# Patient Record
Sex: MALE | Race: WHITE | Employment: OTHER | URBAN - METROPOLITAN AREA
[De-identification: names, ages, dates, MRNs, and addresses within clinical notes are randomized per-mention and may not be internally consistent; named-entity substitution may affect disease eponyms.]

---

## 2019-12-05 ENCOUNTER — APPOINTMENT (OUTPATIENT)
Dept: CT IMAGING | Age: 74
End: 2019-12-05
Payer: MEDICARE

## 2019-12-05 ENCOUNTER — HOSPITAL ENCOUNTER (EMERGENCY)
Age: 74
Discharge: HOME | End: 2019-12-05
Attending: EMERGENCY MEDICINE
Payer: MEDICARE

## 2019-12-05 ENCOUNTER — APPOINTMENT (OUTPATIENT)
Dept: GENERAL RADIOLOGY | Age: 74
End: 2019-12-05
Payer: MEDICARE

## 2019-12-05 VITALS
HEART RATE: 73 BPM | RESPIRATION RATE: 18 BRPM | SYSTOLIC BLOOD PRESSURE: 152 MMHG | HEIGHT: 68 IN | BODY MASS INDEX: 25.31 KG/M2 | TEMPERATURE: 98.7 F | WEIGHT: 167 LBS | OXYGEN SATURATION: 96 % | DIASTOLIC BLOOD PRESSURE: 70 MMHG

## 2019-12-05 DIAGNOSIS — N18.6 ESRD ON HEMODIALYSIS (HCC): ICD-10-CM

## 2019-12-05 DIAGNOSIS — Z99.2 ESRD ON HEMODIALYSIS (HCC): ICD-10-CM

## 2019-12-05 DIAGNOSIS — R51.9 NONINTRACTABLE HEADACHE, UNSPECIFIED CHRONICITY PATTERN, UNSPECIFIED HEADACHE TYPE: Primary | ICD-10-CM

## 2019-12-05 LAB
ALBUMIN SERPL-MCNC: 4.7 G/DL (ref 3.5–5.2)
ALP BLD-CCNC: 79 U/L (ref 40–129)
ALT SERPL-CCNC: <5 U/L (ref 0–40)
ANION GAP SERPL CALCULATED.3IONS-SCNC: 18 MMOL/L (ref 7–16)
APTT: 32.9 SEC (ref 24.5–35.1)
AST SERPL-CCNC: 9 U/L (ref 0–39)
BACTERIA: ABNORMAL /HPF
BILIRUB SERPL-MCNC: 0.6 MG/DL (ref 0–1.2)
BILIRUBIN URINE: NEGATIVE
BLOOD, URINE: NEGATIVE
BUN BLDV-MCNC: 51 MG/DL (ref 8–23)
CALCIUM SERPL-MCNC: 10.2 MG/DL (ref 8.6–10.2)
CHLORIDE BLD-SCNC: 98 MMOL/L (ref 98–107)
CLARITY: CLEAR
CO2: 25 MMOL/L (ref 22–29)
COLOR: YELLOW
CREAT SERPL-MCNC: 6.6 MG/DL (ref 0.7–1.2)
EPITHELIAL CELLS, UA: ABNORMAL /HPF
GFR AFRICAN AMERICAN: 10
GFR NON-AFRICAN AMERICAN: 8 ML/MIN/1.73
GLUCOSE BLD-MCNC: 109 MG/DL (ref 74–99)
GLUCOSE URINE: NEGATIVE MG/DL
HCT VFR BLD CALC: 44 % (ref 37–54)
HEMOGLOBIN: 13.7 G/DL (ref 12.5–16.5)
INR BLD: 1
KETONES, URINE: NEGATIVE MG/DL
LEUKOCYTE ESTERASE, URINE: ABNORMAL
MCH RBC QN AUTO: 32.9 PG (ref 26–35)
MCHC RBC AUTO-ENTMCNC: 31.1 % (ref 32–34.5)
MCV RBC AUTO: 105.5 FL (ref 80–99.9)
NITRITE, URINE: NEGATIVE
PDW BLD-RTO: 15.1 FL (ref 11.5–15)
PH UA: 8.5 (ref 5–9)
PLATELET # BLD: 115 E9/L (ref 130–450)
PMV BLD AUTO: 11.3 FL (ref 7–12)
POTASSIUM REFLEX MAGNESIUM: 5 MMOL/L (ref 3.5–5)
PRO-BNP: ABNORMAL PG/ML (ref 0–450)
PROTEIN UA: 100 MG/DL
PROTHROMBIN TIME: 11.8 SEC (ref 9.3–12.4)
RBC # BLD: 4.17 E12/L (ref 3.8–5.8)
RBC UA: ABNORMAL /HPF (ref 0–2)
SODIUM BLD-SCNC: 141 MMOL/L (ref 132–146)
SPECIFIC GRAVITY UA: 1.01 (ref 1–1.03)
TOTAL PROTEIN: 7.7 G/DL (ref 6.4–8.3)
TROPONIN: 0.05 NG/ML (ref 0–0.03)
TROPONIN: 0.06 NG/ML (ref 0–0.03)
UROBILINOGEN, URINE: 0.2 E.U./DL
WBC # BLD: 5.1 E9/L (ref 4.5–11.5)
WBC UA: ABNORMAL /HPF (ref 0–5)

## 2019-12-05 PROCEDURE — 99284 EMERGENCY DEPT VISIT MOD MDM: CPT

## 2019-12-05 PROCEDURE — 85730 THROMBOPLASTIN TIME PARTIAL: CPT

## 2019-12-05 PROCEDURE — 85027 COMPLETE CBC AUTOMATED: CPT

## 2019-12-05 PROCEDURE — 84484 ASSAY OF TROPONIN QUANT: CPT

## 2019-12-05 PROCEDURE — 70450 CT HEAD/BRAIN W/O DYE: CPT

## 2019-12-05 PROCEDURE — 93005 ELECTROCARDIOGRAM TRACING: CPT | Performed by: EMERGENCY MEDICINE

## 2019-12-05 PROCEDURE — 83880 ASSAY OF NATRIURETIC PEPTIDE: CPT

## 2019-12-05 PROCEDURE — 87088 URINE BACTERIA CULTURE: CPT

## 2019-12-05 PROCEDURE — 80053 COMPREHEN METABOLIC PANEL: CPT

## 2019-12-05 PROCEDURE — 2500000003 HC RX 250 WO HCPCS: Performed by: EMERGENCY MEDICINE

## 2019-12-05 PROCEDURE — 96374 THER/PROPH/DIAG INJ IV PUSH: CPT

## 2019-12-05 PROCEDURE — 81001 URINALYSIS AUTO W/SCOPE: CPT

## 2019-12-05 PROCEDURE — 96375 TX/PRO/DX INJ NEW DRUG ADDON: CPT

## 2019-12-05 PROCEDURE — 85610 PROTHROMBIN TIME: CPT

## 2019-12-05 PROCEDURE — 6360000002 HC RX W HCPCS: Performed by: EMERGENCY MEDICINE

## 2019-12-05 PROCEDURE — 36415 COLL VENOUS BLD VENIPUNCTURE: CPT

## 2019-12-05 PROCEDURE — 71045 X-RAY EXAM CHEST 1 VIEW: CPT

## 2019-12-05 RX ORDER — LABETALOL HYDROCHLORIDE 5 MG/ML
10 INJECTION, SOLUTION INTRAVENOUS ONCE
Status: COMPLETED | OUTPATIENT
Start: 2019-12-05 | End: 2019-12-05

## 2019-12-05 RX ORDER — METOPROLOL TARTRATE 50 MG/1
50 TABLET, FILM COATED ORAL 2 TIMES DAILY
Status: ON HOLD | COMMUNITY
End: 2021-08-13 | Stop reason: HOSPADM

## 2019-12-05 RX ORDER — DIPHENHYDRAMINE HYDROCHLORIDE 50 MG/ML
25 INJECTION INTRAMUSCULAR; INTRAVENOUS ONCE
Status: COMPLETED | OUTPATIENT
Start: 2019-12-05 | End: 2019-12-05

## 2019-12-05 RX ORDER — ROSUVASTATIN CALCIUM 10 MG/1
5 TABLET, COATED ORAL NIGHTLY
COMMUNITY

## 2019-12-05 RX ORDER — AMLODIPINE BESYLATE 10 MG/1
10 TABLET ORAL DAILY
Status: ON HOLD | COMMUNITY
End: 2020-07-14 | Stop reason: ALTCHOICE

## 2019-12-05 RX ORDER — DOXAZOSIN 2 MG/1
2 TABLET ORAL NIGHTLY
Status: ON HOLD | COMMUNITY
End: 2020-07-14 | Stop reason: ALTCHOICE

## 2019-12-05 RX ORDER — HYDRALAZINE HYDROCHLORIDE 20 MG/ML
5 INJECTION INTRAMUSCULAR; INTRAVENOUS ONCE
Status: COMPLETED | OUTPATIENT
Start: 2019-12-05 | End: 2019-12-05

## 2019-12-05 RX ORDER — METOCLOPRAMIDE HYDROCHLORIDE 5 MG/ML
10 INJECTION INTRAMUSCULAR; INTRAVENOUS ONCE
Status: COMPLETED | OUTPATIENT
Start: 2019-12-05 | End: 2019-12-05

## 2019-12-05 RX ADMIN — LABETALOL HYDROCHLORIDE 10 MG: 5 INJECTION INTRAVENOUS at 21:25

## 2019-12-05 RX ADMIN — DIPHENHYDRAMINE HYDROCHLORIDE 25 MG: 50 INJECTION, SOLUTION INTRAMUSCULAR; INTRAVENOUS at 19:07

## 2019-12-05 RX ADMIN — METOCLOPRAMIDE 10 MG: 5 INJECTION, SOLUTION INTRAMUSCULAR; INTRAVENOUS at 19:07

## 2019-12-05 RX ADMIN — HYDRALAZINE HYDROCHLORIDE 5 MG: 20 INJECTION INTRAMUSCULAR; INTRAVENOUS at 18:19

## 2019-12-05 ASSESSMENT — PAIN SCALES - GENERAL
PAINLEVEL_OUTOF10: 0
PAINLEVEL_OUTOF10: 0
PAINLEVEL_OUTOF10: 8

## 2019-12-05 ASSESSMENT — PAIN DESCRIPTION - PAIN TYPE: TYPE: ACUTE PAIN

## 2019-12-05 ASSESSMENT — PAIN DESCRIPTION - LOCATION
LOCATION: HEAD
LOCATION: HEAD

## 2019-12-06 LAB
EKG ATRIAL RATE: 71 BPM
EKG P AXIS: 66 DEGREES
EKG P-R INTERVAL: 142 MS
EKG Q-T INTERVAL: 374 MS
EKG QRS DURATION: 68 MS
EKG QTC CALCULATION (BAZETT): 406 MS
EKG R AXIS: 65 DEGREES
EKG T AXIS: 58 DEGREES
EKG VENTRICULAR RATE: 71 BPM

## 2019-12-06 PROCEDURE — 93010 ELECTROCARDIOGRAM REPORT: CPT | Performed by: INTERNAL MEDICINE

## 2019-12-06 ASSESSMENT — ENCOUNTER SYMPTOMS
SHORTNESS OF BREATH: 0
EYE DISCHARGE: 0
VOMITING: 0
ABDOMINAL PAIN: 0
SINUS PRESSURE: 0
SORE THROAT: 0
EYE PAIN: 0
EYE REDNESS: 0
NAUSEA: 0
WHEEZING: 0
DIARRHEA: 0
COUGH: 0
BACK PAIN: 0

## 2019-12-08 LAB — URINE CULTURE, ROUTINE: NORMAL

## 2019-12-10 ENCOUNTER — HOSPITAL ENCOUNTER (OUTPATIENT)
Age: 74
Discharge: HOME | End: 2019-12-12

## 2019-12-10 PROCEDURE — 88305 TISSUE EXAM BY PATHOLOGIST: CPT

## 2020-02-19 ENCOUNTER — HOSPITAL ENCOUNTER (OUTPATIENT)
Age: 75
Discharge: HOME | End: 2020-02-21
Payer: MEDICARE

## 2020-02-19 LAB — HEMOGLOBIN: 7.6 G/DL (ref 12.5–16.5)

## 2020-02-19 PROCEDURE — 85018 HEMOGLOBIN: CPT

## 2020-03-09 ENCOUNTER — OFFICE VISIT (OUTPATIENT)
Dept: VASCULAR SURGERY | Age: 75
End: 2020-03-09
Payer: MEDICARE

## 2020-03-09 VITALS
BODY MASS INDEX: 25.76 KG/M2 | WEIGHT: 170 LBS | SYSTOLIC BLOOD PRESSURE: 150 MMHG | RESPIRATION RATE: 16 BRPM | HEIGHT: 68 IN | HEART RATE: 78 BPM | DIASTOLIC BLOOD PRESSURE: 78 MMHG

## 2020-03-09 PROBLEM — Z99.2 ENCOUNTER REGARDING VASCULAR ACCESS FOR DIALYSIS FOR ESRD (HCC): Status: ACTIVE | Noted: 2020-03-09

## 2020-03-09 PROBLEM — N18.6 ENCOUNTER REGARDING VASCULAR ACCESS FOR DIALYSIS FOR ESRD (HCC): Status: ACTIVE | Noted: 2020-03-09

## 2020-03-09 PROCEDURE — 99204 OFFICE O/P NEW MOD 45 MIN: CPT | Performed by: SURGERY

## 2020-03-09 RX ORDER — MULTIVIT-MIN/IRON/FOLIC ACID/K 18-600-40
CAPSULE ORAL
COMMUNITY

## 2020-03-09 RX ORDER — HYDRALAZINE HYDROCHLORIDE 50 MG/1
50 TABLET, FILM COATED ORAL 4 TIMES DAILY
Status: ON HOLD | COMMUNITY
End: 2021-08-13 | Stop reason: HOSPADM

## 2020-03-09 NOTE — PROGRESS NOTES
sexual activity: Not on file   Other Topics Concern    Not on file   Social History Narrative    Not on file     No family history on file.   Labs  Lab Results   Component Value Date    WBC 5.1 12/05/2019    HGB 7.6 (L) 02/19/2020    HCT 44.0 12/05/2019     (L) 12/05/2019    PROTIME 11.8 12/05/2019    INR 1.0 12/05/2019    APTT 32.9 12/05/2019    K 5.0 12/05/2019    BUN 51 (H) 12/05/2019    CREATININE 6.6 (H) 12/05/2019       PHYSICAL EXAM:    Resp 16   Ht 5' 8\" (1.727 m)   Wt 170 lb (77.1 kg)   BMI 25.85 kg/m²   CONSTITUTIONAL:   Awake, alert, cooperative  PSYCHIATRIC :  Oriented to time, place and person     Appropriate insight to disease process  EYES: Lids and lashes normal  ENT:  External ears and nose without lesions   Hearing deficits noted  NECK: Supple, symmetrical, trachea midline   Thyroid goiter not appreciated   Carotid bruit notnoted  LUNGS:  No increased work of breathing                 Clear to auscultation  CARDIOVASCULAR:  regular rate and rhythm   ABDOMEN:  soft, non-distended, non-tender   Hernias notnoted   Aorta is not palpable  Lymphatics : Cervical lymphadenopathy notnoted     Femoral lymphadenopathy notnoted  SKIN:   Normal skin color   Texture and turgor normal, no induration  EXTREMITIES:   R UE 5/5 strength   No cyanosis noted in nail beds  L UE 5/5 strength   No cyanosis noted in nail beds  R LE Edema absent   No open wounds  L LE Edema absent   No open wounds    L brachial 2+     L radial 1+     L ulnar No signal     L palmar biphasic     Arterial Dominance radial   R femoral 2+ L femoral 2+   R dorsalis pedis monophasic L dorsalis pedis Weakly biphasic   R posterior tibial Weakly biphasic L posterior tibial Weakly biphasic     RADIOLOGY: As    A/P Dialysis Access  Bleeding with use of fistula - L BC AVF  · Plan for L UE fistulagram  · Previous fistulagram Dr. Mor Rojas used 9 mm balloon for the cephalic vein, subclavian vein junction  · He also plastied an area more proximally  · will schedule OR at patient's convenience   · Pt explained risks, benefits, complications, procedure, alternatives, options, and expected outcomes of each of the above options and was agreeable to proceed  · They understand the recurrent nature of these kind of stenoses    Assx PVD  History of aorto bifemoral bypass  · He is asymptomatic from his PVD  · Continue Medical management with ASA and statin   · Discussed with pt tobacco use and relationship to PVD  pt currently is not a smoker  Pt understands tobacco use can contribute to worsening of pvd and development of limb loss   · Emphasized importance of foot care and to call if develops any wounds, ulcerations, changes in appearance, claudication and/or symptoms  · We will obtain ABIs and PVRs in the future at a follow-up appointment    Anaid Moreno

## 2020-03-09 NOTE — PATIENT INSTRUCTIONS
help?  Call your doctor now or seek immediate medical care if:    · You have signs of infection, such as:  ? Increased pain, swelling, warmth, or redness around the access. ? Red streaks leading from the access. ? Pus draining from the access. ? A fever.     · You do not feel a pulse in your access.    Watch closely for changes in your health, and be sure to contact your doctor if:    · You do not get better as expected. Where can you learn more? Go to https://[employer]. org and sign in to your [employer] account. Enter L169 in the City Notes box to learn more about \"Hemodialysis Vascular Access: Care Instructions. \"     If you do not have an account, please click on the \"Sign Up Now\" link. Current as of: August 11, 2019  Content Version: 12.3  © 1076-8445 Healthwise, Incorporated. Care instructions adapted under license by [facility] ([facility]). If you have questions about a medical condition or this instruction, always ask your healthcare professional. [place] [address] any warranty or liability for your use of this information.

## 2020-03-17 ENCOUNTER — TELEPHONE (OUTPATIENT)
Dept: VASCULAR SURGERY | Age: 75
End: 2020-03-17

## 2020-05-07 ENCOUNTER — TELEPHONE (OUTPATIENT)
Dept: VASCULAR SURGERY | Age: 75
End: 2020-05-07

## 2020-05-19 ENCOUNTER — TELEPHONE (OUTPATIENT)
Dept: VASCULAR SURGERY | Age: 75
End: 2020-05-19

## 2020-07-11 ENCOUNTER — APPOINTMENT (OUTPATIENT)
Dept: GENERAL RADIOLOGY | Age: 75
DRG: 177 | End: 2020-07-11
Payer: MEDICARE

## 2020-07-11 ENCOUNTER — HOSPITAL ENCOUNTER (EMERGENCY)
Age: 75
Discharge: HOME | DRG: 177 | End: 2020-07-11
Attending: EMERGENCY MEDICINE
Payer: MEDICARE

## 2020-07-11 VITALS
TEMPERATURE: 97.8 F | RESPIRATION RATE: 16 BRPM | DIASTOLIC BLOOD PRESSURE: 76 MMHG | HEART RATE: 70 BPM | OXYGEN SATURATION: 96 % | SYSTOLIC BLOOD PRESSURE: 149 MMHG

## 2020-07-11 LAB
ANION GAP SERPL CALCULATED.3IONS-SCNC: 12 MMOL/L (ref 7–16)
ANISOCYTOSIS: ABNORMAL
BASOPHILS ABSOLUTE: 0 E9/L (ref 0–0.2)
BASOPHILS RELATIVE PERCENT: 0 % (ref 0–2)
BUN BLDV-MCNC: 27 MG/DL (ref 8–23)
CALCIUM SERPL-MCNC: 9.5 MG/DL (ref 8.6–10.2)
CHLORIDE BLD-SCNC: 101 MMOL/L (ref 98–107)
CO2: 28 MMOL/L (ref 22–29)
CREAT SERPL-MCNC: 4.8 MG/DL (ref 0.7–1.2)
EKG ATRIAL RATE: 74 BPM
EKG P AXIS: 83 DEGREES
EKG P-R INTERVAL: 142 MS
EKG Q-T INTERVAL: 394 MS
EKG QRS DURATION: 78 MS
EKG QTC CALCULATION (BAZETT): 437 MS
EKG R AXIS: 69 DEGREES
EKG T AXIS: 61 DEGREES
EKG VENTRICULAR RATE: 74 BPM
EOSINOPHILS ABSOLUTE: 0.03 E9/L (ref 0.05–0.5)
EOSINOPHILS RELATIVE PERCENT: 1 % (ref 0–6)
GFR AFRICAN AMERICAN: 14
GFR NON-AFRICAN AMERICAN: 12 ML/MIN/1.73
GLUCOSE BLD-MCNC: 94 MG/DL (ref 74–99)
HCT VFR BLD CALC: 27.6 % (ref 37–54)
HEMOGLOBIN: 8.6 G/DL (ref 12.5–16.5)
LYMPHOCYTES ABSOLUTE: 0.22 E9/L (ref 1.5–4)
LYMPHOCYTES RELATIVE PERCENT: 8 % (ref 20–42)
MCH RBC QN AUTO: 32.7 PG (ref 26–35)
MCHC RBC AUTO-ENTMCNC: 31.2 % (ref 32–34.5)
MCV RBC AUTO: 104.9 FL (ref 80–99.9)
MONOCYTES ABSOLUTE: 0.27 E9/L (ref 0.1–0.95)
MONOCYTES RELATIVE PERCENT: 10 % (ref 2–12)
NEUTROPHILS ABSOLUTE: 2.19 E9/L (ref 1.8–7.3)
NEUTROPHILS RELATIVE PERCENT: 81 % (ref 43–80)
PDW BLD-RTO: 15.9 FL (ref 11.5–15)
PLATELET # BLD: 72 E9/L (ref 130–450)
PLATELET CONFIRMATION: NORMAL
PMV BLD AUTO: 12 FL (ref 7–12)
POIKILOCYTES: ABNORMAL
POTASSIUM REFLEX MAGNESIUM: 4.1 MMOL/L (ref 3.5–5)
RBC # BLD: 2.63 E12/L (ref 3.8–5.8)
SODIUM BLD-SCNC: 141 MMOL/L (ref 132–146)
TEAR DROP CELLS: ABNORMAL
TROPONIN: 0.08 NG/ML (ref 0–0.03)
WBC # BLD: 2.7 E9/L (ref 4.5–11.5)

## 2020-07-11 PROCEDURE — U0003 INFECTIOUS AGENT DETECTION BY NUCLEIC ACID (DNA OR RNA); SEVERE ACUTE RESPIRATORY SYNDROME CORONAVIRUS 2 (SARS-COV-2) (CORONAVIRUS DISEASE [COVID-19]), AMPLIFIED PROBE TECHNIQUE, MAKING USE OF HIGH THROUGHPUT TECHNOLOGIES AS DESCRIBED BY CMS-2020-01-R: HCPCS

## 2020-07-11 PROCEDURE — 80048 BASIC METABOLIC PNL TOTAL CA: CPT

## 2020-07-11 PROCEDURE — 85025 COMPLETE CBC W/AUTO DIFF WBC: CPT

## 2020-07-11 PROCEDURE — 84484 ASSAY OF TROPONIN QUANT: CPT

## 2020-07-11 PROCEDURE — 71045 X-RAY EXAM CHEST 1 VIEW: CPT

## 2020-07-11 PROCEDURE — 99284 EMERGENCY DEPT VISIT MOD MDM: CPT

## 2020-07-11 PROCEDURE — 93005 ELECTROCARDIOGRAM TRACING: CPT | Performed by: STUDENT IN AN ORGANIZED HEALTH CARE EDUCATION/TRAINING PROGRAM

## 2020-07-11 PROCEDURE — 93010 ELECTROCARDIOGRAM REPORT: CPT | Performed by: INTERNAL MEDICINE

## 2020-07-11 ASSESSMENT — ENCOUNTER SYMPTOMS
BACK PAIN: 0
VOMITING: 0
TROUBLE SWALLOWING: 0
DIARRHEA: 0
CONSTIPATION: 0
ABDOMINAL PAIN: 0
EYE PAIN: 0
PHOTOPHOBIA: 0
NAUSEA: 0
SORE THROAT: 0
APNEA: 0
EYE REDNESS: 0
WHEEZING: 0
SHORTNESS OF BREATH: 1
COUGH: 1
RHINORRHEA: 0
CHEST TIGHTNESS: 0

## 2020-07-11 ASSESSMENT — PAIN DESCRIPTION - PAIN TYPE: TYPE: ACUTE PAIN

## 2020-07-11 ASSESSMENT — PAIN SCALES - GENERAL: PAINLEVEL_OUTOF10: 6

## 2020-07-11 NOTE — ED PROVIDER NOTES
[facility] ENCOUNTER      Pt Name: Lynette Morales  MRN: [id number]  [place] 1945  Date of evaluation: 7/11/2020      CHIEF COMPLAINT       Chief Complaint   Patient presents with    Headache    Generalized Body Aches     wife and son have covid        HPI  Lynette Morales is a 75 y.o. male with history of end-stage renal disease on dialysis Monday Wednesday Friday, hyperlipidemia, hypertension who presents to the emergency department with complaints of headache, generalized body aches, chills and intermittent shortness of breath. He states that both his wife who he lives with and his son have been tested positive for COVID. He states his symptoms began for 5 days ago and have gradually worsened. He states that he feels generally weak. He has chills at home. The shortness of breath is mild, intermittent. Nothing seems to cause the shortness of breath. He denies dyspnea on exertion. Denies any associated chest pain, lightheadedness, syncope, nausea, vomiting, diarrhea, or abdominal pain. His last dialysis session was on Friday  And was the full time. He has not missed any dialysis sessions. He also endorses ageusia. No history of any heart problems. Except as noted above the remainder of the review of systems was reviewed and negative. Review of Systems   Constitutional: Positive for chills and fatigue. Negative for activity change, diaphoresis and fever. HENT: Negative for rhinorrhea, sore throat and trouble swallowing. Eyes: Negative for photophobia, pain and redness. Respiratory: Positive for cough and shortness of breath. Negative for apnea, chest tightness and wheezing. Cardiovascular: Negative for chest pain, palpitations and leg swelling. Gastrointestinal: Negative for abdominal pain, constipation, diarrhea, nausea and vomiting. Endocrine: Negative for polyuria.    Genitourinary: Negative for Sensory: No sensory deficit. Motor: No weakness or abnormal muscle tone. Psychiatric:         Mood and Affect: Mood normal.         Behavior: Behavior normal.          Procedures     MDM   This is a 76-year-old male with a history of end-stage renal disease on dialysis, hyperlipidemia, hypertension who presents to the emergency department with complaints of fatigue, body aches, chills intermittent shortness of breath. In the emergency department he is awake and alert, well-appearing, no respiratory distress. Ambulates without difficulty, does not become hypoxic with ambulation. Chest x-ray shows no evidence of pneumonia. COVID test pending. EKG showed no ischemic changes, less likely acute cardiac etiology of her symptoms today. Troponin was 0.08 however consistent with previous and likely secondary to his end-stage renal disease, patient asymptomatic with no chest pain here. Electrolytes show potassium normal at 4.1. He has chronic stable anemia hemoglobin 8.6 actually improved compared to previous. CBC did show thrombocytopenia with platelet count of 72 slightly worse compared to previous. Discussed this with the patient will follow-up with his regular doctor. Does have mild leukopenia. Symptoms seem most consistent with viral syndrome. Both his wife from he lives with and his daughter are positive for COVID and my clinical suspicion is that he is the COVID virus. Discussed with him the importance of quarantining and to call his dialysis center so that they can organize appropriate isolation during his dialysis sessions.   Discussed plan for discharge home as well as return precautions and the patient understands and agrees to the plan.                  --------------------------------------------- PAST HISTORY ---------------------------------------------  Past Medical History:  has a past medical history of Blind left eye, Chronic kidney disease, Dialysis patient ([facility] Hyperlipidemia, and Hypertension. Past Surgical History:  has a past surgical history that includes AV fistula creation (Left, 2015); HEMODIALYSIS ACCESS PERCUTANEOUS REVISION (Left, 2019); and PERIPHERAL VASCULAR BYPASS (2008). Social History:  reports that he has quit smoking. He has never used smokeless tobacco. He reports current alcohol use. He reports that he does not use drugs. Family History: family history is not on file. The patients home medications have been reviewed. Allergies: Patient has no known allergies.     -------------------------------------------------- RESULTS -------------------------------------------------  Labs:  Results for orders placed or performed during the hospital encounter of 07/11/20   CBC Auto Differential   Result Value Ref Range    WBC 2.7 (L) 4.5 - 11.5 E9/L    RBC 2.63 (L) 3.80 - 5.80 E12/L    Hemoglobin 8.6 (L) 12.5 - 16.5 g/dL    Hematocrit 27.6 (L) 37.0 - 54.0 %    .9 (H) 80.0 - 99.9 fL    MCH 32.7 26.0 - 35.0 pg    MCHC 31.2 (L) 32.0 - 34.5 %    RDW 15.9 (H) 11.5 - 15.0 fL    Platelets 72 (L) 462 - 450 E9/L    MPV 12.0 7.0 - 12.0 fL    Neutrophils % 81.0 (H) 43.0 - 80.0 %    Lymphocytes % 8.0 (L) 20.0 - 42.0 %    Monocytes % 10.0 2.0 - 12.0 %    Eosinophils % 1.0 0.0 - 6.0 %    Basophils % 0.0 0.0 - 2.0 %    Neutrophils Absolute 2.19 1.80 - 7.30 E9/L    Lymphocytes Absolute 0.22 (L) 1.50 - 4.00 E9/L    Monocytes Absolute 0.27 0.10 - 0.95 E9/L    Eosinophils Absolute 0.03 (L) 0.05 - 0.50 E9/L    Basophils Absolute 0.00 0.00 - 0.20 E9/L    Anisocytosis 1+     Poikilocytes 1+     Tear Drop Cells 1+    Basic Metabolic Panel w/ Reflex to MG   Result Value Ref Range    Sodium 141 132 - 146 mmol/L    Potassium reflex Magnesium 4.1 3.5 - 5.0 mmol/L    Chloride 101 98 - 107 mmol/L    CO2 28 22 - 29 mmol/L    Anion Gap 12 7 - 16 mmol/L    Glucose 94 74 - 99 mg/dL    BUN 27 (H) 8 - 23 mg/dL    CREATININE 4.8 (H) 0.7 - 1.2 mg/dL    GFR Non-African American 12 >=60 mL/min/1.73 GFR African American 14     Calcium 9.5 8.6 - 10.2 mg/dL   Troponin   Result Value Ref Range    Troponin 0.08 (H) 0.00 - 0.03 ng/mL   Covid-19 Ambulatory   Result Value Ref Range    Source NP swab    Platelet Confirmation   Result Value Ref Range    Platelet Confirmation CONFIRMED    EKG 12 Lead   Result Value Ref Range    Ventricular Rate 74 BPM    Atrial Rate 74 BPM    P-R Interval 142 ms    QRS Duration 78 ms    Q-T Interval 394 ms    QTc Calculation (Bazett) 437 ms    P Axis 83 degrees    R Axis 69 degrees    T Axis 61 degrees       Radiology:  XR CHEST PORTABLE   Final Result   No acute process                   ------------------------- NURSING NOTES AND VITALS REVIEWED ---------------------------  Date / Time Roomed:  7/11/2020  8:53 AM  ED Bed Assignment:  05/05    The nursing notes within the ED encounter and vital signs as below have been reviewed. BP (!) 149/76   Pulse 70   Temp 97.8 °F (36.6 °C)   Resp 16   SpO2 96%   Oxygen Saturation Interpretation: Normal      ------------------------------------------ PROGRESS NOTES ------------------------------------------    I have spoken with the patient and discussed todays results, in addition to providing specific details for the plan of care and counseling regarding the diagnosis and prognosis. Their questions are answered at this time and they are agreeable with the plan. I discussed at length with them reasons for immediate return here for re evaluation. They will followup with their primary care physician by calling their office tomorrow. --------------------------------- ADDITIONAL PROVIDER NOTES ---------------------------------  At this time the patient is without objective evidence of an acute process requiring hospitalization or inpatient management. They have remained hemodynamically stable throughout their entire ED visit and are stable for discharge with outpatient follow-up.      The plan has been discussed in detail and they are aware of the specific conditions for emergent return, as well as the importance of follow-up. Discharge Medication List as of 7/11/2020 11:50 AM          Diagnosis:  1. Thrombocytopenia ([facility]    2. Macrocytic anemia    3. ESRD (end stage renal disease) ([facility]    4. Viral syndrome    5. Body aches        Disposition:  Patient's disposition: Discharge to home  Patient's condition is stable.        Emre Iraheta,   Resident  07/11/20 1932

## 2020-07-13 ENCOUNTER — CARE COORDINATION (OUTPATIENT)
Dept: CARE COORDINATION | Age: 75
End: 2020-07-13

## 2020-07-13 ENCOUNTER — HOSPITAL ENCOUNTER (INPATIENT)
Age: 75
LOS: 16 days | Discharge: HOME | DRG: 177 | End: 2020-07-29
Attending: EMERGENCY MEDICINE | Admitting: INTERNAL MEDICINE
Payer: MEDICARE

## 2020-07-13 ENCOUNTER — APPOINTMENT (OUTPATIENT)
Dept: GENERAL RADIOLOGY | Age: 75
DRG: 177 | End: 2020-07-13
Payer: MEDICARE

## 2020-07-13 PROBLEM — U07.1 COVID-19: Status: ACTIVE | Noted: 2020-07-13

## 2020-07-13 LAB
ALBUMIN SERPL-MCNC: 4.2 G/DL (ref 3.5–5.2)
ALP BLD-CCNC: 67 U/L (ref 40–129)
ALT SERPL-CCNC: 8 U/L (ref 0–40)
ANION GAP SERPL CALCULATED.3IONS-SCNC: 18 MMOL/L (ref 7–16)
ANISOCYTOSIS: ABNORMAL
AST SERPL-CCNC: 21 U/L (ref 0–39)
BASOPHILS ABSOLUTE: 0 E9/L (ref 0–0.2)
BASOPHILS RELATIVE PERCENT: 0 % (ref 0–2)
BILIRUB SERPL-MCNC: 0.3 MG/DL (ref 0–1.2)
BUN BLDV-MCNC: 62 MG/DL (ref 8–23)
BURR CELLS: ABNORMAL
CALCIUM SERPL-MCNC: 9 MG/DL (ref 8.6–10.2)
CHLORIDE BLD-SCNC: 93 MMOL/L (ref 98–107)
CO2: 22 MMOL/L (ref 22–29)
CREAT SERPL-MCNC: 8.2 MG/DL (ref 0.7–1.2)
EOSINOPHILS ABSOLUTE: 0 E9/L (ref 0.05–0.5)
EOSINOPHILS RELATIVE PERCENT: 0 % (ref 0–6)
GFR AFRICAN AMERICAN: 8
GFR NON-AFRICAN AMERICAN: 6 ML/MIN/1.73
GLUCOSE BLD-MCNC: 112 MG/DL (ref 74–99)
HCT VFR BLD CALC: 26.9 % (ref 37–54)
HEMOGLOBIN: 8.3 G/DL (ref 12.5–16.5)
LACTIC ACID, SEPSIS: 1.5 MMOL/L (ref 0.5–1.9)
LYMPHOCYTES ABSOLUTE: 0.43 E9/L (ref 1.5–4)
LYMPHOCYTES RELATIVE PERCENT: 9 % (ref 20–42)
MCH RBC QN AUTO: 32.5 PG (ref 26–35)
MCHC RBC AUTO-ENTMCNC: 30.9 % (ref 32–34.5)
MCV RBC AUTO: 105.5 FL (ref 80–99.9)
MONOCYTES ABSOLUTE: 0.29 E9/L (ref 0.1–0.95)
MONOCYTES RELATIVE PERCENT: 6 % (ref 2–12)
NEUTROPHILS ABSOLUTE: 4.08 E9/L (ref 1.8–7.3)
NEUTROPHILS RELATIVE PERCENT: 85 % (ref 43–80)
OVALOCYTES: ABNORMAL
PDW BLD-RTO: 16.3 FL (ref 11.5–15)
PLATELET # BLD: 87 E9/L (ref 130–450)
PLATELET CONFIRMATION: NORMAL
PMV BLD AUTO: 11.9 FL (ref 7–12)
POIKILOCYTES: ABNORMAL
POTASSIUM SERPL-SCNC: 5.3 MMOL/L (ref 3.5–5)
RBC # BLD: 2.55 E12/L (ref 3.8–5.8)
SARS-COV-2, NAAT: DETECTED
SODIUM BLD-SCNC: 133 MMOL/L (ref 132–146)
TOTAL PROTEIN: 6.4 G/DL (ref 6.4–8.3)
TROPONIN: 0.09 NG/ML (ref 0–0.03)
WBC # BLD: 4.8 E9/L (ref 4.5–11.5)

## 2020-07-13 PROCEDURE — 99285 EMERGENCY DEPT VISIT HI MDM: CPT

## 2020-07-13 PROCEDURE — 83605 ASSAY OF LACTIC ACID: CPT

## 2020-07-13 PROCEDURE — 99223 1ST HOSP IP/OBS HIGH 75: CPT | Performed by: INTERNAL MEDICINE

## 2020-07-13 PROCEDURE — 84484 ASSAY OF TROPONIN QUANT: CPT

## 2020-07-13 PROCEDURE — 80053 COMPREHEN METABOLIC PANEL: CPT

## 2020-07-13 PROCEDURE — 2700000000 HC OXYGEN THERAPY PER DAY

## 2020-07-13 PROCEDURE — U0002 COVID-19 LAB TEST NON-CDC: HCPCS

## 2020-07-13 PROCEDURE — 2580000003 HC RX 258: Performed by: STUDENT IN AN ORGANIZED HEALTH CARE EDUCATION/TRAINING PROGRAM

## 2020-07-13 PROCEDURE — 87040 BLOOD CULTURE FOR BACTERIA: CPT

## 2020-07-13 PROCEDURE — 6360000002 HC RX W HCPCS: Performed by: STUDENT IN AN ORGANIZED HEALTH CARE EDUCATION/TRAINING PROGRAM

## 2020-07-13 PROCEDURE — 93005 ELECTROCARDIOGRAM TRACING: CPT | Performed by: STUDENT IN AN ORGANIZED HEALTH CARE EDUCATION/TRAINING PROGRAM

## 2020-07-13 PROCEDURE — 2060000000 HC ICU INTERMEDIATE R&B

## 2020-07-13 PROCEDURE — 85025 COMPLETE CBC W/AUTO DIFF WBC: CPT

## 2020-07-13 PROCEDURE — 71045 X-RAY EXAM CHEST 1 VIEW: CPT

## 2020-07-13 PROCEDURE — 6370000000 HC RX 637 (ALT 250 FOR IP): Performed by: EMERGENCY MEDICINE

## 2020-07-13 RX ORDER — SODIUM CHLORIDE 0.9 % (FLUSH) 0.9 %
10 SYRINGE (ML) INJECTION EVERY 12 HOURS SCHEDULED
Status: DISCONTINUED | OUTPATIENT
Start: 2020-07-13 | End: 2020-07-13 | Stop reason: SDUPTHER

## 2020-07-13 RX ORDER — SODIUM CHLORIDE 0.9 % (FLUSH) 0.9 %
10 SYRINGE (ML) INJECTION EVERY 12 HOURS SCHEDULED
Status: DISCONTINUED | OUTPATIENT
Start: 2020-07-13 | End: 2020-07-29 | Stop reason: HOSPADM

## 2020-07-13 RX ORDER — METOPROLOL TARTRATE 50 MG/1
50 TABLET, FILM COATED ORAL 2 TIMES DAILY
Status: DISCONTINUED | OUTPATIENT
Start: 2020-07-13 | End: 2020-07-29 | Stop reason: HOSPADM

## 2020-07-13 RX ORDER — DOXAZOSIN 2 MG/1
2 TABLET ORAL NIGHTLY
Status: DISCONTINUED | OUTPATIENT
Start: 2020-07-13 | End: 2020-07-15

## 2020-07-13 RX ORDER — HEPARIN SODIUM 10000 [USP'U]/ML
5000 INJECTION, SOLUTION INTRAVENOUS; SUBCUTANEOUS EVERY 8 HOURS SCHEDULED
Status: DISCONTINUED | OUTPATIENT
Start: 2020-07-13 | End: 2020-07-25

## 2020-07-13 RX ORDER — SODIUM CHLORIDE 0.9 % (FLUSH) 0.9 %
10 SYRINGE (ML) INJECTION PRN
Status: DISCONTINUED | OUTPATIENT
Start: 2020-07-13 | End: 2020-07-29 | Stop reason: HOSPADM

## 2020-07-13 RX ORDER — ASCORBIC ACID 500 MG
1000 TABLET ORAL 2 TIMES DAILY
Status: DISCONTINUED | OUTPATIENT
Start: 2020-07-13 | End: 2020-07-29 | Stop reason: HOSPADM

## 2020-07-13 RX ORDER — ONDANSETRON 2 MG/ML
4 INJECTION INTRAMUSCULAR; INTRAVENOUS EVERY 6 HOURS PRN
Status: DISCONTINUED | OUTPATIENT
Start: 2020-07-13 | End: 2020-07-29 | Stop reason: HOSPADM

## 2020-07-13 RX ORDER — POLYETHYLENE GLYCOL 3350 17 G/17G
17 POWDER, FOR SOLUTION ORAL DAILY PRN
Status: DISCONTINUED | OUTPATIENT
Start: 2020-07-13 | End: 2020-07-29 | Stop reason: HOSPADM

## 2020-07-13 RX ORDER — SODIUM CHLORIDE 0.9 % (FLUSH) 0.9 %
10 SYRINGE (ML) INJECTION PRN
Status: DISCONTINUED | OUTPATIENT
Start: 2020-07-13 | End: 2020-07-13 | Stop reason: SDUPTHER

## 2020-07-13 RX ORDER — ACETAMINOPHEN 325 MG/1
650 TABLET ORAL EVERY 6 HOURS PRN
Status: DISCONTINUED | OUTPATIENT
Start: 2020-07-13 | End: 2020-07-29 | Stop reason: HOSPADM

## 2020-07-13 RX ORDER — PROMETHAZINE HYDROCHLORIDE 25 MG/1
12.5 TABLET ORAL EVERY 6 HOURS PRN
Status: DISCONTINUED | OUTPATIENT
Start: 2020-07-13 | End: 2020-07-29 | Stop reason: HOSPADM

## 2020-07-13 RX ORDER — ZINC SULFATE 50(220)MG
50 CAPSULE ORAL DAILY
Status: DISCONTINUED | OUTPATIENT
Start: 2020-07-14 | End: 2020-07-29 | Stop reason: HOSPADM

## 2020-07-13 RX ORDER — ACETAMINOPHEN 500 MG
1000 TABLET ORAL ONCE
Status: COMPLETED | OUTPATIENT
Start: 2020-07-13 | End: 2020-07-13

## 2020-07-13 RX ORDER — HYDRALAZINE HYDROCHLORIDE 50 MG/1
50 TABLET, FILM COATED ORAL 3 TIMES DAILY
Status: DISCONTINUED | OUTPATIENT
Start: 2020-07-13 | End: 2020-07-29 | Stop reason: HOSPADM

## 2020-07-13 RX ORDER — ROSUVASTATIN CALCIUM 5 MG/1
5 TABLET, COATED ORAL NIGHTLY
Status: DISCONTINUED | OUTPATIENT
Start: 2020-07-13 | End: 2020-07-29 | Stop reason: HOSPADM

## 2020-07-13 RX ORDER — CALCIUM ACETATE 667 MG/1
2001 CAPSULE ORAL
Status: DISCONTINUED | OUTPATIENT
Start: 2020-07-14 | End: 2020-07-14 | Stop reason: SDUPTHER

## 2020-07-13 RX ORDER — ACETAMINOPHEN 650 MG/1
650 SUPPOSITORY RECTAL EVERY 6 HOURS PRN
Status: DISCONTINUED | OUTPATIENT
Start: 2020-07-13 | End: 2020-07-29 | Stop reason: HOSPADM

## 2020-07-13 RX ORDER — ASPIRIN 81 MG/1
81 TABLET, CHEWABLE ORAL DAILY
Status: DISCONTINUED | OUTPATIENT
Start: 2020-07-14 | End: 2020-07-25

## 2020-07-13 RX ORDER — AMLODIPINE BESYLATE 10 MG/1
10 TABLET ORAL DAILY
Status: DISCONTINUED | OUTPATIENT
Start: 2020-07-14 | End: 2020-07-29 | Stop reason: HOSPADM

## 2020-07-13 RX ADMIN — SODIUM CHLORIDE, PRESERVATIVE FREE 10 ML: 5 INJECTION INTRAVENOUS at 20:04

## 2020-07-13 RX ADMIN — ACETAMINOPHEN 1000 MG: 500 TABLET ORAL at 20:03

## 2020-07-13 RX ADMIN — CALCIUM GLUCONATE 1 G: 98 INJECTION, SOLUTION INTRAVENOUS at 21:17

## 2020-07-13 ASSESSMENT — ENCOUNTER SYMPTOMS
SORE THROAT: 0
ABDOMINAL PAIN: 0
COUGH: 1
VOMITING: 0
SHORTNESS OF BREATH: 1
EYE DISCHARGE: 0

## 2020-07-13 ASSESSMENT — PAIN SCALES - GENERAL
PAINLEVEL_OUTOF10: 0
PAINLEVEL_OUTOF10: 0

## 2020-07-13 NOTE — ED NOTES
Crt of 8.22 reported to , no orders given to this RN     Cedrick Perry, ANTONI  10/11/02 [facility], ANTONI  75/06/57 1954

## 2020-07-13 NOTE — CARE COORDINATION
Patient contacted regarding Lina Watkins. Discussed COVID-19 related testing which was available at this time. Test results were pending. Patient informed of results, if available? Yes    Care Transition Nurse/ Ambulatory Care Manager contacted the patient by telephone to perform post discharge assessment. Verified name and  with patient as identifiers. Provided introduction to self, and explanation of the CTN/ACM role, and reason for call due to risk factors for infection and/or exposure to COVID-19. Symptoms reviewed with patient who verbalized the following symptoms: pain or aching joints. Due to new onset of symptoms encounter was not routed to provider for escalation. Discussed follow-up appointments. If no appointment was previously scheduled, appointment scheduling offered: Yes  [facility] follow up appointment(s): No future appointments. Non-[facility] follow up appointment(s): na     Patient has following risk factors of: chronic kidney disease. CTN/ACM reviewed discharge instructions, medical action plan and red flags such as increased shortness of breath, increasing fever and signs of decompensation with patient who verbalized understanding. Discussed exposure protocols and quarantine with CDC Guidelines What to do if you are sick with coronavirus disease .  Patient was given an opportunity for questions and concerns. The patient agrees to contact the [facility] exposure line [phone], [facility] department [place] Department of Health: ([phone]) and PCP office for questions related to their healthcare. CTN/ACM provided contact information for future needs.     Reviewed and educated patient on any new and changed medications related to discharge diagnosis     Patient/family/caregiver given information for [employer] Loop and agrees to enroll no  Patient's preferred e-mail: na   Patient's preferred phone number: na  Based on Loop alert triggers, patient will be contacted by nurse care manager

## 2020-07-14 LAB
ANION GAP SERPL CALCULATED.3IONS-SCNC: 17 MMOL/L (ref 7–16)
BACTERIA: NORMAL /HPF
BILIRUBIN URINE: NEGATIVE
BLOOD, URINE: NEGATIVE
BUN BLDV-MCNC: 69 MG/DL (ref 8–23)
C-REACTIVE PROTEIN: 3.1 MG/DL (ref 0–0.4)
CALCIUM SERPL-MCNC: 9.9 MG/DL (ref 8.6–10.2)
CHLORIDE BLD-SCNC: 94 MMOL/L (ref 98–107)
CLARITY: CLEAR
CO2: 23 MMOL/L (ref 22–29)
COLOR: YELLOW
CREAT SERPL-MCNC: 8.9 MG/DL (ref 0.7–1.2)
D DIMER: 969 NG/ML DDU
EKG ATRIAL RATE: 84 BPM
EKG P AXIS: 72 DEGREES
EKG P-R INTERVAL: 134 MS
EKG Q-T INTERVAL: 350 MS
EKG QRS DURATION: 72 MS
EKG QTC CALCULATION (BAZETT): 413 MS
EKG R AXIS: 66 DEGREES
EKG T AXIS: 61 DEGREES
EKG VENTRICULAR RATE: 84 BPM
EPITHELIAL CELLS, UA: NORMAL /HPF
FERRITIN: 898 NG/ML
FIBRINOGEN: 359 MG/DL (ref 225–540)
FOLATE: 8.1 NG/ML (ref 4.8–24.2)
GFR AFRICAN AMERICAN: 7
GFR NON-AFRICAN AMERICAN: 6 ML/MIN/1.73
GLUCOSE BLD-MCNC: 120 MG/DL (ref 74–99)
GLUCOSE URINE: NEGATIVE MG/DL
HCT VFR BLD CALC: 26.4 % (ref 37–54)
HEMOGLOBIN: 8.2 G/DL (ref 12.5–16.5)
KETONES, URINE: NEGATIVE MG/DL
LACTATE DEHYDROGENASE: 192 U/L (ref 135–225)
LEUKOCYTE ESTERASE, URINE: NEGATIVE
MCH RBC QN AUTO: 32.9 PG (ref 26–35)
MCHC RBC AUTO-ENTMCNC: 31.1 % (ref 32–34.5)
MCV RBC AUTO: 106 FL (ref 80–99.9)
NITRITE, URINE: NEGATIVE
PDW BLD-RTO: 16.5 FL (ref 11.5–15)
PH UA: 8 (ref 5–9)
PLATELET # BLD: 79 E9/L (ref 130–450)
PLATELET CONFIRMATION: NORMAL
PMV BLD AUTO: 11.9 FL (ref 7–12)
POTASSIUM REFLEX MAGNESIUM: 4.9 MMOL/L (ref 3.5–5)
PROCALCITONIN: 2.21 NG/ML (ref 0–0.08)
PROTEIN UA: >=300 MG/DL
RBC # BLD: 2.49 E12/L (ref 3.8–5.8)
RBC UA: NORMAL /HPF (ref 0–2)
SARS-COV-2: DETECTED
SEDIMENTATION RATE, ERYTHROCYTE: 2 MM/HR (ref 0–15)
SODIUM BLD-SCNC: 134 MMOL/L (ref 132–146)
SOURCE: ABNORMAL
SPECIFIC GRAVITY UA: 1.01 (ref 1–1.03)
UROBILINOGEN, URINE: 0.2 E.U./DL
VITAMIN B-12: 564 PG/ML (ref 211–946)
WBC # BLD: 4.2 E9/L (ref 4.5–11.5)
WBC UA: NORMAL /HPF (ref 0–5)

## 2020-07-14 PROCEDURE — 2700000000 HC OXYGEN THERAPY PER DAY

## 2020-07-14 PROCEDURE — 90935 HEMODIALYSIS ONE EVALUATION: CPT | Performed by: INTERNAL MEDICINE

## 2020-07-14 PROCEDURE — 83615 LACTATE (LD) (LDH) ENZYME: CPT

## 2020-07-14 PROCEDURE — 6370000000 HC RX 637 (ALT 250 FOR IP): Performed by: NURSE PRACTITIONER

## 2020-07-14 PROCEDURE — 6360000002 HC RX W HCPCS: Performed by: INTERNAL MEDICINE

## 2020-07-14 PROCEDURE — 85384 FIBRINOGEN ACTIVITY: CPT

## 2020-07-14 PROCEDURE — 82728 ASSAY OF FERRITIN: CPT

## 2020-07-14 PROCEDURE — 85378 FIBRIN DEGRADE SEMIQUANT: CPT

## 2020-07-14 PROCEDURE — 5A1D70Z PERFORMANCE OF URINARY FILTRATION, INTERMITTENT, LESS THAN 6 HOURS PER DAY: ICD-10-PCS | Performed by: INTERNAL MEDICINE

## 2020-07-14 PROCEDURE — 82746 ASSAY OF FOLIC ACID SERUM: CPT

## 2020-07-14 PROCEDURE — 82607 VITAMIN B-12: CPT

## 2020-07-14 PROCEDURE — 85651 RBC SED RATE NONAUTOMATED: CPT

## 2020-07-14 PROCEDURE — 85027 COMPLETE CBC AUTOMATED: CPT

## 2020-07-14 PROCEDURE — 2500000003 HC RX 250 WO HCPCS: Performed by: INTERNAL MEDICINE

## 2020-07-14 PROCEDURE — 86140 C-REACTIVE PROTEIN: CPT

## 2020-07-14 PROCEDURE — 36415 COLL VENOUS BLD VENIPUNCTURE: CPT

## 2020-07-14 PROCEDURE — 2580000003 HC RX 258: Performed by: INTERNAL MEDICINE

## 2020-07-14 PROCEDURE — 2060000000 HC ICU INTERMEDIATE R&B

## 2020-07-14 PROCEDURE — 84145 PROCALCITONIN (PCT): CPT

## 2020-07-14 PROCEDURE — 81001 URINALYSIS AUTO W/SCOPE: CPT

## 2020-07-14 PROCEDURE — 99232 SBSQ HOSP IP/OBS MODERATE 35: CPT | Performed by: FAMILY MEDICINE

## 2020-07-14 PROCEDURE — 6370000000 HC RX 637 (ALT 250 FOR IP): Performed by: INTERNAL MEDICINE

## 2020-07-14 PROCEDURE — 2580000003 HC RX 258: Performed by: SPECIALIST

## 2020-07-14 PROCEDURE — 80048 BASIC METABOLIC PNL TOTAL CA: CPT

## 2020-07-14 RX ORDER — 0.9 % SODIUM CHLORIDE 0.9 %
30 INTRAVENOUS SOLUTION INTRAVENOUS PRN
Status: DISCONTINUED | OUTPATIENT
Start: 2020-07-14 | End: 2020-07-29 | Stop reason: HOSPADM

## 2020-07-14 RX ORDER — FERROUS SULFATE 325(65) MG
325 TABLET ORAL 2 TIMES DAILY WITH MEALS
Status: DISCONTINUED | OUTPATIENT
Start: 2020-07-14 | End: 2020-07-29 | Stop reason: HOSPADM

## 2020-07-14 RX ORDER — NIFEDIPINE 30 MG/1
60 TABLET, FILM COATED, EXTENDED RELEASE ORAL EVERY 12 HOURS
Status: ON HOLD | COMMUNITY
End: 2020-07-29 | Stop reason: HOSPADM

## 2020-07-14 RX ORDER — DEXAMETHASONE SODIUM PHOSPHATE 4 MG/ML
6 INJECTION, SOLUTION INTRA-ARTICULAR; INTRALESIONAL; INTRAMUSCULAR; INTRAVENOUS; SOFT TISSUE DAILY
Status: DISCONTINUED | OUTPATIENT
Start: 2020-07-14 | End: 2020-07-21

## 2020-07-14 RX ORDER — CALCIUM ACETATE 667 MG/1
2001 CAPSULE ORAL
Status: DISCONTINUED | OUTPATIENT
Start: 2020-07-14 | End: 2020-07-29 | Stop reason: HOSPADM

## 2020-07-14 RX ADMIN — DOXAZOSIN 2 MG: 2 TABLET ORAL at 19:55

## 2020-07-14 RX ADMIN — METOPROLOL TARTRATE 50 MG: 50 TABLET, FILM COATED ORAL at 07:19

## 2020-07-14 RX ADMIN — DOXAZOSIN 2 MG: 2 TABLET ORAL at 00:05

## 2020-07-14 RX ADMIN — HEPARIN SODIUM 5000 UNITS: 10000 INJECTION INTRAVENOUS; SUBCUTANEOUS at 00:05

## 2020-07-14 RX ADMIN — METOPROLOL TARTRATE 50 MG: 50 TABLET, FILM COATED ORAL at 00:05

## 2020-07-14 RX ADMIN — Medication 1000 MG: at 00:05

## 2020-07-14 RX ADMIN — ACETAMINOPHEN 650 MG: 325 TABLET ORAL at 13:25

## 2020-07-14 RX ADMIN — ZINC SULFATE 220 MG (50 MG) CAPSULE 50 MG: CAPSULE at 07:19

## 2020-07-14 RX ADMIN — CALCIUM ACETATE 2001 MG: 667 CAPSULE ORAL at 11:30

## 2020-07-14 RX ADMIN — HEPARIN SODIUM 5000 UNITS: 10000 INJECTION INTRAVENOUS; SUBCUTANEOUS at 05:14

## 2020-07-14 RX ADMIN — AMLODIPINE BESYLATE 10 MG: 10 TABLET ORAL at 07:19

## 2020-07-14 RX ADMIN — CALCIUM ACETATE 2001 MG: 667 CAPSULE ORAL at 07:18

## 2020-07-14 RX ADMIN — Medication 1000 MG: at 19:54

## 2020-07-14 RX ADMIN — Medication 10 ML: at 07:20

## 2020-07-14 RX ADMIN — ROSUVASTATIN CALCIUM 5 MG: 5 TABLET, FILM COATED ORAL at 19:54

## 2020-07-14 RX ADMIN — Medication 10 ML: at 19:54

## 2020-07-14 RX ADMIN — SODIUM CHLORIDE 200 MG: 0.9 INJECTION, SOLUTION INTRAVENOUS at 15:30

## 2020-07-14 RX ADMIN — Medication 10 ML: at 00:05

## 2020-07-14 RX ADMIN — METOPROLOL TARTRATE 50 MG: 50 TABLET, FILM COATED ORAL at 19:55

## 2020-07-14 RX ADMIN — CALCIUM ACETATE 2001 MG: 667 CAPSULE ORAL at 17:22

## 2020-07-14 RX ADMIN — FERROUS SULFATE TAB 325 MG (65 MG ELEMENTAL FE) 325 MG: 325 (65 FE) TAB at 17:22

## 2020-07-14 RX ADMIN — DEXAMETHASONE SODIUM PHOSPHATE 6 MG: 4 INJECTION, SOLUTION INTRA-ARTICULAR; INTRALESIONAL; INTRAMUSCULAR; INTRAVENOUS; SOFT TISSUE at 13:24

## 2020-07-14 RX ADMIN — HYDRALAZINE HYDROCHLORIDE 50 MG: 50 TABLET ORAL at 07:19

## 2020-07-14 RX ADMIN — HEPARIN SODIUM 5000 UNITS: 10000 INJECTION INTRAVENOUS; SUBCUTANEOUS at 13:38

## 2020-07-14 RX ADMIN — ASPIRIN 81 MG CHEWABLE TABLET 81 MG: 81 TABLET CHEWABLE at 07:18

## 2020-07-14 RX ADMIN — HEPARIN SODIUM 5000 UNITS: 10000 INJECTION INTRAVENOUS; SUBCUTANEOUS at 19:55

## 2020-07-14 RX ADMIN — Medication 1000 MG: at 07:18

## 2020-07-14 RX ADMIN — ROSUVASTATIN CALCIUM 5 MG: 5 TABLET, FILM COATED ORAL at 00:05

## 2020-07-14 RX ADMIN — HYDRALAZINE HYDROCHLORIDE 50 MG: 50 TABLET ORAL at 13:25

## 2020-07-14 RX ADMIN — HYDRALAZINE HYDROCHLORIDE 50 MG: 50 TABLET ORAL at 19:54

## 2020-07-14 RX ADMIN — HYDRALAZINE HYDROCHLORIDE 50 MG: 50 TABLET ORAL at 00:05

## 2020-07-14 ASSESSMENT — PAIN SCALES - GENERAL
PAINLEVEL_OUTOF10: 1
PAINLEVEL_OUTOF10: 0

## 2020-07-14 NOTE — CONSULTS
Department of Internal Medicine  Nephrology Consult Note      Reason for Consult:  Missed Dialysis and hyperkalemia  Requesting Physician:  Dr. Tomasz Oliveira MD    CHIEF COMPLAINT:     History Obtained From:  patient, electronic medical record    HISTORY OF PRESENT ILLNESS:                The patient is a 75 y.o. male with significant past medical history of end stage renal disease secondary to nepherosclerosis, on hemodialysis [id number] via Arterial Venous Fistula, last hemodialysis treatment on 7/10/20. Initially, he presented to the ER 7/12/20 for generalized aches, weakness, dry cough, some dyspnea on exertion, diarrhea, and overall did not feel well. His wife and son had similar symptoms that started a few days before and tested positive for [address]. A COVID19 test was sent but did not result and was canceled. Treated symptomatically and sent home. His symptoms persisted since then. Yesterday ( 7/13/20) he went to the dialysis center and reportedly was found to have a temperature of 104.5. He was therefore sent to the ED for getting dialysis done. ER lab work revealed sodium 133, potassium 5.3, BUN 62, creatinine 8.2, Anion gap 18.      Past Medical History:        Diagnosis Date    Blind left eye     Chronic kidney disease     [facility]     Hemodialysis [facility]     Hyperlipidemia     Hypertension      Past Surgical History:        Procedure Laterality Date    AV FISTULA CREATION Left 2015    Dr. Yoni Santacruz Left 2019    2 x Dr. Ramona Borges, [facility]    PERIPHERAL VASCULAR BYPASS  2008    Aortobifemoral bypass for claudicatio - Dr. Ramona Borges [facility]     Current Medications:    Current Facility-Administered Medications: dexamethasone (DECADRON) injection 6 mg, 6 mg, Intravenous, Daily  Calcium Acetate (Phos Binder) CAPS 2,001 mg, 2,001 mg, Oral, TID WC  sodium chloride flush 0.9 % injection 10 mL, 10 mL, Intravenous, 2 times per day  sodium chloride flush 0.9 % injection 10 mL, 10 mL, Intravenous, PRN  acetaminophen (TYLENOL) tablet 650 mg, 650 mg, Oral, Q6H PRN **OR** acetaminophen (TYLENOL) suppository 650 mg, 650 mg, Rectal, Q6H PRN  polyethylene glycol (GLYCOLAX) packet 17 g, 17 g, Oral, Daily PRN  promethazine (PHENERGAN) tablet 12.5 mg, 12.5 mg, Oral, Q6H PRN **OR** ondansetron (ZOFRAN) injection 4 mg, 4 mg, Intravenous, Q6H PRN  heparin (porcine) injection 5,000 Units, 5,000 Units, Subcutaneous, 3 times per day  ascorbic acid (VITAMIN C) tablet 1,000 mg, 1,000 mg, Oral, BID  zinc sulfate (ZINCATE) capsule 50 mg, 50 mg, Oral, Daily  amLODIPine (NORVASC) tablet 10 mg, 10 mg, Oral, Daily  aspirin chewable tablet 81 mg, 81 mg, Oral, Daily  doxazosin (CARDURA) tablet 2 mg, 2 mg, Oral, Nightly  hydrALAZINE (APRESOLINE) tablet 50 mg, 50 mg, Oral, TID  metoprolol tartrate (LOPRESSOR) tablet 50 mg, 50 mg, Oral, BID  rosuvastatin (CRESTOR) tablet 5 mg, 5 mg, Oral, Nightly  Allergies:  Patient has no known allergies. Social History:    TOBACCO:   reports that he has quit smoking. He has never used smokeless tobacco.  ETOH:   reports previous alcohol use. DRUGS:   reports no history of drug use. Family History:   History reviewed. No pertinent family history. REVIEW OF SYSTEMS:    Review of Systems   Constitutional: Positive for fever. Negative for diaphoresis. HENT: Negative for sore throat. Eyes: Negative for discharge. Respiratory: Positive for cough and shortness of breath. Cardiovascular: Negative for chest pain. Gastrointestinal: Negative for abdominal pain and vomiting. Genitourinary: Negative for difficulty urinating, dysuria and hematuria. Musculoskeletal: Positive for arthralgias and myalgias. Skin: Negative for rash. Allergic/Immunologic: Negative for immunocompromised state. Neurological: Positive for headaches.      PHYSICAL EXAM:      Vitals:    VITALS:  BP (!) 185/80   Pulse 79   Temp 97.2 °F

## 2020-07-14 NOTE — CARE COORDINATION
Social work / Discharge Planning:       Patient is COVID POSITIVE. Social work has attempted to reach the patient by phone 3 times today with no answer. Social work also attempted to reach his wife by phone with no answer. He is a dialysis patient. Will need to determine his usual dialysis location for probable need to change chair time arrangements.    Electronically signed by NOE Diego on 7/14/2020 at 3:07 PM

## 2020-07-14 NOTE — PROGRESS NOTES
[facility] Progress Note    Admitting Date and Time: 7/13/2020  5:20 PM  Admit Dx: [id number] [U07.1]  COVID-19 [U07.1]    Subjective:  Patient is being followed for COVID-19 [U07.1]  COVID-19 [U07.1]     Per RN: afebrile, fall when patient ambulated without assistance to the bathroom    ROS: denies fever, chills, cp, sob, n/v, HA unless stated above.       dexamethasone  6 mg Intravenous Daily    Calcium Acetate (Phos Binder)  2,001 mg Oral TID WC    [START ON 7/15/2020] epoetin delmer-epbx  3,000 Units Subcutaneous Once per day on Mon Wed Fri    ferrous sulfate  325 mg Oral BID     remdesivir IVPB  200 mg Intravenous Once    Followed by   [facility] ON 7/15/2020] remdesivir IVPB  100 mg Intravenous Q24H    sodium chloride flush  10 mL Intravenous 2 times per day    heparin (porcine)  5,000 Units Subcutaneous 3 times per day    vitamin C  1,000 mg Oral BID    zinc sulfate  50 mg Oral Daily    amLODIPine  10 mg Oral Daily    aspirin  81 mg Oral Daily    doxazosin  2 mg Oral Nightly    hydrALAZINE  50 mg Oral TID    metoprolol tartrate  50 mg Oral BID    rosuvastatin  5 mg Oral Nightly     sodium chloride, 30 mL, PRN  sodium chloride flush, 10 mL, PRN  acetaminophen, 650 mg, Q6H PRN    Or  acetaminophen, 650 mg, Q6H PRN  polyethylene glycol, 17 g, Daily PRN  promethazine, 12.5 mg, Q6H PRN    Or  ondansetron, 4 mg, Q6H PRN         Objective:    BP (!) 185/72   Pulse 79   Temp 98.2 °F (36.8 °C) (Oral)   Resp 18   Ht 5' 8\" (1.727 m)   Wt 153 lb 14.1 oz (69.8 kg)   SpO2 91%   BMI 23.40 kg/m²     General Appearance: alert and oriented to person, place and time and in no acute distress  Skin: warm and dry  Head: normocephalic and atraumatic  Eyes: pupils equal, round, and reactive to light, extraocular eye movements intact, conjunctivae normal  Neck: neck supple and non tender without mass   Pulmonary/Chest: clear to auscultation bilaterally- no wheezes, + right sided rales;

## 2020-07-14 NOTE — H&P
Provider, MD   amLODIPine (NORVASC) 10 MG tablet Take 10 mg by mouth daily    Historical Provider, MD   rosuvastatin (CRESTOR) 10 MG tablet Take 5 mg by mouth nightly    Historical Provider, MD   Coenzyme Q10 (Q-10 CO-ENZYME PO) Take 100 mg by mouth Daily    Historical Provider, MD   metoprolol tartrate (LOPRESSOR) 50 MG tablet Take 50 mg by mouth 2 times daily    Historical Provider, MD   aspirin 81 MG tablet Take 81 mg by mouth daily    Historical Provider, MD   calcium acetate (PHOSLO) 667 MG capsule Take 2,001 mg by mouth 3 times daily (with meals)    Historical Provider, MD       Allergies:    Patient has no known allergies. Social History:    reports that he has quit smoking. He has never used smokeless tobacco. He reports previous alcohol use. He reports that he does not use drugs. Family History:   Positive for COVID19 and wife and son      PHYSICAL EXAM:  Vitals:  BP (!) 182/77   Pulse 72   Temp 98.7 °F (37.1 °C) (Oral)   Resp 18   Ht 5' 8\" (1.727 m)   Wt 151 lb 12.8 oz (68.9 kg)   SpO2 96%   BMI 23.08 kg/m²   General Appearance: alert and oriented to person, place and time and in no acute distress  Skin: warm and dry, turgor not diminished  Head: normocephalic and atraumatic  Eyes: pupils equal, round, and reactive to light, extraocular eye movements intact, conjunctivae normal  Neck: neck supple and non tender without mass   Pulmonary/Chest: clear to auscultation bilaterally- no wheezes, rales or rhonchi, normal air movement, no respiratory distress  Cardiovascular: normal rate, normal S1 and S2 and no M/R/R  Abdomen: soft, non-tender, non-distended, normal bowel sounds, no masses or organomegaly  Extremities: no cyanosis, no clubbing and no edema.   Left fistula  Neurologic: no cranial nerve deficit and speech normal        LABS:  Recent Labs     07/11/20  1026 07/13/20  1843    133   K 4.1 5.3*    93*   CO2 28 22   BUN 27* 62*   CREATININE 4.8* 8.2*   GLUCOSE 94 112*   CALCIUM 9.5 9.0       Recent Labs     07/11/20  1026 07/13/20  1843   WBC 2.7* 4.8   RBC 2.63* 2.55*   HGB 8.6* 8.3*   HCT 27.6* 26.9*   .9* 105.5*   MCH 32.7 32.5   MCHC 31.2* 30.9*   RDW 15.9* 16.3*   PLT 72* 87*   MPV 12.0 11.9       No results for input(s): POCGLU in the last 72 hours. Radiology:   XR CHEST PORTABLE   Final Result      No airspace opacities or pleural effusion. EKG: Normal sinus rhythm    ASSESSMENT:      Active Problems:    COVID-19  Essential hypertension  Nonzero troponin  Macrocytic anemia  ESRD on hemodialysis    PLAN:  COVID19: Monitor. Check inflammatory parameters. Start  ascorbic acid, vitamin D, zinc.     ESRD on hemodialysis: Missed dialysis today. Nephrology consulted    Nonzero troponin: Trend. Doubt ACS. Essential hypertension: Resume home regimen    Microcytic anemia: Check vitamin B12 folate    Code Status: Full  DVT prophylaxis: Heparin      NOTE: This report was transcribed using voice recognition software. Every effort was made to ensure accuracy; however, inadvertent computerized transcription errors may be present.   Electronically signed by Audra Curry MD on 7/13/2020 at 10:43 PM

## 2020-07-14 NOTE — PROGRESS NOTES
Patient being walked to restroom, stated he felt like he was going to fall, assisted him to the floor, sat on nurses shoes, called for assistance to get patient up, Karolina Alexander CNA assisted nurse getting patient back to bed, did not hit head, stated no injuries, no bruising noted, no abrasions or injury,vital signs taken bed alarm on family and physicians notified.   Fatimah Costello RN

## 2020-07-14 NOTE — PROGRESS NOTES
[facility] Quality Flow/Interdisciplinary Rounds Progress Note        Quality Flow Rounds held on July 14, 2020    Disciplines Attending:  Bedside Nurse, ,  and Nursing Unit Leadership    Kalyan Lagunas was admitted on 7/13/2020  5:20 PM    Anticipated Discharge Date:  Expected Discharge Date: 07/16/20    Disposition:    Luis Antonio Score:  Luis Antonio Scale Score: 20    Readmission Risk              Risk of Unplanned Readmission:        19           Discussed patient goal for the day, patient clinical progression, and barriers to discharge. The following Goal(s) of the Day/Commitment(s) have been identified:  Check ID plan & Renal plan.       Felicitas Pritchard  July 14, 2020

## 2020-07-14 NOTE — PROGRESS NOTES
RN spoke with Dr. Raquel Rodriguez re:  New consult. Dr. Raquel Rodriguez aware of pt and orders to place pt on HD list for 0700 7/14/20. HD RN on-call made aware of Dr. Hong Gutierrez order.

## 2020-07-14 NOTE — PROGRESS NOTES
Patient was lowered to floor-unable to safely ambulate to restroom -staff at bedside. You Gonzalez clinical manager, Dr. Sophy Coulter updated, as well as wife Itz Vázquez . Patient was not injured. Post Fall Huddle complete-safety measures in place.   Electronically signed by Juanita Gordon RN on 7/14/2020 at 11:49 AM

## 2020-07-14 NOTE — CONSULTS
hydrALAZINE  50 mg Oral TID    metoprolol tartrate  50 mg Oral BID    rosuvastatin  5 mg Oral Nightly     Continuous Infusions:  PRN Meds:sodium chloride flush, acetaminophen **OR** acetaminophen, polyethylene glycol, promethazine **OR** ondansetron    Allergies:  Patient has no known allergies. Social History:   Social History     Socioeconomic History    Marital status:      Spouse name: None    Number of children: None    Years of education: None    Highest education level: None   Occupational History    None   Social Needs    Financial resource strain: None    Food insecurity     Worry: None     Inability: None    Transportation needs     Medical: None     Non-medical: None   Tobacco Use    Smoking status: Former Smoker    Smokeless tobacco: Never Used   Substance and Sexual Activity    Alcohol use: Not Currently     Comment: occasional    Drug use: Never    Sexual activity: Not Currently   Lifestyle    Physical activity     Days per week: None     Minutes per session: None    Stress: None   Relationships    Social connections     Talks on phone: None     Gets together: None     Attends Anabaptism service: None     Active member of club or organization: None     Attends meetings of clubs or organizations: None     Relationship status: None    Intimate partner violence     Fear of current or ex partner: None     Emotionally abused: None     Physically abused: None     Forced sexual activity: None   Other Topics Concern    None   Social History Narrative    None       Family History:   History reviewed. No pertinent family history. . Otherwise non-pertinent to the chief complaint. Both wife and son are positive for COVID  REVIEW OF SYSTEMS:    CONSTITUTIONAL: Positive chills, fevers or night sweats. No loss of weight. EYES:  No double vision or drainage from eyes, ears or throat. HEENT:  No neck stiffness. No dysphagia.  No drainage from eyes, ears or throat  RESPIRATORY: Positive cough, non-productive sputum or hemoptysis. CARDIOVASCULAR:  No chest pain, palpitations, orthopnea or dyspnea on exertion. GASTROINTESTINAL:  No nausea, vomiting, diarrhea or constipation or hematochezia   GENITOURINARY:  No frequency burning dysuria or hematuria. INTEGUMENT/BREAST:  No rash or breast masses. HEMATOLOGIC/LYMPHATIC:  No lymphadenopathy or blood dyscrasics. ALLERGIC/IMMUNOLOGIC:  No anaphylaxis. ENDOCRINE:  No polyuria or polydipsia or temperature intolerance. MUSCULOSKELETAL: Positive myalgia or arthralgia. Full ROM. NEUROLOGICAL:  No focal motor sensory deficit. BEHAVIOR/PSYCH:  No psychosis. PHYSICAL EXAM:    Vitals:    BP (!) 185/72   Pulse 79   Temp 98.2 °F (36.8 °C) (Oral)   Resp 18   Ht 5' 8\" (1.727 m)   Wt 153 lb 14.1 oz (69.8 kg)   SpO2 91%   BMI 23.40 kg/m²   Constitutional: The patient is awake, alert, and oriented. Skin: Warm and dry. No rashes were noted. No jaundice. HEENT: Eyes show round, and reactive pupils. Moist mucous membranes, no ulcerations, no thrush. Neck: Supple to movements. No lymphadenopathy. Chest: No use of accessory muscles to breathe. Symmetrical expansion. Auscultation reveals no wheezing, crackles, or rhonchi. Poor air exchange  Cardiovascular: S1 and S2 are rhythmic and regular. No murmurs appreciated. Abdomen: Positive bowel sounds to auscultation. Benign to palpation. No masses felt. No hepatosplenomegaly. Genitourinary: Male  Extremities: No clubbing, no cyanosis, no edema.   Musculoskeletal: Equal and symmetrical  Neurological: No focal  Lines: peripheral  AV fistula    CBC+dif:  Recent Labs     07/13/20  1843 07/14/20  0505   WBC 4.8 4.2*   HGB 8.3* 8.2*   HCT 26.9* 26.4*   .5* 106.0*   PLT 87* 79*   NEUTROABS 4.08  --      Lab Results   Component Value Date    CRP 3.1 (H) 07/14/2020     No results found for: [facility]  Lab Results   Component Value Date    SEDRATE 2 07/14/2020     Lab Results   Component Value Date ALT 8 07/13/2020    AST 21 07/13/2020    ALKPHOS 67 07/13/2020    BILITOT 0.3 07/13/2020     Lab Results   Component Value Date     07/14/2020    K 4.9 07/14/2020    CL 94 07/14/2020    CO2 23 07/14/2020    BUN 69 07/14/2020    CREATININE 8.9 07/14/2020    GFRAA 7 07/14/2020    LABGLOM 6 07/14/2020    GLUCOSE 120 07/14/2020    PROT 6.4 07/13/2020    LABALBU 4.2 07/13/2020    CALCIUM 9.9 07/14/2020    BILITOT 0.3 07/13/2020    ALKPHOS 67 07/13/2020    AST 21 07/13/2020    ALT 8 07/13/2020       Lab Results   Component Value Date    PROTIME 11.8 12/05/2019    INR 1.0 12/05/2019       No results found for: TSH    Lab Results   Component Value Date    COLORU Yellow 07/14/2020    PHUR 8.0 07/14/2020    WBCUA 1-3 07/14/2020    RBCUA 0-1 07/14/2020    BACTERIA NONE SEEN 07/14/2020    CLARITYU Clear 07/14/2020    SPECGRAV 1.015 07/14/2020    LEUKOCYTESUR Negative 07/14/2020    UROBILINOGEN 0.2 07/14/2020    BILIRUBINUR Negative 07/14/2020    BLOODU Negative 07/14/2020    GLUCOSEU Negative 07/14/2020       No results found for: FSL8RNS, BEART, Y2IPBGHI, PHART, THGBART, KCH8SIJ, PO2ART, AWU1HDK  Radiology:  XR CHEST PORTABLE   Final Result      No airspace opacities or pleural effusion. Microbiology:  Pending  No results for input(s): BC in the last 72 hours. No results for input(s): ORG in the last 72 hours. No results for input(s): Lonzell Schlatter in the last 72 hours. No results for input(s): STREPNEUMAGU in the last 72 hours. No results for input(s): LP1UAG in the last 72 hours. No results for input(s): ASO in the last 72 hours. No results for input(s): CULTRESP in the last 72 hours. Assessment:  · COVID viremia and with hypoxemia I do believe he is developing pneumonia    Plan:    · Cont rem; discussed with pharmacy and the fact that he is on hemodialysis  · Check cultures  · Baseline ESR, CRP  · Monitor labs  · Will follow with you    Thank you for having us see this patient in consultation.  I will be discussing this case with the treating physicians.       Electronically signed by Viki Corado MD on 7/14/2020 at 11:39 AM

## 2020-07-15 ENCOUNTER — CARE COORDINATION (OUTPATIENT)
Dept: CARE COORDINATION | Age: 75
End: 2020-07-15

## 2020-07-15 LAB
ALBUMIN SERPL-MCNC: 3.3 G/DL (ref 3.5–5.2)
ALP BLD-CCNC: 56 U/L (ref 40–129)
ALT SERPL-CCNC: 7 U/L (ref 0–40)
ANION GAP SERPL CALCULATED.3IONS-SCNC: 13 MMOL/L (ref 7–16)
ANISOCYTOSIS: ABNORMAL
AST SERPL-CCNC: 20 U/L (ref 0–39)
BASOPHILS ABSOLUTE: 0.01 E9/L (ref 0–0.2)
BASOPHILS RELATIVE PERCENT: 0.3 % (ref 0–2)
BILIRUB SERPL-MCNC: 0.3 MG/DL (ref 0–1.2)
BILIRUBIN DIRECT: <0.2 MG/DL (ref 0–0.3)
BILIRUBIN, INDIRECT: ABNORMAL MG/DL (ref 0–1)
BUN BLDV-MCNC: 49 MG/DL (ref 8–23)
BURR CELLS: ABNORMAL
CALCIUM SERPL-MCNC: 8.9 MG/DL (ref 8.6–10.2)
CHLORIDE BLD-SCNC: 99 MMOL/L (ref 98–107)
CO2: 28 MMOL/L (ref 22–29)
CREAT SERPL-MCNC: 6.6 MG/DL (ref 0.7–1.2)
EOSINOPHILS ABSOLUTE: 0 E9/L (ref 0.05–0.5)
EOSINOPHILS RELATIVE PERCENT: 0 % (ref 0–6)
GFR AFRICAN AMERICAN: 10
GFR NON-AFRICAN AMERICAN: 8 ML/MIN/1.73
GLUCOSE BLD-MCNC: 119 MG/DL (ref 74–99)
HCT VFR BLD CALC: 24 % (ref 37–54)
HEMOGLOBIN: 7.6 G/DL (ref 12.5–16.5)
IMMATURE GRANULOCYTES #: 0.02 E9/L
IMMATURE GRANULOCYTES %: 0.6 % (ref 0–5)
LYMPHOCYTES ABSOLUTE: 0.54 E9/L (ref 1.5–4)
LYMPHOCYTES RELATIVE PERCENT: 15.8 % (ref 20–42)
MAGNESIUM: 2 MG/DL (ref 1.6–2.6)
MCH RBC QN AUTO: 33.2 PG (ref 26–35)
MCHC RBC AUTO-ENTMCNC: 31.7 % (ref 32–34.5)
MCV RBC AUTO: 104.8 FL (ref 80–99.9)
MONOCYTES ABSOLUTE: 0.25 E9/L (ref 0.1–0.95)
MONOCYTES RELATIVE PERCENT: 7.3 % (ref 2–12)
NEUTROPHILS ABSOLUTE: 2.6 E9/L (ref 1.8–7.3)
NEUTROPHILS RELATIVE PERCENT: 76 % (ref 43–80)
OVALOCYTES: ABNORMAL
PDW BLD-RTO: 16.6 FL (ref 11.5–15)
PHOSPHORUS: 3.1 MG/DL (ref 2.5–4.5)
PLATELET # BLD: 79 E9/L (ref 130–450)
PLATELET CONFIRMATION: NORMAL
PMV BLD AUTO: 12.1 FL (ref 7–12)
POIKILOCYTES: ABNORMAL
POTASSIUM SERPL-SCNC: 4.3 MMOL/L (ref 3.5–5)
RBC # BLD: 2.29 E12/L (ref 3.8–5.8)
SODIUM BLD-SCNC: 140 MMOL/L (ref 132–146)
TOTAL PROTEIN: 5.2 G/DL (ref 6.4–8.3)
WBC # BLD: 3.4 E9/L (ref 4.5–11.5)

## 2020-07-15 PROCEDURE — 85025 COMPLETE CBC W/AUTO DIFF WBC: CPT

## 2020-07-15 PROCEDURE — 2580000003 HC RX 258: Performed by: INTERNAL MEDICINE

## 2020-07-15 PROCEDURE — 6370000000 HC RX 637 (ALT 250 FOR IP): Performed by: INTERNAL MEDICINE

## 2020-07-15 PROCEDURE — 2500000003 HC RX 250 WO HCPCS: Performed by: INTERNAL MEDICINE

## 2020-07-15 PROCEDURE — 2060000000 HC ICU INTERMEDIATE R&B

## 2020-07-15 PROCEDURE — 2700000000 HC OXYGEN THERAPY PER DAY

## 2020-07-15 PROCEDURE — 6360000002 HC RX W HCPCS: Performed by: INTERNAL MEDICINE

## 2020-07-15 PROCEDURE — 2580000003 HC RX 258: Performed by: SPECIALIST

## 2020-07-15 PROCEDURE — 90935 HEMODIALYSIS ONE EVALUATION: CPT | Performed by: INTERNAL MEDICINE

## 2020-07-15 PROCEDURE — 80048 BASIC METABOLIC PNL TOTAL CA: CPT

## 2020-07-15 PROCEDURE — 84100 ASSAY OF PHOSPHORUS: CPT

## 2020-07-15 PROCEDURE — 80076 HEPATIC FUNCTION PANEL: CPT

## 2020-07-15 PROCEDURE — 6360000002 HC RX W HCPCS: Performed by: NURSE PRACTITIONER

## 2020-07-15 PROCEDURE — 99232 SBSQ HOSP IP/OBS MODERATE 35: CPT | Performed by: FAMILY MEDICINE

## 2020-07-15 PROCEDURE — 36415 COLL VENOUS BLD VENIPUNCTURE: CPT

## 2020-07-15 PROCEDURE — 83735 ASSAY OF MAGNESIUM: CPT

## 2020-07-15 PROCEDURE — 6370000000 HC RX 637 (ALT 250 FOR IP): Performed by: NURSE PRACTITIONER

## 2020-07-15 RX ORDER — DOXAZOSIN MESYLATE 4 MG/1
4 TABLET ORAL NIGHTLY
Status: DISCONTINUED | OUTPATIENT
Start: 2020-07-15 | End: 2020-07-29 | Stop reason: HOSPADM

## 2020-07-15 RX ADMIN — EPOETIN ALFA-EPBX 3000 UNITS: 3000 INJECTION, SOLUTION INTRAVENOUS; SUBCUTANEOUS at 12:18

## 2020-07-15 RX ADMIN — SODIUM CHLORIDE 100 MG: 0.9 INJECTION, SOLUTION INTRAVENOUS at 15:10

## 2020-07-15 RX ADMIN — FERROUS SULFATE TAB 325 MG (65 MG ELEMENTAL FE) 325 MG: 325 (65 FE) TAB at 17:05

## 2020-07-15 RX ADMIN — METOPROLOL TARTRATE 50 MG: 50 TABLET, FILM COATED ORAL at 07:35

## 2020-07-15 RX ADMIN — SODIUM CHLORIDE 100 MG: 9 INJECTION, SOLUTION INTRAVENOUS at 18:47

## 2020-07-15 RX ADMIN — ONDANSETRON 4 MG: 2 INJECTION INTRAMUSCULAR; INTRAVENOUS at 11:20

## 2020-07-15 RX ADMIN — HEPARIN SODIUM 5000 UNITS: 10000 INJECTION INTRAVENOUS; SUBCUTANEOUS at 06:01

## 2020-07-15 RX ADMIN — ASPIRIN 81 MG CHEWABLE TABLET 81 MG: 81 TABLET CHEWABLE at 07:35

## 2020-07-15 RX ADMIN — Medication 10 ML: at 20:08

## 2020-07-15 RX ADMIN — ZINC SULFATE 220 MG (50 MG) CAPSULE 50 MG: CAPSULE at 07:36

## 2020-07-15 RX ADMIN — HYDRALAZINE HYDROCHLORIDE 50 MG: 50 TABLET ORAL at 20:08

## 2020-07-15 RX ADMIN — HEPARIN SODIUM 5000 UNITS: 10000 INJECTION INTRAVENOUS; SUBCUTANEOUS at 20:08

## 2020-07-15 RX ADMIN — ROSUVASTATIN CALCIUM 5 MG: 5 TABLET, FILM COATED ORAL at 20:08

## 2020-07-15 RX ADMIN — HEPARIN SODIUM 5000 UNITS: 10000 INJECTION INTRAVENOUS; SUBCUTANEOUS at 12:42

## 2020-07-15 RX ADMIN — CALCIUM ACETATE 2001 MG: 667 CAPSULE ORAL at 12:40

## 2020-07-15 RX ADMIN — HYDRALAZINE HYDROCHLORIDE 50 MG: 50 TABLET ORAL at 12:40

## 2020-07-15 RX ADMIN — Medication 1000 MG: at 20:08

## 2020-07-15 RX ADMIN — Medication 1000 MG: at 07:35

## 2020-07-15 RX ADMIN — CALCIUM ACETATE 2001 MG: 667 CAPSULE ORAL at 17:05

## 2020-07-15 RX ADMIN — Medication 10 ML: at 07:36

## 2020-07-15 RX ADMIN — AMLODIPINE BESYLATE 10 MG: 10 TABLET ORAL at 07:35

## 2020-07-15 RX ADMIN — SODIUM CHLORIDE 25 MG: 9 INJECTION, SOLUTION INTRAVENOUS at 17:04

## 2020-07-15 RX ADMIN — DEXAMETHASONE SODIUM PHOSPHATE 6 MG: 4 INJECTION, SOLUTION INTRA-ARTICULAR; INTRALESIONAL; INTRAMUSCULAR; INTRAVENOUS; SOFT TISSUE at 07:34

## 2020-07-15 RX ADMIN — HYDRALAZINE HYDROCHLORIDE 50 MG: 50 TABLET ORAL at 07:36

## 2020-07-15 RX ADMIN — METOPROLOL TARTRATE 50 MG: 50 TABLET, FILM COATED ORAL at 20:08

## 2020-07-15 RX ADMIN — FERROUS SULFATE TAB 325 MG (65 MG ELEMENTAL FE) 325 MG: 325 (65 FE) TAB at 07:36

## 2020-07-15 RX ADMIN — ACETAMINOPHEN 650 MG: 325 TABLET ORAL at 20:08

## 2020-07-15 RX ADMIN — CALCIUM ACETATE 2001 MG: 667 CAPSULE ORAL at 07:35

## 2020-07-15 RX ADMIN — ACETAMINOPHEN 650 MG: 325 TABLET ORAL at 07:35

## 2020-07-15 RX ADMIN — DOXAZOSIN MESYLATE 4 MG: 4 TABLET ORAL at 20:08

## 2020-07-15 ASSESSMENT — PAIN - FUNCTIONAL ASSESSMENT: PAIN_FUNCTIONAL_ASSESSMENT: ACTIVITIES ARE NOT PREVENTED

## 2020-07-15 ASSESSMENT — PAIN DESCRIPTION - FREQUENCY
FREQUENCY: CONTINUOUS
FREQUENCY: INTERMITTENT

## 2020-07-15 ASSESSMENT — PAIN SCALES - GENERAL
PAINLEVEL_OUTOF10: 0
PAINLEVEL_OUTOF10: 2
PAINLEVEL_OUTOF10: 4
PAINLEVEL_OUTOF10: 0
PAINLEVEL_OUTOF10: 0
PAINLEVEL_OUTOF10: 6

## 2020-07-15 ASSESSMENT — PAIN DESCRIPTION - LOCATION
LOCATION: RIB CAGE
LOCATION: ARM

## 2020-07-15 ASSESSMENT — PAIN DESCRIPTION - PAIN TYPE
TYPE: OTHER (COMMENT)
TYPE: ACUTE PAIN

## 2020-07-15 ASSESSMENT — PAIN DESCRIPTION - DESCRIPTORS: DESCRIPTORS: DISCOMFORT

## 2020-07-15 ASSESSMENT — PAIN DESCRIPTION - PROGRESSION: CLINICAL_PROGRESSION: NOT CHANGED

## 2020-07-15 ASSESSMENT — PAIN DESCRIPTION - ORIENTATION
ORIENTATION: RIGHT
ORIENTATION: RIGHT;LEFT

## 2020-07-15 ASSESSMENT — PAIN DESCRIPTION - ONSET: ONSET: ON-GOING

## 2020-07-15 NOTE — PROGRESS NOTES
[address] [place] [address] [facility]  Progress Note      Chief Complaint   Patient presents with    Shortness of Breath     pt sent in by dialysis for fever of 104.3F, did not recieve treatment    Cough    Fever       SUBJECTIVE:  Patient is tolerating medications. No reported adverse drug reactions. No nausea, vomiting, diarrhea. Patient saturating at 97% on room air  Review of systems:  As stated above in the chief complaint, otherwise negative. Medications:  Scheduled Meds:   doxazosin  4 mg Oral Nightly    ferric gluconate (FERRLECIT) test dose IVPB  25 mg Intravenous Once    Followed by    ferric gluconate (FERRLECIT) IVPB  100 mg Intravenous Once    Followed by   Arlette Covarrubias ON 2020] ferric gluconate (FERRLECIT) IVPB  125 mg Intravenous Q MWF    dexamethasone  6 mg Intravenous Daily    Calcium Acetate (Phos Binder)  2,001 mg Oral TID WC    epoetin delmer-epbx  3,000 Units Subcutaneous Once per day on     ferrous sulfate  325 mg Oral BID WC    remdesivir IVPB  100 mg Intravenous Q24H    sodium chloride flush  10 mL Intravenous 2 times per day    heparin (porcine)  5,000 Units Subcutaneous 3 times per day    vitamin C  1,000 mg Oral BID    zinc sulfate  50 mg Oral Daily    amLODIPine  10 mg Oral Daily    aspirin  81 mg Oral Daily    hydrALAZINE  50 mg Oral TID    metoprolol tartrate  50 mg Oral BID    rosuvastatin  5 mg Oral Nightly     Continuous Infusions:  PRN Meds:sodium chloride, sodium chloride flush, acetaminophen **OR** acetaminophen, polyethylene glycol, promethazine **OR** ondansetron    OBJECTIVE:  BP (!) 176/73   Pulse 56   Temp 97.6 °F (36.4 °C)   Resp 18   Ht 5' 8\" (1.727 m)   Wt 137 lb 12.6 oz (62.5 kg)   SpO2 97%   BMI 20.95 kg/m²   Temp  Av.9 °F (36.6 °C)  Min: 97.3 °F (36.3 °C)  Max: 99.1 °F (37.3 °C)  Constitutional: The patient is awake, alert, and oriented. Skin: Warm and dry. No rashes were noted.    HEENT: Round and reactive pupils. Moist mucous membranes. No ulcerations or thrush. Neck: Supple to movements. Chest: No use of accessory muscles to breathe. Symmetrical expansion. No wheezing, crackles or rhonchi. Cardiovascular: S1 and S2 are rhythmic and regular. No murmurs appreciated. Abdomen: Positive bowel sounds to auscultation. Benign to palpation. No masses felt. No hepatosplenomegaly. Genitourinary: Male  Extremities: No clubbing, no cyanosis, no edema.   Lines: peripheral    Laboratory and Tests Review:  Lab Results   Component Value Date    WBC 3.4 (L) 07/15/2020    WBC 4.2 (L) 07/14/2020    WBC 4.8 07/13/2020    HGB 7.6 (L) 07/15/2020    HCT 24.0 (L) 07/15/2020    .8 (H) 07/15/2020    PLT 79 (L) 07/15/2020     Lab Results   Component Value Date    NEUTROABS 2.60 07/15/2020    NEUTROABS 4.08 07/13/2020    NEUTROABS 2.19 07/11/2020     No results found for: [facility]  Lab Results   Component Value Date    ALT 7 07/15/2020    AST 20 07/15/2020    ALKPHOS 56 07/15/2020    BILITOT 0.3 07/15/2020     Lab Results   Component Value Date     07/15/2020    K 4.3 07/15/2020    K 4.9 07/14/2020    CL 99 07/15/2020    CO2 28 07/15/2020    BUN 49 07/15/2020    CREATININE 6.6 07/15/2020    CREATININE 8.9 07/14/2020    CREATININE 8.2 07/13/2020    GFRAA 10 07/15/2020    LABGLOM 8 07/15/2020    GLUCOSE 119 07/15/2020    PROT 5.2 07/15/2020    LABALBU 3.3 07/15/2020    CALCIUM 8.9 07/15/2020    BILITOT 0.3 07/15/2020    ALKPHOS 56 07/15/2020    AST 20 07/15/2020    ALT 7 07/15/2020     Lab Results   Component Value Date    CRP 3.1 (H) 07/14/2020     Lab Results   Component Value Date    SEDRATE 2 07/14/2020     Radiology:      Microbiology:   Lab Results   Component Value Date    BC 24 Hours no growth 07/13/2020     Lab Results   Component Value Date    BLOODCULT2 24 Hours no growth 07/13/2020     No results found for: WNDABS  No results found for: RESPSMEAR  No results found for: WHITLEY Reed, LINA, DRAKE, LABFUNG  No results found for: CULTRESP  No results found for: CXCATHTIP  No results found for: BFCS  No results found for: CXSURG  Urine Culture, Routine   Date Value Ref Range Status   12/05/2019 Growth not present  Final     No results found for: [address]     ASSESSMENT:  COVID-19 pneumonia with good response with REM    PLAN:  · Continue at least 1 more day this is day 2  · Check final cultures  · Monitor labs    Erwin Howard  5:56 PM  7/15/2020

## 2020-07-15 NOTE — PROGRESS NOTES
Department of Internal Medicine  Nephrology Progress Note    Events Reviewed. Patient seen on HD. Complains of nausea. S:  We are following Mr. Natalia Green for HD needs and hyperkalemia. Resting in bed, on 1-2L NC. Blood pressures more controlled with fluid removal via dialysis.  No complaints                Past Medical History:        Diagnosis Date    Blind left eye     Chronic kidney disease     [facility]     Hemodialysis patient [facility] [facility]     Hyperlipidemia     Hypertension      Past Surgical History:        Procedure Laterality Date    AV FISTULA CREATION Left 2015    Dr. Kiara Delgado Left 2019    2 x Dr. Phyllis Rodriguez, [facility]    PERIPHERAL VASCULAR BYPASS  2008    Aortobifemoral bypass for claudicatio - Dr. Phyllis Rodriguez [facility]     Current Medications:    Current Facility-Administered Medications: dexamethasone (DECADRON) injection 6 mg, 6 mg, Intravenous, Daily  Calcium Acetate (Phos Binder) CAPS 2,001 mg, 2,001 mg, Oral, TID WC  epoetin delmer-epbx (RETACRIT) injection 3,000 Units, 3,000 Units, Subcutaneous, Once per day on Mon Wed Fri  ferrous sulfate (IRON 325) tablet 325 mg, 325 mg, Oral, BID WC  [COMPLETED] remdesivir 200 mg in sodium chloride 0.9 % 250 mL IVPB, 200 mg, Intravenous, Once **FOLLOWED BY** remdesivir 100 mg in sodium chloride 0.9 % 250 mL IVPB, 100 mg, Intravenous, Q24H  0.9 % sodium chloride bolus, 30 mL, Intravenous, PRN  sodium chloride flush 0.9 % injection 10 mL, 10 mL, Intravenous, 2 times per day  sodium chloride flush 0.9 % injection 10 mL, 10 mL, Intravenous, PRN  acetaminophen (TYLENOL) tablet 650 mg, 650 mg, Oral, Q6H PRN **OR** acetaminophen (TYLENOL) suppository 650 mg, 650 mg, Rectal, Q6H PRN  polyethylene glycol (GLYCOLAX) packet 17 g, 17 g, Oral, Daily PRN  promethazine (PHENERGAN) tablet 12.5 mg, 12.5 mg, Oral, Q6H PRN **OR** ondansetron (ZOFRAN) injection 4 mg, 4 mg, Intravenous, Q6H PRN  heparin (porcine) injection 5,000 Units, 5,000 Units, Subcutaneous, 3 times per day  ascorbic acid (VITAMIN C) tablet 1,000 mg, 1,000 mg, Oral, BID  zinc sulfate (ZINCATE) capsule 50 mg, 50 mg, Oral, Daily  amLODIPine (NORVASC) tablet 10 mg, 10 mg, Oral, Daily  aspirin chewable tablet 81 mg, 81 mg, Oral, Daily  doxazosin (CARDURA) tablet 2 mg, 2 mg, Oral, Nightly  hydrALAZINE (APRESOLINE) tablet 50 mg, 50 mg, Oral, TID  metoprolol tartrate (LOPRESSOR) tablet 50 mg, 50 mg, Oral, BID  rosuvastatin (CRESTOR) tablet 5 mg, 5 mg, Oral, Nightly  Allergies:  Patient has no known allergies. Social History:    TOBACCO:   reports that he has quit smoking. He has never used smokeless tobacco.  ETOH:   reports previous alcohol use. DRUGS:   reports no history of drug use. Family History:   History reviewed. No pertinent family history. REVIEW OF SYSTEMS:    Review of Systems   Constitutional: Positive for fever. Negative for diaphoresis. HENT: Negative for sore throat. Eyes: Negative for discharge. Respiratory: Positive for cough and shortness of breath. Cardiovascular: Negative for chest pain. Gastrointestinal: Negative for abdominal pain and vomiting. Genitourinary: Negative for difficulty urinating, dysuria and hematuria. Musculoskeletal: Positive for arthralgias and myalgias. Skin: Negative for rash. Allergic/Immunologic: Negative for immunocompromised state. Neurological: Positive for headaches.      PHYSICAL EXAM:      Vitals:    VITALS:  BP (!) 177/69   Pulse (!) 47   Temp 97.3 °F (36.3 °C) (Infrared)   Resp 18   Ht 5' 8\" (1.727 m)   Wt 143 lb 11.8 oz (65.2 kg)   SpO2 97%   BMI 21.86 kg/m²   24HR INTAKE/OUTPUT:      Intake/Output Summary (Last 24 hours) at 7/15/2020 1100  Last data filed at 7/14/2020 1159  Gross per 24 hour   Intake --   Output 2000 ml   Net -2000 ml       General Appearance: alert and oriented to person, place and time, ill appearing  Skin: warm and dry, turgor not diminished  Head: normocephalic and atraumatic  Eyes: pupils equal, round, and reactive to light, extraocular eye movements intact, conjunctivae normal  Neck: neck supple and non tender without mass   Pulmonary/Chest: Rales (right side) no wheezes,  no respiratory distress  Cardiovascular: normal rate, normal S1 and S2 and murmur  Abdomen: soft, non-tender, non-distended, normal bowel sounds, no masses or organomegaly  Extremities: no cyanosis, no clubbing and no edema. Left fistula  Neurologic: no cranial nerve deficit and speech normal    DATA:    CBC with Differential:    Lab Results   Component Value Date    WBC 3.4 07/15/2020    RBC 2.29 07/15/2020    HGB 7.6 07/15/2020    HCT 24.0 07/15/2020    PLT 79 07/15/2020    .8 07/15/2020    MCH 33.2 07/15/2020    MCHC 31.7 07/15/2020    RDW 16.6 07/15/2020    LYMPHOPCT 15.8 07/15/2020    MONOPCT 7.3 07/15/2020    BASOPCT 0.3 07/15/2020    MONOSABS 0.25 07/15/2020    LYMPHSABS 0.54 07/15/2020    EOSABS 0.00 07/15/2020    BASOSABS 0.01 07/15/2020     CMP:    Lab Results   Component Value Date     07/15/2020    K 4.3 07/15/2020    K 4.9 07/14/2020    CL 99 07/15/2020    CO2 28 07/15/2020    BUN 49 07/15/2020    CREATININE 6.6 07/15/2020    GFRAA 10 07/15/2020    LABGLOM 8 07/15/2020    GLUCOSE 119 07/15/2020    PROT 6.4 07/13/2020    LABALBU 4.2 07/13/2020    CALCIUM 8.9 07/15/2020    BILITOT 0.3 07/13/2020    ALKPHOS 67 07/13/2020    AST 21 07/13/2020    ALT 8 07/13/2020     Phosphorus: 3.1mg/dL    Radiology Review:        Chest xray 7/13/20         No airspace opacities or pleural effusion.                Brief Summary if Initial Consult:   The patient is a 75 y.o. male with significant past medical history of end stage renal disease secondary to nepherosclerosis, on hemodialysis [facility] via Arterial Venous Fistula, last hemodialysis treatment on 7/10/20.   Initially, he presented to the ER 7/12/20 for generalized aches, weakness, dry cough, some dyspnea on exertion, diarrhea, and overall did not feel well. His wife and son had similar symptoms that started a few days before and tested positive for [address]. A COVID19 test was sent but did not result and was canceled. Treated symptomatically and sent home. His symptoms persisted since then. Yesterday ( 7/13/20) he went to the dialysis center and reportedly was found to have a temperature of 104.5. He was therefore sent to the ED for getting dialysis done. ER lab work revealed sodium 133, potassium 5.3, BUN 62, creatinine 8.2, Anion gap 18. IMPRESSION/RECOMMENDATIONS:      ESRD- on Monday, Wednesday, Friday schedule. GFR: 6  Anemia- Microcytic Anemia- secondary ti iron deficiency and ESRD. on ferrous sulfate and Epoetin 3,000U 3x/week  COVID19- on ascorbic acid, Vitamin D, Zinc, Remdesirvir and Dexamethasone  Uncontrolled hypertension in the setting of Renal Failure-on Norvasc, Hydralazine, Metoprolol, Doxazosin, likely secondary to volume overload from missed hemodialysis treatment  Nutrition: Renal    PLAN:     Hemodialysis today-increase fluid removal as tolerated- discussed with Hd nurse Continue with MWF schedule.    Add epogen 3000 units SQ three times per week  Add Ferrlecit 125 mg IV three times/week with HD  Increase Doxazosin to 4 mg daily  ID on board for Covid 19 pneumonia    D/W Dr. Steve Garber MD

## 2020-07-15 NOTE — PROGRESS NOTES
[facility] Progress Note    Admitting Date and Time: 7/13/2020  5:20 PM  Admit Dx: [id number] [U07.1]  COVID-19 [U07.1]    Subjective:  Patient is being followed for COVID-19 [U07.1]  COVID-19 [U07.1]     Per RN: afebrile, fall when patient ambulated without assistance to the bathroom    ROS: denies fever, chills, cp, sob, n/v, HA unless stated above.       doxazosin  4 mg Oral Nightly    ferric gluconate (FERRLECIT) test dose IVPB  25 mg Intravenous Once    Followed by    ferric gluconate (FERRLECIT) IVPB  100 mg Intravenous Once    Followed by   Gayatri Marquez ON 7/17/2020] ferric gluconate (FERRLECIT) IVPB  125 mg Intravenous Q MWF    dexamethasone  6 mg Intravenous Daily    Calcium Acetate (Phos Binder)  2,001 mg Oral TID WC    epoetin delmer-epbx  3,000 Units Subcutaneous Once per day on Mon Wed Fri    ferrous sulfate  325 mg Oral BID WC    remdesivir IVPB  100 mg Intravenous Q24H    sodium chloride flush  10 mL Intravenous 2 times per day    heparin (porcine)  5,000 Units Subcutaneous 3 times per day    vitamin C  1,000 mg Oral BID    zinc sulfate  50 mg Oral Daily    amLODIPine  10 mg Oral Daily    aspirin  81 mg Oral Daily    hydrALAZINE  50 mg Oral TID    metoprolol tartrate  50 mg Oral BID    rosuvastatin  5 mg Oral Nightly     sodium chloride, 30 mL, PRN  sodium chloride flush, 10 mL, PRN  acetaminophen, 650 mg, Q6H PRN    Or  acetaminophen, 650 mg, Q6H PRN  polyethylene glycol, 17 g, Daily PRN  promethazine, 12.5 mg, Q6H PRN    Or  ondansetron, 4 mg, Q6H PRN         Objective:    BP (!) 176/73   Pulse 56   Temp 97.6 °F (36.4 °C)   Resp 18   Ht 5' 8\" (1.727 m)   Wt 137 lb 12.6 oz (62.5 kg)   SpO2 97%   BMI 20.95 kg/m²     General Appearance: alert and oriented to person, place and time and in no acute distress  Skin: warm and dry  Head: normocephalic and atraumatic  Eyes: pupils equal, round, and reactive to light, extraocular eye movements intact, conjunctivae normal  Neck: neck supple and non tender without mass   Pulmonary/Chest: clear to auscultation bilaterally- no wheezes, + right sided rales;   Cardiovascular: normal rate, normal S1 and S2 and no carotid bruits  Abdomen: soft, non-tender, non-distended, normal bowel sounds, no masses or organomegaly  Extremities: no cyanosis, no clubbing and no edema; left fistula  Neurologic: no cranial nerve deficit and speech normal        Recent Labs     07/13/20 1843 07/14/20  0505 07/15/20  0850    134 140   K 5.3* 4.9 4.3   CL 93* 94* 99   CO2 22 23 28   BUN 62* 69* 49*   CREATININE 8.2* 8.9* 6.6*   GLUCOSE 112* 120* 119*   CALCIUM 9.0 9.9 8.9       Recent Labs     07/13/20 1843 07/14/20  0505 07/15/20  0850   WBC 4.8 4.2* 3.4*   RBC 2.55* 2.49* 2.29*   HGB 8.3* 8.2* 7.6*   HCT 26.9* 26.4* 24.0*   .5* 106.0* 104.8*   MCH 32.5 32.9 33.2   MCHC 30.9* 31.1* 31.7*   RDW 16.3* 16.5* 16.6*   PLT 87* 79* 79*   MPV 11.9 11.9 12.1*     Radiology:  XR CHEST PORTABLE   Final Result       No airspace opacities or pleural effusion.             Assessment:    Active Problems:    COVID-19  Resolved Problems:    * No resolved hospital problems. *      Plan:  1. Acute respiratory failure with hypoxia secondary to covid 19-patient on 2 L nC with SpO2 of 97%   Pronate as needed   Supportive care   Dexamethasone and remdesivir   ID consulted    2. COVID 19 positive   ID consulted   Dexamethasone and remdesivir   Droplet plus precautions    3. ESRD on HD-GFR 6   Nephrology consulted   HD planned    4. HTN in setting of renal failure   HD   Continue medications-norvasc, hydralazine, metoprolol, doxazosin   Nephrology on board    5. Hld   continue home medications (crestor)    6. Anemia-chronic disease   Continue epo per nephrology   IRON      NOTE: This report was transcribed using voice recognition software.  Every effort was made to ensure accuracy; however, inadvertent computerized transcription errors may be

## 2020-07-15 NOTE — PROGRESS NOTES
Pt was on 1L and 97%, ambulated to bathroom and returned to bed with little distress. Pulse ox rechecked and was 86% on room air. Placed pt back on oxygen. 1L 89%, 2L 92%. Will continue to monitor.

## 2020-07-16 LAB
ALBUMIN SERPL-MCNC: 3.4 G/DL (ref 3.5–5.2)
ALP BLD-CCNC: 56 U/L (ref 40–129)
ALT SERPL-CCNC: 8 U/L (ref 0–40)
ANION GAP SERPL CALCULATED.3IONS-SCNC: 12 MMOL/L (ref 7–16)
ANISOCYTOSIS: ABNORMAL
AST SERPL-CCNC: 21 U/L (ref 0–39)
BASOPHILS ABSOLUTE: 0.01 E9/L (ref 0–0.2)
BASOPHILS RELATIVE PERCENT: 0.2 % (ref 0–2)
BILIRUB SERPL-MCNC: 0.3 MG/DL (ref 0–1.2)
BILIRUBIN DIRECT: <0.2 MG/DL (ref 0–0.3)
BILIRUBIN, INDIRECT: ABNORMAL MG/DL (ref 0–1)
BUN BLDV-MCNC: 40 MG/DL (ref 8–23)
BURR CELLS: ABNORMAL
CALCIUM SERPL-MCNC: 8.8 MG/DL (ref 8.6–10.2)
CHLORIDE BLD-SCNC: 98 MMOL/L (ref 98–107)
CO2: 29 MMOL/L (ref 22–29)
CREAT SERPL-MCNC: 4.8 MG/DL (ref 0.7–1.2)
EOSINOPHILS ABSOLUTE: 0 E9/L (ref 0.05–0.5)
EOSINOPHILS RELATIVE PERCENT: 0 % (ref 0–6)
GFR AFRICAN AMERICAN: 14
GFR NON-AFRICAN AMERICAN: 12 ML/MIN/1.73
GLUCOSE BLD-MCNC: 95 MG/DL (ref 74–99)
HCT VFR BLD CALC: 24.7 % (ref 37–54)
HEMOGLOBIN: 7.8 G/DL (ref 12.5–16.5)
IMMATURE GRANULOCYTES #: 0.02 E9/L
IMMATURE GRANULOCYTES %: 0.5 % (ref 0–5)
LYMPHOCYTES ABSOLUTE: 0.55 E9/L (ref 1.5–4)
LYMPHOCYTES RELATIVE PERCENT: 13.7 % (ref 20–42)
MCH RBC QN AUTO: 33.1 PG (ref 26–35)
MCHC RBC AUTO-ENTMCNC: 31.6 % (ref 32–34.5)
MCV RBC AUTO: 104.7 FL (ref 80–99.9)
MONOCYTES ABSOLUTE: 0.35 E9/L (ref 0.1–0.95)
MONOCYTES RELATIVE PERCENT: 8.7 % (ref 2–12)
NEUTROPHILS ABSOLUTE: 3.09 E9/L (ref 1.8–7.3)
NEUTROPHILS RELATIVE PERCENT: 76.9 % (ref 43–80)
OVALOCYTES: ABNORMAL
PDW BLD-RTO: 16.9 FL (ref 11.5–15)
PLATELET # BLD: 89 E9/L (ref 130–450)
PLATELET CONFIRMATION: NORMAL
PMV BLD AUTO: 11.1 FL (ref 7–12)
POIKILOCYTES: ABNORMAL
POTASSIUM SERPL-SCNC: 4.1 MMOL/L (ref 3.5–5)
RBC # BLD: 2.36 E12/L (ref 3.8–5.8)
SODIUM BLD-SCNC: 139 MMOL/L (ref 132–146)
TOTAL PROTEIN: 5.2 G/DL (ref 6.4–8.3)
WBC # BLD: 4 E9/L (ref 4.5–11.5)

## 2020-07-16 PROCEDURE — 6370000000 HC RX 637 (ALT 250 FOR IP): Performed by: INTERNAL MEDICINE

## 2020-07-16 PROCEDURE — 2700000000 HC OXYGEN THERAPY PER DAY

## 2020-07-16 PROCEDURE — 99232 SBSQ HOSP IP/OBS MODERATE 35: CPT | Performed by: INTERNAL MEDICINE

## 2020-07-16 PROCEDURE — 6360000002 HC RX W HCPCS: Performed by: INTERNAL MEDICINE

## 2020-07-16 PROCEDURE — 2500000003 HC RX 250 WO HCPCS: Performed by: INTERNAL MEDICINE

## 2020-07-16 PROCEDURE — 6370000000 HC RX 637 (ALT 250 FOR IP): Performed by: NURSE PRACTITIONER

## 2020-07-16 PROCEDURE — 80076 HEPATIC FUNCTION PANEL: CPT

## 2020-07-16 PROCEDURE — 36415 COLL VENOUS BLD VENIPUNCTURE: CPT

## 2020-07-16 PROCEDURE — 80048 BASIC METABOLIC PNL TOTAL CA: CPT

## 2020-07-16 PROCEDURE — 85025 COMPLETE CBC W/AUTO DIFF WBC: CPT

## 2020-07-16 PROCEDURE — 2060000000 HC ICU INTERMEDIATE R&B

## 2020-07-16 PROCEDURE — 2580000003 HC RX 258: Performed by: SPECIALIST

## 2020-07-16 PROCEDURE — 2580000003 HC RX 258: Performed by: INTERNAL MEDICINE

## 2020-07-16 RX ADMIN — HEPARIN SODIUM 5000 UNITS: 10000 INJECTION INTRAVENOUS; SUBCUTANEOUS at 15:00

## 2020-07-16 RX ADMIN — Medication 10 ML: at 08:10

## 2020-07-16 RX ADMIN — HEPARIN SODIUM 5000 UNITS: 10000 INJECTION INTRAVENOUS; SUBCUTANEOUS at 21:27

## 2020-07-16 RX ADMIN — FERROUS SULFATE TAB 325 MG (65 MG ELEMENTAL FE) 325 MG: 325 (65 FE) TAB at 17:23

## 2020-07-16 RX ADMIN — FERROUS SULFATE TAB 325 MG (65 MG ELEMENTAL FE) 325 MG: 325 (65 FE) TAB at 08:09

## 2020-07-16 RX ADMIN — ACETAMINOPHEN 650 MG: 325 TABLET ORAL at 08:09

## 2020-07-16 RX ADMIN — ASPIRIN 81 MG CHEWABLE TABLET 81 MG: 81 TABLET CHEWABLE at 08:09

## 2020-07-16 RX ADMIN — AMLODIPINE BESYLATE 10 MG: 10 TABLET ORAL at 08:09

## 2020-07-16 RX ADMIN — SODIUM CHLORIDE 100 MG: 0.9 INJECTION, SOLUTION INTRAVENOUS at 15:09

## 2020-07-16 RX ADMIN — CALCIUM ACETATE 2001 MG: 667 CAPSULE ORAL at 08:09

## 2020-07-16 RX ADMIN — HYDRALAZINE HYDROCHLORIDE 50 MG: 50 TABLET ORAL at 21:26

## 2020-07-16 RX ADMIN — Medication 10 ML: at 21:25

## 2020-07-16 RX ADMIN — CALCIUM ACETATE 2001 MG: 667 CAPSULE ORAL at 11:54

## 2020-07-16 RX ADMIN — ROSUVASTATIN CALCIUM 5 MG: 5 TABLET, FILM COATED ORAL at 21:26

## 2020-07-16 RX ADMIN — DOXAZOSIN MESYLATE 4 MG: 4 TABLET ORAL at 21:27

## 2020-07-16 RX ADMIN — METOPROLOL TARTRATE 50 MG: 50 TABLET, FILM COATED ORAL at 08:09

## 2020-07-16 RX ADMIN — Medication 1000 MG: at 08:09

## 2020-07-16 RX ADMIN — ONDANSETRON 4 MG: 2 INJECTION INTRAMUSCULAR; INTRAVENOUS at 17:14

## 2020-07-16 RX ADMIN — Medication 1000 MG: at 21:25

## 2020-07-16 RX ADMIN — HEPARIN SODIUM 5000 UNITS: 10000 INJECTION INTRAVENOUS; SUBCUTANEOUS at 06:15

## 2020-07-16 RX ADMIN — DEXAMETHASONE SODIUM PHOSPHATE 6 MG: 4 INJECTION, SOLUTION INTRA-ARTICULAR; INTRALESIONAL; INTRAMUSCULAR; INTRAVENOUS; SOFT TISSUE at 08:10

## 2020-07-16 RX ADMIN — HYDRALAZINE HYDROCHLORIDE 50 MG: 50 TABLET ORAL at 08:09

## 2020-07-16 RX ADMIN — HYDRALAZINE HYDROCHLORIDE 50 MG: 50 TABLET ORAL at 15:09

## 2020-07-16 RX ADMIN — METOPROLOL TARTRATE 50 MG: 50 TABLET, FILM COATED ORAL at 21:26

## 2020-07-16 RX ADMIN — ZINC SULFATE 220 MG (50 MG) CAPSULE 50 MG: CAPSULE at 08:09

## 2020-07-16 ASSESSMENT — PAIN DESCRIPTION - ORIENTATION: ORIENTATION: RIGHT;LEFT

## 2020-07-16 ASSESSMENT — PAIN SCALES - GENERAL
PAINLEVEL_OUTOF10: 5
PAINLEVEL_OUTOF10: 0
PAINLEVEL_OUTOF10: 5

## 2020-07-16 ASSESSMENT — PAIN DESCRIPTION - FREQUENCY: FREQUENCY: INTERMITTENT

## 2020-07-16 ASSESSMENT — PAIN DESCRIPTION - PAIN TYPE: TYPE: ACUTE PAIN

## 2020-07-16 ASSESSMENT — PAIN DESCRIPTION - DESCRIPTORS: DESCRIPTORS: DISCOMFORT

## 2020-07-16 ASSESSMENT — PAIN DESCRIPTION - LOCATION: LOCATION: RIB CAGE

## 2020-07-16 ASSESSMENT — PAIN DESCRIPTION - PROGRESSION: CLINICAL_PROGRESSION: NOT CHANGED

## 2020-07-16 NOTE — PROGRESS NOTES
Pt up to bathroom with wheeled walker. Nurse stand-by assist. He denies pain and SOB. Ambulatory pulse ox complete. Pt tolerated well. Incentive spirometer education implemented and returned demonstration noted. All needs are met at this time.  Safety measures are in place

## 2020-07-16 NOTE — PROGRESS NOTES
[facility] CARE AT [facility]   Progress Note    Admitting Date and Time: 7/13/2020  5:20 PM  Admit Dx: PIOZT-03 [U07.1]  COVID-19 [U07.1]    Subjective:    Patient was admitted with COVID-19 [U07.1]  COVID-19 [U07.1].  Patient denies fever, chills, cp, sob, n/v.     doxazosin  4 mg Oral Nightly    [START ON 7/17/2020] ferric gluconate (FERRLECIT) IVPB  125 mg Intravenous Q MWF    dexamethasone  6 mg Intravenous Daily    Calcium Acetate (Phos Binder)  2,001 mg Oral TID WC    epoetin delmer-epbx  3,000 Units Subcutaneous Once per day on Mon Wed Fri    ferrous sulfate  325 mg Oral BID WC    remdesivir IVPB  100 mg Intravenous Q24H    sodium chloride flush  10 mL Intravenous 2 times per day    heparin (porcine)  5,000 Units Subcutaneous 3 times per day    vitamin C  1,000 mg Oral BID    zinc sulfate  50 mg Oral Daily    amLODIPine  10 mg Oral Daily    aspirin  81 mg Oral Daily    hydrALAZINE  50 mg Oral TID    metoprolol tartrate  50 mg Oral BID    rosuvastatin  5 mg Oral Nightly     sodium chloride, 30 mL, PRN  sodium chloride flush, 10 mL, PRN  acetaminophen, 650 mg, Q6H PRN    Or  acetaminophen, 650 mg, Q6H PRN  polyethylene glycol, 17 g, Daily PRN  promethazine, 12.5 mg, Q6H PRN    Or  ondansetron, 4 mg, Q6H PRN         Objective:    BP (!) 152/67   Pulse 69   Temp 98.6 °F (37 °C) (Oral)   Resp 18   Ht 5' 8\" (1.727 m)   Wt 137 lb 12.6 oz (62.5 kg)   SpO2 94%   BMI 20.95 kg/m²   Skin: warm and dry, no rash or erythema  Pulmonary/Chest: clear to auscultation bilaterally but diminished- no wheezes, rales or rhonchi, normal air movement, no respiratory distress  Cardiovascular: rhythm reg at rate of 70  Abdomen: soft, non-tender, non-distended, normal bowel sounds, no masses or organomegaly  Extremities: no cyanosis, no clubbing and no edema      Recent Labs     07/14/20  0505 07/15/20  0850 07/16/20  0310    140 139   K 4.9 4.3 4.1   CL 94* 99 98   CO2 23 28 29   BUN 69* 49* 40* CREATININE 8.9* 6.6* 4.8*   GLUCOSE 120* 119* 95   CALCIUM 9.9 8.9 8.8       Recent Labs     07/14/20  0505 07/15/20  0850 07/16/20  0310   WBC 4.2* 3.4* 4.0*   RBC 2.49* 2.29* 2.36*   HGB 8.2* 7.6* 7.8*   HCT 26.4* 24.0* 24.7*   .0* 104.8* 104.7*   MCH 32.9 33.2 33.1   MCHC 31.1* 31.7* 31.6*   RDW 16.5* 16.6* 16.9*   PLT 79* 79* 89*   MPV 11.9 12.1* 11.1       CBC with Differential:    Lab Results   Component Value Date    WBC 4.0 07/16/2020    RBC 2.36 07/16/2020    HGB 7.8 07/16/2020    HCT 24.7 07/16/2020    PLT 89 07/16/2020    .7 07/16/2020    MCH 33.1 07/16/2020    MCHC 31.6 07/16/2020    RDW 16.9 07/16/2020    LYMPHOPCT 13.7 07/16/2020    MONOPCT 8.7 07/16/2020    BASOPCT 0.2 07/16/2020    MONOSABS 0.35 07/16/2020    LYMPHSABS 0.55 07/16/2020    EOSABS 0.00 07/16/2020    BASOSABS 0.01 07/16/2020     BMP:    Lab Results   Component Value Date     07/16/2020    K 4.1 07/16/2020    K 4.9 07/14/2020    CL 98 07/16/2020    CO2 29 07/16/2020    BUN 40 07/16/2020    LABALBU 3.4 07/16/2020    CREATININE 4.8 07/16/2020    CALCIUM 8.8 07/16/2020    GFRAA 14 07/16/2020    LABGLOM 12 07/16/2020    GLUCOSE 95 07/16/2020     Hepatic Function Panel:    Lab Results   Component Value Date    ALKPHOS 56 07/16/2020    ALT 8 07/16/2020    AST 21 07/16/2020    PROT 5.2 07/16/2020    BILITOT 0.3 07/16/2020    BILIDIR <0.2 07/16/2020    IBILI see below 07/16/2020    LABALBU 3.4 07/16/2020        Radiology:   XR CHEST PORTABLE   Final Result      No airspace opacities or pleural effusion. Assessment:    Active Problems:    COVID-19  Resolved Problems:    * No resolved hospital problems. *      Plan:  1. Acute respiratory failure with hypoxia due to covid 19 wean oxygen as able  2. Pneumonia due to covid 19 ID following remdesivir per ID. Continue steroids  3. ESRD HD per nephrology  4. htn monitor bp and continue med  5. Hyperlipidemia continue med  6.  Thrombocytopenia monitor        Electronically signed by Nishant Jaquez DO on 7/16/2020 at 6:51 PM

## 2020-07-16 NOTE — PROGRESS NOTES
Pulse ox was ___88___% on room air at rest.  Ambulated patient on room air. Oxygen saturation was ____94__% on room air while ambulating.   Recovery pulse ox was ___91___% on _______ RA

## 2020-07-16 NOTE — PROGRESS NOTES
[address] [place] [address] [facility]  [facility]  Progress Note      Chief Complaint   Patient presents with    Shortness of Breath     pt sent in by dialysis for fever of 104.3F, did not recieve treatment    Cough    Fever       SUBJECTIVE:  Patient is tolerating medications. No reported adverse drug reactions. No nausea, vomiting, diarrhea. Patient saturating at 97% on room air/2 L  Review of systems:  As stated above in the chief complaint, otherwise negative. Medications:  Scheduled Meds:   doxazosin  4 mg Oral Nightly    [START ON 2020] ferric gluconate (FERRLECIT) IVPB  125 mg Intravenous Q MWF    dexamethasone  6 mg Intravenous Daily    Calcium Acetate (Phos Binder)  2,001 mg Oral TID WC    epoetin delmer-epbx  3,000 Units Subcutaneous Once per day on     ferrous sulfate  325 mg Oral BID WC    remdesivir IVPB  100 mg Intravenous Q24H    sodium chloride flush  10 mL Intravenous 2 times per day    heparin (porcine)  5,000 Units Subcutaneous 3 times per day    vitamin C  1,000 mg Oral BID    zinc sulfate  50 mg Oral Daily    amLODIPine  10 mg Oral Daily    aspirin  81 mg Oral Daily    hydrALAZINE  50 mg Oral TID    metoprolol tartrate  50 mg Oral BID    rosuvastatin  5 mg Oral Nightly     Continuous Infusions:  PRN Meds:sodium chloride, sodium chloride flush, acetaminophen **OR** acetaminophen, polyethylene glycol, promethazine **OR** ondansetron    OBJECTIVE:  BP (!) 171/74   Pulse 72   Temp 99 °F (37.2 °C) (Oral)   Resp 18   Ht 5' 8\" (1.727 m)   Wt 137 lb 12.6 oz (62.5 kg)   SpO2 96%   BMI 20.95 kg/m²   Temp  Av.4 °F (36.9 °C)  Min: 97.6 °F (36.4 °C)  Max: 99 °F (37.2 °C)  Constitutional: The patient is awake, alert, and oriented. Skin: Warm and dry. No rashes were noted. HEENT: Round and reactive pupils. Moist mucous membranes. No ulcerations or thrush. Neck: Supple to movements. Chest: No use of accessory muscles to breathe.  Symmetrical expansion. No wheezing, crackles or rhonchi. Cardiovascular: S1 and S2 are rhythmic and regular. No murmurs appreciated. Abdomen: Positive bowel sounds to auscultation. Benign to palpation. No masses felt. No hepatosplenomegaly. Genitourinary: Male  Extremities: No clubbing, no cyanosis, no edema.   Lines: peripheral    Laboratory and Tests Review:  Lab Results   Component Value Date    WBC 4.0 (L) 07/16/2020    WBC 3.4 (L) 07/15/2020    WBC 4.2 (L) 07/14/2020    HGB 7.8 (L) 07/16/2020    HCT 24.7 (L) 07/16/2020    .7 (H) 07/16/2020    PLT 89 (L) 07/16/2020     Lab Results   Component Value Date    NEUTROABS 3.09 07/16/2020    NEUTROABS 2.60 07/15/2020    NEUTROABS 4.08 07/13/2020     No results found for: CRPHS  Lab Results   Component Value Date    ALT 8 07/16/2020    AST 21 07/16/2020    ALKPHOS 56 07/16/2020    BILITOT 0.3 07/16/2020     Lab Results   Component Value Date     07/16/2020    K 4.1 07/16/2020    K 4.9 07/14/2020    CL 98 07/16/2020    CO2 29 07/16/2020    BUN 40 07/16/2020    CREATININE 4.8 07/16/2020    CREATININE 6.6 07/15/2020    CREATININE 8.9 07/14/2020    GFRAA 14 07/16/2020    LABGLOM 12 07/16/2020    GLUCOSE 95 07/16/2020    PROT 5.2 07/16/2020    LABALBU 3.4 07/16/2020    CALCIUM 8.8 07/16/2020    BILITOT 0.3 07/16/2020    ALKPHOS 56 07/16/2020    AST 21 07/16/2020    ALT 8 07/16/2020     Lab Results   Component Value Date    CRP 3.1 (H) 07/14/2020     Lab Results   Component Value Date    SEDRATE 2 07/14/2020     Radiology:      Microbiology:   Lab Results   Component Value Date    BC 24 Hours no growth 07/13/2020     Lab Results   Component Value Date    BLOODCULT2 24 Hours no growth 07/13/2020     No results found for: WNDABS  No results found for: RESPSMEAR  No results found for: MPNEUMO, CLAMYDCU, LABLEGI, AFBCX, FUNGSM, LABFUNG  No results found for: CULTRESP  No results found for: CXCATHTIP  No results found for: BFCS  No results found for: CXSURG  Urine Culture, Routine   Date Value Ref Range Status   12/05/2019 Growth not present  Final     No results found for: [address]     ASSESSMENT:  COVID-19 pneumonia with good response with REM    PLAN:  · Continue  rem day this is day 3  · Check final cultures  · Monitor labs    Annelise Walton  10:55 AM  7/16/2020

## 2020-07-16 NOTE — CARE COORDINATION
Social work / Discharge Planning:       Patient is COVID POSITIVE. Social work called [phone] and spoke to the [employer].   She stated that she needed to make some calls to coordinate chair time for positive covid patient. She requested that social work call her back in one hour.    Electronically signed by NOE Wade on 7/16/2020 at 12:58 PM

## 2020-07-16 NOTE — PLAN OF CARE
Met This Shift  Goal: Hemodynamic stability will improve  Description: Hemodynamic stability will improve  Outcome: Met This Shift  Goal: Ability to maintain a balanced intake and output will improve  Description: Ability to maintain a balanced intake and output will improve  Outcome: Met This Shift     Problem: Respiratory:  Goal: Respiratory status will improve  Description: Respiratory status will improve  Outcome: Met This Shift     Problem: Pain:  Goal: Pain level will decrease  Description: Pain level will decrease  Outcome: Met This Shift  Goal: Control of acute pain  Description: Control of acute pain  Outcome: Met This Shift  Goal: Control of chronic pain  Description: Control of chronic pain  Outcome: Met This Shift     Problem: Skin Integrity:  Goal: Will show no infection signs and symptoms  Description: Will show no infection signs and symptoms  Outcome: Met This Shift  Goal: Absence of new skin breakdown  Description: Absence of new skin breakdown  Outcome: Met This Shift

## 2020-07-16 NOTE — CARE COORDINATION
Social work / Discharge Planning:       Social work spoke to Kike, the director at [facility]. The patient will need to get dialysis here tomorrow and will start up again at [facility] on Monday. Awaiting return call regarding chair time. Social work updated Dr. Adriana Marsh and RN. Electronically signed by NOE Diego on 7/16/2020 at 2:23 PM          RN updated social work that patient will not need 02 for discharge.   Electronically signed by NOE Diego on 7/16/2020 at 2:24 PM

## 2020-07-16 NOTE — PROGRESS NOTES
650 mg, 650 mg, Oral, Q6H PRN **OR** acetaminophen (TYLENOL) suppository 650 mg, 650 mg, Rectal, Q6H PRN  polyethylene glycol (GLYCOLAX) packet 17 g, 17 g, Oral, Daily PRN  promethazine (PHENERGAN) tablet 12.5 mg, 12.5 mg, Oral, Q6H PRN **OR** ondansetron (ZOFRAN) injection 4 mg, 4 mg, Intravenous, Q6H PRN  heparin (porcine) injection 5,000 Units, 5,000 Units, Subcutaneous, 3 times per day  ascorbic acid (VITAMIN C) tablet 1,000 mg, 1,000 mg, Oral, BID  zinc sulfate (ZINCATE) capsule 50 mg, 50 mg, Oral, Daily  amLODIPine (NORVASC) tablet 10 mg, 10 mg, Oral, Daily  aspirin chewable tablet 81 mg, 81 mg, Oral, Daily  hydrALAZINE (APRESOLINE) tablet 50 mg, 50 mg, Oral, TID  metoprolol tartrate (LOPRESSOR) tablet 50 mg, 50 mg, Oral, BID  rosuvastatin (CRESTOR) tablet 5 mg, 5 mg, Oral, Nightly  Allergies:  Patient has no known allergies. Social History:    TOBACCO:   reports that he has quit smoking. He has never used smokeless tobacco.  ETOH:   reports previous alcohol use. DRUGS:   reports no history of drug use. Family History:   History reviewed. No pertinent family history. REVIEW OF SYSTEMS:    Review of Systems   Constitutional: Positive for fever. Negative for diaphoresis. HENT: Negative for sore throat. Eyes: Negative for discharge. Respiratory: Positive for cough and shortness of breath. Cardiovascular: Negative for chest pain. Gastrointestinal: Negative for abdominal pain and vomiting. Genitourinary: Negative for difficulty urinating, dysuria and hematuria. Musculoskeletal: Positive for arthralgias and myalgias. Skin: Negative for rash. Allergic/Immunologic: Negative for immunocompromised state. Neurological: Positive for headaches.      PHYSICAL EXAM:      Vitals:    VITALS:  BP (!) 154/89   Pulse 72   Temp 99 °F (37.2 °C) (Oral)   Resp 18   Ht 5' 8\" (1.727 m)   Wt 137 lb 12.6 oz (62.5 kg)   SpO2 96%   BMI 20.95 kg/m²   24HR INTAKE/OUTPUT:    No intake or output data in the 24 hours ending 07/16/20 1524    General Appearance: alert and oriented to person, place and time, ill appearing  Skin: warm and dry, turgor not diminished  Head: normocephalic and atraumatic  Eyes: pupils equal, round, and reactive to light, extraocular eye movements intact, conjunctivae normal  Neck: neck supple and non tender without mass   Pulmonary/Chest: Rales (right side) no wheezes,  no respiratory distress  Cardiovascular: normal rate, normal S1 and S2 and murmur  Abdomen: soft, non-tender, non-distended, normal bowel sounds, no masses or organomegaly  Extremities: no cyanosis, no clubbing and no edema.   Left fistula  Neurologic: no cranial nerve deficit and speech normal    DATA:    CBC with Differential:    Lab Results   Component Value Date    WBC 4.0 07/16/2020    RBC 2.36 07/16/2020    HGB 7.8 07/16/2020    HCT 24.7 07/16/2020    PLT 89 07/16/2020    .7 07/16/2020    MCH 33.1 07/16/2020    MCHC 31.6 07/16/2020    RDW 16.9 07/16/2020    LYMPHOPCT 13.7 07/16/2020    MONOPCT 8.7 07/16/2020    BASOPCT 0.2 07/16/2020    MONOSABS 0.35 07/16/2020    LYMPHSABS 0.55 07/16/2020    EOSABS 0.00 07/16/2020    BASOSABS 0.01 07/16/2020     CMP:    Lab Results   Component Value Date     07/16/2020    K 4.1 07/16/2020    K 4.9 07/14/2020    CL 98 07/16/2020    CO2 29 07/16/2020    BUN 40 07/16/2020    CREATININE 4.8 07/16/2020    GFRAA 14 07/16/2020    LABGLOM 12 07/16/2020    GLUCOSE 95 07/16/2020    PROT 5.2 07/16/2020    LABALBU 3.4 07/16/2020    CALCIUM 8.8 07/16/2020    BILITOT 0.3 07/16/2020    ALKPHOS 56 07/16/2020    AST 21 07/16/2020    ALT 8 07/16/2020     Phosphorus: 3.1mg/dL    Radiology Review:        Chest xray 7/13/20         No airspace opacities or pleural effusion.                Brief Summary if Initial Consult:   The patient is a 75 y.o. 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

## 2020-07-17 LAB
ABO/RH: NORMAL
ALBUMIN SERPL-MCNC: 3.1 G/DL (ref 3.5–5.2)
ALP BLD-CCNC: 50 U/L (ref 40–129)
ALT SERPL-CCNC: 7 U/L (ref 0–40)
ANION GAP SERPL CALCULATED.3IONS-SCNC: 14 MMOL/L (ref 7–16)
ANION GAP SERPL CALCULATED.3IONS-SCNC: 14 MMOL/L (ref 7–16)
ANISOCYTOSIS: ABNORMAL
ANTIBODY SCREEN: NORMAL
AST SERPL-CCNC: 18 U/L (ref 0–39)
BASOPHILS ABSOLUTE: 0 E9/L (ref 0–0.2)
BASOPHILS ABSOLUTE: 0 E9/L (ref 0–0.2)
BASOPHILS RELATIVE PERCENT: 0 % (ref 0–2)
BASOPHILS RELATIVE PERCENT: 0 % (ref 0–2)
BILIRUB SERPL-MCNC: 0.3 MG/DL (ref 0–1.2)
BILIRUBIN DIRECT: <0.2 MG/DL (ref 0–0.3)
BILIRUBIN, INDIRECT: ABNORMAL MG/DL (ref 0–1)
BUN BLDV-MCNC: 42 MG/DL (ref 8–23)
BUN BLDV-MCNC: 81 MG/DL (ref 8–23)
CALCIUM SERPL-MCNC: 8.9 MG/DL (ref 8.6–10.2)
CALCIUM SERPL-MCNC: 9.4 MG/DL (ref 8.6–10.2)
CHLORIDE BLD-SCNC: 97 MMOL/L (ref 98–107)
CHLORIDE BLD-SCNC: 98 MMOL/L (ref 98–107)
CO2: 27 MMOL/L (ref 22–29)
CO2: 29 MMOL/L (ref 22–29)
CREAT SERPL-MCNC: 4.1 MG/DL (ref 0.7–1.2)
CREAT SERPL-MCNC: 6.8 MG/DL (ref 0.7–1.2)
EOSINOPHILS ABSOLUTE: 0 E9/L (ref 0.05–0.5)
EOSINOPHILS ABSOLUTE: 0 E9/L (ref 0.05–0.5)
EOSINOPHILS RELATIVE PERCENT: 0 % (ref 0–6)
EOSINOPHILS RELATIVE PERCENT: 0 % (ref 0–6)
GFR AFRICAN AMERICAN: 10
GFR AFRICAN AMERICAN: 17
GFR NON-AFRICAN AMERICAN: 14 ML/MIN/1.73
GFR NON-AFRICAN AMERICAN: 8 ML/MIN/1.73
GLUCOSE BLD-MCNC: 110 MG/DL (ref 74–99)
GLUCOSE BLD-MCNC: 146 MG/DL (ref 74–99)
HCT VFR BLD CALC: 21.2 % (ref 37–54)
HCT VFR BLD CALC: 25.6 % (ref 37–54)
HEMOGLOBIN: 6.6 G/DL (ref 12.5–16.5)
HEMOGLOBIN: 8.1 G/DL (ref 12.5–16.5)
IMMATURE GRANULOCYTES #: 0.03 E9/L
IMMATURE GRANULOCYTES #: 0.09 E9/L
IMMATURE GRANULOCYTES %: 0.6 % (ref 0–5)
IMMATURE GRANULOCYTES %: 1.1 % (ref 0–5)
LYMPHOCYTES ABSOLUTE: 0.53 E9/L (ref 1.5–4)
LYMPHOCYTES ABSOLUTE: 0.6 E9/L (ref 1.5–4)
LYMPHOCYTES RELATIVE PERCENT: 12.2 % (ref 20–42)
LYMPHOCYTES RELATIVE PERCENT: 6.3 % (ref 20–42)
MAGNESIUM: 2 MG/DL (ref 1.6–2.6)
MCH RBC QN AUTO: 32.5 PG (ref 26–35)
MCH RBC QN AUTO: 32.8 PG (ref 26–35)
MCHC RBC AUTO-ENTMCNC: 31.1 % (ref 32–34.5)
MCHC RBC AUTO-ENTMCNC: 31.6 % (ref 32–34.5)
MCV RBC AUTO: 102.8 FL (ref 80–99.9)
MCV RBC AUTO: 105.5 FL (ref 80–99.9)
MONOCYTES ABSOLUTE: 0.37 E9/L (ref 0.1–0.95)
MONOCYTES ABSOLUTE: 0.5 E9/L (ref 0.1–0.95)
MONOCYTES RELATIVE PERCENT: 6 % (ref 2–12)
MONOCYTES RELATIVE PERCENT: 7.5 % (ref 2–12)
NEUTROPHILS ABSOLUTE: 3.93 E9/L (ref 1.8–7.3)
NEUTROPHILS ABSOLUTE: 7.26 E9/L (ref 1.8–7.3)
NEUTROPHILS RELATIVE PERCENT: 79.7 % (ref 43–80)
NEUTROPHILS RELATIVE PERCENT: 86.6 % (ref 43–80)
OVALOCYTES: ABNORMAL
PDW BLD-RTO: 16.9 FL (ref 11.5–15)
PDW BLD-RTO: 18.5 FL (ref 11.5–15)
PHOSPHORUS: 3 MG/DL (ref 2.5–4.5)
PLATELET # BLD: 104 E9/L (ref 130–450)
PLATELET # BLD: 121 E9/L (ref 130–450)
PMV BLD AUTO: 11.8 FL (ref 7–12)
PMV BLD AUTO: 12.1 FL (ref 7–12)
POIKILOCYTES: ABNORMAL
POTASSIUM SERPL-SCNC: 3.9 MMOL/L (ref 3.5–5)
POTASSIUM SERPL-SCNC: 5.1 MMOL/L (ref 3.5–5)
RBC # BLD: 2.01 E12/L (ref 3.8–5.8)
RBC # BLD: 2.49 E12/L (ref 3.8–5.8)
SODIUM BLD-SCNC: 138 MMOL/L (ref 132–146)
SODIUM BLD-SCNC: 141 MMOL/L (ref 132–146)
TEAR DROP CELLS: ABNORMAL
TOTAL PROTEIN: 4.9 G/DL (ref 6.4–8.3)
WBC # BLD: 4.9 E9/L (ref 4.5–11.5)
WBC # BLD: 8.4 E9/L (ref 4.5–11.5)

## 2020-07-17 PROCEDURE — 86900 BLOOD TYPING SEROLOGIC ABO: CPT

## 2020-07-17 PROCEDURE — 90935 HEMODIALYSIS ONE EVALUATION: CPT

## 2020-07-17 PROCEDURE — 2500000003 HC RX 250 WO HCPCS: Performed by: INTERNAL MEDICINE

## 2020-07-17 PROCEDURE — 2580000003 HC RX 258: Performed by: INTERNAL MEDICINE

## 2020-07-17 PROCEDURE — 36415 COLL VENOUS BLD VENIPUNCTURE: CPT

## 2020-07-17 PROCEDURE — 2700000000 HC OXYGEN THERAPY PER DAY

## 2020-07-17 PROCEDURE — 83735 ASSAY OF MAGNESIUM: CPT

## 2020-07-17 PROCEDURE — P9016 RBC LEUKOCYTES REDUCED: HCPCS

## 2020-07-17 PROCEDURE — 80048 BASIC METABOLIC PNL TOTAL CA: CPT

## 2020-07-17 PROCEDURE — 90935 HEMODIALYSIS ONE EVALUATION: CPT | Performed by: INTERNAL MEDICINE

## 2020-07-17 PROCEDURE — 84100 ASSAY OF PHOSPHORUS: CPT

## 2020-07-17 PROCEDURE — 80076 HEPATIC FUNCTION PANEL: CPT

## 2020-07-17 PROCEDURE — 6370000000 HC RX 637 (ALT 250 FOR IP): Performed by: NURSE PRACTITIONER

## 2020-07-17 PROCEDURE — 99232 SBSQ HOSP IP/OBS MODERATE 35: CPT | Performed by: INTERNAL MEDICINE

## 2020-07-17 PROCEDURE — 86923 COMPATIBILITY TEST ELECTRIC: CPT

## 2020-07-17 PROCEDURE — 6360000002 HC RX W HCPCS: Performed by: INTERNAL MEDICINE

## 2020-07-17 PROCEDURE — 6370000000 HC RX 637 (ALT 250 FOR IP): Performed by: STUDENT IN AN ORGANIZED HEALTH CARE EDUCATION/TRAINING PROGRAM

## 2020-07-17 PROCEDURE — 6370000000 HC RX 637 (ALT 250 FOR IP): Performed by: INTERNAL MEDICINE

## 2020-07-17 PROCEDURE — 86850 RBC ANTIBODY SCREEN: CPT

## 2020-07-17 PROCEDURE — 36430 TRANSFUSION BLD/BLD COMPNT: CPT

## 2020-07-17 PROCEDURE — 2060000000 HC ICU INTERMEDIATE R&B

## 2020-07-17 PROCEDURE — 86901 BLOOD TYPING SEROLOGIC RH(D): CPT

## 2020-07-17 PROCEDURE — 6360000002 HC RX W HCPCS: Performed by: NURSE PRACTITIONER

## 2020-07-17 PROCEDURE — 85025 COMPLETE CBC W/AUTO DIFF WBC: CPT

## 2020-07-17 RX ORDER — PANTOPRAZOLE SODIUM 40 MG/1
40 TABLET, DELAYED RELEASE ORAL
Status: DISCONTINUED | OUTPATIENT
Start: 2020-07-18 | End: 2020-07-29 | Stop reason: HOSPADM

## 2020-07-17 RX ORDER — 0.9 % SODIUM CHLORIDE 0.9 %
20 INTRAVENOUS SOLUTION INTRAVENOUS ONCE
Status: COMPLETED | OUTPATIENT
Start: 2020-07-17 | End: 2020-07-17

## 2020-07-17 RX ORDER — SUCRALFATE 1 G/1
1 TABLET ORAL EVERY 6 HOURS SCHEDULED
Status: DISCONTINUED | OUTPATIENT
Start: 2020-07-17 | End: 2020-07-22

## 2020-07-17 RX ADMIN — Medication 1000 MG: at 21:20

## 2020-07-17 RX ADMIN — SUCRALFATE 1 G: 1 TABLET ORAL at 17:35

## 2020-07-17 RX ADMIN — HYDRALAZINE HYDROCHLORIDE 50 MG: 50 TABLET ORAL at 12:23

## 2020-07-17 RX ADMIN — EPOETIN ALFA-EPBX 3000 UNITS: 3000 INJECTION, SOLUTION INTRAVENOUS; SUBCUTANEOUS at 12:36

## 2020-07-17 RX ADMIN — SODIUM CHLORIDE 125 MG: 900 INJECTION INTRAVENOUS at 11:21

## 2020-07-17 RX ADMIN — Medication 10 ML: at 21:20

## 2020-07-17 RX ADMIN — DEXAMETHASONE SODIUM PHOSPHATE 6 MG: 4 INJECTION, SOLUTION INTRA-ARTICULAR; INTRALESIONAL; INTRAMUSCULAR; INTRAVENOUS; SOFT TISSUE at 11:19

## 2020-07-17 RX ADMIN — CALCIUM ACETATE 2001 MG: 667 CAPSULE ORAL at 11:24

## 2020-07-17 RX ADMIN — SODIUM CHLORIDE 20 ML: 9 INJECTION, SOLUTION INTRAVENOUS at 14:43

## 2020-07-17 RX ADMIN — HEPARIN SODIUM 5000 UNITS: 10000 INJECTION INTRAVENOUS; SUBCUTANEOUS at 05:39

## 2020-07-17 RX ADMIN — HYDRALAZINE HYDROCHLORIDE 50 MG: 50 TABLET ORAL at 11:19

## 2020-07-17 RX ADMIN — ONDANSETRON 4 MG: 2 INJECTION INTRAMUSCULAR; INTRAVENOUS at 12:23

## 2020-07-17 RX ADMIN — FERROUS SULFATE TAB 325 MG (65 MG ELEMENTAL FE) 325 MG: 325 (65 FE) TAB at 17:35

## 2020-07-17 RX ADMIN — Medication 1000 MG: at 11:20

## 2020-07-17 RX ADMIN — CALCIUM ACETATE 2001 MG: 667 CAPSULE ORAL at 17:35

## 2020-07-17 RX ADMIN — SUCRALFATE 1 G: 1 TABLET ORAL at 22:12

## 2020-07-17 RX ADMIN — ZINC SULFATE 220 MG (50 MG) CAPSULE 50 MG: CAPSULE at 11:19

## 2020-07-17 RX ADMIN — ROSUVASTATIN CALCIUM 5 MG: 5 TABLET, FILM COATED ORAL at 21:21

## 2020-07-17 RX ADMIN — Medication 10 ML: at 11:19

## 2020-07-17 RX ADMIN — FERROUS SULFATE TAB 325 MG (65 MG ELEMENTAL FE) 325 MG: 325 (65 FE) TAB at 11:19

## 2020-07-17 RX ADMIN — ASPIRIN 81 MG CHEWABLE TABLET 81 MG: 81 TABLET CHEWABLE at 11:19

## 2020-07-17 ASSESSMENT — PAIN SCALES - GENERAL
PAINLEVEL_OUTOF10: 0

## 2020-07-17 ASSESSMENT — PAIN DESCRIPTION - PROGRESSION: CLINICAL_PROGRESSION: NOT CHANGED

## 2020-07-17 NOTE — PROGRESS NOTES
[address] [place] [address] [facility]  Progress Note      Chief Complaint   Patient presents with    Shortness of Breath     pt sent in by dialysis for fever of 104.3F, did not recieve treatment    Cough    Fever       SUBJECTIVE:  Patient is tolerating medications. No reported adverse drug reactions. No nausea, vomiting, diarrhea. Patient saturating at 97% on room air      Review of systems:  As stated above in the chief complaint, otherwise negative. Medications:  Scheduled Meds:   sodium chloride  20 mL Intravenous Once    doxazosin  4 mg Oral Nightly    ferric gluconate (FERRLECIT) IVPB  125 mg Intravenous Q MWF    dexamethasone  6 mg Intravenous Daily    Calcium Acetate (Phos Binder)  2,001 mg Oral TID WC    epoetin delmer-epbx  3,000 Units Subcutaneous Once per day on     ferrous sulfate  325 mg Oral BID WC    remdesivir IVPB  100 mg Intravenous Q24H    sodium chloride flush  10 mL Intravenous 2 times per day    heparin (porcine)  5,000 Units Subcutaneous 3 times per day    vitamin C  1,000 mg Oral BID    zinc sulfate  50 mg Oral Daily    amLODIPine  10 mg Oral Daily    aspirin  81 mg Oral Daily    hydrALAZINE  50 mg Oral TID    metoprolol tartrate  50 mg Oral BID    rosuvastatin  5 mg Oral Nightly     Continuous Infusions:  PRN Meds:sodium chloride, sodium chloride flush, acetaminophen **OR** acetaminophen, polyethylene glycol, promethazine **OR** ondansetron    OBJECTIVE:  BP (!) 102/45   Pulse 65   Temp 97.9 °F (36.6 °C)   Resp 18   Ht 5' 8\" (1.727 m)   Wt 142 lb 3.2 oz (64.5 kg)   SpO2 94%   BMI 21.62 kg/m²   Temp  Av °F (36.7 °C)  Min: 97.4 °F (36.3 °C)  Max: 98.6 °F (37 °C)  Constitutional: The patient is awake, alert, and oriented. Skin: Warm and dry. No rashes were noted. HEENT: Round and reactive pupils. Moist mucous membranes. No ulcerations or thrush. Neck: Supple to movements. Chest: No use of accessory muscles to breathe. Symmetrical expansion. No wheezing, crackles or rhonchi. Cardiovascular: S1 and S2 are rhythmic and regular. No murmurs appreciated. Abdomen: Positive bowel sounds to auscultation. Benign to palpation. No masses felt. No hepatosplenomegaly. Genitourinary: Male  Extremities: No clubbing, no cyanosis, no edema.   Lines: peripheral    Laboratory and Tests Review:  Lab Results   Component Value Date    WBC 4.9 07/17/2020    WBC 4.0 (L) 07/16/2020    WBC 3.4 (L) 07/15/2020    HGB 6.6 (L) 07/17/2020    HCT 21.2 (L) 07/17/2020    .5 (H) 07/17/2020     (L) 07/17/2020     Lab Results   Component Value Date    NEUTROABS 3.93 07/17/2020    NEUTROABS 3.09 07/16/2020    NEUTROABS 2.60 07/15/2020     No results found for: CRPHS  Lab Results   Component Value Date    ALT 7 07/17/2020    AST 18 07/17/2020    ALKPHOS 50 07/17/2020    BILITOT 0.3 07/17/2020     Lab Results   Component Value Date     07/17/2020    K 5.1 07/17/2020    K 4.9 07/14/2020    CL 97 07/17/2020    CO2 27 07/17/2020    BUN 81 07/17/2020    CREATININE 6.8 07/17/2020    CREATININE 4.8 07/16/2020    CREATININE 6.6 07/15/2020    GFRAA 10 07/17/2020    LABGLOM 8 07/17/2020    GLUCOSE 110 07/17/2020    PROT 4.9 07/17/2020    LABALBU 3.1 07/17/2020    CALCIUM 9.4 07/17/2020    BILITOT 0.3 07/17/2020    ALKPHOS 50 07/17/2020    AST 18 07/17/2020    ALT 7 07/17/2020     Lab Results   Component Value Date    CRP 3.1 (H) 07/14/2020     Lab Results   Component Value Date    SEDRATE 2 07/14/2020     Radiology:      Microbiology:   Lab Results   Component Value Date    BC 24 Hours no growth 07/13/2020     Lab Results   Component Value Date    BLOODCULT2 24 Hours no growth 07/13/2020     No results found for: WNDABS  No results found for: RESPSMEAR  No results found for: MPNEUMO, CLAMYDCU, LABLEGI, AFBCX, FUNGSM, LABFUNG  No results found for: CULTRESP  No results found for: CXCATHTIP  No results found for: BFCS  No results found for: CXSURG  Urine Culture, Routine   Date Value Ref Range Status   12/05/2019 Growth not present  Final     No results found for: [address]     ASSESSMENT:  COVID-19 pneumonia with good response with REM    PLAN:  · Stop   rem ; ok to d/c  · Check final cultures  · Monitor labs    Merline Bock  10:52 AM  7/17/2020

## 2020-07-17 NOTE — PROGRESS NOTES
Pt incontinent of stool. Felt like he needed to go more and requested ambulating to BR. Sat pt on edge of bed and pt leaned backwards with difficulty sitting up. RN encouraged bedpan and pt refused. Pt felt nauseated and began heaving. No emesis, just green sputum expectorated. Zofran given. RN encouraged bedpan again - pt refused. Pt attempted to stand up and could not remain on feet - he needed to sit back down and at this point agreed to bedpan. Pt educated on low blood count + side effect of dialysis + pt not eating can all contribute to weakness. Noted large black tarry BM. Hemoccult tested +. Explained to pt that hemocult is to r/o GI bleed vs iron supplement stools. Pt continues to have continuous loose bowel movements. Munira care performed after 4 back-to-back BMs. Affected skin remains intact. Will get pt aquaphor. Charge RN notified. 1400 Heparin held.

## 2020-07-17 NOTE — PROGRESS NOTES
Spoke with Pt's wife re: recent colonoscopy as pt does not remember who he saw. Una Neff states that he say Dr Sherrie Oleary and Colonoscopy was done at [facility]. She believes that the only finding was a couple of polyps. Does not recall that he has ever had an EGD. Called Dr Tom Estrada office. Staff there states that Dr Layton Silverio group covers him at [facility].

## 2020-07-17 NOTE — PROGRESS NOTES
epoetin delmer-epbx (RETACRIT) injection 3,000 Units, 3,000 Units, Subcutaneous, Once per day on Mon Wed Fri  ferrous sulfate (IRON 325) tablet 325 mg, 325 mg, Oral, BID WC  0.9 % sodium chloride bolus, 30 mL, Intravenous, PRN  sodium chloride flush 0.9 % injection 10 mL, 10 mL, Intravenous, 2 times per day  sodium chloride flush 0.9 % injection 10 mL, 10 mL, Intravenous, PRN  acetaminophen (TYLENOL) tablet 650 mg, 650 mg, Oral, Q6H PRN **OR** acetaminophen (TYLENOL) suppository 650 mg, 650 mg, Rectal, Q6H PRN  polyethylene glycol (GLYCOLAX) packet 17 g, 17 g, Oral, Daily PRN  promethazine (PHENERGAN) tablet 12.5 mg, 12.5 mg, Oral, Q6H PRN **OR** ondansetron (ZOFRAN) injection 4 mg, 4 mg, Intravenous, Q6H PRN  heparin (porcine) injection 5,000 Units, 5,000 Units, Subcutaneous, 3 times per day  ascorbic acid (VITAMIN C) tablet 1,000 mg, 1,000 mg, Oral, BID  zinc sulfate (ZINCATE) capsule 50 mg, 50 mg, Oral, Daily  amLODIPine (NORVASC) tablet 10 mg, 10 mg, Oral, Daily  aspirin chewable tablet 81 mg, 81 mg, Oral, Daily  hydrALAZINE (APRESOLINE) tablet 50 mg, 50 mg, Oral, TID  metoprolol tartrate (LOPRESSOR) tablet 50 mg, 50 mg, Oral, BID  rosuvastatin (CRESTOR) tablet 5 mg, 5 mg, Oral, Nightly  Allergies:  Patient has no known allergies. Social History:    TOBACCO:   reports that he has quit smoking. He has never used smokeless tobacco.  ETOH:   reports previous alcohol use. DRUGS:   reports no history of drug use. Family History:   History reviewed. No pertinent family history. REVIEW OF SYSTEMS:    Review of Systems   Constitutional: Positive for fever. Negative for diaphoresis. HENT: Negative for sore throat. Eyes: Negative for discharge. Respiratory: Positive for cough and shortness of breath. Cardiovascular: Negative for chest pain. Gastrointestinal: Negative for abdominal pain and vomiting. Genitourinary: Negative for difficulty urinating, dysuria and hematuria.    Musculoskeletal: PROT 4.9 07/17/2020    LABALBU 3.1 07/17/2020    CALCIUM 9.4 07/17/2020    BILITOT 0.3 07/17/2020    ALKPHOS 50 07/17/2020    AST 18 07/17/2020    ALT 7 07/17/2020     Phosphorus: 3.1mg/dL    Radiology Review:        Chest xray 7/13/20         No airspace opacities or pleural effusion.                Brief Summary if Initial Consult:   The patient is a 75 y.o. 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 Initially, he presented to the ER 7/12/20 for generalized aches, weakness, dry cough, some dyspnea on exertion, diarrhea, and overall did not feel well. His wife and son had similar symptoms that started a few days before and tested positive for [address]. A COVID19 test was sent but did not result and was canceled. Treated symptomatically and sent home. His symptoms persisted since then. Yesterday ( 7/13/20) he went to the dialysis center and reportedly was found to have a temperature of 104.5. He was therefore sent to the ED for getting dialysis done. ER lab work revealed sodium 133, potassium 5.3, BUN 62, creatinine 8.2, Anion gap 18. IMPRESSION/RECOMMENDATIONS:      ESRD- on Monday, Wednesday, Friday schedule. GFR: 6  Anemia- Microcytic Anemia- secondary to iron deficiency and ESRD. on ferrous sulfate and Epoetin 3,000U 3x/week, ferrlecit 125mg IV 3x/week with dialysis. COVID19- on ascorbic acid, Vitamin D, Zinc, Remdesirvir and Dexamethasone  Uncontrolled hypertension in the setting of Renal Failure-on Norvasc, Hydralazine, Metoprolol, Doxazosin, likely secondary to volume overload from missed hemodialysis treatment  GI Bleed: s/p 1 unit of blood. Surgery consulted. Diet: Renal    PLAN:            Continue HD per patient schedule of Monday, Wednesday, and Friday. 1 Unit of PRBC with dialysis today.  D/W the RN  Continue with Ferrlecit 3x weekly with HD  Continue Doxazosin  4 mg daily  Obtain repeat BMP, mag, phos, cbc  Continue to monitor kidney function  Surgery Consulted for evaluation of hemoccult + and anemia secondary to blood loss

## 2020-07-17 NOTE — CONSULTS
GENERAL SURGERY  CONSULT NOTE  7/17/2020    Physician Consulted: Dr. Nova Cook  Reason for Consult: Anemia, melena  Referring Physician: Dr. Lian JANE  Shira Bourgeois is a 75 y.o. male who presents for evaluation of melena and anemia. Patient not seen due to COVID status. Chart checked and report from nursing staff. Patient was planned for discharge today. He is ESRD on dialysis MWF. Unable to undergo outpatient dialysis because of COVID status. Underwent dialysis today in hospital. Had large tarry bowel movement afterwards. Also seeming more fatigued. Episode of hypotension. Had a colonoscopy in December of 2019 with Dr Jagdeep Rachel which found tubular adenomas. He has had tubular adenomas since 2011. Last EGD was 2016 which found duodenitis and negative for H. pylori      Past Medical History:   Diagnosis Date    Blind left eye     Chronic kidney disease     [facility] [address]     [facility] ([facility] [address]     Hyperlipidemia     Hypertension        Past Surgical History:   Procedure Laterality Date    AV FISTULA CREATION Left 2015    Dr. Mariana Fraire Left 2019    2 x Dr. Meka Del Toro, [facility]    PERIPHERAL VASCULAR BYPASS  2008    Aortobifemoral bypass for claudicatio - Dr. Meka Del Toro [facility]       Medications Prior to Admission:    Prior to Admission medications    Medication Sig Start Date End Date Taking?  Authorizing Provider   NIFEdipine (ADALAT CC) 30 MG extended release tablet Take 60 mg by mouth every 12 hours   Yes Historical Provider, MD   Cholecalciferol (VITAMIN D) 50 MCG (2000 UT) CAPS capsule Take by mouth    Historical Provider, MD   hydrALAZINE (APRESOLINE) 50 MG tablet Take 50 mg by mouth 3 times daily    Historical Provider, MD   rosuvastatin (CRESTOR) 10 MG tablet Take 5 mg by mouth nightly    Historical Provider, MD   Coenzyme Q10 (Q-10 CO-ENZYME PO) Take 100 mg by mouth Daily    Historical Provider, MD   metoprolol tartrate (LOPRESSOR) 50 MG tablet Take 50 mg by mouth 2 times daily    Historical Provider, MD   aspirin 81 MG tablet Take 81 mg by mouth daily    Historical Provider, MD   calcium acetate (PHOSLO) 667 MG capsule Take 2,001 mg by mouth 3 times daily (with meals)    Historical Provider, MD       No Known Allergies    History reviewed. No pertinent family history. Social History     Tobacco Use    Smoking status: Former Smoker    Smokeless tobacco: Never Used   Substance Use Topics    Alcohol use: Not Currently     Comment: occasional    Drug use: Never         Review of Systems   Not performed due to patient's COVID positive status    PHYSICAL EXAM:    Vitals:    20 1445   BP: 137/61   Pulse: 73   Resp: 20   Temp: 97.8 °F (36.6 °C)   SpO2: 93%     Physical Exam  Not performed due to patient's COVID positive status    LABS:    CBC  Recent Labs     20  0530   WBC 4.9   HGB 6.6*   HCT 21.2*   *     BMP  Recent Labs     20  0530      K 5.1*   CL 97*   CO2 27   BUN 81*   CREATININE 6.8*   CALCIUM 9.4     Liver Function  Recent Labs     20  0530   BILITOT 0.3   BILIDIR <0.2   AST 18   ALT 7   ALKPHOS 50   PROT 4.9*   LABALBU 3.1*     No results for input(s): LACTATE in the last 72 hours. No results for input(s): INR, PTT in the last 72 hours. Invalid input(s): PT    RADIOLOGY    Xr Chest Portable    Result Date: 2020  Patient MRN:  [id number] : 1945 Age: 75 years Gender: Male Order Date:  2020 6:15 PM EXAM: XR CHEST PORTABLE COMPARISON: 2020 INDICATION:  shortness of breath shortness of breath FINDINGS: The heart is normal in size. No focal airspace opacity. There is no pleural effusion. There is no pneumothorax. No free air beneath the diaphragms. No airspace opacities or pleural effusion.          ASSESSMENT/PLAN:  75 y.o. male with upper GI Bleed    -Hemoccult positive, also on IRON  -If his Hgb continues to drop we will perform an EGD, but at this time the risks outweigh the benefits  -Will follow H/H  transfusion per primary  -PPI  -Carafate      Electronically signed by Henrietta Thomas DO on 7/17/20 at 3:52 PM EDT      The patient was seen and examined and the chart was reviewed. I agree with the assessment and plan. I will speak with the nursing staff regarding the patient's clinical status regarding bowel movements. Likely, if the patient has ongoing evidence of a GI bleed then the patient will undergo a an upper endoscopy.

## 2020-07-18 LAB
ALBUMIN SERPL-MCNC: 3.1 G/DL (ref 3.5–5.2)
ALP BLD-CCNC: 52 U/L (ref 40–129)
ALT SERPL-CCNC: 21 U/L (ref 0–40)
ANION GAP SERPL CALCULATED.3IONS-SCNC: 14 MMOL/L (ref 7–16)
AST SERPL-CCNC: 46 U/L (ref 0–39)
BASOPHILS ABSOLUTE: 0 E9/L (ref 0–0.2)
BASOPHILS RELATIVE PERCENT: 0 % (ref 0–2)
BILIRUB SERPL-MCNC: 0.3 MG/DL (ref 0–1.2)
BILIRUBIN DIRECT: <0.2 MG/DL (ref 0–0.3)
BILIRUBIN, INDIRECT: ABNORMAL MG/DL (ref 0–1)
BLOOD CULTURE, ROUTINE: NORMAL
BUN BLDV-MCNC: 60 MG/DL (ref 8–23)
CALCIUM SERPL-MCNC: 9.2 MG/DL (ref 8.6–10.2)
CHLORIDE BLD-SCNC: 98 MMOL/L (ref 98–107)
CO2: 28 MMOL/L (ref 22–29)
CREAT SERPL-MCNC: 5.1 MG/DL (ref 0.7–1.2)
CULTURE, BLOOD 2: NORMAL
EOSINOPHILS ABSOLUTE: 0 E9/L (ref 0.05–0.5)
EOSINOPHILS RELATIVE PERCENT: 0 % (ref 0–6)
GFR AFRICAN AMERICAN: 13
GFR NON-AFRICAN AMERICAN: 11 ML/MIN/1.73
GLUCOSE BLD-MCNC: 121 MG/DL (ref 74–99)
HCT VFR BLD CALC: 22 % (ref 37–54)
HEMOGLOBIN: 6.9 G/DL (ref 12.5–16.5)
IMMATURE GRANULOCYTES #: 0.14 E9/L
IMMATURE GRANULOCYTES %: 1.6 % (ref 0–5)
LYMPHOCYTES ABSOLUTE: 0.6 E9/L (ref 1.5–4)
LYMPHOCYTES RELATIVE PERCENT: 6.8 % (ref 20–42)
MCH RBC QN AUTO: 31.8 PG (ref 26–35)
MCHC RBC AUTO-ENTMCNC: 31.4 % (ref 32–34.5)
MCV RBC AUTO: 101.4 FL (ref 80–99.9)
MONOCYTES ABSOLUTE: 0.69 E9/L (ref 0.1–0.95)
MONOCYTES RELATIVE PERCENT: 7.8 % (ref 2–12)
NEUTROPHILS ABSOLUTE: 7.45 E9/L (ref 1.8–7.3)
NEUTROPHILS RELATIVE PERCENT: 83.8 % (ref 43–80)
PDW BLD-RTO: 18.7 FL (ref 11.5–15)
PLATELET # BLD: 126 E9/L (ref 130–450)
PMV BLD AUTO: 11.8 FL (ref 7–12)
POTASSIUM SERPL-SCNC: 4.3 MMOL/L (ref 3.5–5)
RBC # BLD: 2.17 E12/L (ref 3.8–5.8)
SODIUM BLD-SCNC: 140 MMOL/L (ref 132–146)
TOTAL PROTEIN: 5.1 G/DL (ref 6.4–8.3)
WBC # BLD: 8.9 E9/L (ref 4.5–11.5)

## 2020-07-18 PROCEDURE — 36415 COLL VENOUS BLD VENIPUNCTURE: CPT

## 2020-07-18 PROCEDURE — 85025 COMPLETE CBC W/AUTO DIFF WBC: CPT

## 2020-07-18 PROCEDURE — 99232 SBSQ HOSP IP/OBS MODERATE 35: CPT | Performed by: INTERNAL MEDICINE

## 2020-07-18 PROCEDURE — 2060000000 HC ICU INTERMEDIATE R&B

## 2020-07-18 PROCEDURE — 6370000000 HC RX 637 (ALT 250 FOR IP): Performed by: STUDENT IN AN ORGANIZED HEALTH CARE EDUCATION/TRAINING PROGRAM

## 2020-07-18 PROCEDURE — 6370000000 HC RX 637 (ALT 250 FOR IP): Performed by: INTERNAL MEDICINE

## 2020-07-18 PROCEDURE — 2500000003 HC RX 250 WO HCPCS: Performed by: INTERNAL MEDICINE

## 2020-07-18 PROCEDURE — 80048 BASIC METABOLIC PNL TOTAL CA: CPT

## 2020-07-18 PROCEDURE — 36430 TRANSFUSION BLD/BLD COMPNT: CPT

## 2020-07-18 PROCEDURE — 80076 HEPATIC FUNCTION PANEL: CPT

## 2020-07-18 PROCEDURE — 2580000003 HC RX 258: Performed by: INTERNAL MEDICINE

## 2020-07-18 PROCEDURE — 6370000000 HC RX 637 (ALT 250 FOR IP): Performed by: NURSE PRACTITIONER

## 2020-07-18 PROCEDURE — P9016 RBC LEUKOCYTES REDUCED: HCPCS

## 2020-07-18 PROCEDURE — 6360000002 HC RX W HCPCS: Performed by: INTERNAL MEDICINE

## 2020-07-18 RX ORDER — 0.9 % SODIUM CHLORIDE 0.9 %
20 INTRAVENOUS SOLUTION INTRAVENOUS ONCE
Status: COMPLETED | OUTPATIENT
Start: 2020-07-18 | End: 2020-07-18

## 2020-07-18 RX ADMIN — HYDRALAZINE HYDROCHLORIDE 50 MG: 50 TABLET ORAL at 14:39

## 2020-07-18 RX ADMIN — SUCRALFATE 1 G: 1 TABLET ORAL at 05:37

## 2020-07-18 RX ADMIN — Medication 10 ML: at 09:53

## 2020-07-18 RX ADMIN — SUCRALFATE 1 G: 1 TABLET ORAL at 16:50

## 2020-07-18 RX ADMIN — DOXAZOSIN MESYLATE 4 MG: 4 TABLET ORAL at 21:35

## 2020-07-18 RX ADMIN — ROSUVASTATIN CALCIUM 5 MG: 5 TABLET, FILM COATED ORAL at 21:35

## 2020-07-18 RX ADMIN — CALCIUM ACETATE 2001 MG: 667 CAPSULE ORAL at 09:52

## 2020-07-18 RX ADMIN — SODIUM CHLORIDE 20 ML: 9 INJECTION, SOLUTION INTRAVENOUS at 12:04

## 2020-07-18 RX ADMIN — FERROUS SULFATE TAB 325 MG (65 MG ELEMENTAL FE) 325 MG: 325 (65 FE) TAB at 16:50

## 2020-07-18 RX ADMIN — FERROUS SULFATE TAB 325 MG (65 MG ELEMENTAL FE) 325 MG: 325 (65 FE) TAB at 09:53

## 2020-07-18 RX ADMIN — METOPROLOL TARTRATE 50 MG: 50 TABLET, FILM COATED ORAL at 21:35

## 2020-07-18 RX ADMIN — AMLODIPINE BESYLATE 10 MG: 10 TABLET ORAL at 09:52

## 2020-07-18 RX ADMIN — DEXAMETHASONE SODIUM PHOSPHATE 6 MG: 4 INJECTION, SOLUTION INTRA-ARTICULAR; INTRALESIONAL; INTRAMUSCULAR; INTRAVENOUS; SOFT TISSUE at 09:53

## 2020-07-18 RX ADMIN — Medication 1000 MG: at 09:52

## 2020-07-18 RX ADMIN — SUCRALFATE 1 G: 1 TABLET ORAL at 12:09

## 2020-07-18 RX ADMIN — PANTOPRAZOLE SODIUM 40 MG: 40 TABLET, DELAYED RELEASE ORAL at 05:37

## 2020-07-18 RX ADMIN — ZINC SULFATE 220 MG (50 MG) CAPSULE 50 MG: CAPSULE at 09:53

## 2020-07-18 RX ADMIN — CALCIUM ACETATE 2001 MG: 667 CAPSULE ORAL at 12:08

## 2020-07-18 RX ADMIN — PETROLATUM: 420 OINTMENT TOPICAL at 09:53

## 2020-07-18 RX ADMIN — CALCIUM ACETATE 2001 MG: 667 CAPSULE ORAL at 16:50

## 2020-07-18 RX ADMIN — Medication 1000 MG: at 21:35

## 2020-07-18 RX ADMIN — Medication 10 ML: at 21:36

## 2020-07-18 RX ADMIN — SODIUM CHLORIDE, PRESERVATIVE FREE 10 ML: 5 INJECTION INTRAVENOUS at 14:44

## 2020-07-18 RX ADMIN — HYDRALAZINE HYDROCHLORIDE 50 MG: 50 TABLET ORAL at 21:35

## 2020-07-18 RX ADMIN — ASPIRIN 81 MG CHEWABLE TABLET 81 MG: 81 TABLET CHEWABLE at 09:53

## 2020-07-18 RX ADMIN — METOPROLOL TARTRATE 50 MG: 50 TABLET, FILM COATED ORAL at 09:53

## 2020-07-18 RX ADMIN — HYDRALAZINE HYDROCHLORIDE 50 MG: 50 TABLET ORAL at 09:52

## 2020-07-18 ASSESSMENT — PAIN SCALES - GENERAL
PAINLEVEL_OUTOF10: 0

## 2020-07-18 ASSESSMENT — PAIN DESCRIPTION - PROGRESSION: CLINICAL_PROGRESSION: NOT CHANGED

## 2020-07-18 NOTE — PROGRESS NOTES
Department of Internal Medicine  Nephrology Progress Note    Events Reviewed. S:  We are following MrLázaro Mantilla for HD needs and hyperkalemia. He is off of supplemental o2, saturating well on RA  I was called by the nurse this am for low HB  Arranged for 1 unit of PRBC transfusion.                  Past Medical History:        Diagnosis Date    Blind left eye     Chronic kidney disease     [facility] [address]     [facility]od[facility] [address]     Hyperlipidemia     Hypertension      Past Surgical History:        Procedure Laterality Date    AV FISTULA CREATION Left 2015    Dr. Yoni Santacruz Left 2019    2 x Dr. Ramona Borges, [facility]    PERIPHERAL VASCULAR BYPASS  2008    Aortobifemoral bypass for claudicatio - Dr. Ramona Borges [facility]     Current Medications:    Current Facility-Administered Medications: white petrolatum ointment, , Topical, BID PRN  pantoprazole (PROTONIX) tablet 40 mg, 40 mg, Oral, QAM AC  sucralfate (CARAFATE) tablet 1 g, 1 g, Oral, 4 times per day  doxazosin (CARDURA) tablet 4 mg, 4 mg, Oral, Nightly  [COMPLETED] ferric gluconate (FERRLECIT) 25 mg in sodium chloride 0.9 % 50 mL (test dose) IVPB, 25 mg, Intravenous, Once **FOLLOWED BY** [COMPLETED] ferric gluconate (FERRLECIT) 100 mg in sodium chloride 0.9 % 100 mL IVPB, 100 mg, Intravenous, Once **FOLLOWED BY** ferric gluconate (FERRLECIT) 125 mg in sodium chloride 0.9 % 100 mL IVPB, 125 mg, Intravenous, Q MWF  dexamethasone (DECADRON) injection 6 mg, 6 mg, Intravenous, Daily  Calcium Acetate (Phos Binder) CAPS 2,001 mg, 2,001 mg, Oral, TID WC  epoetin delmer-epbx (RETACRIT) injection 3,000 Units, 3,000 Units, Subcutaneous, Once per day on Mon Wed Fri  ferrous sulfate (IRON 325) tablet 325 mg, 325 mg, Oral, BID WC  0.9 % sodium chloride bolus, 30 mL, Intravenous, PRN  sodium chloride flush 0.9 % injection 10 mL, 10 mL, Intravenous, 2 times per day  sodium chloride flush 0.9 % injection 10 mL, 10 mL, Intravenous, PRN  acetaminophen (TYLENOL) tablet 650 mg, 650 mg, Oral, Q6H PRN **OR** acetaminophen (TYLENOL) suppository 650 mg, 650 mg, Rectal, Q6H PRN  polyethylene glycol (GLYCOLAX) packet 17 g, 17 g, Oral, Daily PRN  promethazine (PHENERGAN) tablet 12.5 mg, 12.5 mg, Oral, Q6H PRN **OR** ondansetron (ZOFRAN) injection 4 mg, 4 mg, Intravenous, Q6H PRN  heparin (porcine) injection 5,000 Units, 5,000 Units, Subcutaneous, 3 times per day  ascorbic acid (VITAMIN C) tablet 1,000 mg, 1,000 mg, Oral, BID  zinc sulfate (ZINCATE) capsule 50 mg, 50 mg, Oral, Daily  amLODIPine (NORVASC) tablet 10 mg, 10 mg, Oral, Daily  aspirin chewable tablet 81 mg, 81 mg, Oral, Daily  hydrALAZINE (APRESOLINE) tablet 50 mg, 50 mg, Oral, TID  metoprolol tartrate (LOPRESSOR) tablet 50 mg, 50 mg, Oral, BID  rosuvastatin (CRESTOR) tablet 5 mg, 5 mg, Oral, Nightly  Allergies:  Patient has no known allergies. Social History:    TOBACCO:   reports that he has quit smoking. He has never used smokeless tobacco.  ETOH:   reports previous alcohol use. DRUGS:   reports no history of drug use. Family History:   History reviewed. No pertinent family history. REVIEW OF SYSTEMS:    Review of Systems   Constitutional: Positive for fever. Negative for diaphoresis. HENT: Negative for sore throat. Eyes: Negative for discharge. Respiratory: Positive for cough and shortness of breath. Cardiovascular: Negative for chest pain. Gastrointestinal: Negative for abdominal pain and vomiting. Genitourinary: Negative for difficulty urinating, dysuria and hematuria. Musculoskeletal: Positive for arthralgias and myalgias. Skin: Negative for rash. Allergic/Immunologic: Negative for immunocompromised state. Neurological: Positive for headaches.      PHYSICAL EXAM:      Vitals:    VITALS:  BP (!) 129/50   Pulse 66   Temp 97 °F (36.1 °C) (Oral)   Resp 17   Ht 5' 8\" (1.727 m)   Wt 142 lb 3.2 oz (64.5 kg)   SpO2 95%   BMI 21.62 kg/m²   24HR INTAKE/OUTPUT:      Intake/Output Summary (Last 24 hours) at 7/18/2020 1519  Last data filed at 7/18/2020 1155  Gross per 24 hour   Intake 115 ml   Output --   Net 115 ml       General Appearance: alert and oriented to person, place and time, ill appearing  Skin: warm and dry, turgor not diminished  Head: normocephalic and atraumatic  Eyes: pupils equal, round, and reactive to light, extraocular eye movements intact, conjunctivae normal  Neck: neck supple and non tender without mass   Pulmonary/Chest: Rales (right side) no wheezes,  no respiratory distress  Cardiovascular: normal rate, normal S1 and S2 and murmur  Abdomen: soft, non-tender, non-distended, normal bowel sounds, no masses or organomegaly  Extremities: no cyanosis, no clubbing and no edema.   Left fistula  Neurologic: no cranial nerve deficit and speech normal    DATA:    CBC with Differential:    Lab Results   Component Value Date    WBC 8.9 07/18/2020    RBC 2.17 07/18/2020    HGB 6.9 07/18/2020    HCT 22.0 07/18/2020     07/18/2020    .4 07/18/2020    MCH 31.8 07/18/2020    MCHC 31.4 07/18/2020    RDW 18.7 07/18/2020    LYMPHOPCT 6.8 07/18/2020    MONOPCT 7.8 07/18/2020    BASOPCT 0.0 07/18/2020    MONOSABS 0.69 07/18/2020    LYMPHSABS 0.60 07/18/2020    EOSABS 0.00 07/18/2020    BASOSABS 0.00 07/18/2020     CMP:    Lab Results   Component Value Date     07/18/2020    K 4.3 07/18/2020    K 4.9 07/14/2020    CL 98 07/18/2020    CO2 28 07/18/2020    BUN 60 07/18/2020    CREATININE 5.1 07/18/2020    GFRAA 13 07/18/2020    LABGLOM 11 07/18/2020    GLUCOSE 121 07/18/2020    PROT 5.1 07/18/2020    LABALBU 3.1 07/18/2020    CALCIUM 9.2 07/18/2020    BILITOT 0.3 07/18/2020    ALKPHOS 52 07/18/2020    AST 46 07/18/2020    ALT 21 07/18/2020     Phosphorus: 3.1mg/dL    Radiology Review:        Chest xray 7/13/20         No airspace opacities or pleural effusion.                Brief Summary if Initial Consult:   The patient is a 75 y.o. male with significant past medical history of end stage renal disease secondary to nepherosclerosis, on hemodialysis [facility]xrtl-Gexckcp[facility] via Arterial Venous Fistula, last hemodialysis treatment on 7/10/20. Initially, he presented to the ER 7/12/20 for generalized aches, weakness, dry cough, some dyspnea on exertion, diarrhea, and overall did not feel well. His wife and son had similar symptoms that started a few days before and tested positive for [address]. A COVID19 test was sent but did not result and was canceled. Treated symptomatically and sent home. His symptoms persisted since then. Yesterday ( 7/13/20) he went to the dialysis center and reportedly was found to have a temperature of 104.5. He was therefore sent to the ED for getting dialysis done. ER lab work revealed sodium 133, potassium 5.3, BUN 62, creatinine 8.2, Anion gap 18. IMPRESSION/RECOMMENDATIONS:      ESRD- on Monday, Wednesday, Friday schedule. GFR: 6  Anemia- Microcytic Anemia- secondary to iron deficiency and ESRD. on ferrous sulfate and Epoetin 3,000U 3x/week, ferrlecit 125mg IV 3x/week with dialysis. COVID19- on ascorbic acid, Vitamin D, Zinc, Remdesirvir and Dexamethasone  Uncontrolled hypertension in the setting of Renal Failure-on Norvasc, Hydralazine, Metoprolol, Doxazosin, likely secondary to volume overload from missed hemodialysis treatment  GI Bleed: s/p 1 unit of blood. Surgery consulted. Diet: Renal    PLAN:            Continue HD per patient schedule of Monday, Wednesday, and Friday. 1 Unit of PRBC with dialysis today.  D/W the RN  Continue with Ferrlecit 3x weekly with HD  Increase Epogen to 5000 units Three times/week  Surgery consulted, he will likely need an endoscopy

## 2020-07-18 NOTE — PROGRESS NOTES
Notified Renal, Dr Tom Duran of HGB, BUN and Creat this am, did order 1 PRBC, no dialysis today.   Gin Lynn RN

## 2020-07-18 NOTE — PROGRESS NOTES
[facility]   Progress Note    Admitting Date and Time: 7/13/2020  5:20 PM  Admit Dx: [id number] [U07.1]  COVID-19 [U07.1]    Subjective:    Patient was admitted with COVID-19 [U07.1]  COVID-19 [U07.1].  Patient denies fever, chills, cp, sob, n/v.     [START ON 7/18/2020] pantoprazole  40 mg Oral QAM AC    sucralfate  1 g Oral 4 times per day    doxazosin  4 mg Oral Nightly    ferric gluconate (FERRLECIT) IVPB  125 mg Intravenous Q MWF    dexamethasone  6 mg Intravenous Daily    Calcium Acetate (Phos Binder)  2,001 mg Oral TID WC    epoetin delmer-epbx  3,000 Units Subcutaneous Once per day on Mon Wed Fri    ferrous sulfate  325 mg Oral BID WC    sodium chloride flush  10 mL Intravenous 2 times per day    heparin (porcine)  5,000 Units Subcutaneous 3 times per day    vitamin C  1,000 mg Oral BID    zinc sulfate  50 mg Oral Daily    amLODIPine  10 mg Oral Daily    aspirin  81 mg Oral Daily    hydrALAZINE  50 mg Oral TID    metoprolol tartrate  50 mg Oral BID    rosuvastatin  5 mg Oral Nightly     white petrolatum, , BID PRN  sodium chloride, 30 mL, PRN  sodium chloride flush, 10 mL, PRN  acetaminophen, 650 mg, Q6H PRN    Or  acetaminophen, 650 mg, Q6H PRN  polyethylene glycol, 17 g, Daily PRN  promethazine, 12.5 mg, Q6H PRN    Or  ondansetron, 4 mg, Q6H PRN         Objective:    BP (!) 106/44   Pulse 80   Temp 98.8 °F (37.1 °C) (Oral)   Resp 19   Ht 5' 8\" (1.727 m)   Wt 142 lb 3.2 oz (64.5 kg)   SpO2 93%   BMI 21.62 kg/m²   Skin: warm and dry, no rash or erythema  Pulmonary/Chest: clear to auscultation bilaterally- no wheezes, rales or rhonchi, normal air movement, no respiratory distress  Cardiovascular: rhythm reg at rate of 84  Abdomen: soft, non-tender, non-distended, normal bowel sounds, no masses or organomegaly  Extremities: no cyanosis, no clubbing and no edema      Recent Labs     07/16/20  0310 07/17/20  0530 07/17/20  1725    138 141   K 4.1 5.1* 3.9 CL 98 97* 98   CO2 29 27 29   BUN 40* 81* 42*   CREATININE 4.8* 6.8* 4.1*   GLUCOSE 95 110* 146*   CALCIUM 8.8 9.4 8.9       Recent Labs     07/16/20  0310 07/17/20  0530 07/17/20  1725   WBC 4.0* 4.9 8.4   RBC 2.36* 2.01* 2.49*   HGB 7.8* 6.6* 8.1*   HCT 24.7* 21.2* 25.6*   .7* 105.5* 102.8*   MCH 33.1 32.8 32.5   MCHC 31.6* 31.1* 31.6*   RDW 16.9* 16.9* 18.5*   PLT 89* 104* 121*   MPV 11.1 11.8 12.1*       CBC with Differential:    Lab Results   Component Value Date    WBC 8.4 07/17/2020    RBC 2.49 07/17/2020    HGB 8.1 07/17/2020    HCT 25.6 07/17/2020     07/17/2020    .8 07/17/2020    MCH 32.5 07/17/2020    MCHC 31.6 07/17/2020    RDW 18.5 07/17/2020    LYMPHOPCT 6.3 07/17/2020    MONOPCT 6.0 07/17/2020    BASOPCT 0.0 07/17/2020    MONOSABS 0.50 07/17/2020    LYMPHSABS 0.53 07/17/2020    EOSABS 0.00 07/17/2020    BASOSABS 0.00 07/17/2020     BMP:    Lab Results   Component Value Date     07/17/2020    K 3.9 07/17/2020    K 4.9 07/14/2020    CL 98 07/17/2020    CO2 29 07/17/2020    BUN 42 07/17/2020    LABALBU 3.1 07/17/2020    CREATININE 4.1 07/17/2020    CALCIUM 8.9 07/17/2020    GFRAA 17 07/17/2020    LABGLOM 14 07/17/2020    GLUCOSE 146 07/17/2020     Magnesium:    Lab Results   Component Value Date    MG 2.0 07/17/2020     Phosphorus:    Lab Results   Component Value Date    PHOS 3.0 07/17/2020        Radiology:   XR CHEST PORTABLE   Final Result      No airspace opacities or pleural effusion. Assessment:    Active Problems:    COVID-19  Resolved Problems:    * No resolved hospital problems. *      Plan:  1. Acute respiratory failure with hypoxia due to covid 19 wean oxygen as able  2. Pneumonia due to covid 19 ID following ID stopping remdesivir. Continue steroids  3. Melena consult surgery  4. Anemia likely with acute blood loss component monitor hgb and transfuse prn  5. ESRD HD per nephrology  6. htn monitor bp and continue med  7.  Hyperlipidemia continue med  8. Thrombocytopenia monitor        Electronically signed by Ben Sorto DO on 7/17/2020 at 9:58 PM

## 2020-07-19 ENCOUNTER — ANESTHESIA EVENT (OUTPATIENT)
Dept: OPERATING ROOM | Age: 75
DRG: 177 | End: 2020-07-19
Payer: MEDICARE

## 2020-07-19 ENCOUNTER — ANESTHESIA (OUTPATIENT)
Dept: OPERATING ROOM | Age: 75
DRG: 177 | End: 2020-07-19
Payer: MEDICARE

## 2020-07-19 ENCOUNTER — APPOINTMENT (OUTPATIENT)
Dept: ULTRASOUND IMAGING | Age: 75
DRG: 177 | End: 2020-07-19
Payer: MEDICARE

## 2020-07-19 LAB
ALBUMIN SERPL-MCNC: 3.5 G/DL (ref 3.5–5.2)
ALP BLD-CCNC: 62 U/L (ref 40–129)
ALT SERPL-CCNC: 21 U/L (ref 0–40)
ANION GAP SERPL CALCULATED.3IONS-SCNC: 17 MMOL/L (ref 7–16)
ANISOCYTOSIS: ABNORMAL
AST SERPL-CCNC: 32 U/L (ref 0–39)
BASOPHILIC STIPPLING: ABNORMAL
BASOPHILS ABSOLUTE: 0 E9/L (ref 0–0.2)
BASOPHILS RELATIVE PERCENT: 0 % (ref 0–2)
BILIRUB SERPL-MCNC: 0.4 MG/DL (ref 0–1.2)
BILIRUBIN DIRECT: <0.2 MG/DL (ref 0–0.3)
BILIRUBIN, INDIRECT: ABNORMAL MG/DL (ref 0–1)
BLOOD BANK DISPENSE STATUS: NORMAL
BLOOD BANK PRODUCT CODE: NORMAL
BPU ID: NORMAL
BUN BLDV-MCNC: 91 MG/DL (ref 8–23)
CALCIUM SERPL-MCNC: 10.6 MG/DL (ref 8.6–10.2)
CHLORIDE BLD-SCNC: 96 MMOL/L (ref 98–107)
CO2: 28 MMOL/L (ref 22–29)
CREAT SERPL-MCNC: 7 MG/DL (ref 0.7–1.2)
DESCRIPTION BLOOD BANK: NORMAL
EOSINOPHILS ABSOLUTE: 0 E9/L (ref 0.05–0.5)
EOSINOPHILS RELATIVE PERCENT: 0 % (ref 0–6)
GFR AFRICAN AMERICAN: 9
GFR NON-AFRICAN AMERICAN: 8 ML/MIN/1.73
GLUCOSE BLD-MCNC: 118 MG/DL (ref 74–99)
HCT VFR BLD CALC: 21.2 % (ref 37–54)
HEMOGLOBIN: 6.8 G/DL (ref 12.5–16.5)
LYMPHOCYTES ABSOLUTE: 0.38 E9/L (ref 1.5–4)
LYMPHOCYTES RELATIVE PERCENT: 4.4 % (ref 20–42)
MCH RBC QN AUTO: 32.5 PG (ref 26–35)
MCHC RBC AUTO-ENTMCNC: 32.1 % (ref 32–34.5)
MCV RBC AUTO: 101.4 FL (ref 80–99.9)
MONOCYTES ABSOLUTE: 0.38 E9/L (ref 0.1–0.95)
MONOCYTES RELATIVE PERCENT: 4.3 % (ref 2–12)
NEUTROPHILS ABSOLUTE: 8.55 E9/L (ref 1.8–7.3)
NEUTROPHILS RELATIVE PERCENT: 91.3 % (ref 43–80)
NUCLEATED RED BLOOD CELLS: 1.7 /100 WBC
PDW BLD-RTO: 19.4 FL (ref 11.5–15)
PLATELET # BLD: 127 E9/L (ref 130–450)
PMV BLD AUTO: 11.9 FL (ref 7–12)
POIKILOCYTES: ABNORMAL
POLYCHROMASIA: ABNORMAL
POTASSIUM SERPL-SCNC: 4.5 MMOL/L (ref 3.5–5)
RBC # BLD: 2.09 E12/L (ref 3.8–5.8)
SCHISTOCYTES: ABNORMAL
SODIUM BLD-SCNC: 141 MMOL/L (ref 132–146)
TEAR DROP CELLS: ABNORMAL
TOTAL PROTEIN: 5.7 G/DL (ref 6.4–8.3)
WBC # BLD: 9.4 E9/L (ref 4.5–11.5)

## 2020-07-19 PROCEDURE — 6360000002 HC RX W HCPCS: Performed by: INTERNAL MEDICINE

## 2020-07-19 PROCEDURE — 99232 SBSQ HOSP IP/OBS MODERATE 35: CPT | Performed by: INTERNAL MEDICINE

## 2020-07-19 PROCEDURE — 80048 BASIC METABOLIC PNL TOTAL CA: CPT

## 2020-07-19 PROCEDURE — 2580000003 HC RX 258: Performed by: INTERNAL MEDICINE

## 2020-07-19 PROCEDURE — 80076 HEPATIC FUNCTION PANEL: CPT

## 2020-07-19 PROCEDURE — 36415 COLL VENOUS BLD VENIPUNCTURE: CPT

## 2020-07-19 PROCEDURE — 85025 COMPLETE CBC W/AUTO DIFF WBC: CPT

## 2020-07-19 PROCEDURE — P9016 RBC LEUKOCYTES REDUCED: HCPCS

## 2020-07-19 PROCEDURE — 6370000000 HC RX 637 (ALT 250 FOR IP): Performed by: NURSE PRACTITIONER

## 2020-07-19 PROCEDURE — 93970 EXTREMITY STUDY: CPT

## 2020-07-19 PROCEDURE — 36430 TRANSFUSION BLD/BLD COMPNT: CPT

## 2020-07-19 PROCEDURE — 2500000003 HC RX 250 WO HCPCS: Performed by: INTERNAL MEDICINE

## 2020-07-19 PROCEDURE — 6370000000 HC RX 637 (ALT 250 FOR IP): Performed by: INTERNAL MEDICINE

## 2020-07-19 PROCEDURE — 2060000000 HC ICU INTERMEDIATE R&B

## 2020-07-19 PROCEDURE — 6370000000 HC RX 637 (ALT 250 FOR IP): Performed by: STUDENT IN AN ORGANIZED HEALTH CARE EDUCATION/TRAINING PROGRAM

## 2020-07-19 RX ORDER — 0.9 % SODIUM CHLORIDE 0.9 %
20 INTRAVENOUS SOLUTION INTRAVENOUS ONCE
Status: DISCONTINUED | OUTPATIENT
Start: 2020-07-19 | End: 2020-07-20

## 2020-07-19 RX ADMIN — DEXAMETHASONE SODIUM PHOSPHATE 6 MG: 4 INJECTION, SOLUTION INTRA-ARTICULAR; INTRALESIONAL; INTRAMUSCULAR; INTRAVENOUS; SOFT TISSUE at 09:03

## 2020-07-19 RX ADMIN — DOXAZOSIN MESYLATE 4 MG: 4 TABLET ORAL at 21:15

## 2020-07-19 RX ADMIN — CALCIUM ACETATE 2001 MG: 667 CAPSULE ORAL at 18:02

## 2020-07-19 RX ADMIN — HYDRALAZINE HYDROCHLORIDE 50 MG: 50 TABLET ORAL at 21:14

## 2020-07-19 RX ADMIN — ZINC SULFATE 220 MG (50 MG) CAPSULE 50 MG: CAPSULE at 09:07

## 2020-07-19 RX ADMIN — ASPIRIN 81 MG CHEWABLE TABLET 81 MG: 81 TABLET CHEWABLE at 09:06

## 2020-07-19 RX ADMIN — SUCRALFATE 1 G: 1 TABLET ORAL at 01:52

## 2020-07-19 RX ADMIN — FERROUS SULFATE TAB 325 MG (65 MG ELEMENTAL FE) 325 MG: 325 (65 FE) TAB at 18:02

## 2020-07-19 RX ADMIN — SUCRALFATE 1 G: 1 TABLET ORAL at 18:02

## 2020-07-19 RX ADMIN — CALCIUM ACETATE 2001 MG: 667 CAPSULE ORAL at 09:06

## 2020-07-19 RX ADMIN — CALCIUM ACETATE 2001 MG: 667 CAPSULE ORAL at 11:27

## 2020-07-19 RX ADMIN — Medication 10 ML: at 09:07

## 2020-07-19 RX ADMIN — Medication 10 ML: at 21:14

## 2020-07-19 RX ADMIN — METOPROLOL TARTRATE 50 MG: 50 TABLET, FILM COATED ORAL at 09:06

## 2020-07-19 RX ADMIN — ROSUVASTATIN CALCIUM 5 MG: 5 TABLET, FILM COATED ORAL at 21:14

## 2020-07-19 RX ADMIN — HYDRALAZINE HYDROCHLORIDE 50 MG: 50 TABLET ORAL at 14:45

## 2020-07-19 RX ADMIN — FERROUS SULFATE TAB 325 MG (65 MG ELEMENTAL FE) 325 MG: 325 (65 FE) TAB at 09:06

## 2020-07-19 RX ADMIN — Medication 1000 MG: at 21:15

## 2020-07-19 RX ADMIN — SUCRALFATE 1 G: 1 TABLET ORAL at 06:08

## 2020-07-19 RX ADMIN — AMLODIPINE BESYLATE 10 MG: 10 TABLET ORAL at 09:06

## 2020-07-19 RX ADMIN — PANTOPRAZOLE SODIUM 40 MG: 40 TABLET, DELAYED RELEASE ORAL at 06:08

## 2020-07-19 RX ADMIN — SUCRALFATE 1 G: 1 TABLET ORAL at 11:27

## 2020-07-19 RX ADMIN — METOPROLOL TARTRATE 50 MG: 50 TABLET, FILM COATED ORAL at 21:14

## 2020-07-19 RX ADMIN — HYDRALAZINE HYDROCHLORIDE 50 MG: 50 TABLET ORAL at 09:07

## 2020-07-19 RX ADMIN — Medication 1000 MG: at 09:06

## 2020-07-19 ASSESSMENT — PAIN SCALES - GENERAL
PAINLEVEL_OUTOF10: 0

## 2020-07-19 NOTE — PROGRESS NOTES
The patient's laboratory studies have been reviewed. Again, the patient did have a drop in his hemoglobin and hematocrit over the last 24 hours. The patient will undergo an upper endoscopy tomorrow 7/20/2020.

## 2020-07-19 NOTE — PLAN OF CARE
Problem: Gas Exchange - Impaired  Goal: Absence of hypoxia  Outcome: Met This Shift     Problem:  Body Temperature -  Risk of, Imbalanced  Goal: Ability to maintain a body temperature within defined limits  Outcome: Met This Shift     Problem: Falls - Risk of:  Goal: Will remain free from falls  Description: Will remain free from falls  Outcome: Met This Shift  Goal: Absence of physical injury  Description: Absence of physical injury  Outcome: Met This Shift

## 2020-07-19 NOTE — PROGRESS NOTES
Department of Internal Medicine  Nephrology Progress Note    Events Reviewed. S:  We are following MrLázaro Morocho for HD needs and hyperkalemia.  He is off of supplemental o2, saturating well on RA  Events reviewed                Past Medical History:        Diagnosis Date    Blind left eye     Chronic kidney disease     [facility]     Hemodialysis patient ([facility]     Hyperlipidemia     Hypertension      Past Surgical History:        Procedure Laterality Date    AV FISTULA CREATION Left 2015    Dr. Raquel Cartagena Left 2019    2 x Dr. Maco Garner, [facility]    PERIPHERAL VASCULAR BYPASS  2008    Aortobifemoral bypass for claudicatio - Dr. Maco Garner [facility]     Current Medications:    Current Facility-Administered Medications: 0.9 % sodium chloride bolus, 20 mL, Intravenous, Once  [START ON 7/20/2020] epoetin delmer-epbx (RETACRIT) injection 5,000 Units, 5,000 Units, Subcutaneous, Once per day on Mon Wed Fri  white petrolatum ointment, , Topical, BID PRN  pantoprazole (PROTONIX) tablet 40 mg, 40 mg, Oral, QAM AC  sucralfate (CARAFATE) tablet 1 g, 1 g, Oral, 4 times per day  doxazosin (CARDURA) tablet 4 mg, 4 mg, Oral, Nightly  [COMPLETED] ferric gluconate (FERRLECIT) 25 mg in sodium chloride 0.9 % 50 mL (test dose) IVPB, 25 mg, Intravenous, Once **FOLLOWED BY** [COMPLETED] ferric gluconate (FERRLECIT) 100 mg in sodium chloride 0.9 % 100 mL IVPB, 100 mg, Intravenous, Once **FOLLOWED BY** ferric gluconate (FERRLECIT) 125 mg in sodium chloride 0.9 % 100 mL IVPB, 125 mg, Intravenous, Q MWF  dexamethasone (DECADRON) injection 6 mg, 6 mg, Intravenous, Daily  Calcium Acetate (Phos Binder) CAPS 2,001 mg, 2,001 mg, Oral, TID WC  ferrous sulfate (IRON 325) tablet 325 mg, 325 mg, Oral, BID WC  0.9 % sodium chloride bolus, 30 mL, Intravenous, PRN  sodium chloride flush 0.9 % injection 10 mL, 10 mL, Intravenous, 2 times per day  sodium chloride flush 0.9 % injection 10 mL, 10 mL, Intravenous, PRN  acetaminophen (TYLENOL) tablet 650 mg, 650 mg, Oral, Q6H PRN **OR** acetaminophen (TYLENOL) suppository 650 mg, 650 mg, Rectal, Q6H PRN  polyethylene glycol (GLYCOLAX) packet 17 g, 17 g, Oral, Daily PRN  promethazine (PHENERGAN) tablet 12.5 mg, 12.5 mg, Oral, Q6H PRN **OR** ondansetron (ZOFRAN) injection 4 mg, 4 mg, Intravenous, Q6H PRN  heparin (porcine) injection 5,000 Units, 5,000 Units, Subcutaneous, 3 times per day  ascorbic acid (VITAMIN C) tablet 1,000 mg, 1,000 mg, Oral, BID  zinc sulfate (ZINCATE) capsule 50 mg, 50 mg, Oral, Daily  amLODIPine (NORVASC) tablet 10 mg, 10 mg, Oral, Daily  aspirin chewable tablet 81 mg, 81 mg, Oral, Daily  hydrALAZINE (APRESOLINE) tablet 50 mg, 50 mg, Oral, TID  metoprolol tartrate (LOPRESSOR) tablet 50 mg, 50 mg, Oral, BID  rosuvastatin (CRESTOR) tablet 5 mg, 5 mg, Oral, Nightly  Allergies:  Patient has no known allergies. Social History:    TOBACCO:   reports that he has quit smoking. He has never used smokeless tobacco.  ETOH:   reports previous alcohol use. DRUGS:   reports no history of drug use. Family History:   History reviewed. No pertinent family history. REVIEW OF SYSTEMS:    Review of Systems   Constitutional: Positive for fever. Negative for diaphoresis. HENT: Negative for sore throat. Eyes: Negative for discharge. Respiratory: Positive for cough and shortness of breath. Cardiovascular: Negative for chest pain. Gastrointestinal: Negative for abdominal pain and vomiting. Genitourinary: Negative for difficulty urinating, dysuria and hematuria. Musculoskeletal: Positive for arthralgias and myalgias. Skin: Negative for rash. Allergic/Immunologic: Negative for immunocompromised state. Neurological: Positive for headaches.      PHYSICAL EXAM:      Vitals:    VITALS:  BP (!) 106/53   Pulse 70   Temp 98.4 °F (36.9 °C) (Oral)   Resp 16   Ht 5' 8\" (1.727 m)   Wt 138 lb 11.2 oz (62.9 kg)   SpO2 96%   BMI 21.09 kg/m² 24HR INTAKE/OUTPUT:      Intake/Output Summary (Last 24 hours) at 7/19/2020 1753  Last data filed at 7/19/2020 1323  Gross per 24 hour   Intake 140 ml   Output --   Net 140 ml       General Appearance: alert and oriented to person, place and time, ill appearing  Skin: warm and dry, turgor not diminished  Head: normocephalic and atraumatic  Eyes: pupils equal, round, and reactive to light, extraocular eye movements intact, conjunctivae normal  Neck: neck supple and non tender without mass   Pulmonary/Chest: Rales (right side) no wheezes,  no respiratory distress  Cardiovascular: normal rate, normal S1 and S2 and murmur  Abdomen: soft, non-tender, non-distended, normal bowel sounds, no masses or organomegaly  Extremities: no cyanosis, no clubbing and no edema.   Left fistula  Neurologic: no cranial nerve deficit and speech normal    DATA:    CBC with Differential:    Lab Results   Component Value Date    WBC 9.4 07/19/2020    RBC 2.09 07/19/2020    HGB 6.8 07/19/2020    HCT 21.2 07/19/2020     07/19/2020    .4 07/19/2020    MCH 32.5 07/19/2020    MCHC 32.1 07/19/2020    RDW 19.4 07/19/2020    NRBC 1.7 07/19/2020    LYMPHOPCT 4.4 07/19/2020    MONOPCT 4.3 07/19/2020    BASOPCT 0.0 07/19/2020    MONOSABS 0.38 07/19/2020    LYMPHSABS 0.38 07/19/2020    EOSABS 0.00 07/19/2020    BASOSABS 0.00 07/19/2020     CMP:    Lab Results   Component Value Date     07/19/2020    K 4.5 07/19/2020    K 4.9 07/14/2020    CL 96 07/19/2020    CO2 28 07/19/2020    BUN 91 07/19/2020    CREATININE 7.0 07/19/2020    GFRAA 9 07/19/2020    LABGLOM 8 07/19/2020    GLUCOSE 118 07/19/2020    PROT 5.7 07/19/2020    LABALBU 3.5 07/19/2020    CALCIUM 10.6 07/19/2020    BILITOT 0.4 07/19/2020    ALKPHOS 62 07/19/2020    AST 32 07/19/2020    ALT 21 07/19/2020     Phosphorus: 3.1mg/dL    Radiology Review:        Chest xray 7/13/20         No airspace opacities or pleural effusion.                Brief Summary if Initial Consult:   The patient is a 75 y.o. male with significant past medical history of end stage renal disease secondary to nepherosclerosis, on hemodialysis [id number] via Arterial Venous Fistula, last hemodialysis treatment on 7/10/20. Initially, he presented to the ER 7/12/20 for generalized aches, weakness, dry cough, some dyspnea on exertion, diarrhea, and overall did not feel well. His wife and son had similar symptoms that started a few days before and tested positive for [address]. A COVID19 test was sent but did not result and was canceled. Treated symptomatically and sent home. His symptoms persisted since then. Yesterday ( 7/13/20) he went to the dialysis center and reportedly was found to have a temperature of 104.5. He was therefore sent to the ED for getting dialysis done. ER lab work revealed sodium 133, potassium 5.3, BUN 62, creatinine 8.2, Anion gap 18. IMPRESSION/RECOMMENDATIONS:      ESRD- on Monday, Wednesday, Friday schedule. GFR: 6  Anemia- Microcytic Anemia- secondary to iron deficiency and ESRD. on ferrous sulfate and Epoetin 3,000U 3x/week, ferrlecit 125mg IV 3x/week with dialysis. COVID19- on ascorbic acid, Vitamin D, Zinc, Remdesirvir and Dexamethasone  Uncontrolled hypertension in the setting of Renal Failure-on Norvasc, Hydralazine, Metoprolol, Doxazosin, likely secondary to volume overload from missed hemodialysis treatment  GI Bleed: s/p 1 unit of blood. Surgery consulted. Diet: Renal    PLAN:            Continue HD per patient schedule of Monday, Wednesday, and Friday. 1 Unit of PRBC with dialysis today.  D/W the RN  Continue with Ferrlecit 3x weekly with HD  Increase Epogen to 5000 units Three times/week  Plan for endoscopy tomorrow noted

## 2020-07-19 NOTE — PROGRESS NOTES
[facility] Hospital[facility]   Progress Note    Admitting Date and Time: 7/13/2020  5:20 PM  Admit Dx: [id number] [U07.1]  COVID-19 [U07.1]    Subjective:    Patient was admitted with COVID-19 [U07.1]  COVID-19 [U07.1].  Patient denies fever, chills, cp, sob, n/v.     sodium chloride  20 mL Intravenous Once    [START ON 7/20/2020] epoetin delmer-epbx  5,000 Units Subcutaneous Once per day on Mon Wed Fri    pantoprazole  40 mg Oral QAM AC    sucralfate  1 g Oral 4 times per day    doxazosin  4 mg Oral Nightly    ferric gluconate (FERRLECIT) IVPB  125 mg Intravenous Q MWF    dexamethasone  6 mg Intravenous Daily    Calcium Acetate (Phos Binder)  2,001 mg Oral TID WC    ferrous sulfate  325 mg Oral BID WC    sodium chloride flush  10 mL Intravenous 2 times per day    heparin (porcine)  5,000 Units Subcutaneous 3 times per day    vitamin C  1,000 mg Oral BID    zinc sulfate  50 mg Oral Daily    amLODIPine  10 mg Oral Daily    aspirin  81 mg Oral Daily    hydrALAZINE  50 mg Oral TID    metoprolol tartrate  50 mg Oral BID    rosuvastatin  5 mg Oral Nightly     white petrolatum, , BID PRN  sodium chloride, 30 mL, PRN  sodium chloride flush, 10 mL, PRN  acetaminophen, 650 mg, Q6H PRN    Or  acetaminophen, 650 mg, Q6H PRN  polyethylene glycol, 17 g, Daily PRN  promethazine, 12.5 mg, Q6H PRN    Or  ondansetron, 4 mg, Q6H PRN         Objective:    BP (!) 105/53   Pulse 70   Temp 97.7 °F (36.5 °C) (Oral)   Resp 16   Ht 5' 8\" (1.727 m)   Wt 138 lb 11.2 oz (62.9 kg)   SpO2 93%   BMI 21.09 kg/m²   Skin: warm and dry, no rash or erythema  Pulmonary/Chest: clear to auscultation bilaterally- no wheezes, rales or rhonchi, normal air movement, no respiratory distress  Cardiovascular: rhythm reg at rate of 72  Abdomen: soft, non-tender, non-distended, normal bowel sounds, no masses or organomegaly  Extremities: no cyanosis, no clubbing and no edema      Recent Labs     07/17/20  1725 07/18/20  4757 07/19/20  0910    140 141   K 3.9 4.3 4.5   CL 98 98 96*   CO2 29 28 28   BUN 42* 60* 91*   CREATININE 4.1* 5.1* 7.0*   GLUCOSE 146* 121* 118*   CALCIUM 8.9 9.2 10.6*       Recent Labs     07/17/20  1725 07/18/20  0533 07/19/20  0910   WBC 8.4 8.9 9.4   RBC 2.49* 2.17* 2.09*   HGB 8.1* 6.9* 6.8*   HCT 25.6* 22.0* 21.2*   .8* 101.4* 101.4*   MCH 32.5 31.8 32.5   MCHC 31.6* 31.4* 32.1   RDW 18.5* 18.7* 19.4*   * 126* 127*   MPV 12.1* 11.8 11.9       CBC with Differential:    Lab Results   Component Value Date    WBC 9.4 07/19/2020    RBC 2.09 07/19/2020    HGB 6.8 07/19/2020    HCT 21.2 07/19/2020     07/19/2020    .4 07/19/2020    MCH 32.5 07/19/2020    MCHC 32.1 07/19/2020    RDW 19.4 07/19/2020    NRBC 1.7 07/19/2020    LYMPHOPCT 4.4 07/19/2020    MONOPCT 4.3 07/19/2020    BASOPCT 0.0 07/19/2020    MONOSABS 0.38 07/19/2020    LYMPHSABS 0.38 07/19/2020    EOSABS 0.00 07/19/2020    BASOSABS 0.00 07/19/2020     BMP:    Lab Results   Component Value Date     07/19/2020    K 4.5 07/19/2020    K 4.9 07/14/2020    CL 96 07/19/2020    CO2 28 07/19/2020    BUN 91 07/19/2020    LABALBU 3.5 07/19/2020    CREATININE 7.0 07/19/2020    CALCIUM 10.6 07/19/2020    GFRAA 9 07/19/2020    LABGLOM 8 07/19/2020    GLUCOSE 118 07/19/2020     Hepatic Function Panel:    Lab Results   Component Value Date    ALKPHOS 62 07/19/2020    ALT 21 07/19/2020    AST 32 07/19/2020    PROT 5.7 07/19/2020    BILITOT 0.4 07/19/2020    BILIDIR <0.2 07/19/2020    IBILI see below 07/19/2020    LABALBU 3.5 07/19/2020        Radiology:   US DUP LOWER EXTREMITIES BILATERAL VENOUS   Final Result   No evidence to suggest deep venous thrombosis of the bilateral lower   extremities. XR CHEST PORTABLE   Final Result      No airspace opacities or pleural effusion. Assessment:    Active Problems:    COVID-19  Resolved Problems:    * No resolved hospital problems. *      Plan:  1.  Acute

## 2020-07-19 NOTE — PROGRESS NOTES
Clinical supervisor, Shine Mello, notified of need to schedule EGD for tomorrow with Dr. Jose A Hernández.

## 2020-07-19 NOTE — ANESTHESIA PRE PROCEDURE
Department of Anesthesiology  Preprocedure Note       Name:  rCistina Kincaid   Age:  75 y.o.  :  1945                                          MRN:  [id number]         Date:  2020      Surgeon: * No surgeons listed *    Procedure: * No procedures listed *    Medications prior to admission:   Prior to Admission medications    Medication Sig Start Date End Date Taking?  Authorizing Provider   NIFEdipine (ADALAT CC) 30 MG extended release tablet Take 60 mg by mouth every 12 hours   Yes Historical Provider, MD   Cholecalciferol (VITAMIN D) 50 MCG (2000) CAPS capsule Take by mouth    Historical Provider, MD   hydrALAZINE (APRESOLINE) 50 MG tablet Take 50 mg by mouth 3 times daily    Historical Provider, MD   rosuvastatin (CRESTOR) 10 MG tablet Take 5 mg by mouth nightly    Historical Provider, MD   Coenzyme Q10 (Q-10 CO-ENZYME PO) Take 100 mg by mouth Daily    Historical Provider, MD   metoprolol tartrate (LOPRESSOR) 50 MG tablet Take 50 mg by mouth 2 times daily    Historical Provider, MD   aspirin 81 MG tablet Take 81 mg by mouth daily    Historical Provider, MD   calcium acetate (PHOSLO) 667 MG capsule Take 2,001 mg by mouth 3 times daily (with meals)    Historical Provider, MD       Current medications:    Current Facility-Administered Medications   Medication Dose Route Frequency Provider Last Rate Last Dose    0.9 % sodium chloride bolus  20 mL Intravenous Once Jose Pedroic, DO        [START ON 2020] epoetin delmer-epbx (RETACRIT) injection 5,000 Units  5,000 Units Subcutaneous Once per day on  Iram Javed MD        white petrolatum ointment   Topical BID PRN Jose Pedroic, DO        pantoprazole (PROTONIX) tablet 40 mg  40 mg Oral QAM AMOR Green DO   40 mg at 20 0608    sucralfate (CARAFATE) tablet 1 g  1 g Oral 4 times per day Gaurav Green DO   1 g at 20 1127    doxazosin (CARDURA) tablet 4 mg  4 mg Oral Nightly Iram Javed MD   4 mg at 07/18/20 2135    ferric gluconate (FERRLECIT) 125 mg in sodium chloride 0.9 % 100 mL IVPB  125 mg Intravenous Q MWF Arthur Younger MD   Stopped at 07/17/20 1441    dexamethasone (DECADRON) injection 6 mg  6 mg Intravenous Daily Sharyn Sewell MD   6 mg at 07/19/20 0903    Calcium Acetate (Phos Binder) CAPS 2,001 mg  2,001 mg Oral TID  Irina Gay MD   2,001 mg at 07/19/20 1127    ferrous sulfate (IRON 325) tablet 325 mg  325 mg Oral BID  Graciela Ramires, APRN - CNP   325 mg at 07/19/20 0906    0.9 % sodium chloride bolus  30 mL Intravenous PRN Peg White MD        sodium chloride flush 0.9 % injection 10 mL  10 mL Intravenous 2 times per day Lis Haq MD   10 mL at 07/19/20 0907    sodium chloride flush 0.9 % injection 10 mL  10 mL Intravenous PRN Irina Gay MD   10 mL at 07/18/20 1444    acetaminophen (TYLENOL) tablet 650 mg  650 mg Oral Q6H PRN Lis Haq MD   650 mg at 07/16/20 0809    Or    acetaminophen (TYLENOL) suppository 650 mg  650 mg Rectal Q6H PRN Irina Gay MD        polyethylene glycol (GLYCOLAX) packet 17 g  17 g Oral Daily PRN Irina Gay MD        promethazine (PHENERGAN) tablet 12.5 mg  12.5 mg Oral Q6H PRN Irina Gay MD        Or    ondansetron (ZOFRAN) injection 4 mg  4 mg Intravenous Q6H PRN Irina Gay MD   4 mg at 07/17/20 1223    heparin (porcine) injection 5,000 Units  5,000 Units Subcutaneous 3 times per day Lis Haq MD   Stopped at 07/17/20 1441    ascorbic acid (VITAMIN C) tablet 1,000 mg  1,000 mg Oral BID Irina Gay MD   1,000 mg at 07/19/20 0906    zinc sulfate (ZINCATE) capsule 50 mg  50 mg Oral Daily Irina Gay MD   50 mg at 07/19/20 0907    amLODIPine (NORVASC) tablet 10 mg  10 mg Oral Daily Irina Gay MD   10 mg at 07/19/20 0906    aspirin chewable tablet 81 mg  81 mg Oral Daily Irina Gay MD   81 mg at 07/19/20 0906    hydrALAZINE (APRESOLINE) tablet 50 mg  50 mg Oral TID Irina MD Victor Hugo   50 mg at 07/19/20 1445    metoprolol tartrate (LOPRESSOR) tablet 50 mg  50 mg Oral BID Mallory Iyer MD   50 mg at 07/19/20 3262    rosuvastatin (CRESTOR) tablet 5 mg  5 mg Oral Nightly Irina Gay MD   5 mg at 07/18/20 2130       Allergies:  No Known Allergies    Problem List:    Patient Active Problem List   Diagnosis Code    Encounter regarding vascular access for dialysis for ESRD ([facility] [address] N18.6, Z99.2    COVID-19 U07.1       Past Medical History:        Diagnosis Date    Blind left eye     Chronic kidney disease     D[facility] ([facility] [facility]     [facility]     Hyperlipidemia     Hypertension        Past Surgical History:        Procedure Laterality Date    AV FISTULA CREATION Left 2015    Dr. Mike Ayala Left 2019    2 x Dr. Delores Ann, [facility]    PERIPHERAL VASCULAR BYPASS  2008    Aortobifemoral bypass for claudicatio - Dr. Katya Lindquist       Social History:    Social History     Tobacco Use    Smoking status: Former Smoker    Smokeless tobacco: Never Used   Substance Use Topics    Alcohol use: Not Currently     Comment: occasional                                Counseling given: Not Answered      Vital Signs (Current):   Vitals:    07/19/20 0145 07/19/20 0900 07/19/20 1513 07/19/20 1554   BP: (!) 155/55 (!) 141/46 (!) 105/54 (!) 106/53   Pulse: 77 78 70 70   Resp:  16 16 16   Temp: 97.7 °F (36.5 °C) 97.9 °F (36.6 °C) 98.3 °F (36.8 °C) 98.4 °F (36.9 °C)   TempSrc: Infrared Oral Oral Oral   SpO2:  94% 94% 96%   Weight: 138 lb 11.2 oz (62.9 kg)      Height:                                                  BP Readings from Last 3 Encounters:   07/19/20 (!) 106/53   07/11/20 (!) 149/76   03/09/20 (!) 150/78       NPO Status:                                                                                 BMI:   Wt Readings from Last 3 Encounters:   07/19/20 138 lb 11.2 oz (62.9 kg)   03/09/20 170 lb (77.1 kg)   12/05/19 167 lb (75.8 kg)     Body mass index is 21.09 kg/m². CBC:   Lab Results   Component Value Date    WBC 9.4 07/19/2020    RBC 2.09 07/19/2020    HGB 6.8 07/19/2020    HCT 21.2 07/19/2020    .4 07/19/2020    RDW 19.4 07/19/2020     07/19/2020       CMP:   Lab Results   Component Value Date     07/19/2020    K 4.5 07/19/2020    K 4.9 07/14/2020    CL 96 07/19/2020    CO2 28 07/19/2020    BUN 91 07/19/2020    CREATININE 7.0 07/19/2020    GFRAA 9 07/19/2020    LABGLOM 8 07/19/2020    GLUCOSE 118 07/19/2020    PROT 5.7 07/19/2020    CALCIUM 10.6 07/19/2020    BILITOT 0.4 07/19/2020    ALKPHOS 62 07/19/2020    AST 32 07/19/2020    ALT 21 07/19/2020       POC Tests: No results for input(s): POCGLU, POCNA, POCK, POCCL, POCBUN, POCHEMO, POCHCT in the last 72 hours.     Coags:   Lab Results   Component Value Date    PROTIME 11.8 12/05/2019    INR 1.0 12/05/2019    APTT 32.9 12/05/2019       HCG (If Applicable): No results found for: PREGTESTUR, PREGSERUM, HCG, HCGQUANT     ABGs: No results found for: PHART, PO2ART, [id number], EOJ8BSB, BEART, S4IZRDYT     Type & Screen (If Applicable):  No results found for: LABABO, LABRH    Drug/Infectious Status (If Applicable):  No results found for: HIV, HEPCAB    COVID-19 Screening (If Applicable):   Lab Results   Component Value Date    COVID19 DETECTED 07/13/2020    COVID19 Detected 07/11/2020         Anesthesia Evaluation  Patient summary reviewed no history of anesthetic complications:   Airway: Mallampati: III  TM distance: >3 FB   Neck ROM: full  Mouth opening: > = 3 FB Dental:      Comment: Poor dentition    Pulmonary: breath sounds clear to auscultation                            ROS comment: covid +   Cardiovascular:    (+) hypertension:, hyperlipidemia        Rhythm: regular                      Neuro/Psych:   Negative Neuro/Psych ROS              GI/Hepatic/Renal:   (+) renal disease: dialysis,           Endo/Other:    (+) blood dyscrasia: anemia:., . Abdominal:           Vascular: negative vascular ROS. Anesthesia Plan      MAC     ASA 4       Induction: intravenous. Anesthetic plan and risks discussed with patient. Plan discussed with CRNA. Kierra Arellano MD   7/19/2020     DOS STAFF ADDENDUM:    Patient seen and chart reviewed. No interval change in history or exam.   Anesthesia plan discussed, risk/benefits addressed. Patient's concerns and questions answered.      John Bedoya, DO  July 20, 2020

## 2020-07-19 NOTE — PROGRESS NOTES
[facility] Quality Flow/Interdisciplinary Rounds Progress Note        Quality Flow Rounds held on July 19, 2020    Disciplines Attending:  Bedside Nurse, ,  and Nursing Unit Leadership    Christopher Wong was admitted on 7/13/2020  5:20 PM    Anticipated Discharge Date:  Expected Discharge Date: 07/16/20    Disposition:    Luis Antonio Score:  Luis Antonio Scale Score: 17    Readmission Risk              Risk of Unplanned Readmission:        24           Discussed patient goal for the day, patient clinical progression, and barriers to discharge. The following Goal(s) of the Day/Commitment(s) have been identified:  Decadron IV daily, low hgb, General surgery planning upper endoscopy tomorrow.       Jez Gaffney  July 19, 2020

## 2020-07-20 VITALS
OXYGEN SATURATION: 100 % | DIASTOLIC BLOOD PRESSURE: 52 MMHG | RESPIRATION RATE: 12 BRPM | SYSTOLIC BLOOD PRESSURE: 121 MMHG

## 2020-07-20 LAB
ALBUMIN SERPL-MCNC: 2.6 G/DL (ref 3.5–5.2)
ALP BLD-CCNC: 48 U/L (ref 40–129)
ALT SERPL-CCNC: 15 U/L (ref 0–40)
ANION GAP SERPL CALCULATED.3IONS-SCNC: 15 MMOL/L (ref 7–16)
AST SERPL-CCNC: 25 U/L (ref 0–39)
BILIRUB SERPL-MCNC: 0.4 MG/DL (ref 0–1.2)
BILIRUBIN DIRECT: <0.2 MG/DL (ref 0–0.3)
BILIRUBIN, INDIRECT: ABNORMAL MG/DL (ref 0–1)
BUN BLDV-MCNC: 98 MG/DL (ref 8–23)
CALCIUM SERPL-MCNC: 9 MG/DL (ref 8.6–10.2)
CHLORIDE BLD-SCNC: 102 MMOL/L (ref 98–107)
CO2: 22 MMOL/L (ref 22–29)
CREAT SERPL-MCNC: 6.9 MG/DL (ref 0.7–1.2)
GFR AFRICAN AMERICAN: 9
GFR NON-AFRICAN AMERICAN: 8 ML/MIN/1.73
GLUCOSE BLD-MCNC: 98 MG/DL (ref 74–99)
HCT VFR BLD CALC: 18.7 % (ref 37–54)
HCT VFR BLD CALC: 23.4 % (ref 37–54)
HEMOGLOBIN: 6 G/DL (ref 12.5–16.5)
HEMOGLOBIN: 7.7 G/DL (ref 12.5–16.5)
MCH RBC QN AUTO: 31.6 PG (ref 26–35)
MCHC RBC AUTO-ENTMCNC: 32.1 % (ref 32–34.5)
MCV RBC AUTO: 98.4 FL (ref 80–99.9)
PDW BLD-RTO: 18.7 FL (ref 11.5–15)
PLATELET # BLD: 99 E9/L (ref 130–450)
PLATELET CONFIRMATION: NORMAL
PMV BLD AUTO: 12.8 FL (ref 7–12)
POTASSIUM SERPL-SCNC: 4.7 MMOL/L (ref 3.5–5)
RBC # BLD: 1.9 E12/L (ref 3.8–5.8)
SODIUM BLD-SCNC: 139 MMOL/L (ref 132–146)
TOTAL PROTEIN: 4.4 G/DL (ref 6.4–8.3)
WBC # BLD: 7.2 E9/L (ref 4.5–11.5)

## 2020-07-20 PROCEDURE — 36415 COLL VENOUS BLD VENIPUNCTURE: CPT

## 2020-07-20 PROCEDURE — 7100000000 HC PACU RECOVERY - FIRST 15 MIN: Performed by: SURGERY

## 2020-07-20 PROCEDURE — 2580000003 HC RX 258: Performed by: NURSE ANESTHETIST, CERTIFIED REGISTERED

## 2020-07-20 PROCEDURE — 2500000003 HC RX 250 WO HCPCS: Performed by: NURSE ANESTHETIST, CERTIFIED REGISTERED

## 2020-07-20 PROCEDURE — 2580000003 HC RX 258: Performed by: INTERNAL MEDICINE

## 2020-07-20 PROCEDURE — P9047 ALBUMIN (HUMAN), 25%, 50ML: HCPCS | Performed by: INTERNAL MEDICINE

## 2020-07-20 PROCEDURE — 88342 IMHCHEM/IMCYTCHM 1ST ANTB: CPT

## 2020-07-20 PROCEDURE — 6360000002 HC RX W HCPCS: Performed by: INTERNAL MEDICINE

## 2020-07-20 PROCEDURE — 90935 HEMODIALYSIS ONE EVALUATION: CPT

## 2020-07-20 PROCEDURE — 85014 HEMATOCRIT: CPT

## 2020-07-20 PROCEDURE — 80048 BASIC METABOLIC PNL TOTAL CA: CPT

## 2020-07-20 PROCEDURE — 7100000001 HC PACU RECOVERY - ADDTL 15 MIN: Performed by: SURGERY

## 2020-07-20 PROCEDURE — 2060000000 HC ICU INTERMEDIATE R&B

## 2020-07-20 PROCEDURE — 6370000000 HC RX 637 (ALT 250 FOR IP): Performed by: STUDENT IN AN ORGANIZED HEALTH CARE EDUCATION/TRAINING PROGRAM

## 2020-07-20 PROCEDURE — 99232 SBSQ HOSP IP/OBS MODERATE 35: CPT | Performed by: INTERNAL MEDICINE

## 2020-07-20 PROCEDURE — 3700000001 HC ADD 15 MINUTES (ANESTHESIA): Performed by: SURGERY

## 2020-07-20 PROCEDURE — 80076 HEPATIC FUNCTION PANEL: CPT

## 2020-07-20 PROCEDURE — 85018 HEMOGLOBIN: CPT

## 2020-07-20 PROCEDURE — 3700000000 HC ANESTHESIA ATTENDED CARE: Performed by: SURGERY

## 2020-07-20 PROCEDURE — 6370000000 HC RX 637 (ALT 250 FOR IP): Performed by: INTERNAL MEDICINE

## 2020-07-20 PROCEDURE — 85027 COMPLETE CBC AUTOMATED: CPT

## 2020-07-20 PROCEDURE — 2709999900 HC NON-CHARGEABLE SUPPLY: Performed by: SURGERY

## 2020-07-20 PROCEDURE — 3600007501: Performed by: SURGERY

## 2020-07-20 PROCEDURE — 3600007511: Performed by: SURGERY

## 2020-07-20 PROCEDURE — 88305 TISSUE EXAM BY PATHOLOGIST: CPT

## 2020-07-20 PROCEDURE — 2700000000 HC OXYGEN THERAPY PER DAY

## 2020-07-20 PROCEDURE — 6360000002 HC RX W HCPCS: Performed by: NURSE ANESTHETIST, CERTIFIED REGISTERED

## 2020-07-20 PROCEDURE — 0DB68ZX EXCISION OF STOMACH, VIA NATURAL OR ARTIFICIAL OPENING ENDOSCOPIC, DIAGNOSTIC: ICD-10-PCS | Performed by: SURGERY

## 2020-07-20 RX ORDER — ALBUMIN (HUMAN) 12.5 G/50ML
25 SOLUTION INTRAVENOUS 2 TIMES DAILY
Status: COMPLETED | OUTPATIENT
Start: 2020-07-20 | End: 2020-07-23

## 2020-07-20 RX ORDER — 0.9 % SODIUM CHLORIDE 0.9 %
250 INTRAVENOUS SOLUTION INTRAVENOUS ONCE
Status: DISCONTINUED | OUTPATIENT
Start: 2020-07-20 | End: 2020-07-29 | Stop reason: HOSPADM

## 2020-07-20 RX ORDER — PROPOFOL 10 MG/ML
INJECTION, EMULSION INTRAVENOUS PRN
Status: DISCONTINUED | OUTPATIENT
Start: 2020-07-20 | End: 2020-07-20 | Stop reason: SDUPTHER

## 2020-07-20 RX ORDER — SODIUM CHLORIDE 9 MG/ML
INJECTION, SOLUTION INTRAVENOUS CONTINUOUS PRN
Status: DISCONTINUED | OUTPATIENT
Start: 2020-07-20 | End: 2020-07-20 | Stop reason: SDUPTHER

## 2020-07-20 RX ORDER — SUCCINYLCHOLINE/SOD CL,ISO/PF 200MG/10ML
SYRINGE (ML) INTRAVENOUS PRN
Status: DISCONTINUED | OUTPATIENT
Start: 2020-07-20 | End: 2020-07-20 | Stop reason: SDUPTHER

## 2020-07-20 RX ADMIN — ALBUMIN (HUMAN) 25 G: 0.25 INJECTION, SOLUTION INTRAVENOUS at 21:49

## 2020-07-20 RX ADMIN — ACETAMINOPHEN 650 MG: 325 TABLET ORAL at 18:04

## 2020-07-20 RX ADMIN — SODIUM CHLORIDE: 9 INJECTION, SOLUTION INTRAVENOUS at 15:09

## 2020-07-20 RX ADMIN — Medication 140 MG: at 15:15

## 2020-07-20 RX ADMIN — DEXAMETHASONE SODIUM PHOSPHATE 6 MG: 4 INJECTION, SOLUTION INTRA-ARTICULAR; INTRALESIONAL; INTRAMUSCULAR; INTRAVENOUS; SOFT TISSUE at 09:02

## 2020-07-20 RX ADMIN — EPOETIN ALFA-EPBX 2000 UNITS: 2000 INJECTION, SOLUTION INTRAVENOUS; SUBCUTANEOUS at 09:03

## 2020-07-20 RX ADMIN — EPOETIN ALFA-EPBX 3000 UNITS: 3000 INJECTION, SOLUTION INTRAVENOUS; SUBCUTANEOUS at 09:03

## 2020-07-20 RX ADMIN — Medication 10 ML: at 08:55

## 2020-07-20 RX ADMIN — SODIUM CHLORIDE 125 MG: 900 INJECTION INTRAVENOUS at 09:03

## 2020-07-20 RX ADMIN — SUCRALFATE 1 G: 1 TABLET ORAL at 18:04

## 2020-07-20 RX ADMIN — PROPOFOL 50 MG: 10 INJECTION, EMULSION INTRAVENOUS at 15:15

## 2020-07-20 RX ADMIN — Medication 10 ML: at 21:59

## 2020-07-20 ASSESSMENT — PULMONARY FUNCTION TESTS
PIF_VALUE: 9
PIF_VALUE: 9
PIF_VALUE: 21
PIF_VALUE: 9
PIF_VALUE: 19
PIF_VALUE: 9
PIF_VALUE: 20
PIF_VALUE: 9
PIF_VALUE: 20
PIF_VALUE: 9
PIF_VALUE: 24
PIF_VALUE: 19
PIF_VALUE: 20
PIF_VALUE: 20
PIF_VALUE: 21
PIF_VALUE: 25
PIF_VALUE: 9

## 2020-07-20 ASSESSMENT — PAIN SCALES - GENERAL
PAINLEVEL_OUTOF10: 0

## 2020-07-20 NOTE — PROGRESS NOTES
Patient  Is out for EGD off the floor. Tolerated HD well today with 1.6 liter removed. Poor appetite noted by nursing. Discussed with Dr Lennox Ben. PLAN:  - start SPA BID x 6 doses  - await GI work up.       Emily Baker MD

## 2020-07-20 NOTE — PROGRESS NOTES
Comprehensive Nutrition Assessment    Type and Reason for Visit:  Initial(LOS Assessment)    Nutrition Recommendations/Plan: Continue NPO and ADAT once medically appropriate. Will Start Nepro Renal ONS TID w/ diet. Rec to consider Starting an Princess. Stimulant if medically appropriate. If intake remains poor x next few days, would then rec to consider EN feeds d/t poor intake. Nutrition Assessment:  Pt adm w/ SOB, cough and fever x ~3 days 2/2 COVID19+ per EMR review. Pt is at risk d/t continued poor intake x ~7 days since adm 2/2 poor appetite as well as increased needs 2/2 ESRD w/ HD losses. Will Start ONS to aid in PO intake. Malnutrition Assessment:  Malnutrition Status:  Insufficient data    Context:  Acute Illness     Findings of the 6 clinical characteristics of malnutrition:  Energy Intake:  7 - 50% or less of estimated energy requirements for 5 or more days  Weight Loss:  Unable to assess(2/2 poor EMR wt hx pta)     Body Fat Loss:  Unable to assess(2/2 COVID19 Iso)     Muscle Mass Loss:  Unable to assess    Fluid Accumulation:  Unable to assess(2/2 ESRD w/ Fluid Overload)     Strength:  Not Performed    Estimated Daily Nutrient Needs:  Energy (kcal):  6534-9749(MSJ x 1.2 SF per CBW); Weight Used for Energy Requirements:  Current     Protein (g):  80-95(1.3-1.5 gm/kg per CBW);  Weight Used for Protein Requirements:  Current        Fluid (ml/day):  Per Renal MGMT; Weight Used for Fluid Requirements:  Current      Nutrition Related Findings:  A&O, poor dentition, Abd/BS WDL, Gen/RUE +1 and BLE Trace edema, -I/O's(HD Treatments; pend plan for EGD 7/20)      Wounds:  None       Current Nutrition Therapies:    Diet NPO, After Midnight    Anthropometric Measures:  · Height: 5' 8\" (172.7 cm)  · Current Body Weight: 138 lb (62.6 kg)(bed 7/19)   · Admission Body Weight: 151 lb (68.5 kg)(bed 7/13)    · Usual Body Weight: 167 lb (75.8 kg)(stated 12/5/16 per EMR; poor EMR hx w/ no actual wt's pta currently; [place]OL[place] properly at this time d/t poor EMR wt hx)     · Ideal Body Weight: 154 lbs; % Ideal Body Weight 89.6 %   · BMI: 21  · Adjusted Body Weight:  ; No Adjustment   · Adjusted BMI:      · BMI Categories: Underweight (BMI less than 22) age over 72       Nutrition Diagnosis:   · Inadequate oral intake related to other (comment)(2/2 COVID19+ w/ Poor Appetite) as evidenced by intake 26-50%, poor intake prior to admission    Nutrition Interventions:   Food and/or Nutrient Delivery:  Continue NPO(Continue NPO and ADAT once medically appropriate. Will Start Nepro Renal ONS TID w/ diet. Rec to consider Starting an Princess. Stimulant if medically appropriate. If intake remains poor x next few days, would then rec to consider EN feeds d/t poor intake.)  Nutrition Education/Counseling:  (diet edu not appropriate at this time)   Coordination of Nutrition Care:  Continued Inpatient Monitoring    Goals:  Nutrition Progression. Nutrition Monitoring and Evaluation:   Behavioral-Environmental Outcomes:  (n/a)   Food/Nutrient Intake Outcomes:  Diet Advancement/Tolerance, Food and Nutrient Intake, Supplement Intake  Physical Signs/Symptoms Outcomes:  Chewing or Swallowing, GI Status, Fluid Status or Edema, Nutrition Focused Physical Findings, Skin, Weight     Discharge Planning:     Too soon to determine     Electronically signed by Yoselin Nails RD, LD on 7/20/20 at 2:58 PM EDT    Contact: ext [phone]

## 2020-07-20 NOTE — OP NOTE
Operative Note      Patient: Carlos Martinez  YOB: 1945  MRN: [id number]    Date of Procedure: 7/20/2020    Pre-Op Diagnosis: COVID 19+    Post-Op Diagnosis: Severe reflux disease with multiple esophageal ulcerations with associated friability and possible underlying malignancy and moderate gastritis       Procedure(s):  EGD ESOPHAGOGASTRODUODENOSCOPY with biopsies the other Dr. Sha Ross ++DROPLET PLUS - COVID++    Surgeon(s):  Sally Lauren MD    Assistant:   * No surgical staff found *    Anesthesia: Monitor Anesthesia Care    Estimated Blood Loss (mL): Minimal    Complications: None    Specimens:   ID Type Source Tests Collected by Time Destination   A :  Antrum [facility] SURGICAL PATHOLOGY Sally Lauren MD 7/20/2020 1531        Implants:  * No implants in log *      Drains: * No LDAs found *    Findings: As above    Detailed Description of Procedure:   Patient was brought to the operative suite after intubation. A bite-block was placed. The scope was passed through the lumen of the esophagus, stomach and duodenum. The endoscope was retrieved. The duodenum was normal.  Upon reentering the stomach, the patient had a mild gastritis. The retroflexed view did not reveal hiatal hernia. The GE junction was marked at 40 cm from incisors. Patient had severe reflux esophagitis with associated ulcerations. This occupied at least 75% of the circumference. Subsequently, the possibility of a new malignancy cannot be ruled out.     Electronically signed by Sally Lauren MD on 7/20/2020 at 3:35 PM

## 2020-07-20 NOTE — PLAN OF CARE
Problem: Airway Clearance - Ineffective  Goal: Achieve or maintain patent airway  Outcome: Ongoing     Problem: Breathing Pattern - Ineffective  Goal: Ability to achieve and maintain a regular respiratory rate  Outcome: Met This Shift     Problem: Isolation Precautions - Risk of Spread of Infection  Goal: Prevent transmission of infection  Outcome: Ongoing     Problem: Falls - Risk of:  Goal: Will remain free from falls  Description: Will remain free from falls  Outcome: Met This Shift  Goal: Absence of physical injury  Description: Absence of physical injury  Outcome: Met This Shift

## 2020-07-20 NOTE — PROGRESS NOTES
Spoke with Dr Pierre Villanueva regarding Farzad Michel. Ordered a STAT repeat while patient on dialysis.

## 2020-07-21 LAB
ABO/RH: NORMAL
ALBUMIN SERPL-MCNC: 3.3 G/DL (ref 3.5–5.2)
ALP BLD-CCNC: 52 U/L (ref 40–129)
ALT SERPL-CCNC: 12 U/L (ref 0–40)
ANION GAP SERPL CALCULATED.3IONS-SCNC: 15 MMOL/L (ref 7–16)
ANTIBODY SCREEN: NORMAL
AST SERPL-CCNC: 17 U/L (ref 0–39)
BILIRUB SERPL-MCNC: 0.6 MG/DL (ref 0–1.2)
BILIRUBIN DIRECT: 0.2 MG/DL (ref 0–0.3)
BILIRUBIN, INDIRECT: 0.4 MG/DL (ref 0–1)
BUN BLDV-MCNC: 64 MG/DL (ref 8–23)
CALCIUM SERPL-MCNC: 9.1 MG/DL (ref 8.6–10.2)
CHLORIDE BLD-SCNC: 99 MMOL/L (ref 98–107)
CO2: 26 MMOL/L (ref 22–29)
CREAT SERPL-MCNC: 5.4 MG/DL (ref 0.7–1.2)
GFR AFRICAN AMERICAN: 13
GFR NON-AFRICAN AMERICAN: 10 ML/MIN/1.73
GLUCOSE BLD-MCNC: 95 MG/DL (ref 74–99)
HCT VFR BLD CALC: 19 % (ref 37–54)
HCT VFR BLD CALC: 19.8 % (ref 37–54)
HEMOGLOBIN: 6.1 G/DL (ref 12.5–16.5)
HEMOGLOBIN: 6.5 G/DL (ref 12.5–16.5)
MCH RBC QN AUTO: 32.4 PG (ref 26–35)
MCHC RBC AUTO-ENTMCNC: 32.1 % (ref 32–34.5)
MCV RBC AUTO: 101.1 FL (ref 80–99.9)
PDW BLD-RTO: 19.8 FL (ref 11.5–15)
PLATELET # BLD: 89 E9/L (ref 130–450)
PLATELET CONFIRMATION: NORMAL
PMV BLD AUTO: 12.1 FL (ref 7–12)
POTASSIUM SERPL-SCNC: 4.2 MMOL/L (ref 3.5–5)
RBC # BLD: 1.88 E12/L (ref 3.8–5.8)
SODIUM BLD-SCNC: 140 MMOL/L (ref 132–146)
TOTAL PROTEIN: 5.1 G/DL (ref 6.4–8.3)
WBC # BLD: 7.2 E9/L (ref 4.5–11.5)

## 2020-07-21 PROCEDURE — P9016 RBC LEUKOCYTES REDUCED: HCPCS

## 2020-07-21 PROCEDURE — 6360000002 HC RX W HCPCS: Performed by: INTERNAL MEDICINE

## 2020-07-21 PROCEDURE — 86900 BLOOD TYPING SEROLOGIC ABO: CPT

## 2020-07-21 PROCEDURE — 6370000000 HC RX 637 (ALT 250 FOR IP): Performed by: STUDENT IN AN ORGANIZED HEALTH CARE EDUCATION/TRAINING PROGRAM

## 2020-07-21 PROCEDURE — 86850 RBC ANTIBODY SCREEN: CPT

## 2020-07-21 PROCEDURE — 80048 BASIC METABOLIC PNL TOTAL CA: CPT

## 2020-07-21 PROCEDURE — 6370000000 HC RX 637 (ALT 250 FOR IP): Performed by: INTERNAL MEDICINE

## 2020-07-21 PROCEDURE — 80076 HEPATIC FUNCTION PANEL: CPT

## 2020-07-21 PROCEDURE — 2580000003 HC RX 258: Performed by: INTERNAL MEDICINE

## 2020-07-21 PROCEDURE — 85027 COMPLETE CBC AUTOMATED: CPT

## 2020-07-21 PROCEDURE — 36430 TRANSFUSION BLD/BLD COMPNT: CPT

## 2020-07-21 PROCEDURE — 86923 COMPATIBILITY TEST ELECTRIC: CPT

## 2020-07-21 PROCEDURE — 85014 HEMATOCRIT: CPT

## 2020-07-21 PROCEDURE — 85018 HEMOGLOBIN: CPT

## 2020-07-21 PROCEDURE — P9047 ALBUMIN (HUMAN), 25%, 50ML: HCPCS | Performed by: INTERNAL MEDICINE

## 2020-07-21 PROCEDURE — 36415 COLL VENOUS BLD VENIPUNCTURE: CPT

## 2020-07-21 PROCEDURE — 6370000000 HC RX 637 (ALT 250 FOR IP): Performed by: NURSE PRACTITIONER

## 2020-07-21 PROCEDURE — 2060000000 HC ICU INTERMEDIATE R&B

## 2020-07-21 PROCEDURE — 2500000003 HC RX 250 WO HCPCS: Performed by: INTERNAL MEDICINE

## 2020-07-21 PROCEDURE — 99232 SBSQ HOSP IP/OBS MODERATE 35: CPT | Performed by: INTERNAL MEDICINE

## 2020-07-21 PROCEDURE — 86901 BLOOD TYPING SEROLOGIC RH(D): CPT

## 2020-07-21 RX ORDER — 0.9 % SODIUM CHLORIDE 0.9 %
20 INTRAVENOUS SOLUTION INTRAVENOUS ONCE
Status: COMPLETED | OUTPATIENT
Start: 2020-07-21 | End: 2020-07-21

## 2020-07-21 RX ADMIN — ALBUMIN (HUMAN) 25 G: 0.25 INJECTION, SOLUTION INTRAVENOUS at 20:29

## 2020-07-21 RX ADMIN — CALCIUM ACETATE 2001 MG: 667 CAPSULE ORAL at 17:21

## 2020-07-21 RX ADMIN — Medication 1000 MG: at 10:05

## 2020-07-21 RX ADMIN — SUCRALFATE 1 G: 1 TABLET ORAL at 22:47

## 2020-07-21 RX ADMIN — ALBUMIN (HUMAN) 25 G: 0.25 INJECTION, SOLUTION INTRAVENOUS at 09:55

## 2020-07-21 RX ADMIN — SUCRALFATE 1 G: 1 TABLET ORAL at 12:38

## 2020-07-21 RX ADMIN — Medication 10 ML: at 10:00

## 2020-07-21 RX ADMIN — ROSUVASTATIN CALCIUM 5 MG: 5 TABLET, FILM COATED ORAL at 20:29

## 2020-07-21 RX ADMIN — SODIUM CHLORIDE 20 ML: 9 INJECTION, SOLUTION INTRAVENOUS at 17:20

## 2020-07-21 RX ADMIN — Medication 1000 MG: at 20:24

## 2020-07-21 RX ADMIN — ASPIRIN 81 MG CHEWABLE TABLET 81 MG: 81 TABLET CHEWABLE at 10:05

## 2020-07-21 RX ADMIN — CALCIUM ACETATE 2001 MG: 667 CAPSULE ORAL at 10:06

## 2020-07-21 RX ADMIN — DEXAMETHASONE SODIUM PHOSPHATE 6 MG: 4 INJECTION, SOLUTION INTRA-ARTICULAR; INTRALESIONAL; INTRAMUSCULAR; INTRAVENOUS; SOFT TISSUE at 09:55

## 2020-07-21 RX ADMIN — FERROUS SULFATE TAB 325 MG (65 MG ELEMENTAL FE) 325 MG: 325 (65 FE) TAB at 17:21

## 2020-07-21 RX ADMIN — FERROUS SULFATE TAB 325 MG (65 MG ELEMENTAL FE) 325 MG: 325 (65 FE) TAB at 10:06

## 2020-07-21 RX ADMIN — HYDRALAZINE HYDROCHLORIDE 50 MG: 50 TABLET ORAL at 20:24

## 2020-07-21 RX ADMIN — Medication 10 ML: at 20:29

## 2020-07-21 RX ADMIN — METOPROLOL TARTRATE 50 MG: 50 TABLET, FILM COATED ORAL at 20:24

## 2020-07-21 RX ADMIN — SUCRALFATE 1 G: 1 TABLET ORAL at 17:27

## 2020-07-21 RX ADMIN — ZINC SULFATE 220 MG (50 MG) CAPSULE 50 MG: CAPSULE at 10:06

## 2020-07-21 RX ADMIN — AMLODIPINE BESYLATE 10 MG: 10 TABLET ORAL at 10:06

## 2020-07-21 RX ADMIN — DOXAZOSIN MESYLATE 4 MG: 4 TABLET ORAL at 20:25

## 2020-07-21 RX ADMIN — CALCIUM ACETATE 2001 MG: 667 CAPSULE ORAL at 12:38

## 2020-07-21 ASSESSMENT — PAIN SCALES - GENERAL
PAINLEVEL_OUTOF10: 0

## 2020-07-21 ASSESSMENT — PAIN DESCRIPTION - PROGRESSION
CLINICAL_PROGRESSION: NOT CHANGED
CLINICAL_PROGRESSION: NOT CHANGED

## 2020-07-21 NOTE — PROGRESS NOTES
[facility] Hospital[facility]   Progress Note    Admitting Date and Time: 7/13/2020  5:20 PM  Admit Dx: CHDAS-64 [U07.1]  COVID-19 [U07.1]    Subjective:    Patient was admitted with COVID-19 [U07.1]  COVID-19 [U07.1].  Patient denies fever, chills, cp, sob, n/v.     epoetin delmer-epbx  3,000 Units Subcutaneous Once per day on Mon Wed Fri    And    epoetin delmer-epbx  2,000 Units Subcutaneous Once per day on Mon Wed Fri    albumin human  25 g Intravenous BID    sodium chloride  250 mL Intravenous Once    pantoprazole  40 mg Oral QAM AC    sucralfate  1 g Oral 4 times per day    doxazosin  4 mg Oral Nightly    ferric gluconate (FERRLECIT) IVPB  125 mg Intravenous Q MWF    dexamethasone  6 mg Intravenous Daily    Calcium Acetate (Phos Binder)  2,001 mg Oral TID WC    ferrous sulfate  325 mg Oral BID WC    sodium chloride flush  10 mL Intravenous 2 times per day    heparin (porcine)  5,000 Units Subcutaneous 3 times per day    vitamin C  1,000 mg Oral BID    zinc sulfate  50 mg Oral Daily    amLODIPine  10 mg Oral Daily    aspirin  81 mg Oral Daily    hydrALAZINE  50 mg Oral TID    metoprolol tartrate  50 mg Oral BID    rosuvastatin  5 mg Oral Nightly     white petrolatum, , BID PRN  sodium chloride, 30 mL, PRN  sodium chloride flush, 10 mL, PRN  acetaminophen, 650 mg, Q6H PRN    Or  acetaminophen, 650 mg, Q6H PRN  polyethylene glycol, 17 g, Daily PRN  promethazine, 12.5 mg, Q6H PRN    Or  ondansetron, 4 mg, Q6H PRN         Objective:    /64   Pulse 82   Temp 98 °F (36.7 °C) (Infrared)   Resp 18   Ht 5' 8\" (1.727 m)   Wt 143 lb 15.4 oz (65.3 kg)   SpO2 96%   BMI 21.89 kg/m²   Skin: warm and dry, no rash or erythema  Pulmonary/Chest: clear to auscultation bilaterally- no wheezes, rales or rhonchi, normal air movement, no respiratory distress  Cardiovascular: rhythm reg at rate of 84  Abdomen: soft, non-tender, non-distended, normal bowel sounds, no masses or organomegaly  Extremities: no cyanosis, no clubbing and no edema      Recent Labs     07/18/20  0533 07/19/20  0910 07/20/20  0605    141 139   K 4.3 4.5 4.7   CL 98 96* 102   CO2 28 28 22   BUN 60* 91* 98*   CREATININE 5.1* 7.0* 6.9*   GLUCOSE 121* 118* 98   CALCIUM 9.2 10.6* 9.0       Recent Labs     07/18/20  0533 07/19/20  0910 07/20/20  0605 07/20/20  0923   WBC 8.9 9.4 7.2  --    RBC 2.17* 2.09* 1.90*  --    HGB 6.9* 6.8* 6.0* 7.7*   HCT 22.0* 21.2* 18.7* 23.4*   .4* 101.4* 98.4  --    MCH 31.8 32.5 31.6  --    MCHC 31.4* 32.1 32.1  --    RDW 18.7* 19.4* 18.7*  --    * 127* 99*  --    MPV 11.8 11.9 12.8*  --        CBC:   Lab Results   Component Value Date    WBC 7.2 07/20/2020    RBC 1.90 07/20/2020    HGB 7.7 07/20/2020    HCT 23.4 07/20/2020    MCV 98.4 07/20/2020    MCH 31.6 07/20/2020    MCHC 32.1 07/20/2020    RDW 18.7 07/20/2020    PLT 99 07/20/2020    MPV 12.8 07/20/2020     BMP:    Lab Results   Component Value Date     07/20/2020    K 4.7 07/20/2020    K 4.9 07/14/2020     07/20/2020    CO2 22 07/20/2020    BUN 98 07/20/2020    LABALBU 2.6 07/20/2020    CREATININE 6.9 07/20/2020    CALCIUM 9.0 07/20/2020    GFRAA 9 07/20/2020    LABGLOM 8 07/20/2020    GLUCOSE 98 07/20/2020     Hepatic Function Panel:    Lab Results   Component Value Date    ALKPHOS 48 07/20/2020    ALT 15 07/20/2020    AST 25 07/20/2020    PROT 4.4 07/20/2020    BILITOT 0.4 07/20/2020    BILIDIR <0.2 07/20/2020    IBILI see below 07/20/2020    LABALBU 2.6 07/20/2020        Radiology:   US DUP LOWER EXTREMITIES BILATERAL VENOUS   Final Result   No evidence to suggest deep venous thrombosis of the bilateral lower   extremities. XR CHEST PORTABLE   Final Result      No airspace opacities or pleural effusion. Assessment:    Active Problems:    COVID-19  Resolved Problems:    * No resolved hospital problems. *      Plan:  1.  Acute respiratory failure with hypoxia due to covid 19 monitor off oxygen  2. Pneumonia due to covid 19 ID following ID stopping remdesivir. Continue steroids  3. Severe gerd ppi  4. Esophageal ulcerations ppi  5. Gastritis ppi  6. Melena no bleeding seen on egd  7. Anemia likely with acute blood loss component monitor hgb and transfuse prn  8. ESRD HD per nephrology  9. htn monitor bp and continue med  10. Hyperlipidemia continue med  11.  Thrombocytopenia monitor        Electronically signed by Rosa Rojas DO on 7/20/2020 at 8:15 PM

## 2020-07-21 NOTE — PROGRESS NOTES
Department of Internal Medicine  Nephrology Progress Note    Events Reviewed. S:  We are following Mr. Luis Salguero for HD needs and hyperkalemia.   He had EGD yesterday which showed severe reflux disease with multiple esophageal ulcers possible underlying malignancy and moderate gastritis  Hemoglobin is again down to 6.1 today          Past Medical History:        Diagnosis Date    Blind left eye     Chronic kidney disease     [facility]     Hemodialysis patient ([facility]     Hyperlipidemia     Hypertension      Past Surgical History:        Procedure Laterality Date    AV FISTULA CREATION Left 2015    Dr. Payal Camacho Left 2019    2 x Dr. Sixto Fong, [facility]    PERIPHERAL VASCULAR BYPASS  2008    Aortobifemoral bypass for claudicatio - Dr. Joshua Griffin N/A 7/20/2020    EGD ESOPHAGOGASTRODUODENOSCOPY WITH ANTRAL BIOPSY performed by Sally Lauren MD at [facility] OR     Current Medications:    Current Facility-Administered Medications: 0.9 % sodium chloride bolus, 20 mL, Intravenous, Once  epoetin delmer-epbx (RETACRIT) injection 3,000 Units, 3,000 Units, Subcutaneous, Once per day on Mon Wed Fri **AND** epoetin delmer-epbx (RETACRIT) injection 2,000 Units, 2,000 Units, Subcutaneous, Once per day on Mon Wed Fri  albumin human 25 % IV solution 25 g, 25 g, Intravenous, BID  0.9 % sodium chloride bolus, 250 mL, Intravenous, Once  white petrolatum ointment, , Topical, BID PRN  pantoprazole (PROTONIX) tablet 40 mg, 40 mg, Oral, QAM AC  sucralfate (CARAFATE) tablet 1 g, 1 g, Oral, 4 times per day  doxazosin (CARDURA) tablet 4 mg, 4 mg, Oral, Nightly  [COMPLETED] ferric gluconate (FERRLECIT) 25 mg in sodium chloride 0.9 % 50 mL (test dose) IVPB, 25 mg, Intravenous, Once **FOLLOWED BY** [COMPLETED] ferric gluconate (FERRLECIT) 100 mg in sodium chloride 0.9 % 100 mL IVPB, 100 mg, Intravenous, Once **FOLLOWED BY** ferric gluconate (FERRLECIT) 125 mg in sodium chloride 0.9 % 100 mL IVPB, 125 mg, Intravenous, Q MWF  dexamethasone (DECADRON) injection 6 mg, 6 mg, Intravenous, Daily  Calcium Acetate (Phos Binder) CAPS 2,001 mg, 2,001 mg, Oral, TID WC  ferrous sulfate (IRON 325) tablet 325 mg, 325 mg, Oral, BID WC  0.9 % sodium chloride bolus, 30 mL, Intravenous, PRN  sodium chloride flush 0.9 % injection 10 mL, 10 mL, Intravenous, 2 times per day  sodium chloride flush 0.9 % injection 10 mL, 10 mL, Intravenous, PRN  acetaminophen (TYLENOL) tablet 650 mg, 650 mg, Oral, Q6H PRN **OR** acetaminophen (TYLENOL) suppository 650 mg, 650 mg, Rectal, Q6H PRN  polyethylene glycol (GLYCOLAX) packet 17 g, 17 g, Oral, Daily PRN  promethazine (PHENERGAN) tablet 12.5 mg, 12.5 mg, Oral, Q6H PRN **OR** ondansetron (ZOFRAN) injection 4 mg, 4 mg, Intravenous, Q6H PRN  heparin (porcine) injection 5,000 Units, 5,000 Units, Subcutaneous, 3 times per day  ascorbic acid (VITAMIN C) tablet 1,000 mg, 1,000 mg, Oral, BID  zinc sulfate (ZINCATE) capsule 50 mg, 50 mg, Oral, Daily  amLODIPine (NORVASC) tablet 10 mg, 10 mg, Oral, Daily  aspirin chewable tablet 81 mg, 81 mg, Oral, Daily  hydrALAZINE (APRESOLINE) tablet 50 mg, 50 mg, Oral, TID  metoprolol tartrate (LOPRESSOR) tablet 50 mg, 50 mg, Oral, BID  rosuvastatin (CRESTOR) tablet 5 mg, 5 mg, Oral, Nightly  Allergies:  Patient has no known allergies. Social History:    TOBACCO:   reports that he has quit smoking. He has never used smokeless tobacco.  ETOH:   reports previous alcohol use. DRUGS:   reports no history of drug use. Family History:   History reviewed. No pertinent family history. REVIEW OF SYSTEMS:    Review of Systems   Constitutional: Positive for fever. Negative for diaphoresis. HENT: Negative for sore throat. Eyes: Negative for discharge. Respiratory: Positive for cough and shortness of breath. Cardiovascular: Negative for chest pain. Gastrointestinal: Negative for abdominal pain and vomiting. Genitourinary: Negative for difficulty urinating, dysuria and hematuria. Musculoskeletal: Positive for arthralgias and myalgias. Skin: Negative for rash. Allergic/Immunologic: Negative for immunocompromised state. Neurological: Positive for headaches. PHYSICAL EXAM:      Vitals:    VITALS:  BP (!) 104/50   Pulse 70   Temp 98.6 °F (37 °C) (Infrared)   Resp 16   Ht 5' 8\" (1.727 m)   Wt 143 lb 15.4 oz (65.3 kg)   SpO2 94%   BMI 21.89 kg/m²   24HR INTAKE/OUTPUT:      Intake/Output Summary (Last 24 hours) at 7/21/2020 1426  Last data filed at 7/20/2020 1540  Gross per 24 hour   Intake 200 ml   Output --   Net 200 ml       General Appearance: alert and oriented to person, place and time, ill appearing  Skin: warm and dry, turgor not diminished  Head: normocephalic and atraumatic  Eyes: pupils equal, round, and reactive to light, extraocular eye movements intact, conjunctivae normal  Neck: neck supple and non tender without mass   Pulmonary/Chest: Rales (right side) no wheezes,  no respiratory distress  Cardiovascular: normal rate, normal S1 and S2 and murmur  Abdomen: soft, non-tender, non-distended, normal bowel sounds, no masses or organomegaly  Extremities: no cyanosis, no clubbing and no edema.   Left fistula  Neurologic: no cranial nerve deficit and speech normal    DATA:    CBC with Differential:    Lab Results   Component Value Date    WBC 7.2 07/21/2020    RBC 1.88 07/21/2020    HGB 6.1 07/21/2020    HCT 19.0 07/21/2020    PLT 89 07/21/2020    .1 07/21/2020    MCH 32.4 07/21/2020    MCHC 32.1 07/21/2020    RDW 19.8 07/21/2020    NRBC 1.7 07/19/2020    LYMPHOPCT 4.4 07/19/2020    MONOPCT 4.3 07/19/2020    BASOPCT 0.0 07/19/2020    MONOSABS 0.38 07/19/2020    LYMPHSABS 0.38 07/19/2020    EOSABS 0.00 07/19/2020    BASOSABS 0.00 07/19/2020     CMP:    Lab Results   Component Value Date     07/21/2020    K 4.2 07/21/2020    K 4.9 07/14/2020    CL 99 07/21/2020    CO2 26 07/21/2020 BUN 64 07/21/2020    CREATININE 5.4 07/21/2020    GFRAA 13 07/21/2020    LABGLOM 10 07/21/2020    GLUCOSE 95 07/21/2020    PROT 5.1 07/21/2020    LABALBU 3.3 07/21/2020    CALCIUM 9.1 07/21/2020    BILITOT 0.6 07/21/2020    ALKPHOS 52 07/21/2020    AST 17 07/21/2020    ALT 12 07/21/2020     Phosphorus: 3.1mg/dL    Radiology Review:        Chest xray 7/13/20         No airspace opacities or pleural effusion.                Brief Summary if Initial Consult:   The patient is a 75 y.o. male with significant past medical history of end stage renal disease secondary to nepherosclerosis, on hemodialysis [facility] via Arterial Venous Fistula, last hemodialysis treatment on 7/10/20. Initially, he presented to the ER 7/12/20 for generalized aches, weakness, dry cough, some dyspnea on exertion, diarrhea, and overall did not feel well. His wife and son had similar symptoms that started a few days before and tested positive for [address]. A COVID19 test was sent but did not result and was canceled. Treated symptomatically and sent home. His symptoms persisted since then. Yesterday ( 7/13/20) he went to the dialysis center and reportedly was found to have a temperature of 104.5. He was therefore sent to the ED for getting dialysis done. ER lab work revealed sodium 133, potassium 5.3, BUN 62, creatinine 8.2, Anion gap 18. IMPRESSION/RECOMMENDATIONS:      ESRD- on Monday, Wednesday, Friday schedule. GFR: 6  Anemia- Microcytic Anemia- secondary to iron deficiency and ESRD. on ferrous sulfate and Epoetin 3,000U 3x/week, ferrlecit 125mg IV 3x/week with dialysis. COVID19- on ascorbic acid, Vitamin D, Zinc, Remdesirvir and Dexamethasone  Uncontrolled hypertension in the setting of Renal Failure-on Norvasc, Hydralazine, Metoprolol, Doxazosin, likely secondary to volume overload from missed hemodialysis treatment  GI Bleed: s/p 1 unit of blood. Surgery consulted.   Diet: Renal    PLAN:            Continue HD per patient schedule of Monday, Wednesday, and Friday.    1 Unit of PRBC today, discussed with the RN  Continue with Ferrlecit 3x weekly with HD  Continue with Epogen to 5000 units Three times/week  Endoscopy results noted      Jorgito Victoria MD

## 2020-07-21 NOTE — PLAN OF CARE
Problem: Airway Clearance - Ineffective  Goal: Achieve or maintain patent airway  Outcome: Ongoing     Problem: Gas Exchange - Impaired  Goal: Absence of hypoxia  Outcome: Ongoing  Goal: Promote optimal lung function  Outcome: Ongoing     Problem: Breathing Pattern - Ineffective  Goal: Ability to achieve and maintain a regular respiratory rate  Outcome: Ongoing     Problem:  Body Temperature -  Risk of, Imbalanced  Goal: Ability to maintain a body temperature within defined limits  Outcome: Ongoing  Goal: Will regain or maintain usual level of consciousness  Outcome: Ongoing  Goal: Complications related to the disease process, condition or treatment will be avoided or minimized  Outcome: Ongoing     Problem: Isolation Precautions - Risk of Spread of Infection  Goal: Prevent transmission of infection  Outcome: Ongoing     Problem: Nutrition Deficits  Goal: Optimize nutrtional status  Outcome: Ongoing     Problem: Risk for Fluid Volume Deficit  Goal: Maintain normal heart rhythm  Outcome: Ongoing  Goal: Maintain absence of muscle cramping  Outcome: Ongoing  Goal: Maintain normal serum potassium, sodium, calcium, phosphorus, and pH  Outcome: Ongoing     Problem: Loneliness or Risk for Loneliness  Goal: Demonstrate positive use of time alone when socialization is not possible  Outcome: Ongoing     Problem: Fatigue  Goal: Verbalize increase energy and improved vitality  Outcome: Ongoing     Problem: Patient Education: Go to Patient Education Activity  Goal: Patient/Family Education  Outcome: Ongoing     Problem: Falls - Risk of:  Goal: Will remain free from falls  Description: Will remain free from falls  Outcome: Ongoing  Goal: Absence of physical injury  Description: Absence of physical injury  Outcome: Ongoing     Problem: Fluid Volume:  Goal: Ability to achieve a balanced intake and output will improve  Description: Ability to achieve a balanced intake and output will improve  Outcome: Ongoing  Goal: Hemodynamic stability will improve  Description: Hemodynamic stability will improve  Outcome: Ongoing  Goal: Ability to maintain a balanced intake and output will improve  Description: Ability to maintain a balanced intake and output will improve  Outcome: Ongoing     Problem: Physical Regulation:  Goal: Ability to maintain clinical measurements within normal limits will improve  Description: Ability to maintain clinical measurements within normal limits will improve  Outcome: Ongoing  Goal: Will show no signs and symptoms of electrolyte imbalance  Description: Will show no signs and symptoms of electrolyte imbalance  Outcome: Ongoing     Problem: Health Behavior:  Goal: Identification of resources available to assist in meeting health care needs will improve  Description: Identification of resources available to assist in meeting health care needs will improve  Outcome: Ongoing     Problem: Respiratory:  Goal: Respiratory status will improve  Description: Respiratory status will improve  Outcome: Ongoing     Problem: Pain:  Goal: Pain level will decrease  Description: Pain level will decrease  Outcome: Ongoing  Goal: Control of acute pain  Description: Control of acute pain  Outcome: Ongoing  Goal: Control of chronic pain  Description: Control of chronic pain  Outcome: Ongoing     Problem: Skin Integrity:  Goal: Will show no infection signs and symptoms  Description: Will show no infection signs and symptoms  Outcome: Ongoing  Goal: Absence of new skin breakdown  Description: Absence of new skin breakdown  Outcome: Ongoing

## 2020-07-21 NOTE — ANESTHESIA POSTPROCEDURE EVALUATION
Department of Anesthesiology  Postprocedure Note    Patient: Stefanie Pacheco  MRN: [id number]  YOB: 1945  Date of evaluation: 7/21/2020  Time:  9:03 AM     Procedure Summary     Date:  07/20/20 Room / Location:  [facility] OR 08 / [facility]    Anesthesia Start:  5192 Anesthesia Stop:  9427    Procedures:       EGD ESOPHAGOGASTRODUODENOSCOPY WITH ANTRAL BIOPSY (N/A Throat)      Procedure Not Yet Scheduled Diagnosis:  (-)    Surgeon:  Diana Hawkins MD Responsible Provider:  Melissa Bright DO    Anesthesia Type:  MAC ASA Status:  4          Anesthesia Type: MAC    Anisa Phase I: Anisa Score: 8    Anisa Phase II:      Last vitals: Reviewed and per EMR flowsheets.        Anesthesia Post Evaluation    Patient location during evaluation: PACU  Patient participation: complete - patient participated  Level of consciousness: awake and alert  Airway patency: patent  Nausea & Vomiting: no nausea and no vomiting  Complications: no  Cardiovascular status: hemodynamically stable  Respiratory status: acceptable  Hydration status: euvolemic

## 2020-07-21 NOTE — PROGRESS NOTES
GENERAL SURGERY  DAILY PROGRESS NOTE  7/21/2020    Subjective:  Per nurse, no events overnight, no obvious bleeding.     Objective:  BP (!) 100/47   Pulse 75   Temp 99.5 °F (37.5 °C) (Infrared)   Resp 17   Ht 5' 8\" (1.727 m)   Wt 143 lb 15.4 oz (65.3 kg)   SpO2 95%   BMI 21.89 kg/m²     Not examined due to COVID ++    Assessment/Plan:  75 y.o. male gastritis, severe reflux esophagitis with associated ulcerations    -Peptic Ulcer Diet w/ renal supplements  -PPI  -Carafate  -OK to discharge from general surgery standpoint  -Outpatient follow up    Electronically signed by Viktor Chang DO on 7/21/2020 at 8:41 AM

## 2020-07-21 NOTE — PLAN OF CARE
Problem: Airway Clearance - Ineffective  Goal: Achieve or maintain patent airway  Outcome: Met This Shift     Problem: Gas Exchange - Impaired  Goal: Absence of hypoxia  Outcome: Met This Shift  Goal: Promote optimal lung function  Outcome: Met This Shift     Problem: Breathing Pattern - Ineffective  Goal: Ability to achieve and maintain a regular respiratory rate  Outcome: Met This Shift     Problem: Body Temperature -  Risk of, Imbalanced  Goal: Ability to maintain a body temperature within defined limits  Outcome: Met This Shift  Goal: Will regain or maintain usual level of consciousness  Outcome: Met This Shift  Goal: Complications related to the disease process, condition or treatment will be avoided or minimized  Outcome: Met This Shift     Problem: Isolation Precautions - Risk of Spread of Infection  Goal: Prevent transmission of infection  Outcome: Met This Shift     Problem: Nutrition Deficits  Goal: Optimize nutrtional status  Outcome: Met This Shift     Problem: Risk for Fluid Volume Deficit  Goal: Maintain normal heart rhythm  Outcome: Met This Shift  Goal: Maintain absence of muscle cramping  Outcome: Met This Shift     Problem: Loneliness or Risk for Loneliness  Goal: Demonstrate positive use of time alone when socialization is not possible  Outcome: Met This Shift     Problem: Fatigue  Goal: Verbalize increase energy and improved vitality  Outcome: Met This Shift     Problem: Falls - Risk of:  Goal: Will remain free from falls  Description: Will remain free from falls  Outcome: Met This Shift  Goal: Absence of physical injury  Description: Absence of physical injury  Outcome: Met This Shift     Problem: Inadequate oral food/beverage intake (NI-2.1)  Goal: Food and/or Nutrient Delivery  Description: Individualized approach for food/nutrient provision.   7/20/2020 1457 by Hailey Torres RD, RENETTA  Outcome: Met This Shift

## 2020-07-21 NOTE — CARE COORDINATION
Social work / Discharge Planning:       Patient is COVID POSITIVE. Possible discharge tomorrow per QFR. Will need to update CDC ([place]) for dialysis arrangements due to positive covid.   Electronically signed by NOE Jackson on 7/21/2020 at 3:27 PM

## 2020-07-22 ENCOUNTER — APPOINTMENT (OUTPATIENT)
Dept: CT IMAGING | Age: 75
DRG: 177 | End: 2020-07-22
Payer: MEDICARE

## 2020-07-22 LAB
ALBUMIN SERPL-MCNC: 3.5 G/DL (ref 3.5–5.2)
ALP BLD-CCNC: 48 U/L (ref 40–129)
ALT SERPL-CCNC: 9 U/L (ref 0–40)
AST SERPL-CCNC: 12 U/L (ref 0–39)
BILIRUB SERPL-MCNC: 0.8 MG/DL (ref 0–1.2)
BILIRUBIN DIRECT: 0.3 MG/DL (ref 0–0.3)
BILIRUBIN, INDIRECT: 0.5 MG/DL (ref 0–1)
BLOOD BANK DISPENSE STATUS: NORMAL
BLOOD BANK DISPENSE STATUS: NORMAL
BLOOD BANK PRODUCT CODE: NORMAL
BLOOD BANK PRODUCT CODE: NORMAL
BPU ID: NORMAL
BPU ID: NORMAL
DESCRIPTION BLOOD BANK: NORMAL
DESCRIPTION BLOOD BANK: NORMAL
HCT VFR BLD CALC: 23.3 % (ref 37–54)
HEMOGLOBIN: 7.6 G/DL (ref 12.5–16.5)
TOTAL PROTEIN: 5.2 G/DL (ref 6.4–8.3)

## 2020-07-22 PROCEDURE — 36430 TRANSFUSION BLD/BLD COMPNT: CPT

## 2020-07-22 PROCEDURE — 2580000003 HC RX 258: Performed by: INTERNAL MEDICINE

## 2020-07-22 PROCEDURE — 6360000002 HC RX W HCPCS: Performed by: INTERNAL MEDICINE

## 2020-07-22 PROCEDURE — 90935 HEMODIALYSIS ONE EVALUATION: CPT

## 2020-07-22 PROCEDURE — 80076 HEPATIC FUNCTION PANEL: CPT

## 2020-07-22 PROCEDURE — 6370000000 HC RX 637 (ALT 250 FOR IP): Performed by: STUDENT IN AN ORGANIZED HEALTH CARE EDUCATION/TRAINING PROGRAM

## 2020-07-22 PROCEDURE — 99233 SBSQ HOSP IP/OBS HIGH 50: CPT | Performed by: INTERNAL MEDICINE

## 2020-07-22 PROCEDURE — 36415 COLL VENOUS BLD VENIPUNCTURE: CPT

## 2020-07-22 PROCEDURE — 6370000000 HC RX 637 (ALT 250 FOR IP): Performed by: INTERNAL MEDICINE

## 2020-07-22 PROCEDURE — 2060000000 HC ICU INTERMEDIATE R&B

## 2020-07-22 PROCEDURE — 97161 PT EVAL LOW COMPLEX 20 MIN: CPT

## 2020-07-22 PROCEDURE — 6360000004 HC RX CONTRAST MEDICATION: Performed by: RADIOLOGY

## 2020-07-22 PROCEDURE — P9047 ALBUMIN (HUMAN), 25%, 50ML: HCPCS | Performed by: INTERNAL MEDICINE

## 2020-07-22 PROCEDURE — 74177 CT ABD & PELVIS W/CONTRAST: CPT

## 2020-07-22 PROCEDURE — 6370000000 HC RX 637 (ALT 250 FOR IP): Performed by: NURSE PRACTITIONER

## 2020-07-22 PROCEDURE — 97165 OT EVAL LOW COMPLEX 30 MIN: CPT

## 2020-07-22 PROCEDURE — 85014 HEMATOCRIT: CPT

## 2020-07-22 PROCEDURE — P9016 RBC LEUKOCYTES REDUCED: HCPCS

## 2020-07-22 PROCEDURE — 2500000003 HC RX 250 WO HCPCS: Performed by: INTERNAL MEDICINE

## 2020-07-22 PROCEDURE — 85018 HEMOGLOBIN: CPT

## 2020-07-22 RX ORDER — SUCRALFATE 1 G/1
1 TABLET ORAL EVERY 6 HOURS SCHEDULED
Status: DISCONTINUED | OUTPATIENT
Start: 2020-07-22 | End: 2020-07-29 | Stop reason: HOSPADM

## 2020-07-22 RX ORDER — 0.9 % SODIUM CHLORIDE 0.9 %
20 INTRAVENOUS SOLUTION INTRAVENOUS ONCE
Status: COMPLETED | OUTPATIENT
Start: 2020-07-22 | End: 2020-07-22

## 2020-07-22 RX ORDER — SUCRALFATE ORAL 1 G/10ML
1 SUSPENSION ORAL EVERY 6 HOURS SCHEDULED
Status: DISCONTINUED | OUTPATIENT
Start: 2020-07-22 | End: 2020-07-22 | Stop reason: CLARIF

## 2020-07-22 RX ORDER — POLYETHYLENE GLYCOL 3350 17 G/17G
17 POWDER, FOR SOLUTION ORAL ONCE
Status: COMPLETED | OUTPATIENT
Start: 2020-07-22 | End: 2020-07-22

## 2020-07-22 RX ADMIN — HYDRALAZINE HYDROCHLORIDE 50 MG: 50 TABLET ORAL at 10:50

## 2020-07-22 RX ADMIN — Medication 10 ML: at 20:20

## 2020-07-22 RX ADMIN — SODIUM CHLORIDE 125 MG: 900 INJECTION INTRAVENOUS at 12:29

## 2020-07-22 RX ADMIN — FERROUS SULFATE TAB 325 MG (65 MG ELEMENTAL FE) 325 MG: 325 (65 FE) TAB at 10:50

## 2020-07-22 RX ADMIN — ALBUMIN (HUMAN) 25 G: 0.25 INJECTION, SOLUTION INTRAVENOUS at 20:17

## 2020-07-22 RX ADMIN — ALBUMIN (HUMAN) 25 G: 0.25 INJECTION, SOLUTION INTRAVENOUS at 10:51

## 2020-07-22 RX ADMIN — EPOETIN ALFA-EPBX 2000 UNITS: 2000 INJECTION, SOLUTION INTRAVENOUS; SUBCUTANEOUS at 12:29

## 2020-07-22 RX ADMIN — SUCRALFATE 1 G: 1 TABLET ORAL at 05:48

## 2020-07-22 RX ADMIN — SUCRALFATE 1 G: 1 TABLET ORAL at 12:30

## 2020-07-22 RX ADMIN — Medication 1000 MG: at 21:30

## 2020-07-22 RX ADMIN — DOXAZOSIN MESYLATE 4 MG: 4 TABLET ORAL at 21:06

## 2020-07-22 RX ADMIN — Medication 10 ML: at 10:51

## 2020-07-22 RX ADMIN — ASPIRIN 81 MG CHEWABLE TABLET 81 MG: 81 TABLET CHEWABLE at 10:50

## 2020-07-22 RX ADMIN — SODIUM CHLORIDE 20 ML: 9 INJECTION, SOLUTION INTRAVENOUS at 02:00

## 2020-07-22 RX ADMIN — METOPROLOL TARTRATE 50 MG: 50 TABLET, FILM COATED ORAL at 10:49

## 2020-07-22 RX ADMIN — HYDRALAZINE HYDROCHLORIDE 50 MG: 50 TABLET ORAL at 13:48

## 2020-07-22 RX ADMIN — IOPAMIDOL 110 ML: 755 INJECTION, SOLUTION INTRAVENOUS at 16:21

## 2020-07-22 RX ADMIN — IOHEXOL 50 ML: 240 INJECTION, SOLUTION INTRATHECAL; INTRAVASCULAR; INTRAVENOUS; ORAL at 16:21

## 2020-07-22 RX ADMIN — POLYETHYLENE GLYCOL 3350 17 G: 17 POWDER, FOR SOLUTION ORAL at 20:21

## 2020-07-22 RX ADMIN — ZINC SULFATE 220 MG (50 MG) CAPSULE 50 MG: CAPSULE at 10:51

## 2020-07-22 RX ADMIN — METOPROLOL TARTRATE 50 MG: 50 TABLET, FILM COATED ORAL at 21:06

## 2020-07-22 RX ADMIN — CALCIUM ACETATE 2001 MG: 667 CAPSULE ORAL at 10:50

## 2020-07-22 RX ADMIN — PANTOPRAZOLE SODIUM 40 MG: 40 TABLET, DELAYED RELEASE ORAL at 10:50

## 2020-07-22 RX ADMIN — ROSUVASTATIN CALCIUM 5 MG: 5 TABLET, FILM COATED ORAL at 20:21

## 2020-07-22 RX ADMIN — EPOETIN ALFA-EPBX 3000 UNITS: 3000 INJECTION, SOLUTION INTRAVENOUS; SUBCUTANEOUS at 12:30

## 2020-07-22 RX ADMIN — Medication 1000 MG: at 11:05

## 2020-07-22 RX ADMIN — AMLODIPINE BESYLATE 10 MG: 10 TABLET ORAL at 10:50

## 2020-07-22 RX ADMIN — CALCIUM ACETATE 2001 MG: 667 CAPSULE ORAL at 12:30

## 2020-07-22 ASSESSMENT — PAIN DESCRIPTION - PROGRESSION: CLINICAL_PROGRESSION: NOT CHANGED

## 2020-07-22 ASSESSMENT — PAIN SCALES - GENERAL
PAINLEVEL_OUTOF10: 0

## 2020-07-22 NOTE — PROGRESS NOTES
Maik sent to Dr. Dinorah Mcclelland with the following message: Dr. Yunior Ferris would like to do a CT of the abd/pelvis with contrast today to check for a bleeding source. The patient would not be due for dialysis until Friday usually. The CT department wanted me to see if we could move forward with the CT today. Could the patient have dialysis tomorrow? Electronically signed by Jessenia Graf RN on 7/22/2020 at 2:54 PM      Dr. Dinorah Mcclelland agreeable to dialyze patient tomorrow in order to move forward with CT scan today.   Electronically signed by Jessenia Graf RN on 7/22/2020 at 2:56 PM

## 2020-07-22 NOTE — PROGRESS NOTES
Physical Therapy    [facility]  Initial Assessment    NAME: Kristen Schneider  : 1945  MRN: [id number]    Date of Service: 2020    REQUIRES PT FOLLOW UP: Yes       Patient Diagnosis(es): The primary encounter diagnosis was COVID-19. Diagnoses of Cough, Shortness of breath, and Hyperkalemia were also pertinent to this visit. has a past medical history of Blind left eye, Chronic kidney disease, Dialysis patient [facility]), Hemodialysis patient ([facility] [address] Hyperlipidemia, and Hypertension. has a past surgical history that includes AV fistula creation (Left, 2015); HEMODIALYSIS ACCESS PERCUTANEOUS REVISION (Left, ); PERIPHERAL VASCULAR BYPASS (); and Upper gastrointestinal endoscopy (N/A, 2020). Evaluating Therapist: Gabriela Morales, PT     Referring Provider:  Stephan Garza DO     Room #: 583    DIAGNOSIS: COVID  Additional History: ESRD on HD   PRECAUTIONS:  Falls , droplet plus     Social:  Pt lives with  Wife  in a  1  floor plan  Prior to admission pt walked with  No AD, independent per pt     Initial Evaluation  Date:  2020 Treatment      Short Term/ Long Term   Goals   Was pt agreeable to Eval/treatment? yes      Does pt have pain?  none reported      Bed Mobility  Rolling:  NT   Supine to sit:  NT   Sit to supine: NT   Scooting:  Min assist in sit  Pt sitting in chair upon arrival   SBA    Transfers Sit to stand: Mod assist   Stand to sit:  Mod assist   Stand pivot: mod assist    SBA    Ambulation     7 feet with ww  with  Min assist    50  feet with  ww  with  SBA        Stair negotiation: ascended and descended NT    4  steps with  1 rail with  CGA ( as able)    LE ROM  AAROM WFL      LE strength  4-/ 5      AM- PAC RAW score              Pt is alert and Oriented      Balance: min/mod assist , high fall risk  Endurance: poor        ASSESSMENT  Pt displays functional ability as noted in the objective portion of this evaluation. Treatment/Education:    Mobility as above. limited activity tolerance. Pt ambulated with flexed knees . LE instability noted , especially with fatigue. High fall risk   Pt transferred from bedside chair to w/c due to going for testing     Pt educated on fall risk,  Safe and proper technique with mobility, cues for posture       Patient response to education:   Pt verbalized understanding Pt demonstrated skill Pt requires further education in this area   x Needs cues   x       Comments:  Pt left  In w/c after session: RN transporting pt for testing       Rehab potential is Good for reaching above PT goals. Pts/ family goals   1. None stated     Patient and or family understand(s) diagnosis, prognosis, and plan of care. -  Yes     PLAN  PT care will be provided in accordance with the objectives noted above. Whenever appropriate, clear delegation orders will be provided for nursing staff. Exercises and functional mobility practice will be used as well as appropriate assistive devices or modalities to obtain goals. Patient and family education will also be administered as needed. Frequency of treatments will be 2-5x/week x  7-14 days. Evaluation Time includes thorough review of current medical information, gathering information on past medical history/social history and prior level of function, completion of standardized testing/informal observation of tasks, assessment of data and education on plan of care and goals.     CPT codes:  [x] Low Complexity PT evaluation 98337  [] Moderate Complexity PT evaluation 16460  [] High Complexity PT evaluation 15723  [] PT Re-evaluation 99467  [] Gait training 90655  minutes  [] Therapeutic activities 08304 minutes  [] Therapeutic exercises 93004  minutes  [] Neuromuscular reeducation 16352  minutes       Kathie Mendez number:  PT [id number]

## 2020-07-22 NOTE — PROGRESS NOTES
Department of Internal Medicine  Nephrology Progress Note    Events Reviewed. S:  We are following MrLázaro Garrett for HD needs and hyperkalemia. He is a little more comfortable today, discussed with the RN, he has a large hematoma/bruise noted on the right flank not previously documented. He had dialysis this a.m., tolerated the treatment well   Received 2 units of PRBC transfusion with hemoglobin up from 6.5-7.6.   No other obvious source of bleeding  past Medical History:        Diagnosis Date    Blind left eye     Chronic kidney disease     [facility] ([facility] [address]     Hemodialysis patient ([facility] [address]     Hyperlipidemia     Hypertension      Past Surgical History:        Procedure Laterality Date    AV FISTULA CREATION Left 2015    Dr. Khushi Goodman Left 2019    2 x Dr. Jeermy Payton, [facility]    PERIPHERAL VASCULAR BYPASS  2008    Aortobifemoral bypass for claudicatio - Dr. Isabel Javed N/A 7/20/2020    EGD ESOPHAGOGASTRODUODENOSCOPY WITH ANTRAL BIOPSY performed by Matthew Castro MD at [facility] OR     Current Medications:    Current Facility-Administered Medications: epoetin delmer-epbx (RETACRIT) injection 3,000 Units, 3,000 Units, Subcutaneous, Once per day on Mon Wed Fri **AND** epoetin delmer-epbx (RETACRIT) injection 2,000 Units, 2,000 Units, Subcutaneous, Once per day on Mon Wed Fri  albumin human 25 % IV solution 25 g, 25 g, Intravenous, BID  0.9 % sodium chloride bolus, 250 mL, Intravenous, Once  white petrolatum ointment, , Topical, BID PRN  pantoprazole (PROTONIX) tablet 40 mg, 40 mg, Oral, QAM AC  sucralfate (CARAFATE) tablet 1 g, 1 g, Oral, 4 times per day  doxazosin (CARDURA) tablet 4 mg, 4 mg, Oral, Nightly  [COMPLETED] ferric gluconate (FERRLECIT) 25 mg in sodium chloride 0.9 % 50 mL (test dose) IVPB, 25 mg, Intravenous, Once **FOLLOWED BY** [COMPLETED] ferric gluconate (FERRLECIT) 100 mg in sodium chloride 0.9 % 100 mL IVPB, 100 mg, Intravenous, Once **FOLLOWED BY** ferric gluconate (FERRLECIT) 125 mg in sodium chloride 0.9 % 100 mL IVPB, 125 mg, Intravenous, Q MWF  Calcium Acetate (Phos Binder) CAPS 2,001 mg, 2,001 mg, Oral, TID WC  ferrous sulfate (IRON 325) tablet 325 mg, 325 mg, Oral, BID WC  0.9 % sodium chloride bolus, 30 mL, Intravenous, PRN  sodium chloride flush 0.9 % injection 10 mL, 10 mL, Intravenous, 2 times per day  sodium chloride flush 0.9 % injection 10 mL, 10 mL, Intravenous, PRN  acetaminophen (TYLENOL) tablet 650 mg, 650 mg, Oral, Q6H PRN **OR** acetaminophen (TYLENOL) suppository 650 mg, 650 mg, Rectal, Q6H PRN  polyethylene glycol (GLYCOLAX) packet 17 g, 17 g, Oral, Daily PRN  promethazine (PHENERGAN) tablet 12.5 mg, 12.5 mg, Oral, Q6H PRN **OR** ondansetron (ZOFRAN) injection 4 mg, 4 mg, Intravenous, Q6H PRN  heparin (porcine) injection 5,000 Units, 5,000 Units, Subcutaneous, 3 times per day  ascorbic acid (VITAMIN C) tablet 1,000 mg, 1,000 mg, Oral, BID  zinc sulfate (ZINCATE) capsule 50 mg, 50 mg, Oral, Daily  amLODIPine (NORVASC) tablet 10 mg, 10 mg, Oral, Daily  aspirin chewable tablet 81 mg, 81 mg, Oral, Daily  hydrALAZINE (APRESOLINE) tablet 50 mg, 50 mg, Oral, TID  metoprolol tartrate (LOPRESSOR) tablet 50 mg, 50 mg, Oral, BID  rosuvastatin (CRESTOR) tablet 5 mg, 5 mg, Oral, Nightly  Allergies:  Patient has no known allergies. Social History:    TOBACCO:   reports that he has quit smoking. He has never used smokeless tobacco.  ETOH:   reports previous alcohol use. DRUGS:   reports no history of drug use. Family History:   History reviewed. No pertinent family history. REVIEW OF SYSTEMS:    Review of Systems   Constitutional: Positive for fever. Negative for diaphoresis. HENT: Negative for sore throat. Eyes: Negative for discharge. Respiratory: Positive for cough and shortness of breath. Cardiovascular: Negative for chest pain. Gastrointestinal: Negative for abdominal pain and vomiting. Genitourinary: Negative for difficulty urinating, dysuria and hematuria. Musculoskeletal: Positive for arthralgias and myalgias. Skin: Negative for rash. Allergic/Immunologic: Negative for immunocompromised state. Neurological: Positive for headaches. PHYSICAL EXAM:      Vitals:    VITALS:  BP (!) 156/59   Pulse 71   Temp 98.6 °F (37 °C) (Axillary)   Resp 16   Ht 5' 8\" (1.727 m)   Wt 139 lb 12.4 oz (63.4 kg)   SpO2 96%   BMI 21.25 kg/m²   24HR INTAKE/OUTPUT:      Intake/Output Summary (Last 24 hours) at 7/22/2020 1537  Last data filed at 7/22/2020 1022  Gross per 24 hour   Intake 1263 ml   Output 1950 ml   Net -687 ml       General Appearance: alert and oriented to person, place and time, eating lunch  Skin: warm and dry, turgor not diminished  Head: normocephalic and atraumatic  Eyes: pupils equal, round, and reactive to light, extraocular eye movements intact, conjunctivae normal  Neck: neck supple and non tender without mass   Pulmonary/Chest: Rales (right side) no wheezes,  no respiratory distress  Cardiovascular: normal rate, normal S1 and S2 and murmur  Abdomen: soft, non-tender, non-distended, normal bowel sounds, no masses or organomegaly, right flank bruise/hematoma noted by the RN  Extremities: no cyanosis, no clubbing and no edema.   Left fistula  Neurologic: no cranial nerve deficit and speech normal    DATA:    CBC with Differential:    Lab Results   Component Value Date    WBC 7.2 07/21/2020    RBC 1.88 07/21/2020    HGB 7.6 07/22/2020    HCT 23.3 07/22/2020    PLT 89 07/21/2020    .1 07/21/2020    MCH 32.4 07/21/2020    MCHC 32.1 07/21/2020    RDW 19.8 07/21/2020    NRBC 1.7 07/19/2020    LYMPHOPCT 4.4 07/19/2020    MONOPCT 4.3 07/19/2020    BASOPCT 0.0 07/19/2020    MONOSABS 0.38 07/19/2020    LYMPHSABS 0.38 07/19/2020    EOSABS 0.00 07/19/2020    BASOSABS 0.00 07/19/2020     CMP:    Lab Results   Component Value Date     07/21/2020    K 4.2 07/21/2020    K 4.9 consulted. Diet: Renal    PLAN:            Continue HD per patient schedule of Monday, Wednesday, and Friday.   Okay to proceed with CT of abdomen and pelvis with IV contrast, will move his dialysis tomorrow  With 2 units of PRBC transfusion hemoglobin only went up from 6.5-7.6, continue to monitor the hemoglobin level closely, continue with Ferrlecit 3 times a week with dialysis, Epogen 5000 units subcutaneously 3 times a week  Discussed with Dr. June Child    Discussed with the RN      Jeri Gonzales MD

## 2020-07-22 NOTE — PROGRESS NOTES
Initiated blood transfusion at 0322. Remained with patient for 15 minutes to monitor for reaction. Patient tolerating well at this time.

## 2020-07-22 NOTE — PROGRESS NOTES
Occupational Therapy  OCCUPATIONAL THERAPY INITIAL EVALUATION      Date:2020  Patient Name: Trina Garduno  MRN: [id number]  : 1945  Room: [address] [place] [id number]    Referring Provider: Rashida Workman DO   Evaluating OT: Tony Farley. Deion [id number]    AM-[facility] Daily Activity Raw Score: 1524      Recommended Adaptive Equipment: Continue to assess. Diagnosis: COVID-19 [U07.1]     Pertinent Medical History: HTN, ESRD      Precautions: falls, droplet+ isolation, chair alarm, visual impairment - blind in L eye    Home Living: Patient lives with his wife in a one-floor home. Prior Level of Function (PLOF): Per patient, he was independent with ADLs, independent (shared responsibility) with completion of IADLs, and independent with functional mobility (without device) prior to this hospitalization. Driving: Yes    Pain Level: No complaints/indications of pain verbalized/demonstrated. Cognition: Patient alert and oriented grossly. WFL command follow demonstrated. Memory: Fair   Sequencing: Fair   Problem Solving: Fair   Judgement/Safety: Fair    Functional Assessment:   Initial Eval Status  Date: 2020 Treatment Status  Date:  Short Term Goals  Treatment Frequency/Duration: 2-5x/week for 3-7 days PRN   Feeding Setup  Independent   Grooming Min A  SBA  (standing/seated at sink)   UB Dressing Min A  Setup   LB Dressing Max A  Min A - with use of AE, as needed/appropriate   Bathing Max A  Min A - with use of AE/DME, as needed/appropriate   Toileting Max A  Min A   Bed Mobility  Not assessed. Patient seated in bedside chair upon start of this session. Functional Transfers Sit-to-Stand:  Mod A   from bedside chair  SBA   Functional Mobility Min A   (with walker) for minimal functional mobility within patient's room (from bedside chair to manual wheelchair near foot of bed)  SBA - with use of device, as needed/appropriate   Balance Sitting: Fair+  (at edge of chair)  Standing: Fair-  (with walker)  Fair+ dynamic standing balance during completion of ADLs and other functional tasks. Activity Tolerance Limited  Patient will demonstrate Good understanding and consistent implementation of energy conservation techniques and work simplification techniques into ADL routines. Visual/  Perceptual Impaired  Patient is blind in L eye     N/A   B UE Strength 4-/5  Patient will demonstrate 4+/5 B UE strength in order to maximize independence with ADLs and functional transfers. Long-Term Goal: Patient will increase functional independence to PLOF in order to allow patient to live in least restrictive environment. ROM: Additional Information:    R UE  WFL    L UE WFL      Hearing: Impaired  Sensation: No complaints of numbness/tingling in B UEs. Tone: WFL  Edema: No    Comments: RN approved patient's participation in [address] [place] [address] activities. Upon arrival, patient seated in bedside chair. At end of session, patient seated in manual wheelchair with nursing staff member present and all lines and tubes intact. Patient would benefit from continued skilled OT to increase safety and independence with completion of ADL/IADL tasks for functional independence and quality of life. Patient education provided regardin) safe transfer techniques. Patient indicated [address] understanding.     Eval Complexity: Low    Assessment of Current Deficits:   Functional Mobility [x]  ADLs [x] Strength [x]  Cognition []  Functional Transfers  [x] IADLs [x] Safety Awareness [x]  Endurance [x]  Fine Motor Coordination [] Balance [x] Vision/Perception [] Sensation []   Gross Motor Coordination [] ROM [] Delirium []                  Motor Control []    Plan of Care:   OT treatment to be provided 2-5x/week for 3-7 days to address the following, as needed, during hospitalization:  ADL Retraining [x]   Equipment Needs [x]   Neuromuscular Re-Education [x] Energy Conservation Techniques [x]  Functional Transfer Training [x] Patient and/or Family Education [x]  Functional Mobility Training [x] Environmental Modifications [x]  Cognitive Re-Training []   Compensatory Techniques for ADLs [x]  Splinting Needs []   Positioning to Improve Overall Function [x]   Therapeutic Activity [x]  Therapeutic Exercise  [x]  Visual/Perceptual: []    Delirium Prevention/Treatment  []  Other:  []    Rehab Potential: Good for established goals. Patient / Family Goal: No goal stated. Patient and/or family were instructed on functional diagnosis, prognosis/goals, and OT plan of care. Demonstrated Fair understanding. Low complexity evaluation + 0 timed treatment minutes  Time Out: 1614    Evaluation time includes thorough review of current medical information, gathering information on past medical history/social history and prior level of function, completion of standardized testing/informal observation of tasks, assessment of data, and development of POC/Goals. Katalina Chacko, OTR/L  License Number: [id number]

## 2020-07-22 NOTE — PLAN OF CARE
Problem: Airway Clearance - Ineffective  Goal: Achieve or maintain patent airway  Outcome: Met This Shift     Problem: Gas Exchange - Impaired  Goal: Absence of hypoxia  Outcome: Met This Shift  Goal: Promote optimal lung function  Outcome: Met This Shift     Problem: Breathing Pattern - Ineffective  Goal: Ability to achieve and maintain a regular respiratory rate  Outcome: Met This Shift

## 2020-07-22 NOTE — PROGRESS NOTES
organomegaly  Extremities: no cyanosis, no clubbing and no edema      Recent Labs     07/19/20  0910 07/20/20  0605 07/21/20  0547    139 140   K 4.5 4.7 4.2   CL 96* 102 99   CO2 28 22 26   BUN 91* 98* 64*   CREATININE 7.0* 6.9* 5.4*   GLUCOSE 118* 98 95   CALCIUM 10.6* 9.0 9.1       Recent Labs     07/19/20  0910 07/20/20  0605 07/20/20  0923 07/21/20  0950   WBC 9.4 7.2  --  7.2   RBC 2.09* 1.90*  --  1.88*   HGB 6.8* 6.0* 7.7* 6.1*   HCT 21.2* 18.7* 23.4* 19.0*   .4* 98.4  --  101.1*   MCH 32.5 31.6  --  32.4   MCHC 32.1 32.1  --  32.1   RDW 19.4* 18.7*  --  19.8*   * 99*  --  89*   MPV 11.9 12.8*  --  12.1*       CBC:   Lab Results   Component Value Date    WBC 7.2 07/21/2020    RBC 1.88 07/21/2020    HGB 6.1 07/21/2020    HCT 19.0 07/21/2020    .1 07/21/2020    MCH 32.4 07/21/2020    MCHC 32.1 07/21/2020    RDW 19.8 07/21/2020    PLT 89 07/21/2020    MPV 12.1 07/21/2020     BMP:    Lab Results   Component Value Date     07/21/2020    K 4.2 07/21/2020    K 4.9 07/14/2020    CL 99 07/21/2020    CO2 26 07/21/2020    BUN 64 07/21/2020    LABALBU 3.3 07/21/2020    CREATININE 5.4 07/21/2020    CALCIUM 9.1 07/21/2020    GFRAA 13 07/21/2020    LABGLOM 10 07/21/2020    GLUCOSE 95 07/21/2020     Hepatic Function Panel:    Lab Results   Component Value Date    ALKPHOS 52 07/21/2020    ALT 12 07/21/2020    AST 17 07/21/2020    PROT 5.1 07/21/2020    BILITOT 0.6 07/21/2020    BILIDIR 0.2 07/21/2020    IBILI 0.4 07/21/2020    LABALBU 3.3 07/21/2020        Radiology:   US DUP LOWER EXTREMITIES BILATERAL VENOUS   Final Result   No evidence to suggest deep venous thrombosis of the bilateral lower   extremities. XR CHEST PORTABLE   Final Result      No airspace opacities or pleural effusion.                 Assessment:    Active Problems:    COVID-19    Acute respiratory failure due to COVID-19    Pneumonia due to COVID-19 virus    ESRD (end stage renal disease) ([facility] [address] Thrombocytopenia ([facility] [address]    Melena    Anemia associated with acute blood loss  Resolved Problems:    * No resolved hospital problems. *      Plan:  1. Acute respiratory failure with hypoxia due to covid 19 monitor off oxygen  2. Pneumonia due to covid 19 ID following ID finished remdesivir. 3. Severe gerd ppi carafate  4. Esophageal ulcerations ppi carafate  5. Gastritis ppi carafate  6. Melena no bleeding seen on egd  7. Anemia likely with acute blood loss component monitor hgb and transfuse prn  8. ESRD HD per nephrology  9. htn monitor bp and continue med  10. Hyperlipidemia continue med  11. Thrombocytopenia monitor    Pt's hgb dropped again today and getting another transfusion. I am stopping steroids at this point because of trying to protect the gi tract and pt not hypoxic. Although possibility of fluid shifts accounting for fluctuations in hgb(or 7.7 not accurate), pt's hgb did have a significant drop and receiving transfusion by nephrology. Prior to discharge, I would want to make sure hgb is stable. Would consider rescoping him, possibly push endoscopy or colonoscopy, if needed. Also, increase activity while monitoring hgb.  Will get PT/OT eval.               Electronically signed by Diana Burton DO on 7/21/2020 at 9:09 PM

## 2020-07-22 NOTE — PROGRESS NOTES
[facility] Progress Note    Admitting Date and Time: 7/13/2020  5:20 PM  Admit Dx: [id number] [U07.1]  COVID-19 [U07.1]    Subjective:  Patient is being followed for COVID-19 [U07.1]  COVID-19 [U07.1]   Pt feels ok, breathing well, no complaints, didn't have a BM for 3 days    ROS: denies fever, chills, cp, sob, n/v, HA unless stated above.       epoetin delmer-epbx  3,000 Units Subcutaneous Once per day on Mon Wed Fri    And    epoetin delmer-epbx  2,000 Units Subcutaneous Once per day on Mon Wed Fri    albumin human  25 g Intravenous BID    sodium chloride  250 mL Intravenous Once    pantoprazole  40 mg Oral QAM AC    sucralfate  1 g Oral 4 times per day    doxazosin  4 mg Oral Nightly    ferric gluconate (FERRLECIT) IVPB  125 mg Intravenous Q MWF    Calcium Acetate (Phos Binder)  2,001 mg Oral TID WC    ferrous sulfate  325 mg Oral BID WC    sodium chloride flush  10 mL Intravenous 2 times per day    heparin (porcine)  5,000 Units Subcutaneous 3 times per day    vitamin C  1,000 mg Oral BID    zinc sulfate  50 mg Oral Daily    amLODIPine  10 mg Oral Daily    aspirin  81 mg Oral Daily    hydrALAZINE  50 mg Oral TID    metoprolol tartrate  50 mg Oral BID    rosuvastatin  5 mg Oral Nightly     white petrolatum, , BID PRN  sodium chloride, 30 mL, PRN  sodium chloride flush, 10 mL, PRN  acetaminophen, 650 mg, Q6H PRN    Or  acetaminophen, 650 mg, Q6H PRN  polyethylene glycol, 17 g, Daily PRN  promethazine, 12.5 mg, Q6H PRN    Or  ondansetron, 4 mg, Q6H PRN         Objective:    BP (!) 164/51   Pulse 69   Temp 98.5 °F (36.9 °C) (Axillary)   Resp 16   Ht 5' 8\" (1.727 m)   Wt 139 lb 12.4 oz (63.4 kg)   SpO2 97%   BMI 21.25 kg/m²     General Appearance: alert and oriented to person, place and time   Skin: warm and dry, a large 25x 15 cm hematoma/ecchymosis lrigth flank  Head: normocephalic and atraumatic  Eyes: pupils equal, round, and reactive to light, extraocular eye movements

## 2020-07-22 NOTE — PROGRESS NOTES
Significant bruise to R flank not previously documented by other clinicians. Shared with Dr. Yeimi Babb and Dr. Reveles Head on rounds.     Electronically signed by Cintia Anguiano RN on 7/22/2020 at 12:48 PM

## 2020-07-22 NOTE — PROGRESS NOTES
Pt ran 3 hours: 1550 removed: stable/tolerated well; denies c/o; 2251 bath. Next tx 7/24/20. Needles removed; hemostasis achieved; DSD applied. + refill test at end of tx as BV change from -16.0 to -14.7. Pt confused and upset about start time. Insisted this RN initiated tx at 10, but it was 0656. Pt believes he ran 4 hours today but did not.        /87   Pulse 83   Temp 97.6 °F (36.4 °C)   Resp 14   Ht 5' 8\" (1.727 m)   Wt 139 lb 12.4 oz (63.4 kg)   SpO2 95%   BMI 21.25 kg/m²

## 2020-07-23 LAB
ALBUMIN SERPL-MCNC: 3.5 G/DL (ref 3.5–5.2)
ALP BLD-CCNC: 49 U/L (ref 40–129)
ALT SERPL-CCNC: 8 U/L (ref 0–40)
ANION GAP SERPL CALCULATED.3IONS-SCNC: 13 MMOL/L (ref 7–16)
ANISOCYTOSIS: ABNORMAL
AST SERPL-CCNC: 17 U/L (ref 0–39)
BASOPHILIC STIPPLING: ABNORMAL
BASOPHILS ABSOLUTE: 0 E9/L (ref 0–0.2)
BASOPHILS RELATIVE PERCENT: 0 % (ref 0–2)
BILIRUB SERPL-MCNC: 1.3 MG/DL (ref 0–1.2)
BILIRUBIN DIRECT: 0.4 MG/DL (ref 0–0.3)
BILIRUBIN, INDIRECT: 0.9 MG/DL (ref 0–1)
BUN BLDV-MCNC: 49 MG/DL (ref 8–23)
CALCIUM SERPL-MCNC: 8.6 MG/DL (ref 8.6–10.2)
CHLORIDE BLD-SCNC: 101 MMOL/L (ref 98–107)
CO2: 25 MMOL/L (ref 22–29)
CREAT SERPL-MCNC: 4.3 MG/DL (ref 0.7–1.2)
EOSINOPHILS ABSOLUTE: 0.08 E9/L (ref 0.05–0.5)
EOSINOPHILS RELATIVE PERCENT: 1.1 % (ref 0–6)
GFR AFRICAN AMERICAN: 16
GFR NON-AFRICAN AMERICAN: 14 ML/MIN/1.73
GLUCOSE BLD-MCNC: 76 MG/DL (ref 74–99)
HCT VFR BLD CALC: 23.1 % (ref 37–54)
HEMOGLOBIN: 7.4 G/DL (ref 12.5–16.5)
HYPOCHROMIA: ABNORMAL
IMMATURE GRANULOCYTES #: 0.14 E9/L
IMMATURE GRANULOCYTES %: 2 % (ref 0–5)
LYMPHOCYTES ABSOLUTE: 0.81 E9/L (ref 1.5–4)
LYMPHOCYTES RELATIVE PERCENT: 11.5 % (ref 20–42)
MAGNESIUM: 1.7 MG/DL (ref 1.6–2.6)
MCH RBC QN AUTO: 29.8 PG (ref 26–35)
MCHC RBC AUTO-ENTMCNC: 32 % (ref 32–34.5)
MCV RBC AUTO: 93.1 FL (ref 80–99.9)
MONOCYTES ABSOLUTE: 0.54 E9/L (ref 0.1–0.95)
MONOCYTES RELATIVE PERCENT: 7.6 % (ref 2–12)
NEUTROPHILS ABSOLUTE: 5.49 E9/L (ref 1.8–7.3)
NEUTROPHILS RELATIVE PERCENT: 77.8 % (ref 43–80)
OVALOCYTES: ABNORMAL
PDW BLD-RTO: 21.2 FL (ref 11.5–15)
PLATELET # BLD: 89 E9/L (ref 130–450)
PLATELET CONFIRMATION: NORMAL
PMV BLD AUTO: 11.8 FL (ref 7–12)
POIKILOCYTES: ABNORMAL
POLYCHROMASIA: ABNORMAL
POTASSIUM REFLEX MAGNESIUM: 3.5 MMOL/L (ref 3.5–5)
RBC # BLD: 2.48 E12/L (ref 3.8–5.8)
SODIUM BLD-SCNC: 139 MMOL/L (ref 132–146)
TEAR DROP CELLS: ABNORMAL
TOTAL PROTEIN: 4.9 G/DL (ref 6.4–8.3)
WBC # BLD: 7.1 E9/L (ref 4.5–11.5)

## 2020-07-23 PROCEDURE — 6360000002 HC RX W HCPCS: Performed by: INTERNAL MEDICINE

## 2020-07-23 PROCEDURE — 80076 HEPATIC FUNCTION PANEL: CPT

## 2020-07-23 PROCEDURE — 99233 SBSQ HOSP IP/OBS HIGH 50: CPT | Performed by: INTERNAL MEDICINE

## 2020-07-23 PROCEDURE — 6370000000 HC RX 637 (ALT 250 FOR IP): Performed by: SURGERY

## 2020-07-23 PROCEDURE — 2580000003 HC RX 258: Performed by: INTERNAL MEDICINE

## 2020-07-23 PROCEDURE — 6370000000 HC RX 637 (ALT 250 FOR IP): Performed by: NURSE PRACTITIONER

## 2020-07-23 PROCEDURE — 2060000000 HC ICU INTERMEDIATE R&B

## 2020-07-23 PROCEDURE — 6370000000 HC RX 637 (ALT 250 FOR IP): Performed by: INTERNAL MEDICINE

## 2020-07-23 PROCEDURE — 6370000000 HC RX 637 (ALT 250 FOR IP): Performed by: STUDENT IN AN ORGANIZED HEALTH CARE EDUCATION/TRAINING PROGRAM

## 2020-07-23 PROCEDURE — 83735 ASSAY OF MAGNESIUM: CPT

## 2020-07-23 PROCEDURE — 85025 COMPLETE CBC W/AUTO DIFF WBC: CPT

## 2020-07-23 PROCEDURE — 97535 SELF CARE MNGMENT TRAINING: CPT

## 2020-07-23 PROCEDURE — 97530 THERAPEUTIC ACTIVITIES: CPT

## 2020-07-23 PROCEDURE — 80048 BASIC METABOLIC PNL TOTAL CA: CPT

## 2020-07-23 PROCEDURE — 36415 COLL VENOUS BLD VENIPUNCTURE: CPT

## 2020-07-23 PROCEDURE — 90935 HEMODIALYSIS ONE EVALUATION: CPT

## 2020-07-23 PROCEDURE — 2500000003 HC RX 250 WO HCPCS: Performed by: INTERNAL MEDICINE

## 2020-07-23 PROCEDURE — P9047 ALBUMIN (HUMAN), 25%, 50ML: HCPCS | Performed by: INTERNAL MEDICINE

## 2020-07-23 RX ADMIN — ROSUVASTATIN CALCIUM 5 MG: 5 TABLET, FILM COATED ORAL at 21:17

## 2020-07-23 RX ADMIN — PANTOPRAZOLE SODIUM 40 MG: 40 TABLET, DELAYED RELEASE ORAL at 05:14

## 2020-07-23 RX ADMIN — FERROUS SULFATE TAB 325 MG (65 MG ELEMENTAL FE) 325 MG: 325 (65 FE) TAB at 16:26

## 2020-07-23 RX ADMIN — SUCRALFATE 1 G: 1 TABLET ORAL at 23:52

## 2020-07-23 RX ADMIN — AMLODIPINE BESYLATE 10 MG: 10 TABLET ORAL at 09:22

## 2020-07-23 RX ADMIN — FERROUS SULFATE TAB 325 MG (65 MG ELEMENTAL FE) 325 MG: 325 (65 FE) TAB at 09:22

## 2020-07-23 RX ADMIN — Medication 10 ML: at 09:23

## 2020-07-23 RX ADMIN — SUCRALFATE 1 G: 1 TABLET ORAL at 00:20

## 2020-07-23 RX ADMIN — SUCRALFATE 1 G: 1 TABLET ORAL at 11:10

## 2020-07-23 RX ADMIN — HYDRALAZINE HYDROCHLORIDE 50 MG: 50 TABLET ORAL at 21:18

## 2020-07-23 RX ADMIN — SUCRALFATE 1 G: 1 TABLET ORAL at 05:14

## 2020-07-23 RX ADMIN — METOPROLOL TARTRATE 50 MG: 50 TABLET, FILM COATED ORAL at 09:22

## 2020-07-23 RX ADMIN — DOXAZOSIN MESYLATE 4 MG: 4 TABLET ORAL at 21:18

## 2020-07-23 RX ADMIN — SUCRALFATE 1 G: 1 TABLET ORAL at 16:26

## 2020-07-23 RX ADMIN — ZINC SULFATE 220 MG (50 MG) CAPSULE 50 MG: CAPSULE at 09:22

## 2020-07-23 RX ADMIN — CALCIUM ACETATE 2001 MG: 667 CAPSULE ORAL at 16:27

## 2020-07-23 RX ADMIN — CALCIUM ACETATE 2001 MG: 667 CAPSULE ORAL at 11:09

## 2020-07-23 RX ADMIN — ASPIRIN 81 MG CHEWABLE TABLET 81 MG: 81 TABLET CHEWABLE at 09:22

## 2020-07-23 RX ADMIN — Medication 1000 MG: at 09:22

## 2020-07-23 RX ADMIN — ALBUMIN (HUMAN) 25 G: 0.25 INJECTION, SOLUTION INTRAVENOUS at 11:08

## 2020-07-23 RX ADMIN — HYDRALAZINE HYDROCHLORIDE 50 MG: 50 TABLET ORAL at 14:21

## 2020-07-23 RX ADMIN — CALCIUM ACETATE 2001 MG: 667 CAPSULE ORAL at 09:22

## 2020-07-23 RX ADMIN — Medication 1000 MG: at 21:17

## 2020-07-23 RX ADMIN — METOPROLOL TARTRATE 50 MG: 50 TABLET, FILM COATED ORAL at 21:17

## 2020-07-23 RX ADMIN — Medication 10 ML: at 21:19

## 2020-07-23 RX ADMIN — HYDRALAZINE HYDROCHLORIDE 50 MG: 50 TABLET ORAL at 09:22

## 2020-07-23 ASSESSMENT — PAIN SCALES - GENERAL
PAINLEVEL_OUTOF10: 0

## 2020-07-23 NOTE — CARE COORDINATION
Updated discharge plan of care. Pt therapies scores low. Plan for home may NOT be safe discharge plan. Attempted to call and speak with wife-left vm. Possible need for LIANA. Backdoor referral called to Nathaly Pagan at [facility] with wife about transfer to [facility]. She is agreeable to LIANA. [facility] is the only facility accepting pt's with +Covid at this time.  Referral called-[facility]

## 2020-07-23 NOTE — PROGRESS NOTES
[facility] Quality Flow/Interdisciplinary Rounds Progress Note        Quality Flow Rounds held on July 23, 2020    Disciplines Attending:  Bedside Nurse, ,  and Nursing Unit Leadership    Christopher Wong was admitted on 7/13/2020  5:20 PM    Anticipated Discharge Date:  Expected Discharge Date: 07/16/20    Disposition:    Luis Antonio Score:  Luis Antonio Scale Score: 17    Readmission Risk              Risk of Unplanned Readmission:        22           Discussed patient goal for the day, patient clinical progression, and barriers to discharge. The following Goal(s) of the Day/Commitment(s) have been identified:  Monitor Hgb, check placement progress.       Dany Quinonez  July 23, 2020

## 2020-07-23 NOTE — PROGRESS NOTES
Department of Internal Medicine  Nephrology Progress Note    Events Reviewed. S:  We are following Mr. Melissa Nathan for HD needs and hyperkalemia.   No acute overnight events as per the RN, he underwent a CT of abdomen and pelvis with IV contrast yesterday, I have arranged for his dialysis treatment today  past Medical History:        Diagnosis Date    Blind left eye     Chronic kidney disease     [facility] patient ([facility] [address]     Hemodialysis patient ([facility] [address]     Hyperlipidemia     Hypertension      Past Surgical History:        Procedure Laterality Date    AV FISTULA CREATION Left 2015    Dr. Mirta Phan Left 2019    2 x Dr. Annelise Nunez, [facility]    PERIPHERAL VASCULAR BYPASS  2008    Aortobifemoral bypass for claudicatio - Dr. Dominguez Distance N/A 7/20/2020    EGD ESOPHAGOGASTRODUODENOSCOPY WITH ANTRAL BIOPSY performed by Alexa Torres MD at [facility] OR     Current Medications:    Current Facility-Administered Medications: sucralfate (CARAFATE) tablet 1 g, 1 g, Oral, 4 times per day  epoetin delmer-epbx (RETACRIT) injection 3,000 Units, 3,000 Units, Subcutaneous, Once per day on Mon Wed Fri **AND** epoetin delmer-epbx (RETACRIT) injection 2,000 Units, 2,000 Units, Subcutaneous, Once per day on Mon Wed Fri  0.9 % sodium chloride bolus, 250 mL, Intravenous, Once  white petrolatum ointment, , Topical, BID PRN  pantoprazole (PROTONIX) tablet 40 mg, 40 mg, Oral, QAM AC  doxazosin (CARDURA) tablet 4 mg, 4 mg, Oral, Nightly  [COMPLETED] ferric gluconate (FERRLECIT) 25 mg in sodium chloride 0.9 % 50 mL (test dose) IVPB, 25 mg, Intravenous, Once **FOLLOWED BY** [COMPLETED] ferric gluconate (FERRLECIT) 100 mg in sodium chloride 0.9 % 100 mL IVPB, 100 mg, Intravenous, Once **FOLLOWED BY** ferric gluconate (FERRLECIT) 125 mg in sodium chloride 0.9 % 100 mL IVPB, 125 mg, Intravenous, Q MWF  Calcium Acetate (Phos Binder) CAPS 2,001 mg, 2,001 mg, Oral, TID WC  ferrous sulfate (IRON 325) tablet 325 mg, 325 mg, Oral, BID WC  0.9 % sodium chloride bolus, 30 mL, Intravenous, PRN  sodium chloride flush 0.9 % injection 10 mL, 10 mL, Intravenous, 2 times per day  sodium chloride flush 0.9 % injection 10 mL, 10 mL, Intravenous, PRN  acetaminophen (TYLENOL) tablet 650 mg, 650 mg, Oral, Q6H PRN **OR** acetaminophen (TYLENOL) suppository 650 mg, 650 mg, Rectal, Q6H PRN  polyethylene glycol (GLYCOLAX) packet 17 g, 17 g, Oral, Daily PRN  promethazine (PHENERGAN) tablet 12.5 mg, 12.5 mg, Oral, Q6H PRN **OR** ondansetron (ZOFRAN) injection 4 mg, 4 mg, Intravenous, Q6H PRN  heparin (porcine) injection 5,000 Units, 5,000 Units, Subcutaneous, 3 times per day  ascorbic acid (VITAMIN C) tablet 1,000 mg, 1,000 mg, Oral, BID  zinc sulfate (ZINCATE) capsule 50 mg, 50 mg, Oral, Daily  amLODIPine (NORVASC) tablet 10 mg, 10 mg, Oral, Daily  aspirin chewable tablet 81 mg, 81 mg, Oral, Daily  hydrALAZINE (APRESOLINE) tablet 50 mg, 50 mg, Oral, TID  metoprolol tartrate (LOPRESSOR) tablet 50 mg, 50 mg, Oral, BID  rosuvastatin (CRESTOR) tablet 5 mg, 5 mg, Oral, Nightly  Allergies:  Patient has no known allergies. Social History:    TOBACCO:   reports that he has quit smoking. He has never used smokeless tobacco.  ETOH:   reports previous alcohol use. DRUGS:   reports no history of drug use. Family History:   History reviewed. No pertinent family history. REVIEW OF SYSTEMS:    Review of Systems   Constitutional: Positive for fever. Negative for diaphoresis. HENT: Negative for sore throat. Eyes: Negative for discharge. Respiratory: Positive for cough and shortness of breath. Cardiovascular: Negative for chest pain. Gastrointestinal: Negative for abdominal pain and vomiting. Genitourinary: Negative for difficulty urinating, dysuria and hematuria. Musculoskeletal: Positive for arthralgias and myalgias. Skin: Negative for rash.    Allergic/Immunologic: Negative for immunocompromised state.   Neurological: Positive for headaches. PHYSICAL EXAM:      Vitals:    VITALS:  BP (!) 151/68   Pulse 63   Temp 98.1 °F (36.7 °C) (Oral)   Resp 16   Ht 5' 8\" (1.727 m)   Wt 153 lb 4.8 oz (69.5 kg)   SpO2 96%   BMI 23.31 kg/m²   24HR INTAKE/OUTPUT:      Intake/Output Summary (Last 24 hours) at 7/23/2020 1501  Last data filed at 7/23/2020 0900  Gross per 24 hour   Intake 180 ml   Output --   Net 180 ml       General Appearance: alert and oriented to person, place and time, resting comfortably  Skin: warm and dry, turgor not diminished  Head: normocephalic and atraumatic  Eyes: pupils equal, round, and reactive to light, extraocular eye movements intact, conjunctivae normal  Neck: neck supple and non tender without mass   Pulmonary/Chest: Rales (right side) no wheezes,  no respiratory distress  Cardiovascular: normal rate, normal S1 and S2 and murmur  Abdomen: soft, non-tender, non-distended, normal bowel sounds, no masses or organomegaly, right flank bruise/hematoma noted by the RN  Extremities: no cyanosis, no clubbing and no edema.   Left fistula  Neurologic: no cranial nerve deficit and speech normal    DATA:    CBC with Differential:    Lab Results   Component Value Date    WBC 7.1 07/23/2020    RBC 2.48 07/23/2020    HGB 7.4 07/23/2020    HCT 23.1 07/23/2020    PLT 89 07/23/2020    MCV 93.1 07/23/2020    MCH 29.8 07/23/2020    MCHC 32.0 07/23/2020    RDW 21.2 07/23/2020    NRBC 1.7 07/19/2020    LYMPHOPCT 11.5 07/23/2020    MONOPCT 7.6 07/23/2020    BASOPCT 0.0 07/23/2020    MONOSABS 0.54 07/23/2020    LYMPHSABS 0.81 07/23/2020    EOSABS 0.08 07/23/2020    BASOSABS 0.00 07/23/2020     CMP:    Lab Results   Component Value Date     07/23/2020    K 3.5 07/23/2020     07/23/2020    CO2 25 07/23/2020    BUN 49 07/23/2020    CREATININE 4.3 07/23/2020    GFRAA 16 07/23/2020    LABGLOM 14 07/23/2020    GLUCOSE 76 07/23/2020    PROT 4.9 07/23/2020    LABALBU 3.5 07/23/2020    CALCIUM 8.6 07/23/2020    BILITOT 1.3 07/23/2020    ALKPHOS 49 07/23/2020    AST 17 07/23/2020    ALT 8 07/23/2020     Phosphorus: 3.1mg/dL    Radiology Review:        Chest xray 7/13/20         No airspace opacities or pleural effusion.                Brief Summary if Initial Consult:   The patient is a 75 y.o. male with significant past medical history of end stage renal disease secondary to nepherosclerosis, on hemodialysis [facility] via Arterial Venous Fistula, last hemodialysis treatment on 7/10/20. Initially, he presented to the ER 7/12/20 for generalized aches, weakness, dry cough, some dyspnea on exertion, diarrhea, and overall did not feel well. His wife and son had similar symptoms that started a few days before and tested positive for [address]. A COVID19 test was sent but did not result and was canceled. Treated symptomatically and sent home. His symptoms persisted since then. Yesterday ( 7/13/20) he went to the dialysis center and reportedly was found to have a temperature of 104.5. He was therefore sent to the ED for getting dialysis done. ER lab work revealed sodium 133, potassium 5.3, BUN 62, creatinine 8.2, Anion gap 18. IMPRESSION/RECOMMENDATIONS:      ESRD- on Monday, Wednesday, Friday schedule. GFR: 6  Anemia- Microcytic Anemia- secondary to iron deficiency and ESRD. on ferrous sulfate and Epoetin 3,000U 3x/week, ferrlecit 125mg IV 3x/week with dialysis. COVID19- on ascorbic acid, Vitamin D, Zinc, Remdesirvir and Dexamethasone  Uncontrolled hypertension in the setting of Renal Failure-on Norvasc, Hydralazine, Metoprolol, Doxazosin, likely secondary to volume overload from missed hemodialysis treatment  GI Bleed: s/p 1 unit of blood. Surgery consulted.   Diet: Renal    PLAN:            Hemodialysis today as he underwent a CT of abdomen and pelvis with IV contrast yesterday  CT results noted and there is a large intramuscular hematoma in the right rectus abdominis muscle extending from the rib margin down to the pubic symphysis, this may be contributing to the drop in his hemoglobin  Continue with current doses of Epogen and IV iron  Transfuse for hemoglobin is less than 7    Discussed with the RN      Jose Jewell MD

## 2020-07-23 NOTE — PROGRESS NOTES
Occupational Therapy  OT BEDSIDE TREATMENT NOTE      Date:2020  Patient Name: Tina Lund  MRN: [id number]  : 1945  Room: [address] [place] [address]     Referring Provider: Falguni Mejia DO   Evaluating OT: Vandana Ch, OTR/L - [id number]     AM-PAC Daily Activity Raw Score: 15/24      Recommended Adaptive Equipment: Continue to assess.      Diagnosis: COVID-19      Pertinent Medical History: HTN, ESRD      Precautions: falls, droplet plus isolation COVID +     Home Living: Patient lives with his wife in a one-floor home.      Prior Level of Function (PLOF): Per patient, he was independent with ADLs, independent (shared responsibility) with completion of IADLs, and independent with functional mobility (without device) prior to this hospitalization. Driving: Yes     Pain Level: no reports of pain    Cognition: pt is oriented and alert to current situation. Appears disgruntled and limited motivation for participation this session     Functional Assessment:    Initial Eval Status  Date: 2020 Treatment Status  Date: 20 Short Term Goals  Treatment Frequency/Duration: 2-5x/week for 3-7 days PRN   Feeding Setup  Set up  Of dinner tray Independent   Grooming Min A SBA  Standing sink level for hand hygiene  SBA  (standing/seated at sink)   UB Dressing Min A   Setup   LB Dressing Max A Mod A  Management of B socks  Min A - with use of AE, as needed/appropriate   Bathing Max A   Min A - with use of AE/DME, as needed/appropriate   Toileting Max A Mod A  Use of grab bar for support in transfer  Min A   Bed Mobility  Not assessed. Patient seated in bedside chair upon start of this session. Supine <> Sit: Mod A      Functional Transfers Sit-to-Stand:  Mod A   from bedside chair  STS: Mod A SBA   Functional Mobility Min A   (with walker) for minimal functional mobility within patient's room (from bedside chair to manual wheelchair near foot of bed) Min A with [employer]  Household distance  Limited further due to weakness/fatigue  SBA - with use of device, as needed/appropriate   Balance Sitting: Fair+  (at edge of chair)  Standing: Fair-  (with walker) Fair minus at [address]  standing  Fair+ dynamic standing balance during completion of ADLs and other functional tasks. Activity Tolerance Limited Fair minus  Patient will demonstrate Good understanding and consistent implementation of energy conservation techniques and work simplification techniques into ADL routines. B UE Strength 4-/5   Patient will demonstrate 4+/5 B UE strength in order to maximize independence with ADLs and functional transfers. Education: Pt trained on pacing, safety, compensatory techniques, active participation, functional use of B UEs and strengthening in bed mobility, functional transfers, functional mobility, LB dressing, toileting, grooming and self feeding in preparation for maximum independence in all self care tasks. Min to mod cues throughout session for follow through of trained techniques. Comments: Upon arrival pt supine in bed. At end of session supine in bed per pt request all lines and tubes intact, call light and phone within reach. Bed/chair alarm: ON    · Pt has made fair progress towards set goals.    · Continue with current plan of care    Treatment Time In:1605   Treatment Time Out: 1630   Treatment Charges: Mins Units    ADL/Home Mgt 78313 20 2    Thera Activities 96109 5     Ther Ex 21222      Manual Therapy 97797      Neuro Re-ed 91315      Orthotic manage/training  90279      Non Billable Time      Total Timed Treatment 25 2        Jovana Gallo OTR/L  [id number]

## 2020-07-23 NOTE — PROGRESS NOTES
Physical Therapy  Facility/Department: [facility]  Daily Treatment Note  NAME: Valentino Hurter  : 1945  MRN: [id number]    Date of Service: 2020      Patient Diagnosis(es): The primary encounter diagnosis was COVID-19. Diagnoses of Cough, Shortness of breath, and Hyperkalemia were also pertinent to this visit. has a past medical history of Blind left eye, Chronic kidney disease, Dialysis patient [facility]), Hemodialysis patient ([facility] [address] Hyperlipidemia, and Hypertension. has a past surgical history that includes AV fistula creation (Left, 2015); HEMODIALYSIS ACCESS PERCUTANEOUS REVISION (Left, ); PERIPHERAL VASCULAR BYPASS (); and Upper gastrointestinal endoscopy (N/A, 2020). Evaluating Therapist: Lambert Jeronimo PT      Referring Provider:  Maria De Jesus Arboleda DO      Room #: 433    DIAGNOSIS: COVID  Additional History: ESRD on HD   PRECAUTIONS:  Falls , droplet plus      Social:  Pt lives with  Wife  in a  1  floor plan  Prior to admission pt walked with  No AD, independent per pt       Initial Evaluation  Date:  2020 Treatment  2020      Short Term/ Long Term   Goals   Was pt agreeable to Eval/treatment? yes  Needed encouragement      Does pt have pain?  none reported        Bed Mobility  Rolling:  NT   Supine to sit:  NT   Sit to supine: NT   Scooting:  Min assist in sit  Pt sitting in chair upon arrival   Supine to sit : mod assist     Sit to supine : min assist   Scooting : min assist  SBA    Transfers Sit to stand:   Mod assist   Stand to sit:  Mod assist   Stand pivot: mod assist   Sit<> stand: mod assist from bed and commode   SBA    Ambulation     7 feet with ww  with  Min assist   15 feet x 1and 20 feet x 1 with ww min assist   50  feet with  ww  with  SBA          Stair negotiation: ascended and descended NT     4  steps with  1 rail with  CGA ( as able)    LE ROM  AAROM WFL        LE strength  4-/ 5        AM- PAC RAW score                 Pt is alert and Oriented       Balance: min assist , high fall risk  Endurance: poor         ASSESSMENT    Pt displays functional ability as noted in the objective portion of this treatment            Treatment/Education:    Mobility as above. limited activity tolerance,  but much improved as compared to yesterday. Cues to stay within JERAD of walker and safety with mobility      Pt educated on fall risk,  Safe and proper technique with mobility, cues for posture        Patient response to education:   Pt verbalized understanding Pt demonstrated skill Pt requires further education in this area   x Needs cues   x         Comments:  Pt left  In  Bed after session, call light in reach and pt set up with dinner            PLAN    PT care will be provided in accordance with the objectives noted above. Whenever appropriate, clear delegation orders will be provided for nursing staff. Exercises and functional mobility practice will be used as well as appropriate assistive devices or modalities to obtain goals. Patient and family education will also be administered as needed.     Frequency of treatments will be 2-5x/week x  7-14 days. Con't with PT POC      Time in:  1608  Time out : 1635       CPT codes:  []? Low Complexity PT evaluation [id number]  []? Moderate Complexity PT evaluation 44004  []? High Complexity PT evaluation [id number]  []? PT Re-evaluation [id number]  []? Gait training 23856  minutes  [x]? Therapeutic activities 71018  23 minutes  []? Therapeutic exercises 39032  minutes  []?  Neuromuscular reeducation 71581  minutes         Kathie 18 number:  PT 0233

## 2020-07-23 NOTE — PROGRESS NOTES
The patient is presently in isolation. The CT of the abdomen and pelvis revealed a large rectus sheath hematoma extending along the entire right rectus and into the right flank. The patient's hemoglobin and hematocrit were 7.4 and 23.1. The patient has a thrombocytopenia.

## 2020-07-23 NOTE — CARE COORDINATION
7/23/2020  Social Work Discharge Planning:SW spoke to Quiana Cotter at [employer] in [place] who informed that as long as Pt gets dialyzed before discharge, or Sat., they can have a chair for him next Monday at 4pm. Pt will need to call them from the parking lot so they can bring him into the facility.  Electronically signed by NOE Chaves on 7/23/2020 at 2:11 PM

## 2020-07-23 NOTE — PROGRESS NOTES
[facility] Progress Note    Admitting Date and Time: 7/13/2020  5:20 PM  Admit Dx: [id number] [U07.1]  COVID-19 [U07.1]    Subjective:  Patient is being followed for COVID-19 [U07.1]  COVID-19 [U07.1]   Pt feels ok, breathing well, no complaints,     ROS: denies fever, chills, cp, sob, n/v, HA unless stated above.       sucralfate  1 g Oral 4 times per day    epoetin delmer-epbx  3,000 Units Subcutaneous Once per day on Mon Wed Fri    And    epoetin delmer-epbx  2,000 Units Subcutaneous Once per day on Mon Wed Fri    sodium chloride  250 mL Intravenous Once    pantoprazole  40 mg Oral QAM AC    doxazosin  4 mg Oral Nightly    ferric gluconate (FERRLECIT) IVPB  125 mg Intravenous Q MWF    Calcium Acetate (Phos Binder)  2,001 mg Oral TID WC    ferrous sulfate  325 mg Oral BID WC    sodium chloride flush  10 mL Intravenous 2 times per day    heparin (porcine)  5,000 Units Subcutaneous 3 times per day    vitamin C  1,000 mg Oral BID    zinc sulfate  50 mg Oral Daily    amLODIPine  10 mg Oral Daily    aspirin  81 mg Oral Daily    hydrALAZINE  50 mg Oral TID    metoprolol tartrate  50 mg Oral BID    rosuvastatin  5 mg Oral Nightly     white petrolatum, , BID PRN  sodium chloride, 30 mL, PRN  sodium chloride flush, 10 mL, PRN  acetaminophen, 650 mg, Q6H PRN    Or  acetaminophen, 650 mg, Q6H PRN  polyethylene glycol, 17 g, Daily PRN  promethazine, 12.5 mg, Q6H PRN    Or  ondansetron, 4 mg, Q6H PRN         Objective:    BP (!) 142/58   Pulse 69   Temp 98.4 °F (36.9 °C) (Oral)   Resp 16   Ht 5' 8\" (1.727 m)   Wt 153 lb 4.8 oz (69.5 kg)   SpO2 96%   BMI 23.31 kg/m²     General Appearance: alert and oriented to person, place and time   Skin: warm and dry, a large 25x 15 cm hematoma/ecchymosis lrigth flank  Head: normocephalic and atraumatic  Eyes: pupils equal, round, and reactive to light, extraocular eye movements intact, conjunctivae normal  Neck: neck supple and non tender without mass Pulmonary/Chest: clear to auscultation bilaterally- no wheezes, rales or rhonchi, normal air movement, no respiratory distress  Cardiovascular: normal rate, normal S1 and S2 and no carotid bruits  Abdomen: soft, non-tender, non-distended, normal bowel sounds, no masses or organomegaly  Extremities: no cyanosis, no clubbing and no edema  Neurologic: no cranial nerve deficit and speech normal        Recent Labs     07/21/20  0547      K 4.2   CL 99   CO2 26   BUN 64*   CREATININE 5.4*   GLUCOSE 95   CALCIUM 9.1       Recent Labs     07/21/20  0950 07/21/20  2248 07/22/20  0658 07/23/20  0420   WBC 7.2  --   --  7.1   RBC 1.88*  --   --  2.48*   HGB 6.1* 6.5* 7.6* 7.4*   HCT 19.0* 19.8* 23.3* 23.1*   .1*  --   --  93.1   MCH 32.4  --   --  29.8   MCHC 32.1  --   --  32.0   RDW 19.8*  --   --  21.2*   PLT 89*  --   --  89*   MPV 12.1*  --   --  11.8         Assessment:    Active Problems:    COVID-19    Acute respiratory failure due to COVID-19    Pneumonia due to COVID-19 virus    ESRD (end stage renal disease) (HCC)    Thrombocytopenia (HCC)    Melena    Anemia associated with acute blood loss  Resolved Problems:    * No resolved hospital problems. *      Plan:  1. Acute hypoxic respiratory failure due to COVID-19 pneumonia, initially oxygen saturation was below 90% on room air, required O2 nasal cannula, currently resolved patient is off oxygen. 2.  Acute COVID-19 pneumonia, patient received a course of Remdesivir and was on steroids, steroids were stopped due to concern of bleeding. 3.  Acute anemia thought to be due to bleeding, patient has a new hematoma on his back, he also has esophageal ulceration but no signs of bleeding on the EGD that was done on 7/20/2020. Patient status post transfusion to use of packed RBCs, hemoglobin went up from 6.5-7.5 continue monitoring for now. 4.  Esophageal ulceration, start patient on Carafate and PPI  5.   History of end-stage renal disease, appreciate nephrology input continue on dialysis  6. Thrombocytopenia, might be due to COVID-19 infection, platelet count at 89, continue monitor. 7.   New right flank hematoma, CT abd showed intramuscular hematoma of the rectus muscle and a large hematoma right flank, no retroperitoneal or intra-abd bleed. NOTE: This report was transcribed using voice recognition software. Every effort was made to ensure accuracy; however, inadvertent computerized transcription errors may be present.   Electronically signed by Inocencio Bean MD on 7/23/2020 at 1:06 PM

## 2020-07-24 LAB
ALBUMIN SERPL-MCNC: 3.8 G/DL (ref 3.5–5.2)
ALP BLD-CCNC: 66 U/L (ref 40–129)
ALT SERPL-CCNC: 9 U/L (ref 0–40)
ANION GAP SERPL CALCULATED.3IONS-SCNC: 13 MMOL/L (ref 7–16)
AST SERPL-CCNC: 15 U/L (ref 0–39)
BILIRUB SERPL-MCNC: 1.6 MG/DL (ref 0–1.2)
BILIRUBIN DIRECT: 0.4 MG/DL (ref 0–0.3)
BILIRUBIN, INDIRECT: 1.2 MG/DL (ref 0–1)
BUN BLDV-MCNC: 41 MG/DL (ref 8–23)
CALCIUM SERPL-MCNC: 9.4 MG/DL (ref 8.6–10.2)
CHLORIDE BLD-SCNC: 98 MMOL/L (ref 98–107)
CO2: 27 MMOL/L (ref 22–29)
CREAT SERPL-MCNC: 4.6 MG/DL (ref 0.7–1.2)
GFR AFRICAN AMERICAN: 15
GFR NON-AFRICAN AMERICAN: 12 ML/MIN/1.73
GLUCOSE BLD-MCNC: 87 MG/DL (ref 74–99)
HCT VFR BLD CALC: 23.9 % (ref 37–54)
HEMOGLOBIN: 7.6 G/DL (ref 12.5–16.5)
MCH RBC QN AUTO: 30.5 PG (ref 26–35)
MCHC RBC AUTO-ENTMCNC: 31.8 % (ref 32–34.5)
MCV RBC AUTO: 96 FL (ref 80–99.9)
PDW BLD-RTO: 21.9 FL (ref 11.5–15)
PLATELET # BLD: 99 E9/L (ref 130–450)
PLATELET CONFIRMATION: NORMAL
PMV BLD AUTO: 12 FL (ref 7–12)
POTASSIUM SERPL-SCNC: 4.2 MMOL/L (ref 3.5–5)
RBC # BLD: 2.49 E12/L (ref 3.8–5.8)
SODIUM BLD-SCNC: 138 MMOL/L (ref 132–146)
TOTAL PROTEIN: 5.4 G/DL (ref 6.4–8.3)
WBC # BLD: 8.5 E9/L (ref 4.5–11.5)

## 2020-07-24 PROCEDURE — 97535 SELF CARE MNGMENT TRAINING: CPT

## 2020-07-24 PROCEDURE — 6370000000 HC RX 637 (ALT 250 FOR IP): Performed by: SURGERY

## 2020-07-24 PROCEDURE — 6370000000 HC RX 637 (ALT 250 FOR IP): Performed by: NURSE PRACTITIONER

## 2020-07-24 PROCEDURE — 97530 THERAPEUTIC ACTIVITIES: CPT

## 2020-07-24 PROCEDURE — 99233 SBSQ HOSP IP/OBS HIGH 50: CPT | Performed by: INTERNAL MEDICINE

## 2020-07-24 PROCEDURE — 2580000003 HC RX 258: Performed by: INTERNAL MEDICINE

## 2020-07-24 PROCEDURE — 6370000000 HC RX 637 (ALT 250 FOR IP): Performed by: INTERNAL MEDICINE

## 2020-07-24 PROCEDURE — 6370000000 HC RX 637 (ALT 250 FOR IP): Performed by: STUDENT IN AN ORGANIZED HEALTH CARE EDUCATION/TRAINING PROGRAM

## 2020-07-24 PROCEDURE — 2060000000 HC ICU INTERMEDIATE R&B

## 2020-07-24 PROCEDURE — 87340 HEPATITIS B SURFACE AG IA: CPT

## 2020-07-24 PROCEDURE — U0003 INFECTIOUS AGENT DETECTION BY NUCLEIC ACID (DNA OR RNA); SEVERE ACUTE RESPIRATORY SYNDROME CORONAVIRUS 2 (SARS-COV-2) (CORONAVIRUS DISEASE [COVID-19]), AMPLIFIED PROBE TECHNIQUE, MAKING USE OF HIGH THROUGHPUT TECHNOLOGIES AS DESCRIBED BY CMS-2020-01-R: HCPCS

## 2020-07-24 PROCEDURE — 85027 COMPLETE CBC AUTOMATED: CPT

## 2020-07-24 PROCEDURE — 36415 COLL VENOUS BLD VENIPUNCTURE: CPT

## 2020-07-24 PROCEDURE — 80048 BASIC METABOLIC PNL TOTAL CA: CPT

## 2020-07-24 PROCEDURE — 80076 HEPATIC FUNCTION PANEL: CPT

## 2020-07-24 PROCEDURE — 2500000003 HC RX 250 WO HCPCS: Performed by: INTERNAL MEDICINE

## 2020-07-24 PROCEDURE — 6360000002 HC RX W HCPCS: Performed by: INTERNAL MEDICINE

## 2020-07-24 RX ADMIN — FERROUS SULFATE TAB 325 MG (65 MG ELEMENTAL FE) 325 MG: 325 (65 FE) TAB at 18:04

## 2020-07-24 RX ADMIN — ZINC SULFATE 220 MG (50 MG) CAPSULE 50 MG: CAPSULE at 11:48

## 2020-07-24 RX ADMIN — HYDRALAZINE HYDROCHLORIDE 50 MG: 50 TABLET ORAL at 21:07

## 2020-07-24 RX ADMIN — PANTOPRAZOLE SODIUM 40 MG: 40 TABLET, DELAYED RELEASE ORAL at 05:43

## 2020-07-24 RX ADMIN — CALCIUM ACETATE 2001 MG: 667 CAPSULE ORAL at 18:04

## 2020-07-24 RX ADMIN — Medication 10 ML: at 11:48

## 2020-07-24 RX ADMIN — Medication 10 ML: at 21:07

## 2020-07-24 RX ADMIN — SUCRALFATE 1 G: 1 TABLET ORAL at 18:52

## 2020-07-24 RX ADMIN — Medication 1000 MG: at 11:48

## 2020-07-24 RX ADMIN — EPOETIN ALFA-EPBX 2000 UNITS: 2000 INJECTION, SOLUTION INTRAVENOUS; SUBCUTANEOUS at 12:03

## 2020-07-24 RX ADMIN — HYDRALAZINE HYDROCHLORIDE 50 MG: 50 TABLET ORAL at 14:30

## 2020-07-24 RX ADMIN — EPOETIN ALFA-EPBX 3000 UNITS: 3000 INJECTION, SOLUTION INTRAVENOUS; SUBCUTANEOUS at 12:03

## 2020-07-24 RX ADMIN — SUCRALFATE 1 G: 1 TABLET ORAL at 23:11

## 2020-07-24 RX ADMIN — AMLODIPINE BESYLATE 10 MG: 10 TABLET ORAL at 11:47

## 2020-07-24 RX ADMIN — METOPROLOL TARTRATE 50 MG: 50 TABLET, FILM COATED ORAL at 21:07

## 2020-07-24 RX ADMIN — SUCRALFATE 1 G: 1 TABLET ORAL at 12:04

## 2020-07-24 RX ADMIN — DOXAZOSIN MESYLATE 4 MG: 4 TABLET ORAL at 23:11

## 2020-07-24 RX ADMIN — ROSUVASTATIN CALCIUM 5 MG: 5 TABLET, FILM COATED ORAL at 21:07

## 2020-07-24 RX ADMIN — SUCRALFATE 1 G: 1 TABLET ORAL at 05:43

## 2020-07-24 RX ADMIN — CALCIUM ACETATE 2001 MG: 667 CAPSULE ORAL at 11:50

## 2020-07-24 RX ADMIN — ASPIRIN 81 MG CHEWABLE TABLET 81 MG: 81 TABLET CHEWABLE at 11:49

## 2020-07-24 RX ADMIN — SODIUM CHLORIDE 125 MG: 900 INJECTION INTRAVENOUS at 11:50

## 2020-07-24 RX ADMIN — METOPROLOL TARTRATE 50 MG: 50 TABLET, FILM COATED ORAL at 11:49

## 2020-07-24 RX ADMIN — HYDRALAZINE HYDROCHLORIDE 50 MG: 50 TABLET ORAL at 11:49

## 2020-07-24 RX ADMIN — Medication 1000 MG: at 21:07

## 2020-07-24 NOTE — PROGRESS NOTES
Department of Internal Medicine  Nephrology Progress Note    Events Reviewed. S:  We are following MrLázaro Green for HD needs and hyperkalemia.   Events reviewed with the RN, he is hypoxic today and discharge is on hold  past Medical History:        Diagnosis Date    Blind left eye     Chronic kidney disease     [facility]     [facility]od[facility] ([facility]     Hyperlipidemia     Hypertension      Past Surgical History:        Procedure Laterality Date    AV FISTULA CREATION Left 2015    Dr. Kiara Delgado Left 2019    2 x Dr. Phyllis Rodriguez, [facility]    PERIPHERAL VASCULAR BYPASS  2008    Aortobifemoral bypass for claudicatio - Dr. Griffith Organ N/A 7/20/2020    EGD ESOPHAGOGASTRODUODENOSCOPY WITH ANTRAL BIOPSY performed by Kavita Kwan MD at [facility] OR     Current Medications:    Current Facility-Administered Medications: sucralfate (CARAFATE) tablet 1 g, 1 g, Oral, 4 times per day  epoetin delmer-epbx (RETACRIT) injection 3,000 Units, 3,000 Units, Subcutaneous, Once per day on Mon Wed Fri **AND** epoetin delmer-epbx (RETACRIT) injection 2,000 Units, 2,000 Units, Subcutaneous, Once per day on Mon Wed Fri  0.9 % sodium chloride bolus, 250 mL, Intravenous, Once  white petrolatum ointment, , Topical, BID PRN  pantoprazole (PROTONIX) tablet 40 mg, 40 mg, Oral, QAM AC  doxazosin (CARDURA) tablet 4 mg, 4 mg, Oral, Nightly  [COMPLETED] ferric gluconate (FERRLECIT) 25 mg in sodium chloride 0.9 % 50 mL (test dose) IVPB, 25 mg, Intravenous, Once **FOLLOWED BY** [COMPLETED] ferric gluconate (FERRLECIT) 100 mg in sodium chloride 0.9 % 100 mL IVPB, 100 mg, Intravenous, Once **FOLLOWED BY** ferric gluconate (FERRLECIT) 125 mg in sodium chloride 0.9 % 100 mL IVPB, 125 mg, Intravenous, Q MWF  Calcium Acetate (Phos Binder) CAPS 2,001 mg, 2,001 mg, Oral, TID WC  ferrous sulfate (IRON 325) tablet 325 mg, 325 mg, Oral, BID WC  0.9 % sodium chloride bolus, 30 07/24/2020    ALT 9 07/24/2020     Phosphorus: 3.1mg/dL    Radiology Review:        Chest xray 7/13/20         No airspace opacities or pleural effusion.                Brief Summary if Initial Consult:   The patient is a 75 y.o. male with significant past medical history of end stage renal disease secondary to nepherosclerosis, on hemodialysis [facility] via Arterial Venous Fistula, last hemodialysis treatment on 7/10/20. Initially, he presented to the ER 7/12/20 for generalized aches, weakness, dry cough, some dyspnea on exertion, diarrhea, and overall did not feel well. His wife and son had similar symptoms that started a few days before and tested positive for [address]. A COVID19 test was sent but did not result and was canceled. Treated symptomatically and sent home. His symptoms persisted since then. Yesterday ( 7/13/20) he went to the dialysis center and reportedly was found to have a temperature of 104.5. He was therefore sent to the ED for getting dialysis done. ER lab work revealed sodium 133, potassium 5.3, BUN 62, creatinine 8.2, Anion gap 18. IMPRESSION/RECOMMENDATIONS:      ESRD- on Monday, Wednesday, Friday schedule. GFR: 6  Anemia- Microcytic Anemia- secondary to iron deficiency and ESRD. on ferrous sulfate and Epoetin 3,000U 3x/week, ferrlecit 125mg IV 3x/week with dialysis. COVID19- on ascorbic acid, Vitamin D, Zinc, Remdesirvir and Dexamethasone  Uncontrolled hypertension in the setting of Renal Failure-on Norvasc, Hydralazine, Metoprolol, Doxazosin, likely secondary to volume overload from missed hemodialysis treatment  GI Bleed: s/p 1 unit of blood. Surgery consulted.   Diet: Renal    PLAN:            Hemoglobin remained stable  Dialysis tomorrow, orders reviewed with the dialysis nurse   Continue with Epogen and IV iron at current doses   Okay to discharge from renal standpoint, repeat COVID testing requested by the outpatient dialysis unit, discussed with the ANTONI Jane MD

## 2020-07-24 NOTE — CARE COORDINATION
[facility] unable to accept the patient. Spoke with Connor Cortess from [facility] about needs and to see if pt will meet criteria. Will need to reach out to family. Will follow-O     ADDEND: requested for daily therapies and for repeat COVID send out testing. Still awaiting input from [facility], [address] [place] [address]. We will not get auth for [facility] until Monday.  Will explore all options for discharge planning, since wife is not happy with [facility] choices, discussed this with the wife-O

## 2020-07-24 NOTE — FLOWSHEET NOTE
07/23/20 2011   Vital Signs   BP (!) 144/52   Temp 97.8 °F (36.6 °C)   Pulse 70   Resp 19   Post-Hemodialysis Assessment   Post-Treatment Procedures Blood returned; Access bleeding time < 10 minutes   Machine Disinfection Process Acid/Vinegar Clean;Heat Disinfect; Exterior Machine Disinfection   Rinseback Volume (ml) 300 ml   Total Liters Processed (l/min) 34.9 l/min   Dialyzer Clearance Lightly streaked   Duration of Treatment (minutes) 150 minutes   Hemodialysis Intake (ml) 300 ml   Hemodialysis Output (ml) 700 ml   NET Removed (ml) 400 ml   Tolerated Treatment Good   Patient Response to Treatment Tx ended dt pt demands, Education given for AMA tx termination. 400ml removed   Bilateral Breath Sounds Diminished   Physician Notified?  Yes

## 2020-07-24 NOTE — PLAN OF CARE
injury  Outcome: Met This Shift     Problem: Fluid Volume:  Goal: Ability to achieve a balanced intake and output will improve  Description: Ability to achieve a balanced intake and output will improve  Outcome: Met This Shift  Goal: Hemodynamic stability will improve  Description: Hemodynamic stability will improve  Outcome: Met This Shift  Goal: Ability to maintain a balanced intake and output will improve  Description: Ability to maintain a balanced intake and output will improve  Outcome: Met This Shift     Problem: Physical Regulation:  Goal: Ability to maintain clinical measurements within normal limits will improve  Description: Ability to maintain clinical measurements within normal limits will improve  Outcome: Met This Shift  Goal: Will show no signs and symptoms of electrolyte imbalance  Description: Will show no signs and symptoms of electrolyte imbalance  Outcome: Met This Shift     Problem: Health Behavior:  Goal: Identification of resources available to assist in meeting health care needs will improve  Description: Identification of resources available to assist in meeting health care needs will improve  Outcome: Met This Shift     Problem: Respiratory:  Goal: Respiratory status will improve  Description: Respiratory status will improve  7/23/2020 2341 by Tigist Corcoran RN  Outcome: Met This Shift  7/23/2020 1148 by Jodie Todd RN  Outcome: Met This Shift     Problem: Pain:  Goal: Pain level will decrease  Description: Pain level will decrease  Outcome: Met This Shift  Goal: Control of acute pain  Description: Control of acute pain  Outcome: Met This Shift  Goal: Control of chronic pain  Description: Control of chronic pain  Outcome: Met This Shift     Problem: Skin Integrity:  Goal: Will show no infection signs and symptoms  Description: Will show no infection signs and symptoms  Outcome: Met This Shift  Goal: Absence of new skin breakdown  Description: Absence of new skin breakdown  Outcome:  Met This Shift

## 2020-07-24 NOTE — PROGRESS NOTES
neck supple and non tender without mass   Pulmonary/Chest: clear to auscultation bilaterally- no wheezes, rales or rhonchi, normal air movement, no respiratory distress  Cardiovascular: normal rate, normal S1 and S2 and no carotid bruits  Abdomen: soft, non-tender, non-distended, normal bowel sounds, no masses or organomegaly  Extremities: no cyanosis, no clubbing and no edema  Neurologic: no cranial nerve deficit and speech normal        Recent Labs     07/23/20  1420      K 3.5      CO2 25   BUN 49*   CREATININE 4.3*   GLUCOSE 76   CALCIUM 8.6       Recent Labs     07/21/20  0950 07/21/20  2248 07/22/20  0658 07/23/20  0420   WBC 7.2  --   --  7.1   RBC 1.88*  --   --  2.48*   HGB 6.1* 6.5* 7.6* 7.4*   HCT 19.0* 19.8* 23.3* 23.1*   .1*  --   --  93.1   MCH 32.4  --   --  29.8   MCHC 32.1  --   --  32.0   RDW 19.8*  --   --  21.2*   PLT 89*  --   --  89*   MPV 12.1*  --   --  11.8         Assessment:    Active Problems:    COVID-19    Acute respiratory failure due to COVID-19    Pneumonia due to COVID-19 virus    ESRD (end stage renal disease) (HCC)    Thrombocytopenia (HCC)    Melena    Anemia associated with acute blood loss  Resolved Problems:    * No resolved hospital problems. *      Plan:  1. Acute hypoxic respiratory failure due to COVID-19 pneumonia, initially oxygen saturation was below 90% on room air, required O2 nasal cannula, currently resolved patient is off oxygen. 2.  Acute COVID-19 pneumonia, patient received a course of Remdesivir and was on steroids, steroids were stopped due to concern of bleeding. 3.  Acute anemia thought to be due to bleeding, patient has a new hematoma on his back, he also has esophageal ulceration but no signs of bleeding on the EGD that was done on 7/20/2020. Patient status post transfusion to use of packed RBCs, hemoglobin went up from 6.5 to 7.5 continue monitoring for now stable at 7.6  4.   Esophageal ulceration, start patient on Carafate and PPI  5. History of end-stage renal disease, appreciate nephrology input continue on dialysis  6. Thrombocytopenia, might be due to COVID-19 infection, platelet count at 89, continue monitor. 7.   New right flank hematoma, CT abd showed intramuscular hematoma of the rectus muscle and a large hematoma right flank, no retroperitoneal or intra-abd bleed. 8.  Dispo, not safe for home discharge, talking to family about SNF placement. NOTE: This report was transcribed using voice recognition software. Every effort was made to ensure accuracy; however, inadvertent computerized transcription errors may be present.   Electronically signed by Governor MD Kevin on 7/24/2020 at 8:07 AM

## 2020-07-24 NOTE — PROGRESS NOTES
[facility] Quality Flow/Interdisciplinary Rounds Progress Note        Quality Flow Rounds held on July 24, 2020    Disciplines Attending:  Bedside Nurse, ,  and Nursing Unit Leadership    Starla Lay was admitted on 7/13/2020  5:20 PM    Anticipated Discharge Date:  Expected Discharge Date: 07/16/20    Disposition:    Luis Antonio Score:  Luis Antonio Scale Score: 17    Readmission Risk              Risk of Unplanned Readmission:        22           Discussed patient goal for the day, patient clinical progression, and barriers to discharge. The following Goal(s) of the Day/Commitment(s) have been identified:  Check discharge.       Angel Toure  July 24, 2020

## 2020-07-24 NOTE — PROGRESS NOTES
to maximize safety. SBA   Functional Mobility Min A   (with walker) for minimal functional mobility within patient's room (from bedside chair to manual wheelchair near foot of bed) CGA to Min A (with walker) within patient's room and bathroom. Cues given occasionally to maximize safety with management of walker. SBA - with use of device, as needed/appropriate   Balance Sitting: Fair+  (at edge of chair)  Standing: Fair-  (with walker) Dynamic Standing: Fair-  (with walker) Fair+ dynamic standing balance during completion of ADLs and other functional tasks. Activity Tolerance Limited Fair- Patient will demonstrate Good understanding and consistent implementation of energy conservation techniques and work simplification techniques into ADL routines. B UE Strength 4-/5   Patient will demonstrate 4+/5 B UE strength in order to maximize independence with ADLs and functional transfers. Comments: Upon this OTR's arrival to patient's room, patient supine in bed. Upon conclusion of this session, patient seated in bedside chair with all lines and tubes intact, chair alarm activated, waffle cushion in place, and call light within reach. Further skilled OT treatment indicated to increase patient's safety and independence with completion of ADL/IADL tasks in order to maximize patient's functional independence and quality of life. Education: Patient education provided regardin) safe transfer techniques, 2) importance of OOB activities, 3) importance of having assistance with ADLs and other OOB activities to prevent falls, 4) techniques to maximize safety with use of walker. Patient demonstrated [address] understanding. · Patient has made progress towards set goals. · Continue with current plan of care. Treatment Time In: 1528  Treatment Time Out: 4122    Treatment Charges: Minutes: Units:    Ther Ex  [id number]     Manual Therapy Eliana Hawkins [id number] 12 1   ADL/Home Mgt [id number] 12 1   Neuro Re-ed 91701 Group Therapy      Orthotic manage/training  [id number]     Total Timed Treatment 23 2     Katalina Boo, OTR/L  License Number: [id number]

## 2020-07-25 LAB
ABO/RH: NORMAL
ALBUMIN SERPL-MCNC: 3.4 G/DL (ref 3.5–5.2)
ALP BLD-CCNC: 53 U/L (ref 40–129)
ALT SERPL-CCNC: 9 U/L (ref 0–40)
ANION GAP SERPL CALCULATED.3IONS-SCNC: 16 MMOL/L (ref 7–16)
ANTIBODY SCREEN: NORMAL
AST SERPL-CCNC: 12 U/L (ref 0–39)
BILIRUB SERPL-MCNC: 1.2 MG/DL (ref 0–1.2)
BILIRUBIN DIRECT: 0.4 MG/DL (ref 0–0.3)
BILIRUBIN, INDIRECT: 0.8 MG/DL (ref 0–1)
BLOOD BANK DISPENSE STATUS: NORMAL
BLOOD BANK PRODUCT CODE: NORMAL
BPU ID: NORMAL
BUN BLDV-MCNC: 44 MG/DL (ref 8–23)
CALCIUM SERPL-MCNC: 9.2 MG/DL (ref 8.6–10.2)
CHLORIDE BLD-SCNC: 98 MMOL/L (ref 98–107)
CO2: 25 MMOL/L (ref 22–29)
CREAT SERPL-MCNC: 5.6 MG/DL (ref 0.7–1.2)
DESCRIPTION BLOOD BANK: NORMAL
GFR AFRICAN AMERICAN: 12
GFR NON-AFRICAN AMERICAN: 10 ML/MIN/1.73
GLUCOSE BLD-MCNC: 82 MG/DL (ref 74–99)
HCT VFR BLD CALC: 21.6 % (ref 37–54)
HEMOGLOBIN: 6.8 G/DL (ref 12.5–16.5)
MCH RBC QN AUTO: 30.6 PG (ref 26–35)
MCHC RBC AUTO-ENTMCNC: 31.5 % (ref 32–34.5)
MCV RBC AUTO: 97.3 FL (ref 80–99.9)
PDW BLD-RTO: 22.5 FL (ref 11.5–15)
PLATELET # BLD: 100 E9/L (ref 130–450)
PMV BLD AUTO: 11.7 FL (ref 7–12)
POTASSIUM SERPL-SCNC: 3.8 MMOL/L (ref 3.5–5)
RBC # BLD: 2.22 E12/L (ref 3.8–5.8)
SODIUM BLD-SCNC: 139 MMOL/L (ref 132–146)
TOTAL PROTEIN: 5.1 G/DL (ref 6.4–8.3)
WBC # BLD: 6.9 E9/L (ref 4.5–11.5)

## 2020-07-25 PROCEDURE — 85027 COMPLETE CBC AUTOMATED: CPT

## 2020-07-25 PROCEDURE — 90935 HEMODIALYSIS ONE EVALUATION: CPT

## 2020-07-25 PROCEDURE — 36430 TRANSFUSION BLD/BLD COMPNT: CPT

## 2020-07-25 PROCEDURE — 99233 SBSQ HOSP IP/OBS HIGH 50: CPT | Performed by: INTERNAL MEDICINE

## 2020-07-25 PROCEDURE — 2500000003 HC RX 250 WO HCPCS: Performed by: INTERNAL MEDICINE

## 2020-07-25 PROCEDURE — 6370000000 HC RX 637 (ALT 250 FOR IP): Performed by: STUDENT IN AN ORGANIZED HEALTH CARE EDUCATION/TRAINING PROGRAM

## 2020-07-25 PROCEDURE — 86901 BLOOD TYPING SEROLOGIC RH(D): CPT

## 2020-07-25 PROCEDURE — 80048 BASIC METABOLIC PNL TOTAL CA: CPT

## 2020-07-25 PROCEDURE — 6360000002 HC RX W HCPCS: Performed by: INTERNAL MEDICINE

## 2020-07-25 PROCEDURE — 2580000003 HC RX 258: Performed by: INTERNAL MEDICINE

## 2020-07-25 PROCEDURE — 6370000000 HC RX 637 (ALT 250 FOR IP): Performed by: NURSE PRACTITIONER

## 2020-07-25 PROCEDURE — 6370000000 HC RX 637 (ALT 250 FOR IP): Performed by: SURGERY

## 2020-07-25 PROCEDURE — 36415 COLL VENOUS BLD VENIPUNCTURE: CPT

## 2020-07-25 PROCEDURE — 86850 RBC ANTIBODY SCREEN: CPT

## 2020-07-25 PROCEDURE — 86923 COMPATIBILITY TEST ELECTRIC: CPT

## 2020-07-25 PROCEDURE — 2060000000 HC ICU INTERMEDIATE R&B

## 2020-07-25 PROCEDURE — 6370000000 HC RX 637 (ALT 250 FOR IP): Performed by: INTERNAL MEDICINE

## 2020-07-25 PROCEDURE — 86900 BLOOD TYPING SEROLOGIC ABO: CPT

## 2020-07-25 PROCEDURE — 80076 HEPATIC FUNCTION PANEL: CPT

## 2020-07-25 PROCEDURE — P9016 RBC LEUKOCYTES REDUCED: HCPCS

## 2020-07-25 RX ORDER — 0.9 % SODIUM CHLORIDE 0.9 %
20 INTRAVENOUS SOLUTION INTRAVENOUS ONCE
Status: DISCONTINUED | OUTPATIENT
Start: 2020-07-25 | End: 2020-07-29 | Stop reason: HOSPADM

## 2020-07-25 RX ADMIN — HEPARIN SODIUM 5000 UNITS: 10000 INJECTION INTRAVENOUS; SUBCUTANEOUS at 06:24

## 2020-07-25 RX ADMIN — FERROUS SULFATE TAB 325 MG (65 MG ELEMENTAL FE) 325 MG: 325 (65 FE) TAB at 12:35

## 2020-07-25 RX ADMIN — ROSUVASTATIN CALCIUM 5 MG: 5 TABLET, FILM COATED ORAL at 22:14

## 2020-07-25 RX ADMIN — Medication 10 ML: at 22:15

## 2020-07-25 RX ADMIN — PANTOPRAZOLE SODIUM 40 MG: 40 TABLET, DELAYED RELEASE ORAL at 06:08

## 2020-07-25 RX ADMIN — FERROUS SULFATE TAB 325 MG (65 MG ELEMENTAL FE) 325 MG: 325 (65 FE) TAB at 17:10

## 2020-07-25 RX ADMIN — Medication 1000 MG: at 22:11

## 2020-07-25 RX ADMIN — SUCRALFATE 1 G: 1 TABLET ORAL at 17:14

## 2020-07-25 RX ADMIN — Medication 500 MG: at 12:33

## 2020-07-25 RX ADMIN — PETROLATUM: 420 OINTMENT TOPICAL at 12:00

## 2020-07-25 RX ADMIN — Medication 10 ML: at 08:34

## 2020-07-25 RX ADMIN — SUCRALFATE 1 G: 1 TABLET ORAL at 22:15

## 2020-07-25 RX ADMIN — SUCRALFATE 1 G: 1 TABLET ORAL at 06:08

## 2020-07-25 RX ADMIN — CALCIUM ACETATE 2001 MG: 667 CAPSULE ORAL at 17:09

## 2020-07-25 RX ADMIN — ASPIRIN 81 MG CHEWABLE TABLET 81 MG: 81 TABLET CHEWABLE at 12:33

## 2020-07-25 RX ADMIN — HYDRALAZINE HYDROCHLORIDE 50 MG: 50 TABLET ORAL at 15:38

## 2020-07-25 RX ADMIN — ACETAMINOPHEN 650 MG: 325 TABLET ORAL at 22:23

## 2020-07-25 ASSESSMENT — PAIN SCALES - GENERAL
PAINLEVEL_OUTOF10: 0

## 2020-07-25 NOTE — PROGRESS NOTES
Patient getting ready to be hook up for hd. Patient advise me to leave the medications and once he had some food he would take his pills.

## 2020-07-25 NOTE — PROGRESS NOTES
Department of Internal Medicine  Nephrology Progress Note    Events Reviewed. S:  We are following Mr. Mimi Hand for HD needs and hyperkalemia.   Events reviewed with the RN,had dialysis today  Tolerated the treatment well  past Medical History:        Diagnosis Date    Blind left eye     Chronic kidney disease     [facility] [address]     Hemod[facility] ([facility] [address] [facility]     Hyperlipidemia     Hypertension      Past Surgical History:        Procedure Laterality Date    AV FISTULA CREATION Left 2015    Dr. Angie Broderick Left 2019    2 x Dr. Stacia Rodgers, [facility]    PERIPHERAL VASCULAR BYPASS  2008    Aortobifemoral bypass for claudicatio - Dr. Shadia Ruiz N/A 7/20/2020    EGD ESOPHAGOGASTRODUODENOSCOPY WITH ANTRAL BIOPSY performed by Alesia Roper MD at [facility] OR     Current Medications:    Current Facility-Administered Medications: 0.9 % sodium chloride bolus, 20 mL, Intravenous, Once  sucralfate (CARAFATE) tablet 1 g, 1 g, Oral, 4 times per day  epoetin delmer-epbx (RETACRIT) injection 3,000 Units, 3,000 Units, Subcutaneous, Once per day on Mon Wed Fri **AND** epoetin delmer-epbx (RETACRIT) injection 2,000 Units, 2,000 Units, Subcutaneous, Once per day on Mon Wed Fri  0.9 % sodium chloride bolus, 250 mL, Intravenous, Once  white petrolatum ointment, , Topical, BID PRN  pantoprazole (PROTONIX) tablet 40 mg, 40 mg, Oral, QAM AC  doxazosin (CARDURA) tablet 4 mg, 4 mg, Oral, Nightly  [COMPLETED] ferric gluconate (FERRLECIT) 25 mg in sodium chloride 0.9 % 50 mL (test dose) IVPB, 25 mg, Intravenous, Once **FOLLOWED BY** [COMPLETED] ferric gluconate (FERRLECIT) 100 mg in sodium chloride 0.9 % 100 mL IVPB, 100 mg, Intravenous, Once **FOLLOWED BY** ferric gluconate (FERRLECIT) 125 mg in sodium chloride 0.9 % 100 mL IVPB, 125 mg, Intravenous, Q MWF  Calcium Acetate (Phos Binder) CAPS 2,001 mg, 2,001 mg, Oral, TID WC  ferrous sulfate (IRON 325) tablet 325 mg, 325 mg, Oral, BID WC  0.9 % sodium chloride bolus, 30 mL, Intravenous, PRN  sodium chloride flush 0.9 % injection 10 mL, 10 mL, Intravenous, 2 times per day  sodium chloride flush 0.9 % injection 10 mL, 10 mL, Intravenous, PRN  acetaminophen (TYLENOL) tablet 650 mg, 650 mg, Oral, Q6H PRN **OR** acetaminophen (TYLENOL) suppository 650 mg, 650 mg, Rectal, Q6H PRN  polyethylene glycol (GLYCOLAX) packet 17 g, 17 g, Oral, Daily PRN  promethazine (PHENERGAN) tablet 12.5 mg, 12.5 mg, Oral, Q6H PRN **OR** ondansetron (ZOFRAN) injection 4 mg, 4 mg, Intravenous, Q6H PRN  heparin (porcine) injection 5,000 Units, 5,000 Units, Subcutaneous, 3 times per day  ascorbic acid (VITAMIN C) tablet 1,000 mg, 1,000 mg, Oral, BID  zinc sulfate (ZINCATE) capsule 50 mg, 50 mg, Oral, Daily  amLODIPine (NORVASC) tablet 10 mg, 10 mg, Oral, Daily  aspirin chewable tablet 81 mg, 81 mg, Oral, Daily  hydrALAZINE (APRESOLINE) tablet 50 mg, 50 mg, Oral, TID  metoprolol tartrate (LOPRESSOR) tablet 50 mg, 50 mg, Oral, BID  rosuvastatin (CRESTOR) tablet 5 mg, 5 mg, Oral, Nightly  Allergies:  Patient has no known allergies. Social History:    TOBACCO:   reports that he has quit smoking. He has never used smokeless tobacco.  ETOH:   reports previous alcohol use. DRUGS:   reports no history of drug use. Family History:   History reviewed. No pertinent family history. REVIEW OF SYSTEMS:    Review of Systems   Constitutional: Positive for fever. Negative for diaphoresis. HENT: Negative for sore throat. Eyes: Negative for discharge. Respiratory: Positive for cough and shortness of breath. Cardiovascular: Negative for chest pain. Gastrointestinal: Negative for abdominal pain and vomiting. Genitourinary: Negative for difficulty urinating, dysuria and hematuria. Musculoskeletal: Positive for arthralgias and myalgias. Skin: Negative for rash. Allergic/Immunologic: Negative for immunocompromised state.    Neurological: Positive for headaches. PHYSICAL EXAM:      Vitals:    VITALS:  BP (!) 130/49   Pulse 84   Temp 98.1 °F (36.7 °C) (Oral)   Resp 18   Ht 5' 8\" (1.727 m)   Wt 153 lb 4.8 oz (69.5 kg)   SpO2 98%   BMI 23.31 kg/m²   24HR INTAKE/OUTPUT:      Intake/Output Summary (Last 24 hours) at 7/25/2020 1258  Last data filed at 7/25/2020 1145  Gross per 24 hour   Intake --   Output 1840 ml   Net -1840 ml       General Appearance: alert and oriented to person, place and time, resting comfortably  Skin: warm and dry, turgor not diminished  Head: normocephalic and atraumatic  Eyes: pupils equal, round, and reactive to light, extraocular eye movements intact, conjunctivae normal  Neck: neck supple and non tender without mass   Pulmonary/Chest: Rales (right side) no wheezes,  no respiratory distress  Cardiovascular: normal rate, normal S1 and S2 and murmur  Abdomen: soft, non-tender, non-distended, normal bowel sounds, no masses or organomegaly, right flank bruise/hematoma noted by the RN  Extremities: no cyanosis, no clubbing and no edema.   Left fistula  Neurologic: no cranial nerve deficit and speech normal    DATA:    CBC with Differential:    Lab Results   Component Value Date    WBC 6.9 07/25/2020    RBC 2.22 07/25/2020    HGB 6.8 07/25/2020    HCT 21.6 07/25/2020     07/25/2020    MCV 97.3 07/25/2020    MCH 30.6 07/25/2020    MCHC 31.5 07/25/2020    RDW 22.5 07/25/2020    NRBC 1.7 07/19/2020    LYMPHOPCT 11.5 07/23/2020    MONOPCT 7.6 07/23/2020    BASOPCT 0.0 07/23/2020    MONOSABS 0.54 07/23/2020    LYMPHSABS 0.81 07/23/2020    EOSABS 0.08 07/23/2020    BASOSABS 0.00 07/23/2020     CMP:    Lab Results   Component Value Date     07/25/2020    K 3.8 07/25/2020    K 3.5 07/23/2020    CL 98 07/25/2020    CO2 25 07/25/2020    BUN 44 07/25/2020    CREATININE 5.6 07/25/2020    GFRAA 12 07/25/2020    LABGLOM 10 07/25/2020    GLUCOSE 82 07/25/2020    PROT 5.4 07/24/2020    LABALBU 3.8 07/24/2020    CALCIUM 9.2 07/25/2020 BILITOT 1.6 07/24/2020    ALKPHOS 66 07/24/2020    AST 15 07/24/2020    ALT 9 07/24/2020     Phosphorus: 3.1mg/dL    Radiology Review:        Chest xray 7/13/20         No airspace opacities or pleural effusion.                Brief Summary if Initial Consult:   The patient is a 75 y.o. male with significant past medical history of end stage renal disease secondary to nepherosclerosis, on hemodialysis [facility] via Arterial Venous Fistula, last hemodialysis treatment on 7/10/20. Initially, he presented to the ER 7/12/20 for generalized aches, weakness, dry cough, some dyspnea on exertion, diarrhea, and overall did not feel well. His wife and son had similar symptoms that started a few days before and tested positive for [address]. A COVID19 test was sent but did not result and was canceled. Treated symptomatically and sent home. His symptoms persisted since then. Yesterday ( 7/13/20) he went to the dialysis center and reportedly was found to have a temperature of 104.5. He was therefore sent to the ED for getting dialysis done. ER lab work revealed sodium 133, potassium 5.3, BUN 62, creatinine 8.2, Anion gap 18. IMPRESSION/RECOMMENDATIONS:      ESRD- on Monday, Wednesday, Friday schedule. GFR: 6  Anemia- Microcytic Anemia- secondary to iron deficiency and ESRD. on ferrous sulfate and Epoetin 3,000U 3x/week, fex/week with dialysis.    COVID19- on ascorbic acid, Vitamin D, Zinc, Remdesirvir and Dexamethasone  Uncontrolled hypertension in the setting of Renal Failure-on Norvasc, Hydralazine, Metoprolol, Doxazosin, likely secondary to volume overload from missed hemodialysis     PLAN:  HD today, orders reviewed with the dialysis nurse  Transfusion per primary team  C/W Retacrit and IV Iron with dialysis  Hold BP meds for a SBP of less than 120    Arthur Pettit MD

## 2020-07-25 NOTE — PROGRESS NOTES
[facility] Progress Note    Admitting Date and Time: 7/13/2020  5:20 PM  Admit Dx: MALYR-51 [U07.1]  COVID-19 [U07.1]    Subjective:  Patient is being followed for COVID-19 [U07.1]  COVID-19 [U07.1]   Pt feels ok, breathing well, complaining of pain in his right upper extremity where the bruise is at    ROS: denies fever, chills, cp, sob, n/v, HA unless stated above.       sucralfate  1 g Oral 4 times per day    epoetin delmer-epbx  3,000 Units Subcutaneous Once per day on Mon Wed Fri    And    epoetin delmer-epbx  2,000 Units Subcutaneous Once per day on Mon Wed Fri    sodium chloride  250 mL Intravenous Once    pantoprazole  40 mg Oral QAM AC    doxazosin  4 mg Oral Nightly    ferric gluconate (FERRLECIT) IVPB  125 mg Intravenous Q MWF    Calcium Acetate (Phos Binder)  2,001 mg Oral TID WC    ferrous sulfate  325 mg Oral BID WC    sodium chloride flush  10 mL Intravenous 2 times per day    heparin (porcine)  5,000 Units Subcutaneous 3 times per day    vitamin C  1,000 mg Oral BID    zinc sulfate  50 mg Oral Daily    amLODIPine  10 mg Oral Daily    aspirin  81 mg Oral Daily    hydrALAZINE  50 mg Oral TID    metoprolol tartrate  50 mg Oral BID    rosuvastatin  5 mg Oral Nightly     white petrolatum, , BID PRN  sodium chloride, 30 mL, PRN  sodium chloride flush, 10 mL, PRN  acetaminophen, 650 mg, Q6H PRN    Or  acetaminophen, 650 mg, Q6H PRN  polyethylene glycol, 17 g, Daily PRN  promethazine, 12.5 mg, Q6H PRN    Or  ondansetron, 4 mg, Q6H PRN         Objective:    /75   Pulse 85   Temp 97.7 °F (36.5 °C) (Infrared)   Resp 18   Ht 5' 8\" (1.727 m)   Wt 153 lb 4.8 oz (69.5 kg)   SpO2 96%   BMI 23.31 kg/m²     General Appearance: alert and oriented to person, place and time   Skin: warm and dry, a large 25x 15 cm hematoma/ecchymosis lrigth flank  Head: normocephalic and atraumatic, right arm with tenderness and brusing  Eyes: pupils equal, round, and reactive to light, disease, appreciate nephrology input continue on dialysis  6. Thrombocytopenia, might be due to COVID-19 infection, platelet count at 89, continue monitor. 7.   New right flank hematoma, CT abd showed intramuscular hematoma of the rectus muscle and a large hematoma right flank, no retroperitoneal or intra-abd bleed. Discussed with surgery, patient might need to be transferred downtown for possible IR embolization  8. Dispo, not safe for home discharge, talking to family about SNF placement. NOTE: This report was transcribed using voice recognition software. Every effort was made to ensure accuracy; however, inadvertent computerized transcription errors may be present.   Electronically signed by Adriana Castillo MD on 7/25/2020 at 7:54 AM

## 2020-07-25 NOTE — PLAN OF CARE
Ability to maintain clinical measurements within normal limits will improve  Outcome: Met This Shift  Goal: Will show no signs and symptoms of electrolyte imbalance  Description: Will show no signs and symptoms of electrolyte imbalance  Outcome: Met This Shift     Problem: Respiratory:  Goal: Respiratory status will improve  Description: Respiratory status will improve  Outcome: Met This Shift     Problem: Pain:  Goal: Pain level will decrease  Description: Pain level will decrease  Outcome: Met This Shift  Goal: Control of acute pain  Description: Control of acute pain  Outcome: Met This Shift  Goal: Control of chronic pain  Description: Control of chronic pain  Outcome: Met This Shift     Problem: Skin Integrity:  Goal: Will show no infection signs and symptoms  Description: Will show no infection signs and symptoms  Outcome: Met This Shift  Goal: Absence of new skin breakdown  Description: Absence of new skin breakdown  Outcome: Met This Shift

## 2020-07-25 NOTE — PROGRESS NOTES
[facility] Quality Flow/Interdisciplinary Rounds Progress Note        Quality Flow Rounds held on July 25, 2020    Disciplines Attending:  Bedside Nurse, ,  and Nursing Unit Leadership    Mitchell Shipley was admitted on 7/13/2020  5:20 PM    Anticipated Discharge Date:  Expected Discharge Date: 07/16/20    Disposition:    Luis Antonio Score:  Luis Antonio Scale Score: 18    Readmission Risk              Risk of Unplanned Readmission:        22           Discussed patient goal for the day, patient clinical progression, and barriers to discharge. The following Goal(s) of the Day/Commitment(s) have been identified:  Monitor hgb/hct-for placement Monday to SNF.       Ginnie Boeck  July 25, 2020

## 2020-07-26 LAB
ALBUMIN SERPL-MCNC: 3.6 G/DL (ref 3.5–5.2)
ALP BLD-CCNC: 61 U/L (ref 40–129)
ALT SERPL-CCNC: 11 U/L (ref 0–40)
ANION GAP SERPL CALCULATED.3IONS-SCNC: 13 MMOL/L (ref 7–16)
AST SERPL-CCNC: 16 U/L (ref 0–39)
BILIRUB SERPL-MCNC: 2 MG/DL (ref 0–1.2)
BILIRUBIN DIRECT: 0.5 MG/DL (ref 0–0.3)
BILIRUBIN, INDIRECT: 1.5 MG/DL (ref 0–1)
BUN BLDV-MCNC: 32 MG/DL (ref 8–23)
CALCIUM SERPL-MCNC: 9.3 MG/DL (ref 8.6–10.2)
CHLORIDE BLD-SCNC: 99 MMOL/L (ref 98–107)
CO2: 27 MMOL/L (ref 22–29)
CREAT SERPL-MCNC: 4.6 MG/DL (ref 0.7–1.2)
GFR AFRICAN AMERICAN: 15
GFR NON-AFRICAN AMERICAN: 12 ML/MIN/1.73
GLUCOSE BLD-MCNC: 81 MG/DL (ref 74–99)
HCT VFR BLD CALC: 24.9 % (ref 37–54)
HEMOGLOBIN: 8 G/DL (ref 12.5–16.5)
MCH RBC QN AUTO: 30.9 PG (ref 26–35)
MCHC RBC AUTO-ENTMCNC: 32.1 % (ref 32–34.5)
MCV RBC AUTO: 96.1 FL (ref 80–99.9)
PDW BLD-RTO: 21.8 FL (ref 11.5–15)
PLATELET # BLD: 121 E9/L (ref 130–450)
PMV BLD AUTO: 11.1 FL (ref 7–12)
POTASSIUM SERPL-SCNC: 3.7 MMOL/L (ref 3.5–5)
RBC # BLD: 2.59 E12/L (ref 3.8–5.8)
SODIUM BLD-SCNC: 139 MMOL/L (ref 132–146)
TOTAL PROTEIN: 5.3 G/DL (ref 6.4–8.3)
WBC # BLD: 7.8 E9/L (ref 4.5–11.5)

## 2020-07-26 PROCEDURE — 6370000000 HC RX 637 (ALT 250 FOR IP): Performed by: INTERNAL MEDICINE

## 2020-07-26 PROCEDURE — 80076 HEPATIC FUNCTION PANEL: CPT

## 2020-07-26 PROCEDURE — 80048 BASIC METABOLIC PNL TOTAL CA: CPT

## 2020-07-26 PROCEDURE — 85027 COMPLETE CBC AUTOMATED: CPT

## 2020-07-26 PROCEDURE — 99232 SBSQ HOSP IP/OBS MODERATE 35: CPT | Performed by: INTERNAL MEDICINE

## 2020-07-26 PROCEDURE — 36415 COLL VENOUS BLD VENIPUNCTURE: CPT

## 2020-07-26 PROCEDURE — 6370000000 HC RX 637 (ALT 250 FOR IP): Performed by: NURSE PRACTITIONER

## 2020-07-26 PROCEDURE — 2060000000 HC ICU INTERMEDIATE R&B

## 2020-07-26 PROCEDURE — 6370000000 HC RX 637 (ALT 250 FOR IP): Performed by: SURGERY

## 2020-07-26 PROCEDURE — 2500000003 HC RX 250 WO HCPCS: Performed by: INTERNAL MEDICINE

## 2020-07-26 PROCEDURE — 6370000000 HC RX 637 (ALT 250 FOR IP): Performed by: STUDENT IN AN ORGANIZED HEALTH CARE EDUCATION/TRAINING PROGRAM

## 2020-07-26 PROCEDURE — 2580000003 HC RX 258: Performed by: INTERNAL MEDICINE

## 2020-07-26 RX ADMIN — PETROLATUM: 420 OINTMENT TOPICAL at 09:18

## 2020-07-26 RX ADMIN — FERROUS SULFATE TAB 325 MG (65 MG ELEMENTAL FE) 325 MG: 325 (65 FE) TAB at 16:35

## 2020-07-26 RX ADMIN — SUCRALFATE 1 G: 1 TABLET ORAL at 16:34

## 2020-07-26 RX ADMIN — DOXAZOSIN MESYLATE 4 MG: 4 TABLET ORAL at 21:20

## 2020-07-26 RX ADMIN — METOPROLOL TARTRATE 50 MG: 50 TABLET, FILM COATED ORAL at 09:13

## 2020-07-26 RX ADMIN — AMLODIPINE BESYLATE 10 MG: 10 TABLET ORAL at 09:12

## 2020-07-26 RX ADMIN — Medication 10 ML: at 09:19

## 2020-07-26 RX ADMIN — SUCRALFATE 1 G: 1 TABLET ORAL at 06:02

## 2020-07-26 RX ADMIN — FERROUS SULFATE TAB 325 MG (65 MG ELEMENTAL FE) 325 MG: 325 (65 FE) TAB at 09:12

## 2020-07-26 RX ADMIN — CALCIUM ACETATE 2001 MG: 667 CAPSULE ORAL at 09:12

## 2020-07-26 RX ADMIN — PANTOPRAZOLE SODIUM 40 MG: 40 TABLET, DELAYED RELEASE ORAL at 06:02

## 2020-07-26 RX ADMIN — ZINC SULFATE 220 MG (50 MG) CAPSULE 50 MG: CAPSULE at 09:13

## 2020-07-26 RX ADMIN — HYDRALAZINE HYDROCHLORIDE 50 MG: 50 TABLET ORAL at 14:58

## 2020-07-26 RX ADMIN — HYDRALAZINE HYDROCHLORIDE 50 MG: 50 TABLET ORAL at 09:13

## 2020-07-26 RX ADMIN — Medication 1000 MG: at 21:18

## 2020-07-26 RX ADMIN — CALCIUM ACETATE 2001 MG: 667 CAPSULE ORAL at 16:34

## 2020-07-26 RX ADMIN — Medication 1000 MG: at 09:12

## 2020-07-26 RX ADMIN — SUCRALFATE 1 G: 1 TABLET ORAL at 12:08

## 2020-07-26 RX ADMIN — HYDRALAZINE HYDROCHLORIDE 50 MG: 50 TABLET ORAL at 21:18

## 2020-07-26 RX ADMIN — ROSUVASTATIN CALCIUM 5 MG: 5 TABLET, FILM COATED ORAL at 21:18

## 2020-07-26 RX ADMIN — METOPROLOL TARTRATE 50 MG: 50 TABLET, FILM COATED ORAL at 21:18

## 2020-07-26 RX ADMIN — CALCIUM ACETATE 2001 MG: 667 CAPSULE ORAL at 12:08

## 2020-07-26 RX ADMIN — Medication 10 ML: at 21:20

## 2020-07-26 ASSESSMENT — PAIN SCALES - GENERAL: PAINLEVEL_OUTOF10: 0

## 2020-07-26 NOTE — PROGRESS NOTES
[facility] Quality Flow/Interdisciplinary Rounds Progress Note        Quality Flow Rounds held on July 26, 2020    Disciplines Attending:  Bedside Nurse, ,  and Nursing Unit Leadership    Mateo Ruiz was admitted on 7/13/2020  5:20 PM    Anticipated Discharge Date:  Expected Discharge Date: 07/16/20    Disposition:    Luis Antonio Score:  Luis Antonio Scale Score: 19    Readmission Risk              Risk of Unplanned Readmission:        21           Discussed patient goal for the day, patient clinical progression, and barriers to discharge. The following Goal(s) of the Day/Commitment(s) have been identified:  Continue to monitor hgb/hct, discharge planning to SNF.       Germaine Franco  July 26, 2020

## 2020-07-26 NOTE — PLAN OF CARE
Patient resting comfortably I bed. Patient tool all his am medications w/o difficulty. Patient right arm re-wrapped with ace.

## 2020-07-26 NOTE — PROGRESS NOTES
Department of Internal Medicine  Nephrology Progress Note    Events Reviewed. S:  We are following Mr. Viviana Oliver for HD needs and hyperkalemia.   Events reviewed with the RN, hemoglobin remains stable     past Medical History:        Diagnosis Date    Blind left eye     Chronic kidney disease     [facility] ([facility]     [facility]od[facility] ([facility] [facility]     Hyperlipidemia     Hypertension      Past Surgical History:        Procedure Laterality Date    AV FISTULA CREATION Left 2015    Dr. Sheree Jones Left 2019    2 x Dr. Norva Severin, [facility]    PERIPHERAL VASCULAR BYPASS  2008    Aortobifemoral bypass for claudicatio - Dr. Shahbaz Diaz N/A 7/20/2020    EGD ESOPHAGOGASTRODUODENOSCOPY WITH ANTRAL BIOPSY performed by Cheng Blood MD at [facility] OR     Current Medications:    Current Facility-Administered Medications: 0.9 % sodium chloride bolus, 20 mL, Intravenous, Once  sucralfate (CARAFATE) tablet 1 g, 1 g, Oral, 4 times per day  epoetin delmer-epbx (RETACRIT) injection 3,000 Units, 3,000 Units, Subcutaneous, Once per day on Mon Wed Fri **AND** epoetin delmer-epbx (RETACRIT) injection 2,000 Units, 2,000 Units, Subcutaneous, Once per day on Mon Wed Fri  0.9 % sodium chloride bolus, 250 mL, Intravenous, Once  white petrolatum ointment, , Topical, BID PRN  pantoprazole (PROTONIX) tablet 40 mg, 40 mg, Oral, QAM AC  doxazosin (CARDURA) tablet 4 mg, 4 mg, Oral, Nightly  [COMPLETED] ferric gluconate (FERRLECIT) 25 mg in sodium chloride 0.9 % 50 mL (test dose) IVPB, 25 mg, Intravenous, Once **FOLLOWED BY** [COMPLETED] ferric gluconate (FERRLECIT) 100 mg in sodium chloride 0.9 % 100 mL IVPB, 100 mg, Intravenous, Once **FOLLOWED BY** ferric gluconate (FERRLECIT) 125 mg in sodium chloride 0.9 % 100 mL IVPB, 125 mg, Intravenous, Q MWF  Calcium Acetate (Phos Binder) CAPS 2,001 mg, 2,001 mg, Oral, TID WC  ferrous sulfate (IRON 325) tablet 325 mg, 325 mg, Oral, BID WC  0.9 % sodium chloride bolus, 30 mL, Intravenous, PRN  sodium chloride flush 0.9 % injection 10 mL, 10 mL, Intravenous, 2 times per day  sodium chloride flush 0.9 % injection 10 mL, 10 mL, Intravenous, PRN  acetaminophen (TYLENOL) tablet 650 mg, 650 mg, Oral, Q6H PRN **OR** acetaminophen (TYLENOL) suppository 650 mg, 650 mg, Rectal, Q6H PRN  polyethylene glycol (GLYCOLAX) packet 17 g, 17 g, Oral, Daily PRN  promethazine (PHENERGAN) tablet 12.5 mg, 12.5 mg, Oral, Q6H PRN **OR** ondansetron (ZOFRAN) injection 4 mg, 4 mg, Intravenous, Q6H PRN  ascorbic acid (VITAMIN C) tablet 1,000 mg, 1,000 mg, Oral, BID  zinc sulfate (ZINCATE) capsule 50 mg, 50 mg, Oral, Daily  amLODIPine (NORVASC) tablet 10 mg, 10 mg, Oral, Daily  hydrALAZINE (APRESOLINE) tablet 50 mg, 50 mg, Oral, TID  metoprolol tartrate (LOPRESSOR) tablet 50 mg, 50 mg, Oral, BID  rosuvastatin (CRESTOR) tablet 5 mg, 5 mg, Oral, Nightly  Allergies:  Patient has no known allergies. Social History:    TOBACCO:   reports that he has quit smoking. He has never used smokeless tobacco.  ETOH:   reports previous alcohol use. DRUGS:   reports no history of drug use. Family History:   History reviewed. No pertinent family history.          PHYSICAL EXAM:      Vitals:    VITALS:  /62   Pulse 80   Temp 98.7 °F (37.1 °C) (Oral)   Resp 16   Ht 5' 8\" (1.727 m)   Wt 153 lb 4.8 oz (69.5 kg)   SpO2 99%   BMI 23.31 kg/m²   24HR INTAKE/OUTPUT:    No intake or output data in the 24 hours ending 07/26/20 1538    General Appearance: alert and oriented to person, place and time, resting comfortably  Skin: warm and dry, turgor not diminished  Head: normocephalic and atraumatic  Eyes: pupils equal, round, and reactive to light, extraocular eye movements intact, conjunctivae normal  Neck: neck supple and non tender without mass   Pulmonary/Chest: Rales (right side) no wheezes,  no respiratory distress  Cardiovascular: normal rate, normal S1 and S2 and murmur  Abdomen: soft, non-tender, non-distended, normal bowel sounds, no masses or organomegaly, right flank bruise/hematoma noted by the RN  Extremities: no cyanosis, no clubbing and no edema. Left fistula  Neurologic: no cranial nerve deficit and speech normal    DATA:    CBC with Differential:    Lab Results   Component Value Date    WBC 7.8 07/26/2020    RBC 2.59 07/26/2020    HGB 8.0 07/26/2020    HCT 24.9 07/26/2020     07/26/2020    MCV 96.1 07/26/2020    MCH 30.9 07/26/2020    MCHC 32.1 07/26/2020    RDW 21.8 07/26/2020    NRBC 1.7 07/19/2020    LYMPHOPCT 11.5 07/23/2020    MONOPCT 7.6 07/23/2020    BASOPCT 0.0 07/23/2020    MONOSABS 0.54 07/23/2020    LYMPHSABS 0.81 07/23/2020    EOSABS 0.08 07/23/2020    BASOSABS 0.00 07/23/2020     CMP:    Lab Results   Component Value Date     07/26/2020    K 3.7 07/26/2020    K 3.5 07/23/2020    CL 99 07/26/2020    CO2 27 07/26/2020    BUN 32 07/26/2020    CREATININE 4.6 07/26/2020    GFRAA 15 07/26/2020    LABGLOM 12 07/26/2020    GLUCOSE 81 07/26/2020    PROT 5.1 07/25/2020    LABALBU 3.4 07/25/2020    CALCIUM 9.3 07/26/2020    BILITOT 1.2 07/25/2020    ALKPHOS 53 07/25/2020    AST 12 07/25/2020    ALT 9 07/25/2020     Phosphorus: 3.1mg/dL    Radiology Review:        Chest xray 7/13/20         No airspace opacities or pleural effusion.                Brief Summary if Initial Consult:   The patient is a 75 y.o. male with significant past medical history of end stage renal disease secondary to nepherosclerosis, on hemodialysis [id number] via Arterial Venous Fistula, last hemodialysis treatment on 7/10/20. Initially, he presented to the ER 7/12/20 for generalized aches, weakness, dry cough, some dyspnea on exertion, diarrhea, and overall did not feel well. His wife and son had similar symptoms that started a few days before and tested positive for [address]. A COVID19 test was sent but did not result and was canceled.   Treated symptomatically and sent home. His symptoms persisted since then. Yesterday ( 7/13/20) he went to the dialysis center and reportedly was found to have a temperature of 104.5. He was therefore sent to the ED for getting dialysis done. ER lab work revealed sodium 133, potassium 5.3, BUN 62, creatinine 8.2, Anion gap 18. IMPRESSION/RECOMMENDATIONS:      ESRD- on Monday, Wednesday, Friday schedule. GFR: 6  Anemia- Microcytic Anemia- secondary to iron deficiency and ESRD. on ferrous sulfate and Epoetin 3,000U 3x/week, fex/week with dialysis.    COVID19- on ascorbic acid, Vitamin D, Zinc, Remdesirvir and Dexamethasone  Uncontrolled hypertension in the setting of Renal Failure-on Norvasc, Hydralazine, Metoprolol, Doxazosin, likely secondary to volume overload from missed hemodialysis     PLAN:  HD Monday Wednesday Friday as per his schedule  Transfusion per primary team, hemoglobin remains stable, surgery on board for new right flank intramuscular hematoma of the rectus muscle, he may need IR embolization if the hemoglobin continues to drop  C/W Retacrit and IV Iron with dialysis  Hold BP meds for a SBP of less than 120    Dionne Meza MD

## 2020-07-26 NOTE — PROGRESS NOTES
[facility] Progress Note    Admitting Date and Time: 7/13/2020  5:20 PM  Admit Dx: [id number] [U07.1]  COVID-19 [U07.1]    Subjective:  Patient is being followed for COVID-19 [U07.1]  COVID-19 [U07.1]   Pt feels ok, breathing well, improved pain in his right upper extremity where the bruise is at    ROS: denies fever, chills, cp, sob, n/v, HA unless stated above.       sodium chloride  20 mL Intravenous Once    sucralfate  1 g Oral 4 times per day    epoetin delmer-epbx  3,000 Units Subcutaneous Once per day on Mon Wed Fri    And    epoetin delmer-epbx  2,000 Units Subcutaneous Once per day on Mon Wed Fri    sodium chloride  250 mL Intravenous Once    pantoprazole  40 mg Oral QAM AC    doxazosin  4 mg Oral Nightly    ferric gluconate (FERRLECIT) IVPB  125 mg Intravenous Q MWF    Calcium Acetate (Phos Binder)  2,001 mg Oral TID WC    ferrous sulfate  325 mg Oral BID WC    sodium chloride flush  10 mL Intravenous 2 times per day    vitamin C  1,000 mg Oral BID    zinc sulfate  50 mg Oral Daily    amLODIPine  10 mg Oral Daily    hydrALAZINE  50 mg Oral TID    metoprolol tartrate  50 mg Oral BID    rosuvastatin  5 mg Oral Nightly     white petrolatum, , BID PRN  sodium chloride, 30 mL, PRN  sodium chloride flush, 10 mL, PRN  acetaminophen, 650 mg, Q6H PRN    Or  acetaminophen, 650 mg, Q6H PRN  polyethylene glycol, 17 g, Daily PRN  promethazine, 12.5 mg, Q6H PRN    Or  ondansetron, 4 mg, Q6H PRN         Objective:    /68   Pulse 80   Temp 98 °F (36.7 °C) (Oral)   Resp 18   Ht 5' 8\" (1.727 m)   Wt 153 lb 4.8 oz (69.5 kg)   SpO2 99%   BMI 23.31 kg/m²     General Appearance: alert and oriented to person, place and time   Skin: warm and dry, a large 25x 15 cm hematoma/ecchymosis lrigth flank  Head: normocephalic and atraumatic, right arm with tenderness and brusing  Eyes: pupils equal, round, and reactive to light, extraocular eye movements intact, conjunctivae normal  Neck: neck supple and non tender without mass   Pulmonary/Chest: clear to auscultation bilaterally- no wheezes, rales or rhonchi, normal air movement, no respiratory distress  Cardiovascular: normal rate, normal S1 and S2 and no carotid bruits  Abdomen: soft, non-tender, non-distended, normal bowel sounds, no masses or organomegaly  Extremities: no cyanosis, no clubbing and no edema  Neurologic: no cranial nerve deficit and speech normal        Recent Labs     07/24/20  1200 07/25/20  0851 07/26/20  0300    139 139   K 4.2 3.8 3.7   CL 98 98 99   CO2 27 25 27   BUN 41* 44* 32*   CREATININE 4.6* 5.6* 4.6*   GLUCOSE 87 82 81   CALCIUM 9.4 9.2 9.3       Recent Labs     07/24/20  1200 07/25/20  0851 07/26/20  0300   WBC 8.5 6.9 7.8   RBC 2.49* 2.22* 2.59*   HGB 7.6* 6.8* 8.0*   HCT 23.9* 21.6* 24.9*   MCV 96.0 97.3 96.1   MCH 30.5 30.6 30.9   MCHC 31.8* 31.5* 32.1   RDW 21.9* 22.5* 21.8*   PLT 99* 100* 121*   MPV 12.0 11.7 11.1         Assessment:    Active Problems:    COVID-19    Acute respiratory failure due to COVID-19    Pneumonia due to COVID-19 virus    ESRD (end stage renal disease) (HCC)    Thrombocytopenia (HCC)    Melena    Anemia associated with acute blood loss  Resolved Problems:    * No resolved hospital problems. *      Plan:  1. Acute hypoxic respiratory failure due to COVID-19 pneumonia, initially oxygen saturation was below 90% on room air, required O2 nasal cannula, currently resolved patient is off oxygen. 2.  Acute COVID-19 pneumonia, patient received a course of Remdesivir and was on steroids, steroids were stopped due to concern of bleeding. 3.  Acute anemia thought to be due to bleeding, patient has a new hematoma on his back, he also has esophageal ulceration but no signs of bleeding on the EGD that was done on 7/20/2020. Patient status post transfusion of packed RBCs, hemoglobin went up from 6.5 to 7.5, dropped again to 6.8, transfused, hg is at 8, we will monitor  4.   Esophageal ulceration, start patient on Carafate and PPI  5. History of end-stage renal disease, appreciate nephrology input continue on dialysis  6. Thrombocytopenia, might be due to COVID-19 infection, platelet count at 89, continue monitor. 7.   New right flank hematoma, CT abd showed intramuscular hematoma of the rectus muscle and a large hematoma right flank, no retroperitoneal or intra-abd bleed. Discussed with surgery, patient might need to be transferred downtown for possible IR embolization tomorrow if continues to drop  8. Dispo, not safe for home discharge, talking to family about SNF placement. NOTE: This report was transcribed using voice recognition software. Every effort was made to ensure accuracy; however, inadvertent computerized transcription errors may be present.   Electronically signed by Susannah Christine MD on 7/26/2020 at 12:04 PM

## 2020-07-26 NOTE — PLAN OF CARE
Problem: Airway Clearance - Ineffective  Goal: Achieve or maintain patent airway  Outcome: Met This Shift     Problem: Gas Exchange - Impaired  Goal: Absence of hypoxia  Outcome: Met This Shift  Goal: Promote optimal lung function  Outcome: Met This Shift     Problem: Breathing Pattern - Ineffective  Goal: Ability to achieve and maintain a regular respiratory rate  Outcome: Met This Shift     Problem: Fatigue  Goal: Verbalize increase energy and improved vitality  Outcome: Met This Shift     Problem: Falls - Risk of:  Goal: Will remain free from falls  Description: Will remain free from falls  Outcome: Met This Shift  Goal: Absence of physical injury  Description: Absence of physical injury  Outcome: Met This Shift     Problem: Pain:  Goal: Pain level will decrease  Description: Pain level will decrease  Outcome: Met This Shift  Goal: Control of acute pain  Description: Control of acute pain  Outcome: Met This Shift  Goal: Control of chronic pain  Description: Control of chronic pain  Outcome: Met This Shift

## 2020-07-27 LAB
HCT VFR BLD CALC: 29.5 % (ref 37–54)
HEMOGLOBIN: 9.5 G/DL (ref 12.5–16.5)
HEPATITIS B SURFACE ANTIGEN INTERPRETATION: NORMAL
MCH RBC QN AUTO: 30.7 PG (ref 26–35)
MCHC RBC AUTO-ENTMCNC: 32.2 % (ref 32–34.5)
MCV RBC AUTO: 95.5 FL (ref 80–99.9)
PDW BLD-RTO: 22 FL (ref 11.5–15)
PLATELET # BLD: 149 E9/L (ref 130–450)
PMV BLD AUTO: 10.7 FL (ref 7–12)
RBC # BLD: 3.09 E12/L (ref 3.8–5.8)
SARS-COV-2: DETECTED
SOURCE: ABNORMAL
WBC # BLD: 9.1 E9/L (ref 4.5–11.5)

## 2020-07-27 PROCEDURE — 2580000003 HC RX 258: Performed by: INTERNAL MEDICINE

## 2020-07-27 PROCEDURE — 97530 THERAPEUTIC ACTIVITIES: CPT

## 2020-07-27 PROCEDURE — 90935 HEMODIALYSIS ONE EVALUATION: CPT

## 2020-07-27 PROCEDURE — U0003 INFECTIOUS AGENT DETECTION BY NUCLEIC ACID (DNA OR RNA); SEVERE ACUTE RESPIRATORY SYNDROME CORONAVIRUS 2 (SARS-COV-2) (CORONAVIRUS DISEASE [COVID-19]), AMPLIFIED PROBE TECHNIQUE, MAKING USE OF HIGH THROUGHPUT TECHNOLOGIES AS DESCRIBED BY CMS-2020-01-R: HCPCS

## 2020-07-27 PROCEDURE — 2060000000 HC ICU INTERMEDIATE R&B

## 2020-07-27 PROCEDURE — 6370000000 HC RX 637 (ALT 250 FOR IP): Performed by: INTERNAL MEDICINE

## 2020-07-27 PROCEDURE — 6370000000 HC RX 637 (ALT 250 FOR IP): Performed by: STUDENT IN AN ORGANIZED HEALTH CARE EDUCATION/TRAINING PROGRAM

## 2020-07-27 PROCEDURE — 6360000002 HC RX W HCPCS: Performed by: INTERNAL MEDICINE

## 2020-07-27 PROCEDURE — 6370000000 HC RX 637 (ALT 250 FOR IP): Performed by: SURGERY

## 2020-07-27 PROCEDURE — 36415 COLL VENOUS BLD VENIPUNCTURE: CPT

## 2020-07-27 PROCEDURE — 97535 SELF CARE MNGMENT TRAINING: CPT

## 2020-07-27 PROCEDURE — 6370000000 HC RX 637 (ALT 250 FOR IP): Performed by: NURSE PRACTITIONER

## 2020-07-27 PROCEDURE — 85027 COMPLETE CBC AUTOMATED: CPT

## 2020-07-27 PROCEDURE — 99232 SBSQ HOSP IP/OBS MODERATE 35: CPT | Performed by: INTERNAL MEDICINE

## 2020-07-27 PROCEDURE — 2500000003 HC RX 250 WO HCPCS: Performed by: INTERNAL MEDICINE

## 2020-07-27 RX ADMIN — Medication 10 ML: at 20:59

## 2020-07-27 RX ADMIN — CALCIUM ACETATE 2001 MG: 667 CAPSULE ORAL at 14:00

## 2020-07-27 RX ADMIN — CALCIUM ACETATE 2001 MG: 667 CAPSULE ORAL at 17:34

## 2020-07-27 RX ADMIN — SUCRALFATE 1 G: 1 TABLET ORAL at 13:59

## 2020-07-27 RX ADMIN — SUCRALFATE 1 G: 1 TABLET ORAL at 17:34

## 2020-07-27 RX ADMIN — HYDRALAZINE HYDROCHLORIDE 50 MG: 50 TABLET ORAL at 14:00

## 2020-07-27 RX ADMIN — SUCRALFATE 1 G: 1 TABLET ORAL at 06:06

## 2020-07-27 RX ADMIN — FERROUS SULFATE TAB 325 MG (65 MG ELEMENTAL FE) 325 MG: 325 (65 FE) TAB at 17:34

## 2020-07-27 RX ADMIN — METOPROLOL TARTRATE 50 MG: 50 TABLET, FILM COATED ORAL at 20:59

## 2020-07-27 RX ADMIN — ZINC SULFATE 220 MG (50 MG) CAPSULE 50 MG: CAPSULE at 14:00

## 2020-07-27 RX ADMIN — AMLODIPINE BESYLATE 10 MG: 10 TABLET ORAL at 14:00

## 2020-07-27 RX ADMIN — SODIUM CHLORIDE 125 MG: 900 INJECTION INTRAVENOUS at 13:56

## 2020-07-27 RX ADMIN — EPOETIN ALFA-EPBX 2000 UNITS: 2000 INJECTION, SOLUTION INTRAVENOUS; SUBCUTANEOUS at 13:59

## 2020-07-27 RX ADMIN — ROSUVASTATIN CALCIUM 5 MG: 5 TABLET, FILM COATED ORAL at 20:59

## 2020-07-27 RX ADMIN — SUCRALFATE 1 G: 1 TABLET ORAL at 00:27

## 2020-07-27 RX ADMIN — Medication 10 ML: at 09:03

## 2020-07-27 RX ADMIN — DOXAZOSIN MESYLATE 4 MG: 4 TABLET ORAL at 20:59

## 2020-07-27 RX ADMIN — EPOETIN ALFA-EPBX 3000 UNITS: 3000 INJECTION, SOLUTION INTRAVENOUS; SUBCUTANEOUS at 13:58

## 2020-07-27 RX ADMIN — PANTOPRAZOLE SODIUM 40 MG: 40 TABLET, DELAYED RELEASE ORAL at 06:06

## 2020-07-27 RX ADMIN — HYDRALAZINE HYDROCHLORIDE 50 MG: 50 TABLET ORAL at 20:59

## 2020-07-27 RX ADMIN — Medication 1000 MG: at 20:59

## 2020-07-27 ASSESSMENT — PAIN SCALES - GENERAL
PAINLEVEL_OUTOF10: 0

## 2020-07-27 NOTE — PLAN OF CARE
Problem: Airway Clearance - Ineffective  Goal: Achieve or maintain patent airway  Outcome: Met This Shift     Problem: Gas Exchange - Impaired  Goal: Absence of hypoxia  Outcome: Met This Shift  Goal: Promote optimal lung function  Outcome: Met This Shift     Problem: Breathing Pattern - Ineffective  Goal: Ability to achieve and maintain a regular respiratory rate  Outcome: Met This Shift     Problem:  Body Temperature -  Risk of, Imbalanced  Goal: Ability to maintain a body temperature within defined limits  Outcome: Met This Shift  Goal: Will regain or maintain usual level of consciousness  Outcome: Met This Shift  Goal: Complications related to the disease process, condition or treatment will be avoided or minimized  Outcome: Met This Shift     Problem: Isolation Precautions - Risk of Spread of Infection  Goal: Prevent transmission of infection  Outcome: Met This Shift     Problem: Nutrition Deficits  Goal: Optimize nutrtional status  Outcome: Met This Shift     Problem: Risk for Fluid Volume Deficit  Goal: Maintain normal heart rhythm  Outcome: Met This Shift  Goal: Maintain absence of muscle cramping  Outcome: Met This Shift  Goal: Maintain normal serum potassium, sodium, calcium, phosphorus, and pH  Outcome: Ongoing     Problem: Loneliness or Risk for Loneliness  Goal: Demonstrate positive use of time alone when socialization is not possible  Outcome: Ongoing     Problem: Fatigue  Goal: Verbalize increase energy and improved vitality  Outcome: Ongoing     Problem: Patient Education: Go to Patient Education Activity  Goal: Patient/Family Education  Outcome: Ongoing     Problem: Falls - Risk of:  Goal: Will remain free from falls  Description: Will remain free from falls  Outcome: Met This Shift  Goal: Absence of physical injury  Description: Absence of physical injury  Outcome: Met This Shift     Problem: Fluid Volume:  Goal: Ability to achieve a balanced intake and output will improve  Description: Ability to achieve a balanced intake and output will improve  Outcome: Ongoing  Goal: Hemodynamic stability will improve  Description: Hemodynamic stability will improve  Outcome: Met This Shift     Problem: Physical Regulation:  Goal: Ability to maintain clinical measurements within normal limits will improve  Description: Ability to maintain clinical measurements within normal limits will improve  Outcome: Met This Shift  Goal: Will show no signs and symptoms of electrolyte imbalance  Description: Will show no signs and symptoms of electrolyte imbalance  Outcome: Met This Shift     Problem: Health Behavior:  Goal: Identification of resources available to assist in meeting health care needs will improve  Description: Identification of resources available to assist in meeting health care needs will improve  Outcome: Met This Shift     Problem: Respiratory:  Goal: Respiratory status will improve  Description: Respiratory status will improve  Outcome: Met This Shift     Problem: Pain:  Goal: Pain level will decrease  Description: Pain level will decrease  Outcome: Met This Shift  Goal: Control of acute pain  Description: Control of acute pain  Outcome: Met This Shift  Goal: Control of chronic pain  Description: Control of chronic pain  Outcome: Met This Shift     Problem: Skin Integrity:  Goal: Will show no infection signs and symptoms  Description: Will show no infection signs and symptoms  Outcome: Met This Shift  Goal: Absence of new skin breakdown  Description: Absence of new skin breakdown  Outcome: Met This Shift     Problem: Inadequate oral food/beverage intake (NI-2.1)  Goal: Food and/or Nutrient Delivery  Description: Individualized approach for food/nutrient provision.   7/27/2020 1537 by Noni Reyes RD, LD  Outcome: Met This Shift

## 2020-07-27 NOTE — PROGRESS NOTES
Occupational Therapy  OT BEDSIDE TREATMENT NOTE      Date:2020  Patient Name: Shady Mcgraw  MRN: [id number]  : 1945  Room: [address] [place] [address]A     Referring Provider: Jose Arita DO  Evaluating OT: Felipe Ch, OTR/GISEL - [id number]     AM-PAC Daily Activity Raw Score: 16     Recommended Adaptive Equipment: Continue to assess.      Diagnosis: COVID-19      Pertinent Medical History: HTN, ESRD      Precautions: falls, droplet+ isolation (COVID-19 +)     Home Living: Patient lives with his wife in a one-floor home.      Prior Level of Function (PLOF): Per patient, he was independent with ADLs, independent (shared responsibility) with completion of IADLs, and independent with functional mobility (without device) prior to this hospitalization. Driving: Yes     Pain Level: Pt report back and neck pain when upright.      Cognition: Patient alert and grossly oriented. Patient reluctant to participate in OOB activities initially; verbal encouragement and patient education provided.     Functional Assessment:    Initial Eval Status  Date: 2020 Treatment Status   Short Term Goals  Treatment Frequency/Duration: 2-5x/week for 3-7 days PRN   Feeding Setup   Independent   Grooming Min A CGA for balance while standing at the sink  SBA  (standing/seated at sink)   UB Dressing Min A   Setup   LB Dressing Max A   Min A - with use of AE, as needed/appropriate   Bathing Max A  Min A - with use of AE/DME, as needed/appropriate   Toileting Max A Min  A Min A   Bed Mobility  Not assessed. Patient seated in bedside chair upon start of this session.  Min A supine to sit   CGA sit to supine      Functional Transfers Sit-to-Stand: Mod A   from bedside chair Mod A from bed and toilet surfaces. Mod cues for hand placement to increase safety and technique.   SBA   Functional Mobility Min A   (with walker) for minimal functional mobility within patient's room (from bedside chair to manual wheelchair near foot of bed)  Min A using w/w to and from bathroom. Cues for safety and pacing. SBA - with use of device, as needed/appropriate   Balance Sitting: Fair+  (at edge of chair)  Standing: Fair-  (with walker)  Fair+ dynamic standing balance during completion of ADLs and other functional tasks. Activity Tolerance Limited Fair- Patient will demonstrate Good understanding and consistent implementation of energy conservation techniques and work simplification techniques into ADL routines. B UE Strength 4-/5   Patient will demonstrate 4+/5 B UE strength in order to maximize independence with ADLs and functional transfers. Comments:  Pt laying in the bed. Encouragement to participate in therapy. Pt declined to remain in chair at end of the session due to back and neck pain when sitting up. Exercise: functional use of UE's noted. Education/treatment:  ADL retraining with facilitation of movement to increase self care. Therapeutic activity to address balance, strength, and endurance for ADL and transfers. Pt education of walker safety, transfer safety, energy conservation, and benefits of increased activity. · Pt has made  progress towards set goals.    · Continue with current plan of care        Treatment Charges: Mins Units    ADL/Home Mgt 89350 10 1    Thera Activities 59007 13 1    Ther Ex 55982      Manual Therapy 23251      Neuro Re-ed 14911      Orthotic manage/training  18942      Non Billable Time      Total Timed Treatment 23 [address] [facility] [place]/L [address]

## 2020-07-27 NOTE — PROGRESS NOTES
[facility] Progress Note    Admitting Date and Time: 7/13/2020  5:20 PM  Admit Dx: [id number] [U07.1]  COVID-19 [U07.1]    Subjective:  Patient is being followed for COVID-19 [U07.1]  COVID-19 [U07.1]   Pt feels ok, breathing well, improved pain in his right upper extremity where the bruise is at    ROS: denies fever, chills, cp, sob, n/v, HA unless stated above.       sodium chloride  20 mL Intravenous Once    sucralfate  1 g Oral 4 times per day    epoetin delmer-epbx  3,000 Units Subcutaneous Once per day on Mon Wed Fri    And    epoetin delmer-epbx  2,000 Units Subcutaneous Once per day on Mon Wed Fri    sodium chloride  250 mL Intravenous Once    pantoprazole  40 mg Oral QAM AC    doxazosin  4 mg Oral Nightly    ferric gluconate (FERRLECIT) IVPB  125 mg Intravenous Q MWF    Calcium Acetate (Phos Binder)  2,001 mg Oral TID WC    ferrous sulfate  325 mg Oral BID WC    sodium chloride flush  10 mL Intravenous 2 times per day    vitamin C  1,000 mg Oral BID    zinc sulfate  50 mg Oral Daily    amLODIPine  10 mg Oral Daily    hydrALAZINE  50 mg Oral TID    metoprolol tartrate  50 mg Oral BID    rosuvastatin  5 mg Oral Nightly     white petrolatum, , BID PRN  sodium chloride, 30 mL, PRN  sodium chloride flush, 10 mL, PRN  acetaminophen, 650 mg, Q6H PRN    Or  acetaminophen, 650 mg, Q6H PRN  polyethylene glycol, 17 g, Daily PRN  promethazine, 12.5 mg, Q6H PRN    Or  ondansetron, 4 mg, Q6H PRN         Objective:    /71   Pulse 80   Temp 98.4 °F (36.9 °C) (Oral)   Resp 12   Ht 5' 8\" (1.727 m)   Wt 137 lb 2 oz (62.2 kg)   SpO2 97%   BMI 20.85 kg/m²     General Appearance: alert and oriented to person, place and time   Skin: warm and dry, a large 25x 15 cm hematoma/ecchymosis lrigth flank  Head: normocephalic and atraumatic, right arm with tenderness and brusing  Eyes: pupils equal, round, and reactive to light, extraocular eye movements intact, conjunctivae normal  Neck: neck supple and non tender without mass   Pulmonary/Chest: clear to auscultation bilaterally- no wheezes, rales or rhonchi, normal air movement, no respiratory distress  Cardiovascular: normal rate, normal S1 and S2 and no carotid bruits  Abdomen: soft, non-tender, non-distended, normal bowel sounds, no masses or organomegaly  Extremities: no cyanosis, no clubbing and no edema  Neurologic: no cranial nerve deficit and speech normal        Recent Labs     07/24/20  1200 07/25/20  0851 07/26/20  0300    139 139   K 4.2 3.8 3.7   CL 98 98 99   CO2 27 25 27   BUN 41* 44* 32*   CREATININE 4.6* 5.6* 4.6*   GLUCOSE 87 82 81   CALCIUM 9.4 9.2 9.3       Recent Labs     07/24/20  1200 07/25/20  0851 07/26/20  0300   WBC 8.5 6.9 7.8   RBC 2.49* 2.22* 2.59*   HGB 7.6* 6.8* 8.0*   HCT 23.9* 21.6* 24.9*   MCV 96.0 97.3 96.1   MCH 30.5 30.6 30.9   MCHC 31.8* 31.5* 32.1   RDW 21.9* 22.5* 21.8*   PLT 99* 100* 121*   MPV 12.0 11.7 11.1         Assessment:    Active Problems:    COVID-19    Acute respiratory failure due to COVID-19    Pneumonia due to COVID-19 virus    ESRD (end stage renal disease) (HCC)    Thrombocytopenia (HCC)    Melena    Anemia associated with acute blood loss  Resolved Problems:    * No resolved hospital problems. *      Plan:  1. Acute hypoxic respiratory failure due to COVID-19 pneumonia, initially oxygen saturation was below 90% on room air, required O2 nasal cannula, currently resolved patient is off oxygen. 2.  Acute COVID-19 pneumonia, patient received a course of Remdesivir and was on steroids, steroids were stopped due to concern of bleeding. 3.  Acute anemia thought to be due to bleeding, patient has a new hematoma on his back, he also has esophageal ulceration but no signs of bleeding on the EGD that was done on 7/20/2020. Patient status post transfusion of packed RBCs, hemoglobin went up from 6.5 to 7.5, dropped again to 6.8, transfused, hg is stable at 9.5, we will monitor  4.   Esophageal

## 2020-07-27 NOTE — PLAN OF CARE
Problem: Inadequate oral food/beverage intake (NI-2.1)  Goal: Food and/or Nutrient Delivery  Continue Diet. Will d/c Nepro Renal ONS d/t pt having dislike for it's taste; will Start Magic Cup Frozen ONS TID. Rec to consider an Appetite Stimulant if med. appropriate. Rec to consider EN feedings at this time d/t continued poor intake since adm. Description: Individualized approach for food/nutrient provision.   Outcome: Met This Shift

## 2020-07-27 NOTE — CARE COORDINATION
Social work / Discharge Planning:       PATIENT IS COVID POSITIVE. [facility] did not accept patient. Last week the CM [facility] [address] [place] [address] (facility accepts covid positive patients). Social work made a follow up call to liaison to check status of referral and to clarify if the facility will transport patient to his dialysis appointments at [address] [place] [address]. Awaiting return call.   Electronically signed by NOE Herbert on 7/27/2020 at 11:21 AM

## 2020-07-27 NOTE — CARE COORDINATION
CM NOTE: Per QFR--- covid +, course of remdesivir completed. Continue IV decadron. +anemia. Await therapy evals.

## 2020-07-27 NOTE — PROGRESS NOTES
[facility] Quality Flow/Interdisciplinary Rounds Progress Note        Quality Flow Rounds held on July 27, 2020    Disciplines Attending:  Bedside Nurse, ,  and Nursing Unit Leadership    Rod Miller was admitted on 7/13/2020  5:20 PM    Anticipated Discharge Date:  Expected Discharge Date: 07/16/20    Disposition:    Luis Antonio Score:  Luis Antonio Scale Score: 19    Readmission Risk              Risk of Unplanned Readmission:        21           Discussed patient goal for the day, patient clinical progression, and barriers to discharge. The following Goal(s) of the Day/Commitment(s) have been identified:  Check LIANA pre-cert, monitor hgb.       Serene Duron  July 27, 2020

## 2020-07-27 NOTE — PROGRESS NOTES
Comprehensive Nutrition Assessment    Type and Reason for Visit:  Reassess    Nutrition Recommendations/Plan: Continue Diet. Will d/c Nepro Renal ONS d/t pt having dislike for it's taste; will Start Magic Cup Frozen ONS TID. Rec to consider an Appetite Stimulant if med. appropriate. Rec to consider EN feedings at this time d/t continued poor intake since adm. Nutrition Assessment:  Pt declining from a nutritional stand-point AEB continued poor intake ~26-50% of most meals since adm d/t poor appetite likely 2/2 COVID19+ as well as Reflux Esophagitis/Gastritis per EMR review and d/w RN. Pt remains at risk d/t continued poor intake w/ increased needs 2/2 ESRD w/ HD losses. Malnutrition Assessment:  Malnutrition Status:  Insufficient data    Context:  Acute Illness     Findings of the 6 clinical characteristics of malnutrition:  Energy Intake:  7 - 50% or less of estimated energy requirements for 5 or more days  Weight Loss:  Unable to assess(2/2 poor EMR wt hx pta)     Body Fat Loss:  Unable to assess(2/2 COVID19+ Iso)     Muscle Mass Loss:  Unable to assess    Fluid Accumulation:  No significant fluid accumulation     Strength:  Not Performed    Estimated Daily Nutrient Needs:  Energy (kcal):  8640-4056(MSJ x 1.2 SF per CBW); Weight Used for Energy Requirements:  Current     Protein (g):  80-95(1.3-1.5 gm/kg per CBW); Weight Used for Protein Requirements:  Current        Fluid (ml/day):  Per Renal MGMT; Weight Used for Fluid Requirements:  Current      Nutrition Related Findings:  A&O, confused at times, partial U/L dentures, Abd/BS WDL, No Edema, No I/O's(HD; EGD w/ Bx revealing severe reflux esophagitis, gastritis, multiple eso. ulcers and possible malignancy 7/20)      Wounds:  None       Current Nutrition Therapies:    Dietary Nutrition Supplements: Renal Oral Supplement  DIET GERD;     Anthropometric Measures:  · Height: 5' 8\" (172.7 cm)  · Current Body Weight: 137 lb (62.1 kg)(bed 7/27)

## 2020-07-27 NOTE — PROGRESS NOTES
Physical Therapy  Facility/Department: [facility]  Daily Treatment Note  NAME: Lynette Morales  : 1945  MRN: [id number]    Date of Service: 2020    Patient Diagnosis(es): The primary encounter diagnosis was COVID-19. Diagnoses of Cough, Shortness of breath, and Hyperkalemia were also pertinent to this visit. has a past medical history of Blind left eye, Chronic kidney disease, Dialysis patient [facility]), [facility] Hyperlipidemia, and Hypertension. has a past surgical history that includes AV fistula creation (Left, 2015); HEMODIALYSIS ACCESS PERCUTANEOUS REVISION (Left, ); PERIPHERAL VASCULAR BYPASS (); and Upper gastrointestinal endoscopy (N/A, 2020).       Evaluating Therapist: Paulina Child PT      Referring Provider:  Jose Arita DO      Room #: 619    DIAGNOSIS: COVID  Additional History: ESRD on HD   PRECAUTIONS:  Falls , droplet plus      Social:  Pt lives with Maris Umana a  1  floor plan  Prior to admission pt walked with  No AD, independent per pt       Initial Evaluation  Date:  2020 Treatment  2020      Short Term/ Long Term   Goals   Was pt agreeable to Eval/treatment?  yes  Needed encouragement      Does pt have pain?  none reported  No c/o pain      Bed Mobility  Rolling:  NT   Supine to sit:  NT   Sit to supine: NT   Scooting:  Min assist in sit  Pt sitting in chair upon arrival   Supine to sit : min assist     Sit to supine : min assist   Scooting : min assist  SBA    Transfers Sit to stand:  Mod assist   Stand to sit:  Mod assist   Stand pivot: mod assist   Sit<> stand: mod assist from bed and commode   SBA    Ambulation     7 feet with ww  with  Min assist   20 feet x 2 with ww min assist   50  feet with  ww  with  SBA          Stair negotiation: ascended and descended NT     4  steps with  1 rail with  CGA ( as able)    LE ROM  AAROM WFL        LE strength  4-/ 5        AM- PAC RAW score             Patient education  Pt was educated on transfer technqiue    Patient response to education:   Pt verbalized understanding Pt demonstrated skill Pt requires further education in this area   no no yes     Additional Comments: Pt continues to have difficulty with sit to stand transfers. Instructed pt to push up from bed to stand but continues to pull on walker. Pt fatigues easily with mobility. Did not want to sit up in chair, states his back starts to hurt if he sits up. Pt was left in chiar with call light left by patient. Time out: 0140      Pt is making good progress toward established Physical Therapy goals. Continue with physical therapy current plan of care.     Adriana PT [id number]      CPT codes:  [] Low Complexity PT evaluation 20153  [] Moderate Complexity PT evaluation 92454  [] High Complexity PT evaluation 47936  [] PT Re-evaluation 01443  [] Gait training 14542  minutes  [] Manual therapy 56895    minutes  [x] Therapeutic activities 97866    minutes  [] Therapeutic exercises 58095     minutes  [] Neuromuscular reeducation 10244     minutes

## 2020-07-27 NOTE — PROGRESS NOTES
Call placed to Dr. June Child to update on H/H-ok to discharge once SNF is established.   Electronically signed by Kim Weaver RN on 7/27/2020 at 12:35 PM

## 2020-07-27 NOTE — PROGRESS NOTES
Dr. Raquel Rodriguez inquired about discharge plans-updated on possible accepting SNF's, Dr. Raquel Rodriguez requesting ambulatory COVID be sent today. He is ready to discharge from renal point of view.   Electronically signed by Kahlil Bragg RN on 7/27/2020 at 12:37 PM

## 2020-07-28 LAB
ALBUMIN SERPL-MCNC: 3.5 G/DL (ref 3.5–5.2)
ALP BLD-CCNC: 71 U/L (ref 40–129)
ALT SERPL-CCNC: 10 U/L (ref 0–40)
ANION GAP SERPL CALCULATED.3IONS-SCNC: 10 MMOL/L (ref 7–16)
AST SERPL-CCNC: 14 U/L (ref 0–39)
BILIRUB SERPL-MCNC: 1.5 MG/DL (ref 0–1.2)
BILIRUBIN DIRECT: 0.4 MG/DL (ref 0–0.3)
BILIRUBIN, INDIRECT: 1.1 MG/DL (ref 0–1)
BUN BLDV-MCNC: 29 MG/DL (ref 8–23)
CALCIUM SERPL-MCNC: 9.9 MG/DL (ref 8.6–10.2)
CHLORIDE BLD-SCNC: 99 MMOL/L (ref 98–107)
CO2: 30 MMOL/L (ref 22–29)
CREAT SERPL-MCNC: 4.9 MG/DL (ref 0.7–1.2)
GFR AFRICAN AMERICAN: 14
GFR NON-AFRICAN AMERICAN: 12 ML/MIN/1.73
GLUCOSE BLD-MCNC: 90 MG/DL (ref 74–99)
HCT VFR BLD CALC: 25.3 % (ref 37–54)
HEMOGLOBIN: 8 G/DL (ref 12.5–16.5)
POTASSIUM SERPL-SCNC: 4 MMOL/L (ref 3.5–5)
SODIUM BLD-SCNC: 139 MMOL/L (ref 132–146)
TOTAL PROTEIN: 5.5 G/DL (ref 6.4–8.3)

## 2020-07-28 PROCEDURE — 6370000000 HC RX 637 (ALT 250 FOR IP): Performed by: NURSE PRACTITIONER

## 2020-07-28 PROCEDURE — 99232 SBSQ HOSP IP/OBS MODERATE 35: CPT | Performed by: INTERNAL MEDICINE

## 2020-07-28 PROCEDURE — 85018 HEMOGLOBIN: CPT

## 2020-07-28 PROCEDURE — 2580000003 HC RX 258: Performed by: INTERNAL MEDICINE

## 2020-07-28 PROCEDURE — 6370000000 HC RX 637 (ALT 250 FOR IP): Performed by: INTERNAL MEDICINE

## 2020-07-28 PROCEDURE — 2060000000 HC ICU INTERMEDIATE R&B

## 2020-07-28 PROCEDURE — 36415 COLL VENOUS BLD VENIPUNCTURE: CPT

## 2020-07-28 PROCEDURE — 97535 SELF CARE MNGMENT TRAINING: CPT

## 2020-07-28 PROCEDURE — 2500000003 HC RX 250 WO HCPCS: Performed by: INTERNAL MEDICINE

## 2020-07-28 PROCEDURE — 80076 HEPATIC FUNCTION PANEL: CPT

## 2020-07-28 PROCEDURE — 6370000000 HC RX 637 (ALT 250 FOR IP): Performed by: SURGERY

## 2020-07-28 PROCEDURE — 80048 BASIC METABOLIC PNL TOTAL CA: CPT

## 2020-07-28 PROCEDURE — 6370000000 HC RX 637 (ALT 250 FOR IP): Performed by: STUDENT IN AN ORGANIZED HEALTH CARE EDUCATION/TRAINING PROGRAM

## 2020-07-28 PROCEDURE — 97530 THERAPEUTIC ACTIVITIES: CPT

## 2020-07-28 PROCEDURE — 85014 HEMATOCRIT: CPT

## 2020-07-28 RX ADMIN — ZINC SULFATE 220 MG (50 MG) CAPSULE 50 MG: CAPSULE at 10:07

## 2020-07-28 RX ADMIN — METOPROLOL TARTRATE 50 MG: 50 TABLET, FILM COATED ORAL at 20:29

## 2020-07-28 RX ADMIN — Medication 1000 MG: at 10:07

## 2020-07-28 RX ADMIN — FERROUS SULFATE TAB 325 MG (65 MG ELEMENTAL FE) 325 MG: 325 (65 FE) TAB at 10:08

## 2020-07-28 RX ADMIN — CALCIUM ACETATE 2001 MG: 667 CAPSULE ORAL at 10:07

## 2020-07-28 RX ADMIN — METOPROLOL TARTRATE 50 MG: 50 TABLET, FILM COATED ORAL at 10:08

## 2020-07-28 RX ADMIN — HYDRALAZINE HYDROCHLORIDE 50 MG: 50 TABLET ORAL at 10:07

## 2020-07-28 RX ADMIN — PANTOPRAZOLE SODIUM 40 MG: 40 TABLET, DELAYED RELEASE ORAL at 06:43

## 2020-07-28 RX ADMIN — ROSUVASTATIN CALCIUM 5 MG: 5 TABLET, FILM COATED ORAL at 20:29

## 2020-07-28 RX ADMIN — DOXAZOSIN MESYLATE 4 MG: 4 TABLET ORAL at 20:29

## 2020-07-28 RX ADMIN — Medication 10 ML: at 10:07

## 2020-07-28 RX ADMIN — AMLODIPINE BESYLATE 10 MG: 10 TABLET ORAL at 10:07

## 2020-07-28 RX ADMIN — Medication 10 ML: at 20:29

## 2020-07-28 RX ADMIN — Medication 1000 MG: at 20:28

## 2020-07-28 RX ADMIN — SUCRALFATE 1 G: 1 TABLET ORAL at 06:43

## 2020-07-28 RX ADMIN — SUCRALFATE 1 G: 1 TABLET ORAL at 12:15

## 2020-07-28 RX ADMIN — CALCIUM ACETATE 2001 MG: 667 CAPSULE ORAL at 12:15

## 2020-07-28 RX ADMIN — SUCRALFATE 1 G: 1 TABLET ORAL at 01:15

## 2020-07-28 RX ADMIN — HYDRALAZINE HYDROCHLORIDE 50 MG: 50 TABLET ORAL at 20:28

## 2020-07-28 RX ADMIN — HYDRALAZINE HYDROCHLORIDE 50 MG: 50 TABLET ORAL at 14:00

## 2020-07-28 ASSESSMENT — PAIN SCALES - GENERAL
PAINLEVEL_OUTOF10: 0

## 2020-07-28 NOTE — PROGRESS NOTES
Occupational Therapy  OT BEDSIDE TREATMENT NOTE      Date:2020  Patient Name: Laurita Norris  MRN: [id number]  : 1945  Room: [address] [place] [address]-A     Referring Provider: Jose Arita DO  Evaluating OT: Rogelio Ch, OTR/L - [id number]     AM-PAC Daily Activity Raw Score: 17     Recommended Adaptive Equipment: shower chair      Diagnosis: COVID-19      Pertinent Medical History: HTN, ESRD      Precautions: falls, droplet+ isolation (COVID-19 +)     Home Living: Patient lives with his wife in a one-floor home.      Prior Level of Function (PLOF): Per patient, he was independent with ADLs, independent (shared responsibility) with completion of IADLs, and independent with functional mobility (without device) prior to this hospitalization. Driving: Yes     Pain Level: Pt did not report pain during this session.      Cognition: Patient alert and grossly oriented. Cues for safety awareness.      Functional Assessment:    Initial Eval Status  Date: 2020 Treatment Status   Short Term Goals  Treatment Frequency/Duration: 2-5x/week for 3-7 days PRN   Feeding Setup   Independent   Grooming Min A SBA for balance while standing at the sink  SBA  (standing/seated at sink)   UB Dressing Min A   Setup   LB Dressing Max A Min  A   Min A - with use of AE, as needed/appropriate   Bathing Max A  Min A - with use of AE/DME, as needed/appropriate   Toileting Max A  Min A   Bed Mobility  Not assessed. Patient seated in bedside chair upon start of this session.  Min A supine to sit        Functional Transfers Sit-to-Stand: Mod A   from bedside chair CGA and mod cues for hand placement. Limited carry over of safety instruction. SBA   Functional Mobility Min A   (with walker) for minimal functional mobility within patient's room (from bedside chair to manual wheelchair near foot of bed)  Min A using w/w to and from bathroom. Cues for safety and pacing.   SBA - with use of device, as needed/appropriate   Balance Sitting: Fair+  (at edge of chair)  Standing: Fair-  (with walker) Stand balance during ADL SBA. Fair+ dynamic standing balance during completion of ADLs and other functional tasks. Activity Tolerance Limited Fair- Patient will demonstrate Good understanding and consistent implementation of energy conservation techniques and work simplification techniques into ADL routines. B UE Strength 4-/5   Patient will demonstrate 4+/5 B UE strength in order to maximize independence with ADLs and functional transfers. Comments:  Pt agreeable to sit in the chair at end of the session. Chair alarm activated. Exercise: functional use of UE's noted. Education/treatment:  ADL retraining with facilitation of movement to increase self care. Therapeutic activity to address balance, strength, and endurance for ADL and transfers. Pt education of walker safety, transfer safety, energy conservation, and benefits of increased activity. · Pt has made  progress towards set goals.    · Continue with current plan of care        Treatment Charges: Mins Units    ADL/Home Mgt 83674 8 1    Thera Activities 40132 17 1    Ther Ex 82445      Manual Therapy 58492      Neuro Re-ed 05022      Orthotic manage/training  07412      Non Billable Time      Total Timed Treatment 25 1287 [facility] [place]/L [id number]

## 2020-07-28 NOTE — PROGRESS NOTES
[facility]    Admitting Date and Time: 7/13/2020  5:20 PM  Admit Dx: [id number] [U07.1]  COVID-19 [U07.1]    Subjective:  Patient is being followed for COVID-19 [U07.1]  COVID-19 [U07.1]   Pt feels ok, breathing well, improved pain, patient continues to have poor functional status, unfortunately no facility will take him    ROS: denies fever, chills, cp, sob, n/v, HA unless stated above.       sodium chloride  20 mL Intravenous Once    sucralfate  1 g Oral 4 times per day    epoetin delmer-epbx  3,000 Units Subcutaneous Once per day on Mon Wed Fri    And    epoetin delmer-epbx  2,000 Units Subcutaneous Once per day on Mon Wed Fri    sodium chloride  250 mL Intravenous Once    pantoprazole  40 mg Oral QAM AC    doxazosin  4 mg Oral Nightly    ferric gluconate (FERRLECIT) IVPB  125 mg Intravenous Q MWF    Calcium Acetate (Phos Binder)  2,001 mg Oral TID WC    ferrous sulfate  325 mg Oral BID WC    sodium chloride flush  10 mL Intravenous 2 times per day    vitamin C  1,000 mg Oral BID    zinc sulfate  50 mg Oral Daily    amLODIPine  10 mg Oral Daily    hydrALAZINE  50 mg Oral TID    metoprolol tartrate  50 mg Oral BID    rosuvastatin  5 mg Oral Nightly     white petrolatum, , BID PRN  sodium chloride, 30 mL, PRN  sodium chloride flush, 10 mL, PRN  acetaminophen, 650 mg, Q6H PRN    Or  acetaminophen, 650 mg, Q6H PRN  polyethylene glycol, 17 g, Daily PRN  promethazine, 12.5 mg, Q6H PRN    Or  ondansetron, 4 mg, Q6H PRN         Objective:    BP (!) 110/57   Pulse 83   Temp 97.9 °F (36.6 °C) (Infrared)   Resp 16   Ht 5' 8\" (1.727 m)   Wt 137 lb 2 oz (62.2 kg)   SpO2 99%   BMI 20.85 kg/m²     General Appearance: alert and oriented to person, place and time   Skin: warm and dry, a large 25x 15 cm hematoma/ecchymosis lrigth flank  Head: normocephalic and atraumatic, right arm with tenderness and brusing  Eyes: pupils equal, round, and reactive to light, extraocular eye movements intact, conjunctivae normal  Neck: neck supple and non tender without mass   Pulmonary/Chest: clear to auscultation bilaterally- no wheezes, rales or rhonchi, normal air movement, no respiratory distress  Cardiovascular: normal rate, normal S1 and S2 and no carotid bruits  Abdomen: soft, non-tender, non-distended, normal bowel sounds, no masses or organomegaly  Extremities: no cyanosis, no clubbing and no edema  Neurologic: no cranial nerve deficit and speech normal        Recent Labs     07/26/20  0300      K 3.7   CL 99   CO2 27   BUN 32*   CREATININE 4.6*   GLUCOSE 81   CALCIUM 9.3       Recent Labs     07/26/20  0300 07/27/20  1115   WBC 7.8 9.1   RBC 2.59* 3.09*   HGB 8.0* 9.5*   HCT 24.9* 29.5*   MCV 96.1 95.5   MCH 30.9 30.7   MCHC 32.1 32.2   RDW 21.8* 22.0*   * 149   MPV 11.1 10.7         Assessment:    Active Problems:    COVID-19    Acute respiratory failure due to COVID-19    Pneumonia due to COVID-19 virus    ESRD (end stage renal disease) (HCC)    Thrombocytopenia (HCC)    Melena    Anemia associated with acute blood loss  Resolved Problems:    * No resolved hospital problems. *      Plan:  1. Acute hypoxic respiratory failure due to COVID-19 pneumonia, initially oxygen saturation was below 90% on room air, required O2 nasal cannula, currently resolved patient is off oxygen. 2.  Acute COVID-19 pneumonia, patient received a course of Remdesivir and was on steroids, steroids were stopped due to concern of bleeding. 3.  Acute anemia thought to be due to bleeding, patient has a new hematoma on his back, he also has esophageal ulceration but no signs of bleeding on the EGD that was done on 7/20/2020. Patient status post transfusion of packed RBCs, hemoglobin went up from 6.5 to 7.5, dropped again to 6.8, transfused, hg is stable at 9.5, we will monitor  4. Esophageal ulceration, start patient on Carafate and PPI  5.   History of end-stage renal disease, appreciate nephrology input

## 2020-07-28 NOTE — PROGRESS NOTES
Department of Internal Medicine  Nephrology Progress Note    Events Reviewed. S:  We are following Mr. Pooja Patterson for HD needs and hyperkalemia.   Events reviewed with the RN, hemoglobin remains stable     past Medical History:        Diagnosis Date    Blind left eye     Chronic kidney disease     [facility]     [facility]od[facility]     Hyperlipidemia     Hypertension      Past Surgical History:        Procedure Laterality Date    AV FISTULA CREATION Left 2015    Dr. Miki Ascencio Left 2019    2 x Dr. Arnav Bruno, [facility]    PERIPHERAL VASCULAR BYPASS  2008    Aortobifemoral bypass for claudicatio - Dr. Bon Horn N/A 7/20/2020    EGD ESOPHAGOGASTRODUODENOSCOPY WITH ANTRAL BIOPSY performed by Marcos Arreaga MD at [facility] OR     Current Medications:    Current Facility-Administered Medications: 0.9 % sodium chloride bolus, 20 mL, Intravenous, Once  sucralfate (CARAFATE) tablet 1 g, 1 g, Oral, 4 times per day  epoetin delmer-epbx (RETACRIT) injection 3,000 Units, 3,000 Units, Subcutaneous, Once per day on Mon Wed Fri **AND** epoetin delmer-epbx (RETACRIT) injection 2,000 Units, 2,000 Units, Subcutaneous, Once per day on Mon Wed Fri  0.9 % sodium chloride bolus, 250 mL, Intravenous, Once  white petrolatum ointment, , Topical, BID PRN  pantoprazole (PROTONIX) tablet 40 mg, 40 mg, Oral, QAM AC  doxazosin (CARDURA) tablet 4 mg, 4 mg, Oral, Nightly  [COMPLETED] ferric gluconate (FERRLECIT) 25 mg in sodium chloride 0.9 % 50 mL (test dose) IVPB, 25 mg, Intravenous, Once **FOLLOWED BY** [COMPLETED] ferric gluconate (FERRLECIT) 100 mg in sodium chloride 0.9 % 100 mL IVPB, 100 mg, Intravenous, Once **FOLLOWED BY** ferric gluconate (FERRLECIT) 125 mg in sodium chloride 0.9 % 100 mL IVPB, 125 mg, Intravenous, Q MWF  Calcium Acetate (Phos Binder) CAPS 2,001 mg, 2,001 mg, Oral, TID WC  ferrous sulfate (IRON 325) tablet 325 mg, 325 mg, Oral, BID WC  0.9 % sodium chloride bolus, 30 mL, Intravenous, PRN  sodium chloride flush 0.9 % injection 10 mL, 10 mL, Intravenous, 2 times per day  sodium chloride flush 0.9 % injection 10 mL, 10 mL, Intravenous, PRN  acetaminophen (TYLENOL) tablet 650 mg, 650 mg, Oral, Q6H PRN **OR** acetaminophen (TYLENOL) suppository 650 mg, 650 mg, Rectal, Q6H PRN  polyethylene glycol (GLYCOLAX) packet 17 g, 17 g, Oral, Daily PRN  promethazine (PHENERGAN) tablet 12.5 mg, 12.5 mg, Oral, Q6H PRN **OR** ondansetron (ZOFRAN) injection 4 mg, 4 mg, Intravenous, Q6H PRN  ascorbic acid (VITAMIN C) tablet 1,000 mg, 1,000 mg, Oral, BID  zinc sulfate (ZINCATE) capsule 50 mg, 50 mg, Oral, Daily  amLODIPine (NORVASC) tablet 10 mg, 10 mg, Oral, Daily  hydrALAZINE (APRESOLINE) tablet 50 mg, 50 mg, Oral, TID  metoprolol tartrate (LOPRESSOR) tablet 50 mg, 50 mg, Oral, BID  rosuvastatin (CRESTOR) tablet 5 mg, 5 mg, Oral, Nightly  Allergies:  Patient has no known allergies. Social History:    TOBACCO:   reports that he has quit smoking. He has never used smokeless tobacco.  ETOH:   reports previous alcohol use. DRUGS:   reports no history of drug use. Family History:   History reviewed. No pertinent family history.          PHYSICAL EXAM:      Vitals:    VITALS:  BP (!) 110/57   Pulse 83   Temp 97.9 °F (36.6 °C) (Infrared)   Resp 16   Ht 5' 8\" (1.727 m)   Wt 137 lb 2 oz (62.2 kg)   SpO2 99%   BMI 20.85 kg/m²   24HR INTAKE/OUTPUT:      Intake/Output Summary (Last 24 hours) at 7/28/2020 1613  Last data filed at 7/28/2020 1115  Gross per 24 hour   Intake 180 ml   Output --   Net 180 ml       General Appearance: alert and oriented to person, place and time, resting comfortably  Skin: warm and dry, turgor not diminished  Head: normocephalic and atraumatic  Eyes: pupils equal, round, and reactive to light, extraocular eye movements intact, conjunctivae normal  Neck: neck supple and non tender without mass   Pulmonary/Chest: Rales (right side) no wheezes,  no respiratory distress  Cardiovascular: normal rate, normal S1 and S2 and murmur  Abdomen: soft, non-tender, non-distended, normal bowel sounds, no masses or organomegaly, right flank bruise/hematoma noted by the RN  Extremities: no cyanosis, no clubbing and no edema. Left fistula  Neurologic: no cranial nerve deficit and speech normal    DATA:    CBC with Differential:    Lab Results   Component Value Date    WBC 9.1 07/27/2020    RBC 3.09 07/27/2020    HGB 8.0 07/28/2020    HCT 25.3 07/28/2020     07/27/2020    MCV 95.5 07/27/2020    MCH 30.7 07/27/2020    MCHC 32.2 07/27/2020    RDW 22.0 07/27/2020    NRBC 1.7 07/19/2020    LYMPHOPCT 11.5 07/23/2020    MONOPCT 7.6 07/23/2020    BASOPCT 0.0 07/23/2020    MONOSABS 0.54 07/23/2020    LYMPHSABS 0.81 07/23/2020    EOSABS 0.08 07/23/2020    BASOSABS 0.00 07/23/2020     CMP:    Lab Results   Component Value Date     07/28/2020    K 4.0 07/28/2020    K 3.5 07/23/2020    CL 99 07/28/2020    CO2 30 07/28/2020    BUN 29 07/28/2020    CREATININE 4.9 07/28/2020    GFRAA 14 07/28/2020    LABGLOM 12 07/28/2020    GLUCOSE 90 07/28/2020    PROT 5.3 07/26/2020    LABALBU 3.6 07/26/2020    CALCIUM 9.9 07/28/2020    BILITOT 2.0 07/26/2020    ALKPHOS 61 07/26/2020    AST 16 07/26/2020    ALT 11 07/26/2020     Phosphorus: 3.1mg/dL    Radiology Review:        Chest xray 7/13/20         No airspace opacities or pleural effusion.                Brief Summary if Initial Consult:   The patient is a [address] y.o. 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 Initially, he presented to the ER 7/12/20 for generalized aches, weakness, dry cough, some dyspnea on exertion, diarrhea, and overall did not feel well.  His wife and son had similar symptoms that started a few days before and tested positive for

## 2020-07-28 NOTE — PROGRESS NOTES
Department of Internal Medicine  Nephrology Progress Note    Events Reviewed. S:  We are following Mr. Anneliese Valdez for HD needs and hyperkalemia.   Events reviewed with the RN, hemoglobin remains stable     past Medical History:        Diagnosis Date    Blind left eye     Chronic kidney disease     [facility]     [facility]od[facility] ([facility] [facility]     Hyperlipidemia     Hypertension      Past Surgical History:        Procedure Laterality Date    AV FISTULA CREATION Left 2015    Dr. Vane Ledezma Left 2019    2 x Dr. Adela Chirinos, [facility]    PERIPHERAL VASCULAR BYPASS  2008    Aortobifemoral bypass for claudicatio - Dr. Era Villagomez N/A 7/20/2020    EGD ESOPHAGOGASTRODUODENOSCOPY WITH ANTRAL BIOPSY performed by Zenobia Glasgow MD at [facility] OR     Current Medications:    Current Facility-Administered Medications: 0.9 % sodium chloride bolus, 20 mL, Intravenous, Once  sucralfate (CARAFATE) tablet 1 g, 1 g, Oral, 4 times per day  epoetin delmer-epbx (RETACRIT) injection 3,000 Units, 3,000 Units, Subcutaneous, Once per day on Mon Wed Fri **AND** epoetin delmer-epbx (RETACRIT) injection 2,000 Units, 2,000 Units, Subcutaneous, Once per day on Mon Wed Fri  0.9 % sodium chloride bolus, 250 mL, Intravenous, Once  white petrolatum ointment, , Topical, BID PRN  pantoprazole (PROTONIX) tablet 40 mg, 40 mg, Oral, QAM AC  doxazosin (CARDURA) tablet 4 mg, 4 mg, Oral, Nightly  [COMPLETED] ferric gluconate (FERRLECIT) 25 mg in sodium chloride 0.9 % 50 mL (test dose) IVPB, 25 mg, Intravenous, Once **FOLLOWED BY** [COMPLETED] ferric gluconate (FERRLECIT) 100 mg in sodium chloride 0.9 % 100 mL IVPB, 100 mg, Intravenous, Once **FOLLOWED BY** ferric gluconate (FERRLECIT) 125 mg in sodium chloride 0.9 % 100 mL IVPB, 125 mg, Intravenous, Q MWF  Calcium Acetate (Phos Binder) CAPS 2,001 mg, 2,001 mg, Oral, TID WC  ferrous sulfate (IRON 325) tablet 325 mg, 325 mg, Oral, BID WC  0.9 % sodium chloride bolus, 30 mL, Intravenous, PRN  sodium chloride flush 0.9 % injection 10 mL, 10 mL, Intravenous, 2 times per day  sodium chloride flush 0.9 % injection 10 mL, 10 mL, Intravenous, PRN  acetaminophen (TYLENOL) tablet 650 mg, 650 mg, Oral, Q6H PRN **OR** acetaminophen (TYLENOL) suppository 650 mg, 650 mg, Rectal, Q6H PRN  polyethylene glycol (GLYCOLAX) packet 17 g, 17 g, Oral, Daily PRN  promethazine (PHENERGAN) tablet 12.5 mg, 12.5 mg, Oral, Q6H PRN **OR** ondansetron (ZOFRAN) injection 4 mg, 4 mg, Intravenous, Q6H PRN  ascorbic acid (VITAMIN C) tablet 1,000 mg, 1,000 mg, Oral, BID  zinc sulfate (ZINCATE) capsule 50 mg, 50 mg, Oral, Daily  amLODIPine (NORVASC) tablet 10 mg, 10 mg, Oral, Daily  hydrALAZINE (APRESOLINE) tablet 50 mg, 50 mg, Oral, TID  metoprolol tartrate (LOPRESSOR) tablet 50 mg, 50 mg, Oral, BID  rosuvastatin (CRESTOR) tablet 5 mg, 5 mg, Oral, Nightly  Allergies:  Patient has no known allergies. Social History:    TOBACCO:   reports that he has quit smoking. He has never used smokeless tobacco.  ETOH:   reports previous alcohol use. DRUGS:   reports no history of drug use. Family History:   History reviewed. No pertinent family history.          PHYSICAL EXAM:      Vitals:    VITALS:  BP (!) 110/57   Pulse 83   Temp 97.9 °F (36.6 °C) (Infrared)   Resp 16   Ht 5' 8\" (1.727 m)   Wt 137 lb 2 oz (62.2 kg)   SpO2 99%   BMI 20.85 kg/m²   24HR INTAKE/OUTPUT:      Intake/Output Summary (Last 24 hours) at 7/28/2020 1614  Last data filed at 7/28/2020 1115  Gross per 24 hour   Intake 180 ml   Output --   Net 180 ml       DATA:    CBC with Differential:    Lab Results   Component Value Date    WBC 9.1 07/27/2020    RBC 3.09 07/27/2020    HGB 8.0 07/28/2020    HCT 25.3 07/28/2020     07/27/2020    MCV 95.5 07/27/2020    MCH 30.7 07/27/2020    MCHC 32.2 07/27/2020    RDW 22.0 07/27/2020    NRBC 1.7 07/19/2020    LYMPHOPCT 11.5 07/23/2020    MONOPCT 7.6 07/23/2020 BASOPCT 0.0 07/23/2020    MONOSABS 0.54 07/23/2020    LYMPHSABS 0.81 07/23/2020    EOSABS 0.08 07/23/2020    BASOSABS 0.00 07/23/2020     CMP:    Lab Results   Component Value Date     07/28/2020    K 4.0 07/28/2020    K 3.5 07/23/2020    CL 99 07/28/2020    CO2 30 07/28/2020    BUN 29 07/28/2020    CREATININE 4.9 07/28/2020    GFRAA 14 07/28/2020    LABGLOM 12 07/28/2020    GLUCOSE 90 07/28/2020    PROT 5.3 07/26/2020    LABALBU 3.6 07/26/2020    CALCIUM 9.9 07/28/2020    BILITOT 2.0 07/26/2020    ALKPHOS 61 07/26/2020    AST 16 07/26/2020    ALT 11 07/26/2020     Phosphorus: 3.1mg/dL    Radiology Review:        Chest xray 7/13/20         No airspace opacities or pleural effusion.                Brief Summary if Initial Consult:   The patient is a 75 y.o. 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 Initially, he presented to the ER 7/12/20 for generalized aches, weakness, dry cough, some dyspnea on exertion, diarrhea, and overall did not feel well. His wife and son had similar symptoms that started a few days before and tested positive for [address]. A COVID19 test was sent but did not result and was canceled. Treated symptomatically and sent home. His symptoms persisted since then. Yesterday ( 7/13/20) he went to the dialysis center and reportedly was found to have a temperature of 104.5. He was therefore sent to the ED for getting dialysis done. ER lab work revealed sodium 133, potassium 5.3, BUN 62, creatinine 8.2, Anion gap 18. IMPRESSION/RECOMMENDATIONS:      ESRD- on Monday, Wednesday, Friday schedule. GFR: 6  Anemia- Microcytic Anemia- secondary to iron deficiency and ESRD. on ferrous sulfate and Epoetin 3,000U 3x/week, fex/week with dialysis.    COVID19- on ascorbic acid, Vitamin D, Zinc, Remdesirvir and Dexamethasone  Uncontrolled hypertension in the setting of Renal Failure-on Norvasc, Hydralazine, Metoprolol, Doxazosin, likely secondary to volume overload from missed hemodialysis     PLAN:  HD Monday Wednesday Friday as per his schedule  Transfusion per primary team, hemoglobin remains stable, surgery on board for new right flank intramuscular hematoma of the rectus muscle, he may need IR embolization if the hemoglobin continues to drop  C/W Retacrit and IV Iron with dialysis  Hold BP meds for a SBP of less than 120  Agree with discharge plans to home and continue HD at [address] [place] [address],[address], [place]

## 2020-07-28 NOTE — CARE COORDINATION
MELISSA spoke with Prabhu Godoy at [facility] who stated they will follow case for home care & therapy at home per pt request. Will not be able to see pt at home until Friday 7/31. H&H dropped this afternoon so CM will await further orders & TX plan. MELISSA spoke with pt to explain above. Pt appreciative that he can have [address] PT/OT---states he just wants to go home soon. MELISSA contacted wife to explain above. SHe also was very happy that pt could have some asst & therapy at home.

## 2020-07-28 NOTE — PROGRESS NOTES
Palliative Consult sent via [employer].   Electronically signed by Angel Toure RN on 7/28/2020 at 4:11 PM

## 2020-07-28 NOTE — CARE COORDINATION
CM NOTE: Per QFR---- covid positive---completed course of IV remdesivir, currently on IV decadron. [facility] 14/24---ambulating approx 40' in isolation room. Is on room air & doing better. CM spoke with pt to discuss possible return home & continue HD TXs at [facility]. Pt states he is doing better & feels he can manage at home with the assistance of his son & wife. CM explained she will contact pt's wife to share transition of care plan with her. Pt states she usually doesn't answer the phone. CM attempted to reach Zulay Vogt without answer. VM left with transition of care plan & request to contact CM. Await return call from wife. CM spoke with wife who states she is at home but not up to par but can provide some assistance. States her son just tested negative & will be returning to work. CM explained barrier with accepting SARs d/t transport issues with HD in [place] 3 times per week. She states she understands that problem. Discussed pt's H&H being stable past 24hrs. & recent labs pending. Will discuss with unit/PCP as pt appears ready for transition of care.

## 2020-07-29 VITALS
BODY MASS INDEX: 19.61 KG/M2 | HEIGHT: 68 IN | OXYGEN SATURATION: 93 % | RESPIRATION RATE: 16 BRPM | TEMPERATURE: 98.2 F | DIASTOLIC BLOOD PRESSURE: 65 MMHG | HEART RATE: 84 BPM | WEIGHT: 129.41 LBS | SYSTOLIC BLOOD PRESSURE: 124 MMHG

## 2020-07-29 LAB
ALBUMIN SERPL-MCNC: 3.4 G/DL (ref 3.5–5.2)
ALP BLD-CCNC: 69 U/L (ref 40–129)
ALT SERPL-CCNC: 12 U/L (ref 0–40)
ANION GAP SERPL CALCULATED.3IONS-SCNC: 14 MMOL/L (ref 7–16)
AST SERPL-CCNC: 18 U/L (ref 0–39)
BILIRUB SERPL-MCNC: 1.4 MG/DL (ref 0–1.2)
BILIRUBIN DIRECT: 0.4 MG/DL (ref 0–0.3)
BILIRUBIN, INDIRECT: 1 MG/DL (ref 0–1)
BUN BLDV-MCNC: 38 MG/DL (ref 8–23)
CALCIUM SERPL-MCNC: 9.8 MG/DL (ref 8.6–10.2)
CHLORIDE BLD-SCNC: 97 MMOL/L (ref 98–107)
CO2: 28 MMOL/L (ref 22–29)
CREAT SERPL-MCNC: 6.2 MG/DL (ref 0.7–1.2)
GFR AFRICAN AMERICAN: 11
GFR NON-AFRICAN AMERICAN: 9 ML/MIN/1.73
GLUCOSE BLD-MCNC: 83 MG/DL (ref 74–99)
HCT VFR BLD CALC: 26 % (ref 37–54)
HEMOGLOBIN: 8.3 G/DL (ref 12.5–16.5)
MCH RBC QN AUTO: 31.7 PG (ref 26–35)
MCHC RBC AUTO-ENTMCNC: 31.9 % (ref 32–34.5)
MCV RBC AUTO: 99.2 FL (ref 80–99.9)
PDW BLD-RTO: 21.8 FL (ref 11.5–15)
PLATELET # BLD: 139 E9/L (ref 130–450)
PMV BLD AUTO: 10.4 FL (ref 7–12)
POTASSIUM SERPL-SCNC: 4 MMOL/L (ref 3.5–5)
RBC # BLD: 2.62 E12/L (ref 3.8–5.8)
SARS-COV-2: DETECTED
SODIUM BLD-SCNC: 139 MMOL/L (ref 132–146)
SOURCE: ABNORMAL
TOTAL PROTEIN: 5.6 G/DL (ref 6.4–8.3)
WBC # BLD: 8.2 E9/L (ref 4.5–11.5)

## 2020-07-29 PROCEDURE — 6370000000 HC RX 637 (ALT 250 FOR IP): Performed by: INTERNAL MEDICINE

## 2020-07-29 PROCEDURE — 80048 BASIC METABOLIC PNL TOTAL CA: CPT

## 2020-07-29 PROCEDURE — 36415 COLL VENOUS BLD VENIPUNCTURE: CPT

## 2020-07-29 PROCEDURE — 2580000003 HC RX 258: Performed by: INTERNAL MEDICINE

## 2020-07-29 PROCEDURE — 85027 COMPLETE CBC AUTOMATED: CPT

## 2020-07-29 PROCEDURE — 90935 HEMODIALYSIS ONE EVALUATION: CPT

## 2020-07-29 PROCEDURE — 6370000000 HC RX 637 (ALT 250 FOR IP): Performed by: STUDENT IN AN ORGANIZED HEALTH CARE EDUCATION/TRAINING PROGRAM

## 2020-07-29 PROCEDURE — 80076 HEPATIC FUNCTION PANEL: CPT

## 2020-07-29 PROCEDURE — 6370000000 HC RX 637 (ALT 250 FOR IP): Performed by: NURSE PRACTITIONER

## 2020-07-29 PROCEDURE — 6370000000 HC RX 637 (ALT 250 FOR IP): Performed by: SURGERY

## 2020-07-29 PROCEDURE — 2500000003 HC RX 250 WO HCPCS: Performed by: INTERNAL MEDICINE

## 2020-07-29 PROCEDURE — 6360000002 HC RX W HCPCS: Performed by: INTERNAL MEDICINE

## 2020-07-29 PROCEDURE — 99239 HOSP IP/OBS DSCHRG MGMT >30: CPT | Performed by: INTERNAL MEDICINE

## 2020-07-29 RX ORDER — PANTOPRAZOLE SODIUM 40 MG/1
40 TABLET, DELAYED RELEASE ORAL
Qty: 30 TABLET | Refills: 3 | Status: ON HOLD | OUTPATIENT
Start: 2020-07-30 | End: 2020-11-03 | Stop reason: HOSPADM

## 2020-07-29 RX ORDER — ZINC SULFATE 50(220)MG
50 CAPSULE ORAL DAILY
Qty: 30 CAPSULE | Refills: 3 | COMMUNITY
Start: 2020-07-29 | End: 2020-10-03 | Stop reason: ALTCHOICE

## 2020-07-29 RX ORDER — SUCRALFATE 1 G/1
1 TABLET ORAL 4 TIMES DAILY
Qty: 120 TABLET | Refills: 3 | Status: ON HOLD | OUTPATIENT
Start: 2020-07-29 | End: 2022-10-16 | Stop reason: HOSPADM

## 2020-07-29 RX ORDER — AMLODIPINE BESYLATE 10 MG/1
10 TABLET ORAL DAILY
Qty: 30 TABLET | Refills: 3 | Status: SHIPPED | OUTPATIENT
Start: 2020-07-29 | End: 2020-10-03 | Stop reason: ALTCHOICE

## 2020-07-29 RX ORDER — PROMETHAZINE HYDROCHLORIDE 12.5 MG/1
12.5 TABLET ORAL EVERY 6 HOURS PRN
Qty: 20 TABLET | Refills: 0 | Status: SHIPPED | OUTPATIENT
Start: 2020-07-29 | End: 2020-08-05

## 2020-07-29 RX ORDER — FERROUS SULFATE 325(65) MG
325 TABLET ORAL 2 TIMES DAILY WITH MEALS
Qty: 30 TABLET | Refills: 3 | Status: SHIPPED | OUTPATIENT
Start: 2020-07-29 | End: 2020-10-03 | Stop reason: ALTCHOICE

## 2020-07-29 RX ORDER — MULTIVIT WITH MINERALS/LUTEIN
250 TABLET ORAL 2 TIMES DAILY
Qty: 20 TABLET | Refills: 0 | Status: SHIPPED | OUTPATIENT
Start: 2020-07-29 | End: 2020-10-03 | Stop reason: ALTCHOICE

## 2020-07-29 RX ORDER — DOXAZOSIN MESYLATE 4 MG/1
4 TABLET ORAL NIGHTLY
Qty: 30 TABLET | Refills: 3 | Status: SHIPPED | OUTPATIENT
Start: 2020-07-29 | End: 2020-10-03 | Stop reason: ALTCHOICE

## 2020-07-29 RX ADMIN — ZINC SULFATE 220 MG (50 MG) CAPSULE 50 MG: CAPSULE at 13:27

## 2020-07-29 RX ADMIN — SUCRALFATE 1 G: 1 TABLET ORAL at 00:10

## 2020-07-29 RX ADMIN — METOPROLOL TARTRATE 50 MG: 50 TABLET, FILM COATED ORAL at 14:50

## 2020-07-29 RX ADMIN — Medication 1000 MG: at 13:27

## 2020-07-29 RX ADMIN — CALCIUM ACETATE 2001 MG: 667 CAPSULE ORAL at 13:28

## 2020-07-29 RX ADMIN — HYDRALAZINE HYDROCHLORIDE 50 MG: 50 TABLET ORAL at 14:51

## 2020-07-29 RX ADMIN — FERROUS SULFATE TAB 325 MG (65 MG ELEMENTAL FE) 325 MG: 325 (65 FE) TAB at 13:28

## 2020-07-29 RX ADMIN — SUCRALFATE 1 G: 1 TABLET ORAL at 13:27

## 2020-07-29 RX ADMIN — PANTOPRAZOLE SODIUM 40 MG: 40 TABLET, DELAYED RELEASE ORAL at 05:09

## 2020-07-29 RX ADMIN — EPOETIN ALFA-EPBX 3000 UNITS: 3000 INJECTION, SOLUTION INTRAVENOUS; SUBCUTANEOUS at 13:30

## 2020-07-29 RX ADMIN — SUCRALFATE 1 G: 1 TABLET ORAL at 05:09

## 2020-07-29 RX ADMIN — Medication 10 ML: at 13:29

## 2020-07-29 RX ADMIN — EPOETIN ALFA-EPBX 2000 UNITS: 2000 INJECTION, SOLUTION INTRAVENOUS; SUBCUTANEOUS at 13:30

## 2020-07-29 RX ADMIN — AMLODIPINE BESYLATE 10 MG: 10 TABLET ORAL at 13:26

## 2020-07-29 RX ADMIN — SODIUM CHLORIDE 125 MG: 900 INJECTION INTRAVENOUS at 13:30

## 2020-07-29 ASSESSMENT — PAIN SCALES - GENERAL
PAINLEVEL_OUTOF10: 0
PAINLEVEL_OUTOF10: 0

## 2020-07-29 NOTE — CARE COORDINATION
Social work / Discharge Planning:        Discharge order noted. CM has home care orders finalized. Social work spoke to Kike at [employer] and provided update on discharge. Patient will start back at [facility] on Friday at 4pm.  He must call from the parking lot when he arrives.    Electronically signed by NOE Rice on 7/29/2020 at 10:59 AM

## 2020-07-29 NOTE — PROGRESS NOTES
Pt's wife updated on discharge. All questions answered. Will call nurses station when she is on her way.

## 2020-07-29 NOTE — PROGRESS NOTES
Hemodialysis x 3 hours today completed as ordered. UF 1000mls, Weight post tx 58.7kg. Vital stable throughout run. Report given.

## 2020-07-29 NOTE — PROGRESS NOTES
Department of Internal Medicine  Nephrology Progress Note    Events Reviewed. S:  We are following Mr. Esther Paz for HD needs and hyperkalemia. Events reviewed with the RN, hemoglobin remains stable .  Tolerated HD well today    past Medical History:        Diagnosis Date    Blind left eye     Chronic kidney disease     [facility]     [facility]od[facility] [facility]     Hyperlipidemia     Hypertension      Past Surgical History:        Procedure Laterality Date    AV FISTULA CREATION Left 2015    Dr. Po Knox Left 2019    2 x Dr. Melissa Alvarez, [facility]    PERIPHERAL VASCULAR BYPASS  2008    Aortobifemoral bypass for claudicatio - Dr. Avelino Sandhoff N/A 7/20/2020    EGD ESOPHAGOGASTRODUODENOSCOPY WITH ANTRAL BIOPSY performed by Fernie Baptiste MD at [facility] OR     Current Medications:    Current Facility-Administered Medications: 0.9 % sodium chloride bolus, 20 mL, Intravenous, Once  sucralfate (CARAFATE) tablet 1 g, 1 g, Oral, 4 times per day  epoetin delmer-epbx (RETACRIT) injection 3,000 Units, 3,000 Units, Subcutaneous, Once per day on Mon Wed Fri **AND** epoetin delmer-epbx (RETACRIT) injection 2,000 Units, 2,000 Units, Subcutaneous, Once per day on Mon Wed Fri  0.9 % sodium chloride bolus, 250 mL, Intravenous, Once  white petrolatum ointment, , Topical, BID PRN  pantoprazole (PROTONIX) tablet 40 mg, 40 mg, Oral, QAM AC  doxazosin (CARDURA) tablet 4 mg, 4 mg, Oral, Nightly  [COMPLETED] ferric gluconate (FERRLECIT) 25 mg in sodium chloride 0.9 % 50 mL (test dose) IVPB, 25 mg, Intravenous, Once **FOLLOWED BY** [COMPLETED] ferric gluconate (FERRLECIT) 100 mg in sodium chloride 0.9 % 100 mL IVPB, 100 mg, Intravenous, Once **FOLLOWED BY** ferric gluconate (FERRLECIT) 125 mg in sodium chloride 0.9 % 100 mL IVPB, 125 mg, Intravenous, Q MWF  Calcium Acetate (Phos Binder) CAPS 2,001 mg, 2,001 mg, Oral, TID WC  ferrous sulfate (IRON 325) tablet 325 mg, 325 mg, Oral, BID WC  0.9 % sodium chloride bolus, 30 mL, Intravenous, PRN  sodium chloride flush 0.9 % injection 10 mL, 10 mL, Intravenous, 2 times per day  sodium chloride flush 0.9 % injection 10 mL, 10 mL, Intravenous, PRN  acetaminophen (TYLENOL) tablet 650 mg, 650 mg, Oral, Q6H PRN **OR** acetaminophen (TYLENOL) suppository 650 mg, 650 mg, Rectal, Q6H PRN  polyethylene glycol (GLYCOLAX) packet 17 g, 17 g, Oral, Daily PRN  promethazine (PHENERGAN) tablet 12.5 mg, 12.5 mg, Oral, Q6H PRN **OR** ondansetron (ZOFRAN) injection 4 mg, 4 mg, Intravenous, Q6H PRN  ascorbic acid (VITAMIN C) tablet 1,000 mg, 1,000 mg, Oral, BID  zinc sulfate (ZINCATE) capsule 50 mg, 50 mg, Oral, Daily  amLODIPine (NORVASC) tablet 10 mg, 10 mg, Oral, Daily  hydrALAZINE (APRESOLINE) tablet 50 mg, 50 mg, Oral, TID  metoprolol tartrate (LOPRESSOR) tablet 50 mg, 50 mg, Oral, BID  rosuvastatin (CRESTOR) tablet 5 mg, 5 mg, Oral, Nightly  Allergies:  Patient has no known allergies. Social History:    TOBACCO:   reports that he has quit smoking. He has never used smokeless tobacco.  ETOH:   reports previous alcohol use. DRUGS:   reports no history of drug use. Family History:   History reviewed. No pertinent family history.          PHYSICAL EXAM:      Vitals:    VITALS:  /65   Pulse 84   Temp 98.2 °F (36.8 °C) (Oral)   Resp 16   Ht 5' 8\" (1.727 m)   Wt 131 lb 2.8 oz (59.5 kg)   SpO2 93%   BMI 19.94 kg/m²   24HR INTAKE/OUTPUT:      Intake/Output Summary (Last 24 hours) at 7/29/2020 1607  Last data filed at 7/29/2020 1454  Gross per 24 hour   Intake 710 ml   Output 1650 ml   Net -940 ml       DATA:    CBC with Differential:    Lab Results   Component Value Date    WBC 8.2 07/29/2020    RBC 2.62 07/29/2020    HGB 8.3 07/29/2020    HCT 26.0 07/29/2020     07/29/2020    MCV 99.2 07/29/2020    MCH 31.7 07/29/2020    MCHC 31.9 07/29/2020    RDW 21.8 07/29/2020    NRBC 1.7 07/19/2020    LYMPHOPCT 11.5 07/23/2020 MONOPCT 7.6 07/23/2020    BASOPCT 0.0 07/23/2020    MONOSABS 0.54 07/23/2020    LYMPHSABS 0.81 07/23/2020    EOSABS 0.08 07/23/2020    BASOSABS 0.00 07/23/2020     CMP:    Lab Results   Component Value Date     07/29/2020    K 4.0 07/29/2020    K 3.5 07/23/2020    CL 97 07/29/2020    CO2 28 07/29/2020    BUN 38 07/29/2020    CREATININE 6.2 07/29/2020    GFRAA 11 07/29/2020    LABGLOM 9 07/29/2020    GLUCOSE 83 07/29/2020    PROT 5.5 07/28/2020    LABALBU 3.5 07/28/2020    CALCIUM 9.8 07/29/2020    BILITOT 1.5 07/28/2020    ALKPHOS 71 07/28/2020    AST 14 07/28/2020    ALT 10 07/28/2020     Phosphorus: 3.1mg/dL    Radiology Review:        Chest xray 7/13/20         No airspace opacities or pleural effusion.                Brief Summary if Initial Consult:   The patient is a 75 y.o. 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 Initially, he presented to the ER 7/12/20 for generalized aches, weakness, dry cough, some dyspnea on exertion, diarrhea, and overall did not feel well. His wife and son had similar symptoms that started a few days before and tested positive for [address]. A COVID19 test was sent but did not result and was canceled. Treated symptomatically and sent home. His symptoms persisted since then. Yesterday ( 7/13/20) he went to the dialysis center and reportedly was found to have a temperature of 104.5. He was therefore sent to the ED for getting dialysis done. ER lab work revealed sodium 133, potassium 5.3, BUN 62, creatinine 8.2, Anion gap 18. IMPRESSION/RECOMMENDATIONS:      ESRD- on Monday, Wednesday, Friday schedule. GFR: 6  Anemia- Microcytic Anemia- secondary to iron deficiency and ESRD. on ferrous sulfate and Epoetin 3,000U 3x/week, fex/week with dialysis.    COVID19- on ascorbic acid, Vitamin D, Zinc, Remdesirvir and Dexamethasone  Uncontrolled hypertension in the setting of Renal Failure-on Norvasc, Hydralazine, Metoprolol, Doxazosin, likely secondary to volume overload from missed hemodialysis     PLAN:  HD Monday Wednesday Friday as per his schedule  Transfusion per primary team, hemoglobin remains stable, surgery on board for new right flank intramuscular hematoma of the rectus muscle, he may need IR embolization if the hemoglobin continues to drop  C/W Retacrit and IV Iron with dialysis  Hold BP meds for a SBP of less than 120  Agree with discharge plans to home and continue HD at [address] [place] [address],[address], [place]

## 2020-07-29 NOTE — DISCHARGE SUMMARY
[facility] Physician Discharge Summary       Yola Hagan DO  [address] [address]  [phone]          [address] [address]      DIALYSIS APPOINTMENTS ARE MONDAY, [address] FROM THE [address]ING [address] [facility] [phone]. Activity level: As tolerated     Dispo: home    Condition on discharge: Stable     Patient ID:  Christopher Wong  [id number]  74 y.o.  1945    Admit date: 7/13/2020    Discharge date and time:  7/29/2020  10:42 AM    Admission Diagnoses: Active Problems:    COVID-19    Acute respiratory failure due to COVID-19    Pneumonia due to COVID-19 virus    ESRD (end stage renal disease) (HCC)    Thrombocytopenia (HCC)    Melena    Anemia associated with acute blood loss  Resolved Problems:    * No resolved hospital problems. *      Discharge Diagnoses: Active Problems:    COVID-19    Acute respiratory failure due to COVID-19    Pneumonia due to COVID-19 virus    ESRD (end stage renal disease) (HCC)    Thrombocytopenia (HCC)    Melena    Anemia associated with acute blood loss  Resolved Problems:    * No resolved hospital problems. *      Consults:  IP CONSULT TO INTERNAL MEDICINE  IP CONSULT TO NEPHROLOGY  IP CONSULT TO INFECTIOUS DISEASES  IP CONSULT TO PHARMACY  IP CONSULT TO IV TEAM  IP CONSULT TO IV TEAM  IP CONSULT TO GENERAL SURGERY  IP CONSULT TO IV TEAM  IP CONSULT TO IV TEAM  IP CONSULT TO PALLIATIVE CARE  IP CONSULT TO [address] [place] [address] Course:   Patient Christopher Wong is a 75 y.o. presented with fever found to be hypoxic and he tested positive for COVID-19 [U07.1]  COVID-19 [U07.1], will admit the patient for evaluation treated him for the following      1. Acute hypoxic respiratory failure due to COVID-19 pneumonia, initially oxygen saturation was below 90% on room air, required O2 nasal cannula, currently resolved patient is off oxygen.   2.  Acute COVID-19 pneumonia, [P.O.:180]  Out: -   No intake/output data recorded. LABS:  Recent Labs     20  1305 20  0541    139   K 4.0 4.0   CL 99 97*   CO2 30* 28   BUN 29* 38*   CREATININE 4.9* 6.2*   GLUCOSE 90 83   CALCIUM 9.9 9.8       Recent Labs     20  1115 20  1305 20  0541   WBC 9.1  --  8.2   RBC 3.09*  --  2.62*   HGB 9.5* 8.0* 8.3*   HCT 29.5* 25.3* 26.0*   MCV 95.5  --  99.2   MCH 30.7  --  31.7   MCHC 32.2  --  31.9*   RDW 22.0*  --  21.8*     --  139   MPV 10.7  --  10.4       No results for input(s): POCGLU in the last 72 hours. Imaging:  Xr Chest Portable    Result Date: 2020  Patient MRN:  [id number] : 1945 Age: 75 years Gender: Male Order Date:  2020 6:15 PM EXAM: XR CHEST PORTABLE COMPARISON: 2020 INDICATION:  shortness of breath shortness of breath FINDINGS: The heart is normal in size. No focal airspace opacity. There is no pleural effusion. There is no pneumothorax. No free air beneath the diaphragms. No airspace opacities or pleural effusion.        Patient Instructions:      Medication List      START taking these medications    ferrous sulfate 325 (65 Fe) MG tablet  Commonly known as:  IRON 325  Take 1 tablet by mouth 2 times daily (with meals)     pantoprazole 40 MG tablet  Commonly known as:  PROTONIX  Take 1 tablet by mouth every morning (before breakfast)  Start taking on:  2020     promethazine 12.5 MG tablet  Commonly known as:  PHENERGAN  Take 1 tablet by mouth every 6 hours as needed for Nausea     sucralfate 1 GM tablet  Commonly known as:  CARAFATE  Take 1 tablet by mouth 4 times daily     vitamin C 250 MG tablet  Take 1 tablet by mouth 2 times daily for 10 days     zinc sulfate 220 (50 Zn) MG capsule  Commonly known as:  ZINCATE  Take 1 capsule by mouth daily        CHANGE how you take these medications    doxazosin 4 MG tablet  Commonly known as:  CARDURA  Take 1 tablet by mouth nightly  What changed: · medication strength  · how much to take        CONTINUE taking these medications    amLODIPine 10 MG tablet  Commonly known as:  NORVASC  Take 1 tablet by mouth daily     calcium acetate 667 MG capsule  Commonly known as:  PHOSLO     Crestor 10 MG tablet  Generic drug:  rosuvastatin     hydrALAZINE 50 MG tablet  Commonly known as:  APRESOLINE     metoprolol tartrate 50 MG tablet  Commonly known as:  LOPRESSOR     Q-10 CO-ENZYME PO     vitamin D 50 MCG (2000 UT) Caps capsule        STOP taking these medications    aspirin 81 MG tablet     NIFEdipine 30 MG extended release tablet  Commonly known as:  ADALAT CC           Where to Get Your Medications      These medications were sent to [address] [place] [address] [phone] - [phone]  [address] [place] [address]    Phone:  [phone]   · amLODIPine 10 MG tablet  · doxazosin 4 MG tablet  · ferrous sulfate 325 (65 Fe) MG tablet  · pantoprazole 40 MG tablet  · promethazine 12.5 MG tablet  · sucralfate 1 GM tablet  · vitamin C 250 MG tablet     You can get these medications from any pharmacy    You don't need a prescription for these medications  · zinc sulfate 220 (50 Zn) MG capsule           Note that more than 30 minutes was spent in preparing discharge papers, discussing discharge with patient, medication review, etc.    Signed:  Electronically signed by Montserrat Coleman MD on 7/29/2020 at 10:42 AM

## 2020-07-30 ENCOUNTER — CARE COORDINATION (OUTPATIENT)
Dept: CASE MANAGEMENT | Age: 75
End: 2020-07-30

## 2020-07-30 NOTE — CARE COORDINATION
Called pt for the COVID follow up call. Pt was POSITIVE for COVID 7/13, 7/24/20. Pt was resting, spoke with pt wife Taqueria Florez. Taqueria Florez reported the pt is much better now, denied any fever, SOB, chills, aches or nausea and vomiting. Pt is fatigued & has a cough that has not changed, no worse. Wife  meds, reviewed new, changed & stopped meds, voiced understanding. Advance Care Planning  People with COVID-19 may have no symptoms, mild symptoms, such as fever, cough, and shortness of breath or they may have more severe illness, developing severe and fatal pneumonia. As a result, Advance Care Planning with attention to naming a health care decision maker (someone you trust to make healthcare decisions for you if you could not speak for yourself) and sharing other health care preferences is important BEFORE a possible health crisis. Please contact your Primary Care Provider to discuss Advance Care Planning. Preventing the Spread of Coronavirus Disease 2019 in Homes and Residential Communities  For the most recent information go to [employer]    Prevention steps for People with confirmed or suspected COVID-19 (including persons under investigation) who do not need to be hospitalized  and   People with confirmed COVID-19 who were hospitalized and determined to be medically stable to go home    Your healthcare provider and public health staff will evaluate whether you can be cared for at home. If it is determined that you do not need to be hospitalized and can be isolated at home, you will be monitored by staff from your local or state health department. You should follow the prevention steps below until a healthcare provider or local or state health department says you can return to your normal activities. Stay home except to get medical care  People who are mildly ill with COVID-19 are able to isolate at home during their illness.  You should restrict activities outside your home, except for getting medical care. Do not go to work, school, or public areas. Avoid using public transportation, ride-sharing, or taxis. Separate yourself from other people and animals in your home  People: As much as possible, you should stay in a specific room and away from other people in your home. Also, you should use a separate bathroom, if available. Animals: You should restrict contact with pets and other animals while you are sick with COVID-19, just like you would around other people. Although there have not been reports of pets or other animals becoming sick with COVID-19, it is still recommended that people sick with COVID-19 limit contact with animals until more information is known about the virus. When possible, have another member of your household care for your animals while you are sick. If you are sick with COVID-19, avoid contact with your pet, including petting, snuggling, being kissed or licked, and sharing food. If you must care for your pet or be around animals while you are sick, wash your hands before and after you interact with pets and wear a facemask. Call ahead before visiting your doctor  If you have a medical appointment, call the healthcare provider and tell them that you have or may have COVID-19. This will help the healthcare providers office take steps to keep other people from getting infected or exposed. Wear a facemask  You should wear a facemask when you are around other people (e.g., sharing a room or vehicle) or pets and before you enter a healthcare providers office. If you are not able to wear a facemask (for example, because it causes trouble breathing), then people who live with you should not stay in the same room with you, or they should wear a facemask if they enter your room. Cover your coughs and sneezes  Cover your mouth and nose with a tissue when you cough or sneeze. Throw used tissues in a lined trash can.  Immediately wash your hands with soap and water for at least 20 seconds or, if soap and water are not available, clean your hands with an alcohol-based hand  that contains at least 60% alcohol. Clean your hands often  Wash your hands often with soap and water for at least 20 seconds, especially after blowing your nose, coughing, or sneezing; going to the bathroom; and before eating or preparing food. If soap and water are not readily available, use an alcohol-based hand  with at least 60% alcohol, covering all surfaces of your hands and rubbing them together until they feel dry. Soap and water are the best option if hands are visibly dirty. Avoid touching your eyes, nose, and mouth with unwashed hands. Avoid sharing personal household items  You should not share dishes, drinking glasses, cups, eating utensils, towels, or bedding with other people or pets in your home. After using these items, they should be washed thoroughly with soap and water. Clean all high-touch surfaces everyday  High touch surfaces include counters, tabletops, doorknobs, bathroom fixtures, toilets, phones, keyboards, tablets, and bedside tables. Also, clean any surfaces that may have blood, stool, or body fluids on them. Use a household cleaning spray or wipe, according to the label instructions. Labels contain instructions for safe and effective use of the cleaning product including precautions you should take when applying the product, such as wearing gloves and making sure you have good ventilation during use of the product. Monitor your symptoms  Seek prompt medical attention if your illness is worsening (e.g., difficulty breathing). Before seeking care, call your healthcare provider and tell them that you have, or are being evaluated for, COVID-19. Put on a facemask before you enter the facility.  These steps will help the healthcare providers office to keep other people in the office or waiting room from getting infected or exposed. Ask your healthcare provider to call the local or state health department. Persons who are placed under active monitoring or facilitated self-monitoring should follow instructions provided by their local health department or occupational health professionals, as appropriate. When working with your local health department check their available hours. If you have a medical emergency and need to call 911, notify the dispatch personnel that you have, or are being evaluated for COVID-19. If possible, put on a facemask before emergency medical services arrive. Discontinuing home isolation  Patients with confirmed COVID-19 should remain under home isolation precautions until the risk of secondary transmission to others is thought to be low. The decision to discontinue home isolation precautions should be made on a case-by-case basis, in consultation with healthcare providers and state and local health departments. Declined LOOP program.    Wife denied any needs or concerns. CTN will continue to follow.     [address] Transition Nurse  [phone]

## 2020-08-06 ENCOUNTER — CARE COORDINATION (OUTPATIENT)
Dept: CASE MANAGEMENT | Age: 75
End: 2020-08-06

## 2020-08-06 NOTE — CARE COORDINATION
CTN final outreach call for [address]  Transitions episode on 8/6/2020  Christopher Wong has been provided the following resources and education related to COVID-19:                         Signs, symptoms and red flags related to COVID-19            CDC exposure and quarantine guidelines            [employer] exposure contact - [phone]            Contact for their local Department of Health     Spoke with James's wife Miguel Abad for final outreach call for [address], [address], she stated he is presently napping. Miguel Abad stated other than continuing to have decreased appetite and intermittent nausea Sanda Heimlich is doing better. She ha snot been able to schedule his follow up appt because she needs to make sure their son is able to help her. He continues with HD. Miguel Pollo denies any needs, questions, or concerns at this time. No further outreach scheduled with this CTN. Patient has this CTN contact information if future needs arise.

## 2020-10-03 ENCOUNTER — APPOINTMENT (OUTPATIENT)
Dept: CT IMAGING | Age: 75
End: 2020-10-03
Payer: MEDICARE

## 2020-10-03 ENCOUNTER — HOSPITAL ENCOUNTER (EMERGENCY)
Age: 75
Discharge: HOME | End: 2020-10-03
Attending: EMERGENCY MEDICINE
Payer: MEDICARE

## 2020-10-03 VITALS
BODY MASS INDEX: 19.11 KG/M2 | DIASTOLIC BLOOD PRESSURE: 72 MMHG | HEART RATE: 70 BPM | WEIGHT: 129 LBS | TEMPERATURE: 97.7 F | OXYGEN SATURATION: 98 % | SYSTOLIC BLOOD PRESSURE: 175 MMHG | HEIGHT: 69 IN | RESPIRATION RATE: 18 BRPM

## 2020-10-03 LAB
ANION GAP SERPL CALCULATED.3IONS-SCNC: 7 MMOL/L (ref 7–16)
BASOPHILS ABSOLUTE: 0.06 E9/L (ref 0–0.2)
BASOPHILS RELATIVE PERCENT: 1.3 % (ref 0–2)
BUN BLDV-MCNC: 34 MG/DL (ref 8–23)
CALCIUM SERPL-MCNC: 9.7 MG/DL (ref 8.6–10.2)
CHLORIDE BLD-SCNC: 100 MMOL/L (ref 98–107)
CO2: 30 MMOL/L (ref 22–29)
CREAT SERPL-MCNC: 4.6 MG/DL (ref 0.7–1.2)
EOSINOPHILS ABSOLUTE: 0.26 E9/L (ref 0.05–0.5)
EOSINOPHILS RELATIVE PERCENT: 5.5 % (ref 0–6)
GFR AFRICAN AMERICAN: 15
GFR NON-AFRICAN AMERICAN: 12 ML/MIN/1.73
GLUCOSE BLD-MCNC: 90 MG/DL (ref 74–99)
HCT VFR BLD CALC: 43.3 % (ref 37–54)
HEMOGLOBIN: 13.2 G/DL (ref 12.5–16.5)
IMMATURE GRANULOCYTES #: 0.02 E9/L
IMMATURE GRANULOCYTES %: 0.4 % (ref 0–5)
INR BLD: 1
LYMPHOCYTES ABSOLUTE: 0.58 E9/L (ref 1.5–4)
LYMPHOCYTES RELATIVE PERCENT: 12.2 % (ref 20–42)
MCH RBC QN AUTO: 32.7 PG (ref 26–35)
MCHC RBC AUTO-ENTMCNC: 30.5 % (ref 32–34.5)
MCV RBC AUTO: 107.2 FL (ref 80–99.9)
MONOCYTES ABSOLUTE: 0.42 E9/L (ref 0.1–0.95)
MONOCYTES RELATIVE PERCENT: 8.8 % (ref 2–12)
NEUTROPHILS ABSOLUTE: 3.43 E9/L (ref 1.8–7.3)
NEUTROPHILS RELATIVE PERCENT: 71.8 % (ref 43–80)
PDW BLD-RTO: 15.7 FL (ref 11.5–15)
PLATELET # BLD: 94 E9/L (ref 130–450)
PLATELET CONFIRMATION: NORMAL
PMV BLD AUTO: 11.8 FL (ref 7–12)
POTASSIUM REFLEX MAGNESIUM: 4.8 MMOL/L (ref 3.5–5)
PROTHROMBIN TIME: 11.6 SEC (ref 9.3–12.4)
RBC # BLD: 4.04 E12/L (ref 3.8–5.8)
RBC # BLD: NORMAL 10*6/UL
SEDIMENTATION RATE, ERYTHROCYTE: 1 MM/HR (ref 0–15)
SODIUM BLD-SCNC: 137 MMOL/L (ref 132–146)
WBC # BLD: 4.8 E9/L (ref 4.5–11.5)

## 2020-10-03 PROCEDURE — 96374 THER/PROPH/DIAG INJ IV PUSH: CPT

## 2020-10-03 PROCEDURE — 99283 EMERGENCY DEPT VISIT LOW MDM: CPT

## 2020-10-03 PROCEDURE — 96375 TX/PRO/DX INJ NEW DRUG ADDON: CPT

## 2020-10-03 PROCEDURE — 85651 RBC SED RATE NONAUTOMATED: CPT

## 2020-10-03 PROCEDURE — 70450 CT HEAD/BRAIN W/O DYE: CPT

## 2020-10-03 PROCEDURE — 99284 EMERGENCY DEPT VISIT MOD MDM: CPT

## 2020-10-03 PROCEDURE — 80048 BASIC METABOLIC PNL TOTAL CA: CPT

## 2020-10-03 PROCEDURE — 85025 COMPLETE CBC W/AUTO DIFF WBC: CPT

## 2020-10-03 PROCEDURE — 6370000000 HC RX 637 (ALT 250 FOR IP): Performed by: EMERGENCY MEDICINE

## 2020-10-03 PROCEDURE — 85610 PROTHROMBIN TIME: CPT

## 2020-10-03 PROCEDURE — 6360000002 HC RX W HCPCS: Performed by: EMERGENCY MEDICINE

## 2020-10-03 RX ORDER — FLUTICASONE PROPIONATE 50 MCG
1 SPRAY, SUSPENSION (ML) NASAL DAILY
Qty: 2 BOTTLE | Refills: 1 | Status: SHIPPED | OUTPATIENT
Start: 2020-10-03 | End: 2020-10-11

## 2020-10-03 RX ORDER — ACETAMINOPHEN 500 MG
1000 TABLET ORAL ONCE
Status: COMPLETED | OUTPATIENT
Start: 2020-10-03 | End: 2020-10-03

## 2020-10-03 RX ORDER — 0.9 % SODIUM CHLORIDE 0.9 %
1000 INTRAVENOUS SOLUTION INTRAVENOUS ONCE
Status: DISCONTINUED | OUTPATIENT
Start: 2020-10-03 | End: 2020-10-03 | Stop reason: HOSPADM

## 2020-10-03 RX ORDER — DIPHENHYDRAMINE HYDROCHLORIDE 50 MG/ML
25 INJECTION INTRAMUSCULAR; INTRAVENOUS ONCE
Status: COMPLETED | OUTPATIENT
Start: 2020-10-03 | End: 2020-10-03

## 2020-10-03 RX ORDER — DOXAZOSIN MESYLATE 4 MG/1
1 TABLET ORAL NIGHTLY
Status: ON HOLD | COMMUNITY
End: 2022-10-16 | Stop reason: HOSPADM

## 2020-10-03 RX ORDER — METOCLOPRAMIDE HYDROCHLORIDE 5 MG/ML
10 INJECTION INTRAMUSCULAR; INTRAVENOUS ONCE
Status: COMPLETED | OUTPATIENT
Start: 2020-10-03 | End: 2020-10-03

## 2020-10-03 RX ADMIN — DIPHENHYDRAMINE HYDROCHLORIDE 25 MG: 50 INJECTION, SOLUTION INTRAMUSCULAR; INTRAVENOUS at 13:00

## 2020-10-03 RX ADMIN — METOCLOPRAMIDE 10 MG: 5 INJECTION, SOLUTION INTRAMUSCULAR; INTRAVENOUS at 13:00

## 2020-10-03 RX ADMIN — ACETAMINOPHEN 1000 MG: 500 TABLET ORAL at 13:00

## 2020-10-03 ASSESSMENT — ENCOUNTER SYMPTOMS
SINUS PAIN: 0
PHOTOPHOBIA: 0
CHOKING: 0
VOMITING: 0
ABDOMINAL PAIN: 0
COUGH: 0
NAUSEA: 0
COLOR CHANGE: 0
CHEST TIGHTNESS: 0
RHINORRHEA: 0
SHORTNESS OF BREATH: 0
APNEA: 0
BACK PAIN: 0

## 2020-10-03 ASSESSMENT — PAIN SCALES - GENERAL: PAINLEVEL_OUTOF10: 6

## 2020-10-03 NOTE — ED PROVIDER NOTES
Dharmesh Antonio is a 67 y.o. male presenting to the ED for headache, beginning 2-3 days ago. The complaint has been intermittent, moderate in severity, and worsened by nothing. 68 yo m w bilateral sharp occipital parietal ha x 2-3 days, pain is 6/10 and intermittent. Pt denies any other compaluints , denies cough or cold sx, fever, neck pain, vision changes, focal weakness, abd pain, cp, sob. Pt was recently put on cipro by nephrologist for uti. Review of Systems:   Review of Systems   Constitutional: Negative for fever and unexpected weight change. HENT: Negative for congestion, postnasal drip, rhinorrhea and sinus pain. Eyes: Negative for photophobia and visual disturbance. Respiratory: Negative for apnea, cough, choking, chest tightness and shortness of breath. Cardiovascular: Negative for chest pain and palpitations. Gastrointestinal: Negative for abdominal pain, nausea and vomiting. Endocrine: Negative for polyphagia and polyuria. Genitourinary: Negative for difficulty urinating, enuresis, flank pain, hematuria and penile pain. Musculoskeletal: Negative for back pain and neck pain. Skin: Negative for color change and pallor. Neurological: Positive for headaches. Negative for dizziness, tremors, seizures, syncope, speech difficulty, weakness and numbness. Hematological: Negative for adenopathy. Psychiatric/Behavioral: Negative for agitation.                 --------------------------------------------- PAST HISTORY ---------------------------------------------  Past Medical History:  has a past medical history of Blind left eye, Chronic kidney disease, Dialysis patient ([facility] [address] Hemodialysis patient ([facility] [address] Hyperlipidemia, and Hypertension. Past Surgical History:  has a past surgical history that includes AV fistula creation (Left, 2015); HEMODIALYSIS ACCESS PERCUTANEOUS REVISION (Left, 2019); PERIPHERAL VASCULAR BYPASS (2008);  Upper gastrointestinal endoscopy (N/A, 7/20/2020); and Cardiac surgery. Social History:  reports that he has quit smoking. He has never used smokeless tobacco. He reports previous alcohol use. He reports that he does not use drugs. Family History: family history is not on file. The patients home medications have been reviewed. Allergies: Patient has no known allergies.     -------------------------------------------------- RESULTS -------------------------------------------------  All laboratory and radiology results have been personally reviewed by myself   LABS:  Results for orders placed or performed during the hospital encounter of 10/03/20   CBC Auto Differential   Result Value Ref Range    WBC 4.8 4.5 - 11.5 E9/L    RBC 4.04 3.80 - 5.80 E12/L    Hemoglobin 13.2 12.5 - 16.5 g/dL    Hematocrit 43.3 37.0 - 54.0 %    .2 (H) 80.0 - 99.9 fL    MCH 32.7 26.0 - 35.0 pg    MCHC 30.5 (L) 32.0 - 34.5 %    RDW 15.7 (H) 11.5 - 15.0 fL    Platelets 94 (L) 475 - 450 E9/L    MPV 11.8 7.0 - 12.0 fL    Neutrophils % 71.8 43.0 - 80.0 %    Immature Granulocytes % 0.4 0.0 - 5.0 %    Lymphocytes % 12.2 (L) 20.0 - 42.0 %    Monocytes % 8.8 2.0 - 12.0 %    Eosinophils % 5.5 0.0 - 6.0 %    Basophils % 1.3 0.0 - 2.0 %    Neutrophils Absolute 3.43 1.80 - 7.30 E9/L    Immature Granulocytes # 0.02 E9/L    Lymphocytes Absolute 0.58 (L) 1.50 - 4.00 E9/L    Monocytes Absolute 0.42 0.10 - 0.95 E9/L    Eosinophils Absolute 0.26 0.05 - 0.50 E9/L    Basophils Absolute 0.06 0.00 - 0.20 E9/L    RBC Morphology Normal    Basic Metabolic Panel w/ Reflex to MG   Result Value Ref Range    Sodium 137 132 - 146 mmol/L    Potassium reflex Magnesium 4.8 3.5 - 5.0 mmol/L    Chloride 100 98 - 107 mmol/L    CO2 30 (H) 22 - 29 mmol/L    Anion Gap 7 7 - 16 mmol/L    Glucose 90 74 - 99 mg/dL    BUN 34 (H) 8 - 23 mg/dL    CREATININE 4.6 (H) 0.7 - 1.2 mg/dL    GFR Non-African American 12 >=60 mL/min/1.73    GFR African American 15     Calcium 9.7 8.6 - 10.2 mg/dL   Protime-INR Result Value Ref Range    Protime 11.6 9.3 - 12.4 sec    INR 1.0    Sedimentation Rate   Result Value Ref Range    Sed Rate 1 0 - 15 mm/Hr   Platelet Confirmation   Result Value Ref Range    Platelet Confirmation CONFIRMED        RADIOLOGY:  Interpreted by Radiologist.  CT Head WO Contrast   Final Result   Left maxillary acute sinusitis. No evidence for acute intracranial findings. Chronic ischemic changes as above.             ------------------------- NURSING NOTES AND VITALS REVIEWED ---------------------------   The nursing notes within the ED encounter and vital signs as below have been reviewed. BP (!) 175/72   Pulse 70   Temp 97.7 °F (36.5 °C) (Temporal)   Resp 18   Ht 5' 9\" (1.753 m)   Wt 129 lb (58.5 kg)   SpO2 98%   BMI 19.05 kg/m²   Oxygen Saturation Interpretation: Normal      ---------------------------------------------------PHYSICAL EXAM--------------------------------------    Physical Exam  Vitals signs reviewed. Constitutional:       General: He is not in acute distress. Appearance: Normal appearance. He is not toxic-appearing. HENT:      Head: Normocephalic and atraumatic. Right Ear: External ear normal.      Left Ear: External ear normal.      Nose: Nose normal. No congestion. Mouth/Throat:      Mouth: Mucous membranes are moist.      Pharynx: Oropharynx is clear. No posterior oropharyngeal erythema. Eyes:      Extraocular Movements: Extraocular movements intact. Pupils: Pupils are equal, round, and reactive to light. Neck:      Musculoskeletal: Normal range of motion and neck supple. No muscular tenderness. Cardiovascular:      Rate and Rhythm: Normal rate and regular rhythm. Pulses: Normal pulses. Heart sounds: No murmur. Pulmonary:      Effort: Pulmonary effort is normal.      Breath sounds: No wheezing or rhonchi. Chest:      Chest wall: No tenderness. Abdominal:      General: Bowel sounds are normal.      Tenderness:  There is side effects of medication before taking. I did discuss warning signs for when to return to the Emergency Room, and the patient verbalized understanding      Counseling: The emergency provider has spoken with the patient and discussed todays results, in addition to providing specific details for the plan of care and counseling regarding the diagnosis and prognosis. Questions are answered at this time and they are agreeable with the plan.      --------------------------------- IMPRESSION AND DISPOSITION ---------------------------------    IMPRESSION  1. Acute nonintractable headache, unspecified headache type    2. Acute non-recurrent maxillary sinusitis    3. Chronic kidney disease, unspecified CKD stage        DISPOSITION  Disposition: Discharge to home  Patient condition is good      NOTE: This report was transcribed using voice recognition software.  Every effort was made to ensure accuracy; however, inadvertent computerized transcription errors may be present       Candy Bragg DO  10/03/20 [id number]

## 2020-10-11 ENCOUNTER — APPOINTMENT (OUTPATIENT)
Dept: CT IMAGING | Age: 75
DRG: 073 | End: 2020-10-11
Payer: MEDICARE

## 2020-10-11 ENCOUNTER — HOSPITAL ENCOUNTER (INPATIENT)
Age: 75
LOS: 1 days | Discharge: ANOTHER HOSPITAL | DRG: 073 | End: 2020-10-12
Attending: EMERGENCY MEDICINE | Admitting: INTERNAL MEDICINE
Payer: MEDICARE

## 2020-10-11 ENCOUNTER — APPOINTMENT (OUTPATIENT)
Dept: GENERAL RADIOLOGY | Age: 75
DRG: 073 | End: 2020-10-11
Payer: MEDICARE

## 2020-10-11 PROBLEM — G93.40 ENCEPHALOPATHY: Status: ACTIVE | Noted: 2020-10-11

## 2020-10-11 PROBLEM — R41.82 AMS (ALTERED MENTAL STATUS): Status: ACTIVE | Noted: 2020-10-11

## 2020-10-11 LAB
ALBUMIN SERPL-MCNC: 3.7 G/DL (ref 3.5–5.2)
ALP BLD-CCNC: 94 U/L (ref 40–129)
ALT SERPL-CCNC: 21 U/L (ref 0–40)
ANION GAP SERPL CALCULATED.3IONS-SCNC: 12 MMOL/L (ref 7–16)
ANISOCYTOSIS: ABNORMAL
AST SERPL-CCNC: 27 U/L (ref 0–39)
BASOPHILS ABSOLUTE: 0.06 E9/L (ref 0–0.2)
BASOPHILS RELATIVE PERCENT: 1.3 % (ref 0–2)
BILIRUB SERPL-MCNC: 0.5 MG/DL (ref 0–1.2)
BUN BLDV-MCNC: 46 MG/DL (ref 8–23)
CALCIUM SERPL-MCNC: 9.8 MG/DL (ref 8.6–10.2)
CHLORIDE BLD-SCNC: 98 MMOL/L (ref 98–107)
CHP ED QC CHECK: NORMAL
CO2: 26 MMOL/L (ref 22–29)
CREAT SERPL-MCNC: 6.4 MG/DL (ref 0.7–1.2)
EOSINOPHILS ABSOLUTE: 0.06 E9/L (ref 0.05–0.5)
EOSINOPHILS RELATIVE PERCENT: 1.3 % (ref 0–6)
GFR AFRICAN AMERICAN: 10
GFR NON-AFRICAN AMERICAN: 9 ML/MIN/1.73
GLUCOSE BLD-MCNC: 86 MG/DL
GLUCOSE BLD-MCNC: 92 MG/DL (ref 74–99)
HCT VFR BLD CALC: 38.9 % (ref 37–54)
HEMOGLOBIN: 12 G/DL (ref 12.5–16.5)
IMMATURE GRANULOCYTES #: 0.01 E9/L
IMMATURE GRANULOCYTES %: 0.2 % (ref 0–5)
LACTIC ACID: 0.9 MMOL/L (ref 0.5–2.2)
LYMPHOCYTES ABSOLUTE: 0.93 E9/L (ref 1.5–4)
LYMPHOCYTES RELATIVE PERCENT: 20.3 % (ref 20–42)
MCH RBC QN AUTO: 32.2 PG (ref 26–35)
MCHC RBC AUTO-ENTMCNC: 30.8 % (ref 32–34.5)
MCV RBC AUTO: 104.3 FL (ref 80–99.9)
METER GLUCOSE: 86 MG/DL (ref 74–99)
MONOCYTES ABSOLUTE: 0.45 E9/L (ref 0.1–0.95)
MONOCYTES RELATIVE PERCENT: 9.8 % (ref 2–12)
NEUTROPHILS ABSOLUTE: 3.07 E9/L (ref 1.8–7.3)
NEUTROPHILS RELATIVE PERCENT: 67.1 % (ref 43–80)
OVALOCYTES: ABNORMAL
PDW BLD-RTO: 14.6 FL (ref 11.5–15)
PLATELET # BLD: 83 E9/L (ref 130–450)
PLATELET CONFIRMATION: NORMAL
PMV BLD AUTO: 11.3 FL (ref 7–12)
POIKILOCYTES: ABNORMAL
POTASSIUM REFLEX MAGNESIUM: 4.7 MMOL/L (ref 3.5–5)
PRO-BNP: ABNORMAL PG/ML (ref 0–450)
RBC # BLD: 3.73 E12/L (ref 3.8–5.8)
SODIUM BLD-SCNC: 136 MMOL/L (ref 132–146)
TEAR DROP CELLS: ABNORMAL
TOTAL PROTEIN: 6.2 G/DL (ref 6.4–8.3)
TROPONIN: 0.11 NG/ML (ref 0–0.03)
WBC # BLD: 4.6 E9/L (ref 4.5–11.5)

## 2020-10-11 PROCEDURE — 80053 COMPREHEN METABOLIC PANEL: CPT

## 2020-10-11 PROCEDURE — 2500000003 HC RX 250 WO HCPCS: Performed by: STUDENT IN AN ORGANIZED HEALTH CARE EDUCATION/TRAINING PROGRAM

## 2020-10-11 PROCEDURE — 85025 COMPLETE CBC W/AUTO DIFF WBC: CPT

## 2020-10-11 PROCEDURE — 83880 ASSAY OF NATRIURETIC PEPTIDE: CPT

## 2020-10-11 PROCEDURE — 6370000000 HC RX 637 (ALT 250 FOR IP): Performed by: INTERNAL MEDICINE

## 2020-10-11 PROCEDURE — 99284 EMERGENCY DEPT VISIT MOD MDM: CPT

## 2020-10-11 PROCEDURE — G0378 HOSPITAL OBSERVATION PER HR: HCPCS

## 2020-10-11 PROCEDURE — 6360000002 HC RX W HCPCS: Performed by: INTERNAL MEDICINE

## 2020-10-11 PROCEDURE — 82962 GLUCOSE BLOOD TEST: CPT

## 2020-10-11 PROCEDURE — 70450 CT HEAD/BRAIN W/O DYE: CPT

## 2020-10-11 PROCEDURE — 93005 ELECTROCARDIOGRAM TRACING: CPT | Performed by: STUDENT IN AN ORGANIZED HEALTH CARE EDUCATION/TRAINING PROGRAM

## 2020-10-11 PROCEDURE — 6370000000 HC RX 637 (ALT 250 FOR IP): Performed by: EMERGENCY MEDICINE

## 2020-10-11 PROCEDURE — 84484 ASSAY OF TROPONIN QUANT: CPT

## 2020-10-11 PROCEDURE — 71045 X-RAY EXAM CHEST 1 VIEW: CPT

## 2020-10-11 PROCEDURE — 2580000003 HC RX 258: Performed by: STUDENT IN AN ORGANIZED HEALTH CARE EDUCATION/TRAINING PROGRAM

## 2020-10-11 PROCEDURE — 99285 EMERGENCY DEPT VISIT HI MDM: CPT

## 2020-10-11 PROCEDURE — 2060000000 HC ICU INTERMEDIATE R&B

## 2020-10-11 PROCEDURE — 83605 ASSAY OF LACTIC ACID: CPT

## 2020-10-11 RX ORDER — METOPROLOL TARTRATE 5 MG/5ML
5 INJECTION INTRAVENOUS ONCE
Status: COMPLETED | OUTPATIENT
Start: 2020-10-11 | End: 2020-10-11

## 2020-10-11 RX ORDER — 0.9 % SODIUM CHLORIDE 0.9 %
1000 INTRAVENOUS SOLUTION INTRAVENOUS ONCE
Status: COMPLETED | OUTPATIENT
Start: 2020-10-11 | End: 2020-10-11

## 2020-10-11 RX ORDER — HEPARIN SODIUM 10000 [USP'U]/ML
5000 INJECTION, SOLUTION INTRAVENOUS; SUBCUTANEOUS EVERY 12 HOURS
Status: DISCONTINUED | OUTPATIENT
Start: 2020-10-11 | End: 2020-10-12 | Stop reason: HOSPADM

## 2020-10-11 RX ORDER — HYDRALAZINE HYDROCHLORIDE 50 MG/1
50 TABLET, FILM COATED ORAL 3 TIMES DAILY
Status: DISCONTINUED | OUTPATIENT
Start: 2020-10-11 | End: 2020-10-12 | Stop reason: HOSPADM

## 2020-10-11 RX ORDER — DOXAZOSIN MESYLATE 4 MG/1
4 TABLET ORAL NIGHTLY
Status: DISCONTINUED | OUTPATIENT
Start: 2020-10-11 | End: 2020-10-12 | Stop reason: HOSPADM

## 2020-10-11 RX ORDER — SUCRALFATE 1 G/1
1 TABLET ORAL 4 TIMES DAILY
Status: DISCONTINUED | OUTPATIENT
Start: 2020-10-11 | End: 2020-10-12 | Stop reason: HOSPADM

## 2020-10-11 RX ORDER — HYDRALAZINE HYDROCHLORIDE 25 MG/1
25 TABLET, FILM COATED ORAL ONCE
Status: COMPLETED | OUTPATIENT
Start: 2020-10-11 | End: 2020-10-11

## 2020-10-11 RX ORDER — PANTOPRAZOLE SODIUM 40 MG/1
40 TABLET, DELAYED RELEASE ORAL
Status: DISCONTINUED | OUTPATIENT
Start: 2020-10-12 | End: 2020-10-12 | Stop reason: HOSPADM

## 2020-10-11 RX ORDER — ROSUVASTATIN CALCIUM 5 MG/1
5 TABLET, COATED ORAL NIGHTLY
Status: DISCONTINUED | OUTPATIENT
Start: 2020-10-11 | End: 2020-10-12 | Stop reason: HOSPADM

## 2020-10-11 RX ORDER — METOPROLOL TARTRATE 50 MG/1
50 TABLET, FILM COATED ORAL 2 TIMES DAILY
Status: DISCONTINUED | OUTPATIENT
Start: 2020-10-11 | End: 2020-10-12 | Stop reason: HOSPADM

## 2020-10-11 RX ORDER — GINSENG 100 MG
CAPSULE ORAL 2 TIMES DAILY
Status: ON HOLD | COMMUNITY
End: 2020-10-19 | Stop reason: HOSPADM

## 2020-10-11 RX ADMIN — METOPROLOL TARTRATE 5 MG: 5 INJECTION INTRAVENOUS at 16:27

## 2020-10-11 RX ADMIN — SUCRALFATE 1 G: 1 TABLET ORAL at 20:20

## 2020-10-11 RX ADMIN — HYDRALAZINE HYDROCHLORIDE 25 MG: 25 TABLET, FILM COATED ORAL at 18:15

## 2020-10-11 RX ADMIN — METOPROLOL TARTRATE 50 MG: 50 TABLET, FILM COATED ORAL at 20:20

## 2020-10-11 RX ADMIN — DOXAZOSIN 4 MG: 4 TABLET ORAL at 21:11

## 2020-10-11 RX ADMIN — HEPARIN SODIUM 5000 UNITS: 10000 INJECTION INTRAVENOUS; SUBCUTANEOUS at 20:34

## 2020-10-11 RX ADMIN — HYDRALAZINE HYDROCHLORIDE 50 MG: 50 TABLET, FILM COATED ORAL at 20:18

## 2020-10-11 RX ADMIN — POTASSIUM & SODIUM PHOSPHATES POWDER PACK 280-160-250 MG 250 MG: 280-160-250 PACK at 20:18

## 2020-10-11 RX ADMIN — SODIUM CHLORIDE 1000 ML: 9 INJECTION, SOLUTION INTRAVENOUS at 14:44

## 2020-10-11 RX ADMIN — ROSUVASTATIN CALCIUM 5 MG: 5 TABLET, FILM COATED ORAL at 20:19

## 2020-10-11 ASSESSMENT — PAIN DESCRIPTION - DESCRIPTORS: DESCRIPTORS: BURNING;TINGLING

## 2020-10-11 ASSESSMENT — PAIN SCALES - GENERAL
PAINLEVEL_OUTOF10: 0

## 2020-10-11 ASSESSMENT — ENCOUNTER SYMPTOMS
WHEEZING: 0
NAUSEA: 0
EYE PAIN: 0
COUGH: 0
SORE THROAT: 0
ABDOMINAL PAIN: 0
EYE REDNESS: 0
BACK PAIN: 0
SINUS PRESSURE: 0
SHORTNESS OF BREATH: 0
DIARRHEA: 0
EYE DISCHARGE: 0
VOMITING: 0

## 2020-10-11 ASSESSMENT — PAIN DESCRIPTION - ORIENTATION: ORIENTATION: RIGHT

## 2020-10-11 NOTE — ED NOTES
Per Dr. Pramod Kaur to send pt to floor with this BP. Medicated with Metoprolol tartrate 5 mg IV at 1630 without effect.      Cloria Cooks, RN  10/11/20 188 hospitals       Cloria Cooks, RN  10/11/20 [id number]

## 2020-10-11 NOTE — PROGRESS NOTES
Spoke to Maintenance dept and they will not come in to verify the negative air, was told to make sure negative air is turned on and the light goes on.   They will verify it in the morning

## 2020-10-11 NOTE — ED PROVIDER NOTES
Chief Complaint   Patient presents with    Hallucinations     pt was diagnosed with shingles on friday. since taking the pain meds and antivirals, patient is having visual and auditory hallucinations along with generalized AMS. Is a 48-year-old male with a medical history of end-stage renal disease on dialysis who presents today for altered mental status and hallucinations. Patients wife is at bedside he states he was diagnosed with shingles several days ago by his PCP, he was sent home with valacyclovir as well as Norco, on Friday. Wife states that he took these medications on Friday and Saturday, and started to develop acute altered mental status and visual hallucinations. Patient states he is seeing people popping out of his vision, the other people are not seeing. He denies auditory hallucinations. He denies thoughts of wanting to hurt himself or anyone else. Wife states he stopped his medications on Saturday, however he is still been confused and having visual disturbances. He is a renal disease patient on dialysis, he goes Monday Wednesday Friday, had a full treatment on Friday. They deny recent falls, trauma or injury. The history is provided by the patient and the spouse. No  was used. Review of Systems   Constitutional: Negative for chills and fever. HENT: Negative for ear pain, sinus pressure and sore throat. Eyes: Negative for pain, discharge, redness and visual disturbance. Respiratory: Negative for cough, shortness of breath and wheezing. Cardiovascular: Negative for chest pain. Gastrointestinal: Negative for abdominal pain, diarrhea, nausea and vomiting. Genitourinary: Negative for dysuria and frequency. Musculoskeletal: Negative for arthralgias and back pain. Skin: Positive for rash. Negative for wound. Neurological: Negative for weakness and headaches. Hematological: Negative for adenopathy.    Psychiatric/Behavioral: Positive for confusion and hallucinations. All other systems reviewed and are negative. Physical Exam  Vitals signs and nursing note reviewed. Constitutional:       General: He is not in acute distress. Appearance: He is well-developed. HENT:      Head: Normocephalic and atraumatic. Eyes:      Extraocular Movements: Extraocular movements intact. Conjunctiva/sclera: Conjunctivae normal.      Pupils: Pupils are equal, round, and reactive to light. Neck:      Musculoskeletal: Normal range of motion and neck supple. Cardiovascular:      Rate and Rhythm: Normal rate and regular rhythm. Heart sounds: Normal heart sounds. No murmur. Pulmonary:      Effort: Pulmonary effort is normal. No respiratory distress. Breath sounds: Normal breath sounds. No wheezing or rales. Abdominal:      General: Bowel sounds are normal.      Palpations: Abdomen is soft. Tenderness: There is no abdominal tenderness. There is no guarding or rebound. Skin:     General: Skin is warm and dry. Findings: Lesion present. Comments: Crusting around the superior aspect of the right eyebrow as well as the nose consistent with a diagnosis of shingles   Neurological:      Mental Status: He is alert and oriented to person, place, and time. Cranial Nerves: No cranial nerve deficit.       Coordination: Coordination normal.      Comments: Muscle strength 5/5 in upper and lower extremities bilaterally  Sensation intact to light touch in upper and lower extremities bilaterally  Alert and oriented x3  Normal finger-to-nose   Psychiatric:      Comments: Visual hallucinations  No thoughts of SI or HI          Procedures     Labs Reviewed   CBC WITH AUTO DIFFERENTIAL - Abnormal; Notable for the following components:       Result Value    RBC 3.73 (*)     Hemoglobin 12.0 (*)     .3 (*)     MCHC 30.8 (*)     Platelets 83 (*)     Lymphocytes Absolute 0.93 (*)     All other components within normal limits COMPREHENSIVE METABOLIC PANEL W/ REFLEX TO MG FOR LOW K - Abnormal; Notable for the following components:    BUN 46 (*)     CREATININE 6.4 (*)     Total Protein 6.2 (*)     All other components within normal limits   TROPONIN - Abnormal; Notable for the following components:    Troponin 0.11 (*)     All other components within normal limits   BRAIN NATRIURETIC PEPTIDE - Abnormal; Notable for the following components:    Pro-BNP 52,431 (*)     All other components within normal limits   POCT GLUCOSE - Normal   LACTIC ACID, PLASMA   PLATELET CONFIRMATION   POCT GLUCOSE     CT Head WO Contrast   Final Result   No acute intracranial abnormality. XR CHEST PORTABLE   Final Result   No evidence of acute process. EKG #1:  I personally interpreted this EKG  Time:  1242    Rate: 61  Rhythm: Sinus. Interpretation: Sinus rhythm with premature atrial complexes, no ST elevation or T wave inversion. MDM  Number of Diagnoses or Management Options  Altered mental status, unspecified altered mental status type:   Herpes zoster with complication:   Diagnosis management comments: Patient is a 30-year-old male with a recent diagnosis of shingles who presents today for altered mental status. Patient was previously on valacyclovir as well as Norco.  He stopped taking his medication Saturday. On presentation patient is alert and oriented, moving all extremities, there are no neurologic deficits. Lab work imaging was obtained. WBC is 4.6, hemoglobin is stable at 12. Electrolytes are normal.  Troponin and BNP are elevated, however patient is end-stage renal disease on dialysis. Point-of-care glucose is 86. CT of the head was obtained since patient is having altered mental status per wife, CT shows no acute intracranial abnormalities. Chest x-ray is normal.  Patient does not make urine, therefore UA is not able to be obtained to evaluate for UTI.   Patient's vitals have remained stable while in the department, he was hypertensive, however wife states he did not take his medication today. He was given medication in the department. I did speak with PCP and recommend admission to the hospital for altered mental status and hallucinations, however likely symptoms are related to either the Norco or the valacyclovir use for his shingles. PCP agrees to admit. Family is in agreement this plan. Amount and/or Complexity of Data Reviewed  Clinical lab tests: reviewed  Tests in the radiology section of CPT®: reviewed           ED Course as of Oct 11 1631   Sun Oct 11, 2020   1603 UA canceled. Patient does not make urine    []   1606 I spoke with PCP, Dr. Kayla Herrera who will admit    []      ED Course User Index  [] Angel Alves, DO       --------------------------------------------- PAST HISTORY ---------------------------------------------  Past Medical History:  has a past medical history of Blind left eye, Chronic kidney disease, Dialysis patient [facility] Hyperlipidemia, and Hypertension. Past Surgical History:  has a past surgical history that includes AV fistula creation (Left, 2015); HEMODIALYSIS ACCESS PERCUTANEOUS REVISION (Left, 2019); PERIPHERAL VASCULAR BYPASS (2008); Upper gastrointestinal endoscopy (N/A, 7/20/2020); and Cardiac surgery. Social History:  reports that he has quit smoking. He has never used smokeless tobacco. He reports previous alcohol use. He reports that he does not use drugs. Family History: family history is not on file. The patients home medications have been reviewed. Allergies: Patient has no known allergies.     -------------------------------------------------- RESULTS -------------------------------------------------    LABS:  Results for orders placed or performed during the hospital encounter of 10/11/20   CBC Auto Differential   Result Value Ref Range    WBC 4.6 4.5 - 11.5 E9/L    RBC 3.73 (L) 3.80 - 5.80 E12/L    Hemoglobin 12.0 (L) 12.5 - 16.5 g/dL    Hematocrit 38.9 37.0 - 54.0 %    .3 (H) 80.0 - 99.9 fL    MCH 32.2 26.0 - 35.0 pg    MCHC 30.8 (L) 32.0 - 34.5 %    RDW 14.6 11.5 - 15.0 fL    Platelets 83 (L) 432 - 450 E9/L    MPV 11.3 7.0 - 12.0 fL    Neutrophils % 67.1 43.0 - 80.0 %    Immature Granulocytes % 0.2 0.0 - 5.0 %    Lymphocytes % 20.3 20.0 - 42.0 %    Monocytes % 9.8 2.0 - 12.0 %    Eosinophils % 1.3 0.0 - 6.0 %    Basophils % 1.3 0.0 - 2.0 %    Neutrophils Absolute 3.07 1.80 - 7.30 E9/L    Immature Granulocytes # 0.01 E9/L    Lymphocytes Absolute 0.93 (L) 1.50 - 4.00 E9/L    Monocytes Absolute 0.45 0.10 - 0.95 E9/L    Eosinophils Absolute 0.06 0.05 - 0.50 E9/L    Basophils Absolute 0.06 0.00 - 0.20 E9/L    Anisocytosis 1+     Poikilocytes 1+     Ovalocytes 1+     Tear Drop Cells 1+    Comprehensive Metabolic Panel w/ Reflex to MG   Result Value Ref Range    Sodium 136 132 - 146 mmol/L    Potassium reflex Magnesium 4.7 3.5 - 5.0 mmol/L    Chloride 98 98 - 107 mmol/L    CO2 26 22 - 29 mmol/L    Anion Gap 12 7 - 16 mmol/L    Glucose 92 74 - 99 mg/dL    BUN 46 (H) 8 - 23 mg/dL    CREATININE 6.4 (H) 0.7 - 1.2 mg/dL    GFR Non-African American 9 >=60 mL/min/1.73    GFR African American 10     Calcium 9.8 8.6 - 10.2 mg/dL    Total Protein 6.2 (L) 6.4 - 8.3 g/dL    Alb 3.7 3.5 - 5.2 g/dL    Total Bilirubin 0.5 0.0 - 1.2 mg/dL    Alkaline Phosphatase 94 40 - 129 U/L    ALT 21 0 - 40 U/L    AST 27 0 - 39 U/L   Troponin   Result Value Ref Range    Troponin 0.11 (H) 0.00 - 0.03 ng/mL   Brain Natriuretic Peptide   Result Value Ref Range    Pro-BNP 52,431 (H) 0 - 450 pg/mL   Lactic Acid, Plasma   Result Value Ref Range    Lactic Acid 0.9 0.5 - 2.2 mmol/L   Platelet Confirmation   Result Value Ref Range    Platelet Confirmation CONFIRMED    POCT Glucose   Result Value Ref Range    Glucose 86 mg/dL    QC OK? y    POCT Glucose   Result Value Ref Range    Meter Glucose 86 74 - 99 mg/dL       RADIOLOGY:  CT Head WO Contrast   Final Result   No acute intracranial abnormality. XR CHEST PORTABLE   Final Result   No evidence of acute process. ------------------------- NURSING NOTES AND VITALS REVIEWED ---------------------------  Date / Time Roomed:  10/11/2020  1:31 PM  ED Bed Assignment:  21/21    The nursing notes within the ED encounter and vital signs as below have been reviewed. Patient Vitals for the past 24 hrs:   BP Temp Temp src Pulse Resp SpO2 Height Weight   10/11/20 1617 (!) 203/89 -- -- 71 16 97 % -- --   10/11/20 1339 (!) 202/94 98 °F (36.7 °C) Oral 64 16 96 % 5' 8\" (1.727 m) 137 lb (62.1 kg)       Oxygen Saturation Interpretation: Normal    ------------------------------------------ PROGRESS NOTES ------------------------------------------  Re-evaluation(s):  Time: 1600  Patients symptoms show no change  Repeat physical examination is not changed    Counseling:  I have spoken with the patient and discussed todays results, in addition to providing specific details for the plan of care and counseling regarding the diagnosis and prognosis. Their questions are answered at this time and they are agreeable with the plan of admission.    --------------------------------- ADDITIONAL PROVIDER NOTES ---------------------------------  Consultations:  Time: 3221. Spoke with Dr. Cammie Saucedo. Discussed case. They will admit the patient. This patient's ED course included: a personal history and physicial examination, re-evaluation prior to disposition, multiple bedside re-evaluations and IV medications    This patient has remained hemodynamically stable during their ED course. Diagnosis:  1. Altered mental status, unspecified altered mental status type    2. Herpes zoster with complication    3. Hallucinations        Disposition:  Patient's disposition: Admit to telemetry  Patient's condition is fair.              Taqueria Saldana DO  Resident  10/11/20 [id number]

## 2020-10-12 ENCOUNTER — HOSPITAL ENCOUNTER (INPATIENT)
Age: 75
LOS: 9 days | Discharge: NURSING, REHAB OR LONG-TERM CARE | DRG: 981 | End: 2020-10-21
Attending: INTERNAL MEDICINE | Admitting: INTERNAL MEDICINE
Payer: MEDICARE

## 2020-10-12 ENCOUNTER — APPOINTMENT (OUTPATIENT)
Dept: MRI IMAGING | Age: 75
DRG: 073 | End: 2020-10-12
Payer: MEDICARE

## 2020-10-12 ENCOUNTER — APPOINTMENT (OUTPATIENT)
Dept: GENERAL RADIOLOGY | Age: 75
DRG: 073 | End: 2020-10-12
Payer: MEDICARE

## 2020-10-12 ENCOUNTER — APPOINTMENT (OUTPATIENT)
Dept: CT IMAGING | Age: 75
DRG: 073 | End: 2020-10-12
Payer: MEDICARE

## 2020-10-12 VITALS
SYSTOLIC BLOOD PRESSURE: 142 MMHG | RESPIRATION RATE: 20 BRPM | BODY MASS INDEX: 22.15 KG/M2 | HEART RATE: 79 BPM | HEIGHT: 68 IN | TEMPERATURE: 98.2 F | WEIGHT: 146.16 LBS | DIASTOLIC BLOOD PRESSURE: 71 MMHG | OXYGEN SATURATION: 99 %

## 2020-10-12 LAB
ALBUMIN SERPL-MCNC: 3.6 G/DL (ref 3.5–5.2)
ALP BLD-CCNC: 91 U/L (ref 40–129)
ALT SERPL-CCNC: 22 U/L (ref 0–40)
ANION GAP SERPL CALCULATED.3IONS-SCNC: 15 MMOL/L (ref 7–16)
APTT: 26.9 SEC (ref 24.5–35.1)
AST SERPL-CCNC: 31 U/L (ref 0–39)
BASOPHILS ABSOLUTE: 0.04 E9/L (ref 0–0.2)
BASOPHILS RELATIVE PERCENT: 0.5 % (ref 0–2)
BILIRUB SERPL-MCNC: 0.6 MG/DL (ref 0–1.2)
BUN BLDV-MCNC: 57 MG/DL (ref 8–23)
C-REACTIVE PROTEIN: 1.1 MG/DL (ref 0–0.4)
CALCIUM SERPL-MCNC: 9.7 MG/DL (ref 8.6–10.2)
CHLORIDE BLD-SCNC: 101 MMOL/L (ref 98–107)
CO2: 21 MMOL/L (ref 22–29)
CREAT SERPL-MCNC: 7.4 MG/DL (ref 0.7–1.2)
EKG ATRIAL RATE: 61 BPM
EKG P AXIS: 52 DEGREES
EKG P-R INTERVAL: 136 MS
EKG Q-T INTERVAL: 426 MS
EKG QRS DURATION: 82 MS
EKG QTC CALCULATION (BAZETT): 428 MS
EKG R AXIS: 60 DEGREES
EKG T AXIS: 50 DEGREES
EKG VENTRICULAR RATE: 61 BPM
EOSINOPHILS ABSOLUTE: 0 E9/L (ref 0.05–0.5)
EOSINOPHILS RELATIVE PERCENT: 0 % (ref 0–6)
GFR AFRICAN AMERICAN: 9
GFR NON-AFRICAN AMERICAN: 7 ML/MIN/1.73
GLUCOSE BLD-MCNC: 99 MG/DL (ref 74–99)
GLUCOSE, CSF: 61 MG/DL (ref 40–70)
HCT VFR BLD CALC: 35.5 % (ref 37–54)
HEMOGLOBIN: 11 G/DL (ref 12.5–16.5)
IMMATURE GRANULOCYTES #: 0.04 E9/L
IMMATURE GRANULOCYTES %: 0.5 % (ref 0–5)
INR BLD: 1
LYMPHOCYTES ABSOLUTE: 0.6 E9/L (ref 1.5–4)
LYMPHOCYTES RELATIVE PERCENT: 7.5 % (ref 20–42)
MCH RBC QN AUTO: 32.2 PG (ref 26–35)
MCHC RBC AUTO-ENTMCNC: 31 % (ref 32–34.5)
MCV RBC AUTO: 103.8 FL (ref 80–99.9)
MONOCYTES ABSOLUTE: 0.41 E9/L (ref 0.1–0.95)
MONOCYTES RELATIVE PERCENT: 5.1 % (ref 2–12)
NEUTROPHILS ABSOLUTE: 6.91 E9/L (ref 1.8–7.3)
NEUTROPHILS RELATIVE PERCENT: 86.4 % (ref 43–80)
PDW BLD-RTO: 14.6 FL (ref 11.5–15)
PLATELET # BLD: 89 E9/L (ref 130–450)
PLATELET CONFIRMATION: NORMAL
PMV BLD AUTO: 11.6 FL (ref 7–12)
POTASSIUM SERPL-SCNC: 5 MMOL/L (ref 3.5–5)
PROTEIN CSF: 62 MG/DL (ref 15–40)
PROTHROMBIN TIME: 11.6 SEC (ref 9.3–12.4)
RBC # BLD: 3.42 E12/L (ref 3.8–5.8)
SEDIMENTATION RATE, ERYTHROCYTE: 3 MM/HR (ref 0–15)
SODIUM BLD-SCNC: 137 MMOL/L (ref 132–146)
TOTAL PROTEIN: 6 G/DL (ref 6.4–8.3)
WBC # BLD: 8 E9/L (ref 4.5–11.5)

## 2020-10-12 PROCEDURE — 6360000002 HC RX W HCPCS: Performed by: HOSPITALIST

## 2020-10-12 PROCEDURE — 6360000002 HC RX W HCPCS: Performed by: SPECIALIST

## 2020-10-12 PROCEDURE — 5A1D70Z PERFORMANCE OF URINARY FILTRATION, INTERMITTENT, LESS THAN 6 HOURS PER DAY: ICD-10-PCS | Performed by: INTERNAL MEDICINE

## 2020-10-12 PROCEDURE — 82945 GLUCOSE OTHER FLUID: CPT

## 2020-10-12 PROCEDURE — 87205 SMEAR GRAM STAIN: CPT

## 2020-10-12 PROCEDURE — 86140 C-REACTIVE PROTEIN: CPT

## 2020-10-12 PROCEDURE — 80053 COMPREHEN METABOLIC PANEL: CPT

## 2020-10-12 PROCEDURE — 6370000000 HC RX 637 (ALT 250 FOR IP): Performed by: HOSPITALIST

## 2020-10-12 PROCEDURE — 85610 PROTHROMBIN TIME: CPT

## 2020-10-12 PROCEDURE — 85651 RBC SED RATE NONAUTOMATED: CPT

## 2020-10-12 PROCEDURE — 87483 CNS DNA AMP PROBE TYPE 12-25: CPT

## 2020-10-12 PROCEDURE — 70551 MRI BRAIN STEM W/O DYE: CPT

## 2020-10-12 PROCEDURE — 70450 CT HEAD/BRAIN W/O DYE: CPT

## 2020-10-12 PROCEDURE — 6360000002 HC RX W HCPCS: Performed by: INTERNAL MEDICINE

## 2020-10-12 PROCEDURE — 89051 BODY FLUID CELL COUNT: CPT

## 2020-10-12 PROCEDURE — 2580000003 HC RX 258: Performed by: SPECIALIST

## 2020-10-12 PROCEDURE — 87798 DETECT AGENT NOS DNA AMP: CPT

## 2020-10-12 PROCEDURE — 62328 DX LMBR SPI PNXR W/FLUOR/CT: CPT

## 2020-10-12 PROCEDURE — 6370000000 HC RX 637 (ALT 250 FOR IP): Performed by: INTERNAL MEDICINE

## 2020-10-12 PROCEDURE — 85025 COMPLETE CBC W/AUTO DIFF WBC: CPT

## 2020-10-12 PROCEDURE — 36415 COLL VENOUS BLD VENIPUNCTURE: CPT

## 2020-10-12 PROCEDURE — 87070 CULTURE OTHR SPECIMN AEROBIC: CPT

## 2020-10-12 PROCEDURE — 85730 THROMBOPLASTIN TIME PARTIAL: CPT

## 2020-10-12 PROCEDURE — 84157 ASSAY OF PROTEIN OTHER: CPT

## 2020-10-12 PROCEDURE — 90935 HEMODIALYSIS ONE EVALUATION: CPT | Performed by: INTERNAL MEDICINE

## 2020-10-12 PROCEDURE — 009U3ZX DRAINAGE OF SPINAL CANAL, PERCUTANEOUS APPROACH, DIAGNOSTIC: ICD-10-PCS | Performed by: INTERNAL MEDICINE

## 2020-10-12 PROCEDURE — 2060000000 HC ICU INTERMEDIATE R&B

## 2020-10-12 RX ORDER — DOXAZOSIN MESYLATE 4 MG/1
4 TABLET ORAL NIGHTLY
Status: DISCONTINUED | OUTPATIENT
Start: 2020-10-12 | End: 2020-10-22 | Stop reason: HOSPADM

## 2020-10-12 RX ORDER — HALOPERIDOL 5 MG/ML
5 INJECTION INTRAMUSCULAR EVERY 6 HOURS PRN
Status: DISCONTINUED | OUTPATIENT
Start: 2020-10-12 | End: 2020-10-12 | Stop reason: HOSPADM

## 2020-10-12 RX ORDER — HALOPERIDOL 5 MG/ML
5 INJECTION INTRAMUSCULAR ONCE
Status: COMPLETED | OUTPATIENT
Start: 2020-10-12 | End: 2020-10-12

## 2020-10-12 RX ORDER — AMLODIPINE BESYLATE 5 MG/1
5 TABLET ORAL ONCE
Status: COMPLETED | OUTPATIENT
Start: 2020-10-12 | End: 2020-10-12

## 2020-10-12 RX ORDER — GINSENG 100 MG
CAPSULE ORAL 3 TIMES DAILY
Status: DISCONTINUED | OUTPATIENT
Start: 2020-10-12 | End: 2020-10-12 | Stop reason: HOSPADM

## 2020-10-12 RX ORDER — METOPROLOL TARTRATE 50 MG/1
50 TABLET, FILM COATED ORAL 2 TIMES DAILY
Status: DISCONTINUED | OUTPATIENT
Start: 2020-10-12 | End: 2020-10-22 | Stop reason: HOSPADM

## 2020-10-12 RX ORDER — HYDRALAZINE HYDROCHLORIDE 20 MG/ML
25 INJECTION INTRAMUSCULAR; INTRAVENOUS ONCE
Status: DISCONTINUED | OUTPATIENT
Start: 2020-10-12 | End: 2020-10-12

## 2020-10-12 RX ORDER — HYDRALAZINE HYDROCHLORIDE 20 MG/ML
10 INJECTION INTRAMUSCULAR; INTRAVENOUS EVERY 6 HOURS PRN
Status: CANCELLED | OUTPATIENT
Start: 2020-10-12

## 2020-10-12 RX ORDER — ROSUVASTATIN CALCIUM 5 MG/1
5 TABLET, COATED ORAL NIGHTLY
Status: CANCELLED | OUTPATIENT
Start: 2020-10-12

## 2020-10-12 RX ORDER — SODIUM CHLORIDE 0.9 % (FLUSH) 0.9 %
10 SYRINGE (ML) INJECTION PRN
Status: CANCELLED | OUTPATIENT
Start: 2020-10-12

## 2020-10-12 RX ORDER — DOXAZOSIN MESYLATE 4 MG/1
4 TABLET ORAL NIGHTLY
Status: CANCELLED | OUTPATIENT
Start: 2020-10-12

## 2020-10-12 RX ORDER — ROSUVASTATIN CALCIUM 5 MG/1
5 TABLET, COATED ORAL NIGHTLY
Status: DISCONTINUED | OUTPATIENT
Start: 2020-10-12 | End: 2020-10-22 | Stop reason: HOSPADM

## 2020-10-12 RX ORDER — HYDRALAZINE HYDROCHLORIDE 50 MG/1
50 TABLET, FILM COATED ORAL 3 TIMES DAILY
Status: CANCELLED | OUTPATIENT
Start: 2020-10-12

## 2020-10-12 RX ORDER — HYDRALAZINE HYDROCHLORIDE 20 MG/ML
10 INJECTION INTRAMUSCULAR; INTRAVENOUS EVERY 6 HOURS PRN
Status: DISCONTINUED | OUTPATIENT
Start: 2020-10-12 | End: 2020-10-12 | Stop reason: HOSPADM

## 2020-10-12 RX ORDER — HYDRALAZINE HYDROCHLORIDE 20 MG/ML
10 INJECTION INTRAMUSCULAR; INTRAVENOUS EVERY 6 HOURS PRN
Status: DISCONTINUED | OUTPATIENT
Start: 2020-10-12 | End: 2020-10-22 | Stop reason: HOSPADM

## 2020-10-12 RX ORDER — PANTOPRAZOLE SODIUM 40 MG/1
40 TABLET, DELAYED RELEASE ORAL
Status: CANCELLED | OUTPATIENT
Start: 2020-10-13

## 2020-10-12 RX ORDER — HYDRALAZINE HYDROCHLORIDE 20 MG/ML
10 INJECTION INTRAMUSCULAR; INTRAVENOUS ONCE
Status: COMPLETED | OUTPATIENT
Start: 2020-10-12 | End: 2020-10-12

## 2020-10-12 RX ORDER — METOPROLOL TARTRATE 50 MG/1
50 TABLET, FILM COATED ORAL 2 TIMES DAILY
Status: CANCELLED | OUTPATIENT
Start: 2020-10-12

## 2020-10-12 RX ORDER — HEPARIN SODIUM 10000 [USP'U]/ML
5000 INJECTION, SOLUTION INTRAVENOUS; SUBCUTANEOUS EVERY 12 HOURS
Status: CANCELLED | OUTPATIENT
Start: 2020-10-12

## 2020-10-12 RX ORDER — PANTOPRAZOLE SODIUM 40 MG/1
40 TABLET, DELAYED RELEASE ORAL
Status: DISCONTINUED | OUTPATIENT
Start: 2020-10-13 | End: 2020-10-22 | Stop reason: HOSPADM

## 2020-10-12 RX ORDER — SUCRALFATE 1 G/1
1 TABLET ORAL 4 TIMES DAILY
Status: CANCELLED | OUTPATIENT
Start: 2020-10-12

## 2020-10-12 RX ORDER — HYDRALAZINE HYDROCHLORIDE 50 MG/1
50 TABLET, FILM COATED ORAL 3 TIMES DAILY
Status: DISCONTINUED | OUTPATIENT
Start: 2020-10-12 | End: 2020-10-22 | Stop reason: HOSPADM

## 2020-10-12 RX ORDER — SODIUM CHLORIDE 0.9 % (FLUSH) 0.9 %
10 SYRINGE (ML) INJECTION PRN
Status: DISCONTINUED | OUTPATIENT
Start: 2020-10-12 | End: 2020-10-22 | Stop reason: HOSPADM

## 2020-10-12 RX ORDER — SUCRALFATE 1 G/1
1 TABLET ORAL 4 TIMES DAILY
Status: DISCONTINUED | OUTPATIENT
Start: 2020-10-12 | End: 2020-10-22 | Stop reason: HOSPADM

## 2020-10-12 RX ORDER — HEPARIN SODIUM 10000 [USP'U]/ML
5000 INJECTION, SOLUTION INTRAVENOUS; SUBCUTANEOUS EVERY 12 HOURS
Status: DISCONTINUED | OUTPATIENT
Start: 2020-10-13 | End: 2020-10-22 | Stop reason: HOSPADM

## 2020-10-12 RX ADMIN — HALOPERIDOL LACTATE 5 MG: 5 INJECTION, SOLUTION INTRAMUSCULAR at 00:52

## 2020-10-12 RX ADMIN — SUCRALFATE 1 G: 1 TABLET ORAL at 11:25

## 2020-10-12 RX ADMIN — BACITRACIN: 500 OINTMENT TOPICAL at 16:44

## 2020-10-12 RX ADMIN — METOPROLOL TARTRATE 50 MG: 50 TABLET, FILM COATED ORAL at 11:25

## 2020-10-12 RX ADMIN — HYDRALAZINE HYDROCHLORIDE 50 MG: 50 TABLET, FILM COATED ORAL at 11:25

## 2020-10-12 RX ADMIN — HYDRALAZINE HYDROCHLORIDE 10 MG: 20 INJECTION, SOLUTION INTRAMUSCULAR; INTRAVENOUS at 01:35

## 2020-10-12 RX ADMIN — PANTOPRAZOLE SODIUM 40 MG: 40 TABLET, DELAYED RELEASE ORAL at 11:25

## 2020-10-12 RX ADMIN — AMLODIPINE BESYLATE 5 MG: 5 TABLET ORAL at 01:26

## 2020-10-12 RX ADMIN — POTASSIUM & SODIUM PHOSPHATES POWDER PACK 280-160-250 MG 250 MG: 280-160-250 PACK at 11:25

## 2020-10-12 RX ADMIN — HALOPERIDOL LACTATE 5 MG: 5 INJECTION, SOLUTION INTRAMUSCULAR at 13:48

## 2020-10-12 ASSESSMENT — PAIN SCALES - GENERAL
PAINLEVEL_OUTOF10: 0
PAINLEVEL_OUTOF10: 0
PAINLEVEL_OUTOF10: 3
PAINLEVEL_OUTOF10: 0

## 2020-10-12 ASSESSMENT — PAIN DESCRIPTION - PAIN TYPE: TYPE: ACUTE PAIN

## 2020-10-12 NOTE — PLAN OF CARE
Problem: Falls - Risk of:  Goal: Absence of physical injury  Description: Absence of physical injury  Outcome: Not Met This Shift     Problem: Falls - Risk of:  Goal: Will remain free from falls  Description: Will remain free from falls  Outcome: Not Met This Shift

## 2020-10-12 NOTE — H&P
CHIEF COMPLAINT: increased confusion      HISTORY OF PRESENT ILLNESS:      The patient is a 67 y.o. male patient presents with increased confusion and delusions. He was fine till Friday October 9 came to office with icnreasing pain in and on forehead. He was found to have herpes zoster. He was started on Valtrex dosed in renal fashion. He had follow up with opthomology. He was also given n orco for pain. Since Friday began with visual and auditory hallucinations. He is agitated this am.he does not know where or why he is here. Past Medical History:    Past Medical History:   Diagnosis Date    Blind left eye     Chronic kidney disease     [facility]     [facility] ([facility]     Hyperlipidemia     Hypertension        Past Surgical History:    Past Surgical History:   Procedure Laterality Date    AV FISTULA CREATION Left 2015    Dr. Cristal Albert Left 2019    2 x Dr[facility] Maame Valentino, [facility]    PERIPHERAL VASCULAR BYPASS  2008    Aortobifemoral bypass for claudicatio - Dr. April Allan N/A 7/20/2020    EGD ESOPHAGOGASTRODUODENOSCOPY WITH ANTRAL BIOPSY performed by Marylu Trimble MD at [facility] OR       Medications Prior to Admission:    Medications Prior to Admission: bacitracin 500 UNIT/GM ointment, Apply topically 2 times daily Apply topically 2 times daily.   doxazosin (CARDURA) 4 MG tablet, Take 1 tablet by mouth nightly  sucralfate (CARAFATE) 1 GM tablet, Take 1 tablet by mouth 4 times daily  pantoprazole (PROTONIX) 40 MG tablet, Take 1 tablet by mouth every morning (before breakfast)  Cholecalciferol (VITAMIN D) 50 MCG (2000 UT) CAPS capsule, Take by mouth  hydrALAZINE (APRESOLINE) 50 MG tablet, Take 50 mg by mouth 3 times daily  rosuvastatin (CRESTOR) 10 MG tablet, Take 5 mg by mouth nightly  Coenzyme Q10 (Q-10 CO-ENZYME PO), Take 100 mg by mouth Daily  metoprolol tartrate (LOPRESSOR) 50 MG 10/11/2020    CL 98 10/11/2020    CO2 26 10/11/2020    BUN 46 10/11/2020    CREATININE 6.4 10/11/2020    GFRAA 10 10/11/2020    LABGLOM 9 10/11/2020    GLUCOSE 86 10/11/2020    PROT 6.2 10/11/2020    LABALBU 3.7 10/11/2020    CALCIUM 9.8 10/11/2020    BILITOT 0.5 10/11/2020    ALKPHOS 94 10/11/2020    AST 27 10/11/2020    ALT 21 10/11/2020     PT/INR:    Lab Results   Component Value Date    PROTIME 11.6 10/03/2020    INR 1.0 10/03/2020     @cktotal:3,ckmb:3,ckmbindex:3,troponini:3@  U/A:    Lab Results   Component Value Date    NITRU Negative 07/14/2020    COLORU Yellow 07/14/2020    PHUR 8.0 07/14/2020    WBCUA 1-3 07/14/2020    RBCUA 0-1 07/14/2020    BACTERIA NONE SEEN 07/14/2020    CLARITYU Clear 07/14/2020    SPECGRAV 1.015 07/14/2020    UROBILINOGEN 0.2 07/14/2020    BILIRUBINUR Negative 07/14/2020    BLOODU Negative 07/14/2020    GLUCOSEU Negative 07/14/2020    KETUA Negative 07/14/2020          ASSESSMENT:      Active Problems:    AMS (altered mental status)    Encephalopathy  Resolved Problems:    * No resolved hospital problems.  *        PLAN:  encephalopathy-  Infectious vs medication related  Will stop all norco  ? Any chance valtrex causing  Will get mri of brain without d/t kidneys-looking for temporal pathology  Ask both ID and neuro to see    Uncontrolled HTN  Doubt cause of confusion  Believe raising with confusion   Defer to renal for treatemnt    ESRD-  Nephrology to see    Acute diastolic heart failure  For HD    Alem Joel DO  7:00 AM  10/12/2020

## 2020-10-12 NOTE — PROGRESS NOTES
Occupational Therapy  OT order received and chart reviewed. Pt currently off floor for test. Plan for transfer downtown for neurology consult per medical chart. Will continue to follow. Thank you.     Steph Peterson OTR/L  [id number]

## 2020-10-12 NOTE — PROGRESS NOTES
IRFLOWSHEET    Date: 10/12/2020    Time: 2:54 PM     Exam: Image guided lumbar puncture    Radiologist Performing Procedure: Dr. Ashley Swift    Nurse Reporting/Phone: [phone]     Nurse Receiving: Vicki Jones    Permit signed:      Yes    ID Band Checklist: Yes    Allergies: Patient has no known allergies. TIME OUT: 1514    PROCEDURE START TIME: 1515    Puncture Site: lumbar spine area    Puncture Time: 1515    Catheters: 20 gauge spinal needle    LABS:   Lab Results   Component Value Date    INR 1.0 10/12/2020    PROTIME 11.6 10/12/2020           Lab Results   Component Value Date    CREATININE 7.4 (H) 10/12/2020    BUN 57 (H) 10/12/2020          Lab Results   Component Value Date    HGB 11.0 (L) 10/12/2020    HCT 35.5 (L) 10/12/2020    PLT 89 (L) 10/12/2020         PROCEDURE END TIME: 1532    Total Contrast: n/a    Fluoroscopy Time: 0.9     Complications:  None    Comments:  Dr. Ashley Swift in to explain procedure to pt's wife, consent form signed. Dr. Ashley Swift aware of pt's vital signs. Pt positioned prone on fluoroscopy table and images taken. Images reviewed by Dr. Ashley Swift.  Lidocaine 1% used for pt comfort. Needle placed to lumbar area at 1518 by Dr. Ashley Swift.  CSF tubes filled and sent to lab. Total of 13ml's CSF collected. Band aid applied to area. Report called to floor nurse, Phyllis Waite RN. Informed her of pt's current b/p and bedrest orders, understanding voiced.

## 2020-10-12 NOTE — PROGRESS NOTES
Physical Therapy  PT orders received. Eval attempted , but pt off floor. Will re-attempt at later date.

## 2020-10-12 NOTE — PROGRESS NOTES
ISA Bed assignment = 8511 Bed A. Nurse to nurse report called. [facility] to transport.  time 8:30PM. Updated patient's wife.

## 2020-10-12 NOTE — PROGRESS NOTES
Spoke to Dr. Mariano Kanner regarding MRI Brain and spinal fluid results. Order received to start IV acyclovir. Patient does not need airborne isolation per Dr. Mariano Kanner.

## 2020-10-12 NOTE — PROGRESS NOTES
Spoke to Dr. Ester Raman. We do not have neurology coverage in [facility] until 10/19. Patient to be transferred to [facility] ([facility]).

## 2020-10-12 NOTE — PROGRESS NOTES
Notified Dr. Erika Perez (covering for Dr. Harshal Carlos) of new patient consult. Dr. Christine Mahmood he will be in to see patient in the morning.

## 2020-10-12 NOTE — CONSULTS
[address] [place] [address] [facility]  Consultation Note     Admit Date: 10/11/2020  1:31 PM    Reason for Consult:   Zoster. ? Herpes encephalitis    Attending Physician:  Dax Haley DO    HISTORY OF PRESENT ILLNESS:             The history is obtained from extensive review of available past medical records and his wife. The patient is a 67 y.o. male who is known to the ID service. The patient was recently treated in July 2020 with Remdesivir for SARS-CoV-2 infection. He presents stating his PCP on 10/9/2020 with a 1 day history of a painful rash on the right forehead. He was diagnosed with shingles and treated with what seems to be Valacyclovir. His wife said she had given him to doses but does not recall the strength. By 10/10/2020 the patient began complaining of visual and auditory hallucinations. He had been seen by ophthalmology and there was no eye involvement. 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 He was brought into the ED on 10/11/2020 with these symptoms. CT showed old lacunar infarct. Past Medical History:        Diagnosis Date    Blind left eye     Chronic kidney disease     Dialysis patient [facility])     Hemodialysis patient ([facility] [address]     Hyperlipidemia     Hypertension      July 2020. Admitted to [facility] with fever, shortness of breath, myalgias and exposure to COVID-19. Treated with dexamethasone, vitamin C and zinc. Seen by Dr. Melquiades Iglesias. Treated with Remdesivir and discharged home.     Past Surgical History:        Procedure Laterality Date    AV FISTULA CREATION Left 2015    Dr. Russ Damon Left 2019    2 x Dr. Margie Martell, [facility]    PERIPHERAL VASCULAR BYPASS  2008    Aortobifemoral bypass for claudicatio - Dr. Per Corona N/A 7/20/2020    EGD ESOPHAGOGASTRODUODENOSCOPY WITH ANTRAL BIOPSY performed by Sushila Overton MD at [facility] Current Medications:   Scheduled Meds:   doxazosin  4 mg Oral Nightly    hydrALAZINE  50 mg Oral TID    metoprolol tartrate  50 mg Oral BID    pantoprazole  40 mg Oral QAM AC    potassium & sodium phosphates  1 packet Oral Daily    rosuvastatin  5 mg Oral Nightly    sucralfate  1 g Oral 4x Daily    heparin (porcine)  5,000 Units Subcutaneous Q12H     Continuous Infusions:  PRN Meds:haloperidol lactate, hydrALAZINE    Allergies:  Patient has no known allergies. Social History:   Social History     Socioeconomic History    Marital status:      Spouse name: None    Number of children: None    Years of education: None    Highest education level: None   Occupational History    None   Social Needs    Financial resource strain: None    Food insecurity     Worry: None     Inability: None    Transportation needs     Medical: None     Non-medical: None   Tobacco Use    Smoking status: Former Smoker    Smokeless tobacco: Never Used   Substance and Sexual Activity    Alcohol use: Not Currently     Comment: occasional    Drug use: Never    Sexual activity: Not Currently   Lifestyle    Physical activity     Days per week: None     Minutes per session: None    Stress: None   Relationships    Social connections     Talks on phone: None     Gets together: None     Attends Nondenominational service: None     Active member of club or organization: None     Attends meetings of clubs or organizations: None     Relationship status: None    Intimate partner violence     Fear of current or ex partner: None     Emotionally abused: None     Physically abused: None     Forced sexual activity: None   Other Topics Concern    None   Social History Narrative    None      Pets: None  Travel: No  The patient lives at home with his wife. He retired from [employer]    Family History:   History reviewed. No pertinent family history. . Otherwise non-pertinent to the chief complaint.     REVIEW OF SYSTEMS:    As in the HPI. A 10 point review of systems could not be obtained from the patient. He is on dialysis Mondays, Wednesdays, Fridays via a left upper extremity AV fistula. There is no history of seizures. No fevers. No emesis. PHYSICAL EXAM:    Vitals:   BP (!) 175/78   Pulse 80   Temp 98.6 °F (37 °C) (Axillary)   Resp 16   Ht 5' 8\" (1.727 m)   Wt 146 lb 1 oz (66.3 kg)   SpO2 97%   BMI 22.21 kg/m²   Constitutional: The patient is lying in bed. He is restless and does not respond to voice commands. Eyes are mostly closed. Skin: Warm and dry. There are erythematous, papular lesions with a few blisters in crops over the right upper trigeminal nerve area. It spares the tip of the nose. HEENT: Eyes show round, and reactive pupils. No conjunctivitis. No keratitis. No jaundice. Moist mucous membranes, no ulcerations, no thrush. Neck: Supple to movements. No lymphadenopathy. Chest: No use of accessory muscles to breathe. Symmetrical expansion. Auscultation reveals no wheezing, crackles, or rhonchi. Cardiovascular: S1 and S2 are rhythmic and regular. No murmurs appreciated. Abdomen: Positive bowel sounds to auscultation. Benign to palpation. No masses felt. No hepatosplenomegaly. Extremities: No clubbing, no cyanosis, no edema.   Lines: peripheral      CBC+dif:  Recent Labs     10/11/20  1434 10/12/20  0923   WBC 4.6 8.0   HGB 12.0* 11.0*   HCT 38.9 35.5*   .3* 103.8*   PLT 83* 89*   NEUTROABS 3.07  --      Lab Results   Component Value Date    CRP 3.1 (H) 07/14/2020      No results found for: CRPHS  Lab Results   Component Value Date    SEDRATE 1 10/03/2020    SEDRATE 2 07/14/2020     Lab Results   Component Value Date    ALT 22 10/12/2020    AST 31 10/12/2020    ALKPHOS 91 10/12/2020    BILITOT 0.6 10/12/2020     Lab Results   Component Value Date     10/12/2020    K 5.0 10/12/2020    K 4.7 10/11/2020     10/12/2020    CO2 21 10/12/2020    BUN 57 10/12/2020    CREATININE 7.4 10/12/2020    GFRAA 9 10/12/2020    LABGLOM 7 10/12/2020    GLUCOSE 99 10/12/2020    PROT 6.0 10/12/2020    LABALBU 3.6 10/12/2020    CALCIUM 9.7 10/12/2020    BILITOT 0.6 10/12/2020    ALKPHOS 91 10/12/2020    AST 31 10/12/2020    ALT 22 10/12/2020       Lab Results   Component Value Date    PROTIME 11.6 10/03/2020    INR 1.0 10/03/2020       No results found for: TSH    Lab Results   Component Value Date    COLORU Yellow 07/14/2020    PHUR 8.0 07/14/2020    WBCUA 1-3 07/14/2020    RBCUA 0-1 07/14/2020    BACTERIA NONE SEEN 07/14/2020    CLARITYU Clear 07/14/2020    SPECGRAV 1.015 07/14/2020    LEUKOCYTESUR Negative 07/14/2020    UROBILINOGEN 0.2 07/14/2020    BILIRUBINUR Negative 07/14/2020    BLOODU Negative 07/14/2020    GLUCOSEU Negative 07/14/2020       No results found for: HCO3, BE, O2SAT, PH, THGB, PCO2, PO2, TCO2  Radiology:  Noted    Microbiology:  Pending  No results for input(s): BC in the last 72 hours. No results for input(s): ORG in the last 72 hours. No results for input(s): John Candle in the last 72 hours. No results for input(s): STREPNEUMAGU in the last 72 hours. No results for input(s): LP1UAG in the last 72 hours. No results for input(s): ASO in the last 72 hours. No results for input(s): CULTRESP in the last 72 hours. No results for input(s): PROCAL in the last 72 hours.     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 [place] [place] for neurology evaluation  · Will follow with you  · I will go ahead and order an image guided lumbar puncture since it is not clear will be able to get an

## 2020-10-12 NOTE — SIGNIFICANT EVENT
Had a fall out of bed per staff hitting head with small laceration, rapid response called. Patient apparently more confused , and has uncontrolled HTN with systolic BP >145 . Haldol 5 mg ordered, also given 10 mg hydralazine and 5 mg norvasc given. Nephrology notified. He is chronic dialysis patient .  Stat ct head no acute abnormality

## 2020-10-12 NOTE — CARE COORDINATION
CASE MANAGEMENT. ... Chart reviewed. Patient resting in bed. Mr Kenny Grissom is in with altered mental status. He is in contact/airborne iso for shingles. Mrs Kenny Grissom is at the bedside. Called and spoke to her over the phone briefly to discuss hospital stay and discharge needs. Unable to obtain much information. Transport here to take patient for testing and Mrs Kenny Grissom is going to accompany him d/t his ams. Will cont to follow along and assist with needs accordingly. Currently, plan is for patient to transport to [facility] for neuro eval. Awaiting bed availability. In the meantime, ID is following.

## 2020-10-12 NOTE — PROGRESS NOTES
We are awaiting bed assignment at [facility] ([facility]). ID consult called through office.

## 2020-10-13 ENCOUNTER — APPOINTMENT (OUTPATIENT)
Dept: GENERAL RADIOLOGY | Age: 75
DRG: 981 | End: 2020-10-13
Attending: INTERNAL MEDICINE
Payer: MEDICARE

## 2020-10-13 PROBLEM — B02.9 HERPES ZOSTER: Status: ACTIVE | Noted: 2020-10-13

## 2020-10-13 LAB
ALBUMIN SERPL-MCNC: 3.7 G/DL (ref 3.5–5.2)
ALP BLD-CCNC: 91 U/L (ref 40–129)
ALT SERPL-CCNC: 21 U/L (ref 0–40)
ANION GAP SERPL CALCULATED.3IONS-SCNC: 20 MMOL/L (ref 7–16)
ANISOCYTOSIS: ABNORMAL
APPEARANCE CSF: CLEAR
AST SERPL-CCNC: 41 U/L (ref 0–39)
BASOPHILS ABSOLUTE: 0 E9/L (ref 0–0.2)
BASOPHILS RELATIVE PERCENT: 0.7 % (ref 0–2)
BILIRUB SERPL-MCNC: 1 MG/DL (ref 0–1.2)
BUN BLDV-MCNC: 25 MG/DL (ref 8–23)
CALCIUM SERPL-MCNC: 9.4 MG/DL (ref 8.6–10.2)
CHLORIDE BLD-SCNC: 96 MMOL/L (ref 98–107)
CO2: 23 MMOL/L (ref 22–29)
COLOR CSF: COLORLESS
CREAT SERPL-MCNC: 4.3 MG/DL (ref 0.7–1.2)
CRYPTOCOCCUS NEOFORMANS/GATTII CSF BY PCR: NOT DETECTED
CYTOMEGALOVIRUS CSF BY PCR: NOT DETECTED
ENTEROVIRUS CSF BY PCR: NOT DETECTED
ENTEROVIRUS CSF BY PCR: NOT DETECTED
EOSINOPHILS ABSOLUTE: 0 E9/L (ref 0.05–0.5)
EOSINOPHILS RELATIVE PERCENT: 0.1 % (ref 0–6)
ESCHERICHIA COLI K1 CSF BY PCR: NOT DETECTED
GFR AFRICAN AMERICAN: 16
GFR NON-AFRICAN AMERICAN: 13 ML/MIN/1.73
GLUCOSE BLD-MCNC: 86 MG/DL (ref 74–99)
HAEMOPHILUS INFLUENZAE CSF BY PCR: NOT DETECTED
HCT VFR BLD CALC: 35.9 % (ref 37–54)
HEMOGLOBIN: 11.9 G/DL (ref 12.5–16.5)
HERPES SIMPLEX VIRUS 1 CSF BY PCR: NOT DETECTED
HUMAN HERPESVIRUS 6 CSF BY PCR: NOT DETECTED
HUMAN PARECHOVIRUS CSF BY PCR: NOT DETECTED
LISTERIA MONOCYTOGENES CSF BY PCR: NOT DETECTED
LYMPHOCYTES ABSOLUTE: 0.84 E9/L (ref 1.5–4)
LYMPHOCYTES RELATIVE PERCENT: 11.3 % (ref 20–42)
MCH RBC QN AUTO: 33.1 PG (ref 26–35)
MCHC RBC AUTO-ENTMCNC: 33.1 % (ref 32–34.5)
MCV RBC AUTO: 99.7 FL (ref 80–99.9)
MONOCYTE, CSF: 96 % (ref 10–70)
MONOCYTES ABSOLUTE: 0.46 E9/L (ref 0.1–0.95)
MONOCYTES RELATIVE PERCENT: 6.1 % (ref 2–12)
NEISSERIA MENINGITIDIS CSF BY PCR: NOT DETECTED
NEUTROPHILS ABSOLUTE: 6.31 E9/L (ref 1.8–7.3)
NEUTROPHILS RELATIVE PERCENT: 82.6 % (ref 43–80)
NEUTROPHILS, CSF: 4 % (ref 0–10)
OVALOCYTES: ABNORMAL
PDW BLD-RTO: 14.5 FL (ref 11.5–15)
PLATELET # BLD: 91 E9/L (ref 130–450)
PLATELET CONFIRMATION: NORMAL
PMV BLD AUTO: 10.6 FL (ref 7–12)
POIKILOCYTES: ABNORMAL
POTASSIUM SERPL-SCNC: 3.8 MMOL/L (ref 3.5–5)
RBC # BLD: 3.6 E12/L (ref 3.8–5.8)
RBC CSF: <2000 /UL
SCHISTOCYTES: ABNORMAL
SODIUM BLD-SCNC: 139 MMOL/L (ref 132–146)
STREPTOCOCCUS AGALACTIAE CSF BY PCR: NOT DETECTED
STREPTOCOCCUS PNEUMONIAE CSF BY PCR: NOT DETECTED
TEAR DROP CELLS: ABNORMAL
TOTAL PROTEIN: 6.1 G/DL (ref 6.4–8.3)
TUBE NUMBER CSF: ABNORMAL
VARICELLA ZOSTER VIRUS CSF BY PCR: DETECTED
VOLUME CSF: 13 ML
WBC # BLD: 7.6 E9/L (ref 4.5–11.5)
WBC CSF: 126 /UL (ref 0–2)

## 2020-10-13 PROCEDURE — 6360000002 HC RX W HCPCS: Performed by: INTERNAL MEDICINE

## 2020-10-13 PROCEDURE — 97530 THERAPEUTIC ACTIVITIES: CPT

## 2020-10-13 PROCEDURE — 2580000003 HC RX 258: Performed by: RADIOLOGY

## 2020-10-13 PROCEDURE — 97162 PT EVAL MOD COMPLEX 30 MIN: CPT

## 2020-10-13 PROCEDURE — 6370000000 HC RX 637 (ALT 250 FOR IP): Performed by: INTERNAL MEDICINE

## 2020-10-13 PROCEDURE — 36415 COLL VENOUS BLD VENIPUNCTURE: CPT

## 2020-10-13 PROCEDURE — 2580000003 HC RX 258: Performed by: INTERNAL MEDICINE

## 2020-10-13 PROCEDURE — 85025 COMPLETE CBC W/AUTO DIFF WBC: CPT

## 2020-10-13 PROCEDURE — 80053 COMPREHEN METABOLIC PANEL: CPT

## 2020-10-13 PROCEDURE — 73521 X-RAY EXAM HIPS BI 2 VIEWS: CPT

## 2020-10-13 PROCEDURE — 2060000000 HC ICU INTERMEDIATE R&B

## 2020-10-13 RX ADMIN — SODIUM CHLORIDE, PRESERVATIVE FREE 10 ML: 5 INJECTION INTRAVENOUS at 10:39

## 2020-10-13 RX ADMIN — HYDRALAZINE HYDROCHLORIDE 10 MG: 20 INJECTION, SOLUTION INTRAMUSCULAR; INTRAVENOUS at 02:16

## 2020-10-13 RX ADMIN — SUCRALFATE 1 G: 1 TABLET ORAL at 17:36

## 2020-10-13 RX ADMIN — HEPARIN SODIUM 5000 UNITS: 10000 INJECTION INTRAVENOUS; SUBCUTANEOUS at 22:30

## 2020-10-13 RX ADMIN — METOPROLOL TARTRATE 50 MG: 50 TABLET, FILM COATED ORAL at 22:45

## 2020-10-13 RX ADMIN — HYDRALAZINE HYDROCHLORIDE 50 MG: 50 TABLET, FILM COATED ORAL at 22:45

## 2020-10-13 RX ADMIN — SUCRALFATE 1 G: 1 TABLET ORAL at 10:38

## 2020-10-13 RX ADMIN — METOPROLOL TARTRATE 50 MG: 50 TABLET, FILM COATED ORAL at 10:38

## 2020-10-13 RX ADMIN — SUCRALFATE 1 G: 1 TABLET ORAL at 14:30

## 2020-10-13 RX ADMIN — HYDRALAZINE HYDROCHLORIDE 50 MG: 50 TABLET, FILM COATED ORAL at 10:38

## 2020-10-13 RX ADMIN — ACYCLOVIR SODIUM 260 MG: 50 INJECTION, SOLUTION INTRAVENOUS at 14:31

## 2020-10-13 RX ADMIN — ROSUVASTATIN CALCIUM 5 MG: 5 TABLET, FILM COATED ORAL at 22:44

## 2020-10-13 RX ADMIN — POTASSIUM & SODIUM PHOSPHATES POWDER PACK 280-160-250 MG 250 MG: 280-160-250 PACK at 10:38

## 2020-10-13 RX ADMIN — SUCRALFATE 1 G: 1 TABLET ORAL at 22:45

## 2020-10-13 RX ADMIN — HEPARIN SODIUM 5000 UNITS: 10000 INJECTION INTRAVENOUS; SUBCUTANEOUS at 11:15

## 2020-10-13 RX ADMIN — HYDRALAZINE HYDROCHLORIDE 50 MG: 50 TABLET, FILM COATED ORAL at 14:30

## 2020-10-13 RX ADMIN — DOXAZOSIN 4 MG: 4 TABLET ORAL at 22:30

## 2020-10-13 ASSESSMENT — PAIN SCALES - GENERAL
PAINLEVEL_OUTOF10: 3
PAINLEVEL_OUTOF10: 0

## 2020-10-13 ASSESSMENT — PAIN DESCRIPTION - LOCATION: LOCATION: BACK

## 2020-10-13 ASSESSMENT — ENCOUNTER SYMPTOMS
CHEST TIGHTNESS: 0
COUGH: 0
SINUS PAIN: 0
SHORTNESS OF BREATH: 0
PHOTOPHOBIA: 0
VOMITING: 0
EYE ITCHING: 0
NAUSEA: 0
TROUBLE SWALLOWING: 0
EYE PAIN: 0

## 2020-10-13 ASSESSMENT — PAIN DESCRIPTION - ORIENTATION: ORIENTATION: LOWER

## 2020-10-13 ASSESSMENT — PAIN DESCRIPTION - PROGRESSION: CLINICAL_PROGRESSION: NOT CHANGED

## 2020-10-13 ASSESSMENT — PAIN - FUNCTIONAL ASSESSMENT: PAIN_FUNCTIONAL_ASSESSMENT: PREVENTS OR INTERFERES SOME ACTIVE ACTIVITIES AND ADLS

## 2020-10-13 ASSESSMENT — PAIN DESCRIPTION - ONSET: ONSET: ON-GOING

## 2020-10-13 ASSESSMENT — PAIN DESCRIPTION - FREQUENCY: FREQUENCY: INTERMITTENT

## 2020-10-13 ASSESSMENT — PAIN DESCRIPTION - DESCRIPTORS: DESCRIPTORS: ACHING;DISCOMFORT

## 2020-10-13 ASSESSMENT — PAIN DESCRIPTION - PAIN TYPE: TYPE: ACUTE PAIN

## 2020-10-13 NOTE — PROGRESS NOTES
Occupational Therapy  OT consult received. Chart reviewed. Will hold evaluation secondary to patient scheduled to have B hip x-rays. Will re-attempt OT evaluation as schedule permits when patient is appropriate.  Ellan Favre, OTR/L #[id number]

## 2020-10-13 NOTE — PROCEDURES
MRI on hold for right now, pt will not be able to hold still for scans. Will need 2 MRI techs available for scan due to airborne and contact precautions, will call to schedule today if patient calm enough to tolerate MRI.

## 2020-10-13 NOTE — PROGRESS NOTES
Physical Therapy  Physical Therapy Initial Assessment     Name: Laurent Addison  : 1945  MRN: [id number]    Referring Provider: Maritza Huntley MD    Date of Service: 10/13/2020    Evaluating PT: Zeus Burrows, PT, DPT, [id number]    Room #: 7698/5228-O  Diagnosis: Encephalopathy  PMHx/PSHx: CKD, HD, HLD, HTN, blind left eye  Precautions: Fall risk, R facial shingles, alarm    SUBJECTIVE:    Pt lives with wife in a single story condo with 1 stair(s) and 0 rail(s) to enter. Bed is on first floor and bath is on first floor. Pt ambulated with [facility] prior to admission. OBJECTIVE:   Initial Evaluation  Date: 10/13/20 Treatment Date: Short Term/ Long Term   Goals   AM-PAC 6 Clicks 3/86     Was pt agreeable to Eval/treatment? Yes     Does pt have pain? L hip pain with mobility; RN aware     Bed Mobility  Rolling: NT  Supine to sit: Mod A  Sit to supine: Mod A  Scooting: Mod A to EOB  Rolling: SBA  Supine to sit: SBA  Sit to supine: SBA  Scooting: SBA   Transfers Sit to stand: NT due to L hip pain  Stand to sit: NT  Stand pivot: NT  Sit to stand: Min A  Stand to sit: Min A  Stand pivot: Mod A with [facility]   Ambulation   NT  >25 feet with [facility] with Mod A   Stair negotiation: ascended and descended NT  1 platform step(s) with [facility] with Mod A   ROM BUE: Refer to OT note  BLE: WFL     Strength BUE: Refer to OT note  BLE: NT     Balance Sitting EOB: SBA  Dynamic Standing: NT  Sitting EOB: Independent   Dynamic Standing: Mod A with [facility]     Pt is A & O x: 4 to person, place, month/year, and situation. Sensation: Pt denies numbness and tingling of extremities. Edema: Unremarkable. Patient education  Pt educated on proper technique during supine to sit transfer. Patient response to education:   Pt verbalized understanding Pt demonstrated skill Pt requires further education in this area   Yes With cueing Yes     ASSESSMENT:    Comments:   Pt was supine in bed with wife present upon room entry, agreeable to PT evaluation.  Pt complained of L hip pain throughout session. Pt required assistance for B LE and trunk mobility during supine to sit transfer. Pt had L hip pain when sitting EOB and attempted to offload by leaning to R side. Pt declined to stand this session due to pain. Pt reports pain has been present since fall at [facility]. RN was notified of pain. Pt sat at EOB for 10+ minutes before he requested to return to supine. Pt was assisted back to bed and was scooted to [facility]. All questions and concerns were addressed. Pt was left supine in bed with all needs met at conclusion of session. RN reports physician ordered B hip x-rays,    Treatment:  Patient practiced and was instructed in the following treatment:     Therapeutic activities: Pt completed all therapeutic activities noted above. Pt was cued for technique during supine to sit transfer. Pt sat at EOB for 10+ minutes as he was cued for repositioning and posture. Pt was skillfully positioned in bed with pillows to protect skin integrity. Pt's/family goals:   1. To return to OF. Patient and or family understand(s) diagnosis, prognosis, and plan of care. Yes. PLAN:    Current Treatment Recommendations   [x] Strengthening     [x] ROM   [x] Balance Training   [x] Endurance Training   [x] Transfer Training   [x] Gait Training   [x] Stair Training   [x] Positioning   [x] Safety and Education Training   [x] Patient/Caregiver Education   [x] HEP  [] Other     PT care will be provided in accordance with the objectives noted above. Exercises and functional mobility practice will be used as well as appropriate assistive devices or modalities to obtain goals. Patient and family education will also be administered as needed. Frequency of treatments: 2-5x/week x 3-5 days.     Time in: 1040  Time out: 1105    Total Treatment Time 15 minutes     Evaluation Time includes thorough review of current medical information, gathering information on past medical history/social history and prior level of function, completion of standardized testing/informal observation of tasks, assessment of data and education on plan of care and goals.     CPT codes:  [] Low Complexity PT evaluation 87756  [x] Moderate Complexity PT evaluation 07305  [] High Complexity PT evaluation 34537  [] PT Re-evaluation 33385  [] Gait training 20288 0 minutes  [] Manual therapy 97259 0 minutes  [x] Therapeutic activities 25176 15 minutes  [] Therapeutic exercises 61887 0 minutes  [] Neuromuscular reeducation 27016 0 minutes     Noemi Esparza, PT, DPT  [id number]

## 2020-10-13 NOTE — CONSULTS
Department of Internal Medicine  Nephrology Consult Note      Reason for Consult:  End-Stage Renal Disease on dialysis. Requesting Physician:  Dr. Won Allison MD    CHIEF COMPLAINT:  AMS    History Obtained From:  patient, spouse, electronic medical record    HISTORY OF PRESENT ILLNESS:  Briefly Pasquale Bolanos is 67 y.o. male with history of ESRD on HD MWF via AVF (last HD yesterday at [facility]), HTN, diastolic HF, and recently admitted to [facility] in July for COVID-19 infection and experienced a 30 lb weight loss. Patient was admitted on 10/12/2020 from home with chief complaint of AMS and visual and auditory hallucinations. Patient was recently diagnosed with shingles on Friday (10/9/2020) after seeing his PCP with complaint of a painful rash on his right forehead for 1 day duration. He was sent home with valacyclovir (renally dosed-once daily, but wife doesn't remember the dose) and Norco. After 2 days of taking the medication, the patient became altered and stopped the medications. The patient went to [facility] on [place] 10/11/2020 because of continued hallucinations with thoughts of medication side effect vs herpes zoster encephalitis. Patient experienced a fall at [facility]. He was then transferred to [facility] for further management and treatment. Patient seen by nephrology on 10/13/2020 for dialysis needs and he feels better today. Per the wife, his mentation has improved. LP performed on 10/12/2020 that was PCR positive for herpes zoster, elevated protein, elevated WBC (monocyte predominance) and IV acyclovir was started. 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                     Past Medical History:        Diagnosis Date    Blind left eye     Chronic kidney disease     [facility] ([facility] [address]     Hemodialysis patient ([facility] [address]     Hyperlipidemia     Hypertension      Past Surgical History:        Procedure Laterality Date    Rayo Ocampo Left 2015     Days per week: Not on file     Minutes per session: Not on file    Stress: Not on file   Relationships    Social connections     Talks on phone: Not on file     Gets together: Not on file     Attends Cheondoism service: Not on file     Active member of club or organization: Not on file     Attends meetings of clubs or organizations: Not on file     Relationship status: Not on file    Intimate partner violence     Fear of current or ex partner: Not on file     Emotionally abused: Not on file     Physically abused: Not on file     Forced sexual activity: Not on file   Other Topics Concern    Not on file   Social History Narrative    Not on file       Family History:   No family history on file. REVIEW OF SYSTEMS:    CONSTITUTIONAL:  negative for  anorexia and weight loss  EYES:  negative for  blurred vision  HEENT:  positive for  Unilateral Facial rash.   negative for  ear drainage, earaches, sore throat and hoarseness  RESPIRATORY:  negative for  dyspnea and wheezing  CARDIOVASCULAR:  negative for  chest pain, edema  GASTROINTESTINAL:  negative for diarrhea, constipation and abdominal pain  GENITOURINARY:  negative for dysuria and urinary incontinence  INTEGUMENT/BREAST:  positive for skin lesion(s)  HEMATOLOGIC/LYMPHATIC:  negative for bleeding  ENDOCRINE:  negative for cold intolerance  MUSCULOSKELETAL:  negative for  joint swelling  NEUROLOGICAL:  negative for visual disturbance, tremor and dysphagia    PHYSICAL EXAM:      Vitals:    VITALS:  BP (!) 156/70   Pulse 88   Temp 98.7 °F (37.1 °C)   Resp 18   Ht 5' 8\" (1.727 m)   Wt 115 lb (52.2 kg)   SpO2 95%   BMI 17.49 kg/m²   24HR INTAKE/OUTPUT:  No intake or output data in the 24 hours ending 10/13/20 1417    Access:  Peripherals, AVF left arm  Constitutional:  Sitting on the side of the bed in no acute distress. HEENT: AT/NC, positive for hearing aids. NECK: supple  Respiratory:  CTAB  Cardiovascular/Edema:  RRR, S1/S2, no edema. euvolemic, chest x-ray without infiltrates. S/p Fall: occurred at [address]. XR BL hips ordered. Elevated troponin likely 2/2 ESRD. Troponin 0.11  BPH: on doxazosin  HTN: on hydralazine and lopressor  HLD: on rosuvastatin   MBD of CKD,, holding binders  Normocytic anemia: 2/2 ESRD. Hg 11.9, MCV 99.7, hold NICOLE for hemoglobin >11  Nutrition, on renal diet    PLAN:    Hemodialysis tomorrow and 3 times a week MWF  Continue Acyclovir 260 mg IV  (5mg/kg) Q24 hrs after dialysis. Continue potassium and sodium phosphate supplementation   Continue current antihypertensive regimen   Follow-up BL XR hips for hip pain with leg movement s/p fall   Strict I&O's. Obtain phosphorus levels in a.m. Thank you very much Dr. Sharri Menchaca  for allowing us to participate in the care of Noah Oliver.        Electronically signed by Joselin Johnson MD on 10/13/2020 at 2:17 PM

## 2020-10-13 NOTE — PROCEDURES
Spoke with Rapheal Lesch in dialysis. Patient is scheduled for MRI on 10/14 at 11:30 and will go to dialysis immediately after MRI. Shaun Durand RN notified.

## 2020-10-13 NOTE — CARE COORDINATION
Social Work:  Met with patient and wife in room. Introduced self and discussed social work roles and discharge planning/transition of care. PCP is Dr. Mine Guido and Pharmacy is [facility]. Lives in a condo with 1 AFSHAN. Has DME w/w and transport w/c PTA. Attends [employer] MWF and wife transports. [facility]. No  SNF. Reviewed PTOT eval and needs. Discussed likely need for [facility] at d/c.  Provided list for [facility] choicing. SW/CM will follow up for choicing and continue to assist with d/c planning.     Electronically signed by NOE Thorne on 10/13/2020 at 2:59 PM

## 2020-10-13 NOTE — H&P
[address] [facility]  History and Physical      CHIEF COMPLAINT: AMS      HISTORY OF PRESENT ILLNESS:      The patient is a 67 y.o. male patient of Dr. Melania Huntley history of end-stage renal disease on hemodialysis who presents with AMS. Patient was recently diagnosed with shingles. He was started on valacyclovir. He became confused. Patient was reported to have increased confusion as well as delusions. Reported visual and auditory hallucinations. He was admitted to [facility]. He was transferred to [address] [place] [address] for further evaluation and treatment. Pt is a poor historian. History is largely obtained from chart review and discussions with staff/family as available. Discussed with wife at bedside. She states patient is doing much better today. Patient reported to have fallen last night. Notes left hip pain. Difficulty weightbearing. Past Medical History:    Past Medical History:   Diagnosis Date    Blind left eye     Chronic kidney disease     [facility]ialysis [facility] ([facility]     Hemodialysis patient ([facility]     Hyperlipidemia     Hypertension        Past Surgical History:    Past Surgical History:   Procedure Laterality Date    AV FISTULA CREATION Left 2015    Dr. Cesar Foley Left 2019    2 x Dr. Gracie Leonard, [facility]    PERIPHERAL VASCULAR BYPASS  2008    Aortobifemoral bypass for claudicatio - Dr. Ra Gardiner N/A 7/20/2020    EGD ESOPHAGOGASTRODUODENOSCOPY WITH ANTRAL BIOPSY performed by Desmond Kendall MD at [facility] OR       Medications Prior to Admission:    Medications Prior to Admission: bacitracin 500 UNIT/GM ointment, Apply topically 2 times daily Apply topically 2 times daily.   potassium & sodium phosphates (PHOS-NAK) 280-160-250 MG PACK, Take 1 packet by mouth daily  doxazosin (CARDURA) 4 MG tablet, Take 1 tablet by mouth nightly  sucralfate (CARAFATE) 1 GM tablet, Take 1 tablet by mouth 4 times daily  pantoprazole (PROTONIX) 40 MG tablet, Take 1 tablet by mouth every morning (before breakfast)  Cholecalciferol (VITAMIN D) 50 MCG (2000 UT) CAPS capsule, Take by mouth  hydrALAZINE (APRESOLINE) 50 MG tablet, Take 50 mg by mouth 3 times daily  rosuvastatin (CRESTOR) 10 MG tablet, Take 5 mg by mouth nightly  Coenzyme Q10 (Q-10 CO-ENZYME PO), Take 100 mg by mouth Daily  metoprolol tartrate (LOPRESSOR) 50 MG tablet, Take 50 mg by mouth 2 times daily    Allergies:    Patient has no known allergies. Social History:    reports that he has quit smoking. He has never used smokeless tobacco. He reports previous alcohol use. He reports that he does not use drugs. Family History:   family history is not on file. REVIEW OF SYSTEMS:  As above in the HPI, otherwise negative    PHYSICAL EXAM:    Vitals:  BP (!) 170/60   Pulse 102   Temp 98.6 °F (37 °C) (Temporal)   Resp 22   Ht 5' 8\" (1.727 m)   Wt 115 lb (52.2 kg)   SpO2 98%   BMI 17.49 kg/m²     General:  Awake, alert, oriented X 2. Well developed, well nourished, well groomed. No apparent distress. HEENT:  Normocephalic, atraumatic. Pupils equal, round, reactive to light. No scleral icterus. No conjunctival injection. Normal lips, teeth, and gums. No nasal discharge. Neck:  Supple  Heart:  RRR, no murmurs, gallops, rubs  Lungs:  CTA bilaterally, bilat symmetrical expansion, no wheeze, rales, or rhonchi  Abdomen:   Bowel sounds present, soft, nontender, no masses, no organomegaly, no peritoneal signs  Extremities: + Hip pain with logroll no clubbing, cyanosis, or edema  Skin:  Warm and dry, no open lesions or rash  Neuro:  Cranial nerves 2-12 intact, no focal deficits  Breast: deferred  Rectal: deferred  Genitalia:  deferred    LABS:    CBC with Differential:    Lab Results   Component Value Date    WBC 7.6 10/13/2020    RBC 3.60 10/13/2020    HGB 11.9 10/13/2020    HCT 35.9 10/13/2020    PLT 91 10/13/2020    MCV 99.7 10/13/2020    MCH 33.1 10/13/2020    MCHC 33.1 10/13/2020    RDW 14.5 10/13/2020    NRBC 1.7 07/19/2020    LYMPHOPCT 7.5 10/12/2020    MONOPCT 5.1 10/12/2020    BASOPCT 0.5 10/12/2020    MONOSABS 0.41 10/12/2020    LYMPHSABS 0.60 10/12/2020    EOSABS 0.00 10/12/2020    BASOSABS 0.04 10/12/2020     CMP:    Lab Results   Component Value Date     10/13/2020    K 3.8 10/13/2020    K 4.7 10/11/2020    CL 96 10/13/2020    CO2 23 10/13/2020    BUN 25 10/13/2020    CREATININE 4.3 10/13/2020    GFRAA 16 10/13/2020    LABGLOM 13 10/13/2020    GLUCOSE 86 10/13/2020    PROT 6.1 10/13/2020    LABALBU 3.7 10/13/2020    CALCIUM 9.4 10/13/2020    BILITOT 1.0 10/13/2020    ALKPHOS 91 10/13/2020    AST 41 10/13/2020    ALT 21 10/13/2020     Magnesium:    Lab Results   Component Value Date    MG 1.7 07/23/2020     Phosphorus:    Lab Results   Component Value Date    PHOS 3.0 07/17/2020     PT/INR:    Lab Results   Component Value Date    PROTIME 11.6 10/12/2020    INR 1.0 10/12/2020     Last 3 Troponin:    Lab Results   Component Value Date    TROPONINI 0.11 10/11/2020    TROPONINI 0.09 07/13/2020    TROPONINI 0.08 07/11/2020     U/A:    Lab Results   Component Value Date    COLORU Yellow 07/14/2020    PROTEINU >=300 07/14/2020    PHUR 8.0 07/14/2020    WBCUA 1-3 07/14/2020    RBCUA 0-1 07/14/2020    BACTERIA NONE SEEN 07/14/2020    CLARITYU Clear 07/14/2020    SPECGRAV 1.015 07/14/2020    LEUKOCYTESUR Negative 07/14/2020    UROBILINOGEN 0.2 07/14/2020    BILIRUBINUR Negative 07/14/2020    BLOODU Negative 07/14/2020    GLUCOSEU Negative 07/14/2020     ABG:  No results found for: PH, PCO2, PO2, HCO3, BE, THGB, TCO2, O2SAT  HgBA1c:  No results found for: LABA1C  FLP:  No results found for: TRIG, HDL, LDLCALC, LDLDIRECT, LABVLDL  TSH:  No results found for: TSH    MRI BRAIN WO CONTRAST    (Results Pending)       ASSESSMENT:      Principal Problem:    Encephalopathy  Active Problems:    ESRD (end stage renal disease) (HCC)    Herpes zoster Hypertension    Hyperlipidemia    Herpes zoster encephalitis  Resolved Problems:    * No resolved hospital problems.  *      PLAN:    25-year-old male history of end-stage renal disease who developed herpes zoster was started on valacyclovir transferred to [address] [place] [address] from [facility] with encephalopathy    Status post LP  Elevated white cells with monocyte predominance  Elevated protein  Positive for herpes zoster  Acyclovir IV  MRI brain with small left frontal lesion on FLAIR  ID consult discussed case  Nephrology consult  Neurology consult  Medications for other co morbidities cont as appropriate w dosage adjustments as necessary   PT/OT  DVT PPx  DC planning         Electronically signed by Vanessa Alves MD on 10/13/2020 at 8:00 AM

## 2020-10-13 NOTE — FLOWSHEET NOTE
10/12/20 1954   Vital Signs   BP (!) 142/71   Temp 98.2 °F (36.8 °C)   Pulse 79   Pain Assessment   Pain Assessment 0-10   Pain Level 0   Post-Hemodialysis Assessment   Post-Treatment Procedures Blood returned; Access bleeding time < 10 minutes   Machine Disinfection Process Acid/Vinegar Clean;Exterior Machine Disinfection; Heat Disinfect   Rinseback Volume (ml) 300 ml   Total Liters Processed (l/min) 72.8 l/min   Dialyzer Clearance Moderately streaked   Duration of Treatment (minutes) 195 minutes   Hemodialysis Output (ml) 2300 ml   NET Removed (ml) 2000 ml   Tolerated Treatment Good   Patient Response to Treatment tolerated well

## 2020-10-13 NOTE — CONSULTS
palpitations  Gastrointestinal: No abdominal pain, no diarrhea  Genitourinary: No dysuria, no hematuria  Integumentary: No rash, no itching  Musculoskeletal: No muscle pain, no joint pain  Neurologic: Has headache, no numbness in extremities    Physical Examination:  Vitals:    10/13/20 0515 10/13/20 0900 10/13/20 1415 10/13/20 1644   BP: (!) 170/60 (!) 156/70 (!) 187/77 (!) 171/77   Pulse: 102 88 70 78   Resp: 22 18 18   Temp:  98.7 °F (37.1 °C)  98 °F (36.7 °C)   TempSrc:    Temporal   SpO2:  95%  94%   Weight:       Height:         Constitutional: Alert, not in distress  Eyes: Sclerae anicteric, no conjunctival erythema  ENT: No buccal lesion, no pharyngeal exudates  Neck: No nuchal rigidity, no cervical adenopathy  Lungs: Clear breath sounds, no crackles, no wheezes  Heart: Regular rate and rhythm, no murmurs  Abdomen: Bowel sounds present, soft, nontender  Skin: Warm and dry, punched out lesions on right parietal area and scant yellowish fluid-filled vesicles on right lateral eyelid. Musculoskeletal: No joint erythema, no joint swelling    Labs, imaging, and medical records/notes were personally reviewed. Assessment:  Herpes zoster encephalitis  Shingles, V1 involvement  ESRD on hemodialysis    Plan:  Start acyclovir 5mg/kg recorded/actual body weight q24h dosed according to renal function. Anticipate 2 weeks of antiviral treatment. Thank you for involving me in the care of Lloyd Mckenzie. I will continue to follow. Please do not hesitate to call for any questions or concerns.     Electronically signed by Magali Streeter MD on 10/13/2020 at 5:13 PM

## 2020-10-13 NOTE — CONSULTS
Systems   Constitutional: Negative for activity change, chills and fatigue. HENT: Negative for congestion, sinus pain and trouble swallowing. Eyes: Negative for photophobia, pain and itching. Respiratory: Negative for cough, chest tightness and shortness of breath. Cardiovascular: Negative for chest pain and palpitations. Gastrointestinal: Negative for nausea and vomiting. Musculoskeletal: Negative for neck pain and neck stiffness. Hip pain   Skin: Positive for rash. Neurological: Positive for weakness. Negative for facial asymmetry, speech difficulty, light-headedness, numbness and headaches. All other systems reviewed and are negative. Objective:   BP (!) 156/70   Pulse 88   Temp 98.7 °F (37.1 °C)   Resp 18   Ht 5' 8\" (1.727 m)   Wt 115 lb (52.2 kg)   SpO2 95%   BMI 17.49 kg/m²     Physical Exam  Vitals signs reviewed. Constitutional:       General: He is not in acute distress. Appearance: Normal appearance. HENT:      Head: Normocephalic. Eyes:      General: Lids are normal.      Extraocular Movements: Extraocular movements intact. Pupils: Pupils are equal, round, and reactive to light. Musculoskeletal:      Comments: Left leg movement and strength diminished from hip pain after fall. Pain on passive and active movement. Heel tap positive. Skin:     Findings: Rash present. Comments: Crusted lesions on right eyebrow and nose, consistent with shingles. Small 1cm laceration on left eyebrow   Neurological:      General: No focal deficit present. Mental Status: He is alert and oriented to person, place, and time. Deep Tendon Reflexes:      Reflex Scores:       Tricep reflexes are 2+ on the right side and 2+ on the left side. Bicep reflexes are 3+ on the right side and 2+ on the left side. Brachioradialis reflexes are 3+ on the right side and 2+ on the left side.        Patellar reflexes are 2+ on the right side and 3+ on the left side.       Achilles reflexes are 2+ on the right side and 2+ on the left side. Psychiatric:         Speech: Speech normal.         Neurological Exam  Mental Status  Alert. Oriented to person, place, time and situation. Recalls 3 of 3 objects immediately. At 3 minutes recalls 2 of 3 objects. Recalls 3 of 3 objects with prompting. Speech is normal. Language is fluent with no aphasia. Attention and concentration are normal. Fund of knowledge is appropriate for level of education. Apraxia absent. Cranial Nerves  CN II: Right visual acuity: normal. Left visual acuity: finger movement. CN III, IV, VI: Extraocular movements intact bilaterally. Normal lids and orbits bilaterally. Pupils equal round and reactive to light bilaterally. CN V: Facial sensation is normal.  CN VII: Full and symmetric facial movement. CN VIII: Hearing is normal.  CN IX, X: Palate elevates symmetrically  CN XI: Shoulder shrug strength is normal.  CN XII: Tongue midline without atrophy or fasciculations. Motor  Decreased muscle bulk throughout. No fasciculations present. Normal muscle tone. Strength is 5/5 in all four extremities except as noted. Right                     Left  Hip flexion                              5-                          3+  Hip extension                         5-                          3+  Hip abduction                         5-                          3+  Hip adduction                         5-                          3+  Knee flexion                           5-                          3+  Knee extension                      5-                          3+    Sensory  Sensation is intact to light touch, pinprick, vibration and proprioception in all four extremities.     Reflexes                                           Right                      Left  Brachioradialis                    3+                         2+  Biceps                                 3+ 2+  Triceps                                2+                         2+  Finger flex                           2+                         2+  Hamstring                            2+                         2+  Patellar                                2+                         3+  Achilles                                2+                         2+    Right pathological reflexes: Tromner absent. Ankle clonus absent. Left pathological reflexes: Tromner absent. Ankle clonus absent. Coordination  Right: Finger-to-nose normal. Heel-to-shin normal.  Left: Finger-to-nose normal. Could not do heel to shin with left due to hip pain. Gait  Not tested. Patient has difficulty walking right now due to hip pain.       Laboratory/Radiology:     CBC with Differential:    Lab Results   Component Value Date    WBC 7.6 10/13/2020    RBC 3.60 10/13/2020    HGB 11.9 10/13/2020    HCT 35.9 10/13/2020    PLT 91 10/13/2020    MCV 99.7 10/13/2020    MCH 33.1 10/13/2020    MCHC 33.1 10/13/2020    RDW 14.5 10/13/2020    NRBC 1.7 07/19/2020    LYMPHOPCT 7.5 10/12/2020    MONOPCT 5.1 10/12/2020    BASOPCT 0.5 10/12/2020    MONOSABS 0.41 10/12/2020    LYMPHSABS 0.60 10/12/2020    EOSABS 0.00 10/12/2020    BASOSABS 0.04 10/12/2020     CMP:    Lab Results   Component Value Date     10/13/2020    K 3.8 10/13/2020    K 4.7 10/11/2020    CL 96 10/13/2020    CO2 23 10/13/2020    BUN 25 10/13/2020    CREATININE 4.3 10/13/2020    GFRAA 16 10/13/2020    LABGLOM 13 10/13/2020    GLUCOSE 86 10/13/2020    PROT 6.1 10/13/2020    LABALBU 3.7 10/13/2020    CALCIUM 9.4 10/13/2020    BILITOT 1.0 10/13/2020    ALKPHOS 91 10/13/2020    AST 41 10/13/2020    ALT 21 10/13/2020     PT/INR:    Lab Results   Component Value Date    PROTIME 11.6 10/12/2020    INR 1.0 10/12/2020     PTT:    Lab Results   Component Value Date    APTT 26.9 10/12/2020     Troponin:    Lab Results   Component Value Date    TROPONINI 0.11 10/11/2020 Cryptococcus neoformans/gattii CSF by PCR  Not Detected  Final  10/12/2020  1:24 PM  [address] [facility]    Cytomegalovirus CSF by PCR  Not Detected  Final  10/12/2020  1:24 PM  [address] [facility]    Enterovirus CSF by PCR  Not Detected  Final  10/12/2020  1:24 PM  [facility]    Escherichia coli K1 CSF by PCR  Not Detected  Final  10/12/2020  1:24 PM  [address] [facility]    Haemophilus influenzae CSF by PCR  Not Detected  Final  10/12/2020  1:24 PM  [address] [facility]    Herpes simplex virus 1 CSF by PCR  Not Detected  Final  10/12/2020  1:24 PM  [address] [facility]    Enterovirus CSF by PCR  Not Detected  Final  10/12/2020  1:24 PM  [address] [facility]    Human herpesvirus 6 CSF by PCR  Not Detected  Final  10/12/2020  1:24 PM  [address] [facility]    Human parechovirus CSF by PCR  Not Detected  Final  10/12/2020  1:24 PM  [address] [facility]    Listeria monocytogenes CSF by PCR  Not Detected  Final  10/12/2020  1:24 PM  [address] [facility]    Neisseria meningitidis CSF by PCR  Not Detected  Final  10/12/2020  1:24 PM  [address] [facility]    Streptococcus agalactiae CSF by PCR  Not Detected  Final  10/12/2020  1:24 PM  [address] [facility]    Streptococcus pneumoniae CSF by PCR  Not Detected  Final  10/12/2020  1:24 PM  [address] [facility]    Varicella zoster virus CSF by PCR  DETECTEDPanic    Final  10/12/2020  1:24 PM  [address] [facility]       Color, CSF  Colorless   Final  10/12/2020  1:24 PM  [address] [place] [address] Lab    Appearance, CSF  Clear  Clear  Final  10/12/2020  1:24 PM  [address] [place] [address] Lab    Tube Number + CELL CT + DIFF-CSF  Tube 3   Final  10/12/2020  1:24 PM  [address] [place] [address] Lab    WBC, CSF  126High    0 - 2 /uL  Final  10/12/2020  1:24 PM  [facility] [id number]  27 involving the left    maxillary sinus.  Small mucous retention cyst versus polyp at the base of the    right maxillary sinus.  The remainder of the visualized paranasal sinuses and    bilateral mastoid air cells are clear.         SOFT TISSUES/SKULL:  No acute abnormality of the visualized skull or soft    tissues. MRI brain 10/12:  FINDINGS:    There is mild diffuse parenchymal volume loss.  There are patchy and    confluent FLAIR hyperintensities within the periventricular and subcortical    white matter in addition to the gabrielle, statistically most compatible with    moderate to severe chronic microvascular ischemic changes. Sultana Mccracken is a    chronic lacunar infarct within the right basal ganglia.  The vascular    arterial flow voids at the skull base appears patent.         There is a left frontal subcortical white matter nonspecific small focal    FLAIR hyperintensity with susceptibility artifact measuring 8 x 7 mm.  There    is no restricted diffusion or vasogenic edema identified within the bilateral    medial temporal lobes.  There is no acute infarct or extra-axial fluid    collection. Sultana Mccracken is no midline shift.         The orbital globes and retrobulbar structures appear grossly unremarkable. There is mild to moderate diffuse mucosal thickening of the left maxillary    sinus.  There is a mucous retention cyst versus polyp within the right    maxillary sinus.  The mastoid air cells are well aerated. I independently reviewed the labs and imaging studies at today's appointment. Assessment:     Patient is a 67year old male with PMH of ESRD, shingles, and stroke presenting with active shingles infection, altered mental status and hallucinations. AMS and hallucinations have resolved and are likely 2/2 to medication side effect. Concern for hip fracture from fall-patient is complaining of hip pain and cannot walk or move his left leg without significant pain. Heel tap positive.     Patient Active Problem List   Diagnosis    Encounter regarding vascular access for dialysis for ESRD ([facility]    COVID-19    Acute respiratory failure due to COVID-19 ([facility]    Pneumonia due to COVID-19 virus    ESRD (end stage renal disease) ([facility]    Thrombocytopenia ([facility]    Melena    Anemia associated with acute blood loss    AMS (altered mental status)    Encephalopathy    Herpes zoster    Hypertension    Hyperlipidemia    Herpes zoster encephalitis       Plan:     MRI with contrast for enhancement of cranial nerves   -MRI wo showed-left frontal subcortical white matter nonspecific small focal lesion   -Consider dialysis afterwards  Xray of Hip and pelvis bilaterally    Yolie Benitez, MS4  10:01 AM  10/13/2020

## 2020-10-14 ENCOUNTER — APPOINTMENT (OUTPATIENT)
Dept: MRI IMAGING | Age: 75
DRG: 981 | End: 2020-10-14
Attending: INTERNAL MEDICINE
Payer: MEDICARE

## 2020-10-14 PROBLEM — S72.002A FRACTURE OF FEMORAL NECK, LEFT, CLOSED (HCC): Status: ACTIVE | Noted: 2020-10-14

## 2020-10-14 LAB
ABO/RH: NORMAL
ANION GAP SERPL CALCULATED.3IONS-SCNC: 12 MMOL/L (ref 7–16)
ANTIBODY SCREEN: NORMAL
BUN BLDV-MCNC: 14 MG/DL (ref 8–23)
CALCIUM SERPL-MCNC: 9 MG/DL (ref 8.6–10.2)
CHLORIDE BLD-SCNC: 98 MMOL/L (ref 98–107)
CO2: 26 MMOL/L (ref 22–29)
CREAT SERPL-MCNC: 2.7 MG/DL (ref 0.7–1.2)
GFR AFRICAN AMERICAN: 28
GFR NON-AFRICAN AMERICAN: 23 ML/MIN/1.73
GLUCOSE BLD-MCNC: 96 MG/DL (ref 74–99)
INR BLD: 1
PHOSPHORUS: 2.7 MG/DL (ref 2.5–4.5)
POTASSIUM REFLEX MAGNESIUM: 3.9 MMOL/L (ref 3.5–5)
PROTHROMBIN TIME: 11.6 SEC (ref 9.3–12.4)
SODIUM BLD-SCNC: 136 MMOL/L (ref 132–146)

## 2020-10-14 PROCEDURE — 70553 MRI BRAIN STEM W/O & W/DYE: CPT

## 2020-10-14 PROCEDURE — 90935 HEMODIALYSIS ONE EVALUATION: CPT

## 2020-10-14 PROCEDURE — 36415 COLL VENOUS BLD VENIPUNCTURE: CPT

## 2020-10-14 PROCEDURE — 85610 PROTHROMBIN TIME: CPT

## 2020-10-14 PROCEDURE — 84100 ASSAY OF PHOSPHORUS: CPT

## 2020-10-14 PROCEDURE — 6360000002 HC RX W HCPCS: Performed by: INTERNAL MEDICINE

## 2020-10-14 PROCEDURE — 86901 BLOOD TYPING SEROLOGIC RH(D): CPT

## 2020-10-14 PROCEDURE — 86850 RBC ANTIBODY SCREEN: CPT

## 2020-10-14 PROCEDURE — 2060000000 HC ICU INTERMEDIATE R&B

## 2020-10-14 PROCEDURE — 2580000003 HC RX 258: Performed by: ORTHOPAEDIC SURGERY

## 2020-10-14 PROCEDURE — 2580000003 HC RX 258: Performed by: RADIOLOGY

## 2020-10-14 PROCEDURE — A9577 INJ MULTIHANCE: HCPCS | Performed by: RADIOLOGY

## 2020-10-14 PROCEDURE — 2580000003 HC RX 258: Performed by: INTERNAL MEDICINE

## 2020-10-14 PROCEDURE — 6360000004 HC RX CONTRAST MEDICATION: Performed by: RADIOLOGY

## 2020-10-14 PROCEDURE — 6370000000 HC RX 637 (ALT 250 FOR IP): Performed by: INTERNAL MEDICINE

## 2020-10-14 PROCEDURE — 80048 BASIC METABOLIC PNL TOTAL CA: CPT

## 2020-10-14 PROCEDURE — 86900 BLOOD TYPING SEROLOGIC ABO: CPT

## 2020-10-14 RX ORDER — SODIUM CHLORIDE 0.9 % (FLUSH) 0.9 %
10 SYRINGE (ML) INJECTION EVERY 12 HOURS SCHEDULED
Status: DISCONTINUED | OUTPATIENT
Start: 2020-10-14 | End: 2020-10-22 | Stop reason: HOSPADM

## 2020-10-14 RX ORDER — PROMETHAZINE HYDROCHLORIDE 25 MG/1
12.5 TABLET ORAL EVERY 6 HOURS PRN
Status: DISCONTINUED | OUTPATIENT
Start: 2020-10-14 | End: 2020-10-22 | Stop reason: HOSPADM

## 2020-10-14 RX ORDER — ACETAMINOPHEN 500 MG
1000 TABLET ORAL EVERY 8 HOURS SCHEDULED
Status: DISCONTINUED | OUTPATIENT
Start: 2020-10-14 | End: 2020-10-22 | Stop reason: HOSPADM

## 2020-10-14 RX ORDER — ONDANSETRON 2 MG/ML
4 INJECTION INTRAMUSCULAR; INTRAVENOUS EVERY 6 HOURS PRN
Status: DISCONTINUED | OUTPATIENT
Start: 2020-10-14 | End: 2020-10-22 | Stop reason: HOSPADM

## 2020-10-14 RX ORDER — POLYETHYLENE GLYCOL 3350 17 G/17G
17 POWDER, FOR SOLUTION ORAL DAILY PRN
Status: DISCONTINUED | OUTPATIENT
Start: 2020-10-14 | End: 2020-10-22 | Stop reason: HOSPADM

## 2020-10-14 RX ORDER — OXYCODONE HYDROCHLORIDE AND ACETAMINOPHEN 5; 325 MG/1; MG/1
1 TABLET ORAL EVERY 4 HOURS PRN
Status: DISCONTINUED | OUTPATIENT
Start: 2020-10-14 | End: 2020-10-22 | Stop reason: HOSPADM

## 2020-10-14 RX ORDER — SODIUM CHLORIDE 0.9 % (FLUSH) 0.9 %
10 SYRINGE (ML) INJECTION PRN
Status: DISCONTINUED | OUTPATIENT
Start: 2020-10-14 | End: 2020-10-22 | Stop reason: HOSPADM

## 2020-10-14 RX ORDER — OXYCODONE HYDROCHLORIDE AND ACETAMINOPHEN 5; 325 MG/1; MG/1
2 TABLET ORAL EVERY 4 HOURS PRN
Status: DISCONTINUED | OUTPATIENT
Start: 2020-10-14 | End: 2020-10-22 | Stop reason: HOSPADM

## 2020-10-14 RX ADMIN — PANTOPRAZOLE SODIUM 40 MG: 40 TABLET, DELAYED RELEASE ORAL at 06:14

## 2020-10-14 RX ADMIN — SUCRALFATE 1 G: 1 TABLET ORAL at 21:04

## 2020-10-14 RX ADMIN — ROSUVASTATIN CALCIUM 5 MG: 5 TABLET, FILM COATED ORAL at 21:04

## 2020-10-14 RX ADMIN — SUCRALFATE 1 G: 1 TABLET ORAL at 08:53

## 2020-10-14 RX ADMIN — HYDRALAZINE HYDROCHLORIDE 50 MG: 50 TABLET, FILM COATED ORAL at 08:53

## 2020-10-14 RX ADMIN — OXYCODONE AND ACETAMINOPHEN 1 TABLET: 5; 325 TABLET ORAL at 22:36

## 2020-10-14 RX ADMIN — SUCRALFATE 1 G: 1 TABLET ORAL at 16:14

## 2020-10-14 RX ADMIN — GADOBENATE DIMEGLUMINE 10 ML: 529 INJECTION, SOLUTION INTRAVENOUS at 11:49

## 2020-10-14 RX ADMIN — POTASSIUM & SODIUM PHOSPHATES POWDER PACK 280-160-250 MG 250 MG: 280-160-250 PACK at 08:53

## 2020-10-14 RX ADMIN — SODIUM CHLORIDE, PRESERVATIVE FREE 10 ML: 5 INJECTION INTRAVENOUS at 08:53

## 2020-10-14 RX ADMIN — ACYCLOVIR SODIUM 260 MG: 50 INJECTION, SOLUTION INTRAVENOUS at 16:14

## 2020-10-14 RX ADMIN — METOPROLOL TARTRATE 50 MG: 50 TABLET, FILM COATED ORAL at 21:04

## 2020-10-14 RX ADMIN — METOPROLOL TARTRATE 50 MG: 50 TABLET, FILM COATED ORAL at 08:53

## 2020-10-14 RX ADMIN — HEPARIN SODIUM 5000 UNITS: 10000 INJECTION INTRAVENOUS; SUBCUTANEOUS at 21:03

## 2020-10-14 RX ADMIN — HEPARIN SODIUM 5000 UNITS: 10000 INJECTION INTRAVENOUS; SUBCUTANEOUS at 06:14

## 2020-10-14 RX ADMIN — HYDRALAZINE HYDROCHLORIDE 50 MG: 50 TABLET, FILM COATED ORAL at 21:04

## 2020-10-14 RX ADMIN — DOXAZOSIN 4 MG: 4 TABLET ORAL at 21:04

## 2020-10-14 RX ADMIN — Medication 10 ML: at 21:30

## 2020-10-14 ASSESSMENT — PAIN SCALES - GENERAL
PAINLEVEL_OUTOF10: 0
PAINLEVEL_OUTOF10: 6
PAINLEVEL_OUTOF10: 6

## 2020-10-14 NOTE — PROGRESS NOTES
JENNY PROGRESS NOTE      Chief complaint: Follow-up of zoster encephalitis    The patient is a 67 y.o. male with history of ESRD on hemodialysis through left upper extremity AV fistula, hypertension, left eye blindness, COVID-19 treated with remdesivir in 07/2020, transferred to [facility] on 10/13 foNeurology evaluation from [facility] where he initially presented on 10/11 with visual hallucinations and unusual behavior. He was recently started on valacyclovir on 10/09 for painful rash on his right forehead presumed to be shingles then became lethargic on 10/10 later having visual hallucinations. According to his wife, he had been evaluated by an eye doctor and was told there was no eye involvement from shingles. At [facility], he underwent lumbar puncture yielding CSF with elevated WBCs of 126 predominantly monocytic at 96%, elevated protein of 62 mg/dL, normal glucose. CSF meningitis encephalitis PCR panel showed Varicella zoster virus PCR detected. Acyclovir was started. He was subsequently transferred to [facility] for Neurology evaluation. Subjective: Patient was seen and examined. No chills, no abdominal pain, no diarrhea, no rash, no itching, no further headaches. Objective:    Vitals:    10/14/20 0845   BP: (!) 147/65   Pulse: 84   Resp: 18   Temp: 98.3 °F (36.8 °C)   SpO2: 95%     Constitutional: Alert, not in distress  Respiratory: Clear breath sounds, no crackles, no wheezes  Cardiovascular: Regular rate and rhythm, no murmurs  Gastrointestinal: Bowel sounds present, soft, nontender  Skin: Warm and dry, punched out lesions on right parietal area and scant yellowish fluid-filled vesicles on right lateral eyelid  Musculoskeletal: No joint swelling, no joint erythema    Labs, imaging, and medical records/notes were personally reviewed.     Assessment:  Herpes zoster encephalitis  Shingles, V1 involvement  ESRD on hemodialysis     Plan:  Continue acyclovir 5mg/kg recorded/actual body weight q24h dosed according to renal function. Anticipate 2 weeks of antiviral treatment.     Thank you for involving me in the care of John Russo. I will continue to follow. Please do not hesitate to call for any questions or concerns.     Electronically signed by Adenike Lares MD on 10/14/2020 at 11:33 AM

## 2020-10-14 NOTE — PROGRESS NOTES
Noah Oliver is a 67 y.o. right handed male with PMH of ERSD on dialysis, stroke, diastolif HF, and HTN. Neurology is following for altered mental status and fall. Patient was confused and had visual hallucinations after taking valacyclovir and Norco for 2 days for a shingles infection in R V1 distribution. 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 Patient fell while at [facility] and had a small laceration on left eyebrow. Patient was transferred to [facility] for neuro evaluation. Mental status was improving. Patient says he feels okay today. He was seen while receiving hemodialysis. He is alert and oriented to person, place, time, and situation. He states that he does not feel confused and denies any hallucinations. He denies any itchiness or pain in eyes. His main complaint is left hip and leg pain which started after his fall. X ray showed a L fractured femoral head.     Objective:     BP (!) 145/66   Pulse 71   Temp 97.5 °F (36.4 °C)   Resp 18   Ht 5' 8\" (1.727 m)   Wt 116 lb 10 oz (52.9 kg)   SpO2 95%   BMI 17.73 kg/m²     General appearance: alert, appears stated age, cooperative and no distress  Head: normocephalic, without obvious abnormality, atraumatic  Eyes: conjunctivae/corneas clear  Neck: no adenopathy, no carotid bruit, supple, symmetrical, trachea midline and thyroid not enlarged, symmetric, no tenderness/mass/nodules  Lungs: clear to auscultation bilaterally  Heart: regular rate and rhythm, S1, S2 normal, no murmur, click, rub or gallop  Extremities: normal, atraumatic, no cyanosis or edema  Pulses: 2+ and symmetric  Skin: crusted lesions in right V1 distribution consistent with shingles; small 1 cm laceration on left eyebrow      Mental Status: Alert, oriented, thought content appropriate     Appropriate attention/concentration  Intact fundus of knowledge  Intact memories    Speech: no dysarthria  Language: no aphasias    Cranial Nerves:  I: smell NA   II: visual acuity  Left eye can see finger movement only (since prior stroke)   II: visual fields Full to confrontation   II: pupils KALIA   III,VII: ptosis None   III,IV,VI: extraocular muscles  Full ROM   V: mastication Normal   V: facial light touch sensation  Normal   V,VII: corneal reflex  Present   VII: facial muscle function - upper  Normal   VII: facial muscle function - lower Normal   VIII: hearing Normal   IX: soft palate elevation  Normal   IX,X: gag reflex Present   XI: trapezius strength  5/5   XI: sternocleidomastoid strength 5/5   XI: neck extension strength  5/5   XII: tongue strength  Normal     Motor:  Right   5/5              Left   5/5               Right Bicep  5/5           Left Bicep  5/5              Right Triceps   5/5       Left Triceps  5/5          Right Deltoid  5/5     Left Deltoid  5/5         Right IPS  5-/5            Left IPS  3+/5               Right Quadriceps  5-/5          Left Quadriceps    3+/5           Right Gastrocnemius    5-/5    Left Gastrocnemius   3+/5  Right Ant Tibialis   5/5  Left Ant Tibialis   5/5    Note: Left lower extremity exam was limited from pain from hip fracture rather than weakness    Sensory:  normal to joint position sense, light touch, a pin prick, proprioception and vibration      Coordination:   finger to nose normal bilaterally  heel to shin normal on the right; unable to perform on left due to pain  test for rapid alternating movements normal    Gait:  was not examined due to patient being on dialysis    DTR:   Right Brachioradialis reflex 3+  Left Brachioradialis reflex 2+  Right Biceps reflex 3+  Left Biceps reflex 2+  Right Triceps reflex 2+  Left Triceps reflex 2+  Right Quadriceps reflex 2+  Left Quadriceps reflex 3+   Right Achilles reflex 2+  Left Achilles reflex 2+    No Babinskis  No Roca's    No pathological reflexes    Laboratory/Radiology:     CBC with Differential:    Lab Results Component Value Date    WBC 7.6 10/13/2020    RBC 3.60 10/13/2020    HGB 11.9 10/13/2020    HCT 35.9 10/13/2020    PLT 91 10/13/2020    MCV 99.7 10/13/2020    MCH 33.1 10/13/2020    MCHC 33.1 10/13/2020    RDW 14.5 10/13/2020    NRBC 1.7 07/19/2020    LYMPHOPCT 11.3 10/13/2020    MONOPCT 6.1 10/13/2020    BASOPCT 0.7 10/13/2020    MONOSABS 0.46 10/13/2020    LYMPHSABS 0.84 10/13/2020    EOSABS 0.00 10/13/2020    BASOSABS 0.00 10/13/2020     CMP:    Lab Results   Component Value Date     10/13/2020    K 3.8 10/13/2020    K 4.7 10/11/2020    CL 96 10/13/2020    CO2 23 10/13/2020    BUN 25 10/13/2020    CREATININE 4.3 10/13/2020    GFRAA 16 10/13/2020    LABGLOM 13 10/13/2020    GLUCOSE 86 10/13/2020    PROT 6.1 10/13/2020    LABALBU 3.7 10/13/2020    CALCIUM 9.4 10/13/2020    BILITOT 1.0 10/13/2020    ALKPHOS 91 10/13/2020    AST 41 10/13/2020    ALT 21 10/13/2020       MRI OF THE BRAIN WITHOUT AND WITH CONTRAST  10/14/2020 11:30 am         TECHNIQUE:    MRI examination of the brain including base skull imaging was performed    utilizing T1 axial and coronal images before and after the administration of    intravenous contrast.  Axial heavily weighted T2 sequences skull base was    also performed.         COMPARISON:    MR brain October 12, 2020.         HISTORY:    ORDERING SYSTEM PROVIDED HISTORY: recent shingles infection involving facial    nerve with abnormal lesion in left frontal lobe on MRI w/o contrast.    TECHNOLOGIST PROVIDED HISTORY:    Need thin sections through posterior fossa for evaluation of cranial nerves. Reason for exam:->recent shingles infection involving facial nerve with    abnormal lesion in left frontal lobe on MRI w/o contrast.    What reading provider will be dictating this exam?->CRC         FINDINGS:    There is no intracranial pathologic enhancement.  There is no associated    enhancement of left frontal subcortical focal FLAIR hyperintensity with    susceptibility artifact.  The bilateral internal auditory canals and    cerebellopontine angle cisterns are without discrete mass lesion or abnormal    enhancement.  There is symmetric enhancement of the cavernous sinuses.  The    bilateral trigeminal nerve root entry zones and cisternal segments are    symmetric in appearance and without abnormal enhancement.  There is symmetric    appearance of the bilateral foramen rotundum and foramen ovale.              Impression    No intracranial pathologic enhancement. All labs and images personally reviewed today    Assessment:     Patient is a 67year old male with PMH of ESRD, shingles, and stroke with active shingles infection, altered mental status and hallucinations. AMS and hallucinations have resolved and are likely 2/2 to medication side effect (Norco and valacyclovir). MRI w wo contrast showed no intracranial pathologic enhancement. Patient is showing no meningeal signs. Patient having pain in left hip and leg after fall. X Ray showed L hip fracture.  Fall was likely 2/2 AMS 2/2 medication side effect.     Patient Active Problem List   Diagnosis    Encounter regarding vascular access for dialysis for ESRD [facility] [address]    COVID-19    Acute respiratory failure due to COVID-19 ([address]    Pneumonia due to COVID-19 virus    ESRD (end stage renal disease) ([address]    Thrombocytopenia ([address]    Melena    Anemia associated with acute blood loss    AMS (altered mental status)    Encephalopathy    Herpes zoster    Hypertension    Hyperlipidemia    Herpes zoster encephalitis       Plan:     AMS resolved  Fall likely 2/2 to AMS  Herpes zoster infection being treated per ID  ESRD management per nephrology  Fractured L femoral head- ortho consulted  Neuro to sign off      Robin Ji, MS 4  3:02 PM  10/14/2020

## 2020-10-14 NOTE — PROGRESS NOTES
Subjective:  Feeling better   No CP or SOB  No fever or chills   No uncontrolled pain  No vomiting or diarrhea     Objective:    BP (!) 147/65   Pulse 84   Temp 98.3 °F (36.8 °C) (Temporal)   Resp 18   Ht 5' 8\" (1.727 m)   Wt 115 lb (52.2 kg)   SpO2 95%   BMI 17.49 kg/m²     24HR INTAKE/OUTPUT:      Intake/Output Summary (Last 24 hours) at 10/14/2020 0921  Last data filed at 10/13/2020 1431  Gross per 24 hour   Intake 50 ml   Output --   Net 50 ml       General appearance: NAD, conversant  Neck: FROM, supple   Lungs: Clear bilaterally no wheezes, no rhonchi, no crackles  CV: RRR, no MRGs; normal carotid upstroke and amplitude without Bruits  Abdomen: Soft, non-tender; no masses or HSM  Extremities: +L hip tenderness No edema, no cyanosis, no clubbing  Skin: Intact no rash, no lesions, no ulcers    Psych: Alert and oriented normal affect  Neuro: Nonfocal  Most Recent Labs  Lab Results   Component Value Date    WBC 7.6 10/13/2020    HGB 11.9 (L) 10/13/2020    HCT 35.9 (L) 10/13/2020    PLT 91 (L) 10/13/2020     10/13/2020    K 3.8 10/13/2020    CL 96 (L) 10/13/2020    CREATININE 4.3 (H) 10/13/2020    BUN 25 (H) 10/13/2020    CO2 23 10/13/2020    GLUCOSE 86 10/13/2020    ALT 21 10/13/2020    AST 41 (H) 10/13/2020    INR 1.0 10/12/2020     No results for input(s): MG in the last 72 hours. Lab Results   Component Value Date    CALCIUM 9.4 10/13/2020    PHOS 3.0 07/17/2020        XR HIP BILATERAL W AP PELVIS (2 VIEWS)   Final Result   1. Left femoral neck fracture. 2. Questionable fractures of the right superior pubic ramus. RECOMMENDATION:   CT of the pelvis may be obtained for further evaluation.          XR HIP 1 VW W PELVIS RIGHT    (Results Pending)   MRI BRAIN W WO CONTRAST    (Results Pending)       Assessment    Principal Problem:    Herpes zoster encephalitis  Active Problems:    ESRD (end stage renal disease) (HCC)    Encephalopathy    Herpes zoster    Hypertension    Hyperlipidemia Fracture of femoral neck, left, closed ([facility] [address]  Resolved Problems:    * No resolved hospital problems.  *      Plan:  48-year-old male history of end-stage renal disease who developed herpes zoster was started on valacyclovir transferred to [address] [place] [address] from [facility] with encephalopathy     Status post LP  Elevated white cells with monocyte predominance  Elevated protein  Positive for herpes zoster  Acyclovir IV--renally adjusted--dose after dialysis  MRI brain with small left frontal lesion on FLAIR  ID Consult appreciated discussed case  Nephrology Consult appreciated   Neurology Consult appreciated   Xray reviewed-- L fem neck fx  Pain control  Bed rest  Orthopedic consult  Medications for other co morbidities cont as appropriate w dosage adjustments as necessary   PT/OT  DVT PPx  DC planning     Electronically signed by Abelardo Corral MD on 10/14/2020 at 9:21 AM

## 2020-10-14 NOTE — PROGRESS NOTES
OT SESSION ATTEMPT     Date:10/14/2020  Patient Name: Polly Faust  MRN: [id number]  : 1945  Room: [address] [place] [address]-A     Attempted OT session this date:    [] unavailable due to other medical staff currently with pt   [] on hold, await MRI/ neurosurgical recommendations. [x] on hold per nursing staff secondary to radiology results of hip/pelvis. Await ortho recommendations   [] declined Occupational Therapy  this date due to ___. Benefits of participation in therapy reviewed with pt.    [] off unit   [] Other:     Will reattempt OT eval at a later time.     Laurent Werner, [place] [id number]

## 2020-10-14 NOTE — PROGRESS NOTES
Department of Internal Medicine  Nephrology Progress Note    Events reviewed. SUBJECTIVE:  We are following [facility] Wang for dialysis needs. Reports no new complaints or concerns. Scheduled Meds:   acetaminophen  1,000 mg Oral 3 times per day    acyclovir  5 mg/kg Intravenous Q24H    doxazosin  4 mg Oral Nightly    heparin (porcine)  5,000 Units Subcutaneous Q12H    hydrALAZINE  50 mg Oral TID    metoprolol tartrate  50 mg Oral BID    pantoprazole  40 mg Oral QAM AC    potassium & sodium phosphates  1 packet Oral Daily    rosuvastatin  5 mg Oral Nightly    sucralfate  1 g Oral 4x Daily     Continuous Infusions:  PRN Meds:.oxyCODONE-acetaminophen **OR** oxyCODONE-acetaminophen, hydrALAZINE, sodium chloride flush      Physical Exam:    VITALS:  BP (!) 147/65   Pulse 84   Temp 98.3 °F (36.8 °C) (Temporal)   Resp 18   Ht 5' 8\" (1.727 m)   Wt 115 lb (52.2 kg)   SpO2 95%   BMI 17.49 kg/m²   24HR INTAKE/OUTPUT:      Intake/Output Summary (Last 24 hours) at 10/14/2020 0933  Last data filed at 10/13/2020 1431  Gross per 24 hour   Intake 50 ml   Output --   Net 50 ml         Access:  Peripherals, AVF left arm  Constitutional:  Sitting on the side of the bed in no acute distress. HEENT: AT/NC, positive for hearing aids. NECK: supple  Respiratory:  CTAB  Cardiovascular/Edema:  RRR, S1/S2, no edema. Gastrointestinal:  Soft, +BS, nontender. Musculoskeletal:  Left hip pain with difficulty weightbearing. Neurologic:  AAOx3  Other:  Skin tear on left face above the eyebrow from fall.      DATA:    CBC:   Lab Results   Component Value Date    WBC 7.6 10/13/2020    RBC 3.60 10/13/2020    HGB 11.9 10/13/2020    HCT 35.9 10/13/2020    MCV 99.7 10/13/2020    MCH 33.1 10/13/2020    MCHC 33.1 10/13/2020    RDW 14.5 10/13/2020    PLT 91 10/13/2020    MPV 10.6 10/13/2020     CMP:    Lab Results   Component Value Date     10/13/2020    K 3.8 10/13/2020    K 4.7 10/11/2020    CL 96 10/13/2020    CO2 23 10/13/2020    BUN 25 10/13/2020    CREATININE 4.3 10/13/2020    GFRAA 16 10/13/2020    LABGLOM 13 10/13/2020    GLUCOSE 86 10/13/2020    PROT 6.1 10/13/2020    LABALBU 3.7 10/13/2020    CALCIUM 9.4 10/13/2020    BILITOT 1.0 10/13/2020    ALKPHOS 91 10/13/2020    AST 41 10/13/2020    ALT 21 10/13/2020     Magnesium:    Lab Results   Component Value Date    MG 1.7 07/23/2020     Phosphorus:    Lab Results   Component Value Date    PHOS 3.0 07/17/2020       Radiology Review:            XR BL Hips- 10/13/2020    1. Left femoral neck fracture. 2. Questionable fractures of the right superior pubic ramus.         RECOMMENDATION:    CT of the pelvis may be obtained for further evaluation.                 CXR- 10/11/2020   No evidence of acute process.                   CT head w/o contrast - 10/11/2020   No acute intracranial abnormality.         Chronic and age related changes including age-appropriate cerebral volume    loss and scattered nonspecific white matter hypodensities which are likely    the sequela of chronic small vessel ischemic disease.                   MRI brain w/o contrast- 10/12/2020    Nonspecific left frontal subcortical white matter small focal FLAIR    hyperintensity with susceptibility artifact.  Further evaluation with    contrast enhanced MR imaging recommended.         No restricted diffusion or edema within the bilateral medial temporal lobes.         Moderate to severe chronic microvascular ischemic changes.                       BRIEF SUMMARY OF INITIAL CONSULT:    Briefly John uRsso is 67 y.o. male with history of ESRD on HD MWF via left upper arm AVF (last HD yesterday at [facility]), HTN, HFpEF, and recently admitted to [facility] in July for COVID-19 infection and experienced a 30 lb weight loss. Patient was admitted on 10/12/2020 from home with chief complaint of AMS and visual and auditory hallucinations.  Patient was recently diagnosed with shingles on Friday (10/9/2020) after seeing his PCP with complaint of a painful rash on his right forehead for 1 day duration. He was sent home with valacyclovir (renally dosed-once daily, but wife doesn't remember the dose) and Norco. After 2 days of taking the medication, the patient became altered and stopped the medications. The patient went to [facility] on [place] 10/11/2020 because of continued hallucinations with thoughts of medication side effect vs herpes zoster encephalitis. Patient experienced a fall at [facility]. He was then transferred to [facility] for further management and treatment. Patient seen by nephrology on 10/13/2020 for dialysis needs and he feels better today. Per the wife, his mentation has improved. LP performed on 10/12/2020 that was PCR positive for herpes zoster, elevated protein, elevated WBC (monocyte predominance) and IV acyclovir was started. 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 IMPRESSION/RECOMMENDATIONS:      ESRD:  Hemodialysis 3 times per week- MWF via AVF left arm; HD started 3/29/2016; last dialysis 10/12/2020 at [facility] with 2L removed, tolerated well. Next dialysis today. Having MRA with gadolinium today, we will plan for HD today and daily x 3 days. Thrombocytopenia: likely 2/2 infection vs drug-induced. Platelets 94 on admission; platelets 996 on discharge in July. 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 On Acyclovir 260 mg IV  (5mg/kg) Q24 hrs after dialysis. Neurology following. Mentation improved. HFpEF, proBNP 52,431, clinically euvolemic, chest x-ray without infiltrates. Left femoral neck fracture S/p Fall at [facility]: BL XR hips with left femoral neck fracture and questionable fractures of the right superior pubic ramus. Elevated troponin likely 2/2 ESRD.  Troponin 0.11  BPH: on doxazosin  HTN: on hydralazine and lopressor  HLD: on rosuvastatin   MBD of CKD, holding binders  Normocytic anemia: 2/2 ESRD. Hg 11.9, MCV 99.7, hold NICOLE for hemoglobin >11  Nutrition, on renal diet    PLAN:    HD today and daily x 3 days. MRI brain w/ and w/o contrast today at 11:30 am   Continue Acyclovir 260 mg IV  (5mg/kg) Q24 hrs after dialysis. Continue potassium and sodium phosphate supplementation   Continue current antihypertensive regimen   Strict I&O's.    Follow-up phosphorus level       Electronically signed by Len Castillo MD on 10/14/2020 at 9:32 AM

## 2020-10-15 ENCOUNTER — ANESTHESIA EVENT (OUTPATIENT)
Dept: OPERATING ROOM | Age: 75
DRG: 981 | End: 2020-10-15
Payer: MEDICARE

## 2020-10-15 PROBLEM — G93.41 METABOLIC ENCEPHALOPATHY: Status: ACTIVE | Noted: 2020-10-11

## 2020-10-15 LAB
ANION GAP SERPL CALCULATED.3IONS-SCNC: 14 MMOL/L (ref 7–16)
BASOPHILS ABSOLUTE: 0.05 E9/L (ref 0–0.2)
BASOPHILS RELATIVE PERCENT: 0.9 % (ref 0–2)
BUN BLDV-MCNC: 21 MG/DL (ref 8–23)
CALCIUM SERPL-MCNC: 8.8 MG/DL (ref 8.6–10.2)
CHLORIDE BLD-SCNC: 97 MMOL/L (ref 98–107)
CO2: 24 MMOL/L (ref 22–29)
CREAT SERPL-MCNC: 4 MG/DL (ref 0.7–1.2)
EOSINOPHILS ABSOLUTE: 0.31 E9/L (ref 0.05–0.5)
EOSINOPHILS RELATIVE PERCENT: 5.7 % (ref 0–6)
GFR AFRICAN AMERICAN: 18
GFR NON-AFRICAN AMERICAN: 15 ML/MIN/1.73
GLUCOSE BLD-MCNC: 84 MG/DL (ref 74–99)
HCT VFR BLD CALC: 34.2 % (ref 37–54)
HEMOGLOBIN: 10.5 G/DL (ref 12.5–16.5)
IMMATURE GRANULOCYTES #: 0.03 E9/L
IMMATURE GRANULOCYTES %: 0.6 % (ref 0–5)
LYMPHOCYTES ABSOLUTE: 0.88 E9/L (ref 1.5–4)
LYMPHOCYTES RELATIVE PERCENT: 16.3 % (ref 20–42)
MCH RBC QN AUTO: 32 PG (ref 26–35)
MCHC RBC AUTO-ENTMCNC: 30.7 % (ref 32–34.5)
MCV RBC AUTO: 104.3 FL (ref 80–99.9)
MONOCYTES ABSOLUTE: 0.49 E9/L (ref 0.1–0.95)
MONOCYTES RELATIVE PERCENT: 9.1 % (ref 2–12)
NEUTROPHILS ABSOLUTE: 3.64 E9/L (ref 1.8–7.3)
NEUTROPHILS RELATIVE PERCENT: 67.4 % (ref 43–80)
PDW BLD-RTO: 14.6 FL (ref 11.5–15)
PLATELET # BLD: 117 E9/L (ref 130–450)
PMV BLD AUTO: 11.1 FL (ref 7–12)
POTASSIUM REFLEX MAGNESIUM: 4.3 MMOL/L (ref 3.5–5)
RBC # BLD: 3.28 E12/L (ref 3.8–5.8)
SODIUM BLD-SCNC: 135 MMOL/L (ref 132–146)
WBC # BLD: 5.4 E9/L (ref 4.5–11.5)

## 2020-10-15 PROCEDURE — 6370000000 HC RX 637 (ALT 250 FOR IP): Performed by: INTERNAL MEDICINE

## 2020-10-15 PROCEDURE — 2060000000 HC ICU INTERMEDIATE R&B

## 2020-10-15 PROCEDURE — 2580000003 HC RX 258: Performed by: ORTHOPAEDIC SURGERY

## 2020-10-15 PROCEDURE — 2580000003 HC RX 258: Performed by: INTERNAL MEDICINE

## 2020-10-15 PROCEDURE — 80048 BASIC METABOLIC PNL TOTAL CA: CPT

## 2020-10-15 PROCEDURE — 90935 HEMODIALYSIS ONE EVALUATION: CPT

## 2020-10-15 PROCEDURE — 6360000002 HC RX W HCPCS: Performed by: INTERNAL MEDICINE

## 2020-10-15 PROCEDURE — 85025 COMPLETE CBC W/AUTO DIFF WBC: CPT

## 2020-10-15 PROCEDURE — 36415 COLL VENOUS BLD VENIPUNCTURE: CPT

## 2020-10-15 RX ADMIN — ACETAMINOPHEN 1000 MG: 500 TABLET ORAL at 14:10

## 2020-10-15 RX ADMIN — DOXAZOSIN 4 MG: 4 TABLET ORAL at 22:15

## 2020-10-15 RX ADMIN — METOPROLOL TARTRATE 50 MG: 50 TABLET, FILM COATED ORAL at 13:20

## 2020-10-15 RX ADMIN — HYDRALAZINE HYDROCHLORIDE 50 MG: 50 TABLET, FILM COATED ORAL at 22:16

## 2020-10-15 RX ADMIN — SUCRALFATE 1 G: 1 TABLET ORAL at 18:16

## 2020-10-15 RX ADMIN — Medication 10 ML: at 22:16

## 2020-10-15 RX ADMIN — Medication 10 ML: at 14:12

## 2020-10-15 RX ADMIN — SUCRALFATE 1 G: 1 TABLET ORAL at 13:20

## 2020-10-15 RX ADMIN — OXYCODONE AND ACETAMINOPHEN 2 TABLET: 5; 325 TABLET ORAL at 10:13

## 2020-10-15 RX ADMIN — METOPROLOL TARTRATE 50 MG: 50 TABLET, FILM COATED ORAL at 22:16

## 2020-10-15 RX ADMIN — SUCRALFATE 1 G: 1 TABLET ORAL at 22:16

## 2020-10-15 RX ADMIN — ACETAMINOPHEN 1000 MG: 500 TABLET ORAL at 22:16

## 2020-10-15 RX ADMIN — ACYCLOVIR SODIUM 260 MG: 50 INJECTION, SOLUTION INTRAVENOUS at 14:07

## 2020-10-15 RX ADMIN — HYDRALAZINE HYDROCHLORIDE 50 MG: 50 TABLET, FILM COATED ORAL at 13:21

## 2020-10-15 ASSESSMENT — PAIN SCALES - GENERAL
PAINLEVEL_OUTOF10: 0
PAINLEVEL_OUTOF10: 8
PAINLEVEL_OUTOF10: 7
PAINLEVEL_OUTOF10: 0

## 2020-10-15 NOTE — PROGRESS NOTES
JENNY PROGRESS NOTE      Chief complaint: Follow-up of zoster encephalitis    The patient is a 67 y.o. male with history of ESRD on hemodialysis through left upper extremity AV fistula, hypertension, left eye blindness, COVID-19 treated with remdesivir in 07/2020, transferred to [facility] on 10/13 foNeurology evaluation from [facility] where he initially presented on 10/11 with visual hallucinations and unusual behavior. He was recently started on valacyclovir on 10/09 for painful rash on his right forehead presumed to be shingles then became lethargic on 10/10 later having visual hallucinations. According to his wife, he had been evaluated by an eye doctor and was told there was no eye involvement from shingles. At [facility], he underwent lumbar puncture yielding CSF with elevated WBCs of 126 predominantly monocytic at 96%, elevated protein of 62 mg/dL, normal glucose. CSF meningitis encephalitis PCR panel showed Varicella zoster virus PCR detected. Acyclovir was started. He was subsequently transferred to [facility] for Neurology evaluation. Subjective: Patient was seen and examined. No chills, no abdominal pain, no diarrhea, no rash, no itching, no headaches. Objective:    Vitals:    10/15/20 1520   BP: (!) 113/56   Pulse: 69   Resp: 18   Temp: 97.7 °F (36.5 °C)   SpO2: 94%     Constitutional: Alert, not in distress  Respiratory: Clear breath sounds, no crackles, no wheezes  Cardiovascular: Regular rate and rhythm, no murmurs  Gastrointestinal: Bowel sounds present, soft, nontender  Skin: Warm and dry, punched out lesions on right parietal area and scant yellowish fluid-filled vesicles on right lateral eyelid  Musculoskeletal: No joint swelling, no joint erythema    Labs, imaging, and medical records/notes were personally reviewed.     Assessment:  Herpes zoster encephalitis  Shingles, right V1 involvement  Closed left femoral neck fracture, secondary to fall  ESRD on hemodialysis     Plan:  Continue acyclovir 5mg/kg recorded/actual body weight q24h dosed according to renal function for 2 weeks from 10/14-10/28.     Thank you for involving me in the care of Franchester Holter. I will continue to follow. Please do not hesitate to call for any questions or concerns.     Electronically signed by Von Sorto MD on 10/15/2020 at 10:46 AM

## 2020-10-15 NOTE — PROGRESS NOTES
Occupational Therapy  OT consult received. Chart reviewed. Will hold evaluation secondary to patient with L displaced transcervical femoral neck fx, scheduled for OR today (10/15/20). Will re-attempt OT evaluation as schedule permits when patient is appropriate.  Holly Lyn, OTR/L #[id number]

## 2020-10-15 NOTE — FLOWSHEET NOTE
Pt ran 3 hours; 3251 bath; 0.5 L removed; stable/tolerated well; denies c/o after scheduled meds administered. Needles removed & patent hemostasis achieved < 10 min, DSD applied positive T/B post Tx. Good Access flow no issues achieving prescribed BFR. Next Tx scheduled Saturday 10/17.      10/15/20 1106   Vital Signs   /65   Pulse 85   Pain Assessment   Pain Assessment 0-10   Pain Level 0   Post-Hemodialysis Assessment   Post-Treatment Procedures Blood returned; Access bleeding time < 10 minutes   Machine Disinfection Process Exterior Machine Disinfection   Rinseback Volume (ml) 300 ml   Total Liters Processed (l/min) 75.4 l/min   Dialyzer Clearance Lightly streaked   Duration of Treatment (minutes) 180 minutes   Hemodialysis Output (ml) 800 ml   NET Removed (ml) 500 ml   Tolerated Treatment Good   Patient Response to Treatment c/o relieved by scheduled meds   Bilateral Breath Sounds Diminished   Edema None   Physician Notified?  No

## 2020-10-15 NOTE — PROGRESS NOTES
Department of Internal Medicine  Nephrology Progress Note    Events reviewed. SUBJECTIVE:  We are following Peggy Vang for dialysis needs. Reports no new complaints or concerns. Scheduled Meds:   acetaminophen  1,000 mg Oral 3 times per day    acyclovir  5 mg/kg Intravenous Q24H    doxazosin  4 mg Oral Nightly    heparin (porcine)  5,000 Units Subcutaneous Q12H    hydrALAZINE  50 mg Oral TID    metoprolol tartrate  50 mg Oral BID    pantoprazole  40 mg Oral QAM AC    potassium & sodium phosphates  1 packet Oral Daily    rosuvastatin  5 mg Oral Nightly    sucralfate  1 g Oral 4x Daily     Continuous Infusions:  PRN Meds:.oxyCODONE-acetaminophen **OR** oxyCODONE-acetaminophen, hydrALAZINE, sodium chloride flush      Physical Exam:    VITALS:  BP (!) 147/65   Pulse 84   Temp 98.3 °F (36.8 °C) (Temporal)   Resp 18   Ht 5' 8\" (1.727 m)   Wt 115 lb (52.2 kg)   SpO2 95%   BMI 17.49 kg/m²   24HR INTAKE/OUTPUT:      Intake/Output Summary (Last 24 hours) at 10/14/2020 0933  Last data filed at 10/13/2020 1431  Gross per 24 hour   Intake 50 ml   Output --   Net 50 ml         Access:  Peripherals, AVF left arm  Constitutional:  Sitting on the side of the bed in no acute distress. HEENT: AT/NC, positive for hearing aids. NECK: supple  Respiratory:  CTAB  Cardiovascular/Edema:  RRR, S1/S2, no edema. Gastrointestinal:  Soft, +BS, nontender. Musculoskeletal:  Left hip pain with difficulty weightbearing and shortening. Neurologic:  AAOx3  Other:  Skin tear on left face above the eyebrow from fall.      DATA:    CBC:   Lab Results   Component Value Date    WBC 7.6 10/13/2020    RBC 3.60 10/13/2020    HGB 11.9 10/13/2020    HCT 35.9 10/13/2020    MCV 99.7 10/13/2020    MCH 33.1 10/13/2020    MCHC 33.1 10/13/2020    RDW 14.5 10/13/2020    PLT 91 10/13/2020    MPV 10.6 10/13/2020     CMP:    Lab Results   Component Value Date     10/15/2020    K 4.3 10/15/2020    CL 97 10/15/2020    CO2 24 10/15/2020    BUN 21 10/15/2020    CREATININE 4.0 10/15/2020    GFRAA 18 10/15/2020    LABGLOM 15 10/15/2020    GLUCOSE 84 10/15/2020    PROT 6.1 10/13/2020    LABALBU 3.7 10/13/2020    CALCIUM 8.8 10/15/2020    BILITOT 1.0 10/13/2020    ALKPHOS 91 10/13/2020    AST 41 10/13/2020    ALT 21 10/13/2020     Magnesium:    Lab Results   Component Value Date    MG 1.7 07/23/2020     Phosphorus:    Lab Results   Component Value Date    PHOS 2.7 10/14/2020       Radiology Review:            XR BL Hips- 10/13/2020    1. Left femoral neck fracture. 2. Questionable fractures of the right superior pubic ramus.         RECOMMENDATION:    CT of the pelvis may be obtained for further evaluation.                 CXR- 10/11/2020   No evidence of acute process.                   CT head w/o contrast - 10/11/2020   No acute intracranial abnormality.         Chronic and age related changes including age-appropriate cerebral volume    loss and scattered nonspecific white matter hypodensities which are likely    the sequela of chronic small vessel ischemic disease.                   MRI brain w/o contrast- 10/12/2020    Nonspecific left frontal subcortical white matter small focal FLAIR    hyperintensity with susceptibility artifact.  Further evaluation with    contrast enhanced MR imaging recommended.         No restricted diffusion or edema within the bilateral medial temporal lobes.         Moderate to severe chronic microvascular ischemic changes.                       BRIEF SUMMARY OF INITIAL CONSULT:    Christiano Summers is 67 y.o. male with history of ESRD on HD MWF via left upper arm AVF (last HD yesterday at [facility]), HTN, HFpEF, and recently admitted to [facility] in July for COVID-19 infection and experienced a 30 lb weight loss. Patient was admitted on 10/12/2020 from home with chief complaint of AMS and visual and auditory hallucinations.  Patient was recently diagnosed with shingles on Friday (10/9/2020) after seeing his PCP with complaint of a painful rash on his right forehead for 1 day duration. He was sent home with valacyclovir (renally dosed-once daily, but wife doesn't remember the dose) and Norco. After 2 days of taking the medication, the patient became altered and stopped the medications. The patient went to [facility] on [place] 10/11/2020 because of continued hallucinations with thoughts of medication side effect vs herpes zoster encephalitis. Patient experienced a fall at [facility]. He was then transferred to [address] [place] [address] for further management and treatment. Patient seen by nephrology on 10/13/2020 for dialysis needs and he feels better today. Per the wife, his mentation has improved. LP performed on 10/12/2020 that was PCR positive for herpes zoster, elevated protein, elevated WBC (monocyte predominance) and IV acyclovir was started. 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 IMPRESSION/RECOMMENDATIONS:      ESRD:  Hemodialysis 3 times per week- MWF via AVF left arm; HD started 3/29/2016; last dialysis yesterday with 1L removed, tolerated well. Dialysis today with 500 ml removed. Plan for dialysis tomorrow for a total of 3 daily due to having MRA with gadolinium yesterday. Thrombocytopenia: likely 2/2 infection vs drug-induced. Platelets 94 on admission; platelets 915 on discharge in July. 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 On Acyclovir 260 mg IV  (5mg/kg) Q24 hrs after dialysis. Neurology following. HFpEF, proBNP 52,431, clinically euvolemic, chest x-ray without infiltrates. Left femoral neck fracture S/p Fall at [facility]: BL XR hips with left femoral neck fracture and questionable fractures of the right superior pubic ramus. Plan for OR today with Ortho. Elevated troponin likely 2/2 ESRD.  Troponin 0.11  BPH: on doxazosin  HTN: on hydralazine and lopressor  HLD: on rosuvastatin   MBD of CKD, holding binders  Normocytic anemia: 2/2 ESRD. Hg 11.9, MCV 99.7, hold NICOLE for hemoglobin >11  Nutrition, NPO for OR    PLAN:    Plan for HD today and tomorrow due to MRI gadolinium exposure yesterday. NPO for OR today with Ortho for displaced left femoral neck fracture. Continue Acyclovir 260 mg IV  (5mg/kg) Q24 hrs after dialysis. Continue potassium and sodium phosphate supplementation   Continue current antihypertensive regimen   Strict I&O's.       Electronically signed by Sadie Coppola MD on 10/15/2020 at 8:43 AM

## 2020-10-15 NOTE — PROGRESS NOTES
Hyperlipidemia    Fracture of femoral neck, left, closed ([facility] [address]  Resolved Problems:    * No resolved hospital problems.  *      Plan:  14-year-old male history of end-stage renal disease who developed herpes zoster was started on valacyclovir transferred to [address] [place] [address] from [facility] with VZV encephalitis found to have left hip fracture for ORIF L hip today     Status post LP  Elevated white cells with monocyte predominance  Elevated protein  Positive for herpes zoster  Acyclovir IV--renally adjusted--dose after dialysis  MRI brain with small left frontal lesion on FLAIR  Repeat MRI w contrast negative  Pain control  ID Consult appreciated discussed case  Nephrology Consult appreciated discussed case  Neurology Consult appreciated discussed case  Orthopedic Consult appreciated for ORIF  Proceed to the OR without further risk stratification  Medications for other co morbidities cont as appropriate w dosage adjustments as necessary   PT/OT  DVT PPx  DC planning will likely need SNF    Electronically signed by Jacklyn Elizabeth MD on 10/15/2020 at 8:42 AM

## 2020-10-16 ENCOUNTER — ANESTHESIA (OUTPATIENT)
Dept: OPERATING ROOM | Age: 75
DRG: 981 | End: 2020-10-16
Payer: MEDICARE

## 2020-10-16 ENCOUNTER — APPOINTMENT (OUTPATIENT)
Dept: GENERAL RADIOLOGY | Age: 75
DRG: 981 | End: 2020-10-16
Attending: INTERNAL MEDICINE
Payer: MEDICARE

## 2020-10-16 VITALS
OXYGEN SATURATION: 99 % | RESPIRATION RATE: 2 BRPM | TEMPERATURE: 96.6 F | SYSTOLIC BLOOD PRESSURE: 163 MMHG | DIASTOLIC BLOOD PRESSURE: 80 MMHG

## 2020-10-16 LAB
ANION GAP SERPL CALCULATED.3IONS-SCNC: 9 MMOL/L (ref 7–16)
BASOPHILS ABSOLUTE: 0.05 E9/L (ref 0–0.2)
BASOPHILS RELATIVE PERCENT: 0.8 % (ref 0–2)
BUN BLDV-MCNC: 23 MG/DL (ref 8–23)
CALCIUM SERPL-MCNC: 9.1 MG/DL (ref 8.6–10.2)
CHLORIDE BLD-SCNC: 96 MMOL/L (ref 98–107)
CO2: 28 MMOL/L (ref 22–29)
CREAT SERPL-MCNC: 3.6 MG/DL (ref 0.7–1.2)
EOSINOPHILS ABSOLUTE: 0.36 E9/L (ref 0.05–0.5)
EOSINOPHILS RELATIVE PERCENT: 5.4 % (ref 0–6)
GFR AFRICAN AMERICAN: 20
GFR NON-AFRICAN AMERICAN: 17 ML/MIN/1.73
GLUCOSE BLD-MCNC: 85 MG/DL (ref 74–99)
HCT VFR BLD CALC: 33.4 % (ref 37–54)
HEMOGLOBIN: 10.5 G/DL (ref 12.5–16.5)
IMMATURE GRANULOCYTES #: 0.01 E9/L
IMMATURE GRANULOCYTES %: 0.2 % (ref 0–5)
LYMPHOCYTES ABSOLUTE: 1.03 E9/L (ref 1.5–4)
LYMPHOCYTES RELATIVE PERCENT: 15.5 % (ref 20–42)
MAGNESIUM: 2 MG/DL (ref 1.6–2.6)
MCH RBC QN AUTO: 32.5 PG (ref 26–35)
MCHC RBC AUTO-ENTMCNC: 31.4 % (ref 32–34.5)
MCV RBC AUTO: 103.4 FL (ref 80–99.9)
METER GLUCOSE: 101 MG/DL (ref 74–99)
MONOCYTES ABSOLUTE: 0.57 E9/L (ref 0.1–0.95)
MONOCYTES RELATIVE PERCENT: 8.6 % (ref 2–12)
NEUTROPHILS ABSOLUTE: 4.61 E9/L (ref 1.8–7.3)
NEUTROPHILS RELATIVE PERCENT: 69.5 % (ref 43–80)
PDW BLD-RTO: 14.4 FL (ref 11.5–15)
PHOSPHORUS: 2.8 MG/DL (ref 2.5–4.5)
PLATELET # BLD: 117 E9/L (ref 130–450)
PMV BLD AUTO: 11.1 FL (ref 7–12)
POTASSIUM SERPL-SCNC: 3.9 MMOL/L (ref 3.5–5)
RBC # BLD: 3.23 E12/L (ref 3.8–5.8)
SODIUM BLD-SCNC: 133 MMOL/L (ref 132–146)
WBC # BLD: 6.6 E9/L (ref 4.5–11.5)

## 2020-10-16 PROCEDURE — 84100 ASSAY OF PHOSPHORUS: CPT

## 2020-10-16 PROCEDURE — 3600000015 HC SURGERY LEVEL 5 ADDTL 15MIN: Performed by: ORTHOPAEDIC SURGERY

## 2020-10-16 PROCEDURE — 7100000000 HC PACU RECOVERY - FIRST 15 MIN: Performed by: ORTHOPAEDIC SURGERY

## 2020-10-16 PROCEDURE — 85025 COMPLETE CBC W/AUTO DIFF WBC: CPT

## 2020-10-16 PROCEDURE — 99222 1ST HOSP IP/OBS MODERATE 55: CPT | Performed by: ORTHOPAEDIC SURGERY

## 2020-10-16 PROCEDURE — 7100000001 HC PACU RECOVERY - ADDTL 15 MIN: Performed by: ORTHOPAEDIC SURGERY

## 2020-10-16 PROCEDURE — 2580000003 HC RX 258: Performed by: INTERNAL MEDICINE

## 2020-10-16 PROCEDURE — 3600000005 HC SURGERY LEVEL 5 BASE: Performed by: ORTHOPAEDIC SURGERY

## 2020-10-16 PROCEDURE — 88305 TISSUE EXAM BY PATHOLOGIST: CPT

## 2020-10-16 PROCEDURE — C1776 JOINT DEVICE (IMPLANTABLE): HCPCS | Performed by: ORTHOPAEDIC SURGERY

## 2020-10-16 PROCEDURE — 88311 DECALCIFY TISSUE: CPT

## 2020-10-16 PROCEDURE — 0SRS0JZ REPLACEMENT OF LEFT HIP JOINT, FEMORAL SURFACE WITH SYNTHETIC SUBSTITUTE, OPEN APPROACH: ICD-10-PCS | Performed by: ORTHOPAEDIC SURGERY

## 2020-10-16 PROCEDURE — 2500000003 HC RX 250 WO HCPCS: Performed by: NURSE ANESTHETIST, CERTIFIED REGISTERED

## 2020-10-16 PROCEDURE — 90935 HEMODIALYSIS ONE EVALUATION: CPT

## 2020-10-16 PROCEDURE — 2500000003 HC RX 250 WO HCPCS

## 2020-10-16 PROCEDURE — 6370000000 HC RX 637 (ALT 250 FOR IP): Performed by: ORTHOPAEDIC SURGERY

## 2020-10-16 PROCEDURE — 27236 TREAT THIGH FRACTURE: CPT | Performed by: ORTHOPAEDIC SURGERY

## 2020-10-16 PROCEDURE — 2580000003 HC RX 258: Performed by: ORTHOPAEDIC SURGERY

## 2020-10-16 PROCEDURE — 2060000000 HC ICU INTERMEDIATE R&B

## 2020-10-16 PROCEDURE — 6360000002 HC RX W HCPCS

## 2020-10-16 PROCEDURE — 6370000000 HC RX 637 (ALT 250 FOR IP): Performed by: INTERNAL MEDICINE

## 2020-10-16 PROCEDURE — 2709999900 HC NON-CHARGEABLE SUPPLY: Performed by: ORTHOPAEDIC SURGERY

## 2020-10-16 PROCEDURE — 6360000002 HC RX W HCPCS: Performed by: NURSE ANESTHETIST, CERTIFIED REGISTERED

## 2020-10-16 PROCEDURE — 3700000000 HC ANESTHESIA ATTENDED CARE: Performed by: ORTHOPAEDIC SURGERY

## 2020-10-16 PROCEDURE — 80048 BASIC METABOLIC PNL TOTAL CA: CPT

## 2020-10-16 PROCEDURE — 6360000002 HC RX W HCPCS: Performed by: ORTHOPAEDIC SURGERY

## 2020-10-16 PROCEDURE — 2580000003 HC RX 258: Performed by: NURSE ANESTHETIST, CERTIFIED REGISTERED

## 2020-10-16 PROCEDURE — 6360000002 HC RX W HCPCS: Performed by: INTERNAL MEDICINE

## 2020-10-16 PROCEDURE — 27236 TREAT THIGH FRACTURE: CPT | Performed by: PHYSICIAN ASSISTANT

## 2020-10-16 PROCEDURE — 83735 ASSAY OF MAGNESIUM: CPT

## 2020-10-16 PROCEDURE — 36415 COLL VENOUS BLD VENIPUNCTURE: CPT

## 2020-10-16 PROCEDURE — 82962 GLUCOSE BLOOD TEST: CPT

## 2020-10-16 PROCEDURE — 2500000003 HC RX 250 WO HCPCS: Performed by: ORTHOPAEDIC SURGERY

## 2020-10-16 PROCEDURE — 73502 X-RAY EXAM HIP UNI 2-3 VIEWS: CPT

## 2020-10-16 PROCEDURE — 3700000001 HC ADD 15 MINUTES (ANESTHESIA): Performed by: ORTHOPAEDIC SURGERY

## 2020-10-16 DEVICE — STEM FEM SZ 4 L125MM NK L35MM +44MM OFFSET 127DEG HIP CO: Type: IMPLANTABLE DEVICE | Site: HIP | Status: FUNCTIONAL

## 2020-10-16 DEVICE — HEAD FEM OD49MM ID26MM HIP CO CHROM POLYETH BPLR CEMENTLESS: Type: IMPLANTABLE DEVICE | Site: HIP | Status: FUNCTIONAL

## 2020-10-16 DEVICE — IMPL HIP FEM HEAD LFIT V40 26MM + 4MM: Type: IMPLANTABLE DEVICE | Site: HIP | Status: FUNCTIONAL

## 2020-10-16 RX ORDER — HEPARIN SODIUM 10000 [USP'U]/ML
5000 INJECTION, SOLUTION INTRAVENOUS; SUBCUTANEOUS EVERY 12 HOURS
Qty: 28 ML | Refills: 0 | Status: ON HOLD | OUTPATIENT
Start: 2020-10-16 | End: 2020-11-03 | Stop reason: HOSPADM

## 2020-10-16 RX ORDER — ONDANSETRON 2 MG/ML
INJECTION INTRAMUSCULAR; INTRAVENOUS PRN
Status: DISCONTINUED | OUTPATIENT
Start: 2020-10-16 | End: 2020-10-16

## 2020-10-16 RX ORDER — DEXTROSE AND SODIUM CHLORIDE 5; .9 G/100ML; G/100ML
INJECTION, SOLUTION INTRAVENOUS CONTINUOUS
Status: DISCONTINUED | OUTPATIENT
Start: 2020-10-16 | End: 2020-10-22 | Stop reason: HOSPADM

## 2020-10-16 RX ORDER — OXYCODONE HYDROCHLORIDE AND ACETAMINOPHEN 5; 325 MG/1; MG/1
1 TABLET ORAL EVERY 6 HOURS PRN
Qty: 28 TABLET | Refills: 0 | Status: SHIPPED | OUTPATIENT
Start: 2020-10-16 | End: 2020-10-23

## 2020-10-16 RX ORDER — SODIUM CHLORIDE 9 MG/ML
INJECTION, SOLUTION INTRAVENOUS CONTINUOUS PRN
Status: DISCONTINUED | OUTPATIENT
Start: 2020-10-16 | End: 2020-10-16 | Stop reason: SDUPTHER

## 2020-10-16 RX ORDER — ONDANSETRON 2 MG/ML
INJECTION INTRAMUSCULAR; INTRAVENOUS PRN
Status: DISCONTINUED | OUTPATIENT
Start: 2020-10-16 | End: 2020-10-16 | Stop reason: SDUPTHER

## 2020-10-16 RX ORDER — NEOSTIGMINE METHYLSULFATE 1 MG/ML
INJECTION, SOLUTION INTRAVENOUS PRN
Status: DISCONTINUED | OUTPATIENT
Start: 2020-10-16 | End: 2020-10-16 | Stop reason: SDUPTHER

## 2020-10-16 RX ORDER — VANCOMYCIN HYDROCHLORIDE 1 G/20ML
INJECTION, POWDER, LYOPHILIZED, FOR SOLUTION INTRAVENOUS PRN
Status: DISCONTINUED | OUTPATIENT
Start: 2020-10-16 | End: 2020-10-16 | Stop reason: ALTCHOICE

## 2020-10-16 RX ORDER — LIDOCAINE HYDROCHLORIDE 20 MG/ML
INJECTION, SOLUTION INTRAVENOUS PRN
Status: DISCONTINUED | OUTPATIENT
Start: 2020-10-16 | End: 2020-10-16 | Stop reason: SDUPTHER

## 2020-10-16 RX ORDER — EPHEDRINE SULFATE/0.9% NACL/PF 50 MG/5 ML
SYRINGE (ML) INTRAVENOUS PRN
Status: DISCONTINUED | OUTPATIENT
Start: 2020-10-16 | End: 2020-10-16

## 2020-10-16 RX ORDER — EPHEDRINE SULFATE/0.9% NACL/PF 50 MG/5 ML
SYRINGE (ML) INTRAVENOUS PRN
Status: DISCONTINUED | OUTPATIENT
Start: 2020-10-16 | End: 2020-10-16 | Stop reason: SDUPTHER

## 2020-10-16 RX ORDER — ROCURONIUM BROMIDE 10 MG/ML
INJECTION, SOLUTION INTRAVENOUS PRN
Status: DISCONTINUED | OUTPATIENT
Start: 2020-10-16 | End: 2020-10-16 | Stop reason: SDUPTHER

## 2020-10-16 RX ORDER — ONDANSETRON 2 MG/ML
4 INJECTION INTRAMUSCULAR; INTRAVENOUS
Status: DISCONTINUED | OUTPATIENT
Start: 2020-10-16 | End: 2020-10-16 | Stop reason: HOSPADM

## 2020-10-16 RX ORDER — FENTANYL CITRATE 50 UG/ML
INJECTION, SOLUTION INTRAMUSCULAR; INTRAVENOUS PRN
Status: DISCONTINUED | OUTPATIENT
Start: 2020-10-16 | End: 2020-10-16 | Stop reason: SDUPTHER

## 2020-10-16 RX ORDER — PROPOFOL 10 MG/ML
INJECTION, EMULSION INTRAVENOUS PRN
Status: DISCONTINUED | OUTPATIENT
Start: 2020-10-16 | End: 2020-10-16 | Stop reason: SDUPTHER

## 2020-10-16 RX ORDER — DEXAMETHASONE SODIUM PHOSPHATE 10 MG/ML
INJECTION, SOLUTION INTRAMUSCULAR; INTRAVENOUS PRN
Status: DISCONTINUED | OUTPATIENT
Start: 2020-10-16 | End: 2020-10-16 | Stop reason: SDUPTHER

## 2020-10-16 RX ORDER — GLYCOPYRROLATE 1 MG/5 ML
SYRINGE (ML) INTRAVENOUS PRN
Status: DISCONTINUED | OUTPATIENT
Start: 2020-10-16 | End: 2020-10-16 | Stop reason: SDUPTHER

## 2020-10-16 RX ADMIN — SUCRALFATE 1 G: 1 TABLET ORAL at 21:27

## 2020-10-16 RX ADMIN — PHENYLEPHRINE HYDROCHLORIDE 100 MCG: 10 INJECTION INTRAVENOUS at 15:19

## 2020-10-16 RX ADMIN — Medication 5 MG: at 15:21

## 2020-10-16 RX ADMIN — SUGAMMADEX 100 MG: 100 INJECTION, SOLUTION INTRAVENOUS at 15:46

## 2020-10-16 RX ADMIN — METOPROLOL TARTRATE 50 MG: 50 TABLET, FILM COATED ORAL at 21:27

## 2020-10-16 RX ADMIN — OXYCODONE AND ACETAMINOPHEN 2 TABLET: 5; 325 TABLET ORAL at 10:50

## 2020-10-16 RX ADMIN — FENTANYL CITRATE 100 MCG: 50 INJECTION, SOLUTION INTRAMUSCULAR; INTRAVENOUS at 14:17

## 2020-10-16 RX ADMIN — PROPOFOL 200 MG: 10 INJECTION, EMULSION INTRAVENOUS at 14:17

## 2020-10-16 RX ADMIN — Medication 10 MG: at 14:28

## 2020-10-16 RX ADMIN — PHENYLEPHRINE HYDROCHLORIDE 200 MCG: 10 INJECTION INTRAVENOUS at 14:41

## 2020-10-16 RX ADMIN — SODIUM CHLORIDE: 9 INJECTION, SOLUTION INTRAVENOUS at 14:13

## 2020-10-16 RX ADMIN — ACETAMINOPHEN 1000 MG: 500 TABLET ORAL at 21:48

## 2020-10-16 RX ADMIN — ROSUVASTATIN CALCIUM 5 MG: 5 TABLET, FILM COATED ORAL at 21:27

## 2020-10-16 RX ADMIN — HEPARIN SODIUM 5000 UNITS: 10000 INJECTION INTRAVENOUS; SUBCUTANEOUS at 21:30

## 2020-10-16 RX ADMIN — Medication 0.6 MG: at 15:35

## 2020-10-16 RX ADMIN — DEXTROSE AND SODIUM CHLORIDE: 5; 900 INJECTION, SOLUTION INTRAVENOUS at 21:28

## 2020-10-16 RX ADMIN — DEXAMETHASONE SODIUM PHOSPHATE 10 MG: 10 INJECTION, SOLUTION INTRAMUSCULAR; INTRAVENOUS at 14:17

## 2020-10-16 RX ADMIN — Medication 10 ML: at 21:28

## 2020-10-16 RX ADMIN — LIDOCAINE HYDROCHLORIDE 40 MG: 20 INJECTION, SOLUTION INTRAVENOUS at 14:17

## 2020-10-16 RX ADMIN — ROCURONIUM BROMIDE 50 MG: 10 INJECTION, SOLUTION INTRAVENOUS at 14:17

## 2020-10-16 RX ADMIN — FENTANYL CITRATE 100 MCG: 50 INJECTION, SOLUTION INTRAMUSCULAR; INTRAVENOUS at 14:32

## 2020-10-16 RX ADMIN — Medication 3 MG: at 15:35

## 2020-10-16 RX ADMIN — FENTANYL CITRATE 50 MCG: 50 INJECTION, SOLUTION INTRAMUSCULAR; INTRAVENOUS at 15:01

## 2020-10-16 RX ADMIN — DOXAZOSIN 4 MG: 4 TABLET ORAL at 21:27

## 2020-10-16 RX ADMIN — Medication 10 ML: at 08:42

## 2020-10-16 RX ADMIN — Medication 2 G: at 14:37

## 2020-10-16 RX ADMIN — PHENYLEPHRINE HYDROCHLORIDE 100 MCG: 10 INJECTION INTRAVENOUS at 14:51

## 2020-10-16 RX ADMIN — DEXTROSE AND SODIUM CHLORIDE: 5; 900 INJECTION, SOLUTION INTRAVENOUS at 11:36

## 2020-10-16 RX ADMIN — Medication 10 MG: at 14:41

## 2020-10-16 RX ADMIN — ONDANSETRON HYDROCHLORIDE 4 MG: 2 INJECTION, SOLUTION INTRAMUSCULAR; INTRAVENOUS at 15:33

## 2020-10-16 RX ADMIN — PHENYLEPHRINE HYDROCHLORIDE 200 MCG: 10 INJECTION INTRAVENOUS at 14:25

## 2020-10-16 ASSESSMENT — PAIN SCALES - GENERAL
PAINLEVEL_OUTOF10: 7
PAINLEVEL_OUTOF10: 0
PAINLEVEL_OUTOF10: 0
PAINLEVEL_OUTOF10: 7
PAINLEVEL_OUTOF10: 0

## 2020-10-16 ASSESSMENT — PULMONARY FUNCTION TESTS
PIF_VALUE: 20
PIF_VALUE: 20
PIF_VALUE: 19
PIF_VALUE: 0
PIF_VALUE: 2
PIF_VALUE: 21
PIF_VALUE: 1
PIF_VALUE: 21
PIF_VALUE: 17
PIF_VALUE: 21
PIF_VALUE: 20
PIF_VALUE: 2
PIF_VALUE: 1
PIF_VALUE: 19
PIF_VALUE: 20
PIF_VALUE: 21
PIF_VALUE: 0
PIF_VALUE: 19
PIF_VALUE: 21
PIF_VALUE: 1
PIF_VALUE: 18
PIF_VALUE: 0
PIF_VALUE: 20
PIF_VALUE: 2
PIF_VALUE: 20
PIF_VALUE: 21
PIF_VALUE: 2
PIF_VALUE: 21
PIF_VALUE: 21
PIF_VALUE: 20
PIF_VALUE: 20
PIF_VALUE: 21
PIF_VALUE: 19
PIF_VALUE: 19
PIF_VALUE: 20
PIF_VALUE: 21
PIF_VALUE: 21
PIF_VALUE: 2
PIF_VALUE: 20
PIF_VALUE: 20
PIF_VALUE: 19
PIF_VALUE: 17
PIF_VALUE: 20
PIF_VALUE: 20
PIF_VALUE: 19
PIF_VALUE: 20
PIF_VALUE: 21
PIF_VALUE: 17
PIF_VALUE: 19
PIF_VALUE: 15
PIF_VALUE: 20
PIF_VALUE: 21
PIF_VALUE: 20
PIF_VALUE: 20
PIF_VALUE: 19
PIF_VALUE: 20
PIF_VALUE: 19
PIF_VALUE: 20
PIF_VALUE: 21
PIF_VALUE: 10
PIF_VALUE: 20
PIF_VALUE: 18
PIF_VALUE: 21
PIF_VALUE: 17
PIF_VALUE: 15
PIF_VALUE: 20
PIF_VALUE: 20
PIF_VALUE: 2
PIF_VALUE: 19
PIF_VALUE: 20
PIF_VALUE: 2
PIF_VALUE: 17
PIF_VALUE: 20
PIF_VALUE: 19
PIF_VALUE: 20
PIF_VALUE: 20
PIF_VALUE: 18
PIF_VALUE: 21
PIF_VALUE: 2
PIF_VALUE: 19
PIF_VALUE: 20
PIF_VALUE: 19
PIF_VALUE: 3
PIF_VALUE: 2
PIF_VALUE: 20
PIF_VALUE: 19
PIF_VALUE: 5
PIF_VALUE: 14
PIF_VALUE: 20

## 2020-10-16 ASSESSMENT — ENCOUNTER SYMPTOMS: SHORTNESS OF BREATH: 0

## 2020-10-16 NOTE — ANESTHESIA PRE PROCEDURE
Department of Anesthesiology  Preprocedure Note       Name:  Polly Faust   Age:  67 y.o.  :  1945                                          MRN:  [id number]         Date:  10/16/2020      Surgeon: Je James):  Leslie Soto MD    Procedure: Procedure(s):  HIP HEMIARTHROPLASTY    Medications prior to admission:   Prior to Admission medications    Medication Sig Start Date End Date Taking? Authorizing Provider   acyclovir (ZOVIRAX) infusion Infuse 253 mg intravenously daily for 8 days Compound per protocol 10/19/20 10/27/20 Yes Ariel Mathis MD   bacitracin 500 UNIT/GM ointment Apply topically 2 times daily Apply topically 2 times daily.     Historical Provider, MD   potassium & sodium phosphates (PHOS-NAK) 280-160-250 MG PACK Take 1 packet by mouth daily    Historical Provider, MD   doxazosin (CARDURA) 4 MG tablet Take 1 tablet by mouth nightly    Historical Provider, MD   sucralfate (CARAFATE) 1 GM tablet Take 1 tablet by mouth 4 times daily 20   Hipolito Temple MD   pantoprazole (PROTONIX) 40 MG tablet Take 1 tablet by mouth every morning (before breakfast) 20   Hipolito Temple MD   Cholecalciferol (VITAMIN D) 50 MCG (2000 UT) CAPS capsule Take by mouth    Historical Provider, MD   hydrALAZINE (APRESOLINE) 50 MG tablet Take 50 mg by mouth 3 times daily    Historical Provider, MD   rosuvastatin (CRESTOR) 10 MG tablet Take 5 mg by mouth nightly    Historical Provider, MD   Coenzyme Q10 (Q-10 CO-ENZYME PO) Take 100 mg by mouth Daily    Historical Provider, MD   metoprolol tartrate (LOPRESSOR) 50 MG tablet Take 50 mg by mouth 2 times daily    Historical Provider, MD       Current medications:    Current Facility-Administered Medications   Medication Dose Route Frequency Provider Last Rate Last Dose    dextrose 5 % and 0.9 % sodium chloride infusion   Intravenous Continuous Harry Rivas MD 40 mL/hr at 10/16/20 1136      ceFAZolin (ANCEF) 2 g in sterile water 20 mL IV syringe  2 g Intravenous See Admin Instructions Claudean Bill, DO        acetaminophen (TYLENOL) tablet 1,000 mg  1,000 mg Oral 3 times per day Jose Miguel Sanchez MD   1,000 mg at 10/15/20 2216    oxyCODONE-acetaminophen (PERCOCET) 5-325 MG per tablet 1 tablet  1 tablet Oral Q4H PRN Jose Miguel Sanchez MD   1 tablet at 10/14/20 2236    Or    oxyCODONE-acetaminophen (PERCOCET) 5-325 MG per tablet 2 tablet  2 tablet Oral Q4H PRN Jose Miguel Sanchez MD   2 tablet at 10/16/20 1050    sodium chloride flush 0.9 % injection 10 mL  10 mL Intravenous 2 times per day Claudean Bill, DO   10 mL at 10/16/20 1044    sodium chloride flush 0.9 % injection 10 mL  10 mL Intravenous PRN Claudean Bill, DO        polyethylene glycol (GLYCOLAX) packet 17 g  17 g Oral Daily PRN Claudean Bill, DO        promethazine (PHENERGAN) tablet 12.5 mg  12.5 mg Oral Q6H PRN Claudean Bill, DO        Or    ondansetron [facility]) injection 4 mg  4 mg Intravenous Q6H PRN Claudean Bill, DO        acyclovir (ZOVIRAX) 260 mg in dextrose 5 % 50 mL IVPB  5 mg/kg Intravenous Q24H Jose Miguel Sanchez MD   Stopped at 10/15/20 1510    doxazosin (CARDURA) tablet 4 mg  4 mg Oral Nightly Jose Miguel Sanchez MD   4 mg at 10/15/20 2215    heparin (porcine) injection 5,000 Units  5,000 Units Subcutaneous Q12H Jose Miguel Sanchez MD   Stopped at 10/15/20 2049    hydrALAZINE (APRESOLINE) injection 10 mg  10 mg Intravenous Q6H PRN Jose Miguel Sanchez MD   10 mg at 10/13/20 0216    hydrALAZINE (APRESOLINE) tablet 50 mg  50 mg Oral TID Jose Miguel Sanchez MD   50 mg at 10/15/20 2216    metoprolol tartrate (LOPRESSOR) tablet 50 mg  50 mg Oral BID Jose Miguel Sanchez MD   50 mg at 10/15/20 2216    pantoprazole (PROTONIX) tablet 40 mg  40 mg Oral QAM AC Jose Miguel Sanchez MD   40 mg at 10/14/20 0614    potassium & sodium phosphates (PHOS-NAK) 280-160-250 MG packet 250 mg  1 packet Oral Daily Jose Miguel Sanchez MD   Stopped at 10/16/20 9595    10/15/20 1106 10/15/20 1520 10/15/20 2350 10/16/20 0737   BP: 133/65 (!) 113/56 (!) 142/60 (!) 156/57   Pulse: 85 69 76 76   Resp:  18 18 18   Temp:  97.7 °F (36.5 °C) 97.5 °F (36.4 °C) 97 °F (36.1 °C)   TempSrc:   Temporal Temporal   SpO2:  94% 96% 95%   Weight:       Height:                                                  BP Readings from Last 3 Encounters:   10/16/20 (!) 156/57   10/12/20 (!) 142/71   10/03/20 (!) 175/72       NPO Status: Time of last liquid consumption: 2245                        Time of last solid consumption: 1900                        Date of last liquid consumption: 10/14/20                        Date of last solid food consumption: 10/14/20    BMI:   Wt Readings from Last 3 Encounters:   10/15/20 111 lb 8.8 oz (50.6 kg)   10/12/20 146 lb 2.6 oz (66.3 kg)   10/03/20 129 lb (58.5 kg)     Body mass index is 16.96 kg/m². CBC:   Lab Results   Component Value Date    WBC 6.6 10/16/2020    RBC 3.23 10/16/2020    HGB 10.5 10/16/2020    HCT 33.4 10/16/2020    .4 10/16/2020    RDW 14.4 10/16/2020     10/16/2020       CMP:   Lab Results   Component Value Date     10/16/2020    K 3.9 10/16/2020    K 4.3 10/15/2020    CL 96 10/16/2020    CO2 28 10/16/2020    BUN 23 10/16/2020    CREATININE 3.6 10/16/2020    GFRAA 20 10/16/2020    LABGLOM 17 10/16/2020    GLUCOSE 85 10/16/2020    PROT 6.1 10/13/2020    CALCIUM 9.1 10/16/2020    BILITOT 1.0 10/13/2020    ALKPHOS 91 10/13/2020    AST 41 10/13/2020    ALT 21 10/13/2020       POC Tests: No results for input(s): POCGLU, POCNA, POCK, POCCL, POCBUN, POCHEMO, POCHCT in the last 72 hours.     Coags:   Lab Results   Component Value Date    PROTIME 11.6 10/14/2020    INR 1.0 10/14/2020    APTT 26.9 10/12/2020       HCG (If Applicable): No results found for: PREGTESTUR, PREGSERUM, HCG, HCGQUANT     ABGs: No results found for: PHART, PO2ART, VPD3OAH, ZVT8OTU, BEART, N8UONQAX     Type & Screen (If Applicable):  No results found for: LABABO, [facility]    Drug/Infectious Status (If Applicable):  No results found for: HIV, HEPCAB    COVID-19 Screening (If Applicable):   Lab Results   Component Value Date    COVID19 Detected 07/27/2020         Anesthesia Evaluation  Patient summary reviewed  Airway: Mallampati: II  TM distance: >3 FB   Neck ROM: full  Mouth opening: > = 3 FB Dental:    (+) upper dentures and partials      Pulmonary: breath sounds clear to auscultation      (-) pneumonia and shortness of breath                           Cardiovascular:  Exercise tolerance: good (>4 METS),   (+) hypertension: moderate,       ECG reviewed  Rhythm: regular  Rate: normal                    Neuro/Psych:                ROS comment: History of confusion probably secondary to viral encephalitis. GI/Hepatic/Renal:   (+) renal disease: ESRD and dialysis,           Endo/Other:    (+) blood dyscrasia: anemia:., .    (-) diabetes mellitus, hypothyroidism               Abdominal:         (-) obese     Vascular:                                        Anesthesia Plan      general     ASA 3       Induction: intravenous. BIS  MIPS: Postoperative opioids intended and Prophylactic antiemetics administered. Anesthetic plan and risks discussed with patient and child/children. Use of blood products discussed with patient whom consented to blood products. Plan discussed with CRNA.                   Naz Tovar MD   10/16/2020

## 2020-10-16 NOTE — PROGRESS NOTES
Subjective:  Feeling okay no complaints  No CP or SOB  No fever or chills   No uncontrolled pain  No vomiting or diarrhea     Objective:    BP (!) 156/57   Pulse 76   Temp 97 °F (36.1 °C) (Temporal)   Resp 18   Ht 5' 8\" (1.727 m)   Wt 111 lb 8.8 oz (50.6 kg)   SpO2 95%   BMI 16.96 kg/m²     24HR INTAKE/OUTPUT:      Intake/Output Summary (Last 24 hours) at 10/16/2020 0931  Last data filed at 10/15/2020 1106  Gross per 24 hour   Intake --   Output 800 ml   Net -800 ml       General appearance: NAD, conversant  Neck: FROM, supple   Lungs: Clear bilaterally no wheezes, no rhonchi, no crackles  CV: RRR, no MRGs; normal carotid upstroke and amplitude without Bruits  Abdomen: Soft, non-tender; no masses or HSM  Extremities: +L hip tenderness No edema, no cyanosis, no clubbing  Skin: Intact no rash, no lesions, no ulcers    Psych: Alert and oriented normal affect  Neuro: Nonfocal  Most Recent Labs  Lab Results   Component Value Date    WBC 6.6 10/16/2020    HGB 10.5 (L) 10/16/2020    HCT 33.4 (L) 10/16/2020     (L) 10/16/2020     10/16/2020    K 3.9 10/16/2020    CL 96 (L) 10/16/2020    CREATININE 3.6 (H) 10/16/2020    BUN 23 10/16/2020    CO2 28 10/16/2020    GLUCOSE 85 10/16/2020    ALT 21 10/13/2020    AST 41 (H) 10/13/2020    INR 1.0 10/14/2020     Recent Labs     10/16/20  0607   MG 2.0     Lab Results   Component Value Date    CALCIUM 9.1 10/16/2020    PHOS 2.8 10/16/2020        MRI BRAIN W WO CONTRAST   Final Result   No intracranial pathologic enhancement. XR HIP BILATERAL W AP PELVIS (2 VIEWS)   Final Result   1. Left femoral neck fracture. 2. Questionable fractures of the right superior pubic ramus. RECOMMENDATION:   CT of the pelvis may be obtained for further evaluation.          XR HIP 1 VW W PELVIS RIGHT    (Results Pending)       Assessment    Principal Problem:    Herpes zoster encephalitis  Active Problems:    ESRD (end stage renal disease) (HCC)    Herpes zoster Hypertension    Hyperlipidemia    Fracture of femoral neck, left, closed ([facility] [address]  Resolved Problems:    * No resolved hospital problems.  *      Plan:  67-year-old male history of end-stage renal disease who developed herpes zoster was started on valacyclovir transferred to [address] [place] [address] from [facility] with VZV encephalitis found to have left hip fracture for ORIF L hip today     Status post LP  Elevated white cells with monocyte predominance  Elevated protein  Positive for herpes zoster  Acyclovir IV--renally adjusted--dose after dialysis--through 10/28  MRI brain with small left frontal lesion on FLAIR  Repeat MRI w contrast negative  Pain control  ID Consult appreciated discussed case  Nephrology Consult appreciated discussed case  Neurology Consult appreciated discussed case  Orthopedic Consult appreciated for ORIF  Proceed to the OR without further risk stratification  Medications for other co morbidities cont as appropriate w dosage adjustments as necessary   PT/OT  DVT PPx  DC planning will likely need SNF    Electronically signed by Hyacinth Haas MD on 10/16/2020 at 9:31 AM

## 2020-10-16 NOTE — OP NOTE
neurovascular structures were well padded and protected. The operative site was then prepped and draped in a standard sterile fashion. An incision was made over the lateral trochanteric region and carried down to the level of the fascia erika maintaining appropriate hemostasis with electrocautery. A small rent was placed in the fascia erika. Ramos scissors were used to extend the fascia erika incision in line with its fibers to the length of the skin incision. A Charnley retractor was then placed in the anterior and posterior margin of the tensor fascia erika incision, taking care not to violate any neurovascular structures. The anterior one-third of the gluteus medius tendon was identified. In line with its fibers, it was reflected using electrocautery off its trochanteric insertion. The gluteus medius and minimus tendons were reflected anteriorly down to the level of the hip capsule. The hip capsule was T'd up to the acetabulum and around the posterior medial and inferior aspects of the hip. The fracture was visualized. An oscillating saw was then used to make the femoral neck cut at a 45-degree angle, 1 cm proximal to the lesser trochanter. After this was done, a corkscrew was then placed into the femoral head. The femoral head was levered out of the acetabulum, and taken for sizing. At this point, the acetabulum was then inspected, removing all bony fragments and interposed tissue. A Woody retractor was placed under the femoral neck cut. A box osteotome was introduced into the femoral neck which was removed. Then a T-handle canal finder was then introduced and removed. Broaching began at a size 0 and was carried up sequentially to an appropriately sized broach, where we found appropriate stability, axially and rotationally. The broach was then removed. The corresponding sized femoral component was then impacted in the appropriate position, which was found to have appropriate stable fit.  An appropriately sized femoral head trial was then placed. The hip was reduced, taken through a range of motion, and was found to be appropriately stable. The hip was then dislocated. The femoral head trial component was then removed. The bipolar component was impacted onto the femoral stem into the appropriate position. The hip was then reduced, taken through a range of motion, and was found to be appropriately stable. Copious irrigation was performed of the joint. Platelet-rich plasma gel was then injected into the joint. Also, #5 Ethibond was used to reapproximate the gluteus medius tendon to its trochanteric insertion. Copious irrigation was performed once more. The tensor fascia erika was then closed with an #0 Vicryl. Copious irrigation was performed once more. #0 and 2-0 Vicryl were used to close the subcutaneous layer. Staples were used for the skin. A sterile layered dressing was placed over the wound. The patient was awakened from anesthesia, transferred to a hospital bed, and taken to the PACU in stable condition. Disposition: The patient was taken to PACU in stable condition. Once stable, he will be transferred to the floor. Orders have been provided to begin physical therapy, weight bear as tolerated Left lower extremity. Patient received a dose of Ancef preoperatively. We will continue this for 24 hours postoperatively for infection prophylaxis. The patient will also be started on  Lovenox while in the hospital and transition to aspirin orally as an outpatient for DVT prophylaxis.       Electronically signed by Navi Berman MD on 10/16/2020 at 3:41 PM

## 2020-10-16 NOTE — PROGRESS NOTES
Department of Internal Medicine  Nephrology Progress Note    Events reviewed. SUBJECTIVE:  We are following Frieda Oleary for dialysis needs. Reports no new complaints or concerns. Scheduled Meds:   acetaminophen  1,000 mg Oral 3 times per day    acyclovir  5 mg/kg Intravenous Q24H    doxazosin  4 mg Oral Nightly    heparin (porcine)  5,000 Units Subcutaneous Q12H    hydrALAZINE  50 mg Oral TID    metoprolol tartrate  50 mg Oral BID    pantoprazole  40 mg Oral QAM AC    potassium & sodium phosphates  1 packet Oral Daily    rosuvastatin  5 mg Oral Nightly    sucralfate  1 g Oral 4x Daily     Continuous Infusions:  PRN Meds:.oxyCODONE-acetaminophen **OR** oxyCODONE-acetaminophen, hydrALAZINE, sodium chloride flush      Physical Exam:    VITALS:  BP (!) 147/65   Pulse 84   Temp 98.3 °F (36.8 °C) (Temporal)   Resp 18   Ht 5' 8\" (1.727 m)   Wt 115 lb (52.2 kg)   SpO2 95%   BMI 17.49 kg/m²   24HR INTAKE/OUTPUT:      Intake/Output Summary (Last 24 hours) at 10/14/2020 0933  Last data filed at 10/13/2020 1431  Gross per 24 hour   Intake 50 ml   Output --   Net 50 ml         Access:  Peripherals, AVF left arm  Constitutional:  Sitting on the side of the bed in no acute distress. HEENT: AT/NC, positive for hearing aids. NECK: supple  Respiratory:  CTAB  Cardiovascular/Edema:  RRR, S1/S2, no edema. Gastrointestinal:  Soft, +BS, nontender. Musculoskeletal:  Left hip pain with difficulty weightbearing and shortening. Neurologic:  AAOx3  Other:  Skin tear on left face above the eyebrow from fall.      DATA:    CBC:   Lab Results   Component Value Date    WBC 6.6 10/16/2020    RBC 3.23 10/16/2020    HGB 10.5 10/16/2020    HCT 33.4 10/16/2020    .4 10/16/2020    MCH 32.5 10/16/2020    MCHC 31.4 10/16/2020    RDW 14.4 10/16/2020     10/16/2020    MPV 11.1 10/16/2020     CMP:    Lab Results   Component Value Date     10/16/2020    K 3.9 10/16/2020    K 4.3 10/15/2020    CL 96 10/16/2020    CO2 28 10/16/2020    BUN 23 10/16/2020    CREATININE 3.6 10/16/2020    GFRAA 20 10/16/2020    LABGLOM 17 10/16/2020    GLUCOSE 85 10/16/2020    PROT 6.1 10/13/2020    LABALBU 3.7 10/13/2020    CALCIUM 9.1 10/16/2020    BILITOT 1.0 10/13/2020    ALKPHOS 91 10/13/2020    AST 41 10/13/2020    ALT 21 10/13/2020     Magnesium:    Lab Results   Component Value Date    MG 2.0 10/16/2020     Phosphorus:    Lab Results   Component Value Date    PHOS 2.8 10/16/2020       Radiology Review:            XR BL Hips- 10/13/2020    1. Left femoral neck fracture. 2. Questionable fractures of the right superior pubic ramus.         RECOMMENDATION:    CT of the pelvis may be obtained for further evaluation.                 CXR- 10/11/2020   No evidence of acute process.                   CT head w/o contrast - 10/11/2020   No acute intracranial abnormality.         Chronic and age related changes including age-appropriate cerebral volume    loss and scattered nonspecific white matter hypodensities which are likely    the sequela of chronic small vessel ischemic disease.                   MRI brain w/o contrast- 10/12/2020    Nonspecific left frontal subcortical white matter small focal FLAIR    hyperintensity with susceptibility artifact.  Further evaluation with    contrast enhanced MR imaging recommended.         No restricted diffusion or edema within the bilateral medial temporal lobes.         Moderate to severe chronic microvascular ischemic changes.                       BRIEF SUMMARY OF INITIAL CONSULT:    Briefly Randy Louis is 67 y.o. male with history of ESRD on HD MWF via left upper arm AVF (last HD yesterday at [facility]), HTN, HFpEF, and recently admitted to [address] [place] [address] in July for COVID-19 infection and experienced a 30 lb weight loss. Patient was admitted on 10/12/2020 from home with chief complaint of AMS and visual and auditory hallucinations.  Patient was recently diagnosed with shingles on Friday (10/9/2020) after seeing his PCP with complaint of a painful rash on his right forehead for 1 day duration. He was sent home with valacyclovir (renally dosed-once daily, but wife doesn't remember the dose) and Norco. After 2 days of taking the medication, the patient became altered and stopped the medications. The patient went to [facility] on [place] 10/11/2020 because of continued hallucinations with thoughts of medication side effect vs herpes zoster encephalitis. Patient experienced a fall at [facility]. He was then transferred to [facility] for further management and treatment. Patient seen by nephrology on 10/13/2020 for dialysis needs and he feels better today. Per the wife, his mentation has improved. LP performed on 10/12/2020 that was PCR positive for herpes zoster, elevated protein, elevated WBC (monocyte predominance) and IV acyclovir was started. 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 IMPRESSION/RECOMMENDATIONS:      ESRD:  Hemodialysis 3 times per week- MWF via AVF left arm; HD started 3/29/2016; last dialysis yesterday with 1L removed, tolerated well. Dialysis today with 500 ml removed. Plan for dialysis tomorrow for a total of 3 daily due to having MRA with gadolinium yesterday. Thrombocytopenia: likely 2/2 infection vs drug-induced. Platelets 94 on admission; platelets 511 on discharge in July. 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 On Acyclovir 260 mg IV  (5mg/kg) Q24 hrs after dialysis. Neurology following. HFpEF, proBNP 52,431, clinically euvolemic, chest x-ray without infiltrates. Left femoral neck fracture S/p Fall at [facility]: BL XR hips with left femoral neck fracture and questionable fractures of the right superior pubic ramus. Plan for OR today with Ortho. Elevated troponin likely 2/2 ESRD.

## 2020-10-16 NOTE — BRIEF OP NOTE
Brief Postoperative Note      Patient: Lauren Nathan  YOB: 1945  MRN: [id number]    Date of Procedure: 10/16/2020    Pre-Op Diagnosis: LEFT HIP FRACTURE    Post-Op Diagnosis: Same       Procedure(s):  HIP HEMIARTHROPLASTY    Surgeon(s):  Destinee Bourgeois MD    Assistant:  Surgical Assistant: Malaika Covarrubias RN  Physician Assistant: Carol Ann Srinivasan PA-C    Anesthesia: General    Estimated Blood Loss (mL): less than 748     Complications: None    Specimens:   ID Type Source Tests Collected by Time Destination   A : LEFT FEMORAL HEAD Bone Hip SURGICAL PATHOLOGY Destinee Bourgeois MD 10/16/2020 1531        Implants:  Implant Name Type Inv.  Item Serial No.  Lot No. LRB No. Used Action   IMPL HIP FEM HEAD LFIT V40 26MM + 4MM Hip IMPL HIP FEM HEAD LFIT V40 26MM + 4MM  [employer]: [employer] [id number] Left 1 Implanted   IMPL HIP FEM HEAD BIPLR UHR UNIV [id number] Hip IMPL HIP FEM HEAD BIPLR UHR UNIV [id number]  [employer]: OR[employer] [id number] Left 1 Implanted   IMPL HIP FEM STEM [id number] SZ 4 Hip IMPL HIP FEM STEM [id number] SZ 4  CHRISSY: Trinity Wade [id number] Left 1 Implanted         Drains:   [REMOVED] LDA Fistula (Removed)       Findings: Stable left hip hemiarthroplasty, see dictated op report    Electronically signed by Carol Ann Srinivasan PA-C on 10/16/2020 at 3:42 PM

## 2020-10-16 NOTE — ANESTHESIA POSTPROCEDURE EVALUATION
Department of Anesthesiology  Postprocedure Note    Patient: Loy Hannah  MRN: [id number]  YOB: 1945  Date of evaluation: 10/16/2020  Time:  6:15 PM     Procedure Summary     Date:  10/16/20 Room / Location:  [facility] OR 08 / [facility]    Anesthesia Start:  3088 Anesthesia Stop:  0330    Procedure:  HIP HEMIARTHROPLASTY (Left Hip) Diagnosis:  (LEFT HIP FRACTURE)    Surgeon:  Pedro Fitzpatrick MD Responsible Provider:  Orlin Giraldo MD    Anesthesia Type:  general ASA Status:  3          Anesthesia Type: general    Anisa Phase I: Anisa Score: 9    Anisa Phase II: Anisa Score: 8    Last vitals: Reviewed and per EMR flowsheets.        Anesthesia Post Evaluation    Patient location during evaluation: PACU  Patient participation: complete - patient participated  Level of consciousness: awake and alert  Airway patency: patent  Nausea & Vomiting: no nausea and no vomiting  Complications: no  Cardiovascular status: hemodynamically stable and blood pressure returned to baseline  Respiratory status: acceptable  Hydration status: euvolemic

## 2020-10-16 NOTE — PROGRESS NOTES
Patient was off the floor for hip fracture surgery at the time of my visit. Assessment:  Herpes zoster encephalitis  Shingles, right V1 involvement  Closed left femoral neck fracture, secondary to fall  ESRD on hemodialysis     Plan:  Continue acyclovir 5mg/kg recorded/actual body weight q24h dosed according to renal function for 2 weeks from 10/14-10/28. Insert midline. Maintain midline care and monitor labs and schedule follow-up appointment per IV antibiotic protocol for OPAT as indicated on discharge instructions. Follow up with Dr. Sander Soto in 1 week.     Thank you for involving me in the care of Pasquale Bolanos. I will sign off for now. Please do not hesitate to call for any questions, concerns, or new findings.       Electronically signed by Shivam Landry MD on 10/16/2020 at 11:55 AM

## 2020-10-16 NOTE — ADT AUTH CERT
RDW 14.4 fL      Platelets 504 M5/U      MPV 11.1 fL         Review/Comments:    Nephrology MD Notes    Status post LP    Elevated white cells with monocyte predominance    Elevated protein    Positive for herpes zoster    Acyclovir IV--renally adjusted--dose after dialysis    MRI brain with small left frontal lesion on FLAIR    ID Consult appreciated discussed case    Nephrology Consult appreciated    Neurology Consult appreciated    Xray reviewed-- L fem neck fx    Pain control    Bed rest    Orthopedic consult         Ortho MD Notes    Date of Procedure: 10/16/2020         Pre-Op Diagnosis: Left femoral neck fracture, closed         Post-Op Diagnosis: Same         Procedure:    Left hip hemiarthroplasty for femoral neck fracture       Operative Course:  Outside the operating suite, the patient was seen and identified. The operative site was marked and identified as appropriate by the patient, staff, surgeon, and Anesthesia. The patient was taken into the operating room, placed on the operative table, and placed under the appropriate amount of sedation under the care of the anesthesia team. The patient was placed into a lateral decubitus position. All bony prominences and neurovascular structures were well padded and protected. The operative site was then prepped and draped in a standard sterile fashion. An incision was made over the lateral trochanteric region and carried down to the level of the fascia erika maintaining appropriate hemostasis with electrocautery. A small rent was placed in the fascia erika. Ramos scissors were used to extend the fascia erika incision in line with its fibers to the length of the skin incision. A Charnley retractor was then placed in the anterior and posterior margin of the tensor fascia erika incision, taking care not to violate any neurovascular structures. The anterior one-third of the gluteus medius tendon was identified.  In line with its fibers, it was reflected using electrocautery off its trochanteric insertion. The gluteus medius and minimus tendons were reflected anteriorly down to the level of the hip capsule. The hip capsule was T'd up to the acetabulum and around the posterior medial and inferior aspects of the hip. The fracture was visualized. An oscillating saw was then used to make the femoral neck cut at a 45-degree angle, 1 cm proximal to the lesser trochanter. After this was done, a corkscrew was then placed into the femoral head. The femoral head was levered out of the acetabulum, and taken for sizing. At this point, the acetabulum was then inspected, removing all bony fragments and interposed tissue. A Woody retractor was placed under the femoral neck cut. A box osteotome was introduced into the femoral neck which was removed. Then a T-handle canal finder was then introduced and removed. Broaching began at a size 0 and was carried up sequentially to an appropriately sized broach, where we found appropriate stability, axially and rotationally. The broach was then removed. The corresponding sized femoral component was then impacted in the appropriate position, which was found to have appropriate stable fit. An appropriately sized femoral head trial was then placed. The hip was reduced, taken through a range of motion, and was found to be appropriately stable. The hip was then dislocated. The femoral head trial component was then removed. The bipolar component was impacted onto the femoral stem into the appropriate position. The hip was then reduced, taken through a range of motion, and was found to be appropriately stable. Copious irrigation was performed of the joint. Platelet-rich plasma gel was then injected into the joint. Also, #5 Ethibond was used to reapproximate the gluteus medius tendon to its trochanteric insertion. Copious irrigation was performed once more. The tensor fascia erika was then closed with an #0 Vicryl.  Copious irrigation was performed once more. #0 and 2-0 Vicryl were used to close the subcutaneous layer. Staples were used for the skin. A sterile layered dressing was placed over the wound. The patient was awakened from anesthesia, transferred to a hospital bed, and taken to the PACU in stable condition. Anticipated Discharge Plan: SNF             Hip: Displaced Fracture of Femoral Neck, Hemiarthroplasty - Clinical Indications for Procedure by Terrell Sagastume         Review Status  Review Entered    Completed  10/16/2020 16:25        Criteria Review       Clinical Indications for Procedure    Most Recent : Ezio Henderson Most Recent Date: 10/16/2020 4:25 PM EDT    (X) Procedure is indicated for  1 or more  of the following  (1) (2) (3) (4) (5):       (X) Displaced fracture of femoral neck in older patient (eg, 72 years or older)        Neurology [address] Day 4 (10/15/2020) by Terrell Sagastume         Review Status  Review Entered    Completed  10/16/2020 16:14        Criteria Review       Care Day: 4 Care Date: 10/15/2020 Level of Care: Intermediate Care    Guideline Day 2    Level Of Care    (X) Floor    10/16/2020 4:14 PM EDT by Ezio Henderson      [address]    Clinical Status    ( ) * No ICU or intermediate care needs    Interventions    (X) Inpatient interventions continue    10/16/2020 4:14 PM EDT by Ezio Henderson      iv acyclovir q24h, bedrest, telemetry, neuro checks q2h    * Milestone    Additional Notes    Continued Stay Review Note         10/15/20         Patient Visit Status: inpt    [facility]         Last set of vitals: BP (!) 141/75   Pulse 83   Temp 98.4 °F (36.9 °C)   Resp 18   Ht 5' 8\" (1.727 m)   Wt 111 lb 8.8 oz (50.6 kg)   SpO2 97%   BMI 16.96 kg/m²         Current Treatments: Plan to take to OR tomorrow for repair of hip fracture. Remains on iv acyclovir q24h, bedrest, telemetry, neuro checks q2h, HD. Ortho, neuro, and nephrology following.  Ortho, neuro, and nephrology following.      Labs:      Ref Range & Units 10/15/20 0614    Sodium 132 - 146 mmol/L 135    Potassium reflex Magnesium 3.5 - 5.0 mmol/L 4.3    Chloride 98 - 107 mmol/L 97Low      CO2 22 - 29 mmol/L 24    Anion Gap 7 - 16 mmol/L 14    Glucose 74 - 99 mg/dL 84    BUN 8 - 23 mg/dL 21    CREATININE 0.7 - 1.2 mg/dL 4.0High         Ref Range & Units 10/15/20 1945    WBC 4.5 - 11.5 E9/L 5.4    RBC 3.80 - 5.80 E12/L 3.28Low      Hemoglobin 12.5 - 16.5 g/dL 10.5Low      Hematocrit 37.0 - 54.0 % 34.2Low      MCV 80.0 - 99.9 fL 104. 3High      MCH 26.0 - 35.0 pg 32.0    MCHC 32.0 - 34.5 % 30.7Low      RDW 11.5 - 15.0 fL 14.6    Platelets 257 - 133 C2/S 117Low      MPV 7.0 - 12.0 fL 11.1       Review/Comments:    Status post LP    Elevated white cells with monocyte predominance    Elevated protein    Positive for herpes zoster    Acyclovir IV--renally adjusted--dose after dialysis    MRI brain with small left frontal lesion on FLAIR    Repeat MRI w contrast negative    Pain control    ID Consult appreciated discussed case    Nephrology Consult appreciated discussed case    Neurology Consult appreciated discussed case    Orthopedic Consult appreciated for ORIF    Proceed to the OR without further risk stratification       Nephrology MD Notes    · Plan for HD today and tomorrow due to MRI gadolinium exposure yesterday. · NPO for OR today with Ortho for displaced left femoral neck fracture. · Continue Acyclovir 260 mg IV  (5mg/kg) Q24 hrs after dialysis. · Continue potassium and sodium phosphate supplementation    · Continue current antihypertensive regimen    · Strict I&O's.      Infectious Disease MD Notes    Plan:    Continue acyclovir 5mg/kg recorded/actual body weight q24h dosed according to renal function for 2 weeks from 10/14-10/28.        Ortho MD Notes    PLAN:    · Plan for surgery with Dr. Rosalio Leyva on 10/15/2020    · NPO after midnight, Needs medical assessment for surgery    · LLE Non-weight bearing    · Pre-op Labs and adjusted--dose after dialysis    MRI brain with small left frontal lesion on FLAIR    ID Consult appreciated discussed case    Nephrology Consult appreciated    Neurology Consult appreciated    Xray reviewed-- L fem neck fx    Pain control    Bed rest    Orthopedic consult       Nephrology MD Notes    PLAN:         · MRI brain w/ and w/o contrast today at 11:30 am    · Hemodialysis after MRI today and 3 times a week MWF    · Continue Acyclovir 260 mg IV  (5mg/kg) Q24 hrs after dialysis. · Continue potassium and sodium phosphate supplementation    · Continue current antihypertensive regimen    · Strict I&O's. · Follow-up phosphorus level       Infectious Disease MD Notes    Plan:    Continue acyclovir 5mg/kg recorded/actual body weight q24h dosed according to renal function. Anticipate 2 weeks of antiviral treatment.        Anticipated Discharge Plan: SNF

## 2020-10-16 NOTE — PROGRESS NOTES
OT SESSION ATTEMPT     Date:10/16/2020  Patient Name: Dharmesh Antonio  MRN: [id number]  : 1945  Room: 8505/8505-A     Attempted OT session this date:    [] unavailable due to other medical staff currently with pt   [] on hold, await MRI/ neurosurgical recommendations. [] on hold per nursing staff secondary to lab / radiology results    [] declined Occupational Therapy  this date due to ___. Benefits of participation in therapy reviewed with pt.    [] off unit   [x] Other: hold for surgery of L hip today    Will reattempt OT eval at a later time.     [place], [place] [address]

## 2020-10-17 PROCEDURE — C1751 CATH, INF, PER/CENT/MIDLINE: HCPCS

## 2020-10-17 PROCEDURE — 2580000003 HC RX 258: Performed by: PHYSICIAN ASSISTANT

## 2020-10-17 PROCEDURE — 6370000000 HC RX 637 (ALT 250 FOR IP): Performed by: PHYSICIAN ASSISTANT

## 2020-10-17 PROCEDURE — 6360000002 HC RX W HCPCS: Performed by: PHYSICIAN ASSISTANT

## 2020-10-17 PROCEDURE — 76937 US GUIDE VASCULAR ACCESS: CPT

## 2020-10-17 PROCEDURE — 6360000002 HC RX W HCPCS: Performed by: INTERNAL MEDICINE

## 2020-10-17 PROCEDURE — 1200000000 HC SEMI PRIVATE

## 2020-10-17 PROCEDURE — 5A1D70Z PERFORMANCE OF URINARY FILTRATION, INTERMITTENT, LESS THAN 6 HOURS PER DAY: ICD-10-PCS | Performed by: INTERNAL MEDICINE

## 2020-10-17 PROCEDURE — 36410 VNPNXR 3YR/> PHY/QHP DX/THER: CPT

## 2020-10-17 RX ORDER — HEPARIN SODIUM (PORCINE) LOCK FLUSH IV SOLN 100 UNIT/ML 100 UNIT/ML
1 SOLUTION INTRAVENOUS PRN
Status: DISCONTINUED | OUTPATIENT
Start: 2020-10-17 | End: 2020-10-22 | Stop reason: HOSPADM

## 2020-10-17 RX ORDER — HEPARIN SODIUM (PORCINE) LOCK FLUSH IV SOLN 100 UNIT/ML 100 UNIT/ML
1 SOLUTION INTRAVENOUS EVERY 12 HOURS SCHEDULED
Status: DISCONTINUED | OUTPATIENT
Start: 2020-10-17 | End: 2020-10-22 | Stop reason: HOSPADM

## 2020-10-17 RX ORDER — SODIUM CHLORIDE 0.9 % (FLUSH) 0.9 %
10 SYRINGE (ML) INJECTION PRN
Status: DISCONTINUED | OUTPATIENT
Start: 2020-10-17 | End: 2020-10-22 | Stop reason: HOSPADM

## 2020-10-17 RX ADMIN — ROSUVASTATIN CALCIUM 5 MG: 5 TABLET, FILM COATED ORAL at 23:38

## 2020-10-17 RX ADMIN — SUCRALFATE 1 G: 1 TABLET ORAL at 13:39

## 2020-10-17 RX ADMIN — OXYCODONE AND ACETAMINOPHEN 1 TABLET: 5; 325 TABLET ORAL at 08:27

## 2020-10-17 RX ADMIN — DOXAZOSIN 4 MG: 4 TABLET ORAL at 23:38

## 2020-10-17 RX ADMIN — ACETAMINOPHEN 1000 MG: 500 TABLET ORAL at 06:08

## 2020-10-17 RX ADMIN — CEFAZOLIN 1 G: 1 INJECTION, POWDER, FOR SOLUTION INTRAMUSCULAR; INTRAVENOUS at 01:51

## 2020-10-17 RX ADMIN — SUCRALFATE 1 G: 1 TABLET ORAL at 23:39

## 2020-10-17 RX ADMIN — SUCRALFATE 1 G: 1 TABLET ORAL at 17:01

## 2020-10-17 RX ADMIN — SUCRALFATE 1 G: 1 TABLET ORAL at 08:27

## 2020-10-17 RX ADMIN — HEPARIN SODIUM 5000 UNITS: 10000 INJECTION INTRAVENOUS; SUBCUTANEOUS at 23:39

## 2020-10-17 RX ADMIN — METOPROLOL TARTRATE 50 MG: 50 TABLET, FILM COATED ORAL at 08:27

## 2020-10-17 RX ADMIN — Medication 10 ML: at 08:28

## 2020-10-17 RX ADMIN — CEFAZOLIN 1 G: 1 INJECTION, POWDER, FOR SOLUTION INTRAMUSCULAR; INTRAVENOUS at 10:29

## 2020-10-17 RX ADMIN — ACETAMINOPHEN 1000 MG: 500 TABLET ORAL at 23:38

## 2020-10-17 RX ADMIN — ACETAMINOPHEN 1000 MG: 500 TABLET ORAL at 13:39

## 2020-10-17 RX ADMIN — ACYCLOVIR SODIUM 260 MG: 50 INJECTION, SOLUTION INTRAVENOUS at 13:46

## 2020-10-17 RX ADMIN — POTASSIUM & SODIUM PHOSPHATES POWDER PACK 280-160-250 MG 250 MG: 280-160-250 PACK at 08:30

## 2020-10-17 RX ADMIN — Medication 10 ML: at 23:37

## 2020-10-17 RX ADMIN — METOPROLOL TARTRATE 50 MG: 50 TABLET, FILM COATED ORAL at 23:39

## 2020-10-17 RX ADMIN — PANTOPRAZOLE SODIUM 40 MG: 40 TABLET, DELAYED RELEASE ORAL at 06:08

## 2020-10-17 RX ADMIN — HEPARIN SODIUM 5000 UNITS: 10000 INJECTION INTRAVENOUS; SUBCUTANEOUS at 06:10

## 2020-10-17 RX ADMIN — SODIUM CHLORIDE, PRESERVATIVE FREE 100 UNITS: 5 INJECTION INTRAVENOUS at 23:36

## 2020-10-17 RX ADMIN — HYDRALAZINE HYDROCHLORIDE 50 MG: 50 TABLET, FILM COATED ORAL at 08:27

## 2020-10-17 ASSESSMENT — PAIN - FUNCTIONAL ASSESSMENT: PAIN_FUNCTIONAL_ASSESSMENT: PREVENTS OR INTERFERES SOME ACTIVE ACTIVITIES AND ADLS

## 2020-10-17 ASSESSMENT — PAIN SCALES - GENERAL
PAINLEVEL_OUTOF10: 0
PAINLEVEL_OUTOF10: 4
PAINLEVEL_OUTOF10: 0
PAINLEVEL_OUTOF10: 1
PAINLEVEL_OUTOF10: 0

## 2020-10-17 ASSESSMENT — PAIN DESCRIPTION - LOCATION: LOCATION: HIP

## 2020-10-17 ASSESSMENT — PAIN DESCRIPTION - DESCRIPTORS: DESCRIPTORS: ACHING;CONSTANT;DISCOMFORT

## 2020-10-17 ASSESSMENT — PAIN DESCRIPTION - ORIENTATION: ORIENTATION: LEFT

## 2020-10-17 ASSESSMENT — PAIN DESCRIPTION - PAIN TYPE: TYPE: SURGICAL PAIN

## 2020-10-17 NOTE — PROGRESS NOTES
Department of Internal Medicine  Nephrology Consult Note    Events reviewed. Had left hemiarthroplasty yesterday. SUBJECTIVE: We are following Mr. Bayron Chaudhary for ESRD on hemodialysis. Reports no complaints. PHYSICAL EXAM:      Vitals:    VITALS:  BP (!) 113/58   Pulse 74   Temp 97.9 °F (36.6 °C) (Temporal)   Resp 16   Ht 5' 8\" (1.727 m)   Wt 116 lb 10 oz (52.9 kg)   SpO2 96%   BMI 17.73 kg/m²   24HR INTAKE/OUTPUT:      Intake/Output Summary (Last 24 hours) at 10/17/2020 1142  Last data filed at 10/17/2020 0913  Gross per 24 hour   Intake 1578 ml   Output 100 ml   Net 1478 ml       Access:  Peripherals, AVF left arm  Constitutional:  Sitting on the side of the bed in no acute distress. HEENT: AT/NC, positive for hearing aids. NECK: supple  Respiratory:  CTAB  Cardiovascular/Edema:  RRR, S1/S2, no edema. Gastrointestinal:  Soft, +BS, nontender. Musculoskeletal:  Left hip pain with difficulty weightbearing. Neurologic:  AAOx3  Other:  Skin tear on left face above the eyebrow from fall.      Scheduled Meds:   acetaminophen  1,000 mg Oral 3 times per day    sodium chloride flush  10 mL Intravenous 2 times per day    acyclovir  5 mg/kg Intravenous Q24H    doxazosin  4 mg Oral Nightly    heparin (porcine)  5,000 Units Subcutaneous Q12H    hydrALAZINE  50 mg Oral TID    metoprolol tartrate  50 mg Oral BID    pantoprazole  40 mg Oral QAM AC    potassium & sodium phosphates  1 packet Oral Daily    rosuvastatin  5 mg Oral Nightly    sucralfate  1 g Oral 4x Daily     Continuous Infusions:   dextrose 5 % and 0.9 % NaCl 40 mL/hr at 10/16/20 2128     PRN Meds:.oxyCODONE-acetaminophen **OR** oxyCODONE-acetaminophen, sodium chloride flush, polyethylene glycol, promethazine **OR** ondansetron, hydrALAZINE, sodium chloride flush    DATA:    CBC:   Lab Results   Component Value Date    WBC 6.6 10/16/2020    RBC 3.23 10/16/2020    HGB 10.5 10/16/2020    HCT 33.4 10/16/2020    .4 10/16/2020    MCH 32.5 10/16/2020    MCHC 31.4 10/16/2020    RDW 14.4 10/16/2020     10/16/2020    MPV 11.1 10/16/2020     CMP:    Lab Results   Component Value Date     10/16/2020    K 3.9 10/16/2020    K 4.3 10/15/2020    CL 96 10/16/2020    CO2 28 10/16/2020    BUN 23 10/16/2020    CREATININE 3.6 10/16/2020    GFRAA 20 10/16/2020    LABGLOM 17 10/16/2020    GLUCOSE 85 10/16/2020    PROT 6.1 10/13/2020    LABALBU 3.7 10/13/2020    CALCIUM 9.1 10/16/2020    BILITOT 1.0 10/13/2020    ALKPHOS 91 10/13/2020    AST 41 10/13/2020    ALT 21 10/13/2020     Magnesium:    Lab Results   Component Value Date    MG 2.0 10/16/2020     Phosphorus:    Lab Results   Component Value Date    PHOS 2.8 10/16/2020       Radiology Review:      CXR- 10/11/2020   No evidence of acute process.           CT head w/o contrast - 10/11/2020   No acute intracranial abnormality.         Chronic and age related changes including age-appropriate cerebral volume    loss and scattered nonspecific white matter hypodensities which are likely    the sequela of chronic small vessel ischemic disease.                   MRI brain w/o contrast- 10/12/2020    Nonspecific left frontal subcortical white matter small focal FLAIR    hyperintensity with susceptibility artifact.  Further evaluation with    contrast enhanced MR imaging recommended.         No restricted diffusion or edema within the bilateral medial temporal lobes.         Moderate to severe chronic microvascular ischemic changes.                       BRIEF SUMMARY OF INITIAL CONSULT:    Briefly Ne Duran is 67 y.o. male with history of ESRD on HD MWF via left upper arm AVF (last HD yesterday at [facility]), HTN, HFpEF, and recently admitted to [facility] in July for COVID-19 infection and experienced a 30 lb weight loss. Patient was admitted on 10/12/2020 from home with chief complaint of AMS and visual and auditory hallucinations.  Patient was recently diagnosed with shingles on Friday (10/9/2020) after seeing his PCP with complaint of a painful rash on his right forehead for 1 day duration. He was sent home with valacyclovir (renally dosed-once daily, but wife doesn't remember the dose) and Norco. After 2 days of taking the medication, the patient became altered and stopped the medications. The patient went to [facility] on [place] 10/11/2020 because of continued hallucinations with thoughts of medication side effect vs herpes zoster encephalitis. Patient experienced a fall at [facility]. He was then transferred to [facility] for further management and treatment. Patient seen by nephrology on 10/13/2020 for dialysis needs and he feels better today. Per the wife, his mentation has improved. LP performed on 10/12/2020 that was PCR positive for herpes zoster, elevated protein, elevated WBC (monocyte predominance) and IV acyclovir was started. 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 Problems resolved:    Elevated troponin likely 2/2 ESRD. Troponin 0.11      IMPRESSION/RECOMMENDATIONS:      ESRD:  Hemodialysis 3 times per week- MWF via AVF left arm. Had dialysis yesterday, tolerated well. 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 On Acyclovir 260 mg IV  (5mg/kg) Q24 hrs after dialysis. HFpEF, proBNP 52,431, clinically euvolemic, chest x-ray without infiltrates. HTN: on hydralazine, doxazosin and lopressor  Left hip fracture, status post fall, s/p hemiarthroplasty October 16, 2020  BPH: on doxazosin  MBD of CKD, holding binders  Thrombocytopenia: likely 2/2 infection vs drug-induced. Platelets 94 on admission; platelets 350 on discharge in July. Normocytic anemia: 2/2 ESRD. Hg 11.9, MCV 99.7, hold NICOLE for hemoglobin >11    PLAN:    Continue hemodialysis tomorrow and 3 times a week MWF  Continue Acyclovir 260 mg IV  (5mg/kg) Q24 hrs after dialysis. Start epoetin alpha 3000 units 3 times a week  Continue current antihypertensive regimen        Electronically signed by Dar Alvarado MD on 10/17/2020 at 11:42 AM

## 2020-10-17 NOTE — PROGRESS NOTES
POWER MIDLINE CATH  Placement 10/17/2020    Product number: [id number]   Lot Number: [id number]      Ultrasound: YES   R Basilic:                Upper Arm Circumference: 25 CM    Size: 4.5 FR SL    Exposed Length: 1 CM    Internal Length: 14 CM   Cut: 0 CM   Vein Measurement: 0.56 CM    Pamela Buckley  10/17/2020  12:51 PM

## 2020-10-18 LAB
ANION GAP SERPL CALCULATED.3IONS-SCNC: 14 MMOL/L (ref 7–16)
BASOPHILS ABSOLUTE: 0.02 E9/L (ref 0–0.2)
BASOPHILS RELATIVE PERCENT: 0.3 % (ref 0–2)
BUN BLDV-MCNC: 41 MG/DL (ref 8–23)
CALCIUM SERPL-MCNC: 9 MG/DL (ref 8.6–10.2)
CHLORIDE BLD-SCNC: 98 MMOL/L (ref 98–107)
CO2: 27 MMOL/L (ref 22–29)
CREAT SERPL-MCNC: 4.7 MG/DL (ref 0.7–1.2)
EOSINOPHILS ABSOLUTE: 0.2 E9/L (ref 0.05–0.5)
EOSINOPHILS RELATIVE PERCENT: 2.6 % (ref 0–6)
GFR AFRICAN AMERICAN: 15
GFR NON-AFRICAN AMERICAN: 12 ML/MIN/1.73
GLUCOSE BLD-MCNC: 93 MG/DL (ref 74–99)
HCT VFR BLD CALC: 25.2 % (ref 37–54)
HEMOGLOBIN: 7.9 G/DL (ref 12.5–16.5)
IMMATURE GRANULOCYTES #: 0.02 E9/L
IMMATURE GRANULOCYTES %: 0.3 % (ref 0–5)
LYMPHOCYTES ABSOLUTE: 1.19 E9/L (ref 1.5–4)
LYMPHOCYTES RELATIVE PERCENT: 15.3 % (ref 20–42)
MAGNESIUM: 2 MG/DL (ref 1.6–2.6)
MCH RBC QN AUTO: 32.8 PG (ref 26–35)
MCHC RBC AUTO-ENTMCNC: 31.3 % (ref 32–34.5)
MCV RBC AUTO: 104.6 FL (ref 80–99.9)
MONOCYTES ABSOLUTE: 0.64 E9/L (ref 0.1–0.95)
MONOCYTES RELATIVE PERCENT: 8.2 % (ref 2–12)
NEUTROPHILS ABSOLUTE: 5.69 E9/L (ref 1.8–7.3)
NEUTROPHILS RELATIVE PERCENT: 73.3 % (ref 43–80)
PDW BLD-RTO: 14.5 FL (ref 11.5–15)
PHOSPHORUS: 3.7 MG/DL (ref 2.5–4.5)
PLATELET # BLD: 126 E9/L (ref 130–450)
PMV BLD AUTO: 11 FL (ref 7–12)
POTASSIUM SERPL-SCNC: 4.3 MMOL/L (ref 3.5–5)
RBC # BLD: 2.41 E12/L (ref 3.8–5.8)
SODIUM BLD-SCNC: 139 MMOL/L (ref 132–146)
WBC # BLD: 7.8 E9/L (ref 4.5–11.5)

## 2020-10-18 PROCEDURE — 6370000000 HC RX 637 (ALT 250 FOR IP): Performed by: PHYSICIAN ASSISTANT

## 2020-10-18 PROCEDURE — 6360000002 HC RX W HCPCS: Performed by: PHYSICIAN ASSISTANT

## 2020-10-18 PROCEDURE — 36415 COLL VENOUS BLD VENIPUNCTURE: CPT

## 2020-10-18 PROCEDURE — 83735 ASSAY OF MAGNESIUM: CPT

## 2020-10-18 PROCEDURE — 6360000002 HC RX W HCPCS

## 2020-10-18 PROCEDURE — 1200000000 HC SEMI PRIVATE

## 2020-10-18 PROCEDURE — 85025 COMPLETE CBC W/AUTO DIFF WBC: CPT

## 2020-10-18 PROCEDURE — 6360000002 HC RX W HCPCS: Performed by: INTERNAL MEDICINE

## 2020-10-18 PROCEDURE — 97166 OT EVAL MOD COMPLEX 45 MIN: CPT

## 2020-10-18 PROCEDURE — 97530 THERAPEUTIC ACTIVITIES: CPT

## 2020-10-18 PROCEDURE — 84100 ASSAY OF PHOSPHORUS: CPT

## 2020-10-18 PROCEDURE — 80048 BASIC METABOLIC PNL TOTAL CA: CPT

## 2020-10-18 PROCEDURE — 2580000003 HC RX 258: Performed by: PHYSICIAN ASSISTANT

## 2020-10-18 RX ADMIN — HYDRALAZINE HYDROCHLORIDE 50 MG: 50 TABLET, FILM COATED ORAL at 08:38

## 2020-10-18 RX ADMIN — ROSUVASTATIN CALCIUM 5 MG: 5 TABLET, FILM COATED ORAL at 22:41

## 2020-10-18 RX ADMIN — ACYCLOVIR SODIUM 260 MG: 50 INJECTION, SOLUTION INTRAVENOUS at 14:00

## 2020-10-18 RX ADMIN — OXYCODONE AND ACETAMINOPHEN 2 TABLET: 5; 325 TABLET ORAL at 08:37

## 2020-10-18 RX ADMIN — OXYCODONE AND ACETAMINOPHEN 2 TABLET: 5; 325 TABLET ORAL at 12:54

## 2020-10-18 RX ADMIN — DOXAZOSIN 4 MG: 4 TABLET ORAL at 22:41

## 2020-10-18 RX ADMIN — HYDRALAZINE HYDROCHLORIDE 50 MG: 50 TABLET, FILM COATED ORAL at 22:41

## 2020-10-18 RX ADMIN — METOPROLOL TARTRATE 50 MG: 50 TABLET, FILM COATED ORAL at 08:37

## 2020-10-18 RX ADMIN — SUCRALFATE 1 G: 1 TABLET ORAL at 15:23

## 2020-10-18 RX ADMIN — ACETAMINOPHEN 1000 MG: 500 TABLET ORAL at 22:00

## 2020-10-18 RX ADMIN — METOPROLOL TARTRATE 50 MG: 50 TABLET, FILM COATED ORAL at 22:41

## 2020-10-18 RX ADMIN — SUCRALFATE 1 G: 1 TABLET ORAL at 22:42

## 2020-10-18 RX ADMIN — HEPARIN SODIUM 5000 UNITS: 10000 INJECTION INTRAVENOUS; SUBCUTANEOUS at 22:40

## 2020-10-18 RX ADMIN — HYDRALAZINE HYDROCHLORIDE 50 MG: 50 TABLET, FILM COATED ORAL at 15:24

## 2020-10-18 RX ADMIN — OXYCODONE AND ACETAMINOPHEN 2 TABLET: 5; 325 TABLET ORAL at 19:56

## 2020-10-18 RX ADMIN — SUCRALFATE 1 G: 1 TABLET ORAL at 18:00

## 2020-10-18 RX ADMIN — ACETAMINOPHEN 1000 MG: 500 TABLET ORAL at 15:23

## 2020-10-18 RX ADMIN — SODIUM CHLORIDE, PRESERVATIVE FREE 100 UNITS: 5 INJECTION INTRAVENOUS at 22:39

## 2020-10-18 RX ADMIN — SUCRALFATE 1 G: 1 TABLET ORAL at 08:37

## 2020-10-18 RX ADMIN — HEPARIN SODIUM 500110 UNITS: 10000 INJECTION INTRAVENOUS; SUBCUTANEOUS at 07:20

## 2020-10-18 RX ADMIN — PANTOPRAZOLE SODIUM 40 MG: 40 TABLET, DELAYED RELEASE ORAL at 08:37

## 2020-10-18 ASSESSMENT — PAIN SCALES - GENERAL
PAINLEVEL_OUTOF10: 3
PAINLEVEL_OUTOF10: 5
PAINLEVEL_OUTOF10: 0
PAINLEVEL_OUTOF10: 0
PAINLEVEL_OUTOF10: 5
PAINLEVEL_OUTOF10: 9
PAINLEVEL_OUTOF10: 7
PAINLEVEL_OUTOF10: 8
PAINLEVEL_OUTOF10: 0

## 2020-10-18 ASSESSMENT — PAIN DESCRIPTION - ORIENTATION
ORIENTATION: LEFT
ORIENTATION: LEFT

## 2020-10-18 ASSESSMENT — PAIN DESCRIPTION - FREQUENCY
FREQUENCY: INTERMITTENT
FREQUENCY: CONTINUOUS

## 2020-10-18 ASSESSMENT — PAIN - FUNCTIONAL ASSESSMENT: PAIN_FUNCTIONAL_ASSESSMENT: PREVENTS OR INTERFERES SOME ACTIVE ACTIVITIES AND ADLS

## 2020-10-18 ASSESSMENT — PAIN DESCRIPTION - PAIN TYPE
TYPE: SURGICAL PAIN
TYPE: SURGICAL PAIN

## 2020-10-18 ASSESSMENT — PAIN DESCRIPTION - ONSET
ONSET: GRADUAL
ONSET: ON-GOING

## 2020-10-18 ASSESSMENT — PAIN DESCRIPTION - LOCATION
LOCATION: HIP
LOCATION: HIP

## 2020-10-18 ASSESSMENT — PAIN DESCRIPTION - PROGRESSION: CLINICAL_PROGRESSION: GRADUALLY IMPROVING

## 2020-10-18 ASSESSMENT — PAIN DESCRIPTION - DESCRIPTORS
DESCRIPTORS: ACHING
DESCRIPTORS: ACHING;CONSTANT;DISCOMFORT

## 2020-10-18 NOTE — PROGRESS NOTES
Department of Orthopedic Surgery  Resident Progress Note     Patient seen and examined. Pain controlled. No new complaints. Denies chest pain, shortness of breath, dizziness/lightheadedness.  No acute overnight events     VITALS:  /62   Pulse 104   Temp 97 °F (36.1 °C) (Temporal)   Resp 14   Ht 5' 8\" (1.727 m)   Wt 116 lb 10 oz (52.9 kg)   SpO2 97%   BMI 17.73 kg/m²      General: alert and oriented well-developed and well-nourished, in no acute distress     MUSCULOSKELETAL:   left lower extremity:  · Dressing C/D/I  · Compartments soft and compressible  · +PF/DF/EHL  · +2/4 DP & PT pulses, Brisk Cap refill, Toes warm and perfused  · Distal sensation grossly intact to Peroneals, Sural, Saphenous, and tibial nrs     CBC:         Lab Results   Component Value Date     WBC 6.6 10/16/2020     HGB 10.5 10/16/2020     HCT 33.4 10/16/2020      10/16/2020      PT/INR:          Lab Results   Component Value Date     PROTIME 11.6 10/14/2020     INR 1.0 10/14/2020               ASSESSMENT  · S/P L Matias hip arthroplasty - 10/16/2020     PLAN      · Continue physical therapy and protocol: WBAT - LLE  · 24 hour abx coverage  · Deep venous thrombosis prophylaxis - lovenox, early mobilization  · Pulmonary hygiene  · Bowel regiment  · PT/OT  · Pain Control: IV and PO  · Monitor H&H 7.9  · D/C Plan:  Per SW/PT recs  · Discuss with attending

## 2020-10-18 NOTE — PROGRESS NOTES
Subjective:  Feeling better pain controlled  No CP or SOB  No fever or chills   No uncontrolled pain  No vomiting or diarrhea     Objective:    BP (!) 126/57   Pulse 75   Temp 97.9 °F (36.6 °C) (Temporal)   Resp 18   Ht 5' 8\" (1.727 m)   Wt 116 lb 10 oz (52.9 kg)   SpO2 95%   BMI 17.73 kg/m²     24HR INTAKE/OUTPUT:      Intake/Output Summary (Last 24 hours) at 10/18/2020 0946  Last data filed at 10/18/2020 5685  Gross per 24 hour   Intake 560 ml   Output --   Net 560 ml       General appearance: NAD, conversant  Neck: FROM, supple   Lungs: Clear bilaterally no wheezes, no rhonchi, no crackles  CV: RRR, no MRGs; normal carotid upstroke and amplitude without Bruits  Abdomen: Soft, non-tender; no masses or HSM  Extremities: Wound CDI no edema, no cyanosis, no clubbing  Skin: Intact no rash, no lesions, no ulcers    Psych: Alert and oriented normal affect  Neuro: Nonfocal    Most Recent Labs  Lab Results   Component Value Date    WBC 7.8 10/18/2020    HGB 7.9 (L) 10/18/2020    HCT 25.2 (L) 10/18/2020     (L) 10/18/2020     10/18/2020    K 4.3 10/18/2020    CL 98 10/18/2020    CREATININE 4.7 (H) 10/18/2020    BUN 41 (H) 10/18/2020    CO2 27 10/18/2020    GLUCOSE 93 10/18/2020    ALT 21 10/13/2020    AST 41 (H) 10/13/2020    INR 1.0 10/14/2020     Recent Labs     10/18/20  0635   MG 2.0     Lab Results   Component Value Date    CALCIUM 9.0 10/18/2020    PHOS 3.7 10/18/2020        XR HIP 2-3 VW W PELVIS LEFT   Final Result   Expected postoperative changes from recent total left hip arthroplasty. MRI BRAIN W WO CONTRAST   Final Result   No intracranial pathologic enhancement. XR HIP BILATERAL W AP PELVIS (2 VIEWS)   Final Result   1. Left femoral neck fracture. 2. Questionable fractures of the right superior pubic ramus. RECOMMENDATION:   CT of the pelvis may be obtained for further evaluation.          XR HIP 1 VW W PELVIS RIGHT    (Results Pending)       Assessment    Principal Problem:    Herpes zoster encephalitis  Active Problems:    ESRD (end stage renal disease) ([facility] [address]    Acute blood loss anemia    Herpes zoster    Hypertension    Hyperlipidemia    Fracture of femoral neck, left, closed ([facility] [address]    History of left hip hemiarthroplasty  Resolved Problems:    * No resolved hospital problems.  *      Plan:  65-year-old male history of end-stage renal disease who developed herpes zoster was started on valacyclovir transferred to [address] [place] [address] from [facility] with VZV encephalitis SP Left hip hemiarthroplasty for femoral neck fracture 10/16    Status post LP revealing Elevated white cells with monocyte predominance, Elevated protein, Positive CSF PCR for herpes zoster  Acyclovir IV--renally adjusted--dose after dialysis--through 10/28  MRI brain with small left frontal lesion on FLAIR  Repeat MRI w contrast negative  Pain control  ID Consult appreciated discussed case  Nephrology Consult appreciated discussed case  Neurology Consult appreciated discussed case  Orthopedic Consult appreciated for ORIF  Medications for other co morbidities cont as appropriate w dosage adjustments as necessary   PT/OT  DVT PPx  DC planning  SNF     Electronically signed by Bernardino Niño MD on 10/18/2020 at 9:46 AM

## 2020-10-18 NOTE — PROGRESS NOTES
OCCUPATIONAL THERAPY INITIAL EVALUATION      Date:10/18/2020  Patient Name: Mara Benson  MRN: [id number]  : 1945  Room: [address] [place], [place] [address]    Evaluating OT: Ellie Gonzáles, OTR/L #[id number]    Referring physician: Lauro Chen MD   AM-PAC Daily Activity Raw Score:   Recommended Adaptive Equipment: tbd in rehab- [facility] for hospital use     Diagnosis:encephalopathy s/p fall    Surgery: s/p L [facility] 10/16/20   Pertinent Medical History:   Past Medical History:   Diagnosis Date    Blind left eye     Chronic kidney disease     [facility] [address]     Hemodialysis patient ([facility] [address]     Hyperlipidemia     Hypertension         Precautions:  Falls, WBAT to LLE, L eye blind, safety, bed alarm, [place], TAPS system, waffle cusion     Home Living: Pt lives with wife in a one floor condo with one step(s) to enter and no rail(s); bed/bath on main floor   Bathroom setup: adding walk in shower with seat and elevated commode   Equipment owned: ww  Prior Level of Function:   Assisted with bathing  with ADLs ,  Assisted  with IADLs; using ww for ambulation. Driving: prior not recent                           Medication Management setup  Occupation: retired from [address] [place] [address]    Pain Level: 4/10 LLE rest and increased with movement  Cognition: A&O: 2+/3 difficulty with month; Follows 1-2 step directions   Memory:  Fair+   Sequencing:  fair   Problem solving:  Fair+   Judgement/safety:  fair     Functional Assessment:   Initial Eval Status  Date: 10/18/20 Treatment Status  Date: Short Term Goals=LTG  Treatment frequency: PRN 2-4 x/week   Feeding setup   Independent   Grooming Min A   setup    UB Dressing Mod A due to decreased sitting balance  SBA   LB Dressing dependent  Min A with AE PRN    Bathing Simulated Max A   Mod A with AE prn    Toileting dependent  Min A to BSC   Bed Mobility  Supine to sit: max A    Sit to supine: max A   Supine to sit: min A    Sit to supine: min A    Functional Transfers Sit to stand:  Max A with ww  Stand to sit:  Mod A with ww    Stand pivot: attempted unable due to posterior balance deficit and unable to advance L foot  Min A with ww   Functional Mobility n/t  tba   Balance Sitting:     Static:  Min -mod A with UE support    Dynamic:mod A   Standing: max A with strong posterior lean     Activity Tolerance Fair+ limited by pain and balance 93% O2 RA HR 73  good   Visual/  Perceptual Glasses: yes impaired L eye blind         Safety fair                 Good      Hand dominance: R   UE ROM: RUE:  WFL  LUE:  WFL  Strength: RUE:  4/5 LUE:  4/5   Strength: R  WFL   L WFL   Fine Motor Coordination:  R WFL  L WFL     Hearing: [place]    Sensation: decreased in B feet- toes otherwise No c/o numbness or tingling  Tone:  WFL  Edema: none noted                            Comments: Nursing approval to work with patient given. Upon arrival, patient supine and agreeable to evaluation. OT evaluation performed with  education on benefits of mobility and safety with ADL completion. Patient cooperative and motivated. At end of session, patient supine on L side with call light and phone within reach, all lines and tubes intact. Nursing notified. OT treatment: OT for functional assessment of  ADL, Functional Transfer/Mobility Training, Equipment Needs, Energy Conservation Techniques, Pt/Family Education, Ther Ex- deep breathing for edema and pain control., OT role and POC reviewed. Patient and wife demo good understanding of functional limitations and safety with mobility and ADL completion. Skilled occupational therapy services provided include instruction/training on safety and adapted techniques for completion of therapeutic activities, ADLs/IADLs, and therapeutic exercise deep breathing for pain and endurance. Therapist educated pt on WBAT to LLE  Precautions, safety with performing ADL's and sitting balance at EOB in preparation for functional activity.  Therapist facilitated bed mobility, functional transfers, graded functional activitie - providing mod - max  A and  cuing on AD management, sequencing and  hand placement, body mechanics, posture, breathing techniques, energy conservation, compensatory strategies, and safety. Therapist facilitated self-care simulatd UB dressing, LB dressing, grooming, toileting, bathing simulated tasks - providing  cuing on body mechanics, posture, breathing techniques, energy conservation, compensatory strategies, and safety. Therapist educated pt on compensatory strategies/energy conservation techniques to safely complete ADLs/IADLs. Skilled monitoring of O2 sats, HR, and pt response throughout treatment. Patient would  benefit from continued skilled OT to increase functional independence and quality of life. Eval Complexity: mod     Assessment of current deficits   Functional mobility [x]  ADLs [x] Strength [x]  Cognition [x]  Functional transfers  [x] IADLs [x] Safety Awareness [x]  Endurance [x]  Fine Motor Coordination [] Balance [x] Vision/perception [] Sensation [x]   Gross Motor Coordination [] ROM [] Delirium []                  Motor Control []    Plan of Care: OT 1-3x/week for 5-7 days PRN   [x] ADL retraining/AE recommendations to increase functional [place]  [x] Energy Conservation Techniques/Strategies to improve tolerance for safe ADLs, functional transfers & mobility                           [x] Functional Transfer /MobilityTraining for fall prevention & DME recommendations               [x] Pt./family Training to improve Ind. With ADLs within  sequencing of tasks. and safety during functional ADLs                                          [x] Therapeutic Activity to improve functional skills, endurance, balance & posture  [x]Therapeutic Exercise to increase strength & endurance for functional ax.   [x] Positioning to Improve Functional [place], Safety, and Skin Integrity  [x] Patient and/or Family Education to Increase Safety and Functional [place] with ADLs, functional transfers/mobility    Rehab Potential: Good for established goals    Patient / Family Goal: decrease pain  Increase strength  Evaluation time includes thorough review of current medical information, gathering information on past medical & social history & PLOF, completion of standardized testing, informal observation of tasks, consultation with other medical professions/disciplines, assessment of data & development of POC/goals. Patient and/or family were instructed diagnosis, prognosis/goals and plan of care. yes Demonstrated good understanding. Min Units   OT Eval Low [id number]     OT Eval Medium [id number] X    OT Eval High [id number]     OT Re-Eval [id number]     Therapeutic Ex [phone]     Therapeutic Activities [id number] [phone]   ADL/Self Care 15067     Orthotic Management 80218     Neuro Re-Ed 81559     Non-Billable Time              Time In: 9:05         Time Out: 9:40               [] Malnutrition indicators have been identified and nursing has been notified to ensure a dietitian consult is ordered. mod OT Evaluation + 15  treatment minutes    Onetha Sink.  [address], [address]

## 2020-10-18 NOTE — PROGRESS NOTES
Department of Internal Medicine  Nephrology Consult Note    Events reviewed. SUBJECTIVE: We are following Mr. Chelsi Almaguer for ESRD on hemodialysis. Reports no complaints. PHYSICAL EXAM:      Vitals:    VITALS:  BP (!) 126/57   Pulse 75   Temp 97.9 °F (36.6 °C) (Temporal)   Resp 18   Ht 5' 8\" (1.727 m)   Wt 116 lb 10 oz (52.9 kg)   SpO2 95%   BMI 17.73 kg/m²   24HR INTAKE/OUTPUT:      Intake/Output Summary (Last 24 hours) at 10/18/2020 1113  Last data filed at 10/18/2020 3595  Gross per 24 hour   Intake 560 ml   Output --   Net 560 ml       Access:  Peripherals, AVF left arm  Constitutional:  Sitting on the side of the bed in no acute distress. HEENT: AT/NC, positive for hearing aids. NECK: supple  Respiratory:  CTAB  Cardiovascular/Edema:  RRR, S1/S2, no edema. Gastrointestinal:  Soft, +BS, nontender. Musculoskeletal:  Left hip pain with difficulty weightbearing. Neurologic:  AAOx3  Other:  Skin tear on left face above the eyebrow from fall.      Scheduled Meds:   [START ON 10/19/2020] epoetin delmer-epbx  3,000 Units Subcutaneous Once per day on Mon Wed Fri    lidocaine  5 mL Intradermal Once    heparin flush  1 mL Intravenous 2 times per day    acetaminophen  1,000 mg Oral 3 times per day    sodium chloride flush  10 mL Intravenous 2 times per day    acyclovir  5 mg/kg Intravenous Q24H    doxazosin  4 mg Oral Nightly    heparin (porcine)  5,000 Units Subcutaneous Q12H    hydrALAZINE  50 mg Oral TID    metoprolol tartrate  50 mg Oral BID    pantoprazole  40 mg Oral QAM AC    rosuvastatin  5 mg Oral Nightly    sucralfate  1 g Oral 4x Daily     Continuous Infusions:   dextrose 5 % and 0.9 % NaCl 40 mL/hr at 10/16/20 2128     PRN Meds:.sodium chloride flush, heparin flush, oxyCODONE-acetaminophen **OR** oxyCODONE-acetaminophen, sodium chloride flush, polyethylene glycol, promethazine **OR** ondansetron, hydrALAZINE, sodium chloride flush    DATA:    CBC:   Lab Results   Component Value Date    WBC 7.8 10/18/2020    RBC 2.41 10/18/2020    HGB 7.9 10/18/2020    HCT 25.2 10/18/2020    .6 10/18/2020    MCH 32.8 10/18/2020    MCHC 31.3 10/18/2020    RDW 14.5 10/18/2020     10/18/2020    MPV 11.0 10/18/2020     CMP:    Lab Results   Component Value Date     10/18/2020    K 4.3 10/18/2020    K 4.3 10/15/2020    CL 98 10/18/2020    CO2 27 10/18/2020    BUN 41 10/18/2020    CREATININE 4.7 10/18/2020    GFRAA 15 10/18/2020    LABGLOM 12 10/18/2020    GLUCOSE 93 10/18/2020    PROT 6.1 10/13/2020    LABALBU 3.7 10/13/2020    CALCIUM 9.0 10/18/2020    BILITOT 1.0 10/13/2020    ALKPHOS 91 10/13/2020    AST 41 10/13/2020    ALT 21 10/13/2020     Magnesium:    Lab Results   Component Value Date    MG 2.0 10/18/2020     Phosphorus:    Lab Results   Component Value Date    PHOS 3.7 10/18/2020       Radiology Review:      CXR- 10/11/2020   No evidence of acute process.           CT head w/o contrast - 10/11/2020   No acute intracranial abnormality.         Chronic and age related changes including age-appropriate cerebral volume    loss and scattered nonspecific white matter hypodensities which are likely    the sequela of chronic small vessel ischemic disease.                   MRI brain w/o contrast- 10/12/2020    Nonspecific left frontal subcortical white matter small focal FLAIR    hyperintensity with susceptibility artifact.  Further evaluation with    contrast enhanced MR imaging recommended.         No restricted diffusion or edema within the bilateral medial temporal lobes.         Moderate to severe chronic microvascular ischemic changes.                       BRIEF SUMMARY OF INITIAL CONSULT:    Briefly Derrick Granados is 67 y.o. male with history of ESRD on HD MWF via left upper arm AVF (last HD yesterday at [facility]), HTN, HFpEF, and recently admitted to [address] [place] [address] in July for COVID-19 infection and experienced a 30 lb weight loss.  Patient was admitted on 10/12/2020 from home with chief complaint of AMS and visual and auditory hallucinations. Patient was recently diagnosed with shingles on Friday (10/9/2020) after seeing his PCP with complaint of a painful rash on his right forehead for 1 day duration. He was sent home with valacyclovir (renally dosed-once daily, but wife doesn't remember the dose) and Norco. After 2 days of taking the medication, the patient became altered and stopped the medications. The patient went to [facility] on [place] 10/11/2020 because of continued hallucinations with thoughts of medication side effect vs herpes zoster encephalitis. Patient experienced a fall at [facility]. He was then transferred to [facility] for further management and treatment. Patient seen by nephrology on 10/13/2020 for dialysis needs and he feels better today. Per the wife, his mentation has improved. LP performed on 10/12/2020 that was PCR positive for herpes zoster, elevated protein, elevated WBC (monocyte predominance) and IV acyclovir was started. 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 Problems resolved:    Elevated troponin likely 2/2 ESRD. Troponin 0.11      IMPRESSION/RECOMMENDATIONS:      ESRD:  Hemodialysis 3 times per week- MWF via AVF left arm. 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 On Acyclovir 260 mg IV  (5mg/kg) Q24 hrs after dialysis. HFpEF, proBNP 52,431, clinically euvolemic, chest x-ray without infiltrates. HTN: on hydralazine, doxazosin and lopressor  Left hip fracture, status post fall, s/p hemiarthroplasty October 16, 2020  BPH: on doxazosin  MBD of CKD, holding binders  Thrombocytopenia: likely 2/2 infection vs drug-induced. Normocytic anemia: 2/2 ESRD.   Drop in H&H after surgery, on epoetin alpha     PLAN:    HD 3 times a week MWF   Start epoetin alpha 3000 units 3 times a week  Continue current antihypertensive regimen   Monitor H&H       Electronically signed by Andrzej Mclean MD on 10/18/2020 at 11:13 AM

## 2020-10-19 PROBLEM — E43 SEVERE PROTEIN-CALORIE MALNUTRITION (HCC): Chronic | Status: ACTIVE | Noted: 2020-10-19

## 2020-10-19 LAB
ANION GAP SERPL CALCULATED.3IONS-SCNC: 15 MMOL/L (ref 7–16)
BASOPHILS ABSOLUTE: 0.06 E9/L (ref 0–0.2)
BASOPHILS RELATIVE PERCENT: 0.8 % (ref 0–2)
BUN BLDV-MCNC: 55 MG/DL (ref 8–23)
CALCIUM SERPL-MCNC: 8.9 MG/DL (ref 8.6–10.2)
CHLORIDE BLD-SCNC: 102 MMOL/L (ref 98–107)
CO2: 25 MMOL/L (ref 22–29)
CREAT SERPL-MCNC: 6.3 MG/DL (ref 0.7–1.2)
EOSINOPHILS ABSOLUTE: 0.4 E9/L (ref 0.05–0.5)
EOSINOPHILS RELATIVE PERCENT: 5.5 % (ref 0–6)
GFR AFRICAN AMERICAN: 11
GFR NON-AFRICAN AMERICAN: 9 ML/MIN/1.73
GLUCOSE BLD-MCNC: 98 MG/DL (ref 74–99)
HCT VFR BLD CALC: 25.6 % (ref 37–54)
HEMOGLOBIN: 7.7 G/DL (ref 12.5–16.5)
IMMATURE GRANULOCYTES #: 0.04 E9/L
IMMATURE GRANULOCYTES %: 0.6 % (ref 0–5)
LYMPHOCYTES ABSOLUTE: 0.88 E9/L (ref 1.5–4)
LYMPHOCYTES RELATIVE PERCENT: 12.2 % (ref 20–42)
MAGNESIUM: 2 MG/DL (ref 1.6–2.6)
MCH RBC QN AUTO: 32.4 PG (ref 26–35)
MCHC RBC AUTO-ENTMCNC: 30.1 % (ref 32–34.5)
MCV RBC AUTO: 107.6 FL (ref 80–99.9)
MONOCYTES ABSOLUTE: 0.47 E9/L (ref 0.1–0.95)
MONOCYTES RELATIVE PERCENT: 6.5 % (ref 2–12)
NEUTROPHILS ABSOLUTE: 5.38 E9/L (ref 1.8–7.3)
NEUTROPHILS RELATIVE PERCENT: 74.4 % (ref 43–80)
PDW BLD-RTO: 14.6 FL (ref 11.5–15)
PLATELET # BLD: 141 E9/L (ref 130–450)
PMV BLD AUTO: 11.1 FL (ref 7–12)
POTASSIUM SERPL-SCNC: 4.7 MMOL/L (ref 3.5–5)
RBC # BLD: 2.38 E12/L (ref 3.8–5.8)
SODIUM BLD-SCNC: 142 MMOL/L (ref 132–146)
WBC # BLD: 7.2 E9/L (ref 4.5–11.5)

## 2020-10-19 PROCEDURE — 6370000000 HC RX 637 (ALT 250 FOR IP): Performed by: INTERNAL MEDICINE

## 2020-10-19 PROCEDURE — 97530 THERAPEUTIC ACTIVITIES: CPT

## 2020-10-19 PROCEDURE — 6360000002 HC RX W HCPCS: Performed by: INTERNAL MEDICINE

## 2020-10-19 PROCEDURE — 1200000000 HC SEMI PRIVATE

## 2020-10-19 PROCEDURE — 6370000000 HC RX 637 (ALT 250 FOR IP): Performed by: PHYSICIAN ASSISTANT

## 2020-10-19 PROCEDURE — 36415 COLL VENOUS BLD VENIPUNCTURE: CPT

## 2020-10-19 PROCEDURE — 2580000003 HC RX 258: Performed by: PHYSICIAN ASSISTANT

## 2020-10-19 PROCEDURE — 83735 ASSAY OF MAGNESIUM: CPT

## 2020-10-19 PROCEDURE — 85025 COMPLETE CBC W/AUTO DIFF WBC: CPT

## 2020-10-19 PROCEDURE — 80048 BASIC METABOLIC PNL TOTAL CA: CPT

## 2020-10-19 PROCEDURE — 6360000002 HC RX W HCPCS: Performed by: PHYSICIAN ASSISTANT

## 2020-10-19 PROCEDURE — 90935 HEMODIALYSIS ONE EVALUATION: CPT

## 2020-10-19 RX ORDER — SENNA PLUS 8.6 MG/1
2 TABLET ORAL NIGHTLY
Status: DISCONTINUED | OUTPATIENT
Start: 2020-10-19 | End: 2020-10-22 | Stop reason: HOSPADM

## 2020-10-19 RX ORDER — POLYETHYLENE GLYCOL 3350 17 G/17G
17 POWDER, FOR SOLUTION ORAL ONCE
Status: DISCONTINUED | OUTPATIENT
Start: 2020-10-19 | End: 2020-10-22 | Stop reason: HOSPADM

## 2020-10-19 RX ORDER — QUETIAPINE FUMARATE 25 MG/1
12.5 TABLET, FILM COATED ORAL EVERY 6 HOURS PRN
Status: DISCONTINUED | OUTPATIENT
Start: 2020-10-19 | End: 2020-10-22 | Stop reason: HOSPADM

## 2020-10-19 RX ADMIN — SODIUM CHLORIDE, PRESERVATIVE FREE 100 UNITS: 5 INJECTION INTRAVENOUS at 22:13

## 2020-10-19 RX ADMIN — SODIUM CHLORIDE, PRESERVATIVE FREE 100 UNITS: 5 INJECTION INTRAVENOUS at 22:31

## 2020-10-19 RX ADMIN — SUCRALFATE 1 G: 1 TABLET ORAL at 22:06

## 2020-10-19 RX ADMIN — ROSUVASTATIN CALCIUM 5 MG: 5 TABLET, FILM COATED ORAL at 22:12

## 2020-10-19 RX ADMIN — SUCRALFATE 1 G: 1 TABLET ORAL at 08:42

## 2020-10-19 RX ADMIN — QUETIAPINE FUMARATE 12.5 MG: 25 TABLET ORAL at 22:11

## 2020-10-19 RX ADMIN — METOPROLOL TARTRATE 50 MG: 50 TABLET, FILM COATED ORAL at 22:10

## 2020-10-19 RX ADMIN — ACETAMINOPHEN 1000 MG: 500 TABLET ORAL at 05:44

## 2020-10-19 RX ADMIN — ACETAMINOPHEN 1000 MG: 500 TABLET ORAL at 22:07

## 2020-10-19 RX ADMIN — OXYCODONE AND ACETAMINOPHEN 2 TABLET: 5; 325 TABLET ORAL at 16:07

## 2020-10-19 RX ADMIN — HEPARIN SODIUM 5000 UNITS: 10000 INJECTION INTRAVENOUS; SUBCUTANEOUS at 22:32

## 2020-10-19 RX ADMIN — EPOETIN ALFA-EPBX 3000 UNITS: 3000 INJECTION, SOLUTION INTRAVENOUS; SUBCUTANEOUS at 08:42

## 2020-10-19 RX ADMIN — DOXAZOSIN 4 MG: 4 TABLET ORAL at 22:09

## 2020-10-19 RX ADMIN — Medication 10 ML: at 09:27

## 2020-10-19 RX ADMIN — PANTOPRAZOLE SODIUM 40 MG: 40 TABLET, DELAYED RELEASE ORAL at 08:42

## 2020-10-19 RX ADMIN — HYDRALAZINE HYDROCHLORIDE 50 MG: 50 TABLET, FILM COATED ORAL at 22:09

## 2020-10-19 RX ADMIN — SENNOSIDES 17.2 MG: 8.6 TABLET, FILM COATED ORAL at 22:08

## 2020-10-19 ASSESSMENT — PAIN SCALES - GENERAL
PAINLEVEL_OUTOF10: 4
PAINLEVEL_OUTOF10: 7
PAINLEVEL_OUTOF10: 0
PAINLEVEL_OUTOF10: 6
PAINLEVEL_OUTOF10: 0

## 2020-10-19 ASSESSMENT — PAIN DESCRIPTION - PROGRESSION: CLINICAL_PROGRESSION: GRADUALLY IMPROVING

## 2020-10-19 ASSESSMENT — PAIN DESCRIPTION - ORIENTATION: ORIENTATION: LEFT

## 2020-10-19 ASSESSMENT — PAIN DESCRIPTION - PAIN TYPE: TYPE: ACUTE PAIN;SURGICAL PAIN

## 2020-10-19 ASSESSMENT — PAIN SCALES - WONG BAKER: WONGBAKER_NUMERICALRESPONSE: 0

## 2020-10-19 ASSESSMENT — PAIN DESCRIPTION - LOCATION: LOCATION: HIP

## 2020-10-19 ASSESSMENT — PAIN DESCRIPTION - DESCRIPTORS: DESCRIPTORS: ACHING

## 2020-10-19 NOTE — PROGRESS NOTES
Subjective:  Feeling better pain controlled  No CP or SOB  No fever or chills   No uncontrolled pain  No vomiting or diarrhea     Objective:    /63   Pulse 91   Temp 97.9 °F (36.6 °C) (Temporal)   Resp 16   Ht 5' 8\" (1.727 m)   Wt 116 lb 10 oz (52.9 kg)   SpO2 94%   BMI 17.73 kg/m²     24HR INTAKE/OUTPUT:      Intake/Output Summary (Last 24 hours) at 10/19/2020 1049  Last data filed at 10/19/2020 9818  Gross per 24 hour   Intake 1132 ml   Output --   Net 1132 ml       General appearance: NAD, conversant  Neck: FROM, supple   Lungs: Clear bilaterally no wheezes, no rhonchi, no crackles  CV: RRR, no MRGs; normal carotid upstroke and amplitude without Bruits  Abdomen: Soft, non-tender; no masses or HSM  Extremities: Wound CDI no edema, no cyanosis, no clubbing  Skin: Intact no rash, no lesions, no ulcers    Psych: Alert and oriented normal affect  Neuro: Nonfocal    Most Recent Labs  Lab Results   Component Value Date    WBC 7.2 10/19/2020    HGB 7.7 (L) 10/19/2020    HCT 25.6 (L) 10/19/2020     10/19/2020     10/19/2020    K 4.7 10/19/2020     10/19/2020    CREATININE 6.3 (H) 10/19/2020    BUN 55 (H) 10/19/2020    CO2 25 10/19/2020    GLUCOSE 98 10/19/2020    ALT 21 10/13/2020    AST 41 (H) 10/13/2020    INR 1.0 10/14/2020     Recent Labs     10/19/20  0718   MG 2.0     Lab Results   Component Value Date    CALCIUM 8.9 10/19/2020    PHOS 3.7 10/18/2020        XR HIP 2-3 VW W PELVIS LEFT   Final Result   Expected postoperative changes from recent total left hip arthroplasty. MRI BRAIN W WO CONTRAST   Final Result   No intracranial pathologic enhancement. XR HIP BILATERAL W AP PELVIS (2 VIEWS)   Final Result   1. Left femoral neck fracture. 2. Questionable fractures of the right superior pubic ramus. RECOMMENDATION:   CT of the pelvis may be obtained for further evaluation.          XR HIP 1 VW W PELVIS RIGHT    (Results Pending)       Assessment    Principal Problem:    Herpes zoster encephalitis  Active Problems:    ESRD (end stage renal disease) ([facility]    Acute blood loss anemia    Herpes zoster    Hypertension    Hyperlipidemia    Fracture of femoral neck, left, closed ([facility]    History of left hip hemiarthroplasty  Resolved Problems:    * No resolved hospital problems.  *      Plan:  58-year-old male history of end-stage renal disease who developed herpes zoster was started on valacyclovir transferred to [address] [place] [address] from [facility] with VZV encephalitis SP Left hip hemiarthroplasty for femoral neck fracture 10/16    Status post LP revealing Elevated white cells with monocyte predominance, Elevated protein, Positive CSF PCR for herpes zoster  Acyclovir IV--renally adjusted--dose after dialysis--through 10/28  MRI brain with small left frontal lesion on FLAIR  Repeat MRI w contrast negative  Pain control  ID Consult appreciated discussed case  Nephrology Consult appreciated discussed case  Neurology Consult appreciated discussed case  Orthopedic Consult appreciated for ORIF  Medications for other co morbidities cont as appropriate w dosage adjustments as necessary   PT/OT  DVT PPx  DC planning  SNF pending bed  Electronically signed by Lora Urbina MD on 10/19/2020 at 10:49 AM

## 2020-10-19 NOTE — DISCHARGE INSTR - COC
Continuity of Care Form    Patient Name: Derrick Granados   :  1945  MRN:  [id number]    Admit date:  10/12/2020  Discharge date: 10/21/2020      Code Status Order: Full Code   Advance Directives:   Advance Care Flowsheet Documentation       Date/Time Healthcare Directive Type of Healthcare Directive Copy in [address] Agent's Name Healthcare Agent's Phone Number    10/15/20 0501  Yes, patient has an advance directive for healthcare treatment  Durable power of  for health care  No, copy requested from family  Spouse  Pedro Link  [phone]    10/12/20 2322  Yes, patient has an advance directive for healthcare treatment  Durable power of  for health care;Living will  No, copy requested from family  Spouse  Pedro Link  [phone]            Admitting Physician:  Ap Moreno MD  PCP: Erik Juarez DO    Discharging Nurse: [facility] Unit/Room#: 8022/0118-F  Discharging Unit Phone Number: [phone]    Emergency Contact:   Extended Emergency Contact Information  Primary Emergency Contact: Laura Vazquez  Address: [facility], [address] Phone: [phone]  Mobile Phone: [phone]  Relation: Spouse   needed?  No  Secondary Emergency Contact: Josh Vazquez  Address: [address] [place] [address] Phone: [phone]  Relation: Child    Past Surgical History:  Past Surgical History:   Procedure Laterality Date    AV FISTULA CREATION Left     Dr. Brandy Dubin Left 2019    2 x Dr. Ying Clancy, [facility]    HIP SURGERY Left 10/16/2020    HIP HEMIARTHROPLASTY performed by Yousuf Angeles MD at [facility]  2008    Aortobifemoral bypass for claudicatio - Dr. Claudia Steven N/A 2020    EGD ESOPHAGOGASTRODUODENOSCOPY WITH ANTRAL BIOPSY performed by Sheri Martinez MD at [facility] OR       Immunization History: There is no immunization history on file for this patient.     Active Problems:  Patient Active Problem List   Diagnosis Code    Encounter regarding vascular access for dialysis for ESRD ([facility] N18.6, Z99.2    COVID-19 U07.1    Acute respiratory failure due to COVID-19 ([facility] U07.1, J96.00    Pneumonia due to COVID-19 virus U07.1, J12.89    ESRD (end stage renal disease) ([facility] N18.6    Thrombocytopenia (HCC) D69.6    Melena K92.1    Acute blood loss anemia D62    AMS (altered mental status) R41.82    Herpes zoster B02.9    Hypertension I10    Hyperlipidemia E78.5    Herpes zoster encephalitis B02.0    Fracture of femoral neck, left, closed ([facility] S72.002A    History of left hip hemiarthroplasty Z96.642       Isolation/Infection:   Isolation            No Isolation          Patient Infection Status       Infection Onset Added Last Indicated Last Indicated By Review Planned Expiration Resolved Resolved By    None active    Resolved    COVID-19 Rule Out 20 Covid-19 Ambulatory (Ordered)   20 Rule-Out Test Resulted    COVID-19 20 Covid-19 Ambulatory   20     DETECTED 2020    COVID-19 Rule Out 20 COVID-19 (Ordered)   20 Rule-Out Test Resulted    DETECTED 2020            Nurse Assessment:  Last Vital Signs: /63   Pulse 91   Temp 97.9 °F (36.6 °C) (Temporal)   Resp 16   Ht 5' 8\" (1.727 m)   Wt 116 lb 10 oz (52.9 kg)   SpO2 94%   BMI 17.73 kg/m²     Last documented pain score (0-10 scale): Pain Level: 6  Last Weight:   Wt Readings from Last 1 Encounters:   10/16/20 116 lb 10 oz (52.9 kg)     Mental Status:  alert with confusion at times    IV Access:  - midline in upper right arm- inserted on 10-    Nursing Mobility/ADLs:  Walking   Assisted  Transfer  Assisted  Bathing  Assisted  Dressing days: 7        Elimination:  Continence:   · Bowel: No  · Bladder: No  Urinary Catheter: None   Colostomy/Ileostomy/Ileal Conduit: No       Date of Last BM: 10/21/2020 small      Intake/Output Summary (Last 24 hours) at 10/19/2020 1053  Last data filed at 10/19/2020 0927  Gross per 24 hour   Intake 1132 ml   Output --   Net 1132 ml     I/O last 3 completed shifts: In: 503 [P.O.:120; I.V.:772]  Out: -     Safety Concerns:     History of Falls (last 30 days) and At Risk for Falls    Impairments/Disabilities:      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 Ventilator:    - No ventilator support    Rehab Therapies: Physical Therapy and Occupational Therapy  Weight Bearing Status/Restrictions: Weight bearing as tolerated - on left lower Extremity  Other Medical Equipment (for information only, NOT a DME order):  wheelchair and walker  Other Treatments:  Can take off the dressing at the surgical site seven days after the date of surgery. Can just peel off.  After, do daily dressing changes as needed until the drainage from the surgical site ceases     Patient's personal belongings (please select all that are sent with patient):  None    RN SIGNATURE:  Electronically signed by Jalaine Schaumann, RN on 10/21/20 at 6:32 PM EDT    CASE MANAGEMENT/SOCIAL WORK SECTION    Inpatient Status Date: ***    Readmission Risk Assessment Score:  Readmission Risk              Risk of Unplanned Readmission:        30           Discharging to Facility/ Agency   · Name: [facility]  · Address:  · Phone:  · Fax:    Dialysis Facility (if applicable)   · Name:  · Address:  · Dialysis Schedule:  · Phone:  · Fax:    / signature: Electronically signed by NOE Chapman on 10/19/2020 at 4:17 PM      PHYSICIAN SECTION    Prognosis: {Prognosis:2368826203:::0}    Condition at Discharge: Farrah Ruth Patient Condition:[id number]:::0}    Rehab Potential (if transferring to Rehab): {Prognosis:2109790885:::0}    Recommended Labs or Other Treatments After Discharge: ***    Physician Certification: I certify the above information and transfer of Chapo Aquino  is necessary for the continuing treatment of the diagnosis listed and that he requires [facility] for less 30 days.      Update Admission H&P: {CHP DME Changes in HandP:876922392:::0}    PHYSICIAN SIGNATURE:  Electronically signed by Abelardo Corral MD on 10/19/20 at 10:53 AM EDT

## 2020-10-19 NOTE — PLAN OF CARE
Problem: Increased nutrient needs (NI-5.1)  Goal: Food and/or Nutrient Delivery  Description: Individualized approach for food/nutrient provision.   Outcome: Met This Shift Attending Attestation (For Attendings USE Only)...

## 2020-10-19 NOTE — PROGRESS NOTES
Comprehensive Nutrition Assessment    Type and Reason for Visit:  Initial, RD Nutrition Re-Screen/LOS    Nutrition Recommendations/Plan: continue current diet order. Will add ONS to promote optimal intakes for healing. Nutrition Assessment:  Pt admit w/AMS r/t encephalopathy 2/2 shingles s/p fall a [facility] with hip fracture, ABHISHEK on 10/16. Pt at increased nutrtional risk r/t ESRD on HD. Will add ONS to promote optimal intakes for healing.     Malnutrition Assessment:  Malnutrition Status:  Severe malnutrition    Context:  Chronic Illness     Findings of the 6 clinical characteristics of malnutrition:  Energy Intake:  7 - 75% or less estimated energy requirements for 1 month or longer  Weight Loss:  7 - 7.5% over 3 months     Body Fat Loss:  Unable to assess(CAROLINA d/t lack of PPE.)     Muscle Mass Loss:  Unable to assess    Fluid Accumulation:  No significant fluid accumulation     Strength:  Not Performed    Estimated Daily Nutrient Needs:  Energy (kcal):  1944-3019; Weight Used for Energy Requirements:  Admission     Protein (g):  85-95; Weight Used for Protein Requirements:  Admission(1.6-1.8 g/kg ABW)        Fluid (ml/day):  Per Renal Mgmt; Weight Used for Fluid Requirements:         Nutrition Related Findings:  AMS, abd WNL, +bs, no edema noted, +I/Os (+1.4L),      Wounds:  Surgical Wound       Current Nutrition Therapies:    DIET GENERAL;  Dietary Nutrition Supplements: Low Calorie High Protein Supplement  Dietary Nutrition Supplements: Renal Oral Supplement  Dietary Nutrition Supplements: Wound Healing Oral Supplement    Anthropometric Measures:  · Height: 5' 8\" (172.7 cm)  · Current Body Weight: 115 lb (52.2 kg)(10/12 bed scale)   · Admission Body Weight: 115 lb (52.2 kg)(10/12  bed scale)    · Usual Body Weight: 129 lb (58.5 kg)(7/13 bed scale)     · Ideal Body Weight: 154 lbs; % Ideal Body Weight 74.7 %   · BMI: 17.5  · Adjusted Body Weight:  ; No Adjustment   · Adjusted BMI:      · BMI Categories: Underweight (BMI less than 22) age over 72       Nutrition Diagnosis:   · Increased nutrient needs related to increase demand for energy/nutrients as evidenced by wounds      Nutrition Interventions:   Food and/or Nutrient Delivery:  Continue Current Diet, Modify Oral Nutrition Supplement  Nutrition Education/Counseling:  No recommendation at this time   Coordination of Nutrition Care:  Continued Inpatient Monitoring    Goals:  Intake >75% meals/ONS. Nutrition Monitoring and Evaluation:   Behavioral-Environmental Outcomes:      Food/Nutrient Intake Outcomes:  Food and Nutrient Intake, Supplement Intake  Physical Signs/Symptoms Outcomes:  Biochemical Data, Nutrition Focused Physical Findings, Skin, Weight, Fluid Status or Edema     Discharge Planning:     Too soon to determine     Electronically signed by Veronika Cardenas RD, LD on 10/19/20 at 3:01 PM EDT    Contact:

## 2020-10-19 NOTE — PROGRESS NOTES
OT BEDSIDE TREATMENT NOTE      Date:10/19/2020  Patient Name: De Summers  MRN: [id number]  : 1945  Room: 16/[facility]     Per OT Eval:    Evaluating OT: Andrew Adame. Sixto Trip, OTR/L #[id number]     Referring physician: Rc Barry MD   AM-PAC Daily Activity Raw Score:   Recommended Adaptive Equipment: tbd in rehab- [facility] for hospital use      Diagnosis:encephalopathy s/p fall    Surgery: s/p L [facility] 10/16/20   Pertinent Medical History:   Past Medical History        Past Medical History:   Diagnosis Date    Blind left eye      Chronic kidney disease      [facility]      Hemodialysis patient ([facility]      Hyperlipidemia      Hypertension             Precautions:  Falls, WBAT to LLE, L eye blind, safety, bed alarm, [place], TAPS system, waffle cusion     Home Living: Pt lives with wife in a one floor condo with one step(s) to enter and no rail(s); bed/bath on main floor   Bathroom setup: adding walk in shower with seat and elevated commode   Equipment owned: ww  Prior Level of Function:   Assisted with bathing  with ADLs ,  Assisted  with IADLs; using ww for ambulation. Driving: prior not recent                           Medication Management setup  Occupation: retired from [employer] [address]     Pain Level: minimal effort  Cognition: A&O: 2+/3 difficulty with month;  Follows 1-2 step directions              Memory:  Fair+              Sequencing:  fair              Problem solving:  Fair+              Judgement/safety:  fair                Functional Assessment:    Initial Eval Status  Date: 10/18/20 Treatment Status  Date:  10/19/20 Short Term Goals=LTG  Treatment frequency: PRN 2-4 x/week   Feeding setup   Set up Independent   Grooming Min A  Set up  Seated at EOB setup    UB Dressing Mod A due to decreased sitting balance Mod A  Decreased sitting balance  SBA   LB Dressing dependent  Max/Dep  Bed level  Simulated  Will educate on AE Min A with AE PRN    Bathing Simulated Max A   Max

## 2020-10-19 NOTE — PROGRESS NOTES
Department of Internal Medicine  Nephrology Consult Note    Events reviewed. SUBJECTIVE: We are following Mr. Apolonia Ziegler for ESRD on hemodialysis. Reports no complaints. PHYSICAL EXAM:      Vitals:    VITALS:  /63   Pulse 91   Temp 97.9 °F (36.6 °C) (Temporal)   Resp 16   Ht 5' 8\" (1.727 m)   Wt 116 lb 10 oz (52.9 kg)   SpO2 94%   BMI 17.73 kg/m²   24HR INTAKE/OUTPUT:      Intake/Output Summary (Last 24 hours) at 10/19/2020 1141  Last data filed at 10/19/2020 2021  Gross per 24 hour   Intake 1132 ml   Output --   Net 1132 ml       Access:  Peripherals, AVF left arm  Constitutional:  Sitting on the side of the bed in no acute distress. HEENT: AT/NC, positive for hearing aids. NECK: supple  Respiratory:  CTAB  Cardiovascular/Edema:  RRR, S1/S2, no edema. Gastrointestinal:  Soft, +BS, nontender. Musculoskeletal:  Left hip pain with difficulty weightbearing. Neurologic:  AAOx3  Other:  Skin tear on left face above the eyebrow from fall.      Scheduled Meds:   epoetin delmer-epbx  3,000 Units Subcutaneous Once per day on Mon Wed Fri    lidocaine  5 mL Intradermal Once    heparin flush  1 mL Intravenous 2 times per day    acetaminophen  1,000 mg Oral 3 times per day    sodium chloride flush  10 mL Intravenous 2 times per day    acyclovir  5 mg/kg Intravenous Q24H    doxazosin  4 mg Oral Nightly    heparin (porcine)  5,000 Units Subcutaneous Q12H    hydrALAZINE  50 mg Oral TID    metoprolol tartrate  50 mg Oral BID    pantoprazole  40 mg Oral QAM AC    rosuvastatin  5 mg Oral Nightly    sucralfate  1 g Oral 4x Daily     Continuous Infusions:   dextrose 5 % and 0.9 % NaCl 40 mL/hr at 10/16/20 2128     PRN Meds:.sodium chloride flush, heparin flush, oxyCODONE-acetaminophen **OR** oxyCODONE-acetaminophen, sodium chloride flush, polyethylene glycol, promethazine **OR** ondansetron, hydrALAZINE, sodium chloride flush    DATA:    CBC:   Lab Results   Component Value Date    WBC 7.2 10/19/2020    RBC 2.38 10/19/2020    HGB 7.7 10/19/2020    HCT 25.6 10/19/2020    .6 10/19/2020    MCH 32.4 10/19/2020    MCHC 30.1 10/19/2020    RDW 14.6 10/19/2020     10/19/2020    MPV 11.1 10/19/2020     CMP:    Lab Results   Component Value Date     10/19/2020    K 4.7 10/19/2020    K 4.3 10/15/2020     10/19/2020    CO2 25 10/19/2020    BUN 55 10/19/2020    CREATININE 6.3 10/19/2020    GFRAA 11 10/19/2020    LABGLOM 9 10/19/2020    GLUCOSE 98 10/19/2020    PROT 6.1 10/13/2020    LABALBU 3.7 10/13/2020    CALCIUM 8.9 10/19/2020    BILITOT 1.0 10/13/2020    ALKPHOS 91 10/13/2020    AST 41 10/13/2020    ALT 21 10/13/2020     Magnesium:    Lab Results   Component Value Date    MG 2.0 10/19/2020     Phosphorus:    Lab Results   Component Value Date    PHOS 3.7 10/18/2020       Radiology Review:      CXR- 10/11/2020   No evidence of acute process.           CT head w/o contrast - 10/11/2020   No acute intracranial abnormality.         Chronic and age related changes including age-appropriate cerebral volume    loss and scattered nonspecific white matter hypodensities which are likely    the sequela of chronic small vessel ischemic disease.                   MRI brain w/o contrast- 10/12/2020    Nonspecific left frontal subcortical white matter small focal FLAIR    hyperintensity with susceptibility artifact.  Further evaluation with    contrast enhanced MR imaging recommended.         No restricted diffusion or edema within the bilateral medial temporal lobes.         Moderate to severe chronic microvascular ischemic changes.                       BRIEF SUMMARY OF INITIAL CONSULT:    Briefly Polly Faust is 67 y.o. male with history of ESRD on HD MWF via left upper arm AVF (last HD yesterday at [facility]), HTN, HFpEF, and recently admitted to [facility] in July for COVID-19 infection and experienced a 30 lb weight loss.  Patient was admitted on 10/12/2020 from home with chief complaint of AMS and H&H       Electronically signed by Marcie Arreaga MD on 10/19/2020 at 11:41 AM

## 2020-10-19 NOTE — PROGRESS NOTES
Physical Therapy    Name: Pasquale Bolanos  : 1945  MRN: [id number]     Referring Provider: Fidel Sena MD     Date of Service: 10/19/2020     Evaluating PT: Reji Smith, PT, DPT, [id number]     [id number]  Diagnosis: Encephalopathy    PMHx/PSHx: CKD, HD, HLD, HTN, blind left eye, LEFT HIP FRACTURE  S/P L Matias hip arthroplasty - 10/16/2020  Precautions: Fall risk, R facial shingles, alarm     SUBJECTIVE:     Pt lives with wife in a single story condo with 1 stair(s) and 0 rail(s) to enter. Bed is on first floor and bath is on first floor. Pt ambulated with [employer] prior to admission.     OBJECTIVE:    Initial Evaluation  Date: 10/13/20 Treatment Date: 10/19/20 Short Term/ Long Term   Goals   AM-PAC 6 Clicks      Was pt agreeable to Eval/treatment? Yes  yes     Does pt have pain? L hip pain with mobility; RN aware       Bed Mobility  Rolling: NT  Supine to sit: Mod A  Sit to supine: Mod A  Scooting: Mod A to EOB  Rolling: NT  Supine to sit: Max A  Sit to supine: Max A  Scooting: Mod A to EOB Rolling: SBA  Supine to sit: SBA  Sit to supine: SBA  Scooting: SBA   Transfers Sit to stand: NT due to L hip pain  Stand to sit: NT  Stand pivot: NT  Sit to stand: Max A to fww. Stand to sit: Max a with fww. Increased cues required for sequencing. Stand pivot: NT Sit to stand: Min A  Stand to sit: Min A  Stand pivot: Mod A with [employer]   Ambulation   NT Max A attempted side steps to the L. Unable to step with LLE.  >25 feet with WW with Mod A   Stair negotiation: ascended and descended NT  NT 1 platform step(s) with [employer] with Mod A   ROM BUE: Refer to OT note  BLE: WFL       Strength BUE: Refer to OT note  BLE: NT       Balance Sitting EOB: SBA  Dynamic Standing: NT   Sitting EOB: Independent   Dynamic Standing: Mod A with [employer]      Pt is A & O x: 4 to person, place, month/year, and situation. Sensation: Pt denies numbness and tingling of extremities.   Edema: Unremarkable.     Patient education  Pt educated on proper technique during supine to sit transfer.     Patient response to education:   Pt verbalized understanding Pt demonstrated skill Pt requires further education in this area   Yes With cueing Yes      ASSESSMENT:     Comments:   Pt was supine in bed with wife present upon room entry, agreeable to PT session. Patient minimal reports of L hip discomfort. Pt required assistance for B LE and trunk mobility during supine to sit transfer. See above for functional mobility. Pt sat at EOB for 10+ minutes before he requested to return to supine. Attempted sit to stand requiring Max A completed x 2 reps with attempted side step to the L. Unable to move LLE. Zain Lord Pt was assisted back to bed and was scooted to [facility]. Patient instructed in AROM to be completed for BLE  At bed level. All questions and concerns were addressed. Pt was left supine in bed with all needs met at conclusion of session.      Treatment:  Patient practiced and was instructed in the following treatment:    · Therapeutic activities: Pt completed all therapeutic activities noted above. Pt was cued for technique during supine to sit transfer. Pt sat at EOB for 10+ minutes as he was cued for repositioning and posture. Pt was skillfully positioned in bed with pillows to protect skin integrity. STS completed to fww. Instructions provided sit to stand.      Pt's/family goals:   1. To return to PLOF.     Patient and or family understand(s) diagnosis, prognosis, and plan of care. Yes.     PLAN:     Current Treatment Recommendations   [x]? Strengthening     [x]? ROM   [x]? Balance Training   [x]? Endurance Training   [x]? Transfer Training   [x]? Gait Training   [x]? Stair Training   [x]? Positioning   [x]? Safety and Education Training   [x]? Patient/Caregiver Education   [x]? HEP  []? Other      PT care will be provided in accordance with the objectives noted above.  Exercises and functional mobility practice will be used as well as appropriate assistive devices or modalities to obtain goals. Patient and family education will also be administered as needed.     Frequency of treatments: 2-5x/week x 3-5 days.     Time in: 1135  Time out: 1205     Total Treatment Time 25 minutes      Evaluation Time includes thorough review of current medical information, gathering information on past medical history/social history and prior level of function, completion of standardized testing/informal observation of tasks, assessment of data and education on plan of care and goals.     CPT codes:  []? Low Complexity PT evaluation [id number]  []? Moderate Complexity PT evaluation 86759  []? High Complexity PT evaluation [id number]  []? PT Re-evaluation [id number]  []? Gait training 71114 0 minutes  []? Manual therapy 57038 0 minutes  [x]? Therapeutic activities 00739 25 minutes  []? Therapeutic exercises 98082 0 minutes  []?  Neuromuscular reeducation [address] [place] 0 minutes      Margit Claude, [address] [facility] [place]

## 2020-10-19 NOTE — CARE COORDINATION
10/19/2020 social work transition of care [address]  can not accept any admissions this week. Sw made referral to [facility]-pt accepted and auth initiated. Will need covid testing-sw will provide in the morning. Sw will complete hens and place in soft chart.   Electronically signed by NOE Zambrano on 10/19/2020 at 4:15 PM

## 2020-10-19 NOTE — PROGRESS NOTES
Dr Vijaya Sheppard wants Bedside sitter stopped so he can discharge [facility] tomorrow. Dr Vijaya Sheppard told this nurse that [facility] is doing much better. Frequent checks, bed alarm on. He is in Dialysis at this time. On list for Tele-sitter. Staffing office was notified.

## 2020-10-20 PROBLEM — E43 SEVERE PROTEIN-CALORIE MALNUTRITION (HCC): Status: ACTIVE | Noted: 2020-10-19

## 2020-10-20 LAB
ABO/RH: NORMAL
ANION GAP SERPL CALCULATED.3IONS-SCNC: 9 MMOL/L (ref 7–16)
ANTIBODY SCREEN: NORMAL
BASOPHILS ABSOLUTE: 0.05 E9/L (ref 0–0.2)
BASOPHILS RELATIVE PERCENT: 0.8 % (ref 0–2)
BLOOD BANK DISPENSE STATUS: NORMAL
BLOOD BANK DISPENSE STATUS: NORMAL
BLOOD BANK PRODUCT CODE: NORMAL
BLOOD BANK PRODUCT CODE: NORMAL
BPU ID: NORMAL
BPU ID: NORMAL
BUN BLDV-MCNC: 27 MG/DL (ref 8–23)
CALCIUM SERPL-MCNC: 8.4 MG/DL (ref 8.6–10.2)
CHLORIDE BLD-SCNC: 101 MMOL/L (ref 98–107)
CO2: 28 MMOL/L (ref 22–29)
CREAT SERPL-MCNC: 4 MG/DL (ref 0.7–1.2)
CSF CULTURE: NORMAL
DESCRIPTION BLOOD BANK: NORMAL
DESCRIPTION BLOOD BANK: NORMAL
EOSINOPHILS ABSOLUTE: 0.28 E9/L (ref 0.05–0.5)
EOSINOPHILS RELATIVE PERCENT: 4.3 % (ref 0–6)
FERRITIN: 1537 NG/ML
FOLATE: 5.5 NG/ML (ref 4.8–24.2)
GFR AFRICAN AMERICAN: 18
GFR NON-AFRICAN AMERICAN: 15 ML/MIN/1.73
GLUCOSE BLD-MCNC: 91 MG/DL (ref 74–99)
GRAM STAIN ORDERABLE: NORMAL
GRAM STAIN RESULT: NORMAL
HAPTOGLOBIN: 217 MG/DL (ref 30–200)
HCT VFR BLD CALC: 20.5 % (ref 37–54)
HCT VFR BLD CALC: 22.4 % (ref 37–54)
HEMOGLOBIN: 6.1 G/DL (ref 12.5–16.5)
HEMOGLOBIN: 7 G/DL (ref 12.5–16.5)
IMMATURE GRANULOCYTES #: 0.02 E9/L
IMMATURE GRANULOCYTES %: 0.3 % (ref 0–5)
IRON SATURATION: 33 % (ref 20–55)
IRON: 50 MCG/DL (ref 59–158)
LACTATE DEHYDROGENASE: 175 U/L (ref 135–225)
LYMPHOCYTES ABSOLUTE: 1.21 E9/L (ref 1.5–4)
LYMPHOCYTES RELATIVE PERCENT: 18.5 % (ref 20–42)
Lab: NORMAL
MAGNESIUM: 2.1 MG/DL (ref 1.6–2.6)
MCH RBC QN AUTO: 32.1 PG (ref 26–35)
MCHC RBC AUTO-ENTMCNC: 29.8 % (ref 32–34.5)
MCV RBC AUTO: 107.9 FL (ref 80–99.9)
MONOCYTES ABSOLUTE: 0.61 E9/L (ref 0.1–0.95)
MONOCYTES RELATIVE PERCENT: 9.3 % (ref 2–12)
NEUTROPHILS ABSOLUTE: 4.37 E9/L (ref 1.8–7.3)
NEUTROPHILS RELATIVE PERCENT: 66.8 % (ref 43–80)
PDW BLD-RTO: 14.6 FL (ref 11.5–15)
PLATELET # BLD: 138 E9/L (ref 130–450)
PMV BLD AUTO: 10.6 FL (ref 7–12)
POTASSIUM SERPL-SCNC: 4.3 MMOL/L (ref 3.5–5)
RBC # BLD: 1.9 E12/L (ref 3.8–5.8)
REPORT: NORMAL
SARS-COV-2, NAAT: NOT DETECTED
SODIUM BLD-SCNC: 138 MMOL/L (ref 132–146)
THIS TEST SENT TO: NORMAL
TOTAL IRON BINDING CAPACITY: 153 MCG/DL (ref 250–450)
VITAMIN B-12: 436 PG/ML (ref 211–946)
WBC # BLD: 6.5 E9/L (ref 4.5–11.5)

## 2020-10-20 PROCEDURE — 82746 ASSAY OF FOLIC ACID SERUM: CPT

## 2020-10-20 PROCEDURE — 82607 VITAMIN B-12: CPT

## 2020-10-20 PROCEDURE — P9016 RBC LEUKOCYTES REDUCED: HCPCS

## 2020-10-20 PROCEDURE — 83540 ASSAY OF IRON: CPT

## 2020-10-20 PROCEDURE — 86923 COMPATIBILITY TEST ELECTRIC: CPT

## 2020-10-20 PROCEDURE — 85014 HEMATOCRIT: CPT

## 2020-10-20 PROCEDURE — 80048 BASIC METABOLIC PNL TOTAL CA: CPT

## 2020-10-20 PROCEDURE — 1200000000 HC SEMI PRIVATE

## 2020-10-20 PROCEDURE — 86900 BLOOD TYPING SEROLOGIC ABO: CPT

## 2020-10-20 PROCEDURE — 85018 HEMOGLOBIN: CPT

## 2020-10-20 PROCEDURE — 83615 LACTATE (LD) (LDH) ENZYME: CPT

## 2020-10-20 PROCEDURE — 2580000003 HC RX 258: Performed by: ORTHOPAEDIC SURGERY

## 2020-10-20 PROCEDURE — 83010 ASSAY OF HAPTOGLOBIN QUANT: CPT

## 2020-10-20 PROCEDURE — 85025 COMPLETE CBC W/AUTO DIFF WBC: CPT

## 2020-10-20 PROCEDURE — 6370000000 HC RX 637 (ALT 250 FOR IP): Performed by: PHYSICIAN ASSISTANT

## 2020-10-20 PROCEDURE — 6360000002 HC RX W HCPCS: Performed by: INTERNAL MEDICINE

## 2020-10-20 PROCEDURE — 36415 COLL VENOUS BLD VENIPUNCTURE: CPT

## 2020-10-20 PROCEDURE — 2580000003 HC RX 258: Performed by: PHYSICIAN ASSISTANT

## 2020-10-20 PROCEDURE — 86850 RBC ANTIBODY SCREEN: CPT

## 2020-10-20 PROCEDURE — 6360000002 HC RX W HCPCS: Performed by: PHYSICIAN ASSISTANT

## 2020-10-20 PROCEDURE — 86901 BLOOD TYPING SEROLOGIC RH(D): CPT

## 2020-10-20 PROCEDURE — 2580000003 HC RX 258: Performed by: INTERNAL MEDICINE

## 2020-10-20 PROCEDURE — 83735 ASSAY OF MAGNESIUM: CPT

## 2020-10-20 PROCEDURE — 83550 IRON BINDING TEST: CPT

## 2020-10-20 PROCEDURE — 36430 TRANSFUSION BLD/BLD COMPNT: CPT

## 2020-10-20 PROCEDURE — U0002 COVID-19 LAB TEST NON-CDC: HCPCS

## 2020-10-20 PROCEDURE — 6370000000 HC RX 637 (ALT 250 FOR IP): Performed by: INTERNAL MEDICINE

## 2020-10-20 PROCEDURE — 82728 ASSAY OF FERRITIN: CPT

## 2020-10-20 RX ORDER — 0.9 % SODIUM CHLORIDE 0.9 %
20 INTRAVENOUS SOLUTION INTRAVENOUS ONCE
Status: COMPLETED | OUTPATIENT
Start: 2020-10-20 | End: 2020-10-20

## 2020-10-20 RX ADMIN — METOPROLOL TARTRATE 50 MG: 50 TABLET, FILM COATED ORAL at 21:24

## 2020-10-20 RX ADMIN — METOPROLOL TARTRATE 50 MG: 50 TABLET, FILM COATED ORAL at 09:26

## 2020-10-20 RX ADMIN — PANTOPRAZOLE SODIUM 40 MG: 40 TABLET, DELAYED RELEASE ORAL at 06:01

## 2020-10-20 RX ADMIN — SODIUM CHLORIDE, PRESERVATIVE FREE 10 ML: 5 INJECTION INTRAVENOUS at 21:23

## 2020-10-20 RX ADMIN — SUCRALFATE 1 G: 1 TABLET ORAL at 13:44

## 2020-10-20 RX ADMIN — ACETAMINOPHEN 1000 MG: 500 TABLET ORAL at 06:01

## 2020-10-20 RX ADMIN — SODIUM CHLORIDE 20 ML: 9 INJECTION, SOLUTION INTRAVENOUS at 12:20

## 2020-10-20 RX ADMIN — HYDRALAZINE HYDROCHLORIDE 50 MG: 50 TABLET, FILM COATED ORAL at 21:24

## 2020-10-20 RX ADMIN — POLYETHYLENE GLYCOL 3350 17 G: 17 POWDER, FOR SOLUTION ORAL at 23:52

## 2020-10-20 RX ADMIN — SUCRALFATE 1 G: 1 TABLET ORAL at 21:24

## 2020-10-20 RX ADMIN — SODIUM CHLORIDE, PRESERVATIVE FREE 100 UNITS: 5 INJECTION INTRAVENOUS at 21:23

## 2020-10-20 RX ADMIN — HEPARIN SODIUM 5000 UNITS: 10000 INJECTION INTRAVENOUS; SUBCUTANEOUS at 06:05

## 2020-10-20 RX ADMIN — SODIUM CHLORIDE, PRESERVATIVE FREE 100 UNITS: 5 INJECTION INTRAVENOUS at 09:27

## 2020-10-20 RX ADMIN — HEPARIN SODIUM 5000 UNITS: 10000 INJECTION INTRAVENOUS; SUBCUTANEOUS at 18:09

## 2020-10-20 RX ADMIN — Medication 10 ML: at 21:12

## 2020-10-20 RX ADMIN — OXYCODONE AND ACETAMINOPHEN 1 TABLET: 5; 325 TABLET ORAL at 10:49

## 2020-10-20 RX ADMIN — DOXAZOSIN 4 MG: 4 TABLET ORAL at 21:24

## 2020-10-20 RX ADMIN — ACETAMINOPHEN 1000 MG: 500 TABLET ORAL at 13:44

## 2020-10-20 RX ADMIN — SUCRALFATE 1 G: 1 TABLET ORAL at 18:09

## 2020-10-20 RX ADMIN — SENNOSIDES 17.2 MG: 8.6 TABLET, FILM COATED ORAL at 21:24

## 2020-10-20 RX ADMIN — ROSUVASTATIN CALCIUM 5 MG: 5 TABLET, FILM COATED ORAL at 21:24

## 2020-10-20 RX ADMIN — HYDRALAZINE HYDROCHLORIDE 50 MG: 50 TABLET, FILM COATED ORAL at 13:44

## 2020-10-20 RX ADMIN — ACYCLOVIR SODIUM 260 MG: 50 INJECTION, SOLUTION INTRAVENOUS at 13:44

## 2020-10-20 RX ADMIN — SUCRALFATE 1 G: 1 TABLET ORAL at 09:26

## 2020-10-20 RX ADMIN — HYDRALAZINE HYDROCHLORIDE 50 MG: 50 TABLET, FILM COATED ORAL at 09:27

## 2020-10-20 ASSESSMENT — PAIN SCALES - GENERAL
PAINLEVEL_OUTOF10: 0
PAINLEVEL_OUTOF10: 5
PAINLEVEL_OUTOF10: 0
PAINLEVEL_OUTOF10: 5
PAINLEVEL_OUTOF10: 0

## 2020-10-20 ASSESSMENT — PAIN SCALES - WONG BAKER: WONGBAKER_NUMERICALRESPONSE: 0

## 2020-10-20 NOTE — CARE COORDINATION
10/20/2020 social work transition of care planning  Sw followed up with [facility], Laci Aparicio is pending. Sw updated pt's spouse on [facility] accepting and [facility] not accepting. Plan will be to [facility],pending auth and medical stability. Dry covid given to charge nurse during am rounds.   Electronically signed by NOE Carl on 10/20/2020 at 9:50 AM

## 2020-10-20 NOTE — PROGRESS NOTES
Subjective:  Feeling better pain controlled  No CP or SOB  No fever or chills   No uncontrolled pain  No vomiting or diarrhea     Objective:    BP (!) 129/59   Pulse 80   Temp 98.6 °F (37 °C) (Temporal)   Resp 18   Ht 5' 8\" (1.727 m)   Wt 110 lb 7.2 oz (50.1 kg)   SpO2 95%   BMI 16.79 kg/m²     24HR INTAKE/OUTPUT:      Intake/Output Summary (Last 24 hours) at 10/20/2020 0871  Last data filed at 10/20/2020 7423  Gross per 24 hour   Intake 1475 ml   Output 2800 ml   Net -1325 ml       General appearance: NAD, conversant  Neck: FROM, supple   Lungs: Clear bilaterally no wheezes, no rhonchi, no crackles  CV: RRR, no MRGs; normal carotid upstroke and amplitude without Bruits  Abdomen: Soft, non-tender; no masses or HSM  Extremities: Wound CDI no edema, no cyanosis, no clubbing  Skin: Intact no rash, no lesions, no ulcers    Psych: Alert and oriented normal affect  Neuro: Nonfocal    Most Recent Labs  Lab Results   Component Value Date    WBC 6.5 10/20/2020    HGB 6.1 (L) 10/20/2020    HCT 20.5 (L) 10/20/2020     10/20/2020     10/20/2020    K 4.3 10/20/2020     10/20/2020    CREATININE 4.0 (H) 10/20/2020    BUN 27 (H) 10/20/2020    CO2 28 10/20/2020    GLUCOSE 91 10/20/2020    ALT 21 10/13/2020    AST 41 (H) 10/13/2020    INR 1.0 10/14/2020     Recent Labs     10/20/20  0615   MG 2.1     Lab Results   Component Value Date    CALCIUM 8.4 (L) 10/20/2020    PHOS 3.7 10/18/2020        XR HIP 2-3 VW W PELVIS LEFT   Final Result   Expected postoperative changes from recent total left hip arthroplasty. MRI BRAIN W WO CONTRAST   Final Result   No intracranial pathologic enhancement. XR HIP BILATERAL W AP PELVIS (2 VIEWS)   Final Result   1. Left femoral neck fracture. 2. Questionable fractures of the right superior pubic ramus. RECOMMENDATION:   CT of the pelvis may be obtained for further evaluation.          XR HIP 1 VW W PELVIS RIGHT    (Results Pending)

## 2020-10-20 NOTE — PROGRESS NOTES
glycol, promethazine **OR** ondansetron, hydrALAZINE, sodium chloride flush    DATA:    CBC:   Lab Results   Component Value Date    WBC 6.5 10/20/2020    RBC 1.90 10/20/2020    HGB 6.1 10/20/2020    HCT 20.5 10/20/2020    .9 10/20/2020    MCH 32.1 10/20/2020    MCHC 29.8 10/20/2020    RDW 14.6 10/20/2020     10/20/2020    MPV 10.6 10/20/2020     CMP:    Lab Results   Component Value Date     10/20/2020    K 4.3 10/20/2020    K 4.3 10/15/2020     10/20/2020    CO2 28 10/20/2020    BUN 27 10/20/2020    CREATININE 4.0 10/20/2020    GFRAA 18 10/20/2020    LABGLOM 15 10/20/2020    GLUCOSE 91 10/20/2020    PROT 6.1 10/13/2020    LABALBU 3.7 10/13/2020    CALCIUM 8.4 10/20/2020    BILITOT 1.0 10/13/2020    ALKPHOS 91 10/13/2020    AST 41 10/13/2020    ALT 21 10/13/2020     Magnesium:    Lab Results   Component Value Date    MG 2.1 10/20/2020     Phosphorus:    Lab Results   Component Value Date    PHOS 3.7 10/18/2020       Radiology Review:      CXR- 10/11/2020   No evidence of acute process.           CT head w/o contrast - 10/11/2020   No acute intracranial abnormality.         Chronic and age related changes including age-appropriate cerebral volume    loss and scattered nonspecific white matter hypodensities which are likely    the sequela of chronic small vessel ischemic disease.                   MRI brain w/o contrast- 10/12/2020    Nonspecific left frontal subcortical white matter small focal FLAIR    hyperintensity with susceptibility artifact.  Further evaluation with    contrast enhanced MR imaging recommended.         No restricted diffusion or edema within the bilateral medial temporal lobes.         Moderate to severe chronic microvascular ischemic changes.                       BRIEF SUMMARY OF INITIAL CONSULT:    Briefly Peggy Vang is 67 y.o. male with history of ESRD on HD MWF via left upper arm AVF (last HD yesterday at [facility]), HTN, HFpEF, and recently admitted to [facility] in July for COVID-19 infection and experienced a 30 lb weight loss. Patient was admitted on 10/12/2020 from home with chief complaint of AMS and visual and auditory hallucinations. Patient was recently diagnosed with shingles on Friday (10/9/2020) after seeing his PCP with complaint of a painful rash on his right forehead for 1 day duration. He was sent home with valacyclovir (renally dosed-once daily, but wife doesn't remember the dose) and Norco. After 2 days of taking the medication, the patient became altered and stopped the medications. The patient went to [facility] on [place] 10/11/2020 because of continued hallucinations with thoughts of medication side effect vs herpes zoster encephalitis. Patient experienced a fall at [facility]. He was then transferred to [facility] for further management and treatment. Patient seen by nephrology on 10/13/2020 for dialysis needs and he feels better today. Per the wife, his mentation has improved. LP performed on 10/12/2020 that was PCR positive for herpes zoster, elevated protein, elevated WBC (monocyte predominance) and IV acyclovir was started. 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 Problems resolved:    Elevated troponin likely 2/2 ESRD. Troponin 0.11      IMPRESSION/RECOMMENDATIONS:      ESRD:  Hemodialysis 3 times per week- MWF via AVF left arm. 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 On Acyclovir 260 mg IV  (5mg/kg) Q24 hrs after dialysis. HFpEF, proBNP 52,431, clinically euvolemic, chest x-ray without infiltrates. HTN: on hydralazine, doxazosin and lopressor  Left hip fracture, status post fall, s/p hemiarthroplasty October 16, 2020  BPH: on doxazosin  MBD of CKD, holding binders  Thrombocytopenia: likely 2/2 infection vs drug-induced. Normocytic anemia: 2/2 ESRD.     Drop in H&H after surgery, on epoetin alpha. For transfusions today.     PLAN:    HD tomorrow and 3 times a week MWF   C/W epoetin alpha 3000 units 3 times a week  Continue current antihypertensive regimen   Transfuse 2 U PRBCs today  Continue to monitor H&H       Electronically signed by TOMMY Swanson CNP on 10/20/2020 at 11:29 AM

## 2020-10-21 VITALS
OXYGEN SATURATION: 95 % | BODY MASS INDEX: 20.28 KG/M2 | TEMPERATURE: 97.6 F | DIASTOLIC BLOOD PRESSURE: 58 MMHG | RESPIRATION RATE: 18 BRPM | SYSTOLIC BLOOD PRESSURE: 114 MMHG | HEART RATE: 84 BPM | HEIGHT: 68 IN | WEIGHT: 133.82 LBS

## 2020-10-21 LAB
ANION GAP SERPL CALCULATED.3IONS-SCNC: 13 MMOL/L (ref 7–16)
BASOPHILS ABSOLUTE: 0.04 E9/L (ref 0–0.2)
BASOPHILS RELATIVE PERCENT: 0.6 % (ref 0–2)
BUN BLDV-MCNC: 48 MG/DL (ref 8–23)
CALCIUM SERPL-MCNC: 8.8 MG/DL (ref 8.6–10.2)
CHLORIDE BLD-SCNC: 103 MMOL/L (ref 98–107)
CO2: 26 MMOL/L (ref 22–29)
CREAT SERPL-MCNC: 5.4 MG/DL (ref 0.7–1.2)
EOSINOPHILS ABSOLUTE: 0.28 E9/L (ref 0.05–0.5)
EOSINOPHILS RELATIVE PERCENT: 3.9 % (ref 0–6)
GFR AFRICAN AMERICAN: 13
GFR NON-AFRICAN AMERICAN: 10 ML/MIN/1.73
GLUCOSE BLD-MCNC: 97 MG/DL (ref 74–99)
HCT VFR BLD CALC: 25.3 % (ref 37–54)
HEMOGLOBIN: 8.1 G/DL (ref 12.5–16.5)
IMMATURE GRANULOCYTES #: 0.04 E9/L
IMMATURE GRANULOCYTES %: 0.6 % (ref 0–5)
LYMPHOCYTES ABSOLUTE: 0.58 E9/L (ref 1.5–4)
LYMPHOCYTES RELATIVE PERCENT: 8.2 % (ref 20–42)
MAGNESIUM: 1.8 MG/DL (ref 1.6–2.6)
MCH RBC QN AUTO: 31.4 PG (ref 26–35)
MCHC RBC AUTO-ENTMCNC: 32 % (ref 32–34.5)
MCV RBC AUTO: 98.1 FL (ref 80–99.9)
MONOCYTES ABSOLUTE: 0.66 E9/L (ref 0.1–0.95)
MONOCYTES RELATIVE PERCENT: 9.3 % (ref 2–12)
NEUTROPHILS ABSOLUTE: 5.5 E9/L (ref 1.8–7.3)
NEUTROPHILS RELATIVE PERCENT: 77.4 % (ref 43–80)
PDW BLD-RTO: 17.4 FL (ref 11.5–15)
PLATELET # BLD: 145 E9/L (ref 130–450)
PMV BLD AUTO: 10.6 FL (ref 7–12)
POTASSIUM SERPL-SCNC: 4.2 MMOL/L (ref 3.5–5)
RBC # BLD: 2.58 E12/L (ref 3.8–5.8)
SODIUM BLD-SCNC: 142 MMOL/L (ref 132–146)
WBC # BLD: 7.1 E9/L (ref 4.5–11.5)

## 2020-10-21 PROCEDURE — 2580000003 HC RX 258: Performed by: PHYSICIAN ASSISTANT

## 2020-10-21 PROCEDURE — 36592 COLLECT BLOOD FROM PICC: CPT

## 2020-10-21 PROCEDURE — 6360000002 HC RX W HCPCS: Performed by: INTERNAL MEDICINE

## 2020-10-21 PROCEDURE — 85025 COMPLETE CBC W/AUTO DIFF WBC: CPT

## 2020-10-21 PROCEDURE — 90935 HEMODIALYSIS ONE EVALUATION: CPT | Performed by: INTERNAL MEDICINE

## 2020-10-21 PROCEDURE — 36415 COLL VENOUS BLD VENIPUNCTURE: CPT

## 2020-10-21 PROCEDURE — 6360000002 HC RX W HCPCS: Performed by: PHYSICIAN ASSISTANT

## 2020-10-21 PROCEDURE — 83735 ASSAY OF MAGNESIUM: CPT

## 2020-10-21 PROCEDURE — 6370000000 HC RX 637 (ALT 250 FOR IP): Performed by: PHYSICIAN ASSISTANT

## 2020-10-21 PROCEDURE — 80048 BASIC METABOLIC PNL TOTAL CA: CPT

## 2020-10-21 RX ORDER — QUETIAPINE FUMARATE 25 MG/1
12.5 TABLET, FILM COATED ORAL EVERY 6 HOURS PRN
Qty: 60 TABLET | Refills: 3 | Status: ON HOLD | DISCHARGE
Start: 2020-10-21 | End: 2022-10-16 | Stop reason: HOSPADM

## 2020-10-21 RX ADMIN — ACETAMINOPHEN 1000 MG: 500 TABLET ORAL at 06:26

## 2020-10-21 RX ADMIN — EPOETIN ALFA-EPBX 3000 UNITS: 3000 INJECTION, SOLUTION INTRAVENOUS; SUBCUTANEOUS at 08:59

## 2020-10-21 RX ADMIN — SODIUM CHLORIDE, PRESERVATIVE FREE 100 UNITS: 5 INJECTION INTRAVENOUS at 09:00

## 2020-10-21 RX ADMIN — OXYCODONE AND ACETAMINOPHEN 1 TABLET: 5; 325 TABLET ORAL at 09:01

## 2020-10-21 RX ADMIN — HEPARIN SODIUM 5000 UNITS: 10000 INJECTION INTRAVENOUS; SUBCUTANEOUS at 17:54

## 2020-10-21 RX ADMIN — ACYCLOVIR SODIUM 260 MG: 50 INJECTION, SOLUTION INTRAVENOUS at 17:47

## 2020-10-21 RX ADMIN — METOPROLOL TARTRATE 50 MG: 50 TABLET, FILM COATED ORAL at 08:59

## 2020-10-21 RX ADMIN — HYDRALAZINE HYDROCHLORIDE 50 MG: 50 TABLET, FILM COATED ORAL at 17:48

## 2020-10-21 RX ADMIN — PANTOPRAZOLE SODIUM 40 MG: 40 TABLET, DELAYED RELEASE ORAL at 06:26

## 2020-10-21 RX ADMIN — SUCRALFATE 1 G: 1 TABLET ORAL at 17:47

## 2020-10-21 RX ADMIN — HYDRALAZINE HYDROCHLORIDE 50 MG: 50 TABLET, FILM COATED ORAL at 08:59

## 2020-10-21 RX ADMIN — ACETAMINOPHEN 1000 MG: 500 TABLET ORAL at 17:47

## 2020-10-21 RX ADMIN — HEPARIN SODIUM 5000 UNITS: 10000 INJECTION INTRAVENOUS; SUBCUTANEOUS at 06:26

## 2020-10-21 RX ADMIN — SUCRALFATE 1 G: 1 TABLET ORAL at 08:59

## 2020-10-21 ASSESSMENT — PAIN SCALES - GENERAL
PAINLEVEL_OUTOF10: 0
PAINLEVEL_OUTOF10: 5
PAINLEVEL_OUTOF10: 0
PAINLEVEL_OUTOF10: 4

## 2020-10-21 ASSESSMENT — PAIN SCALES - WONG BAKER: WONGBAKER_NUMERICALRESPONSE: 0

## 2020-10-21 NOTE — FLOWSHEET NOTE
10/21/20 1735   Vital Signs   BP (!) 129/48   Temp 97.5 °F (36.4 °C)   Pulse 83   Resp 18   Weight 133 lb 13.1 oz (60.7 kg)   Weight Method Bed scale   Percent Weight Change -0.82   Pain Assessment   Pain Assessment 0-10   Pain Level 0   Post-Hemodialysis Assessment   Post-Treatment Procedures Blood returned; Access bleeding time < 10 minutes   Machine Disinfection Process Exterior Machine Disinfection   Rinseback Volume (ml) 300 ml   Total Liters Processed (l/min) 80.6 l/min   Dialyzer Clearance Lightly streaked   Duration of Treatment (minutes) 196 minutes   Heparin amount administered during treatment (units) 0 units   Hemodialysis Intake (ml) 300 ml   Hemodialysis Output (ml) 2300 ml   NET Removed (ml) 2000 ml   Tolerated Treatment Good   Patient Response to Treatment tolerated well, profile B, minimal refill, 2L fluid removal   Bilateral Breath Sounds Diminished;Clear   Edema None   Physician Notified?  No

## 2020-10-21 NOTE — DISCHARGE SUMMARY
Physician Discharge Summary     Patient ID:  Julissa Zamora  [id number]  67 y.o.  1945    Admit date: 10/12/2020    Discharge date and time:  10/21/2020    Discharge Diagnoses: Principal Problem:    Herpes zoster encephalitis  Active Problems:    ESRD (end stage renal disease) ([facility] [address]    Acute blood loss anemia    Herpes zoster    Hypertension    Hyperlipidemia    Fracture of femoral neck, left, closed ([facility] [address]    History of left hip hemiarthroplasty    Severe protein-calorie malnutrition POA  Resolved Problems:    * No resolved hospital problems.  *      Consults: IP CONSULT TO INFECTIOUS DISEASES  IP CONSULT TO NEPHROLOGY  IP CONSULT TO PRIMARY CARE PROVIDER  IP CONSULT TO ORTHOPEDIC SURGERY    Procedures: See below    Hospital Course: 58-year-old male history of end-stage renal disease who developed herpes zoster was started on valacyclovir transferred to [address] [place] [address] from [facility] with VZV encephalitis SP Left hip hemiarthroplasty for femoral neck fracture 10/16    Status post LP revealing Elevated white cells with monocyte predominance, Elevated protein, Positive CSF PCR for herpes zoster  Acyclovir IV--renally adjusted--dose after dialysis--through 10/28  MRI brain with small left frontal lesion on FLAIR  Repeat MRI w contrast negative  Pain controlled  Worsened anemia-transfused 2U PRBC--combination of acute blood loss anemia secondary to surgery and anemia of chronic kidney disease  Reviewed ferritin, TIBC, B12 folate, fecal occult, LDH, haptoglobin  ID Consult appreciated discussed case  Nephrology Consult appreciated discussed case  Neurology Consult appreciated discussed case  Orthopedic Consult appreciated for ORIF  Medications for other co morbidities cont as appropriate w dosage adjustments as necessary   PT/OT  DVT PPx  DC planning  SNF     Discharge Exam:  See progress note from today    Condition:  Stable    Disposition: home    Patient Instructions:   Current Discharge Medication List START taking these medications    Details   QUEtiapine (SEROQUEL) 25 MG tablet Take 0.5 tablets by mouth every 6 hours as needed for Agitation or Other (anxiety)  Qty: 60 tablet, Refills: 3      acyclovir (ZOVIRAX) infusion Infuse 253 mg intravenously daily for 8 days Compound per protocol      oxyCODONE-acetaminophen (PERCOCET) 5-325 MG per tablet Take 1 tablet by mouth every 6 hours as needed for Pain for up to 7 days.   Qty: 28 tablet, Refills: 0    Comments: Reduce doses taken as pain becomes manageable  Associated Diagnoses: History of left hip hemiarthroplasty      Heparin Sodium, Porcine, (HEPARIN, PORCINE,) 83445 UNIT/ML injection Inject 0.5 mLs into the skin every 12 hours for 28 days  Qty: 28 mL, Refills: 0    Associated Diagnoses: History of left hip hemiarthroplasty         CONTINUE these medications which have NOT CHANGED    Details   doxazosin (CARDURA) 4 MG tablet Take 1 tablet by mouth nightly      sucralfate (CARAFATE) 1 GM tablet Take 1 tablet by mouth 4 times daily  Qty: 120 tablet, Refills: 3      pantoprazole (PROTONIX) 40 MG tablet Take 1 tablet by mouth every morning (before breakfast)  Qty: 30 tablet, Refills: 3      Cholecalciferol (VITAMIN D) 50 MCG (2000 UT) CAPS capsule Take by mouth      hydrALAZINE (APRESOLINE) 50 MG tablet Take 50 mg by mouth 3 times daily      rosuvastatin (CRESTOR) 10 MG tablet Take 5 mg by mouth nightly      Coenzyme Q10 (Q-10 CO-ENZYME PO) Take 100 mg by mouth Daily      metoprolol tartrate (LOPRESSOR) 50 MG tablet Take 50 mg by mouth 2 times daily         STOP taking these medications       bacitracin 500 UNIT/GM ointment Comments:   Reason for Stopping:         potassium & sodium phosphates (PHOS-NAK) 280-160-250 MG PACK Comments:   Reason for Stopping:             Activity: activity as tolerated  Diet: regular diet    Follow-up with 1 wk PCP 2 weeks orthopedics 1 month neurology    Note that over 30 minutes was spent in preparing discharge papers, discussing discharge with patient, staff, consultants, medication review, arranging follow up, etc.    Signed:  Judie Medrano MD  10/21/2020  3:02 PM

## 2020-10-21 NOTE — PROGRESS NOTES
Department of Internal Medicine  Nephrology Progress Note    Events reviewed. I have seen and examined him on dialysis at 1.30 pm  Discussed with the dialysis nurses    SUBJECTIVE: We are following Mr. Porsha Khalil for ESRD on hemodialysis. Wife at bedside states that she is concerned that he is more altered today. He answers questions appropriately and follows all commands for me. Noted that he was given percocet this am.    PHYSICAL EXAM:      Vitals:    VITALS:  /60   Pulse 95   Temp 97.7 °F (36.5 °C) (Temporal)   Resp 18   Ht 5' 8\" (1.727 m)   Wt 110 lb 7.2 oz (50.1 kg)   SpO2 95%   BMI 16.79 kg/m²   24HR INTAKE/OUTPUT:      Intake/Output Summary (Last 24 hours) at 10/21/2020 0833  Last data filed at 10/21/2020 0655  Gross per 24 hour   Intake 1830.67 ml   Output 0 ml   Net 1830.67 ml       Access:  AVF left arm  Constitutional:  In no distress. HEENT: AT/NC, positive for hearing aids. NECK: supple  Respiratory:  CTAB  Cardiovascular/Edema:  RRR, S1/S2, no edema. Gastrointestinal:  Soft, +BS, nontender. Musculoskeletal:  L hip with dressing s/p sammy arthroplasty  Neurologic:  AAOx3  Other:  Skin tear and bruising on left face above the eyebrow from fall.     Scheduled Meds:   polyethylene glycol  17 g Oral Once    senna  2 tablet Oral Nightly    epoetin delmer-epbx  3,000 Units Subcutaneous Once per day on Mon Wed Fri    lidocaine  5 mL Intradermal Once    heparin flush  1 mL Intravenous 2 times per day    acetaminophen  1,000 mg Oral 3 times per day    sodium chloride flush  10 mL Intravenous 2 times per day    acyclovir  5 mg/kg Intravenous Q24H    doxazosin  4 mg Oral Nightly    heparin (porcine)  5,000 Units Subcutaneous Q12H    hydrALAZINE  50 mg Oral TID    metoprolol tartrate  50 mg Oral BID    pantoprazole  40 mg Oral QAM AC    rosuvastatin  5 mg Oral Nightly    sucralfate  1 g Oral 4x Daily     Continuous Infusions:   dextrose 5 % and 0.9 % NaCl 40 mL/hr at 10/16/20 8888 PRN Meds:. QUEtiapine, sodium chloride flush, heparin flush, oxyCODONE-acetaminophen **OR** oxyCODONE-acetaminophen, sodium chloride flush, polyethylene glycol, promethazine **OR** ondansetron, hydrALAZINE, sodium chloride flush    DATA:    CBC:   Lab Results   Component Value Date    WBC 7.1 10/21/2020    RBC 2.58 10/21/2020    HGB 8.1 10/21/2020    HCT 25.3 10/21/2020    MCV 98.1 10/21/2020    MCH 31.4 10/21/2020    MCHC 32.0 10/21/2020    RDW 17.4 10/21/2020     10/21/2020    MPV 10.6 10/21/2020     CMP:    Lab Results   Component Value Date     10/21/2020    K 4.2 10/21/2020    K 4.3 10/15/2020     10/21/2020    CO2 26 10/21/2020    BUN 48 10/21/2020    CREATININE 5.4 10/21/2020    GFRAA 13 10/21/2020    LABGLOM 10 10/21/2020    GLUCOSE 97 10/21/2020    PROT 6.1 10/13/2020    LABALBU 3.7 10/13/2020    CALCIUM 8.8 10/21/2020    BILITOT 1.0 10/13/2020    ALKPHOS 91 10/13/2020    AST 41 10/13/2020    ALT 21 10/13/2020     Magnesium:    Lab Results   Component Value Date    MG 1.8 10/21/2020     Phosphorus:    Lab Results   Component Value Date    PHOS 3.7 10/18/2020       Radiology Review:      CXR- 10/11/2020   No evidence of acute process.           CT head w/o contrast - 10/11/2020   No acute intracranial abnormality.         Chronic and age related changes including age-appropriate cerebral volume    loss and scattered nonspecific white matter hypodensities which are likely    the sequela of chronic small vessel ischemic disease.                   MRI brain w/o contrast- 10/12/2020    Nonspecific left frontal subcortical white matter small focal FLAIR    hyperintensity with susceptibility artifact.  Further evaluation with    contrast enhanced MR imaging recommended.         No restricted diffusion or edema within the bilateral medial temporal lobes.         Moderate to severe chronic microvascular ischemic changes.                       BRIEF SUMMARY OF INITIAL CONSULT:    Briefly Cherylene Lips [address] [facility] is 67 y.o. male with history of ESRD on HD MWF via left upper arm AVF (last HD yesterday at [facility]), HTN, HFpEF, and recently admitted to [facility] in July for COVID-19 infection and experienced a 30 lb weight loss. Patient was admitted on 10/12/2020 from home with chief complaint of AMS and visual and auditory hallucinations. Patient was recently diagnosed with shingles on Friday (10/9/2020) after seeing his PCP with complaint of a painful rash on his right forehead for 1 day duration. He was sent home with valacyclovir (renally dosed-once daily, but wife doesn't remember the dose) and Norco. After 2 days of taking the medication, the patient became altered and stopped the medications. The patient went to [address] [place] [address] on [place] 10/11/2020 because of continued hallucinations with thoughts of medication side effect vs herpes zoster encephalitis. Patient experienced a fall at [facility]. He was then transferred to [facility] for further management and treatment. Patient seen by nephrology on 10/13/2020 for dialysis needs and he feels better today. Per the wife, his mentation has improved. LP performed on 10/12/2020 that was PCR positive for herpes zoster, elevated protein, elevated WBC (monocyte predominance) and IV acyclovir was started. 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 Problems resolved:    Elevated troponin likely 2/2 ESRD. Troponin 0.11      IMPRESSION/RECOMMENDATIONS:      ESRD:  Hemodialysis 3 times per week- MWF via AVF left arm. 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 On Acyclovir 260 mg IV  (5mg/kg) Q24 hrs after dialysis. HFpEF, proBNP 52,431, clinically euvolemic, chest x-ray without infiltrates.   HTN: on hydralazine, doxazosin and lopressor  Left hip fracture, status post fall, s/p hemiarthroplasty October 16, 2020  BPH: on doxazosin  MBD of CKD, holding binders  Thrombocytopenia: likely 2/2 infection vs drug-induced. Normocytic anemia: 2/2 ESRD. Drop in H&H after surgery, on epoetin alpha. S/p transfusion yesterday with appropriate rise in creatinine.     PLAN:    HD today and 3 times a week MWF   C/W epoetin alpha 3000 units 3 times a week  Continue current antihypertensive regimen   Discharge planning     Discussed with Dr. Elijah Sanchez MD

## 2020-10-21 NOTE — PROGRESS NOTES
Subjective:  Feeling better pain controlled  No CP or SOB  No fever or chills   No uncontrolled pain  No vomiting or diarrhea   Family at bedside all questions answered --concern patient is more confused today  Objective:    /60   Pulse 95   Temp 97.7 °F (36.5 °C) (Temporal)   Resp 18   Ht 5' 8\" (1.727 m)   Wt 110 lb 7.2 oz (50.1 kg)   SpO2 95%   BMI 16.79 kg/m²     24HR INTAKE/OUTPUT:      Intake/Output Summary (Last 24 hours) at 10/21/2020 0931  Last data filed at 10/21/2020 3877  Gross per 24 hour   Intake 1830.67 ml   Output 0 ml   Net 1830.67 ml       General appearance: NAD, less conversant  Neck: FROM, supple   Lungs: Clear bilaterally no wheezes, no rhonchi, no crackles  CV: RRR, no MRGs; normal carotid upstroke and amplitude without Bruits  Abdomen: Soft, non-tender; no masses or HSM  Extremities: Wound CDI no edema, no cyanosis, no clubbing  Skin: Intact no rash, no lesions, no ulcers    Psych: Alert and oriented x2 normal affect  Neuro: Nonfocal    Most Recent Labs  Lab Results   Component Value Date    WBC 7.1 10/21/2020    HGB 8.1 (L) 10/21/2020    HCT 25.3 (L) 10/21/2020     10/21/2020     10/21/2020    K 4.2 10/21/2020     10/21/2020    CREATININE 5.4 (H) 10/21/2020    BUN 48 (H) 10/21/2020    CO2 26 10/21/2020    GLUCOSE 97 10/21/2020    ALT 21 10/13/2020    AST 41 (H) 10/13/2020    INR 1.0 10/14/2020     Recent Labs     10/21/20  0633   MG 1.8     Lab Results   Component Value Date    CALCIUM 8.8 10/21/2020    PHOS 3.7 10/18/2020        XR HIP 2-3 VW W PELVIS LEFT   Final Result   Expected postoperative changes from recent total left hip arthroplasty. MRI BRAIN W WO CONTRAST   Final Result   No intracranial pathologic enhancement. XR HIP BILATERAL W AP PELVIS (2 VIEWS)   Final Result   1. Left femoral neck fracture. 2. Questionable fractures of the right superior pubic ramus.       RECOMMENDATION:   CT of the pelvis may be obtained for further

## 2020-10-21 NOTE — CARE COORDINATION
10/21/2020 social work transition of care planning  Sw notified that Lawernce Chess has been obtained, will await  Discharge order. Pt will need Hd before he leaves today. Susana will notify [facility] to plan for pt Friday [phone] (chair time 5am). Susana will follow.   Electronically signed by NOE Blackburn on 10/21/2020 at 9:54 AM

## 2020-10-21 NOTE — PROGRESS NOTES
Per Dr. Josselyn Mendez, patient is to be given acyclovir daily. Patient is to on day patient has dialysis patient is to receive the acyclovir after dialysis.

## 2020-10-21 NOTE — CARE COORDINATION
10/21/2020 social work transition of care planning  Sw set pt up for 7:30 pm transport to [facility]. Sw notified nurse,pt's spouse and facility liaison.   Electronically signed by NOE Carl on 10/21/2020 at 1:23 PM

## 2020-10-27 ENCOUNTER — APPOINTMENT (OUTPATIENT)
Dept: CT IMAGING | Age: 75
DRG: 377 | End: 2020-10-27
Payer: MEDICARE

## 2020-10-27 ENCOUNTER — HOSPITAL ENCOUNTER (INPATIENT)
Age: 75
LOS: 7 days | Discharge: NURSING, REHAB OR LONG-TERM CARE | DRG: 377 | End: 2020-11-03
Attending: EMERGENCY MEDICINE | Admitting: INTERNAL MEDICINE
Payer: MEDICARE

## 2020-10-27 PROBLEM — K92.2 GI BLEEDING: Status: ACTIVE | Noted: 2020-10-27

## 2020-10-27 LAB
ABO/RH: NORMAL
ALBUMIN SERPL-MCNC: 2.5 G/DL (ref 3.5–5.2)
ALP BLD-CCNC: 87 U/L (ref 40–129)
ALT SERPL-CCNC: <5 U/L (ref 0–40)
ANION GAP SERPL CALCULATED.3IONS-SCNC: 11 MMOL/L (ref 7–16)
ANTIBODY SCREEN: NORMAL
AST SERPL-CCNC: 16 U/L (ref 0–39)
BASOPHILS ABSOLUTE: 0.07 E9/L (ref 0–0.2)
BASOPHILS RELATIVE PERCENT: 0.6 % (ref 0–2)
BILIRUB SERPL-MCNC: 0.6 MG/DL (ref 0–1.2)
BILIRUBIN DIRECT: 0.2 MG/DL (ref 0–0.3)
BILIRUBIN, INDIRECT: 0.4 MG/DL (ref 0–1)
BUN BLDV-MCNC: 38 MG/DL (ref 8–23)
CALCIUM SERPL-MCNC: 9 MG/DL (ref 8.6–10.2)
CHLORIDE BLD-SCNC: 100 MMOL/L (ref 98–107)
CO2: 26 MMOL/L (ref 22–29)
CREAT SERPL-MCNC: 4.3 MG/DL (ref 0.7–1.2)
EOSINOPHILS ABSOLUTE: 0.3 E9/L (ref 0.05–0.5)
EOSINOPHILS RELATIVE PERCENT: 2.6 % (ref 0–6)
GFR AFRICAN AMERICAN: 16
GFR NON-AFRICAN AMERICAN: 13 ML/MIN/1.73
GLUCOSE BLD-MCNC: 171 MG/DL (ref 74–99)
HCT VFR BLD CALC: 21 % (ref 37–54)
HCT VFR BLD CALC: 26.1 % (ref 37–54)
HEMOGLOBIN: 6.4 G/DL (ref 12.5–16.5)
HEMOGLOBIN: 8.4 G/DL (ref 12.5–16.5)
IMMATURE GRANULOCYTES #: 0.08 E9/L
IMMATURE GRANULOCYTES %: 0.7 % (ref 0–5)
INR BLD: 1.1
LIPASE: 22 U/L (ref 13–60)
LYMPHOCYTES ABSOLUTE: 1.44 E9/L (ref 1.5–4)
LYMPHOCYTES RELATIVE PERCENT: 12.5 % (ref 20–42)
MCH RBC QN AUTO: 31.7 PG (ref 26–35)
MCHC RBC AUTO-ENTMCNC: 30.5 % (ref 32–34.5)
MCV RBC AUTO: 104 FL (ref 80–99.9)
MONOCYTES ABSOLUTE: 0.8 E9/L (ref 0.1–0.95)
MONOCYTES RELATIVE PERCENT: 7 % (ref 2–12)
NEUTROPHILS ABSOLUTE: 8.79 E9/L (ref 1.8–7.3)
NEUTROPHILS RELATIVE PERCENT: 76.6 % (ref 43–80)
PDW BLD-RTO: 16.6 FL (ref 11.5–15)
PLATELET # BLD: 242 E9/L (ref 130–450)
PMV BLD AUTO: 10.8 FL (ref 7–12)
POTASSIUM REFLEX MAGNESIUM: 4.1 MMOL/L (ref 3.5–5)
PROTHROMBIN TIME: 12.1 SEC (ref 9.3–12.4)
RBC # BLD: 2.02 E12/L (ref 3.8–5.8)
SODIUM BLD-SCNC: 137 MMOL/L (ref 132–146)
TOTAL PROTEIN: 5 G/DL (ref 6.4–8.3)
WBC # BLD: 11.5 E9/L (ref 4.5–11.5)

## 2020-10-27 PROCEDURE — 6360000002 HC RX W HCPCS: Performed by: EMERGENCY MEDICINE

## 2020-10-27 PROCEDURE — 2060000000 HC ICU INTERMEDIATE R&B

## 2020-10-27 PROCEDURE — 85018 HEMOGLOBIN: CPT

## 2020-10-27 PROCEDURE — 2580000003 HC RX 258: Performed by: INTERNAL MEDICINE

## 2020-10-27 PROCEDURE — P9016 RBC LEUKOCYTES REDUCED: HCPCS

## 2020-10-27 PROCEDURE — 6370000000 HC RX 637 (ALT 250 FOR IP): Performed by: INTERNAL MEDICINE

## 2020-10-27 PROCEDURE — 6360000004 HC RX CONTRAST MEDICATION: Performed by: RADIOLOGY

## 2020-10-27 PROCEDURE — 99285 EMERGENCY DEPT VISIT HI MDM: CPT

## 2020-10-27 PROCEDURE — 85025 COMPLETE CBC W/AUTO DIFF WBC: CPT

## 2020-10-27 PROCEDURE — 83690 ASSAY OF LIPASE: CPT

## 2020-10-27 PROCEDURE — 80076 HEPATIC FUNCTION PANEL: CPT

## 2020-10-27 PROCEDURE — 96374 THER/PROPH/DIAG INJ IV PUSH: CPT

## 2020-10-27 PROCEDURE — 86850 RBC ANTIBODY SCREEN: CPT

## 2020-10-27 PROCEDURE — 2700000000 HC OXYGEN THERAPY PER DAY

## 2020-10-27 PROCEDURE — 80048 BASIC METABOLIC PNL TOTAL CA: CPT

## 2020-10-27 PROCEDURE — 85610 PROTHROMBIN TIME: CPT

## 2020-10-27 PROCEDURE — 74177 CT ABD & PELVIS W/CONTRAST: CPT

## 2020-10-27 PROCEDURE — 6360000002 HC RX W HCPCS: Performed by: INTERNAL MEDICINE

## 2020-10-27 PROCEDURE — 2580000003 HC RX 258: Performed by: RADIOLOGY

## 2020-10-27 PROCEDURE — C9113 INJ PANTOPRAZOLE SODIUM, VIA: HCPCS | Performed by: INTERNAL MEDICINE

## 2020-10-27 PROCEDURE — 86901 BLOOD TYPING SEROLOGIC RH(D): CPT

## 2020-10-27 PROCEDURE — P9040 RBC LEUKOREDUCED IRRADIATED: HCPCS

## 2020-10-27 PROCEDURE — 85014 HEMATOCRIT: CPT

## 2020-10-27 PROCEDURE — 86900 BLOOD TYPING SEROLOGIC ABO: CPT

## 2020-10-27 PROCEDURE — 36415 COLL VENOUS BLD VENIPUNCTURE: CPT

## 2020-10-27 PROCEDURE — 86923 COMPATIBILITY TEST ELECTRIC: CPT

## 2020-10-27 PROCEDURE — 36430 TRANSFUSION BLD/BLD COMPNT: CPT

## 2020-10-27 RX ORDER — ACETAMINOPHEN 325 MG/1
650 TABLET ORAL EVERY 6 HOURS PRN
Status: DISCONTINUED | OUTPATIENT
Start: 2020-10-27 | End: 2020-11-03 | Stop reason: HOSPADM

## 2020-10-27 RX ORDER — METOPROLOL TARTRATE 50 MG/1
50 TABLET, FILM COATED ORAL 2 TIMES DAILY
Status: DISCONTINUED | OUTPATIENT
Start: 2020-10-27 | End: 2020-11-03 | Stop reason: HOSPADM

## 2020-10-27 RX ORDER — QUETIAPINE FUMARATE 25 MG/1
12.5 TABLET, FILM COATED ORAL EVERY 6 HOURS PRN
Status: DISCONTINUED | OUTPATIENT
Start: 2020-10-27 | End: 2020-11-03 | Stop reason: HOSPADM

## 2020-10-27 RX ORDER — MORPHINE SULFATE 4 MG/ML
4 INJECTION, SOLUTION INTRAMUSCULAR; INTRAVENOUS ONCE
Status: COMPLETED | OUTPATIENT
Start: 2020-10-27 | End: 2020-10-27

## 2020-10-27 RX ORDER — PETROLATUM 42 G/100G
OINTMENT TOPICAL PRN
Status: DISCONTINUED | OUTPATIENT
Start: 2020-10-27 | End: 2020-10-29

## 2020-10-27 RX ORDER — ROSUVASTATIN CALCIUM 5 MG/1
5 TABLET, COATED ORAL NIGHTLY
Status: DISCONTINUED | OUTPATIENT
Start: 2020-10-27 | End: 2020-11-03 | Stop reason: HOSPADM

## 2020-10-27 RX ORDER — SODIUM CHLORIDE 9 MG/ML
INJECTION, SOLUTION INTRAVENOUS CONTINUOUS
Status: DISCONTINUED | OUTPATIENT
Start: 2020-10-27 | End: 2020-10-29

## 2020-10-27 RX ORDER — PETROLATUM 42 G/100G
OINTMENT TOPICAL 2 TIMES DAILY
Status: DISCONTINUED | OUTPATIENT
Start: 2020-10-27 | End: 2020-10-29

## 2020-10-27 RX ORDER — PROMETHAZINE HYDROCHLORIDE 25 MG/1
12.5 TABLET ORAL EVERY 6 HOURS PRN
Status: DISCONTINUED | OUTPATIENT
Start: 2020-10-27 | End: 2020-11-03 | Stop reason: HOSPADM

## 2020-10-27 RX ORDER — DOXAZOSIN MESYLATE 4 MG/1
4 TABLET ORAL NIGHTLY
Status: DISCONTINUED | OUTPATIENT
Start: 2020-10-27 | End: 2020-11-03 | Stop reason: HOSPADM

## 2020-10-27 RX ORDER — SODIUM CHLORIDE 0.9 % (FLUSH) 0.9 %
10 SYRINGE (ML) INJECTION ONCE
Status: COMPLETED | OUTPATIENT
Start: 2020-10-27 | End: 2020-10-27

## 2020-10-27 RX ORDER — 0.9 % SODIUM CHLORIDE 0.9 %
250 INTRAVENOUS SOLUTION INTRAVENOUS ONCE
Status: DISCONTINUED | OUTPATIENT
Start: 2020-10-27 | End: 2020-11-03 | Stop reason: HOSPADM

## 2020-10-27 RX ORDER — SODIUM CHLORIDE 0.9 % (FLUSH) 0.9 %
10 SYRINGE (ML) INJECTION PRN
Status: DISCONTINUED | OUTPATIENT
Start: 2020-10-27 | End: 2020-11-03 | Stop reason: HOSPADM

## 2020-10-27 RX ORDER — SUCRALFATE 1 G/1
1 TABLET ORAL 4 TIMES DAILY
Status: DISCONTINUED | OUTPATIENT
Start: 2020-10-27 | End: 2020-11-03 | Stop reason: HOSPADM

## 2020-10-27 RX ORDER — SODIUM CHLORIDE 9 MG/ML
10 INJECTION INTRAVENOUS EVERY 12 HOURS
Status: DISCONTINUED | OUTPATIENT
Start: 2020-10-27 | End: 2020-10-31

## 2020-10-27 RX ORDER — ACETAMINOPHEN 650 MG/1
650 SUPPOSITORY RECTAL EVERY 6 HOURS PRN
Status: DISCONTINUED | OUTPATIENT
Start: 2020-10-27 | End: 2020-11-03 | Stop reason: HOSPADM

## 2020-10-27 RX ORDER — SODIUM CHLORIDE 0.9 % (FLUSH) 0.9 %
10 SYRINGE (ML) INJECTION EVERY 12 HOURS SCHEDULED
Status: DISCONTINUED | OUTPATIENT
Start: 2020-10-27 | End: 2020-11-03 | Stop reason: HOSPADM

## 2020-10-27 RX ORDER — PANTOPRAZOLE SODIUM 40 MG/10ML
40 INJECTION, POWDER, LYOPHILIZED, FOR SOLUTION INTRAVENOUS EVERY 12 HOURS
Status: DISCONTINUED | OUTPATIENT
Start: 2020-10-27 | End: 2020-10-31

## 2020-10-27 RX ORDER — ONDANSETRON 2 MG/ML
4 INJECTION INTRAMUSCULAR; INTRAVENOUS EVERY 6 HOURS PRN
Status: DISCONTINUED | OUTPATIENT
Start: 2020-10-27 | End: 2020-11-03 | Stop reason: HOSPADM

## 2020-10-27 RX ADMIN — SUCRALFATE 1 G: 1 TABLET ORAL at 21:08

## 2020-10-27 RX ADMIN — Medication 10 ML: at 17:22

## 2020-10-27 RX ADMIN — IOPAMIDOL 90 ML: 755 INJECTION, SOLUTION INTRAVENOUS at 13:05

## 2020-10-27 RX ADMIN — PANTOPRAZOLE SODIUM 40 MG: 40 INJECTION, POWDER, LYOPHILIZED, FOR SOLUTION INTRAVENOUS at 17:21

## 2020-10-27 RX ADMIN — PETROLATUM: 42 OINTMENT TOPICAL at 21:07

## 2020-10-27 RX ADMIN — SODIUM CHLORIDE, PRESERVATIVE FREE 10 ML: 5 INJECTION INTRAVENOUS at 17:21

## 2020-10-27 RX ADMIN — MORPHINE SULFATE 4 MG: 4 INJECTION, SOLUTION INTRAMUSCULAR; INTRAVENOUS at 13:54

## 2020-10-27 RX ADMIN — Medication 10 ML: at 13:05

## 2020-10-27 RX ADMIN — ROSUVASTATIN CALCIUM 5 MG: 5 TABLET, FILM COATED ORAL at 21:08

## 2020-10-27 RX ADMIN — SUCRALFATE 1 G: 1 TABLET ORAL at 17:20

## 2020-10-27 RX ADMIN — SODIUM CHLORIDE: 9 INJECTION, SOLUTION INTRAVENOUS at 21:06

## 2020-10-27 RX ADMIN — METOPROLOL TARTRATE 50 MG: 50 TABLET, FILM COATED ORAL at 21:08

## 2020-10-27 RX ADMIN — DOXAZOSIN 4 MG: 4 TABLET ORAL at 21:08

## 2020-10-27 ASSESSMENT — PAIN SCALES - GENERAL
PAINLEVEL_OUTOF10: 6
PAINLEVEL_OUTOF10: 6

## 2020-10-27 NOTE — CONSULTS
GENERAL SURGERY  CONSULT NOTE  10/27/2020    Physician Consulted: Dr. Pedro Serrano  Reason for Consult: GI Bleed  Referring Physician: Dr. Chino Fernandez    HPI  Gustavo Wright is a 67 y.o. male with history of aorto-femoral bypass and ESRD on dialysis MWF who presents for evaluation of GI bleed. Of note, he was found to be COVID+ in July 2020 and was hospitalized for it, but has since recovered. During that admission he had an EGD for melena, which revealed an esophageal ulceration with friable tissue, biopsy showed chronic gastritis. He was admitted today for melena and was found to have a hemoglobin of 6.4. He states that he has been having abdominal pain along the lower abdomen described as dull and achy without radiation. He says the pain is associated with nausea, no vomiting, and \"black thick tarry stools\" that began last night. He reports some weakness and lethargy. He has already received 1 unit PRBC and is scheduled for another. His last colonoscopy was in December 2019 which revealed tubular adenomas. Other than the aortofemoral bypass, he denies any other abdominal surgeries. He denies any use of anticoagulation.       Past Medical History:   Diagnosis Date    Blind left eye     Chronic kidney disease     [facility]     [facility]od[facility]     Hyperlipidemia     Hypertension        Past Surgical History:   Procedure Laterality Date    AV FISTULA CREATION Left 2015    Dr. Rachel Cullen Left 2019    2 x Dr. Marvin Galindo, [facility]    HIP SURGERY Left 10/16/2020    HIP HEMIARTHROPLASTY performed by Sofia Salas MD at [address] [facility]  2008    Aortobifemoral bypass for claudicatio - Dr. Majanotine Pouch N/A 7/20/2020    EGD ESOPHAGOGASTRODUODENOSCOPY WITH ANTRAL BIOPSY performed by Samanta Varela MD at [facility] OR       Medications Prior to Admission:    Prior to Admission medications    Medication Sig Start Date End Date Taking? Authorizing Provider   QUEtiapine (SEROQUEL) 25 MG tablet Take 0.5 tablets by mouth every 6 hours as needed for Agitation or Other (anxiety) 10/21/20   Rios Avina MD   acyclovir (ZOVIRAX) infusion Infuse 253 mg intravenously daily for 8 days Compound per protocol 10/19/20 10/27/20  Juan Gallegos MD   Heparin Sodium, Porcine, (HEPARIN, PORCINE,) 32072 UNIT/ML injection Inject 0.5 mLs into the skin every 12 hours for 28 days 10/16/20 11/13/20  Afua Desai PA-C   doxazosin (CARDURA) 4 MG tablet Take 1 tablet by mouth nightly    Historical Provider, MD   sucralfate (CARAFATE) 1 GM tablet Take 1 tablet by mouth 4 times daily 7/29/20   Pranav Barbour MD   pantoprazole (PROTONIX) 40 MG tablet Take 1 tablet by mouth every morning (before breakfast) 7/30/20   Pranav Barbour MD   Cholecalciferol (VITAMIN D) 50 MCG (2000 UT) CAPS capsule Take by mouth    Historical Provider, MD   hydrALAZINE (APRESOLINE) 50 MG tablet Take 50 mg by mouth 3 times daily    Historical Provider, MD   rosuvastatin (CRESTOR) 10 MG tablet Take 5 mg by mouth nightly    Historical Provider, MD   Coenzyme Q10 (Q-10 CO-ENZYME PO) Take 100 mg by mouth Daily    Historical Provider, MD   metoprolol tartrate (LOPRESSOR) 50 MG tablet Take 50 mg by mouth 2 times daily    Historical Provider, MD       No Known Allergies    History reviewed. No pertinent family history.     Social History     Tobacco Use    Smoking status: Former Smoker    Smokeless tobacco: Never Used   Substance Use Topics    Alcohol use: Not Currently     Comment: occasional    Drug use: Never         Review of Systems   General ROS: negative  Hematological and Lymphatic ROS: negative  Respiratory ROS: negative  Cardiovascular ROS: negative  Gastrointestinal ROS: As above  Genito-Urinary ROS: negative  Musculoskeletal ROS: negative      PHYSICAL EXAM:    Vitals:    10/27/20 1722   BP: (!) 110/46 Pulse: 90   Resp: 18   Temp: 98.7 °F (37.1 °C)   SpO2: 97%       General Appearance:  awake, alert, oriented, in no acute distress  Skin:  Skin color, texture, turgor normal. No rashes or lesions. Head/face:  NCAT  Eyes:  No gross abnormalities. Lungs:  No increased work of breathing on room air  Heart:  RR and normotensive  Abdomen: Midline abdominal scar healed. Soft, moderate TTP LUQ and lower abdomen, non-distended  Extremities: Extremities warm to touch  Male Rectal: Black stool on glove, no lesions or hemorrhoids, FOBT+    LABS:    CBC  Recent Labs     10/27/20  1058   WBC 11.5   HGB 6.4*   HCT 21.0*        BMP  Recent Labs     10/27/20  1058      K 4.1      CO2 26   BUN 38*   CREATININE 4.3*   CALCIUM 9.0     Liver Function  Recent Labs     10/27/20  1058   LIPASE 22   BILITOT 0.6   BILIDIR 0.2   AST 16   ALT <5   ALKPHOS 87   PROT 5.0*   LABALBU 2.5*     No results for input(s): LACTATE in the last 72 hours. Recent Labs     10/27/20  1058   INR 1.1       RADIOLOGY    Ct Abdomen Pelvis W Iv Contrast Additional Contrast? None    Result Date: 10/27/2020  EXAMINATION: CT OF THE ABDOMEN AND PELVIS WITH CONTRAST 10/27/2020 1:00 pm TECHNIQUE: CT of the abdomen and pelvis was performed with the administration of intravenous contrast. Multiplanar reformatted images are provided for review. Dose modulation, iterative reconstruction, and/or weight based adjustment of the mA/kV was utilized to reduce the radiation dose to as low as reasonably achievable. COMPARISON: CT of the abdomen and pelvis, July 22, 2020 HISTORY: ORDERING SYSTEM PROVIDED HISTORY: GI bleeding, lower abd pain TECHNOLOGIST PROVIDED HISTORY: Reason for exam:->GI bleeding, lower abd pain Additional Contrast?->None What reading provider will be dictating this exam?->CRC FINDINGS: Lower Chest: The lung bases are clear. The heart is normal in size. The distal thoracic aorta is tortuous and ectatic with prominent atherosclerosis. No for evidence of fran aneurysm or dissection. Small hiatal hernia. Organs: Liver: Small cyst is seen in the left lobe of the liver. Additional hypodensities are too small to characterize but likely represent cysts as well. Gallbladder: Cholelithiasis. Pancreas: Multilocular cystic structure in the uncinate process of the pancreas, approximately measuring 2.1 x 2.0 x 2.6 cm. It is grossly stable as of 07/22/2020. Spleen:  Unremarkable. Adrenals: Unremarkable. Kidneys: Bilaterally atrophic with multiple simple and complex cysts. An underlying solid lesion cannot be excluded. Sonographic evaluation needed. No hydrocephalus. GI/Bowel: Moderate fecal retention in the colon indicating constipation. No bowel wall thickening or obstruction. Mild distal colonic diverticulosis without diverticulitis. Apparent mural thickening in the proximal sigmoid colon is likely due to under distension. Normal appendix. Pelvis: A 3.2 x 2.3 cm diverticulum is seen along the right bladder base. The prostate gland is grossly normal in size. Peritoneum/Retroperitoneum: No lymphadenopathy is seen. A bypass graft is seen extending from the abdominal aorta to the common femoral arteries. The graft is widely patent. 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 Bones/Soft Tissues: Chronic mild compression fracture deformity is seen in the L1 vertebral body. No acute fracture. No bony destructive lesion. Status post left hip arthroplasty. 1.  No acute abnormality is seen in the abdomen or the pelvis. 2. Multilocular 2.1 x 2.0 x 2.6 cm cystic lesion in the uncinate process of the pancreas. Follow-up with pre and post contrast enhanced CT or MRI is recommended in 6 months. 3. 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 Aortofemoral bypass graft is patent. 4. Simple and complex cysts in the kidneys bilaterally. Underlying solid lesion cannot be excluded. Sonographic evaluation recommended. 5. Constipation. No evidence of bowel obstruction. 6. Cholelithiasis. ASSESSMENT:  67 y.o. male with melena and anemia, likely due to upper GI bleed. Hx of COVID+ July 2020 and melena, at that time EGD revealed esophageal ulceration and friable tissue.     PLAN:  - Okay for diet tonight, but NPO at midnight for procedure 10/28  - Plan for EGD tomorrow 10/28  - IVFs  - PPI  - Monitor H/H, transfuse PRN per primary  - Pain control PRN  - Antiemetic PRN  - Replete lytes PRN  - Discussed with Dr. Angelique Chen    Electronically signed by Annia Brown MD on 10/27/20 at 5:07 PM EDT

## 2020-10-27 NOTE — ED PROVIDER NOTES
Department of Emergency Medicine   ED  Provider Note  Admit Date/RoomTime: 10/27/2020 10:45 AM  ED Room: 4509/4509-A          History of Present Illness:  10/27/20, Time: 10:55 AM EDT  Chief Complaint   Patient presents with    Rectal Bleeding     started this am                Lauren Nathan is a 67 y.o. male presenting to the ED for GI bleeding, beginning at 1 AM this morning. The complaint has been persistent, moderate in severity, and worsened by nothing. Patient resides at a nursing facility, recent left hip surgery. At 1 AM this morning he began to have bowel movements that were very bloody. Patient states that he has a sensation that he has to move his bowels and when he does it is either stool or blood. Patient has been having bilateral lower abdominal discomfort, sharp and cramping with no radiation, no exacerbating or remitting factors. There has been no fevers, he denies any nausea or vomiting. No recent dysuria. Patient has reportedly been doing well since his surgery. Review of Systems:   Pertinent positives and negatives are stated within HPI, all other systems reviewed and are negative.        --------------------------------------------- PAST HISTORY ---------------------------------------------  Past Medical History:  has a past medical history of Blind left eye, Chronic kidney disease, Dialysis patient [facility]), [facility] Hyperlipidemia, and Hypertension. Past Surgical History:  has a past surgical history that includes AV fistula creation (Left, 2015); HEMODIALYSIS ACCESS PERCUTANEOUS REVISION (Left, 2019); PERIPHERAL VASCULAR BYPASS (2008); Upper gastrointestinal endoscopy (N/A, 7/20/2020); Cardiac surgery; and hip surgery (Left, 10/16/2020). Social History:  reports that he has quit smoking. He has never used smokeless tobacco. He reports previous alcohol use. He reports that he does not use drugs. Family History: family history is not on file. . Unless otherwise noted, family history is non contributory    The patients home medications have been reviewed. Allergies: Patient has no known allergies. ---------------------------------------------------PHYSICAL EXAM--------------------------------------    Constitutional/General: Alert and oriented x3  Head: Normocephalic and atraumatic  Eyes: PERRL, EOMI, sclera non icteric  Mouth: Oropharynx clear, handling secretions, no trismus, no asymmetry of the posterior oropharynx or uvular edema  Neck: Supple, full ROM, no stridor, no meningeal signs  Respiratory: Lungs clear to auscultation bilaterally, no wheezes, rales, or rhonchi. Not in respiratory distress  Cardiovascular:  Regular rate. Regular rhythm. No murmurs, no aortic murmurs, no gallops, or rubs. 2+ distal pulses. Equal extremity pulses. Chest: No chest wall tenderness  GI:  Abdomen Soft, tender bilateral lower quadrants, Non distended. No rebound, guarding, or rigidity. No pulsatile masses. Overtly positive rectal exam for blood, stool is maroon-colored and jellylike  Musculoskeletal: Moves all extremities x 4. Warm and well perfused, no clubbing, cyanosis, or edema. Capillary refill <3 seconds. Right lateral incision to the upper leg is clean and dry, staples in place with no induration, no bleeding  Integument: skin warm and dry. No rashes. Neurologic: GCS 15, no focal deficits, symmetric strength 5/5 in the upper and lower extremities bilaterally  Psychiatric: Normal Affect          -------------------------------------------------- RESULTS -------------------------------------------------  I have personally reviewed all laboratory and imaging results for this patient. Results are listed below.      LABS: (Lab results interpreted by me)  Results for orders placed or performed during the hospital encounter of 10/27/20   CBC Auto Differential   Result Value Ref Range    WBC 11.5 4.5 - 11.5 E9/L    RBC 2.02 (L) 3.80 - 5.80 E12/L    Hemoglobin 6.4 (LL) 12.5 - 16.5 g/dL    Hematocrit 21.0 (L) 37.0 - 54.0 %    .0 (H) 80.0 - 99.9 fL    MCH 31.7 26.0 - 35.0 pg    MCHC 30.5 (L) 32.0 - 34.5 %    RDW 16.6 (H) 11.5 - 15.0 fL    Platelets 019 749 - 146 E9/L    MPV 10.8 7.0 - 12.0 fL    Neutrophils % 76.6 43.0 - 80.0 %    Immature Granulocytes % 0.7 0.0 - 5.0 %    Lymphocytes % 12.5 (L) 20.0 - 42.0 %    Monocytes % 7.0 2.0 - 12.0 %    Eosinophils % 2.6 0.0 - 6.0 %    Basophils % 0.6 0.0 - 2.0 %    Neutrophils Absolute 8.79 (H) 1.80 - 7.30 E9/L    Immature Granulocytes # 0.08 E9/L    Lymphocytes Absolute 1.44 (L) 1.50 - 4.00 E9/L    Monocytes Absolute 0.80 0.10 - 0.95 E9/L    Eosinophils Absolute 0.30 0.05 - 0.50 E9/L    Basophils Absolute 0.07 0.00 - 0.20 G1/O   Basic Metabolic Panel w/ Reflex to MG   Result Value Ref Range    Sodium 137 132 - 146 mmol/L    Potassium reflex Magnesium 4.1 3.5 - 5.0 mmol/L    Chloride 100 98 - 107 mmol/L    CO2 26 22 - 29 mmol/L    Anion Gap 11 7 - 16 mmol/L    Glucose 171 (H) 74 - 99 mg/dL    BUN 38 (H) 8 - 23 mg/dL    CREATININE 4.3 (H) 0.7 - 1.2 mg/dL    GFR Non-African American 13 >=60 mL/min/1.73    GFR African American 16     Calcium 9.0 8.6 - 10.2 mg/dL   Hepatic Function Panel   Result Value Ref Range    Total Protein 5.0 (L) 6.4 - 8.3 g/dL    Alb 2.5 (L) 3.5 - 5.2 g/dL    Alkaline Phosphatase 87 40 - 129 U/L    ALT <5 0 - 40 U/L    AST 16 0 - 39 U/L    Total Bilirubin 0.6 0.0 - 1.2 mg/dL    Bilirubin, Direct 0.2 0.0 - 0.3 mg/dL    Bilirubin, Indirect 0.4 0.0 - 1.0 mg/dL   Lipase   Result Value Ref Range    Lipase 22 13 - 60 U/L   Protime-INR   Result Value Ref Range    Protime 12.1 9.3 - 12.4 sec    INR 1.1    TYPE AND SCREEN   Result Value Ref Range    ABO/Rh O POS     Antibody Screen NEG    PREPARE RBC (CROSSMATCH), 2 Units   Result Value Ref Range    Product Code Blood Bank [id number]     Description Blood Bank Red Blood Cells, Leuko-reduced     Unit Number [id number]     Dispense Status Blood Bank issued Product Code Blood Bank [id number]     Description Blood Bank Red Blood Cells, Leuko-reduced     Unit Number [id number]     Dispense Status Blood Bank issued    ,       RADIOLOGY:  Interpreted by Radiologist unless otherwise specified  CT ABDOMEN PELVIS W IV CONTRAST Additional Contrast? None   Final Result   1. No acute abnormality is seen in the abdomen or the pelvis. 2. Multilocular 2.1 x 2.0 x 2.6 cm cystic lesion in the uncinate process of   the pancreas. Follow-up with pre and post contrast enhanced CT or MRI is   recommended in 6 months. 3. 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 Aortofemoral bypass graft is patent. 4. Simple and complex cysts in the kidneys bilaterally. Underlying solid   lesion cannot be excluded. Sonographic evaluation recommended. 5. Constipation. No evidence of bowel obstruction. 6. Cholelithiasis.                     ------------------------- NURSING NOTES AND VITALS REVIEWED ---------------------------   The nursing notes within the ED encounter and vital signs as below have been reviewed by myself  BP (!) 117/52   Pulse 84   Temp 98.2 °F (36.8 °C) (Temporal)   Resp 14   Ht 5' 8\" (1.727 m)   Wt 156 lb (70.8 kg)   SpO2 96%   BMI 23.72 kg/m²     Oxygen Saturation Interpretation: Normal    The patients available past medical records and past encounters were reviewed.         ------------------------------ ED COURSE/MEDICAL DECISION MAKING----------------------  Medications   0.9 % sodium chloride bolus ( Intravenous Canceled Entry 10/27/20 1803)   doxazosin (CARDURA) tablet 4 mg (has no administration in time range)   metoprolol tartrate (LOPRESSOR) tablet 50 mg (has no administration in time range)   QUEtiapine (SEROQUEL) tablet 12.5 mg (has no administration in time range)   rosuvastatin (CRESTOR) tablet 5 mg (has no administration in time range)   sucralfate (CARAFATE) tablet 1 g (1 g Oral Given 10/27/20 1720)   0.9 % sodium chloride infusion (has no administration in time range)   sodium chloride flush 0.9 % injection 10 mL (has no administration in time range)   sodium chloride flush 0.9 % injection 10 mL (10 mLs Intravenous Given 10/27/20 1722)   acetaminophen (TYLENOL) tablet 650 mg (has no administration in time range)     Or   acetaminophen (TYLENOL) suppository 650 mg (has no administration in time range)   promethazine (PHENERGAN) tablet 12.5 mg (has no administration in time range)     Or   ondansetron (ZOFRAN) injection 4 mg (has no administration in time range)   pantoprazole (PROTONIX) injection 40 mg (40 mg Intravenous Given 10/27/20 1721)     And   sodium chloride (PF) 0.9 % injection 10 mL (10 mLs Intravenous Given 10/27/20 1721)   mineral oil-hydrophilic petrolatum (HYDROPHOR) ointment (has no administration in time range)     And   mineral oil-hydrophilic petrolatum (HYDROPHOR) ointment (has no administration in time range)   sodium chloride flush 0.9 % injection 10 mL (10 mLs Intravenous Given 10/27/20 1305)   iopamidol (ISOVUE-370) 76 % injection 90 mL (90 mLs Intravenous Given 10/27/20 1305)   morphine sulfate (PF) injection 4 mg (4 mg Intravenous Given 10/27/20 1354)           The cardiac monitor revealed sinus rhythm with a heart rate in the 80s as interpreted by me. The cardiac monitor was ordered secondary to the patient's chest pain and to monitor for patient for dysrhythmia. CPT [id number]           Medical Decision Making:     I, Dr. Sofia Orlando, am the primary provider of record    74-year-old male presenting with rectal bleeding. Patient has jellylike stools that are maroon-colored and grossly positive for blood. He is tender in the lower quadrants. He is hemodynamically stable and otherwise well-appearing. His surgical incision from his left hip arthroplasty is clean and dry. Patient's hemoglobin is below 7, will give 2 units PRBCs. Metabolic panel is within acceptable limits.   CT imaging shows no acute abnormalities or cause for patient's GI bleeding. He is resting more comfortably, remaining hemodynamically stable during PRBC transfusion. Patient was given morphine for abdominal pain. He will be admitted for further management. Re-Evaluations: This patient's ED course included: a personal history and physicial examination, re-evaluation prior to disposition, multiple bedside re-evaluations, IV medications, cardiac monitoring and continuous pulse oximetry    This patient has remained hemodynamically stable, improved and been closely monitored during their ED course. Counseling: The emergency provider has spoken with the patient and spouse/SO and discussed todays results, in addition to providing specific details for the plan of care and counseling regarding the diagnosis and prognosis. Questions are answered at this time and they are agreeable with the plan.       --------------------------------- IMPRESSION AND DISPOSITION ---------------------------------    IMPRESSION  1. Gastrointestinal hemorrhage, unspecified gastrointestinal hemorrhage type    2. Anemia, unspecified type        DISPOSITION  Disposition: Admit to telemetry  Patient condition is stable        NOTE: This report was transcribed using voice recognition software.  Every effort was made to ensure accuracy; however, inadvertent computerized transcription errors may be present        Jazmine Arriaga DO  10/27/20 2025

## 2020-10-28 ENCOUNTER — ANESTHESIA (OUTPATIENT)
Dept: ENDOSCOPY | Age: 75
DRG: 377 | End: 2020-10-28
Payer: MEDICARE

## 2020-10-28 ENCOUNTER — ANESTHESIA EVENT (OUTPATIENT)
Dept: ENDOSCOPY | Age: 75
DRG: 377 | End: 2020-10-28
Payer: MEDICARE

## 2020-10-28 VITALS
OXYGEN SATURATION: 100 % | SYSTOLIC BLOOD PRESSURE: 132 MMHG | RESPIRATION RATE: 27 BRPM | DIASTOLIC BLOOD PRESSURE: 62 MMHG

## 2020-10-28 LAB
ANION GAP SERPL CALCULATED.3IONS-SCNC: 15 MMOL/L (ref 7–16)
BLOOD BANK DISPENSE STATUS: NORMAL
BLOOD BANK PRODUCT CODE: NORMAL
BPU ID: NORMAL
BUN BLDV-MCNC: 46 MG/DL (ref 8–23)
CALCIUM SERPL-MCNC: 8.8 MG/DL (ref 8.6–10.2)
CHLORIDE BLD-SCNC: 100 MMOL/L (ref 98–107)
CO2: 21 MMOL/L (ref 22–29)
CREAT SERPL-MCNC: 4.8 MG/DL (ref 0.7–1.2)
DESCRIPTION BLOOD BANK: NORMAL
GFR AFRICAN AMERICAN: 14
GFR NON-AFRICAN AMERICAN: 12 ML/MIN/1.73
GLUCOSE BLD-MCNC: 94 MG/DL (ref 74–99)
HCT VFR BLD CALC: 22.7 % (ref 37–54)
HCT VFR BLD CALC: 24.1 % (ref 37–54)
HCT VFR BLD CALC: 24.9 % (ref 37–54)
HEMOGLOBIN: 7 G/DL (ref 12.5–16.5)
HEMOGLOBIN: 7.9 G/DL (ref 12.5–16.5)
HEMOGLOBIN: 8 G/DL (ref 12.5–16.5)
IRON SATURATION: 52 % (ref 20–55)
IRON: 95 MCG/DL (ref 59–158)
POTASSIUM REFLEX MAGNESIUM: 5.1 MMOL/L (ref 3.5–5)
SARS-COV-2, NAAT: NOT DETECTED
SODIUM BLD-SCNC: 136 MMOL/L (ref 132–146)
TOTAL IRON BINDING CAPACITY: 182 MCG/DL (ref 250–450)

## 2020-10-28 PROCEDURE — 90935 HEMODIALYSIS ONE EVALUATION: CPT

## 2020-10-28 PROCEDURE — 6360000002 HC RX W HCPCS: Performed by: NURSE PRACTITIONER

## 2020-10-28 PROCEDURE — 7100000001 HC PACU RECOVERY - ADDTL 15 MIN: Performed by: SURGERY

## 2020-10-28 PROCEDURE — 36415 COLL VENOUS BLD VENIPUNCTURE: CPT

## 2020-10-28 PROCEDURE — 6370000000 HC RX 637 (ALT 250 FOR IP): Performed by: INTERNAL MEDICINE

## 2020-10-28 PROCEDURE — 2709999900 HC NON-CHARGEABLE SUPPLY: Performed by: SURGERY

## 2020-10-28 PROCEDURE — U0002 COVID-19 LAB TEST NON-CDC: HCPCS

## 2020-10-28 PROCEDURE — 7100000000 HC PACU RECOVERY - FIRST 15 MIN: Performed by: SURGERY

## 2020-10-28 PROCEDURE — 3700000001 HC ADD 15 MINUTES (ANESTHESIA): Performed by: SURGERY

## 2020-10-28 PROCEDURE — 83540 ASSAY OF IRON: CPT

## 2020-10-28 PROCEDURE — 2060000000 HC ICU INTERMEDIATE R&B

## 2020-10-28 PROCEDURE — 2580000003 HC RX 258: Performed by: INTERNAL MEDICINE

## 2020-10-28 PROCEDURE — 2580000003 HC RX 258

## 2020-10-28 PROCEDURE — 3700000000 HC ANESTHESIA ATTENDED CARE: Performed by: SURGERY

## 2020-10-28 PROCEDURE — 36430 TRANSFUSION BLD/BLD COMPNT: CPT

## 2020-10-28 PROCEDURE — 86706 HEP B SURFACE ANTIBODY: CPT

## 2020-10-28 PROCEDURE — 3609017100 HC EGD: Performed by: SURGERY

## 2020-10-28 PROCEDURE — 5A1D70Z PERFORMANCE OF URINARY FILTRATION, INTERMITTENT, LESS THAN 6 HOURS PER DAY: ICD-10-PCS | Performed by: INTERNAL MEDICINE

## 2020-10-28 PROCEDURE — 80048 BASIC METABOLIC PNL TOTAL CA: CPT

## 2020-10-28 PROCEDURE — 85014 HEMATOCRIT: CPT

## 2020-10-28 PROCEDURE — C9113 INJ PANTOPRAZOLE SODIUM, VIA: HCPCS | Performed by: INTERNAL MEDICINE

## 2020-10-28 PROCEDURE — 83550 IRON BINDING TEST: CPT

## 2020-10-28 PROCEDURE — 0DJ08ZZ INSPECTION OF UPPER INTESTINAL TRACT, VIA NATURAL OR ARTIFICIAL OPENING ENDOSCOPIC: ICD-10-PCS | Performed by: SURGERY

## 2020-10-28 PROCEDURE — 6360000002 HC RX W HCPCS

## 2020-10-28 PROCEDURE — 80074 ACUTE HEPATITIS PANEL: CPT

## 2020-10-28 PROCEDURE — 85018 HEMOGLOBIN: CPT

## 2020-10-28 PROCEDURE — 6360000002 HC RX W HCPCS: Performed by: INTERNAL MEDICINE

## 2020-10-28 RX ORDER — 0.9 % SODIUM CHLORIDE 0.9 %
20 INTRAVENOUS SOLUTION INTRAVENOUS ONCE
Status: DISCONTINUED | OUTPATIENT
Start: 2020-10-28 | End: 2020-11-03 | Stop reason: HOSPADM

## 2020-10-28 RX ORDER — SODIUM CHLORIDE 9 MG/ML
INJECTION, SOLUTION INTRAVENOUS CONTINUOUS PRN
Status: DISCONTINUED | OUTPATIENT
Start: 2020-10-28 | End: 2020-10-28 | Stop reason: SDUPTHER

## 2020-10-28 RX ORDER — HYDROCODONE BITARTRATE AND ACETAMINOPHEN 5; 325 MG/1; MG/1
2 TABLET ORAL PRN
Status: DISCONTINUED | OUTPATIENT
Start: 2020-10-28 | End: 2020-10-28 | Stop reason: HOSPADM

## 2020-10-28 RX ORDER — PROMETHAZINE HYDROCHLORIDE 25 MG/ML
6.25 INJECTION, SOLUTION INTRAMUSCULAR; INTRAVENOUS EVERY 10 MIN PRN
Status: DISCONTINUED | OUTPATIENT
Start: 2020-10-28 | End: 2020-10-28 | Stop reason: HOSPADM

## 2020-10-28 RX ORDER — MORPHINE SULFATE 2 MG/ML
1 INJECTION, SOLUTION INTRAMUSCULAR; INTRAVENOUS EVERY 5 MIN PRN
Status: DISCONTINUED | OUTPATIENT
Start: 2020-10-28 | End: 2020-10-28 | Stop reason: HOSPADM

## 2020-10-28 RX ORDER — PROPOFOL 10 MG/ML
INJECTION, EMULSION INTRAVENOUS PRN
Status: DISCONTINUED | OUTPATIENT
Start: 2020-10-28 | End: 2020-10-28 | Stop reason: SDUPTHER

## 2020-10-28 RX ORDER — MEPERIDINE HYDROCHLORIDE 25 MG/ML
12.5 INJECTION INTRAMUSCULAR; INTRAVENOUS; SUBCUTANEOUS EVERY 5 MIN PRN
Status: DISCONTINUED | OUTPATIENT
Start: 2020-10-28 | End: 2020-10-28 | Stop reason: HOSPADM

## 2020-10-28 RX ORDER — HYDROCODONE BITARTRATE AND ACETAMINOPHEN 5; 325 MG/1; MG/1
1 TABLET ORAL PRN
Status: DISCONTINUED | OUTPATIENT
Start: 2020-10-28 | End: 2020-10-28 | Stop reason: HOSPADM

## 2020-10-28 RX ORDER — MORPHINE SULFATE 2 MG/ML
2 INJECTION, SOLUTION INTRAMUSCULAR; INTRAVENOUS EVERY 5 MIN PRN
Status: DISCONTINUED | OUTPATIENT
Start: 2020-10-28 | End: 2020-10-28 | Stop reason: HOSPADM

## 2020-10-28 RX ADMIN — SODIUM CHLORIDE, PRESERVATIVE FREE 10 ML: 5 INJECTION INTRAVENOUS at 05:31

## 2020-10-28 RX ADMIN — SUCRALFATE 1 G: 1 TABLET ORAL at 08:56

## 2020-10-28 RX ADMIN — SUCRALFATE 1 G: 1 TABLET ORAL at 21:13

## 2020-10-28 RX ADMIN — METOPROLOL TARTRATE 50 MG: 50 TABLET, FILM COATED ORAL at 08:56

## 2020-10-28 RX ADMIN — PETROLATUM: 42 OINTMENT TOPICAL at 21:13

## 2020-10-28 RX ADMIN — EPOETIN ALFA-EPBX 3000 UNITS: 3000 INJECTION, SOLUTION INTRAVENOUS; SUBCUTANEOUS at 16:05

## 2020-10-28 RX ADMIN — PANTOPRAZOLE SODIUM 40 MG: 40 INJECTION, POWDER, LYOPHILIZED, FOR SOLUTION INTRAVENOUS at 16:05

## 2020-10-28 RX ADMIN — SODIUM CHLORIDE: 9 INJECTION, SOLUTION INTRAVENOUS at 21:14

## 2020-10-28 RX ADMIN — PROPOFOL 50 MG: 10 INJECTION, EMULSION INTRAVENOUS at 13:22

## 2020-10-28 RX ADMIN — PETROLATUM: 42 OINTMENT TOPICAL at 08:57

## 2020-10-28 RX ADMIN — ROSUVASTATIN CALCIUM 5 MG: 5 TABLET, FILM COATED ORAL at 21:13

## 2020-10-28 RX ADMIN — SODIUM CHLORIDE: 9 INJECTION, SOLUTION INTRAVENOUS at 06:54

## 2020-10-28 RX ADMIN — PANTOPRAZOLE SODIUM 40 MG: 40 INJECTION, POWDER, LYOPHILIZED, FOR SOLUTION INTRAVENOUS at 05:31

## 2020-10-28 RX ADMIN — ACETAMINOPHEN 650 MG: 325 TABLET ORAL at 18:43

## 2020-10-28 RX ADMIN — SODIUM CHLORIDE: 9 INJECTION, SOLUTION INTRAVENOUS at 13:19

## 2020-10-28 RX ADMIN — SODIUM CHLORIDE, PRESERVATIVE FREE 10 ML: 5 INJECTION INTRAVENOUS at 16:05

## 2020-10-28 ASSESSMENT — PAIN SCALES - GENERAL
PAINLEVEL_OUTOF10: 0
PAINLEVEL_OUTOF10: 3
PAINLEVEL_OUTOF10: 0
PAINLEVEL_OUTOF10: 0

## 2020-10-28 ASSESSMENT — ENCOUNTER SYMPTOMS: SHORTNESS OF BREATH: 0

## 2020-10-28 ASSESSMENT — LIFESTYLE VARIABLES: SMOKING_STATUS: 0

## 2020-10-28 NOTE — ANESTHESIA PRE PROCEDURE
Department of Anesthesiology  Preprocedure Note       Name:  Cheryl Soto   Age:  67 y.o.  :  1945                                          MRN:  [id number]         Date:  10/28/2020      Surgeon: Lissette Do):  Sean Thrasher MD    Procedure: Procedure(s):  EGD ESOPHAGOGASTRODUODENOSCOPY    Medications prior to admission:   Prior to Admission medications    Medication Sig Start Date End Date Taking?  Authorizing Provider   QUEtiapine (SEROQUEL) 25 MG tablet Take 0.5 tablets by mouth every 6 hours as needed for Agitation or Other (anxiety) 10/21/20   Parisa Hill MD   Heparin Sodium, Porcine, (HEPARIN, PORCINE,) 03625 UNIT/ML injection Inject 0.5 mLs into the skin every 12 hours for 28 days 10/16/20 11/13/20  Love Bland PA-C   doxazosin (CARDURA) 4 MG tablet Take 1 tablet by mouth nightly    Historical Provider, MD   sucralfate (CARAFATE) 1 GM tablet Take 1 tablet by mouth 4 times daily 20   Taz Sahu MD   pantoprazole (PROTONIX) 40 MG tablet Take 1 tablet by mouth every morning (before breakfast) 20   Taz Sahu MD   Cholecalciferol (VITAMIN D) 50 MCG ( UT) CAPS capsule Take by mouth    Historical Provider, MD   hydrALAZINE (APRESOLINE) 50 MG tablet Take 50 mg by mouth 3 times daily    Historical Provider, MD   rosuvastatin (CRESTOR) 10 MG tablet Take 5 mg by mouth nightly    Historical Provider, MD   Coenzyme Q10 (Q-10 CO-ENZYME PO) Take 100 mg by mouth Daily    Historical Provider, MD   metoprolol tartrate (LOPRESSOR) 50 MG tablet Take 50 mg by mouth 2 times daily    Historical Provider, MD       Current medications:    Current Facility-Administered Medications   Medication Dose Route Frequency Provider Last Rate Last Dose    0.9 % sodium chloride bolus  20 mL Intravenous Once Evangelina Claudio MD        0.9 % sodium chloride bolus  250 mL Intravenous Once Joana Jenkins DO        doxazosin (CARDURA) tablet 4 mg  4 mg Oral Nightly Parisa Hill MD   4 mg at 10/27/20 2108    metoprolol tartrate (LOPRESSOR) tablet 50 mg  50 mg Oral BID Tad Leigh MD   50 mg at 10/28/20 0856    QUEtiapine (SEROQUEL) tablet 12.5 mg  12.5 mg Oral Q6H PRN Tad Leigh MD        rosuvastatin (CRESTOR) tablet 5 mg  5 mg Oral Nightly Tad Leigh MD   5 mg at 10/27/20 2108    sucralfate (CARAFATE) tablet 1 g  1 g Oral 4x Daily Tad Leigh MD   1 g at 10/28/20 0856    0.9 % sodium chloride infusion   Intravenous Continuous Tad Leigh  mL/hr at 10/28/20 0654      sodium chloride flush 0.9 % injection 10 mL  10 mL Intravenous 2 times per day Tad Leigh MD        sodium chloride flush 0.9 % injection 10 mL  10 mL Intravenous PRN Tad Leigh MD   10 mL at 10/27/20 1722    acetaminophen (TYLENOL) tablet 650 mg  650 mg Oral Q6H PRN Tad Leigh MD        Or   [employer] acetaminophen (TYLENOL) suppository 650 mg  650 mg Rectal Q6H PRN Tad Leigh MD        promethazine (PHENERGAN) tablet 12.5 mg  12.5 mg Oral Q6H PRN Tad Leigh MD        Or    ondansetron [facility]) injection 4 mg  4 mg Intravenous Q6H PRN Tad Leigh MD        pantoprazole (PROTONIX) injection 40 mg  40 mg Intravenous Q12H Tad Leigh MD   40 mg at 10/28/20 0531    And    sodium chloride (PF) 0.9 % injection 10 mL  10 mL Intravenous Q12H Tad Leigh MD   10 mL at 10/28/20 0531    mineral oil-hydrophilic petrolatum (HYDROPHOR) ointment   Topical BID Tad Leigh MD        And   [employer] mineral oil-hydrophilic petrolatum (HYDROPHOR) ointment   Topical PRN Tad Leigh MD           Allergies:  No Known Allergies    Problem List:    Patient Active Problem List   Diagnosis Code    Encounter regarding vascular access for dialysis for ESRD ([facility] N18.6, Z99.2    COVID-19 U07.1    Acute respiratory failure due to COVID-19 ([facility] U07.1, J96.00    Pneumonia due to COVID-19 virus U07.1, J12.89    ESRD (end stage renal disease) ([facility] N18.6    Thrombocytopenia (HCC) D69.6    Melena K92.1    Acute blood loss anemia D62    AMS (altered mental status) R41.82    Herpes zoster B02.9    Hypertension I10    Hyperlipidemia E78.5    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 Lynn Nava Left 2019    2 x Dr. Penny Wakefield, [facility]    HIP SURGERY Left 10/16/2020    HIP HEMIARTHROPLASTY performed by Miles Gunter MD at [facility]  2008    Aortobifemoral bypass for claudicatio - Dr. Janett Portillo N/A 7/20/2020    EGD ESOPHAGOGASTRODUODENOSCOPY WITH ANTRAL BIOPSY performed by Piedad Lombard, [place] at [address] [place] [address] History:    Social History     Tobacco Use    Smoking status: Former Smoker    Smokeless tobacco: Never Used   Substance Use Topics    Alcohol use: Not Currently     Comment: occasional                                Counseling given: Not Answered      Vital Signs (Current):   Vitals:    10/28/20 0856 10/28/20 0934 10/28/20 0942 10/28/20 1227   BP:  (!) 100/51 (!) 98/51    Pulse: 88 88 71 76   Resp:  21 19 20   Temp:  37 °C (98.6 °F) 36.2 °C (97.1 °F) 36.4 °C (97.6 °F)   TempSrc:  Temporal Temporal Temporal   SpO2:  90% 91%    Weight:       Height:                                                  BP Readings from Last 3 Encounters:   10/28/20 (!) 98/51   10/21/20 (!) 114/58   10/16/20 (!) 163/80       NPO Status: Time of last liquid decreased breath sounds,      (-) not a current smoker                           Cardiovascular:  Exercise tolerance: poor (<4 METS),   (+) hypertension: moderate, hyperlipidemia      ECG reviewed  Rhythm: regular  Rate: normal                    Neuro/Psych:   (+) depression/anxiety             GI/Hepatic/Renal:   (+) GERD:, PUD, renal disease: CRI and dialysis,          ROS comment: CT Ab/Pelv 10/27/20  Impression   1.  No acute abnormality is seen in the abdomen or the pelvis. 2. Multilocular 2.1 x 2.0 x 2.6 cm cystic lesion in the uncinate process of   the pancreas.  Follow-up with pre and post contrast enhanced CT or MRI is   recommended in 6 months. 3. 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  Aortofemoral bypass graft is patent. 4. Simple and complex cysts in the kidneys bilaterally.  Underlying solid   lesion cannot be excluded.  Sonographic evaluation recommended. 5. Constipation.  No evidence of bowel obstruction. 6. Cholelithiasis. .   Endo/Other:    (+) blood dyscrasia (GIB this admission, Hgb this am 7): anemia:., .                 Abdominal:           Vascular:   + PVD, aortic or cerebral (Ao-Fem bypass 2008), . EKG 10/11/20  Narrative & Impression     Sinus rhythm with premature atrial complexes  ST elevation, consider early repolarization, pericarditis, or injury  When compared with ECG of 13-JUL-2020 18:19,  No significant change was found  Confirmed by Tre White ([id number]) on 10/12/2020 6:28:35 AM     CXR      Impression    No evidence of acute process. Anesthesia Plan      MAC     ASA 3       Induction: intravenous. Anesthetic plan and risks discussed with patient. Use of blood products discussed with patient whom. Plan discussed with CRNA and attending.                   Eloy Dorsey RN   10/28/2020

## 2020-10-28 NOTE — CONSULTS
injection 2 mg, 2 mg, Intravenous, Q5 Min PRN  HYDROmorphone (DILAUDID) injection 0.25 mg, 0.25 mg, Intravenous, Q5 Min PRN  HYDROmorphone (DILAUDID) injection 0.5 mg, 0.5 mg, Intravenous, Q5 Min PRN  HYDROcodone-acetaminophen (NORCO) 5-325 MG per tablet 1 tablet, 1 tablet, Oral, PRN **OR** HYDROcodone-acetaminophen (NORCO) 5-325 MG per tablet 2 tablet, 2 tablet, Oral, PRN  promethazine (PHENERGAN) injection 6.25 mg, 6.25 mg, Intravenous, Q10 Min PRN  meperidine (DEMEROL) injection 12.5 mg, 12.5 mg, Intravenous, Q5 Min PRN  0.9 % sodium chloride bolus, 250 mL, Intravenous, Once  doxazosin (CARDURA) tablet 4 mg, 4 mg, Oral, Nightly  metoprolol tartrate (LOPRESSOR) tablet 50 mg, 50 mg, Oral, BID  QUEtiapine (SEROQUEL) tablet 12.5 mg, 12.5 mg, Oral, Q6H PRN  rosuvastatin (CRESTOR) tablet 5 mg, 5 mg, Oral, Nightly  sucralfate (CARAFATE) tablet 1 g, 1 g, Oral, 4x Daily  0.9 % sodium chloride infusion, , Intravenous, Continuous  sodium chloride flush 0.9 % injection 10 mL, 10 mL, Intravenous, 2 times per day  sodium chloride flush 0.9 % injection 10 mL, 10 mL, Intravenous, PRN  acetaminophen (TYLENOL) tablet 650 mg, 650 mg, Oral, Q6H PRN **OR** acetaminophen (TYLENOL) suppository 650 mg, 650 mg, Rectal, Q6H PRN  promethazine (PHENERGAN) tablet 12.5 mg, 12.5 mg, Oral, Q6H PRN **OR** ondansetron (ZOFRAN) injection 4 mg, 4 mg, Intravenous, Q6H PRN  pantoprazole (PROTONIX) injection 40 mg, 40 mg, Intravenous, Q12H **AND** sodium chloride (PF) 0.9 % injection 10 mL, 10 mL, Intravenous, Q12H  mineral oil-hydrophilic petrolatum (HYDROPHOR) ointment, , Topical, BID **AND** mineral oil-hydrophilic petrolatum (HYDROPHOR) ointment, , Topical, PRN  Allergies:  Patient has no known allergies. Social History:    TOBACCO:   reports that he has quit smoking. He has never used smokeless tobacco.  ETOH:   reports previous alcohol use. Family History:   History reviewed. No pertinent family history.   REVIEW OF SYSTEMS:    Negative unless otherwise stated in HPI    PHYSICAL EXAM:      Vitals:    VITALS:  BP (!) 138/51   Pulse 69   Temp 97.8 °F (36.6 °C) (Temporal)   Resp 19   Ht 5' 8\" (1.727 m)   Wt 156 lb (70.8 kg)   SpO2 98%   BMI 23.72 kg/m²   24HR INTAKE/OUTPUT:    Intake/Output Summary (Last 24 hours) at 10/28/2020 1356  Last data filed at 10/28/2020 1329  Gross per 24 hour   Intake 1079.17 ml   Output --   Net 1079.17 ml       Access:  AVF left arm  Constitutional:  In no distress. HEENT: AT/NC, positive for hearing aids. NECK: supple  Respiratory:  CTAB  Cardiovascular/Edema:  RRR, S1/S2, no edema. Gastrointestinal:  Soft, +BS, nontender. Musculoskeletal:  L hip with dressing s/p sammy arthroplasty  Neurologic:  AAOx3      DATA:    CBC with Differential:    Lab Results   Component Value Date    WBC 11.5 10/27/2020    RBC 2.02 10/27/2020    HGB 7.0 10/28/2020    HCT 22.7 10/28/2020     10/27/2020    .0 10/27/2020    MCH 31.7 10/27/2020    MCHC 30.5 10/27/2020    RDW 16.6 10/27/2020    NRBC 1.7 07/19/2020    LYMPHOPCT 12.5 10/27/2020    MONOPCT 7.0 10/27/2020    BASOPCT 0.6 10/27/2020    MONOSABS 0.80 10/27/2020    LYMPHSABS 1.44 10/27/2020    EOSABS 0.30 10/27/2020    BASOSABS 0.07 10/27/2020     CMP:    Lab Results   Component Value Date     10/28/2020    K 5.1 10/28/2020     10/28/2020    CO2 21 10/28/2020    BUN 46 10/28/2020    CREATININE 4.8 10/28/2020    GFRAA 14 10/28/2020    LABGLOM 12 10/28/2020    GLUCOSE 94 10/28/2020    PROT 5.0 10/27/2020    LABALBU 2.5 10/27/2020    CALCIUM 8.8 10/28/2020    BILITOT 0.6 10/27/2020    ALKPHOS 87 10/27/2020    AST 16 10/27/2020    ALT <5 10/27/2020     Magnesium:    Lab Results   Component Value Date    MG 1.8 10/21/2020     Phosphorus:    Lab Results   Component Value Date    PHOS 3.7 10/18/2020     Radiology Review:    CT abdomen pelvis 10/27/2020   1.  No acute abnormality is seen in the abdomen or the pelvis.     2. Multilocular 2.1 x 2.0 x 2.6 cm cystic lesion in the uncinate process of    the pancreas.  Follow-up with pre and post contrast enhanced CT or MRI is    recommended in 6 months. 3. 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  Aortofemoral bypass graft is patent. 4. Simple and complex cysts in the kidneys bilaterally.  Underlying solid    lesion cannot be excluded.  Sonographic evaluation recommended. 5. Constipation.  No evidence of bowel obstruction. 6. Cholelithiasis. IMPRESSION/RECOMMENDATIONS:      Briefly John Russo is 67 y.o. male with history of ESRD on HD MWF via left upper arm AVF ([facility]), HTN, HFpEF, admitted to [facility] in July for COVID-19 infection and experienced a 30 lb weight loss, who was recently admitted from 10/12-10/21 with herpes zoster encephalitis during which time he experienced a fall, fractured his left hip and underwent a left hip hemiarthroplasty on 10/16. He was discharged to a SNF on 10/21 and presented again to the ER on 10/27 with complaints of bloody stools starting that morning. He was found to have a Hgb of 6.4 and was given 2 U PRBCs. He was admitted for further evaluation and treatment. We are consulted for management of ESRD and HD. His last HD was Monday. ESRD:  Hemodialysis 3 times per week- MWF via AVF left arm. 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 On Acyclovir 260 mg IV  (5mg/kg) Q24 hrs after dialysis. Hyperkalemia 2/2 lack of clearance in the setting of ESRD. HTN: on doxazosin and lopressor  Left hip fracture, status post fall, s/p hemiarthroplasty October 16, 2020  BPH: on doxazosin  MBD of CKD, holding binders  Hypoalbuminemia/malnutrition  Thrombocytopenia: likely 2/2 infection vs drug-induced. Normocytic anemia 2/2 ESRD and acute blood loss anemia in the setting of GIB.  S/p 2 U PRBCs today. For EGD. Will start NICOLE.     PLAN:     HD today and 3 times a week MWF   Start epoetin alpha 3000 units 3 times a week  Continue current antihypertensive regimen   Continue to monitor H&H    Thank you Dr. Steven Marie for this consultation.     Electronically signed by TOMMY Maldonado CNP on 10/28/2020 at 1:48 PM

## 2020-10-28 NOTE — CARE COORDINATION
Met with patient and wife at bedside to discuss transition of care. He is from [place]. He is not a bed hold. They can accept back with a precert. Wife confirmed plan. Will have facility initiate precert in AM after therapy evals are posted. Covid ordered and given to nurse to complete. Ambulance form on soft chart. [place] EGD.  CM to follow  An Ling, RN  [facility] Case Management  [phone]

## 2020-10-28 NOTE — ANESTHESIA PRE PROCEDURE
Department of Anesthesiology  Preprocedure Note       Name:  Jennifer Hogue   Age:  67 y.o.  :  1945                                          MRN:  [id number]         Date:  10/28/2020      Surgeon: Shantell Ambrose):  Daria Dover MD    Procedure: Procedure(s):  EGD ESOPHAGOGASTRODUODENOSCOPY    Medications prior to admission:   Prior to Admission medications    Medication Sig Start Date End Date Taking? Authorizing Provider   QUEtiapine (SEROQUEL) 25 MG tablet Take 0.5 tablets by mouth every 6 hours as needed for Agitation or Other (anxiety) 10/21/20   Heavenly Haro MD   Heparin Sodium, Porcine, (HEPARIN, PORCINE,) 30085 UNIT/ML injection Inject 0.5 mLs into the skin every 12 hours for 28 days 10/16/20 11/13/20  Evaristo House PA-C   doxazosin (CARDURA) 4 MG tablet Take 1 tablet by mouth nightly    Historical Provider, MD   sucralfate (CARAFATE) 1 GM tablet Take 1 tablet by mouth 4 times daily 20   Donato Sahu MD   pantoprazole (PROTONIX) 40 MG tablet Take 1 tablet by mouth every morning (before breakfast) 20   Donato Sahu MD   Cholecalciferol (VITAMIN D) 50 MCG ( UT) CAPS capsule Take by mouth    Historical Provider, MD   hydrALAZINE (APRESOLINE) 50 MG tablet Take 50 mg by mouth 3 times daily    Historical Provider, MD   rosuvastatin (CRESTOR) 10 MG tablet Take 5 mg by mouth nightly    Historical Provider, MD   Coenzyme Q10 (Q-10 CO-ENZYME PO) Take 100 mg by mouth Daily    Historical Provider, MD   metoprolol tartrate (LOPRESSOR) 50 MG tablet Take 50 mg by mouth 2 times daily    Historical Provider, MD       Current medications:    No current facility-administered medications for this visit. No current outpatient medications on file.      Facility-Administered Medications Ordered in Other Visits   Medication Dose Route Frequency Provider Last Rate Last Dose    0.9 % sodium chloride bolus  20 mL Intravenous Once Sal Morales MD        0.9 % sodium chloride bolus  250 mL Intravenous Once Ferdinand Dance, DO        doxazosin (CARDURA) tablet 4 mg  4 mg Oral Nightly Rc Barry MD   4 mg at 10/27/20 2108    metoprolol tartrate (LOPRESSOR) tablet 50 mg  50 mg Oral BID Rc Barry MD   50 mg at 10/28/20 0856    QUEtiapine (SEROQUEL) tablet 12.5 mg  12.5 mg Oral Q6H PRN Rc Barry MD        rosuvastatin (CRESTOR) tablet 5 mg  5 mg Oral Nightly Rc Barry MD   5 mg at 10/27/20 2108    sucralfate (CARAFATE) tablet 1 g  1 g Oral 4x Daily Rc Barry MD   1 g at 10/28/20 0856    0.9 % sodium chloride infusion   Intravenous Continuous Rc Barry  mL/hr at 10/28/20 0654      sodium chloride flush 0.9 % injection 10 mL  10 mL Intravenous 2 times per day Rc Barry MD        sodium chloride flush 0.9 % injection 10 mL  10 mL Intravenous PRN Rc Barry MD   10 mL at 10/27/20 1722    acetaminophen (TYLENOL) tablet 650 mg  650 mg Oral Q6H PRN Rc Barry MD        Or    acetaminophen (TYLENOL) suppository 650 mg  650 mg Rectal Q6H PRN Rc Barry MD        promethazine (PHENERGAN) tablet 12.5 mg  12.5 mg Oral Q6H PRN Rc Barry MD        Or    [facility]ansetron [facility]) [facility] 4 mg  4 mg Intravenous Q6H PRN Rc Barry MD        pantoprazole (PROTONIX) injection 40 mg  40 mg Intravenous Q12H Rc Barry MD   40 mg at 10/28/20 0531    And    sodium chloride (PF) 0.9 % injection 10 mL  10 mL Intravenous Q12H Rc Barry MD   10 mL at 10/28/20 0531    mineral oil-hydrophilic petrolatum (HYDROPHOR) ointment   Topical BID Rc Barry MD        And   [facility] mineral oil-hydrophilic petrolatum (HYDROPHOR) ointment   Topical PRN Rc Barry MD           Allergies:  No Known Allergies    Problem List:    Patient Active Problem List   Diagnosis Code    Encounter regarding vascular access for dialysis for ESRD ([address] N18.6, Z99.2    COVID-19 U07.1    Acute 10/27/20 156 lb (70.8 kg)   10/21/20 133 lb 13.1 oz (60.7 kg)   10/12/20 146 lb 2.6 oz (66.3 kg)     There is no height or weight on file to calculate BMI.    CBC:   Lab Results   Component Value Date    WBC 11.5 10/27/2020    RBC 2.02 10/27/2020    HGB 7.0 10/28/2020    HCT 22.7 10/28/2020    .0 10/27/2020    RDW 16.6 10/27/2020     10/27/2020       CMP:   Lab Results   Component Value Date     10/28/2020    K 5.1 10/28/2020     10/28/2020    CO2 21 10/28/2020    BUN 46 10/28/2020    CREATININE 4.8 10/28/2020    GFRAA 14 10/28/2020    LABGLOM 12 10/28/2020    GLUCOSE 94 10/28/2020    PROT 5.0 10/27/2020    CALCIUM 8.8 10/28/2020    BILITOT 0.6 10/27/2020    ALKPHOS 87 10/27/2020    AST 16 10/27/2020    ALT <5 10/27/2020       POC Tests: No results for input(s): POCGLU, POCNA, POCK, POCCL, POCBUN, POCHEMO, POCHCT in the last 72 hours.     Coags:   Lab Results   Component Value Date    PROTIME 12.1 10/27/2020    INR 1.1 10/27/2020    APTT 26.9 10/12/2020       HCG (If Applicable): No results found for: PREGTESTUR, PREGSERUM, HCG, HCGQUANT     ABGs: No results found for: PHART, PO2ART, ZRW5OFT, RPU2GNG, BEART, V3GWBMEG     Type & Screen (If Applicable):  No results found for: LABABO, LABRH    Drug/Infectious Status (If Applicable):  No results found for: HIV, HEPCAB    COVID-19 Screening (If Applicable):   Lab Results   Component Value Date    COVID19 Not Detected 10/20/2020    COVID19 Detected 07/27/2020         Anesthesia Evaluation  Patient summary reviewed no history of anesthetic complications:   Airway: Mallampati: II  TM distance: >3 FB   Neck ROM: full  Mouth opening: > = 3 FB Dental:    (+) upper dentures and partials      Pulmonary: breath sounds clear to auscultation      (-) pneumonia and shortness of breath                          ROS comment: Hx of COVID+ July 2020   Cardiovascular:  Exercise tolerance: good (>4 METS),   (+) hypertension: moderate, hyperlipidemia      ECG reviewed  Rhythm: regular  Rate: normal                 ROS comment: EKG 10/2020: NSR with PACs     Neuro/Psych:                ROS comment: History of confusion probably secondary to viral encephalitis. GI/Hepatic/Renal:   (+) renal disease: ESRD and dialysis,          ROS comment: esophageal ulceration with friable tissue, biopsy showed chronic gastritis. CT A/P 10/2020:  1.  No acute abnormality is seen in the abdomen or the pelvis. 2. Multilocular 2.1 x 2.0 x 2.6 cm cystic lesion in the uncinate process of   the pancreas.  Follow-up with pre and post contrast enhanced CT or MRI is   recommended in 6 months. 3. 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  Aortofemoral bypass graft is patent. 4. Simple and complex cysts in the kidneys bilaterally.  Underlying solid   lesion cannot be excluded.  Sonographic evaluation recommended. 5. Constipation.  No evidence of bowel obstruction. 6. Cholelithiasis. .   Endo/Other:    (+) blood dyscrasia: anemia:., electrolyte abnormalities (hyperkalemia), .    (-) diabetes mellitus, hypothyroidism                ROS comment: Blind left eye Abdominal:         (-) obese     Vascular:           ROS comment: S/p aortofemoral bypass. Anesthesia Plan      MAC     ASA 3       Induction: intravenous.                           Victor Manuel Officer, MD   10/28/2020

## 2020-10-28 NOTE — OP NOTE
Procedure note      Patient: John Russo  YOB: 1945  MRN: [id number]    Date of Procedure: 10/28/2020    Pre-Op Diagnosis: GI bleed    Post-Op Diagnosis: The patient had changes in the distal esophagus that were unusual but certainly no definitive source for his melena. No ulcerations were noted to involve the duodenum or stomach. Procedure(s):  EGD ESOPHAGOGASTRODUODENOSCOPY    Surgeon(s):  Jose E Robles MD    Assistant:   * No surgical staff found *    Anesthesia: Monitor Anesthesia Care    Estimated Blood Loss (mL): Minimal    Complications: None    Specimens:   * No specimens in log *    Implants:  * No implants in log *      Drains:   [REMOVED] LDA Fistula (Removed)       Findings: As above    Detailed Description of Procedure: The patient was brought to the endoscopy suite and placed on the table in a left lateral decubitus position. The patient received anesthesia per the department of anesthesiology. A bite-block was placed. The endoscope was passed through the lumen of the esophagus, stomach and duodenum. The endoscope was retrieved. The duodenum was normal.  Upon reentry of the stomach, no ulcerations were noted. In neither location was blood noted. The retroflexed view did not reveal a hiatal hernia. The esophagus was found to have thinning of the walls with either varices or staining of some form. But, no active bleeding was noted. Plan: If the patient has ongoing bleeding then a CTA will be obtained.     Electronically signed by Jose E Robles MD on 10/28/2020 at 1:38 PM

## 2020-10-28 NOTE — PROGRESS NOTES
GENERAL SURGERY  DAILY PROGRESS NOTE  10/28/2020    Chief Complaint   Patient presents with    Rectal Bleeding     started this am       Subjective:  Pt resting comfortably in bed. Plan for scope today. Objective:  BP (!) 102/50   Pulse 77   Temp 98.6 °F (37 °C) (Temporal)   Resp 14   Ht 5' 8\" (1.727 m)   Wt 156 lb (70.8 kg)   SpO2 97%   BMI 23.72 kg/m²     Assessment/Plan:  67 y.o. male with melena and anemia, likely due to upper GI bleed.  Hx of COVID+ July 2020 and melena, at that time EGD revealed esophageal ulceration and friable tissue    - NPO for procedure today 10/28  - Plan for EGD today 10/28  - IVFs  - PPI  - Monitor H/H, transfuse PRN per primary  - Pain control PRN  - Antiemetic PRN  - Replete lytes PRN    Electronically signed by Shahriar Smith MD on 10/28/2020 at 6:32 AM

## 2020-10-28 NOTE — H&P
[address] [facility]  History and Physical      CHIEF COMPLAINT: Rectal bleeding      HISTORY OF PRESENT ILLNESS:      The patient is a 67 y.o. male patient of Dr. Thad Nguyen history of end-stage renal disease on hemodialysis, hypertension, hyperlipidemia who presents with rectal bleeding. Room blood per rectum. No exacerbation relief. Associated with generalized weakness and fatigue patient was just discharged the  after 9-day admission for herpes zoster encephalitis. Patient was discharged on IV acyclovir to SNF. This is completed. Patient returns with room blood per rectum. In ED patient is found to be anemic and transfused PRBC. Patient does have history of upper GI bleeding and was on Protonix and Carafate. Patient notes feeling generally weak and tired. Denies lightheadedness. No hematemesis. No exacerbation relief.     Past Medical History:    Past Medical History:   Diagnosis Date    Blind left eye     Chronic kidney disease     [facility]     Hemod[facility]     Hyperlipidemia     Hypertension        Past Surgical History:    Past Surgical History:   Procedure Laterality Date    AV FISTULA CREATION Left     Dr. Tee Santa Left     2 x Dr[facility] Wendie Hale[facility]    HIP SURGERY Left 10/16/2020    HIP HEMIARTHROPLASTY performed by Rosa Zamarripa MD at [address] [place] [address]      Aortobifemoral bypass for claudicatio - Dr. Sumanth Russell N/A 2020    EGD ESOPHAGOGASTRODUODENOSCOPY WITH ANTRAL BIOPSY performed by Amy Marin MD at [facility] OR       Medications Prior to Admission:    Medications Prior to Admission: QUEtiapine (SEROQUEL) 25 MG tablet, Take 0.5 tablets by mouth every 6 hours as needed for Agitation or Other (anxiety)  [] acyclovir (ZOVIRAX) infusion, Infuse 253 mg intravenously daily for 8 days Compound per deferred  Genitalia:  deferred    LABS:    CBC with Differential:    Lab Results   Component Value Date    WBC 11.5 10/27/2020    RBC 2.02 10/27/2020    HGB 7.0 10/28/2020    HCT 22.7 10/28/2020     10/27/2020    .0 10/27/2020    MCH 31.7 10/27/2020    MCHC 30.5 10/27/2020    RDW 16.6 10/27/2020    NRBC 1.7 07/19/2020    LYMPHOPCT 12.5 10/27/2020    MONOPCT 7.0 10/27/2020    BASOPCT 0.6 10/27/2020    MONOSABS 0.80 10/27/2020    LYMPHSABS 1.44 10/27/2020    EOSABS 0.30 10/27/2020    BASOSABS 0.07 10/27/2020     CMP:    Lab Results   Component Value Date     10/28/2020    K 5.1 10/28/2020     10/28/2020    CO2 21 10/28/2020    BUN 46 10/28/2020    CREATININE 4.8 10/28/2020    GFRAA 14 10/28/2020    LABGLOM 12 10/28/2020    GLUCOSE 94 10/28/2020    PROT 5.0 10/27/2020    LABALBU 2.5 10/27/2020    CALCIUM 8.8 10/28/2020    BILITOT 0.6 10/27/2020    ALKPHOS 87 10/27/2020    AST 16 10/27/2020    ALT <5 10/27/2020     Magnesium:    Lab Results   Component Value Date    MG 1.8 10/21/2020     Phosphorus:    Lab Results   Component Value Date    PHOS 3.7 10/18/2020     PT/INR:    Lab Results   Component Value Date    PROTIME 12.1 10/27/2020    INR 1.1 10/27/2020     Last 3 Troponin:    Lab Results   Component Value Date    TROPONINI 0.11 10/11/2020    TROPONINI 0.09 07/13/2020    TROPONINI 0.08 07/11/2020     U/A:    Lab Results   Component Value Date    COLORU Yellow 07/14/2020    PROTEINU >=300 07/14/2020    PHUR 8.0 07/14/2020    WBCUA 1-3 07/14/2020    RBCUA 0-1 07/14/2020    BACTERIA NONE SEEN 07/14/2020    CLARITYU Clear 07/14/2020    SPECGRAV 1.015 07/14/2020    LEUKOCYTESUR Negative 07/14/2020    UROBILINOGEN 0.2 07/14/2020    BILIRUBINUR Negative 07/14/2020    BLOODU Negative 07/14/2020    GLUCOSEU Negative 07/14/2020     ABG:  No results found for: PH, PCO2, PO2, HCO3, BE, THGB, TCO2, O2SAT  HgBA1c:  No results found for: LABA1C  FLP:  No results found for: TRIG, HDL, LDLCALC, LDLDIRECT, LABVLDL  TSH:  No results found for: TSH    CT ABDOMEN PELVIS W IV CONTRAST Additional Contrast? None   Final Result   1. No acute abnormality is seen in the abdomen or the pelvis. 2. Multilocular 2.1 x 2.0 x 2.6 cm cystic lesion in the uncinate process of   the pancreas. Follow-up with pre and post contrast enhanced CT or MRI is   recommended in 6 months. 3. 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 Aortofemoral bypass graft is patent. 4. Simple and complex cysts in the kidneys bilaterally. Underlying solid   lesion cannot be excluded. Sonographic evaluation recommended. 5. Constipation. No evidence of bowel obstruction. 6. Cholelithiasis. ASSESSMENT:      Principal Problem:    Acute blood loss anemia  Active Problems:    ESRD (end stage renal disease) ([address]    Hypertension    Hyperlipidemia    Severe protein-calorie malnutrition POA    GI bleeding  Resolved Problems:    * No resolved hospital problems.  *      PLAN:    18-year-old male history of end-stage renal disease admitted with GI bleeding and anemia    Admit  IV fluids  NPO after midnight  Monitor H&H  Transfuse PRN maintain hemoglobin >= 7  IV PPI  Gen surgical Consult appreciated plan for EGD  Nephrology consult  Medications for other co morbidities cont as appropriate w dosage adjustments as necessary  DVT PPx SCD  DC planning       Electronically signed by Judie Medrano MD on 10/28/2020 at 9:41 AM

## 2020-10-29 ENCOUNTER — APPOINTMENT (OUTPATIENT)
Dept: CT IMAGING | Age: 75
DRG: 377 | End: 2020-10-29
Payer: MEDICARE

## 2020-10-29 LAB
ANION GAP SERPL CALCULATED.3IONS-SCNC: 13 MMOL/L (ref 7–16)
BLOOD BANK DISPENSE STATUS: NORMAL
BLOOD BANK PRODUCT CODE: NORMAL
BPU ID: NORMAL
BUN BLDV-MCNC: 29 MG/DL (ref 8–23)
CALCIUM SERPL-MCNC: 8.2 MG/DL (ref 8.6–10.2)
CHLORIDE BLD-SCNC: 99 MMOL/L (ref 98–107)
CO2: 23 MMOL/L (ref 22–29)
CREAT SERPL-MCNC: 3.5 MG/DL (ref 0.7–1.2)
DESCRIPTION BLOOD BANK: NORMAL
GFR AFRICAN AMERICAN: 21
GFR NON-AFRICAN AMERICAN: 17 ML/MIN/1.73
GLUCOSE BLD-MCNC: 67 MG/DL (ref 74–99)
HAV IGM SER IA-ACNC: NORMAL
HBV SURFACE AB TITR SER: NORMAL {TITER}
HCT VFR BLD CALC: 21.1 % (ref 37–54)
HCT VFR BLD CALC: 21.8 % (ref 37–54)
HCT VFR BLD CALC: 22 % (ref 37–54)
HCT VFR BLD CALC: 22.6 % (ref 37–54)
HEMOGLOBIN: 6.8 G/DL (ref 12.5–16.5)
HEMOGLOBIN: 7.1 G/DL (ref 12.5–16.5)
HEMOGLOBIN: 7.2 G/DL (ref 12.5–16.5)
HEMOGLOBIN: 7.4 G/DL (ref 12.5–16.5)
HEPATITIS B CORE IGM ANTIBODY: NORMAL
HEPATITIS B SURFACE ANTIGEN INTERPRETATION: NORMAL
HEPATITIS C ANTIBODY INTERPRETATION: NORMAL
POTASSIUM SERPL-SCNC: 3.4 MMOL/L (ref 3.5–5)
SODIUM BLD-SCNC: 135 MMOL/L (ref 132–146)

## 2020-10-29 PROCEDURE — 6370000000 HC RX 637 (ALT 250 FOR IP): Performed by: INTERNAL MEDICINE

## 2020-10-29 PROCEDURE — C9113 INJ PANTOPRAZOLE SODIUM, VIA: HCPCS | Performed by: INTERNAL MEDICINE

## 2020-10-29 PROCEDURE — 80048 BASIC METABOLIC PNL TOTAL CA: CPT

## 2020-10-29 PROCEDURE — 2060000000 HC ICU INTERMEDIATE R&B

## 2020-10-29 PROCEDURE — 85018 HEMOGLOBIN: CPT

## 2020-10-29 PROCEDURE — 2580000003 HC RX 258: Performed by: INTERNAL MEDICINE

## 2020-10-29 PROCEDURE — 36415 COLL VENOUS BLD VENIPUNCTURE: CPT

## 2020-10-29 PROCEDURE — 6360000002 HC RX W HCPCS: Performed by: INTERNAL MEDICINE

## 2020-10-29 PROCEDURE — 85014 HEMATOCRIT: CPT

## 2020-10-29 PROCEDURE — 74174 CTA ABD&PLVS W/CONTRAST: CPT

## 2020-10-29 PROCEDURE — 6360000004 HC RX CONTRAST MEDICATION: Performed by: RADIOLOGY

## 2020-10-29 RX ORDER — PETROLATUM 42 G/100G
OINTMENT TOPICAL 2 TIMES DAILY
Status: DISCONTINUED | OUTPATIENT
Start: 2020-10-29 | End: 2020-11-03 | Stop reason: HOSPADM

## 2020-10-29 RX ORDER — 0.9 % SODIUM CHLORIDE 0.9 %
20 INTRAVENOUS SOLUTION INTRAVENOUS ONCE
Status: COMPLETED | OUTPATIENT
Start: 2020-10-29 | End: 2020-10-29

## 2020-10-29 RX ORDER — DEXTROSE, SODIUM CHLORIDE, SODIUM LACTATE, POTASSIUM CHLORIDE, AND CALCIUM CHLORIDE 5; .6; .31; .03; .02 G/100ML; G/100ML; G/100ML; G/100ML; G/100ML
INJECTION, SOLUTION INTRAVENOUS CONTINUOUS
Status: DISCONTINUED | OUTPATIENT
Start: 2020-10-29 | End: 2020-10-30

## 2020-10-29 RX ORDER — PETROLATUM 42 G/100G
OINTMENT TOPICAL 4 TIMES DAILY PRN
Status: DISCONTINUED | OUTPATIENT
Start: 2020-10-29 | End: 2020-11-03 | Stop reason: HOSPADM

## 2020-10-29 RX ADMIN — METOPROLOL TARTRATE 50 MG: 50 TABLET, FILM COATED ORAL at 21:03

## 2020-10-29 RX ADMIN — SKIN PROTECTANT: 44 OINTMENT TOPICAL at 21:03

## 2020-10-29 RX ADMIN — DOXAZOSIN 4 MG: 4 TABLET ORAL at 21:03

## 2020-10-29 RX ADMIN — SUCRALFATE 1 G: 1 TABLET ORAL at 12:22

## 2020-10-29 RX ADMIN — METOPROLOL TARTRATE 50 MG: 50 TABLET, FILM COATED ORAL at 08:34

## 2020-10-29 RX ADMIN — PANTOPRAZOLE SODIUM 40 MG: 40 INJECTION, POWDER, LYOPHILIZED, FOR SOLUTION INTRAVENOUS at 15:54

## 2020-10-29 RX ADMIN — ACETAMINOPHEN 650 MG: 325 TABLET ORAL at 10:21

## 2020-10-29 RX ADMIN — IOPAMIDOL 90 ML: 755 INJECTION, SOLUTION INTRAVENOUS at 14:12

## 2020-10-29 RX ADMIN — PETROLATUM: 42 OINTMENT TOPICAL at 02:21

## 2020-10-29 RX ADMIN — SODIUM CHLORIDE, SODIUM LACTATE, POTASSIUM CHLORIDE, CALCIUM CHLORIDE AND DEXTROSE MONOHYDRATE: 5; 600; 310; 30; 20 INJECTION, SOLUTION INTRAVENOUS at 12:20

## 2020-10-29 RX ADMIN — Medication 10 ML: at 14:12

## 2020-10-29 RX ADMIN — SODIUM CHLORIDE 20 ML: 9 INJECTION, SOLUTION INTRAVENOUS at 16:13

## 2020-10-29 RX ADMIN — SUCRALFATE 1 G: 1 TABLET ORAL at 16:52

## 2020-10-29 RX ADMIN — SUCRALFATE 1 G: 1 TABLET ORAL at 08:34

## 2020-10-29 RX ADMIN — SODIUM CHLORIDE, PRESERVATIVE FREE 10 ML: 5 INJECTION INTRAVENOUS at 02:14

## 2020-10-29 RX ADMIN — PETROLATUM: 42 OINTMENT TOPICAL at 08:36

## 2020-10-29 RX ADMIN — PANTOPRAZOLE SODIUM 40 MG: 40 INJECTION, POWDER, LYOPHILIZED, FOR SOLUTION INTRAVENOUS at 02:13

## 2020-10-29 RX ADMIN — SUCRALFATE 1 G: 1 TABLET ORAL at 21:04

## 2020-10-29 RX ADMIN — ROSUVASTATIN CALCIUM 5 MG: 5 TABLET, FILM COATED ORAL at 21:04

## 2020-10-29 RX ADMIN — SODIUM CHLORIDE, PRESERVATIVE FREE 10 ML: 5 INJECTION INTRAVENOUS at 15:54

## 2020-10-29 ASSESSMENT — PAIN SCALES - GENERAL
PAINLEVEL_OUTOF10: 0
PAINLEVEL_OUTOF10: 6

## 2020-10-29 NOTE — PROGRESS NOTES
Department of Internal Medicine  Nephrology Progress Note  Events reviewed. SUBJECTIVE: We are following Dulce Maria Wang for management of ESRD and HD. He reports no new complaints today. Wife sitting at bedside. PHYSICAL EXAM:      Vitals:    VITALS:  BP (!) 122/57   Pulse 84   Temp 98.3 °F (36.8 °C) (Temporal)   Resp 16   Ht 5' 8\" (1.727 m)   Wt 139 lb 12.4 oz (63.4 kg)   SpO2 93%   BMI 21.25 kg/m²   24HR INTAKE/OUTPUT:      Intake/Output Summary (Last 24 hours) at 10/29/2020 1347  Last data filed at 10/29/2020 0800  Gross per 24 hour   Intake 2528 ml   Output 2150 ml   Net 378 ml       Access:  AVF left arm  Constitutional:  In no distress. HEENT: AT/NC, positive for hearing aids. NECK: supple  Respiratory:  CTAB  Cardiovascular/Edema:  RRR, S1/S2, no edema. Gastrointestinal:  Soft, +BS, nontender. Musculoskeletal:  L hip with dressing s/p sammy arthroplasty  Neurologic:  AAOx3    Scheduled Meds:   sodium chloride  20 mL Intravenous Once    epoetin delmer-epbx  3,000 Units Subcutaneous Once per day on Mon Wed Fri    sodium chloride  250 mL Intravenous Once    doxazosin  4 mg Oral Nightly    metoprolol tartrate  50 mg Oral BID    rosuvastatin  5 mg Oral Nightly    sucralfate  1 g Oral 4x Daily    sodium chloride flush  10 mL Intravenous 2 times per day    pantoprazole  40 mg Intravenous Q12H    And    sodium chloride (PF)  10 mL Intravenous Q12H    mineral oil-hydrophilic petrolatum   Topical BID     Continuous Infusions:   dextrose 5% in lactated ringers 50 mL/hr at 10/29/20 1220     PRN Meds:. QUEtiapine, sodium chloride flush, acetaminophen **OR** acetaminophen, promethazine **OR** ondansetron, mineral oil-hydrophilic petrolatum **AND** mineral oil-hydrophilic petrolatum    DATA:    CBC with Differential:    Lab Results   Component Value Date    WBC 11.5 10/27/2020    RBC 2.02 10/27/2020    HGB 7.1 10/29/2020    HCT 21.8 10/29/2020     10/27/2020    .0 10/27/2020    MCH 31.7 10/27/2020    MCHC 30.5 10/27/2020    RDW 16.6 10/27/2020    NRBC 1.7 07/19/2020    LYMPHOPCT 12.5 10/27/2020    MONOPCT 7.0 10/27/2020    BASOPCT 0.6 10/27/2020    MONOSABS 0.80 10/27/2020    LYMPHSABS 1.44 10/27/2020    EOSABS 0.30 10/27/2020    BASOSABS 0.07 10/27/2020     CMP:    Lab Results   Component Value Date     10/29/2020    K 3.4 10/29/2020    K 5.1 10/28/2020    CL 99 10/29/2020    CO2 23 10/29/2020    BUN 29 10/29/2020    CREATININE 3.5 10/29/2020    GFRAA 21 10/29/2020    LABGLOM 17 10/29/2020    GLUCOSE 67 10/29/2020    PROT 5.0 10/27/2020    LABALBU 2.5 10/27/2020    CALCIUM 8.2 10/29/2020    BILITOT 0.6 10/27/2020    ALKPHOS 87 10/27/2020    AST 16 10/27/2020    ALT <5 10/27/2020     Magnesium:    Lab Results   Component Value Date    MG 1.8 10/21/2020     Phosphorus:    Lab Results   Component Value Date    PHOS 3.7 10/18/2020     Radiology Review:    CT abdomen pelvis 10/27/2020   1.  No acute abnormality is seen in the abdomen or the pelvis. 2. Multilocular 2.1 x 2.0 x 2.6 cm cystic lesion in the uncinate process of    the pancreas.  Follow-up with pre and post contrast enhanced CT or MRI is    recommended in 6 months. 3. 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  Aortofemoral bypass graft is patent. 4. Simple and complex cysts in the kidneys bilaterally.  Underlying solid    lesion cannot be excluded.  Sonographic evaluation recommended. 5. Constipation.  No evidence of bowel obstruction. 6. Cholelithiasis. BRIEF SUMMARY OF INITIAL CONSULT:    Briefly Cheryl Soto is 67 y.o. male with history of ESRD on HD MWF via left upper arm AVF (last HD yesterday at [facility]), HTN, HFpEF, admitted to [facility] in July for COVID-19 infection and experienced a 30 lb weight loss, who was recently admitted from 10/12-10/21 with herpes zoster encephalitis during which time he experienced a fall, fractured his left hip and underwent a left hip hemiarthroplasty on 10/16.  He was discharged to a SNF on 10/21 and presented again to the ER on 10/27 with complaints of bloody stools starting that morning. He was found to have a Hgb of 6.4 and was given 2 U PRBCs. He was admitted for further evaluation and treatment. We are consulted for management of ESRD and HD. His last HD was Monday. IMPRESSION/RECOMMENDATIONS:      ESRD:  Hemodialysis 3 times per week- MWF via AVF left arm. Status post HD yesterday, tolerated well. GIB: melena started morning of 10/27 per patient, S/P EGD yesterday which revealed some distal esophageal changes however no ulcers or source of upper GI bleed. His diet was advanced to clear liquids today. Hypokalemia secondary to removal with dialysis and decreased p.o. intake. 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 On Acyclovir 260 mg IV  (5mg/kg) Q24 hrs after dialysis. HTN: on doxazosin and lopressor  Left hip fracture, status post fall, s/p hemiarthroplasty October 16, 2020  BPH: on doxazosin  MBD of CKD, holding binders  Hypoalbuminemia/malnutrition  Thrombocytopenia: likely 2/2 infection vs drug-induced. Normocytic anemia 2/2 ESRD and acute blood loss anemia in the setting of GIB. S/p 2 U PRBCs yesterday.   Hemoglobin continues to drop.     PLAN:     HD 3 times weekly MWF   Change iv fluids to D5LR at 50 cc/hour   Continue epoetin alpha 3000 units 3 times a week  Continue current antihypertensive regimen   Continue to monitor H&H    Electronically signed by TOMMY Paul CNP on 10/29/2020 at 1:47 PM

## 2020-10-29 NOTE — PROGRESS NOTES
GENERAL SURGERY  DAILY PROGRESS NOTE  10/29/2020    Chief Complaint   Patient presents with    Rectal Bleeding     started this am       Subjective:  Pt doing well today. Denies any abdominal pain. Per the nurse, he had one small melenic BM, but resolving compared to the last couple of BMs. Objective:  BP (!) 114/50   Pulse 75   Temp 97 °F (36.1 °C) (Temporal)   Resp 16   Ht 5' 8\" (1.727 m)   Wt 139 lb 12.4 oz (63.4 kg)   SpO2 97%   BMI 21.25 kg/m²     General Appearance:  awake, alert, oriented, in no acute distress  Head/face:  NCAT  Eyes:  No gross abnormalities. Lungs:  No increased work of breathing on room air  Heart:  RR and normotensive  Abdomen: Midline abdominal scar healed. Soft, non-tender, non-distended    Assessment/Plan:  67 y.o. male with melena and anemia, likely due to upper GI bleed. Hx of COVID+ July 2020 and melena, at that time EGD revealed esophageal ulceration and friable tissue. EGD with some distal esophageal changes, but no ulcers or source of upper GI bleed. - Okay for CLD, advance as tolerated  - If pt rebleeds, then obtain a CTA abd/pelvis  - PPI and Carafate  - Monitor H/H, transfuse PRN per primary  - Pain control PRN  - Antiemetic PRN  - Replete lytes PRN    Electronically signed by Rajeev Smith MD on 10/29/2020 at 6:29 AM     The patient was seen and examined and the chart was reviewed. The EGD did not reveal any significant abnormalities to involve the distal esophagus. Also, no significant ulcerations were noted. The findings do not account for a source for his findings. If the patient has ongoing bleeding then a CTA would be reasonable.

## 2020-10-29 NOTE — PROGRESS NOTES
Physical Therapy    PT orders received and appreciated. Pt off unit at this time for CT scan. Will continue to follow and re-attempt at a later time/date.     Leslie Betancur, PT, DPT  [id number]

## 2020-10-29 NOTE — PROGRESS NOTES
Subjective:  Feeling better   No CP or SOB  No fever or chills   No uncontrolled pain  No vomiting or diarrhea     Objective:    BP (!) 122/57   Pulse 84   Temp 98.3 °F (36.8 °C) (Temporal)   Resp 16   Ht 5' 8\" (1.727 m)   Wt 139 lb 12.4 oz (63.4 kg)   SpO2 93%   BMI 21.25 kg/m²     24HR INTAKE/OUTPUT:      Intake/Output Summary (Last 24 hours) at 10/29/2020 1101  Last data filed at 10/29/2020 0800  Gross per 24 hour   Intake 2933 ml   Output 2150 ml   Net 783 ml       General appearance: NAD, conversant  Neck: FROM, supple   Lungs: Clear bilaterally no wheezes, no rhonchi, no crackles  CV: RRR, no MRGs; normal carotid upstroke and amplitude without Bruits  Abdomen: Soft, non-tender; no masses or HSM  Extremities: No edema, no cyanosis, no clubbing  Skin: Intact no rash, no lesions, no ulcers    Psych: Alert and oriented normal affect  Neuro: Nonfocal  Most Recent Labs  Lab Results   Component Value Date    WBC 11.5 10/27/2020    HGB 7.1 (L) 10/29/2020    HCT 21.8 (L) 10/29/2020     10/27/2020     10/29/2020    K 3.4 (L) 10/29/2020    CL 99 10/29/2020    CREATININE 3.5 (H) 10/29/2020    BUN 29 (H) 10/29/2020    CO2 23 10/29/2020    GLUCOSE 67 (L) 10/29/2020    ALT <5 10/27/2020    AST 16 10/27/2020    INR 1.1 10/27/2020     No results for input(s): MG in the last 72 hours. Lab Results   Component Value Date    CALCIUM 8.2 (L) 10/29/2020    PHOS 3.7 10/18/2020        CT ABDOMEN PELVIS W IV CONTRAST Additional Contrast? None   Final Result   1. No acute abnormality is seen in the abdomen or the pelvis. 2. Multilocular 2.1 x 2.0 x 2.6 cm cystic lesion in the uncinate process of   the pancreas. Follow-up with pre and post contrast enhanced CT or MRI is   recommended in 6 months. 3. 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 Aortofemoral bypass graft is patent. 4. Simple and complex cysts in the kidneys bilaterally. Underlying solid   lesion cannot be excluded.   Sonographic evaluation recommended. 5. Constipation. No evidence of bowel obstruction. 6. Cholelithiasis. CTA ABDOMEN PELVIS W CONTRAST    (Results Pending)       Assessment    Principal Problem:    Acute blood loss anemia  Active Problems:    ESRD (end stage renal disease) ([facility] [address]    Hypertension    Hyperlipidemia    Severe protein-calorie malnutrition POA    GI bleeding  Resolved Problems:    * No resolved hospital problems.  *      Plan:  78-year-old male history of end-stage renal disease admitted with GI bleeding and anemia status post EGD 10/28 showing change in distal esophagus however no definitive bleeding source     Admit  IV fluids  NPO after midnight  Monitor H&H  Transfuse PRN maintain hemoglobin >= 7  IV PPI  Gen surgical Consult appreciated  Nephrology consult  Medications for other co morbidities cont as appropriate w dosage adjustments as necessary  DVT PPx SCD  DC planning     Electronically signed by Lexy Caraballo MD on 10/29/2020 at 11:01 AM

## 2020-10-29 NOTE — FLOWSHEET NOTE
Inpatient Wound Care(initial evaluation) 4509a    Admit Date: 10/27/2020 10:45 AM    Reason for consult:  coccyx    Significant history:    CHIEF COMPLAINT: Rectal bleeding  PMH includes end-stage renal disease on hemodialysis, hypertension, hyperlipidemia who presents with rectal bleeding. Associated with generalized weakness and fatigue patient was just discharged the 21st after 9-day admission for herpes zoster encephalitis.       Procedure: 10/16. Left hip hemiarthroplasty for femoral neck fracture     Wound history: admitted with wound from ECF    Findings:    10/29/20 1630   Skin Integrity   Skin Integrity Bruising; Redness  (dried scabs, skin tears)   Location BUE   Skin Integrity Site 2   Skin Integrity Location 2 Redness  (blanchable)   Location 2 coccyx   Skin Integrity Site 3   Skin Integrity Location 3 Bruising    Location 3 left hip   Skin Integrity Site 4   Skin Integrity Location 4   (dry flaky)   Location 4 feet   Wound 10/27/20 Sacrum   Date First Assessed/Time First Assessed: 10/27/20 1700   Present on Hospital Admission: Yes  Location: Sacrum   Wound Image    Wound Etiology Pressure Stage  2   Dressing/Treatment Pharmaceutical agent (see MAR)   Wound Length (cm) 3 cm   Wound Width (cm) 1 cm   Wound Depth (cm) 0.1 cm   Wound Surface Area (cm^2) 3 cm^2   Change in Wound Size % (l*w) -50   Wound Volume (cm^3) 0.3 cm^3   Wound Assessment   (pink)   Drainage Amount None   Munira-wound Assessment Blanchable erythema   Incision 10/16/20 Hip Left   Date First Assessed: 10/16/20   Location: Hip  Wound Location Orientation: Left   Dressing/Treatment Open to air   Closure Staples   Incision Assessment   (well approximated)   Drainage Amount None     **Informed Consent**    The patient has given verbal consent to have photos taken of wound and inserted into their chart as part of their permanent medical record for purposes of documentation, treatment management and/or medical review.    All Images taken on

## 2020-10-30 LAB
ANION GAP SERPL CALCULATED.3IONS-SCNC: 11 MMOL/L (ref 7–16)
BUN BLDV-MCNC: 37 MG/DL (ref 8–23)
CALCIUM SERPL-MCNC: 8.2 MG/DL (ref 8.6–10.2)
CHLORIDE BLD-SCNC: 100 MMOL/L (ref 98–107)
CO2: 24 MMOL/L (ref 22–29)
CREAT SERPL-MCNC: 4.5 MG/DL (ref 0.7–1.2)
GFR AFRICAN AMERICAN: 15
GFR NON-AFRICAN AMERICAN: 13 ML/MIN/1.73
GLUCOSE BLD-MCNC: 91 MG/DL (ref 74–99)
HCT VFR BLD CALC: 23.1 % (ref 37–54)
HCT VFR BLD CALC: 23.4 % (ref 37–54)
HCT VFR BLD CALC: 24.2 % (ref 37–54)
HEMOGLOBIN: 7.3 G/DL (ref 12.5–16.5)
HEMOGLOBIN: 7.9 G/DL (ref 12.5–16.5)
HEMOGLOBIN: 8 G/DL (ref 12.5–16.5)
POTASSIUM SERPL-SCNC: 3.6 MMOL/L (ref 3.5–5)
SODIUM BLD-SCNC: 135 MMOL/L (ref 132–146)

## 2020-10-30 PROCEDURE — 6360000002 HC RX W HCPCS: Performed by: INTERNAL MEDICINE

## 2020-10-30 PROCEDURE — 85014 HEMATOCRIT: CPT

## 2020-10-30 PROCEDURE — 6360000002 HC RX W HCPCS: Performed by: NURSE PRACTITIONER

## 2020-10-30 PROCEDURE — C9113 INJ PANTOPRAZOLE SODIUM, VIA: HCPCS | Performed by: INTERNAL MEDICINE

## 2020-10-30 PROCEDURE — 97530 THERAPEUTIC ACTIVITIES: CPT

## 2020-10-30 PROCEDURE — 6370000000 HC RX 637 (ALT 250 FOR IP): Performed by: INTERNAL MEDICINE

## 2020-10-30 PROCEDURE — 97166 OT EVAL MOD COMPLEX 45 MIN: CPT

## 2020-10-30 PROCEDURE — 2060000000 HC ICU INTERMEDIATE R&B

## 2020-10-30 PROCEDURE — 97162 PT EVAL MOD COMPLEX 30 MIN: CPT

## 2020-10-30 PROCEDURE — 36415 COLL VENOUS BLD VENIPUNCTURE: CPT

## 2020-10-30 PROCEDURE — 85018 HEMOGLOBIN: CPT

## 2020-10-30 PROCEDURE — 2580000003 HC RX 258: Performed by: INTERNAL MEDICINE

## 2020-10-30 PROCEDURE — 36592 COLLECT BLOOD FROM PICC: CPT

## 2020-10-30 PROCEDURE — 90935 HEMODIALYSIS ONE EVALUATION: CPT

## 2020-10-30 PROCEDURE — 80048 BASIC METABOLIC PNL TOTAL CA: CPT

## 2020-10-30 RX ADMIN — ACETAMINOPHEN 650 MG: 325 TABLET ORAL at 22:22

## 2020-10-30 RX ADMIN — PANTOPRAZOLE SODIUM 40 MG: 40 INJECTION, POWDER, LYOPHILIZED, FOR SOLUTION INTRAVENOUS at 16:45

## 2020-10-30 RX ADMIN — SKIN PROTECTANT: 44 OINTMENT TOPICAL at 11:38

## 2020-10-30 RX ADMIN — SODIUM CHLORIDE, PRESERVATIVE FREE 10 ML: 5 INJECTION INTRAVENOUS at 11:39

## 2020-10-30 RX ADMIN — SODIUM CHLORIDE, PRESERVATIVE FREE 10 ML: 5 INJECTION INTRAVENOUS at 16:45

## 2020-10-30 RX ADMIN — SUCRALFATE 1 G: 1 TABLET ORAL at 22:22

## 2020-10-30 RX ADMIN — METOPROLOL TARTRATE 50 MG: 50 TABLET, FILM COATED ORAL at 22:22

## 2020-10-30 RX ADMIN — SODIUM CHLORIDE, PRESERVATIVE FREE 10 ML: 5 INJECTION INTRAVENOUS at 05:20

## 2020-10-30 RX ADMIN — PANTOPRAZOLE SODIUM 40 MG: 40 INJECTION, POWDER, LYOPHILIZED, FOR SOLUTION INTRAVENOUS at 05:19

## 2020-10-30 RX ADMIN — ROSUVASTATIN CALCIUM 5 MG: 5 TABLET, FILM COATED ORAL at 22:22

## 2020-10-30 RX ADMIN — SUCRALFATE 1 G: 1 TABLET ORAL at 16:45

## 2020-10-30 RX ADMIN — METOPROLOL TARTRATE 50 MG: 50 TABLET, FILM COATED ORAL at 11:38

## 2020-10-30 RX ADMIN — SUCRALFATE 1 G: 1 TABLET ORAL at 11:38

## 2020-10-30 RX ADMIN — SODIUM CHLORIDE, SODIUM LACTATE, POTASSIUM CHLORIDE, CALCIUM CHLORIDE AND DEXTROSE MONOHYDRATE: 5; 600; 310; 30; 20 INJECTION, SOLUTION INTRAVENOUS at 05:21

## 2020-10-30 RX ADMIN — SODIUM CHLORIDE, PRESERVATIVE FREE 10 ML: 5 INJECTION INTRAVENOUS at 22:22

## 2020-10-30 RX ADMIN — SKIN PROTECTANT: 44 OINTMENT TOPICAL at 22:22

## 2020-10-30 RX ADMIN — EPOETIN ALFA-EPBX 3000 UNITS: 3000 INJECTION, SOLUTION INTRAVENOUS; SUBCUTANEOUS at 11:38

## 2020-10-30 ASSESSMENT — PAIN SCALES - GENERAL
PAINLEVEL_OUTOF10: 0
PAINLEVEL_OUTOF10: 0
PAINLEVEL_OUTOF10: 3
PAINLEVEL_OUTOF10: 0
PAINLEVEL_OUTOF10: 0

## 2020-10-30 NOTE — PROGRESS NOTES
Department of Internal Medicine  Nephrology Progress Note    Events reviewed. Patient seen on dialysis    SUBJECTIVE: We are following Tavares Mcclain for management of ESRD and HD. Reports no complaints. PHYSICAL EXAM:      Vitals:    VITALS:  /63   Pulse 68   Temp 98 °F (36.7 °C) (Oral)   Resp 18   Ht 5' 8\" (1.727 m)   Wt 139 lb 12.4 oz (63.4 kg)   SpO2 98%   BMI 21.25 kg/m²   24HR INTAKE/OUTPUT:      Intake/Output Summary (Last 24 hours) at 10/30/2020 1239  Last data filed at 10/30/2020 1044  Gross per 24 hour   Intake 1286 ml   Output 2000 ml   Net -714 ml       Access:  AVF left arm  Constitutional:  In no distress. HEENT: AT/NC, positive for hearing aids. NECK: supple  Respiratory:  CTAB  Cardiovascular/Edema:  RRR, S1/S2, no edema. Gastrointestinal:  Soft, +BS, nontender.    Musculoskeletal:  L hip with dressing s/p sammy arthroplasty  Neurologic:  AAOx3    Scheduled Meds:   mineral oil-hydrophilic petrolatum   Topical BID    sodium chloride  20 mL Intravenous Once    epoetin delmer-epbx  3,000 Units Subcutaneous Once per day on Mon Wed Fri    sodium chloride  250 mL Intravenous Once    doxazosin  4 mg Oral Nightly    metoprolol tartrate  50 mg Oral BID    rosuvastatin  5 mg Oral Nightly    sucralfate  1 g Oral 4x Daily    sodium chloride flush  10 mL Intravenous 2 times per day    pantoprazole  40 mg Intravenous Q12H    And    sodium chloride (PF)  10 mL Intravenous Q12H     Continuous Infusions:   dextrose 5% in lactated ringers 50 mL/hr at 10/30/20 0607     PRN Meds:.mineral oil-hydrophilic petrolatum **AND** mineral oil-hydrophilic petrolatum, QUEtiapine, sodium chloride flush, acetaminophen **OR** acetaminophen, promethazine **OR** ondansetron    DATA:    CBC with Differential:    Lab Results   Component Value Date    WBC 11.5 10/27/2020    RBC 2.02 10/27/2020    HGB 7.9 10/30/2020    HCT 23.4 10/30/2020     10/27/2020    .0 10/27/2020    MCH 31.7 10/27/2020 MCHC 30.5 10/27/2020    RDW 16.6 10/27/2020    NRBC 1.7 07/19/2020    LYMPHOPCT 12.5 10/27/2020    MONOPCT 7.0 10/27/2020    BASOPCT 0.6 10/27/2020    MONOSABS 0.80 10/27/2020    LYMPHSABS 1.44 10/27/2020    EOSABS 0.30 10/27/2020    BASOSABS 0.07 10/27/2020     CMP:    Lab Results   Component Value Date     10/30/2020    K 3.6 10/30/2020    K 5.1 10/28/2020     10/30/2020    CO2 24 10/30/2020    BUN 37 10/30/2020    CREATININE 4.5 10/30/2020    GFRAA 15 10/30/2020    LABGLOM 13 10/30/2020    GLUCOSE 91 10/30/2020    PROT 5.0 10/27/2020    LABALBU 2.5 10/27/2020    CALCIUM 8.2 10/30/2020    BILITOT 0.6 10/27/2020    ALKPHOS 87 10/27/2020    AST 16 10/27/2020    ALT <5 10/27/2020     Magnesium:    Lab Results   Component Value Date    MG 1.8 10/21/2020     Phosphorus:    Lab Results   Component Value Date    PHOS 3.7 10/18/2020     Radiology Review:    CT abdomen pelvis 10/27/2020   1.  No acute abnormality is seen in the abdomen or the pelvis. 2. Multilocular 2.1 x 2.0 x 2.6 cm cystic lesion in the uncinate process of    the pancreas.  Follow-up with pre and post contrast enhanced CT or MRI is    recommended in 6 months. 3. 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  Aortofemoral bypass graft is patent. 4. Simple and complex cysts in the kidneys bilaterally.  Underlying solid    lesion cannot be excluded.  Sonographic evaluation recommended. 5. Constipation.  No evidence of bowel obstruction. 6. Cholelithiasis. BRIEF SUMMARY OF INITIAL CONSULT:    Briefly Lloyd Mckenzie is 67 y.o. male with history of ESRD on HD MWF via left upper arm AVF (last HD yesterday at [facility]), HTN, HFpEF, admitted to [facility] in July for COVID-19 infection and experienced a 30 lb weight loss, who was recently admitted from 10/12-10/21 with herpes zoster encephalitis during which time he experienced a fall, fractured his left hip and underwent a left hip hemiarthroplasty on 10/16.  He was discharged to a SNF on 10/21 and presented again to the ER on 10/27 with complaints of bloody stools starting that morning. He was found to have a Hgb of 6.4 and was given 2 U PRBCs. He was admitted for further evaluation and treatment. We are consulted for management of ESRD and HD. His last HD was Monday. IMPRESSION/RECOMMENDATIONS:      ESRD:  Hemodialysis 3 times per week- MWF via AVF left arm. UGIB: melena started morning of 10/27 per patient, S/P EGD yesterday which revealed some distal esophageal changes however no ulcers or source of upper GI bleed. Hemoglobin is stable. Hypokalemia secondary to removal with dialysis and decreased p.o. intake. Potassium levels improved. 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 On Acyclovir 260 mg IV  (5mg/kg) Q24 hrs after dialysis. HTN: on doxazosin and lopressor  Left hip fracture, status post fall, s/p hemiarthroplasty October 16, 2020  BPH: on doxazosin  MBD of CKD, holding binders  Hypoalbuminemia/malnutrition  Normocytic anemia 2/2 ESRD and acute blood loss anemia in the setting of GIB. S/p 2 U PRBCs yesterday. Hemoglobin stable.  Continue epoetin alpha 3000 units 3 times a week  Diet, full liquids     PLAN:     HD 3 times weekly MWF   Discontinue IV fluids  Hold doxazosin  Continue to monitor H&H    Electronically signed by Lula Arreaga MD on 10/30/2020 at 12:39 PM

## 2020-10-30 NOTE — PROGRESS NOTES
Comprehensive Nutrition Assessment    Type and Reason for Visit:  Initial, Consult, Wound    Nutrition Recommendations/Plan: Advance diet when medically able. Will add ONS to promote optimal intakes for healing. Nutrition Assessment:  Pt admit w/GIB s/p EGD 10/28, s/p ABHISHEK 10/16, recent COVID (7/2020) w/subjective wt loss noted. Advance diet when medically able. Will add ONS to promote healing. Malnutrition Assessment:  Malnutrition Status: At risk for malnutrition (Comment)    Context:  Acute Illness     Findings of the 6 clinical characteristics of malnutrition:  Energy Intake:  Mild decrease in energy intake (Comment)  Weight Loss:  No significant weight loss(Per EMR hx wt stable X ~3 months.)     Body Fat Loss:  Unable to assess     Muscle Mass Loss:  Unable to assess    Fluid Accumulation:  No significant fluid accumulation     Strength:  Not Performed    Estimated Daily Nutrient Needs:  Energy (kcal):  8097-5927; Weight Used for Energy Requirements:  Admission     Protein (g):  105-115; Weight Used for Protein Requirements:  Admission(1.5-1.7 g/kg ABW)        Fluid (ml/day):  Per Renal Mgmt; Method Used for Fluid Requirements:  Standard Renal      Nutrition Related Findings:  A&O, abd WNL, +bs, +I/Os (+2.4L), no edema noted      Wounds:  Multiple, Stage II, Surgical Wound       Current Nutrition Therapies:    DIET FULL LIQUID;  Dietary Nutrition Supplements: Wound Healing Oral Supplement  Dietary Nutrition Supplements: Renal Oral Supplement    Anthropometric Measures:  · Height: 5' 8\" (172.7 cm)  · Current Body Weight: 140 lb (63.5 kg)(10/28 bed scale, 1st measured wt)   · Admission Body Weight: 140 lb (63.5 kg)(10/28 bed scale wt.)    · Usual Body Weight: 130 lb (59 kg)(7/13 bed scale wt. Subjective wt loss noted.  [place] d/t no wt hx EMR.)     · Ideal Body Weight: 154 lbs; % Ideal Body Weight 90.9 %   · BMI: 21.3  · Adjusted Body Weight:  ; No Adjustment   · Adjusted BMI:      · BMI Categories: Underweight (BMI less than 22) age over 72       Nutrition Diagnosis:   · Inadequate energy intake related to increase demand for energy/nutrients as evidenced by wounds      Nutrition Interventions:   Food and/or Nutrient Delivery:  Continue Current Diet, Start Oral Nutrition Supplement  Nutrition Education/Counseling:  No recommendation at this time   Coordination of Nutrition Care:  Continue to monitor while inpatient    Goals:  Advance diet when medically able. Nutrition Monitoring and Evaluation:   Behavioral-Environmental Outcomes:      Food/Nutrient Intake Outcomes:  Diet Advancement/Tolerance, Supplement Intake  Physical Signs/Symptoms Outcomes:  Biochemical Data, Nutrition Focused Physical Findings, Skin, Weight, Fluid Status or Edema     Discharge Planning:     Too soon to determine     Electronically signed by Isela Tyler RD, LD on 10/30/20 at 10:13 AM EDT    Contact:

## 2020-10-30 NOTE — PROGRESS NOTES
Occupational Therapy  OCCUPATIONAL THERAPY INITIAL EVALUATION      Date:10/30/2020  Patient Name: Emmanuel Murray  MRN: [id number]  : 1945  Room: [address] [place] [address]-A    Referring Provider:  Annemarie Avendano MD     Evaluating OT: Lisbeth Gauthier OTR/L #[id number]     AM-PAC Daily Activity Raw Score:     Recommended Adaptive Equipment:  TBD     Diagnosis: GI Bleed   Patient presented to the hospital with melena, weakness and low hgb       Surgery:  [facility] 10/16/20     Pertinent Medical History:    Past Medical History:   Diagnosis Date    Blind left eye     Chronic kidney disease     [facility]     Hemodialysis patient ([facility]     Hyperlipidemia     Hypertension       Precautions:  Falls, WBAT L LE, Hip precautions     Home Living: Pt admitted from a [facility] following fall and L HHA. Pt lives with wife in a 1 story condo with 1 AFSHAN   Bathroom setup: adding walk in shower with seat and elevated commode   Equipment owned: w/w    Prior Level of Function: Independent with ADLs , Independent with IADLs; ambulated without AD  (pt utilizing w/w prn following hospitalization for covid in July)  Driving: yes  Occupation: retired    Pain Level: Pt denies pain at rest this session, reports minimal pain in L hip with activity (did not quantify with number);  Therapist facilitated repositioning to address pain      Cognition: A&O: 4/4; Follows 2 step directions   Memory:  good   Sequencing:  good   Problem solving:  fair   Judgement/safety:  fair     Functional Assessment:   Initial Eval Status  Date: 10/30/20 Treatment Status  Date: Short Term Goals = Long Term Goals  Treatment frequency: 1-4x/wk; PRN   Feeding Independent       Grooming Minimal Assist     Seated at EOB  Modified Hunt    UB Dressing Minimal Assist   Supervision    LB Dressing Dependent   Moderate Assist    Bathing Maximal Assist  Moderate Assist    Toileting Dependent   Moderate Assist    Bed Mobility  Supine to sit: Maximal Assist   Sit to supine: Maximal Assist     HOB elevated; assist with L LE  Supine to sit: Minimal Assist   Sit to supine: Minimal Assist    Functional Transfers Maximal Assist     Sit to stand transfer from bed  Minimal Assist    Functional Mobility NT   Minimal Assist    Balance Sitting:     Static:  Min A    Dynamic:min/mod A (L lateral lean)  Standing: max     Activity Tolerance F-      F+   Visual/  Perceptual Glasses: yes  L eye blind                  Hand dominance: right     Strength ROM Additional Info:    RUE   3+/5 wfl good  and wfl FMC/dexterity noted during ADL tasks     LUE 3+/5 wfl good  and wfl FMC/dexterity noted during ADL tasks     Hearing: wfl  Sensation:wfl  Tone: wfl  Edema:none noted                             Comments: Upon arrival, patient supine in bed and agreeable to OT Session. Pt demonstrating fair understanding of education/techniques, requiring additional training / education. At end of session, patient semi-supine in bed with call light and phone within reach, all lines and tubes intact. Pt would benefit from continued skilled OT to increase safety,  independence and quality of life. Treatment:  OT services provided: Therapist educated pt on role of OT. Therapist facilitated bed mobility, sitting balance at EOB (min -mod A),  functional sit to stand transfer, standing tolerance task with B HHA assist - skilled cuing on sequencing, hand / LE positioning, body mechanics and safety. Therapist facilitated self-care retraining: UB/LB self-care tasks, toileting hygiene task and seated task educating pt on modified techniques, posture, safety and energy conservation techniques. Skilled monitoring of HR, O2 sats and pts response to treatment - cuing on pursed lip breathing.        Assessment of current deficits    Functional mobility [x]  ADLs [x] Strength [x]  Cognition []  Functional transfers  [x] IADLs [x] Safety Awareness [x]  Endurance [x]  Fine Motor Coordination [] Balance [x] Vision/perception [] Sensation []   Gross Motor Coordination [] ROM [] Delirium []                  Motor Control []      Plan of Care:  1-4 days/wk for 1-2 weeks PRN   [x]ADL retraining: adapted techniques and AE recommendations to increase independence within precautions                    [x]Energy conservation techniques to improve tolerance for self care routine   [x]Functional transfer/mobility training for fall prevention & DME recommendations         [x]Patient/family education to increase safety and functional independence             [x]Environmental modifications for safe mobility and completion of ADLs                             []Cognitive retraining ex's to improve problem solving skills & safe participation in ADLs/IADLs     []Sensory re-education techniques to improve extremity awareness, maintain skin integrity and improve hand function                             []Visual/Perceptual retraining ex's to improve body awareness and safety during transfers and ADLs  []Splinting/postioning needs to maintain joint/skin integrity and prevent contractures  [x]Therapeutic activity to improve functional skills. [x]Therapeutic exercise to improve tolerance for ADLs; [facility] skills  [x]Balance retraining ex's for postural control during ADLs with dynamic challenges. []Neuromuscular re-education: facilitation of righting/equilibrium reactions, midline orientation, scapular stability/mobility, Normalization muscle tone/active functional    movement/Attention                         []Delirium prevention/treatment    []Positioning to improve functional independence and skin integrity  []Other:       Rehab Potential: Good for established goals     Patient / Family Goal:   Return to rehab    Patient and/or family were instructed on functional diagnosis, prognosis/goals and OT plan of care. Demonstrated fair understanding.       AM-PAC Daily Activity Inpatient   How much help for putting on and taking off regular lower body clothing?: Total  How much help for Bathing?: A Lot  How much help for Toileting?: Total  How much help for putting on and taking off regular upper body clothing?: A Little  How much help for taking care of personal grooming?: A Little  How much help for eating meals?: None  AM-PAC Inpatient Daily Activity Raw Score: 14  AM-PAC Inpatient ADL T-Scale Score : 33.39  ADL Inpatient CMS 0-100% Score: 59.67  ADL Inpatient CMS G-Code Modifier : CK         Eval Complexity: mod  Profile and History- med (extensive chart review)  Assessment of Occupational Performance and Identification of Deficits- med  Clinical Decision Making- med     Time In: 1500  Time Out: 1525  Total Treatment Time: 14 minutes    Min Units   OT Eval Low 80596       OT Eval Medium 97166  x 1   OT Eval High 79458       OT Re-Eval [id number]       Therapeutic Ex 04617       Therapeutic Activities 46180  11  1   ADL/Self Care 73492  3     Orthotic Management 31002       Neuro Re-Ed 85643       Non-Billable Time          Evaluation Time includes thorough review of current medical information, gathering information on past medical history/social history and prior level of function, completion of standardized testing/informal observation of tasks, assessment of data and education on plan of care and goals.            Emily Ordonez OTR/L #[id number]

## 2020-10-30 NOTE — PROGRESS NOTES
Occupational Therapy  Attempted OT eval at b/s, however, pt was off the unit for Dialysis. Will attempt OT assessment at a later time.   Thank you for this referral.  WILBER Goetz, OTR/L  # [id number]

## 2020-10-30 NOTE — CARE COORDINATION
SOV [place] following. Precert needed to return. Facility to initiate precert as soon as therapy evals are posted. Call placed to [phone] for treats today. Covid 10/28 (-).  Ambulance form on soft chart  Mervat Crandall RN  [facility] Case Management  [phone]

## 2020-10-30 NOTE — FLOWSHEET NOTE
10/30/20 1044   Vital Signs   BP (!) 120/56   Temp 98.2 °F (36.8 °C)   Pulse 69   Resp 20   Pain Assessment   Pain Assessment 0-10   Pain Level 0   Post-Hemodialysis Assessment   Post-Treatment Procedures Blood returned; Access bleeding time < 10 minutes   Machine Disinfection Process Acid/Vinegar Clean;Exterior Machine Disinfection; Heat Disinfect   Dialyzer Clearance Lightly streaked   Duration of Treatment (minutes) 195 minutes   Hemodialysis Intake (ml) 300 ml   Hemodialysis Output (ml) 2000 ml   NET Removed (ml) 1700 ml   Tolerated Treatment Good   Patient Response to Treatment Pt tolerated treatment well. needles removed, Pressure held to sites until bleeding stopped. report to [facility].

## 2020-10-30 NOTE — PROGRESS NOTES
GENERAL SURGERY  DAILY PROGRESS NOTE  10/30/2020    Chief Complaint   Patient presents with    Rectal Bleeding     started this am       Subjective:  Pt states he feels good. Denies any abdominal pain or N/V. Denies having any BMs. He did receive 1U pRBC overnight. Objective:  BP (!) 152/63   Pulse 71   Temp 99.1 °F (37.3 °C) (Temporal)   Resp 16   Ht 5' 8\" (1.727 m)   Wt 139 lb 12.4 oz (63.4 kg)   SpO2 97%   BMI 21.25 kg/m²     General Appearance:  awake, alert, oriented, in no acute distress  Head/face:  NCAT  Eyes:  No gross abnormalities. Lungs:  No increased work of breathing on room air  Heart:  RR and normotensive  Abdomen: Midline abdominal scar healed. Soft, non-tender, non-distended    Assessment/Plan:  67 y.o. male with melena and anemia, likely due to upper GI bleed. Hx of COVID+ July 2020 and melena, at that time EGD revealed esophageal ulceration and friable tissue. EGD with some distal esophageal changes, but no ulcers or source of upper GI bleed. CTA did not reveal any source of GI bleed. - Okay for FLD, advance as tolerated  - Received 1u pRBC overnight, possibly equilibrating as pt has had no active signs of GI bleeding  - PPI and Carafate  - Monitor H/H, transfuse PRN per primary  - Pain control PRN  - Antiemetic PRN  - Replete lytes PRN    Electronically signed by Daria Subramanian MD on 10/30/2020 at 7:05 AM     The patient was seen and examined and the chart was reviewed. I agree with the assessment and plan. The CTA did not reveal an active GI source. The plan will be to continue monitoring the patient's clinical status and hemoglobin and hematocrit.

## 2020-10-30 NOTE — PROGRESS NOTES
ROM    Patient education  Pt educated on PT role, safety with mobility, hip precautions and postural reducation. Education provided on benefits of OOB activity to prevent iatrogenic effects. Patient response to education:   Pt verbalized understanding Pt demonstrated skill Pt requires further education in this area   Yes Requires assist yes     ASSESSMENT:    Comments:  Pt was cleared by RN prior to PT evaluation. Pt was received supine and was agreeable to PT evaluation. Pt required increased time for all mobility. Pt c/o discomfort from position in bed. Pt unable to recall hip precautions and pt and pt's wife educated. Pt found to be soiled and rolling performed for pt care to be completed. Pt required assist for BLE and trunk with transfer to sitting. Pt with kyphotic posture and posterior LOB that he was unable to correct without cueing or assist.  Balance improved with BUE support on rails, but pt unable to maintain position and quickly reverted back to needing assist.  STS attempted from elevated bed with pt unable to clear B hips from bed surface. Pt returned to supine and repositioned with B heel protectors donned. Pt was left with call button within reach and all needs met. Treatment:  Patient practiced and was instructed in the following treatment:     Bed mobility training - pt given verbal and tactile cues to facilitate proper sequencing and safety during rolling and supine<>sit as well as provided with physical assistance to complete task    Sitting Balance EOB for >8 minutes for improved postural awareness, upright tolerance, effective breathing and decreased in light headedness. Physical assist to maintain upright posture and to correct posterior LOB. · Therapeutic Exercises/Activities as noted above.  Patient education provided with focus on upright tolerance, safety, correcting posterior LOB and benefits of participating with therapy. Pt's/ family goals   1.  To return to PLOF    Patient and or family understand(s) diagnosis, prognosis, and plan of care. Yes     PLAN OF CARE:    Current Treatment Recommendations     [x] Strengthening     [x] ROM   [x] Balance Training   [x] Endurance Training   [x] Transfer Training   [x] Gait Training   [] Stair Training   [x] Positioning   [x] Safety and Education Training   [x] Patient/Caregiver Education   [] HEP  [] Other     Frequency of treatments: 2-5x/week x 1-2 weeks. Time in  1325  Time out  1400    Total Treatment Time  23 minutes     Evaluation Time includes thorough review of current medical information, gathering information on past medical history/social history and prior level of function, completion of standardized testing/informal observation of tasks, assessment of data and education on plan of care and goals.     CPT codes:  [] Low Complexity PT evaluation 64611  [x] Moderate Complexity PT evaluation 51477  [] High Complexity PT evaluation 10976  [] PT Re-evaluation 82906  [] Gait training 34907 - minutes  [] Manual therapy 16425 - minutes  [x] Therapeutic activities 73769 23 minutes  [] Therapeutic exercises 12283 - minutes  [] Neuromuscular reeducation 69846 - minutes     Alyse Baca, PT, DPT  License [id number]

## 2020-10-30 NOTE — PROGRESS NOTES
Subjective:  Feeling same no complaint  No CP or SOB  No fever or chills   No uncontrolled pain  No vomiting or diarrhea     Objective:    /63   Pulse 68   Temp 98 °F (36.7 °C) (Oral)   Resp 18   Ht 5' 8\" (1.727 m)   Wt 139 lb 12.4 oz (63.4 kg)   SpO2 98%   BMI 21.25 kg/m²     24HR INTAKE/OUTPUT:      Intake/Output Summary (Last 24 hours) at 10/30/2020 1232  Last data filed at 10/30/2020 1044  Gross per 24 hour   Intake 1286 ml   Output 2000 ml   Net -714 ml       General appearance: NAD, conversant  Neck: FROM, supple   Lungs: Clear bilaterally no wheezes, no rhonchi, no crackles  CV: RRR, no MRGs; normal carotid upstroke and amplitude without Bruits  Abdomen: Soft, non-tender; no masses or HSM  Extremities: No edema, no cyanosis, no clubbing  Skin: Intact no rash, no lesions, no ulcers    Psych: Alert and oriented normal affect  Neuro: Nonfocal  Most Recent Labs  Lab Results   Component Value Date    WBC 11.5 10/27/2020    HGB 7.9 (L) 10/30/2020    HCT 23.4 (L) 10/30/2020     10/27/2020     10/30/2020    K 3.6 10/30/2020     10/30/2020    CREATININE 4.5 (H) 10/30/2020    BUN 37 (H) 10/30/2020    CO2 24 10/30/2020    GLUCOSE 91 10/30/2020    ALT <5 10/27/2020    AST 16 10/27/2020    INR 1.1 10/27/2020     No results for input(s): MG in the last 72 hours. Lab Results   Component Value Date    CALCIUM 8.2 (L) 10/30/2020    PHOS 3.7 10/18/2020        CTA ABDOMEN PELVIS W CONTRAST   Final Result   No evidence of active GI bleeding in the arterial phase. Atherosclerotic   disease involving the mesenteric arteries which may predispose to chronic   intestinal ischemia. Rectal wall thickening without clear luminal compromise. The possibility of   proctocolitis is raised. Multiple renal lesions which are likely hyperdense and hypodense cysts. CT ABDOMEN PELVIS W IV CONTRAST Additional Contrast? None   Final Result   1.   No acute abnormality is seen in the abdomen or the

## 2020-10-31 ENCOUNTER — APPOINTMENT (OUTPATIENT)
Dept: NUCLEAR MEDICINE | Age: 75
DRG: 377 | End: 2020-10-31
Payer: MEDICARE

## 2020-10-31 LAB
ANION GAP SERPL CALCULATED.3IONS-SCNC: 7 MMOL/L (ref 7–16)
BUN BLDV-MCNC: 19 MG/DL (ref 8–23)
CALCIUM SERPL-MCNC: 8 MG/DL (ref 8.6–10.2)
CHLORIDE BLD-SCNC: 100 MMOL/L (ref 98–107)
CO2: 28 MMOL/L (ref 22–29)
CREAT SERPL-MCNC: 3.1 MG/DL (ref 0.7–1.2)
GFR AFRICAN AMERICAN: 24
GFR NON-AFRICAN AMERICAN: 20 ML/MIN/1.73
GLUCOSE BLD-MCNC: 82 MG/DL (ref 74–99)
HCT VFR BLD CALC: 22.5 % (ref 37–54)
HCT VFR BLD CALC: 23 % (ref 37–54)
HCT VFR BLD CALC: 25.2 % (ref 37–54)
HEMOGLOBIN: 7.3 G/DL (ref 12.5–16.5)
HEMOGLOBIN: 7.4 G/DL (ref 12.5–16.5)
HEMOGLOBIN: 7.9 G/DL (ref 12.5–16.5)
POTASSIUM SERPL-SCNC: 3.5 MMOL/L (ref 3.5–5)
SODIUM BLD-SCNC: 135 MMOL/L (ref 132–146)

## 2020-10-31 PROCEDURE — 78278 ACUTE GI BLOOD LOSS IMAGING: CPT

## 2020-10-31 PROCEDURE — C9113 INJ PANTOPRAZOLE SODIUM, VIA: HCPCS | Performed by: INTERNAL MEDICINE

## 2020-10-31 PROCEDURE — 36415 COLL VENOUS BLD VENIPUNCTURE: CPT

## 2020-10-31 PROCEDURE — 80048 BASIC METABOLIC PNL TOTAL CA: CPT

## 2020-10-31 PROCEDURE — 85018 HEMOGLOBIN: CPT

## 2020-10-31 PROCEDURE — 85014 HEMATOCRIT: CPT

## 2020-10-31 PROCEDURE — 2060000000 HC ICU INTERMEDIATE R&B

## 2020-10-31 PROCEDURE — 6370000000 HC RX 637 (ALT 250 FOR IP): Performed by: INTERNAL MEDICINE

## 2020-10-31 PROCEDURE — 6360000002 HC RX W HCPCS: Performed by: INTERNAL MEDICINE

## 2020-10-31 PROCEDURE — 78278 ACUTE GI BLOOD LOSS IMAGING: CPT | Performed by: RADIOLOGY

## 2020-10-31 PROCEDURE — 6370000000 HC RX 637 (ALT 250 FOR IP): Performed by: STUDENT IN AN ORGANIZED HEALTH CARE EDUCATION/TRAINING PROGRAM

## 2020-10-31 PROCEDURE — 2580000003 HC RX 258: Performed by: INTERNAL MEDICINE

## 2020-10-31 RX ORDER — PANTOPRAZOLE SODIUM 40 MG/1
40 TABLET, DELAYED RELEASE ORAL
Status: DISCONTINUED | OUTPATIENT
Start: 2020-10-31 | End: 2020-11-03 | Stop reason: HOSPADM

## 2020-10-31 RX ADMIN — METOPROLOL TARTRATE 50 MG: 50 TABLET, FILM COATED ORAL at 21:04

## 2020-10-31 RX ADMIN — METOPROLOL TARTRATE 50 MG: 50 TABLET, FILM COATED ORAL at 09:36

## 2020-10-31 RX ADMIN — PANTOPRAZOLE SODIUM 40 MG: 40 TABLET, DELAYED RELEASE ORAL at 15:45

## 2020-10-31 RX ADMIN — SKIN PROTECTANT: 44 OINTMENT TOPICAL at 09:37

## 2020-10-31 RX ADMIN — SODIUM CHLORIDE, PRESERVATIVE FREE 10 ML: 5 INJECTION INTRAVENOUS at 21:04

## 2020-10-31 RX ADMIN — PANTOPRAZOLE SODIUM 40 MG: 40 INJECTION, POWDER, LYOPHILIZED, FOR SOLUTION INTRAVENOUS at 05:52

## 2020-10-31 RX ADMIN — SUCRALFATE 1 G: 1 TABLET ORAL at 21:04

## 2020-10-31 RX ADMIN — ACETAMINOPHEN 650 MG: 325 TABLET ORAL at 05:53

## 2020-10-31 RX ADMIN — SKIN PROTECTANT: 44 OINTMENT TOPICAL at 21:06

## 2020-10-31 RX ADMIN — ROSUVASTATIN CALCIUM 5 MG: 5 TABLET, FILM COATED ORAL at 21:04

## 2020-10-31 RX ADMIN — SODIUM CHLORIDE, PRESERVATIVE FREE 10 ML: 5 INJECTION INTRAVENOUS at 09:37

## 2020-10-31 RX ADMIN — SUCRALFATE 1 G: 1 TABLET ORAL at 09:36

## 2020-10-31 RX ADMIN — SUCRALFATE 1 G: 1 TABLET ORAL at 17:01

## 2020-10-31 RX ADMIN — SODIUM CHLORIDE, PRESERVATIVE FREE 10 ML: 5 INJECTION INTRAVENOUS at 05:52

## 2020-10-31 ASSESSMENT — PAIN SCALES - GENERAL
PAINLEVEL_OUTOF10: 0
PAINLEVEL_OUTOF10: 3
PAINLEVEL_OUTOF10: 6

## 2020-10-31 NOTE — PROGRESS NOTES
Department of Internal Medicine  Nephrology Progress Note    Events reviewed. SUBJECTIVE: We are following Cheryl Soto for management of ESRD and HD. Reports no complaints. Received 1 unit PRBC overnight. Still having Melena. PHYSICAL EXAM:      Vitals:    VITALS:  /63   Pulse 68   Temp 98.3 °F (36.8 °C) (Temporal)   Resp 18   Ht 5' 8\" (1.727 m)   Wt 139 lb 12.4 oz (63.4 kg)   SpO2 93%   BMI 21.25 kg/m²   24HR INTAKE/OUTPUT:      Intake/Output Summary (Last 24 hours) at 10/31/2020 1115  Last data filed at 10/30/2020 1852  Gross per 24 hour   Intake 836 ml   Output 0 ml   Net 836 ml       Access:  AVF left arm  Constitutional:  In no distress. HEENT: AT/NC, positive for hearing aids. NECK: supple  Respiratory:  CTAB  Cardiovascular/Edema:  RRR, S1/S2, no edema. Gastrointestinal:  Soft, +BS, nontender.    Musculoskeletal:  L hip with dressing s/p sammy arthroplasty  Neurologic:  AAOx3    Scheduled Meds:   pantoprazole  40 mg Oral BID AC    mineral oil-hydrophilic petrolatum   Topical BID    sodium chloride  20 mL Intravenous Once    epoetin delmer-epbx  3,000 Units Subcutaneous Once per day on Mon Wed Fri    sodium chloride  250 mL Intravenous Once    [Held by provider] doxazosin  4 mg Oral Nightly    metoprolol tartrate  50 mg Oral BID    rosuvastatin  5 mg Oral Nightly    sucralfate  1 g Oral 4x Daily    sodium chloride flush  10 mL Intravenous 2 times per day     Continuous Infusions:    PRN Meds:.technetium labeled red blood cells, mineral oil-hydrophilic petrolatum **AND** mineral oil-hydrophilic petrolatum, QUEtiapine, sodium chloride flush, acetaminophen **OR** acetaminophen, promethazine **OR** ondansetron    DATA:    CBC with Differential:    Lab Results   Component Value Date    WBC 11.5 10/27/2020    RBC 2.02 10/27/2020    HGB 7.4 10/31/2020    HCT 23.0 10/31/2020     10/27/2020    .0 10/27/2020    MCH 31.7 10/27/2020    MCHC 30.5 10/27/2020    RDW 16.6 10/27/2020    NRBC 1.7 07/19/2020    LYMPHOPCT 12.5 10/27/2020    MONOPCT 7.0 10/27/2020    BASOPCT 0.6 10/27/2020    MONOSABS 0.80 10/27/2020    LYMPHSABS 1.44 10/27/2020    EOSABS 0.30 10/27/2020    BASOSABS 0.07 10/27/2020     CMP:    Lab Results   Component Value Date     10/31/2020    K 3.5 10/31/2020    K 5.1 10/28/2020     10/31/2020    CO2 28 10/31/2020    BUN 19 10/31/2020    CREATININE 3.1 10/31/2020    GFRAA 24 10/31/2020    LABGLOM 20 10/31/2020    GLUCOSE 82 10/31/2020    PROT 5.0 10/27/2020    LABALBU 2.5 10/27/2020    CALCIUM 8.0 10/31/2020    BILITOT 0.6 10/27/2020    ALKPHOS 87 10/27/2020    AST 16 10/27/2020    ALT <5 10/27/2020     Magnesium:    Lab Results   Component Value Date    MG 1.8 10/21/2020     Phosphorus:    Lab Results   Component Value Date    PHOS 3.7 10/18/2020     Radiology Review:    CT abdomen pelvis 10/27/2020   1.  No acute abnormality is seen in the abdomen or the pelvis. 2. Multilocular 2.1 x 2.0 x 2.6 cm cystic lesion in the uncinate process of    the pancreas.  Follow-up with pre and post contrast enhanced CT or MRI is    recommended in 6 months. 3. 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  Aortofemoral bypass graft is patent. 4. Simple and complex cysts in the kidneys bilaterally.  Underlying solid    lesion cannot be excluded.  Sonographic evaluation recommended. 5. Constipation.  No evidence of bowel obstruction. 6. Cholelithiasis. BRIEF SUMMARY OF INITIAL CONSULT:    Briefly Ne Duran is 67 y.o. male with history of ESRD on HD MWF via left upper arm AVF (last HD yesterday at [facility], [place]), HTN, HFpEF, admitted to [facility] in July for COVID-19 infection and experienced a 30 lb weight loss, who was recently admitted from 10/12-10/21 with herpes zoster encephalitis during which time he experienced a fall, fractured his left hip and underwent a left hip hemiarthroplasty on 10/16.  He was discharged to a SNF on 10/21 and presented again to the ER on 10/27 with complaints of bloody stools starting that morning. He was found to have a Hgb of 6.4 and was given 2 U PRBCs. He was admitted for further evaluation and treatment. We are consulted for management of ESRD and HD. His last HD was Monday. IMPRESSION/RECOMMENDATIONS:      ESRD:  Hemodialysis 3 times per week- MWF via AVF left arm. UGIB: melena started morning of 10/27 per patient, S/P EGD yesterday which revealed some distal esophageal changes however no ulcers or source of upper GI bleed. Hemoglobin is stable. Hypokalemia secondary to removal with dialysis and decreased p.o. intake. Potassium levels improved. 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 On Acyclovir 260 mg IV  (5mg/kg) Q24 hrs after dialysis. HTN: on doxazosin and lopressor  Left hip fracture, status post fall, s/p hemiarthroplasty October 16, 2020  BPH: on doxazosin  MBD of CKD, holding binders  Hypoalbuminemia/malnutrition  Normocytic anemia 2/2 ESRD and acute blood loss anemia in the setting of GIB. S/p 2 U PRBCs yesterday. Hemoglobin stable.  Continue epoetin alpha 3000 units 3 times a week  Diet, full liquids     PLAN:     HD 3 times weekly MWF   Continue to Hold doxazosin  Continue to monitor H&H    Electronically signed by TOMMY Manley CNP on 10/31/2020 at 11:15 AM

## 2020-10-31 NOTE — PROGRESS NOTES
Subjective:  No acute complaints  Had bleeding studies this morning  Has not had lunch yet  Just feels weak and tired  1 bloody bowel movement last night    Objective:    /63   Pulse 68   Temp 98.3 °F (36.8 °C) (Temporal)   Resp 18   Ht 5' 8\" (1.727 m)   Wt 139 lb 12.4 oz (63.4 kg)   SpO2 93%   BMI 21.25 kg/m²     24HR INTAKE/OUTPUT:      Intake/Output Summary (Last 24 hours) at 10/31/2020 1413  Last data filed at 10/30/2020 1852  Gross per 24 hour   Intake 360 ml   Output 0 ml   Net 360 ml       General appearance: NAD, conversant  Neck: FROM, supple   Lungs: Clear bilaterally no wheezes, no rhonchi, no crackles  CV: RRR, no MRGs; normal carotid upstroke and amplitude without Bruits  Abdomen: Soft, non-tender; no masses or HSM  Extremities: No edema, no cyanosis, no clubbing  Skin: Intact no rash, no lesions, no ulcers    Psych: Alert and oriented normal affect  Neuro: Nonfocal  Most Recent Labs  Lab Results   Component Value Date    WBC 11.5 10/27/2020    HGB 7.4 (L) 10/31/2020    HCT 23.0 (L) 10/31/2020     10/27/2020     10/31/2020    K 3.5 10/31/2020     10/31/2020    CREATININE 3.1 (H) 10/31/2020    BUN 19 10/31/2020    CO2 28 10/31/2020    GLUCOSE 82 10/31/2020    ALT <5 10/27/2020    AST 16 10/27/2020    INR 1.1 10/27/2020     No results for input(s): MG in the last 72 hours. Lab Results   Component Value Date    CALCIUM 8.0 (L) 10/31/2020    PHOS 3.7 10/18/2020        CTA ABDOMEN PELVIS W CONTRAST   Final Result   No evidence of active GI bleeding in the arterial phase. Atherosclerotic   disease involving the mesenteric arteries which may predispose to chronic   intestinal ischemia. Rectal wall thickening without clear luminal compromise. The possibility of   proctocolitis is raised. Multiple renal lesions which are likely hyperdense and hypodense cysts. CT ABDOMEN PELVIS W IV CONTRAST Additional Contrast? None   Final Result   1.   No acute abnormality is seen in the abdomen or the pelvis. 2. Multilocular 2.1 x 2.0 x 2.6 cm cystic lesion in the uncinate process of   the pancreas. Follow-up with pre and post contrast enhanced CT or MRI is   recommended in 6 months. 3. 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 Aortofemoral bypass graft is patent. 4. Simple and complex cysts in the kidneys bilaterally. Underlying solid   lesion cannot be excluded. Sonographic evaluation recommended. 5. Constipation. No evidence of bowel obstruction. 6. Cholelithiasis. NM GI BLOOD LOSS    (Results Pending)   CTA abd reviewed 10/30    Assessment    Principal Problem:    Acute blood loss anemia  Active Problems:    ESRD (end stage renal disease) ([facility] [address]    Hypertension    Hyperlipidemia    Severe protein-calorie malnutrition POA    GI bleeding  Resolved Problems:    * No resolved hospital problems.  *      Plan:  70-year-old male history of end-stage renal disease admitted with GI bleeding and anemia status post EGD 10/28 showing change in distal esophagus however no definitive bleeding source       Transfused on admission and 1UPRBC 10/29  Advance diet  Monitor H&H-- 7.9 this morning  Transfuse PRN maintain hemoglobin >= 7-currently 7.4 and stable  IV PPI\  BUN/creatinine 19/3.1-improving daily  aVoid hypotension  Surgery and renal following  Await bleeding scans results      Discussed with wife at bedside   medications for other co morbidities cont as appropriate w dosage adjustments as necessary  DVT PPx SCD  DC planning     Electronically signed by Alena Gitelman, MD on 10/31/2020 at 2:13 PM

## 2020-10-31 NOTE — PROGRESS NOTES
GENERAL SURGERY  DAILY PROGRESS NOTE  10/31/2020    Chief Complaint   Patient presents with    Rectal Bleeding     started this am       Subjective:  No acute events  Pain controlled  Tolerating diet  Denied n/v  Still with melena x1. Received a unit of blood overnight. Objective:  /63   Pulse 68   Temp 98.3 °F (36.8 °C) (Temporal)   Resp 18   Ht 5' 8\" (1.727 m)   Wt 139 lb 12.4 oz (63.4 kg)   SpO2 93%   BMI 21.25 kg/m²     General Appearance:  awake, alert, oriented, in no acute distress  Head/face:  NCAT  Eyes:  No gross abnormalities. Lungs:  No increased work of breathing on room air  Heart:  RR and normotensive  Abdomen: Midline abdominal scar healed. Soft, non-tender, non-distended    Assessment/Plan:  67 y.o. male with melena and anemia, likely due to upper GI bleed. Hx of COVID+ July 2020 and melena, at that time EGD revealed esophageal ulceration and friable tissue. EGD with some distal esophageal changes, but no ulcers or source of upper GI bleed. CTA did not reveal any source of GI bleed. Pain and nausea control PRN  Diet was tolerated  PPI BID  Monitor Hgb. Transfuse if hgb <7 or symptomatic. Will order bleeding scan with continued melena. Electronically signed by Jesu Hernández MD on 10/31/2020 at 9:19 AM     The patient was seen and examined and the chart was reviewed. I agree with the assessment and plan. The patient will have a bleeding scan obtained. If that scan is normal and the patient can be discharged and followed up as an outpatient.

## 2020-11-01 LAB
ALBUMIN SERPL-MCNC: 2.2 G/DL (ref 3.5–5.2)
ALP BLD-CCNC: 84 U/L (ref 40–129)
ALT SERPL-CCNC: 5 U/L (ref 0–40)
ANION GAP SERPL CALCULATED.3IONS-SCNC: 10 MMOL/L (ref 7–16)
AST SERPL-CCNC: 16 U/L (ref 0–39)
BILIRUB SERPL-MCNC: 0.4 MG/DL (ref 0–1.2)
BUN BLDV-MCNC: 35 MG/DL (ref 8–23)
CALCIUM SERPL-MCNC: 8.4 MG/DL (ref 8.6–10.2)
CHLORIDE BLD-SCNC: 101 MMOL/L (ref 98–107)
CO2: 28 MMOL/L (ref 22–29)
CREAT SERPL-MCNC: 4.7 MG/DL (ref 0.7–1.2)
GFR AFRICAN AMERICAN: 15
GFR NON-AFRICAN AMERICAN: 12 ML/MIN/1.73
GLUCOSE BLD-MCNC: 76 MG/DL (ref 74–99)
HCT VFR BLD CALC: 24.4 % (ref 37–54)
HCT VFR BLD CALC: 26.8 % (ref 37–54)
HEMOGLOBIN: 7.8 G/DL (ref 12.5–16.5)
HEMOGLOBIN: 8.5 G/DL (ref 12.5–16.5)
POTASSIUM SERPL-SCNC: 4.2 MMOL/L (ref 3.5–5)
SODIUM BLD-SCNC: 139 MMOL/L (ref 132–146)
TOTAL PROTEIN: 4.4 G/DL (ref 6.4–8.3)

## 2020-11-01 PROCEDURE — 97530 THERAPEUTIC ACTIVITIES: CPT

## 2020-11-01 PROCEDURE — 80053 COMPREHEN METABOLIC PANEL: CPT

## 2020-11-01 PROCEDURE — 36415 COLL VENOUS BLD VENIPUNCTURE: CPT

## 2020-11-01 PROCEDURE — P9047 ALBUMIN (HUMAN), 25%, 50ML: HCPCS | Performed by: INTERNAL MEDICINE

## 2020-11-01 PROCEDURE — 97110 THERAPEUTIC EXERCISES: CPT

## 2020-11-01 PROCEDURE — 6370000000 HC RX 637 (ALT 250 FOR IP): Performed by: INTERNAL MEDICINE

## 2020-11-01 PROCEDURE — 2580000003 HC RX 258: Performed by: INTERNAL MEDICINE

## 2020-11-01 PROCEDURE — 2060000000 HC ICU INTERMEDIATE R&B

## 2020-11-01 PROCEDURE — 6370000000 HC RX 637 (ALT 250 FOR IP): Performed by: STUDENT IN AN ORGANIZED HEALTH CARE EDUCATION/TRAINING PROGRAM

## 2020-11-01 PROCEDURE — 85014 HEMATOCRIT: CPT

## 2020-11-01 PROCEDURE — 97535 SELF CARE MNGMENT TRAINING: CPT

## 2020-11-01 PROCEDURE — 85018 HEMOGLOBIN: CPT

## 2020-11-01 PROCEDURE — 6360000002 HC RX W HCPCS: Performed by: INTERNAL MEDICINE

## 2020-11-01 RX ORDER — ALBUMIN (HUMAN) 12.5 G/50ML
25 SOLUTION INTRAVENOUS 3 TIMES DAILY
Status: COMPLETED | OUTPATIENT
Start: 2020-11-01 | End: 2020-11-02

## 2020-11-01 RX ADMIN — METOPROLOL TARTRATE 50 MG: 50 TABLET, FILM COATED ORAL at 10:40

## 2020-11-01 RX ADMIN — SUCRALFATE 1 G: 1 TABLET ORAL at 12:37

## 2020-11-01 RX ADMIN — METOPROLOL TARTRATE 50 MG: 50 TABLET, FILM COATED ORAL at 21:43

## 2020-11-01 RX ADMIN — SKIN PROTECTANT: 44 OINTMENT TOPICAL at 21:43

## 2020-11-01 RX ADMIN — ALBUMIN (HUMAN) 25 G: 0.25 INJECTION, SOLUTION INTRAVENOUS at 21:42

## 2020-11-01 RX ADMIN — SUCRALFATE 1 G: 1 TABLET ORAL at 17:33

## 2020-11-01 RX ADMIN — SKIN PROTECTANT: 44 OINTMENT TOPICAL at 10:40

## 2020-11-01 RX ADMIN — SODIUM CHLORIDE, PRESERVATIVE FREE 10 ML: 5 INJECTION INTRAVENOUS at 21:43

## 2020-11-01 RX ADMIN — SODIUM CHLORIDE, PRESERVATIVE FREE 10 ML: 5 INJECTION INTRAVENOUS at 10:40

## 2020-11-01 RX ADMIN — PANTOPRAZOLE SODIUM 40 MG: 40 TABLET, DELAYED RELEASE ORAL at 17:33

## 2020-11-01 RX ADMIN — ROSUVASTATIN CALCIUM 5 MG: 5 TABLET, FILM COATED ORAL at 21:43

## 2020-11-01 RX ADMIN — ALBUMIN (HUMAN) 25 G: 0.25 INJECTION, SOLUTION INTRAVENOUS at 17:33

## 2020-11-01 RX ADMIN — SUCRALFATE 1 G: 1 TABLET ORAL at 21:43

## 2020-11-01 RX ADMIN — SUCRALFATE 1 G: 1 TABLET ORAL at 10:40

## 2020-11-01 RX ADMIN — PANTOPRAZOLE SODIUM 40 MG: 40 TABLET, DELAYED RELEASE ORAL at 06:55

## 2020-11-01 ASSESSMENT — PAIN SCALES - GENERAL
PAINLEVEL_OUTOF10: 0
PAINLEVEL_OUTOF10: 0

## 2020-11-01 NOTE — PROGRESS NOTES
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 Prior to hospitalization for Covid, pt lives with his wife in a 1 story condo with 1 stairs to enter and no rail. Pt was Independent prior to July. Pt's wife reports that pt has been declining with mobility and was using a WW at rehab prior to having shingles. Pt has only been up to [facility] using a lift per wife since last discharge following hip surgery. OBJECTIVE:   Initial Evaluation  Date: 10/30 Treatment  Date: 2020 Short Term/ Long Term   Goals   AM-PAC 6 Clicks  92/70    Was pt agreeable to Eval/treatment? Yes  yes    Does pt have pain? Moderate L hip and buttock pain Pain in L hip    Bed Mobility  Rolling: Mod A  Supine to sit: Max A  Sit to supine: Max A  Scooting: Max A Rolling: modA  Supine<>sit: maxA  Scooting: Joseph Min A   Transfers Sit to stand: NT   Stand to sit: NT  Stand pivot: NT Sit<>stand: modA  Stand pivot: NT Min A   Ambulation    NT Side steps at EOB 5 feet each way with front [employer].   >25 feet using AAD with Min A    Stair negotiation: ascended and descended  NT NT    ROM BUE:  Defer to OT  BLE:  WFL     Strength BUE:  Defer to OT  RLE:  4-/5  LLE:  3+/5, except hip 2-/5  Increase by at least 1/3 MMT grade   Balance Static Sitting EOB:  Mod A   Dynamic Standing:  NT Sitting EOB: SBA  Dynamic standing: Joseph front [employer] Sitting EOB:  Supervision  Dynamic Standing:  Min A     Pt is A & O x 4  Sensation:  Pt denies numbness and tingling to extremities  Edema:  unremarkable    Therapeutic Exercises:  Seated:  hip flexion, LAQ, ankle df/pf x 10 reps each bilaterally  Supine: ankle df/pf, SAQ, glute sets x 10 reps each bilaterally,  AAROM hip flexion within hip precautions x 10 reps LLE    Patient education  Pt educated on role of PT intervention. Pt educated on safety in room with utilization of call light for assistance with mobility. Pt educated on importance of independent performance of therapeutic exercises designated above for improved strength, activity tolerance, and ROM. Patient response to education:   Pt verbalized understanding Pt demonstrated skill Pt requires further education in this area   yes yes yes     ASSESSMENT:    Comments:  RN cleared pt for activity prior to session. Pt received seated at EOB working with OT and agreeable to PT intervention with OT collaboration at this time. Pt performed all functional mobility as noted above. Pt more alert on this date and better tolerating therapeutic exercises as well as functional mobility. Pt able to perform multiple sit<>stands as well as side stepping at EOB on this date. Mild light headedness reported with fatigue which limited session some. Pt was incontinent of bowel during session as well requiring additional time for hygiene care to be completed. At end of session, pt returned to supine and left with all needs met and call light in reach. Pt requires continued skilled PT intervention for the purposes of maximizing functional mobility and independence. Treatment:  Patient practiced and was instructed in the following treatment:     Therapeutic Activities Completed:  o Functional mobility as noted above:   - Bed mobility: maxA supine<>sit with assistance for LLE and cues to maintain hip precautions. Once seated, pt was able to scoot towards EOB at Joseph level with increased time and effort to complete. - Transfer training: sit<>stand x 5 reps min/modA. Cues for proper hand placement and sequencing with forward/backward rocking to allow pt to complete as independently as possible. - Ambulation: side steps at EOB 5 feet bilaterally with front [employer] Joseph.   o Skilled repositioning in supine with HOB elevated and BLE propped on pillow with heel booties donned for pressure relief. o Pt education as noted above. Additional time taken to educate pt and wife on independent performance of therapeutic exercises noted above. PLAN:    Patient is making fair progress towards established goals. Will continue with current POC.       Time in  1040  Time out  1115    Total Treatment Time  25 minutes     CPT codes:  [] Gait training 66926 0 minutes  [] Manual therapy 90278 0 minutes  [x] Therapeutic activities 57471 15 minutes  [x] Therapeutic exercises 09480 10 minutes  [] Neuromuscular reeducation 90665 0 minutes    Irma Tripathi PT, DPT  [id number]

## 2020-11-01 NOTE — PROGRESS NOTES
OT BEDSIDE TREATMENT NOTE      Date:2020  Patient Name: Lauren Nathan  MRN: [id number]  : 1945  Room: [facility]9/[facility]9-A     Per OT Eval:    Referring Provider:  Kallie Vega MD      Evaluating OT: Tabby Hernandez OTR/L #[id number]      AM-PAC Daily Activity Raw Score: 15/24     Recommended Adaptive Equipment:  TBD      Diagnosis: GI Bleed   Patient presented to the hospital with melena, weakness and low hgb         Surgery:  [facility] 10/16/20     Pertinent Medical History:    Past Medical History        Past Medical History:   Diagnosis Date    Blind left eye      Chronic kidney disease      Dialysis patient ([facility] [address]      Hemodialysis patient ([facility] [address]      Hyperlipidemia      Hypertension           Precautions:  Falls, WBAT L LE, Hip precautions     Home Living: Pt admitted from a [facility] following fall and L HHA. Pt lives with wife in a 1 story condo with 1 AFSHAN   Bathroom setup: adding walk in shower with seat and elevated commode   Equipment owned: w/w     Prior Level of Function: Independent with ADLs , Independent with IADLs; ambulated without AD  (pt utilizing w/w prn following hospitalization for covid in July)  Driving: yes  Occupation: retired     Pain Level: Pt denies pain at rest this session, reports minimal pain in L hip with activity (did not quantify with number);  Therapist facilitated repositioning to address pain       Cognition: A&O: 4/4; Follows 2 step directions              Memory:  good              Sequencing:  good              Problem solving:  fair              Judgement/safety:  fair                Functional Assessment:    Initial Eval Status  Date: 10/30/20 Treatment Status  Date:  20 Short Term Goals = Long Term Goals  Treatment frequency: 1-4x/wk; PRN   Feeding Independent        Grooming Minimal Assist      Seated at EOB SBA/Min A  Seated, cues to correct sitting balance, R lateral lean  Modified [place]    UB Dressing Minimal Assist   Min A Supervision    LB Dressing Dependent Max A  Due to incontinence this date  Moderate Assist    Bathing Maximal Assist  Max A Moderate Assist    Toileting Dependent  Dep  Incontinent upon standing, dep for hygiene   Moderate Assist    Bed Mobility  Supine to sit: Maximal Assist   Sit to supine: Maximal Assist      HOB elevated; assist with L LE  Mod A   Supine < > sit  Supine to sit: Minimal Assist   Sit to supine: Minimal Assist    Functional Transfers Maximal Assist      Sit to stand transfer from bed [place] RILEY  Cues for hand placement & technique  Sit < > stand Minimal Assist    Functional Mobility NT   Min A  With walker, side stepping R < > L Minimal Assist    Balance Sitting:     Static:  Min A    Dynamic:min/mod A (L lateral lean)  Standing: max  Sitting:  Static: CGA  Dynamic: Min A  R lateral lean  Standing: Mod A  With walker      Activity Tolerance F-        Fair/fair-  Seated at EOB 15-18 minutes  Light tasks, fatigued but able to tolerate more activity  F+   Visual/  Perceptual Glasses: yes  L eye blind                      Education:  Pt was educated through out treatment regarding proper technique & safety with hip precautions during bed mobility, functional transfers & ADL tasks, with instruction on compensatory strategies to ease tasks to improve safety & prevent falls and allow pt to return home safely. Educated on importance of body posture while seated at EOB during ADL tasks, standing & functional transfers. Pt demonstrates kyphotic posture, educated pt on scapula retraction, shoulder shrugs & rolls along with cervical exercises due to c/o neck discomfort. Spouse present at end of treatment & educated as well with Good understanding. Pt had theraband present, re-enforced exercises & technique to complete while bed level. Comments: Upon arrival pt was in bed & agreeable for therapy.  At end of session pt was returned to bed due to fatigue, HOB slightly upright to stretch back & neck, B heels off loaded, all lines and tubes intact, call light within reach. Spouse present. · Pt has made Fair+ progress towards set goals. · Continue with current plan of care    Treatment Time In: 10:30         Treatment Time Out: 11:15            Treatment Charges: Mins Units   Ther Ex  26873     Manual Therapy 37329     Thera Activities 02803 10 1   ADL/Home Mgt 95222 13 1   Neuro Re-ed 04069     Group Therapy      Orthotic manage/training  07925     Non-Billable Time     Total Timed Treatment 23 2       Gracie EVANGELISTA  [address] [place] [address] Drive, [address] [place] [address]

## 2020-11-01 NOTE — PROGRESS NOTES
Subjective:  No acute complaints  Had bleeding studies this morning  Not eating well   Just feels weak and tired  Wife at bedside     Objective:    /62   Pulse 68   Temp 97.5 °F (36.4 °C) (Temporal)   Resp 20   Ht 5' 8\" (1.727 m)   Wt 139 lb 12.4 oz (63.4 kg)   SpO2 96%   BMI 21.25 kg/m²     24HR INTAKE/OUTPUT:      Intake/Output Summary (Last 24 hours) at 11/1/2020 1602  Last data filed at 11/1/2020 0656  Gross per 24 hour   Intake 60 ml   Output 0 ml   Net 60 ml       General appearance: NAD, conversant  Neck: FROM, supple   Lungs: Clear bilaterally no wheezes, no rhonchi, no crackles  CV: RRR, no MRGs; normal carotid upstroke and amplitude without Bruits  Abdomen: Soft, non-tender; no masses or HSM  Extremities: No edema, no cyanosis, no clubbing  Skin: Intact no rash, no lesions, no ulcers    Psych: Alert and oriented normal affect  Neuro: Nonfocal  Most Recent Labs  Lab Results   Component Value Date    WBC 11.5 10/27/2020    HGB 7.8 (L) 11/01/2020    HCT 24.4 (L) 11/01/2020     10/27/2020     11/01/2020    K 4.2 11/01/2020     11/01/2020    CREATININE 4.7 (H) 11/01/2020    BUN 35 (H) 11/01/2020    CO2 28 11/01/2020    GLUCOSE 76 11/01/2020    ALT 5 11/01/2020    AST 16 11/01/2020    INR 1.1 10/27/2020     No results for input(s): MG in the last 72 hours. Lab Results   Component Value Date    CALCIUM 8.4 (L) 11/01/2020    PHOS 3.7 10/18/2020        NM GI BLOOD LOSS   Final Result   There is no evidence for acute GI bleed            CTA ABDOMEN PELVIS W CONTRAST   Final Result   No evidence of active GI bleeding in the arterial phase. Atherosclerotic   disease involving the mesenteric arteries which may predispose to chronic   intestinal ischemia. Rectal wall thickening without clear luminal compromise. The possibility of   proctocolitis is raised. Multiple renal lesions which are likely hyperdense and hypodense cysts.          CT ABDOMEN PELVIS W IV CONTRAST Additional Contrast? None   Final Result   1. No acute abnormality is seen in the abdomen or the pelvis. 2. Multilocular 2.1 x 2.0 x 2.6 cm cystic lesion in the uncinate process of   the pancreas. Follow-up with pre and post contrast enhanced CT or MRI is   recommended in 6 months. 3. 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 Aortofemoral bypass graft is patent. 4. Simple and complex cysts in the kidneys bilaterally. Underlying solid   lesion cannot be excluded. Sonographic evaluation recommended. 5. Constipation. No evidence of bowel obstruction. 6. Cholelithiasis. CTA abd reviewed 10/30    Assessment    Principal Problem:    Acute blood loss anemia  Active Problems:    ESRD (end stage renal disease) ([facility] [address]    Hypertension    Hyperlipidemia    Severe protein-calorie malnutrition POA    GI bleeding  Resolved Problems:    * No resolved hospital problems.  *      Plan:  42-year-old male history of end-stage renal disease admitted with GI bleeding and anemia status post EGD 10/28 showing change in distal esophagus however no definitive bleeding source       Transfused on admission and 1UPRBC 10/29  Advance diet  Monitor H&H-- 7.9 this morning  Transfuse PRN maintain hemoglobin >= 7-currently 7.4 and stable  IV PPI\  BUN/creatinine 19/3.1-improving daily  aVoid hypotension  Surgery and renal following  Await bleeding scans results- no evidence of acute bleed   Am labs     Discussed with wife at bedside   medications for other co morbidities cont as appropriate w dosage adjustments as necessary  DVT PPx SCD  DC planning     Electronically signed by Mario Martinez MD on 11/1/2020 at 4:02 PM

## 2020-11-01 NOTE — PROGRESS NOTES
10/27/2020    MONOPCT 7.0 10/27/2020    BASOPCT 0.6 10/27/2020    MONOSABS 0.80 10/27/2020    LYMPHSABS 1.44 10/27/2020    EOSABS 0.30 10/27/2020    BASOSABS 0.07 10/27/2020     CMP:    Lab Results   Component Value Date     11/01/2020    K 4.2 11/01/2020    K 5.1 10/28/2020     11/01/2020    CO2 28 11/01/2020    BUN 35 11/01/2020    CREATININE 4.7 11/01/2020    GFRAA 15 11/01/2020    LABGLOM 12 11/01/2020    GLUCOSE 76 11/01/2020    PROT 4.4 11/01/2020    LABALBU 2.2 11/01/2020    CALCIUM 8.4 11/01/2020    BILITOT 0.4 11/01/2020    ALKPHOS 84 11/01/2020    AST 16 11/01/2020    ALT 5 11/01/2020     Magnesium:    Lab Results   Component Value Date    MG 1.8 10/21/2020     Phosphorus:    Lab Results   Component Value Date    PHOS 3.7 10/18/2020     Radiology Review:    CT abdomen pelvis 10/27/2020   1.  No acute abnormality is seen in the abdomen or the pelvis. 2. Multilocular 2.1 x 2.0 x 2.6 cm cystic lesion in the uncinate process of    the pancreas.  Follow-up with pre and post contrast enhanced CT or MRI is    recommended in 6 months. 3. 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  Aortofemoral bypass graft is patent. 4. Simple and complex cysts in the kidneys bilaterally.  Underlying solid    lesion cannot be excluded.  Sonographic evaluation recommended. 5. Constipation.  No evidence of bowel obstruction. 6. Cholelithiasis. BRIEF SUMMARY OF INITIAL CONSULT:    Briefly Emily Cowan is 67 y.o. male with history of ESRD on HD MWF via left upper arm AVF (last HD yesterday at [facility]), HTN, HFpEF, BPH, admitted to [facility] in July for COVID-19 infection and experienced a 30 lb weight loss, who was recently admitted from 10/12-10/21 with herpes zoster encephalitis during which time he experienced a fall, fractured his left hip and underwent a left hip hemiarthroplasty on 10/16.  He was discharged to a SNF on 10/21 and presented again to the ER on 10/27 with complaints of bloody stools starting that morning. He was found to have a Hgb of 6.4 and was given 2 U PRBCs. He was admitted for further evaluation and treatment. We are consulted for management of ESRD and HD. His last HD was Monday. Problems resolved:    Hypokalemia secondary to removal with dialysis and decreased p.o. intake. Potassium levels improved. Left hip fracture, status post fall, s/p hemiarthroplasty October 16, 2020    IMPRESSION/RECOMMENDATIONS:      ESRD:  Hemodialysis 3 times per week- MWF via AVF left arm. UGIB: S/P EGD + distal esophageal changes however no ulcers or source of upper GI bleed. Hemoglobin is stable. 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 On Acyclovir 260 mg IV  (5mg/kg) Q24 hrs after dialysis. HTN: on metoprolol  MBD of CKD, holding binders  Hypoalbuminemia/malnutrition  Normocytic anemia 2/2 ESRD and acute blood loss anemia in the setting of GIB. S/p 2 U PRBCs yesterday. Hemoglobin stable.  Continue epoetin alpha 3000 units 3 times a week     PLAN:     HD 3 times weekly MWF   Albumin 25 g IV daily x3 days  Continue to monitor H&H    Electronically signed by TOMMY Christine CNP on 11/1/2020 at 12:16 PM

## 2020-11-02 LAB
ABO/RH: NORMAL
ANION GAP SERPL CALCULATED.3IONS-SCNC: 12 MMOL/L (ref 7–16)
ANION GAP SERPL CALCULATED.3IONS-SCNC: 13 MMOL/L (ref 7–16)
ANTIBODY SCREEN: NORMAL
BLOOD BANK DISPENSE STATUS: NORMAL
BLOOD BANK PRODUCT CODE: NORMAL
BPU ID: NORMAL
BUN BLDV-MCNC: 55 MG/DL (ref 8–23)
BUN BLDV-MCNC: 58 MG/DL (ref 8–23)
CALCIUM SERPL-MCNC: 8.8 MG/DL (ref 8.6–10.2)
CALCIUM SERPL-MCNC: 9 MG/DL (ref 8.6–10.2)
CHLORIDE BLD-SCNC: 103 MMOL/L (ref 98–107)
CHLORIDE BLD-SCNC: 103 MMOL/L (ref 98–107)
CO2: 26 MMOL/L (ref 22–29)
CO2: 26 MMOL/L (ref 22–29)
CREAT SERPL-MCNC: 5.9 MG/DL (ref 0.7–1.2)
CREAT SERPL-MCNC: 6 MG/DL (ref 0.7–1.2)
DESCRIPTION BLOOD BANK: NORMAL
GFR AFRICAN AMERICAN: 11
GFR AFRICAN AMERICAN: 11
GFR NON-AFRICAN AMERICAN: 9 ML/MIN/1.73
GFR NON-AFRICAN AMERICAN: 9 ML/MIN/1.73
GLUCOSE BLD-MCNC: 83 MG/DL (ref 74–99)
GLUCOSE BLD-MCNC: 88 MG/DL (ref 74–99)
HCT VFR BLD CALC: 23.5 % (ref 37–54)
HCT VFR BLD CALC: 23.5 % (ref 37–54)
HCT VFR BLD CALC: 23.7 % (ref 37–54)
HCT VFR BLD CALC: 24.7 % (ref 37–54)
HEMOGLOBIN: 7.3 G/DL (ref 12.5–16.5)
HEMOGLOBIN: 7.3 G/DL (ref 12.5–16.5)
HEMOGLOBIN: 8 G/DL (ref 12.5–16.5)
IMMATURE RETIC FRACT: 6.8 % (ref 2.3–13.4)
POTASSIUM SERPL-SCNC: 4.1 MMOL/L (ref 3.5–5)
POTASSIUM SERPL-SCNC: 4.2 MMOL/L (ref 3.5–5)
RETIC HGB EQUIVALENT: 37.7 PG (ref 28.2–36.6)
RETICULOCYTE ABSOLUTE COUNT: 0.06 E12/L
RETICULOCYTE COUNT PCT: 2.5 % (ref 0.4–1.9)
SODIUM BLD-SCNC: 141 MMOL/L (ref 132–146)
SODIUM BLD-SCNC: 142 MMOL/L (ref 132–146)

## 2020-11-02 PROCEDURE — 6370000000 HC RX 637 (ALT 250 FOR IP): Performed by: STUDENT IN AN ORGANIZED HEALTH CARE EDUCATION/TRAINING PROGRAM

## 2020-11-02 PROCEDURE — 86850 RBC ANTIBODY SCREEN: CPT

## 2020-11-02 PROCEDURE — 86923 COMPATIBILITY TEST ELECTRIC: CPT

## 2020-11-02 PROCEDURE — 2580000003 HC RX 258: Performed by: INTERNAL MEDICINE

## 2020-11-02 PROCEDURE — P9016 RBC LEUKOCYTES REDUCED: HCPCS

## 2020-11-02 PROCEDURE — 6360000002 HC RX W HCPCS: Performed by: INTERNAL MEDICINE

## 2020-11-02 PROCEDURE — 97110 THERAPEUTIC EXERCISES: CPT

## 2020-11-02 PROCEDURE — 97530 THERAPEUTIC ACTIVITIES: CPT

## 2020-11-02 PROCEDURE — 85018 HEMOGLOBIN: CPT

## 2020-11-02 PROCEDURE — 6360000002 HC RX W HCPCS: Performed by: NURSE PRACTITIONER

## 2020-11-02 PROCEDURE — 36415 COLL VENOUS BLD VENIPUNCTURE: CPT

## 2020-11-02 PROCEDURE — 80048 BASIC METABOLIC PNL TOTAL CA: CPT

## 2020-11-02 PROCEDURE — P9047 ALBUMIN (HUMAN), 25%, 50ML: HCPCS | Performed by: INTERNAL MEDICINE

## 2020-11-02 PROCEDURE — 2060000000 HC ICU INTERMEDIATE R&B

## 2020-11-02 PROCEDURE — 85014 HEMATOCRIT: CPT

## 2020-11-02 PROCEDURE — 90935 HEMODIALYSIS ONE EVALUATION: CPT | Performed by: INTERNAL MEDICINE

## 2020-11-02 PROCEDURE — 85045 AUTOMATED RETICULOCYTE COUNT: CPT

## 2020-11-02 PROCEDURE — 86900 BLOOD TYPING SEROLOGIC ABO: CPT

## 2020-11-02 PROCEDURE — 6370000000 HC RX 637 (ALT 250 FOR IP): Performed by: INTERNAL MEDICINE

## 2020-11-02 PROCEDURE — 86901 BLOOD TYPING SEROLOGIC RH(D): CPT

## 2020-11-02 RX ORDER — ALBUMIN (HUMAN) 12.5 G/50ML
25 SOLUTION INTRAVENOUS 3 TIMES DAILY
Status: DISPENSED | OUTPATIENT
Start: 2020-11-02 | End: 2020-11-03

## 2020-11-02 RX ORDER — 0.9 % SODIUM CHLORIDE 0.9 %
20 INTRAVENOUS SOLUTION INTRAVENOUS ONCE
Status: COMPLETED | OUTPATIENT
Start: 2020-11-02 | End: 2020-11-02

## 2020-11-02 RX ADMIN — METOPROLOL TARTRATE 50 MG: 50 TABLET, FILM COATED ORAL at 11:05

## 2020-11-02 RX ADMIN — ALBUMIN (HUMAN) 25 G: 0.25 INJECTION, SOLUTION INTRAVENOUS at 20:46

## 2020-11-02 RX ADMIN — SUCRALFATE 1 G: 1 TABLET ORAL at 11:05

## 2020-11-02 RX ADMIN — SODIUM CHLORIDE, PRESERVATIVE FREE 10 ML: 5 INJECTION INTRAVENOUS at 11:06

## 2020-11-02 RX ADMIN — SUCRALFATE 1 G: 1 TABLET ORAL at 17:08

## 2020-11-02 RX ADMIN — PANTOPRAZOLE SODIUM 40 MG: 40 TABLET, DELAYED RELEASE ORAL at 17:08

## 2020-11-02 RX ADMIN — SUCRALFATE 1 G: 1 TABLET ORAL at 14:09

## 2020-11-02 RX ADMIN — SUCRALFATE 1 G: 1 TABLET ORAL at 20:46

## 2020-11-02 RX ADMIN — PANTOPRAZOLE SODIUM 40 MG: 40 TABLET, DELAYED RELEASE ORAL at 06:08

## 2020-11-02 RX ADMIN — SODIUM CHLORIDE 20 ML: 9 INJECTION, SOLUTION INTRAVENOUS at 15:31

## 2020-11-02 RX ADMIN — ALBUMIN (HUMAN) 25 G: 0.25 INJECTION, SOLUTION INTRAVENOUS at 11:06

## 2020-11-02 RX ADMIN — METOPROLOL TARTRATE 50 MG: 50 TABLET, FILM COATED ORAL at 20:46

## 2020-11-02 RX ADMIN — SKIN PROTECTANT: 44 OINTMENT TOPICAL at 11:07

## 2020-11-02 RX ADMIN — SKIN PROTECTANT: 44 OINTMENT TOPICAL at 20:47

## 2020-11-02 RX ADMIN — SODIUM CHLORIDE, PRESERVATIVE FREE 10 ML: 5 INJECTION INTRAVENOUS at 20:46

## 2020-11-02 RX ADMIN — ROSUVASTATIN CALCIUM 5 MG: 5 TABLET, FILM COATED ORAL at 20:46

## 2020-11-02 RX ADMIN — EPOETIN ALFA-EPBX 3000 UNITS: 3000 INJECTION, SOLUTION INTRAVENOUS; SUBCUTANEOUS at 11:07

## 2020-11-02 ASSESSMENT — PAIN SCALES - GENERAL
PAINLEVEL_OUTOF10: 0

## 2020-11-02 NOTE — PROGRESS NOTES
Subjective:  Feeling same no complaint +black stool  No CP or SOB  No fever or chills   No uncontrolled pain  No vomiting or diarrhea     Objective:    /80   Pulse 65   Temp 97.7 °F (36.5 °C) (Temporal)   Resp 20   Ht 5' 8\" (1.727 m)   Wt 136 lb 3.9 oz (61.8 kg)   SpO2 97%   BMI 20.72 kg/m²     24HR INTAKE/OUTPUT:      Intake/Output Summary (Last 24 hours) at 11/2/2020 1223  Last data filed at 11/2/2020 1028  Gross per 24 hour   Intake 480 ml   Output 2000 ml   Net -1520 ml       General appearance: NAD, conversant  Neck: FROM, supple   Lungs: Clear bilaterally no wheezes, no rhonchi, no crackles  CV: RRR, no MRGs; normal carotid upstroke and amplitude without Bruits  Abdomen: Soft, non-tender; no masses or HSM  Extremities: No edema, no cyanosis, no clubbing  Skin: Intact no rash, no lesions, no ulcers    Psych: Alert and oriented normal affect  Neuro: Nonfocal  Most Recent Labs  Lab Results   Component Value Date    WBC 11.5 10/27/2020    HGB 7.3 (L) 11/02/2020    HCT 23.5 (L) 11/02/2020     10/27/2020     11/02/2020    K 4.1 11/02/2020     11/02/2020    CREATININE 6.0 (H) 11/02/2020    BUN 58 (H) 11/02/2020    CO2 26 11/02/2020    GLUCOSE 88 11/02/2020    ALT 5 11/01/2020    AST 16 11/01/2020    INR 1.1 10/27/2020     No results for input(s): MG in the last 72 hours. Lab Results   Component Value Date    CALCIUM 9.0 11/02/2020    PHOS 3.7 10/18/2020        NM GI BLOOD LOSS   Final Result   There is no evidence for acute GI bleed            CTA ABDOMEN PELVIS W CONTRAST   Final Result   No evidence of active GI bleeding in the arterial phase. Atherosclerotic   disease involving the mesenteric arteries which may predispose to chronic   intestinal ischemia. Rectal wall thickening without clear luminal compromise. The possibility of   proctocolitis is raised. Multiple renal lesions which are likely hyperdense and hypodense cysts.          CT ABDOMEN PELVIS W IV CONTRAST Additional Contrast? None   Final Result   1. No acute abnormality is seen in the abdomen or the pelvis. 2. Multilocular 2.1 x 2.0 x 2.6 cm cystic lesion in the uncinate process of   the pancreas. Follow-up with pre and post contrast enhanced CT or MRI is   recommended in 6 months. 3. 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 Aortofemoral bypass graft is patent. 4. Simple and complex cysts in the kidneys bilaterally. Underlying solid   lesion cannot be excluded. Sonographic evaluation recommended. 5. Constipation. No evidence of bowel obstruction. 6. Cholelithiasis. CTA abd reviewed 10/30    Assessment    Principal Problem:    Acute blood loss anemia  Active Problems:    ESRD (end stage renal disease) ([facility] [address]    Hypertension    Hyperlipidemia    Severe protein-calorie malnutrition POA    GI bleeding  Resolved Problems:    * No resolved hospital problems.  *      Plan:  48-year-old male history of end-stage renal disease admitted with GI bleeding and anemia status post EGD 10/28 showing change in distal esophagus however no definitive bleeding source     Admit  Transfused on admission and 1UPRBC 10/29  1UPRBC ordered  Advance diet  Monitor H&H-- 7.3 this morning  Transfuse PRN maintain hemoglobin >= 7  Cont retacrit  Iron studies-iron stores adequate, B12 and folate normal, LDH level normal, haptoglobin mildly elevated--no evidence of iron deficiency, vitamin deficiency or intravascular hemolysis  Reticulocyte count  IV PPI  Bleeding scan 10/31 no evidence of bleeding  Cont HD   Gen surgical Consult appreciated  Nephrology Consult appreciated   Medications for other co morbidities cont as appropriate w dosage adjustments as necessary  DVT PPx SCD  DC planning   Extensive discussion with family--wife regarding diagnosis, prognosis and plan of care     Electronically signed by Jaylyn Montgomery MD on 11/2/2020 at 12:23 PM

## 2020-11-02 NOTE — CARE COORDINATION
Precert obtained for [facility] [place]. Charge nurse notified, she will ask  for discharge order when he rounds. Covid 10/28 (-). Ambulance form on soft chart.  CM to set up transport when discharge order obtained  Jaycee Mercedes RN  [facility] Case Management  [phone]

## 2020-11-02 NOTE — FLOWSHEET NOTE
11/02/20 1028   Vital Signs   BP (!) 170/51   Temp 97.3 °F (36.3 °C)   Pulse 66   Resp 18   Weight 136 lb 3.9 oz (61.8 kg)   Weight Method Bed scale   Percent Weight Change -3.44   Pain Assessment   Pain Assessment 0-10   Pain Level 0   Post-Hemodialysis Assessment   Post-Treatment Procedures Blood returned; Access bleeding time < 10 minutes   Machine Disinfection Process Exterior Machine Disinfection   Rinseback Volume (ml) 300 ml   Total Liters Processed (l/min) 82.6 l/min   Dialyzer Clearance Lightly streaked   Duration of Treatment (minutes) 195 minutes   Heparin amount administered during treatment (units) 0 units   Hemodialysis Intake (ml) 300 ml   Hemodialysis Output (ml) 2000 ml   NET Removed (ml) 1700 ml   Tolerated Treatment Good   Patient Response to Treatment tolerated well, profile B, refill noted, 1700cc fluid removal   Bilateral Breath Sounds Diminished   Edema None   Physician Notified?  No

## 2020-11-02 NOTE — PROGRESS NOTES
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 Prior to hospitalization for Covid, pt lives with his wife in a 1 story condo with 1 stairs to enter and no rail. Pt was Independent prior to July. Pt's wife reports that pt has been declining with mobility and was using a WW at rehab prior to having shingles. Pt has only been up to [facility] using a lift per wife since last discharge following hip surgery. OBJECTIVE:   Initial Evaluation  Date: 10/30 Treatment  Date: 2020 Short Term/ Long Term   Goals   AM-PAC 6 Clicks  54/54    Was pt agreeable to Eval/treatment? Yes  yes    Does pt have pain? Moderate L hip and buttock pain Pain in L hip 4/10    Bed Mobility  Rolling: Mod A  Supine to sit: Max A  Sit to supine: Max A  Scooting:  Max A Rolling: Joseph  Supine<>sit: Joseph  Scooting: Joseph Min A   Transfers Sit to stand: NT   Stand to sit: NT  Stand pivot: NT Sit<>stand: modA from EOB and chair  Stand pivot: Joseph [facility] Min A   Ambulation    NT NT  >25 feet using AAD with Min A    Stair negotiation: ascended and descended  NT NT    ROM BUE:  Defer to OT  BLE:  WFL     Strength BUE:  Defer to OT  RLE:  4-/5  LLE:  3+/5, except hip 2-/5  Increase by at least 1/3 MMT grade   Balance Static Sitting EOB:  Mod A   Dynamic Standing:  NT Sitting EOB: SBA  Dynamic standing: Joseph front [facility] Sitting EOB:  Supervision  Dynamic Standing:  Min A     Pt is A & O x 4  Sensation:  Pt denies numbness and tingling to extremities  Edema:  unremarkable    Therapeutic Exercises:  Seated:  hip flexion, LAQ, ankle df/pf 3 x 10 reps each bilaterally    Patient education  Pt educated on role of PT intervention. Pt educated on safety in room with utilization of call light for assistance with mobility. Pt educated on importance of independent performance of therapeutic exercises designated above for improved strength, activity tolerance, and ROM. Patient response to education:   Pt verbalized understanding Pt demonstrated skill Pt requires further education in this area   yes yes yes     ASSESSMENT:    Comments:  RN cleared pt for activity prior to session. Pt received supine in bed and agreeable to PT intervention at this time. Pt performed all functional mobility as noted above. Pt reporting fatigue from dialysis but agreeable to work with therapy. Pt able to tolerate transfer to bedside chair on this date. After transfer, pt reported need for BM and was placed on bed pan while sitting in chair. Pt had black tarry stool. Pt was assisted with perianal hygiene and then left in chair with all needs met and call light in reach. Treatment:  Patient practiced and was instructed in the following treatment:     Therapeutic Activities Completed:  o Functional mobility as noted above:   - Bed mobility: Joseph supine<>sit with increased time and effort to complete. Pt required VC for proper sequencing and efficient use of BUE on bed rail to allow pt to complete as independently as possible. - Transfer training: sit<>stand x 6 reps min/modA. Cues for proper hand placement and sequencing with forward/backward rocking to allow pt to complete as independently as possible. Stand pivot transfer bed>chair with front [employer] modA. Pt unsteady throughout and retropulsive.   Cues for proper sequencing and use of front [employer] when turning to sit.     o Skilled repositioning in seated position for comfort with pillow and waffle cushion under bottom. o Pt education as noted above. PLAN:    Patient is making fair progress towards established goals. Will continue with current POC.       Time in  1331  Time out  1400    Total Treatment Time  29 minutes     CPT codes:  [] Gait training 65749 0 minutes  [] Manual therapy 90503 0 minutes  [x] Therapeutic activities 98921 20 minutes  [x] Therapeutic exercises 86973 9 minutes  [] Neuromuscular reeducation 93850 0 minutes    Adriana Asif, PT, DPT  [id number]

## 2020-11-02 NOTE — PROGRESS NOTES
Department of Internal Medicine  Nephrology Progress Note    Events reviewed. Seen on HD. SUBJECTIVE: We are following Annelise Davis for management of ESRD and HD. Reports no complaints. PHYSICAL EXAM:      Vitals:    VITALS:  /80   Pulse 65   Temp 97.7 °F (36.5 °C) (Temporal)   Resp 20   Ht 5' 8\" (1.727 m)   Wt 136 lb 3.9 oz (61.8 kg)   SpO2 97%   BMI 20.72 kg/m²   24HR INTAKE/OUTPUT:      Intake/Output Summary (Last 24 hours) at 11/2/2020 1304  Last data filed at 11/2/2020 1236  Gross per 24 hour   Intake 720 ml   Output 2000 ml   Net -1280 ml       Access:  AVF left arm  Constitutional:  In no distress. HEENT: AT/NC, positive for hearing aids. NECK: supple  Respiratory:  CTAB  Cardiovascular/Edema:  RRR, S1/S2, no edema. Gastrointestinal:  Soft, +BS, nontender.    Musculoskeletal:  L hip with dressing s/p sammy arthroplasty  Neurologic:  AAOx3    Scheduled Meds:   sodium chloride  20 mL Intravenous Once    pantoprazole  40 mg Oral BID AC    mineral oil-hydrophilic petrolatum   Topical BID    sodium chloride  20 mL Intravenous Once    epoetin delmer-epbx  3,000 Units Subcutaneous Once per day on Mon Wed Fri    sodium chloride  250 mL Intravenous Once    [Held by provider] doxazosin  4 mg Oral Nightly    metoprolol tartrate  50 mg Oral BID    rosuvastatin  5 mg Oral Nightly    sucralfate  1 g Oral 4x Daily    sodium chloride flush  10 mL Intravenous 2 times per day     Continuous Infusions:    PRN Meds:.technetium labeled red blood cells, mineral oil-hydrophilic petrolatum **AND** mineral oil-hydrophilic petrolatum, QUEtiapine, sodium chloride flush, acetaminophen **OR** acetaminophen, promethazine **OR** ondansetron    DATA:    CBC with Differential:    Lab Results   Component Value Date    WBC 11.5 10/27/2020    RBC 2.02 10/27/2020    HGB 7.3 11/02/2020    HCT 23.5 11/02/2020     10/27/2020    .0 10/27/2020    MCH 31.7 10/27/2020    MCHC 30.5 10/27/2020    RDW 16.6 10/27/2020    NRBC 1.7 07/19/2020    LYMPHOPCT 12.5 10/27/2020    MONOPCT 7.0 10/27/2020    BASOPCT 0.6 10/27/2020    MONOSABS 0.80 10/27/2020    LYMPHSABS 1.44 10/27/2020    EOSABS 0.30 10/27/2020    BASOSABS 0.07 10/27/2020     CMP:    Lab Results   Component Value Date     11/02/2020    K 4.1 11/02/2020    K 5.1 10/28/2020     11/02/2020    CO2 26 11/02/2020    BUN 58 11/02/2020    CREATININE 6.0 11/02/2020    GFRAA 11 11/02/2020    LABGLOM 9 11/02/2020    GLUCOSE 88 11/02/2020    PROT 4.4 11/01/2020    LABALBU 2.2 11/01/2020    CALCIUM 9.0 11/02/2020    BILITOT 0.4 11/01/2020    ALKPHOS 84 11/01/2020    AST 16 11/01/2020    ALT 5 11/01/2020     Magnesium:    Lab Results   Component Value Date    MG 1.8 10/21/2020     Phosphorus:    Lab Results   Component Value Date    PHOS 3.7 10/18/2020     Radiology Review:    CT abdomen pelvis 10/27/2020   1.  No acute abnormality is seen in the abdomen or the pelvis. 2. Multilocular 2.1 x 2.0 x 2.6 cm cystic lesion in the uncinate process of    the pancreas.  Follow-up with pre and post contrast enhanced CT or MRI is    recommended in 6 months. 3. 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  Aortofemoral bypass graft is patent. 4. Simple and complex cysts in the kidneys bilaterally.  Underlying solid    lesion cannot be excluded.  Sonographic evaluation recommended. 5. Constipation.  No evidence of bowel obstruction. 6. Cholelithiasis. BRIEF SUMMARY OF INITIAL CONSULT:    Briefly Annelise Davis is 67 y.o. male with history of ESRD on HD MWF via left upper arm AVF (last HD yesterday at [facility]), HTN, HFpEF, BPH, admitted to [address] [place] [address] in July for COVID-19 infection and experienced a 30 lb weight loss, who was recently admitted from 10/12-10/21 with herpes zoster encephalitis during which time he experienced a fall, fractured his left hip and underwent a left hip hemiarthroplasty on 10/16.  He was discharged to a SNF on 10/21 and presented again to the ER on 10/27 with complaints of bloody stools starting that morning. He was found to have a Hgb of 6.4 and was given 2 U PRBCs. He was admitted for further evaluation and treatment. We are consulted for management of ESRD and HD. His last HD was Monday. Problems resolved:    Hypokalemia secondary to removal with dialysis and decreased p.o. intake. Potassium levels improved. Left hip fracture, status post fall, s/p hemiarthroplasty October 16, 2020    IMPRESSION/RECOMMENDATIONS:      ESRD:  Hemodialysis 3 times per week- MWF via AVF left arm. UGIB: S/P EGD + distal esophageal changes however no ulcers or source of upper GI bleed. For bleeding scan if melena continues per general surgery. 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 s/p Acyclovir. HTN: on metoprolol  MBD of CKD, holding binders  Hypoalbuminemia/malnutrition  Normocytic anemia 2/2 ESRD and acute blood loss anemia in the setting of GIB. S/p transfusions, for an addition 1U PRBC today.  Continue epoetin alpha 3000 units 3 times a week.     PLAN:     HD today and 3 times weekly MWF   Transfuse 1U PRBC  Continue Albumin 25 g IV Q8 hours x3 more doses  Continue to monitor H&H    Electronically signed by TOMMY Nelson CNP on 11/2/2020 at 1:04 PM

## 2020-11-03 ENCOUNTER — HOSPITAL ENCOUNTER (EMERGENCY)
Age: 75
Discharge: HOME | End: 2020-11-04
Attending: EMERGENCY MEDICINE
Payer: MEDICARE

## 2020-11-03 VITALS
BODY MASS INDEX: 20.65 KG/M2 | DIASTOLIC BLOOD PRESSURE: 68 MMHG | WEIGHT: 136.24 LBS | HEIGHT: 68 IN | TEMPERATURE: 98.7 F | OXYGEN SATURATION: 99 % | SYSTOLIC BLOOD PRESSURE: 130 MMHG | HEART RATE: 89 BPM | RESPIRATION RATE: 20 BRPM

## 2020-11-03 LAB
ALBUMIN SERPL-MCNC: 3.3 G/DL (ref 3.5–5.2)
ALP BLD-CCNC: 85 U/L (ref 40–129)
ALT SERPL-CCNC: <5 U/L (ref 0–40)
ANION GAP SERPL CALCULATED.3IONS-SCNC: 11 MMOL/L (ref 7–16)
ANION GAP SERPL CALCULATED.3IONS-SCNC: 5 MMOL/L (ref 7–16)
APTT: 35.9 SEC (ref 24.5–35.1)
AST SERPL-CCNC: 11 U/L (ref 0–39)
BASOPHILS ABSOLUTE: 0.09 E9/L (ref 0–0.2)
BASOPHILS RELATIVE PERCENT: 1.1 % (ref 0–2)
BILIRUB SERPL-MCNC: 0.7 MG/DL (ref 0–1.2)
BUN BLDV-MCNC: 35 MG/DL (ref 8–23)
BUN BLDV-MCNC: 47 MG/DL (ref 8–23)
CALCIUM SERPL-MCNC: 8.8 MG/DL (ref 8.6–10.2)
CALCIUM SERPL-MCNC: 9.1 MG/DL (ref 8.6–10.2)
CHLORIDE BLD-SCNC: 102 MMOL/L (ref 98–107)
CHLORIDE BLD-SCNC: 98 MMOL/L (ref 98–107)
CO2: 27 MMOL/L (ref 22–29)
CO2: 31 MMOL/L (ref 22–29)
CREAT SERPL-MCNC: 3.6 MG/DL (ref 0.7–1.2)
CREAT SERPL-MCNC: 4.7 MG/DL (ref 0.7–1.2)
EOSINOPHILS ABSOLUTE: 0.45 E9/L (ref 0.05–0.5)
EOSINOPHILS RELATIVE PERCENT: 5.4 % (ref 0–6)
GFR AFRICAN AMERICAN: 15
GFR AFRICAN AMERICAN: 20
GFR NON-AFRICAN AMERICAN: 12 ML/MIN/1.73
GFR NON-AFRICAN AMERICAN: 17 ML/MIN/1.73
GLUCOSE BLD-MCNC: 81 MG/DL (ref 74–99)
GLUCOSE BLD-MCNC: 96 MG/DL (ref 74–99)
HCT VFR BLD CALC: 25.7 % (ref 37–54)
HCT VFR BLD CALC: 27.6 % (ref 37–54)
HEMOGLOBIN: 8.2 G/DL (ref 12.5–16.5)
HEMOGLOBIN: 8.7 G/DL (ref 12.5–16.5)
IMMATURE GRANULOCYTES #: 0.04 E9/L
IMMATURE GRANULOCYTES %: 0.5 % (ref 0–5)
INR BLD: 1.1
LIPASE: 59 U/L (ref 13–60)
LYMPHOCYTES ABSOLUTE: 0.82 E9/L (ref 1.5–4)
LYMPHOCYTES RELATIVE PERCENT: 9.8 % (ref 20–42)
MCH RBC QN AUTO: 30.9 PG (ref 26–35)
MCHC RBC AUTO-ENTMCNC: 31.5 % (ref 32–34.5)
MCV RBC AUTO: 97.9 FL (ref 80–99.9)
MONOCYTES ABSOLUTE: 0.62 E9/L (ref 0.1–0.95)
MONOCYTES RELATIVE PERCENT: 7.4 % (ref 2–12)
NEUTROPHILS ABSOLUTE: 6.36 E9/L (ref 1.8–7.3)
NEUTROPHILS RELATIVE PERCENT: 75.8 % (ref 43–80)
PDW BLD-RTO: 17.6 FL (ref 11.5–15)
PLATELET # BLD: 188 E9/L (ref 130–450)
PMV BLD AUTO: 10.3 FL (ref 7–12)
POTASSIUM REFLEX MAGNESIUM: 4 MMOL/L (ref 3.5–5)
POTASSIUM SERPL-SCNC: 3.7 MMOL/L (ref 3.5–5)
PROTHROMBIN TIME: 12.7 SEC (ref 9.3–12.4)
RBC # BLD: 2.82 E12/L (ref 3.8–5.8)
SODIUM BLD-SCNC: 136 MMOL/L (ref 132–146)
SODIUM BLD-SCNC: 138 MMOL/L (ref 132–146)
TOTAL PROTEIN: 5.4 G/DL (ref 6.4–8.3)
WBC # BLD: 8.4 E9/L (ref 4.5–11.5)

## 2020-11-03 PROCEDURE — 86900 BLOOD TYPING SEROLOGIC ABO: CPT

## 2020-11-03 PROCEDURE — 97530 THERAPEUTIC ACTIVITIES: CPT

## 2020-11-03 PROCEDURE — 6370000000 HC RX 637 (ALT 250 FOR IP): Performed by: STUDENT IN AN ORGANIZED HEALTH CARE EDUCATION/TRAINING PROGRAM

## 2020-11-03 PROCEDURE — 85018 HEMOGLOBIN: CPT

## 2020-11-03 PROCEDURE — 80053 COMPREHEN METABOLIC PANEL: CPT

## 2020-11-03 PROCEDURE — 85730 THROMBOPLASTIN TIME PARTIAL: CPT

## 2020-11-03 PROCEDURE — P9047 ALBUMIN (HUMAN), 25%, 50ML: HCPCS | Performed by: INTERNAL MEDICINE

## 2020-11-03 PROCEDURE — 86850 RBC ANTIBODY SCREEN: CPT

## 2020-11-03 PROCEDURE — 2580000003 HC RX 258: Performed by: INTERNAL MEDICINE

## 2020-11-03 PROCEDURE — 85014 HEMATOCRIT: CPT

## 2020-11-03 PROCEDURE — 6360000002 HC RX W HCPCS: Performed by: INTERNAL MEDICINE

## 2020-11-03 PROCEDURE — 80048 BASIC METABOLIC PNL TOTAL CA: CPT

## 2020-11-03 PROCEDURE — 36592 COLLECT BLOOD FROM PICC: CPT

## 2020-11-03 PROCEDURE — 6370000000 HC RX 637 (ALT 250 FOR IP): Performed by: INTERNAL MEDICINE

## 2020-11-03 PROCEDURE — 83690 ASSAY OF LIPASE: CPT

## 2020-11-03 PROCEDURE — 85025 COMPLETE CBC W/AUTO DIFF WBC: CPT

## 2020-11-03 PROCEDURE — 85610 PROTHROMBIN TIME: CPT

## 2020-11-03 PROCEDURE — 36415 COLL VENOUS BLD VENIPUNCTURE: CPT

## 2020-11-03 PROCEDURE — 86901 BLOOD TYPING SEROLOGIC RH(D): CPT

## 2020-11-03 PROCEDURE — 99283 EMERGENCY DEPT VISIT LOW MDM: CPT

## 2020-11-03 RX ORDER — PANTOPRAZOLE SODIUM 40 MG/1
40 TABLET, DELAYED RELEASE ORAL
Qty: 30 TABLET | Refills: 3 | DISCHARGE
Start: 2020-11-03

## 2020-11-03 RX ADMIN — SKIN PROTECTANT: 44 OINTMENT TOPICAL at 09:00

## 2020-11-03 RX ADMIN — SUCRALFATE 1 G: 1 TABLET ORAL at 09:00

## 2020-11-03 RX ADMIN — SUCRALFATE 1 G: 1 TABLET ORAL at 16:20

## 2020-11-03 RX ADMIN — SODIUM CHLORIDE, PRESERVATIVE FREE 10 ML: 5 INJECTION INTRAVENOUS at 09:00

## 2020-11-03 RX ADMIN — METOPROLOL TARTRATE 50 MG: 50 TABLET, FILM COATED ORAL at 09:01

## 2020-11-03 RX ADMIN — ALBUMIN (HUMAN) 25 G: 0.25 INJECTION, SOLUTION INTRAVENOUS at 04:18

## 2020-11-03 RX ADMIN — PANTOPRAZOLE SODIUM 40 MG: 40 TABLET, DELAYED RELEASE ORAL at 06:38

## 2020-11-03 RX ADMIN — SUCRALFATE 1 G: 1 TABLET ORAL at 12:24

## 2020-11-03 RX ADMIN — PANTOPRAZOLE SODIUM 40 MG: 40 TABLET, DELAYED RELEASE ORAL at 16:20

## 2020-11-03 ASSESSMENT — ENCOUNTER SYMPTOMS
NAUSEA: 0
SHORTNESS OF BREATH: 0
ABDOMINAL PAIN: 1
RHINORRHEA: 0
VOMITING: 0
COUGH: 0
BLOOD IN STOOL: 1
COLOR CHANGE: 0
DIARRHEA: 0
TROUBLE SWALLOWING: 0

## 2020-11-03 ASSESSMENT — PAIN SCALES - GENERAL: PAINLEVEL_OUTOF10: 0

## 2020-11-03 NOTE — DISCHARGE SUMMARY
tablet Take 1 tablet by mouth 2 times daily (before meals)  Qty: 30 tablet, Refills: 3         CONTINUE these medications which have NOT CHANGED    Details   QUEtiapine (SEROQUEL) 25 MG tablet Take 0.5 tablets by mouth every 6 hours as needed for Agitation or Other (anxiety)  Qty: 60 tablet, Refills: 3      doxazosin (CARDURA) 4 MG tablet Take 1 tablet by mouth nightly      sucralfate (CARAFATE) 1 GM tablet Take 1 tablet by mouth 4 times daily  Qty: 120 tablet, Refills: 3      Cholecalciferol (VITAMIN D) 50 MCG (2000 UT) CAPS capsule Take by mouth      hydrALAZINE (APRESOLINE) 50 MG tablet Take 50 mg by mouth 3 times daily      rosuvastatin (CRESTOR) 10 MG tablet Take 5 mg by mouth nightly      Coenzyme Q10 (Q-10 CO-ENZYME PO) Take 100 mg by mouth Daily      metoprolol tartrate (LOPRESSOR) 50 MG tablet Take 50 mg by mouth 2 times daily         STOP taking these medications       acyclovir (ZOVIRAX) infusion Comments:   Reason for Stopping:         Heparin Sodium, Porcine, (HEPARIN, PORCINE,) 41949 UNIT/ML injection Comments:   Reason for Stopping:             Activity: activity as tolerated  Diet: renal diet    Follow-up with 1wk PCP,2wk GSurg,GI    Note that over 30 minutes was spent in preparing discharge papers, discussing discharge with patient, staff, consultants, medication review, arranging follow up, etc.    Signed:  Angel Bridges MD  11/3/2020  1:19 PM

## 2020-11-03 NOTE — CARE COORDINATION
Precert obtained for SOV samuel Kolb through Friday. Covid 10/28 (-). Ambulance form on soft chart.  CM to follow  Coco Negrete RN  [facility] Case Management  [phone]

## 2020-11-03 NOTE — PROGRESS NOTES
Subjective:  Feeling same no complaint   No CP or SOB  No fever or chills   No uncontrolled pain  No vomiting or diarrhea     Objective:    BP (!) 140/78   Pulse 79   Temp 98.5 °F (36.9 °C) (Temporal)   Resp 22   Ht 5' 8\" (1.727 m)   Wt 136 lb 3.9 oz (61.8 kg)   SpO2 97%   BMI 20.72 kg/m²     24HR INTAKE/OUTPUT:      Intake/Output Summary (Last 24 hours) at 11/3/2020 1041  Last data filed at 11/3/2020 1206  Gross per 24 hour   Intake 1183.33 ml   Output 0 ml   Net 1183.33 ml       General appearance: NAD, conversant  Neck: FROM, supple   Lungs: Clear bilaterally no wheezes, no rhonchi, no crackles  CV: RRR, no MRGs; normal carotid upstroke and amplitude without Bruits  Abdomen: Soft, non-tender; no masses or HSM  Extremities: No edema, no cyanosis, no clubbing  Skin: Intact no rash, no lesions, no ulcers    Psych: Alert and oriented normal affect  Neuro: Nonfocal  Most Recent Labs  Lab Results   Component Value Date    WBC 11.5 10/27/2020    HGB 8.2 (L) 11/03/2020    HCT 25.7 (L) 11/03/2020     10/27/2020     11/03/2020    K 3.7 11/03/2020    CL 98 11/03/2020    CREATININE 3.6 (H) 11/03/2020    BUN 35 (H) 11/03/2020    CO2 27 11/03/2020    GLUCOSE 81 11/03/2020    ALT 5 11/01/2020    AST 16 11/01/2020    INR 1.1 10/27/2020     No results for input(s): MG in the last 72 hours. Lab Results   Component Value Date    CALCIUM 8.8 11/03/2020    PHOS 3.7 10/18/2020        NM GI BLOOD LOSS   Final Result   There is no evidence for acute GI bleed            CTA ABDOMEN PELVIS W CONTRAST   Final Result   No evidence of active GI bleeding in the arterial phase. Atherosclerotic   disease involving the mesenteric arteries which may predispose to chronic   intestinal ischemia. Rectal wall thickening without clear luminal compromise. The possibility of   proctocolitis is raised. Multiple renal lesions which are likely hyperdense and hypodense cysts.          CT ABDOMEN PELVIS W IV CONTRAST Additional Contrast? None   Final Result   1. No acute abnormality is seen in the abdomen or the pelvis. 2. Multilocular 2.1 x 2.0 x 2.6 cm cystic lesion in the uncinate process of   the pancreas. Follow-up with pre and post contrast enhanced CT or MRI is   recommended in 6 months. 3. 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 Aortofemoral bypass graft is patent. 4. Simple and complex cysts in the kidneys bilaterally. Underlying solid   lesion cannot be excluded. Sonographic evaluation recommended. 5. Constipation. No evidence of bowel obstruction. 6. Cholelithiasis. CTA abd reviewed 10/30    Assessment    Principal Problem:    Acute blood loss anemia  Active Problems:    ESRD (end stage renal disease) ([facility] [address]    Hypertension    Hyperlipidemia    Severe protein-calorie malnutrition POA    GI bleeding  Resolved Problems:    * No resolved hospital problems.  *      Plan:  48-year-old male history of end-stage renal disease admitted with GI bleeding and anemia status post EGD 10/28 showing change in distal esophagus however no definitive bleeding source     Admit  Transfused on admission, 1UPRBC 10/29,   1UPRBC 11/2  Advance diet  Monitor H&H-- 8.2 this morning  Transfuse PRN maintain hemoglobin >= 7  Cont retacrit  Iron studies-iron stores adequate, B12 and folate normal, LDH level normal, haptoglobin mildly elevated--no evidence of iron deficiency, vitamin deficiency or intravascular hemolysis  Reticulocyte count  IV PPI-->PO  Bleeding scan 10/31 no evidence of bleeding  Cont HD MWF  Gen surgical Consult appreciated  Nephrology Consult appreciated   Medications for other co morbidities cont as appropriate w dosage adjustments as necessary  DVT PPx SCD  DC planning   Extensive discussion with family--wife regarding diagnosis, prognosis and plan of care   DC to SNF    Electronically signed by Chanel Leone MD on 11/3/2020 at 10:41 AM

## 2020-11-03 NOTE — PROGRESS NOTES
16.6 10/27/2020    NRBC 1.7 07/19/2020    LYMPHOPCT 12.5 10/27/2020    MONOPCT 7.0 10/27/2020    BASOPCT 0.6 10/27/2020    MONOSABS 0.80 10/27/2020    LYMPHSABS 1.44 10/27/2020    EOSABS 0.30 10/27/2020    BASOSABS 0.07 10/27/2020     CMP:    Lab Results   Component Value Date     11/03/2020    K 3.7 11/03/2020    K 5.1 10/28/2020    CL 98 11/03/2020    CO2 27 11/03/2020    BUN 35 11/03/2020    CREATININE 3.6 11/03/2020    GFRAA 20 11/03/2020    LABGLOM 17 11/03/2020    GLUCOSE 81 11/03/2020    PROT 4.4 11/01/2020    LABALBU 2.2 11/01/2020    CALCIUM 8.8 11/03/2020    BILITOT 0.4 11/01/2020    ALKPHOS 84 11/01/2020    AST 16 11/01/2020    ALT 5 11/01/2020     Magnesium:    Lab Results   Component Value Date    MG 1.8 10/21/2020     Phosphorus:    Lab Results   Component Value Date    PHOS 3.7 10/18/2020     Radiology Review:    CT abdomen pelvis 10/27/2020   1.  No acute abnormality is seen in the abdomen or the pelvis. 2. Multilocular 2.1 x 2.0 x 2.6 cm cystic lesion in the uncinate process of    the pancreas.  Follow-up with pre and post contrast enhanced CT or MRI is    recommended in 6 months. 3. 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  Aortofemoral bypass graft is patent. 4. Simple and complex cysts in the kidneys bilaterally.  Underlying solid    lesion cannot be excluded.  Sonographic evaluation recommended. 5. Constipation.  No evidence of bowel obstruction. 6. Cholelithiasis. BRIEF SUMMARY OF INITIAL CONSULT:    Briefly Jimmy Chen is 67 y.o. male with history of ESRD on HD MWF via left upper arm AVF (last HD yesterday at [facility]), HTN, HFpEF, BPH, admitted to [facility] in July for COVID-19 infection and experienced a 30 lb weight loss, who was recently admitted from 10/12-10/21 with herpes zoster encephalitis during which time he experienced a fall, fractured his left hip and underwent a left hip hemiarthroplasty on 10/16.  He was discharged to a SNF on 10/21 and presented again to the ER on 10/27 with complaints of bloody stools starting that morning. He was found to have a Hgb of 6.4 and was given 2 U PRBCs. He was admitted for further evaluation and treatment. We are consulted for management of ESRD and HD. His last HD was Monday. Problems resolved:    Hypokalemia secondary to removal with dialysis and decreased p.o. intake. Potassium levels improved. Left hip fracture, status post fall, s/p hemiarthroplasty October 16, 2020    IMPRESSION/RECOMMENDATIONS:      ESRD:  Hemodialysis 3 times per week- MWF via AVF left arm. S/p HD yesterday. Tolerated well. UGIB: S/P EGD + distal esophageal changes however no ulcers or source of upper GI bleed. For bleeding scan if melena continues per general surgery. 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 s/p Acyclovir. HTN: on metoprolol  MBD of CKD, holding binders  Hypoalbuminemia/malnutrition, s/p supplementation  Normocytic anemia 2/2 ESRD and acute blood loss anemia in the setting of GIB. S/p transfusions.  Continue epoetin alpha 3000 units 3 times a week.     PLAN:     HD 3 times weekly MWF   Continue to monitor H&H  Ok for discharge from renal point of view    Electronically signed by TOMMY Bassett CNP on 11/3/2020 at 3:22 PM

## 2020-11-03 NOTE — PROGRESS NOTES
Gave Nurse to Nurse report to Carrie Number from [address] [place] [address] [address], pt is due for  at 1900

## 2020-11-03 NOTE — PROGRESS NOTES
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 Prior to hospitalization for Covid, pt lives with his wife in a 1 story condo with 1 stairs to enter and no rail. Pt was Independent prior to July. Pt's wife reports that pt has been declining with mobility and was using a WW at rehab prior to having shingles. Pt has only been up to [facility] using a lift per wife since last discharge following hip surgery. OBJECTIVE:   Initial Evaluation  Date: 10/30 Treatment  Date: 2020 Short Term/ Long Term   Goals   AM-PAC 6 Clicks  88/13    Was pt agreeable to Eval/treatment? Yes  yes    Does pt have pain? Moderate L hip and buttock pain Pain in L hip 3/10    Bed Mobility  Rolling: Mod A  Supine to sit: Max A  Sit to supine: Max A  Scooting:  Max A Rolling: Joseph  Supine<>sit: Joseph  Scooting: Joseph Min A   Transfers Sit to stand: NT   Stand to sit: NT  Stand pivot: NT Sit<>stand: Joseph from EOB and chair  Stand pivot: Joseph front [employer] Min A   Ambulation    NT NT  >25 feet using AAD with Min A    Stair negotiation: ascended and descended  NT NT    ROM BUE:  Defer to OT  BLE:  WFL     Strength BUE:  Defer to OT  RLE:  4-/5  LLE:  3+/5, except hip 2-/5  Increase by at least 1/3 MMT grade   Balance Static Sitting EOB:  Mod A   Dynamic Standing:  NT Sitting EOB: SBA  Dynamic standing: Joseph front [employer] Sitting EOB:  Supervision  Dynamic Standing:  Min A     Pt is A & O x 4  Sensation:  Pt denies numbness and tingling to extremities  Edema:  unremarkable    Therapeutic Exercises:  Seated:  hip flexion, LAQ, ankle df/pf  3 x 10 reps each bilaterally. Patient education  Pt educated on role of PT intervention. Pt educated on safety in room with utilization of call light for assistance with mobility. Pt educated on importance of independent performance of therapeutic exercises designated above for improved strength, activity tolerance, and ROM. Patient response to education:   Pt verbalized understanding Pt demonstrated skill Pt requires further education in this area   yes yes yes     ASSESSMENT:    Comments:  RN cleared pt for activity prior to session. Pt received supine in bed and agreeable to PT intervention at this time. Pt performed all functional mobility as noted above. Pt demonstrating improved functional strength and ROM of L hip on this date as he is not requiring AAROM for therapeutic exercises and is requiring less assistance to stand. Pt had BM during session and requested to be left on bed pan sitting up in chair and for therapy to return later. This therapist returned at a later time to assist pt back to bed. Perianal hygiene care was performed. Pt was then incontinent of bowel and placed back on bed pan in bedside chair. RN aware pt on bed pan. Pt had black tarry stool. Pt left with all needs met and call light in reach. Pt requires continued skilled PT intervention for the purposes of maximizing functional mobility and independence. Treatment:  Patient practiced and was instructed in the following treatment:     Therapeutic Activities Completed:  o Functional mobility as noted above:   - Bed mobility: Joseph supine<>sit with increased time and effort to complete. Pt required VC for proper sequencing and efficient use of BUE on bed rail to allow pt to complete as independently as possible.   Pt demonstrating good carry over of verbal instruction from previous sessions.    - Transfer training: sit<>stand x 10 reps Joseph. Cues for proper hand placement and sequencing with forward/backward rocking to allow pt to complete as independently as possible. Stand pivot transfer bed>chair with [facility] Joseph. Pt retropulsive but able to correct with cues and assistance. Cues for proper sequencing and use of [facility] when turning to sit. Pt with good carry over of verbal cues from previous sessions. o Skilled repositioning in seated position for comfort on bed betancourt. Derrek pad placed between pt and bed pan for comfort.  o Pt education as noted above. PLAN:    Patient is making fair progress towards established goals. Will continue with current POC.       Time in  1300  Time out  1325  Time in 1340  Time out 1355    Total Treatment Time  40 minutes     CPT codes:  [] Gait training 28486 0 minutes  [] Manual therapy 83752 0 minutes  [x] Therapeutic activities 63403 40 minutes  [] Therapeutic exercises 65225 0 minutes  [] Neuromuscular reeducation 13902 0 minutes    Ramya Segal PT, DPT  [id number]

## 2020-11-04 VITALS
DIASTOLIC BLOOD PRESSURE: 94 MMHG | HEART RATE: 68 BPM | BODY MASS INDEX: 24.33 KG/M2 | SYSTOLIC BLOOD PRESSURE: 165 MMHG | WEIGHT: 155 LBS | OXYGEN SATURATION: 96 % | TEMPERATURE: 97.7 F | HEIGHT: 67 IN | RESPIRATION RATE: 18 BRPM

## 2020-11-04 LAB
ABO/RH: NORMAL
ANTIBODY SCREEN: NORMAL
HCT VFR BLD CALC: 26.1 % (ref 37–54)
HEMOGLOBIN: 8.3 G/DL (ref 12.5–16.5)

## 2020-11-04 PROCEDURE — 85014 HEMATOCRIT: CPT

## 2020-11-04 PROCEDURE — 85018 HEMOGLOBIN: CPT
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
## 2020-11-04 NOTE — ED NOTES
Bed: 19  Expected date:   Expected time:   Means of arrival:   Comments:  [address]  11/03/20 9746

## 2020-11-04 NOTE — ED NOTES
Pt made aware of repeat lab work at 0300. VS obtained. Pt positioned for comfort. Pt has no other requests at this time. Daughter at bedside.       Berlin Ceballos RN  11/04/20 9974

## 2020-11-05 ENCOUNTER — TELEPHONE (OUTPATIENT)
Dept: ORTHOPEDIC SURGERY | Age: 75
End: 2020-11-05

## 2020-11-05 NOTE — TELEPHONE ENCOUNTER
Chiquis from [facility] called office regarding patient's missed appointment. Patient was hospitalized and SOV is requesting to reschedule appointment. Also requesting if she can remove staples. Staples currently intact, incision well approximated, no drainage or redness. Call returned. No answer, left voicemail.

## 2020-11-06 NOTE — TELEPHONE ENCOUNTER
Call placed to [facility]. Spoke to patient's nurse, Raji Colunga. Discussed OK to remove staples. Apply steri strips and DSD. Okay to remove dressing once no drainage. Order placed to obtain XR HIP 2-3 VW W PELVIS LEFT via [facility] with Dr. Reyna Castellon attached to view on web site. Raji Colunga verbalized understanding. Requested facility to call office once XRAYS obtained. Advised of future appointment. Directions provided.     Future Appointments   Date Time Provider Jorge Hope   12/7/2020 11:00 AM SCHEDULE, SE [facility]

## 2020-11-11 NOTE — TELEPHONE ENCOUNTER
Attempted to view xrays on [facility] website. XR cancelled, note in [facility] states, \"HOANG--PT TRANSFERRED TO ANOTHER FACILITY--KK\"    Attempted to call SOROSCOE jeff. No answer, left voicemail.

## 2020-12-14 ENCOUNTER — HOSPITAL ENCOUNTER (OUTPATIENT)
Dept: GENERAL RADIOLOGY | Age: 75
Discharge: HOME | End: 2020-12-16
Payer: MEDICARE

## 2020-12-14 ENCOUNTER — OFFICE VISIT (OUTPATIENT)
Dept: ORTHOPEDIC SURGERY | Age: 75
End: 2020-12-14
Payer: MEDICARE

## 2020-12-14 VITALS — TEMPERATURE: 98.1 F | HEART RATE: 77 BPM | DIASTOLIC BLOOD PRESSURE: 72 MMHG | SYSTOLIC BLOOD PRESSURE: 135 MMHG

## 2020-12-14 PROCEDURE — 73502 X-RAY EXAM HIP UNI 2-3 VIEWS: CPT

## 2020-12-14 PROCEDURE — 99024 POSTOP FOLLOW-UP VISIT: CPT | Performed by: PHYSICIAN ASSISTANT

## 2020-12-14 PROCEDURE — 99212 OFFICE O/P EST SF 10 MIN: CPT | Performed by: PHYSICIAN ASSISTANT

## 2020-12-14 RX ORDER — ACETAMINOPHEN 325 MG/1
650 TABLET ORAL EVERY 4 HOURS PRN
COMMUNITY

## 2020-12-14 NOTE — PROGRESS NOTES
OP: SURGEON: Dr. Kerwin Macario MD  DATE OF PROCEDURE: 10/16/2020  PROCEDURE: Left hip hemiarthroplasty for femoral neck fracture    Subjective:  Annelise Davis is approximately 2 months follow-up from the above surgery. Patient is WBAT on that extremity. He ambulates with assistive device, walker. Prior to L hip fracture community Ambulator without AD. Pain to extremity is mild and is not taking pain medication. They denies numbness, tingling, weakness. Denies Calf pain. Patient has finished DVT prophylaxis, ASA 81 mg BID. Patient is not participating in therapy, has completed Lyn[facility] PT, would like to hold off on OP PT at this time. Presents with family today, at home since discharge from rehab. Review of Systems -    General ROS: negative for - chills, fatigue, fever or night sweats  Respiratory ROS: no cough, shortness of breath, or wheezing  Cardiovascular ROS: no chest pain or dyspnea on exertion  Gastrointestinal ROS: no abdominal pain, nausea, vomiting, diarrhea, constipation,or black or bloody stools  Genitourinary: no hematuria, dysuria, or incontinence   Musculoskeletal ROS: negative for -back or neck pain or stiffness, also see HPI  Neurological ROS: no TIA or stroke symptoms       Objective:    General: Alert and oriented X 3, normocephalic atraumatic, external ears and eye normal, sclera clear, no acute distress, respirations easy and unlabored with no audible wheezes, skin warm and dry, speech and dress appropriate for noted age, affect euthymic. Extremity:  Left Lower Extremity  Skin clean dry and intact, without signs of infection  Incisions well healed without signs of redness, warmth or drainage  No edema noted  No TTP to the left hemipelvis  No pain with log roll of the hip  Compartments supple throughout thigh and leg  Calf supple and nontender  Demonstrates active knee flexion/extension, ankle plantar/dorsiflexion/great toe extension.    States sensation intact to touch in sural/deep peroneal/superficial peroneal/saphenous/posterior tibial nerve distributions to foot/ankle. Palpable dorsalis pedis and posterior tibialis pulses, cap refill brisk in toes, foot warm/perfused. /72   Pulse 77   Temp 98.1 °F (36.7 °C)     XR:   AP pelvis and 2 views of the left hip demonstrates hip hemiarthroplasty in excellent alignment, no evidence of loosening or subsidence, no acute osseous abnormality noted    Assessment:   Diagnosis Orders   1. History of left hip hemiarthroplasty  XR HIP 2-3 VW W PELVIS LEFT       Plan:  Reviewed x-rays with patient today in office   Continue Weightbearing as tolerated on left lower extremity  Xrays show hemiarthroplasty well seated without signs of loosening    Recommend starting outpatient therapy after home care is done. Call office when interested in this at this time patient declines, states will continued HEP    Continue to use a walker until you & therapy feel comfortable going down to a cane. Tylenol, Ice and Heat as needed for discomfort    Follow up in 6 weeks for new evaluation and xrays      Electronically signed by Afua Desai PA-C on 12/14/2020 at 9:31 PM  Note: This report was completed using [employer] voiced recognition software. Every effort has been made to ensure accuracy; however, inadvertent computerized transcription errors may be present.

## 2020-12-14 NOTE — PATIENT INSTRUCTIONS
Continue Weightbearing as tolerated on left lower extremity  Xrays show hemiarthroplasty well seated without signs of loosening    Recommend starting outpatient therapy after home care is done. Call office when interested in this. Continue to use a walker until you & therapy feel comfortable going down to a cane.      Tylenol, Ice and Heat as needed for discomfort    Follow up in 6 weeks for new evaluation and xrays

## 2021-01-27 ENCOUNTER — OFFICE VISIT (OUTPATIENT)
Dept: ORTHOPEDIC SURGERY | Age: 76
End: 2021-01-27
Payer: MEDICARE

## 2021-01-27 ENCOUNTER — HOSPITAL ENCOUNTER (OUTPATIENT)
Dept: GENERAL RADIOLOGY | Age: 76
Discharge: HOME | End: 2021-01-29
Payer: MEDICARE

## 2021-01-27 VITALS — TEMPERATURE: 98 F

## 2021-01-27 DIAGNOSIS — Z96.642 HISTORY OF LEFT HIP HEMIARTHROPLASTY: ICD-10-CM

## 2021-01-27 DIAGNOSIS — Z96.642 HISTORY OF LEFT HIP HEMIARTHROPLASTY: Primary | ICD-10-CM

## 2021-01-27 PROCEDURE — 99213 OFFICE O/P EST LOW 20 MIN: CPT | Performed by: PHYSICIAN ASSISTANT

## 2021-01-27 PROCEDURE — 73502 X-RAY EXAM HIP UNI 2-3 VIEWS: CPT

## 2021-01-27 PROCEDURE — 99212 OFFICE O/P EST SF 10 MIN: CPT | Performed by: PHYSICIAN ASSISTANT

## 2021-01-27 NOTE — PROGRESS NOTES
Chief Complaint   Patient presents with    Follow Up After Procedure     post op left hip sammy 10-       OP:SURGEON: Dr. Elizabeth House MD  DATE OF PROCEDURE: 10/16/2020  PROCEDURE: Left hip hemiarthroplasty for femoral neck fracture    Subjective:  Maria De Jesus Russell is approximately 3.5 months from above date of surgery. He is weightbearing tolerated from using walker for support. He is done with home physical therapy but is not on outpatient physical therapy. He does go to dialysis several times per week and does have a history of having COVID-19. No new injuries or any other orthopedic complaints. Denies groin pain. Denies paresthesias. Very occasionally has intermittent left lateral hip pain which resolved spontaneously. Does not need to take anything for pain. Overall doing most ADLs at home without issues. Denies calf pain, CP, SOB, fever, chills. Review of Systems -    General ROS: negative for - chills, fatigue, fever or night sweats  Respiratory ROS: no cough, shortness of breath, or wheezing  Cardiovascular ROS: no chest pain or dyspnea on exertion  Gastrointestinal ROS: no abdominal pain, nausea, vomiting, diarrhea, constipation,or black or bloody stools  Genitourinary: no hematuria, dysuria, or incontinence   Musculoskeletal ROS: negative for -back or neck pain or stiffness, also see HPI  Neurological ROS: no TIA or stroke symptoms       Objective:    General: Alert and oriented X 3, normocephalic atraumatic, external ears and eye normal, sclera clear, no acute distress, respirations easy and unlabored with no audible wheezes, skin warm and dry, speech and dress appropriate for noted age, affect euthymic.     Extremity:  Left Lower Extremity  Skin clean dry and intact, without signs of infection  Incisions healed  No edema noted  Compartments supple throughout thigh and leg  Calf supple and nontender  Demonstrates active knee flexion/extension, ankle plantar/dorsiflexion/great toe extension. States sensation intact to touch in sural/deep peroneal/superficial peroneal/saphenous/posterior tibial nerve distributions to foot/ankle. Palpable dorsalis pedis and posterior tibialis pulses, cap refill brisk in toes, foot warm/perfused. AROM of the L hip to flexion, IR/ER, adduction/abducttion all without discomfort  nontender to palpation about the hip              Temp 98 °F (36.7 °C) (Oral)     XR:   3 views of L hip and pelvis demonstrating L hip hemiarthroplasty without subsidence, lucency, or evidence of loosening or failure. No acute fractures or dislocations or any other osseus abnormality identified. Assessment:   Diagnosis Orders   1. History of left hip hemiarthroplasty         Plan:   Reviewed x-rays with patient today in office    Weightbearing as tolerated to left lower extremity. Use walker for support if needed.  Continue remaining active continue home exercise program   Call with any questions or concerns or if having any new pain or new problems.  Seen with Dr. Anna Caro     Follow up PRN    Electronically signed by Katie Sanchez PA-C on 1/27/2021 at 2:41 PM  Note: This report was completed using [email or web address] voiced recognition software. Every effort has been made to ensure accuracy; however, inadvertent computerized transcription errors may be present.

## 2021-01-27 NOTE — PROGRESS NOTES
Patient presents for f/u 10- left hip sammy with walker, slow gait. Denies new injury or fall. Denies pain. \"Doing well\" no complaints at this time.

## 2021-02-26 NOTE — PROGRESS NOTES
Dedra Munguia Hospitalist   Progress Note    Admitting Date and Time: 7/13/2020  5:20 PM  Admit Dx: [id number] [U07.1]  COVID-19 [U07.1]    Subjective:    Patient was admitted with COVID-19 [U07.1]  COVID-19 [U07.1].  Patient denies fever, chills, cp, sob, n/v.     [START ON 7/20/2020] epoetin delmer-epbx  5,000 Units Subcutaneous Once per day on Mon Wed Fri    pantoprazole  40 mg Oral QAM AC    sucralfate  1 g Oral 4 times per day    doxazosin  4 mg Oral Nightly    ferric gluconate (FERRLECIT) IVPB  125 mg Intravenous Q MWF    dexamethasone  6 mg Intravenous Daily    Calcium Acetate (Phos Binder)  2,001 mg Oral TID WC    ferrous sulfate  325 mg Oral BID WC    sodium chloride flush  10 mL Intravenous 2 times per day    heparin (porcine)  5,000 Units Subcutaneous 3 times per day    vitamin C  1,000 mg Oral BID    zinc sulfate  50 mg Oral Daily    amLODIPine  10 mg Oral Daily    aspirin  81 mg Oral Daily    hydrALAZINE  50 mg Oral TID    metoprolol tartrate  50 mg Oral BID    rosuvastatin  5 mg Oral Nightly     white petrolatum, , BID PRN  sodium chloride, 30 mL, PRN  sodium chloride flush, 10 mL, PRN  acetaminophen, 650 mg, Q6H PRN    Or  acetaminophen, 650 mg, Q6H PRN  polyethylene glycol, 17 g, Daily PRN  promethazine, 12.5 mg, Q6H PRN    Or  ondansetron, 4 mg, Q6H PRN         Objective:    BP (!) 140/58   Pulse 69   Temp 97.1 °F (36.2 °C) (Oral)   Resp 16   Ht 5' 8\" (1.727 m)   Wt 142 lb 3.2 oz (64.5 kg)   SpO2 94%   BMI 21.62 kg/m²   Skin: warm and dry, no rash or erythema  Pulmonary/Chest: clear to auscultation bilaterally- no wheezes, rales or rhonchi, normal air movement, no respiratory distress  Cardiovascular: rhythm reg at rate of 72  Abdomen: soft, non-tender, non-distended, normal bowel sounds, no masses or organomegaly  Extremities: no cyanosis, no clubbing and no edema      Recent Labs     07/17/20  0530 07/17/20  1725 07/18/20  0533    141 140   K 5.1* 3.9 4.3 Posterior Vitreous Detachment: I have discussed the diagnosis of PVD with the patient and the possibility of a retinal tear or detachment. The signs/symptoms of a retinal tear/detachment were reviewed to include but not limited to redness, discharge, pain, increase or change in flashes of light, increase or change in floaters, decreased vision, part of the vision missing, veils or any other ocular concerns. I have further explained not all patients who develop a tear or detachment have notable symptoms, therefore, compliance with return visits are necessary. The patient was instructed to contact us immediately if they noticed any of the signs or symptoms of retinal detachment as noted above as the prognosis for any potential treatment options may be time related. Return for follow-up as scheduled. CL 97* 98 98   CO2 27 29 28   BUN 81* 42* 60*   CREATININE 6.8* 4.1* 5.1*   GLUCOSE 110* 146* 121*   CALCIUM 9.4 8.9 9.2       Recent Labs     07/17/20  0530 07/17/20  1725 07/18/20  0533   WBC 4.9 8.4 8.9   RBC 2.01* 2.49* 2.17*   HGB 6.6* 8.1* 6.9*   HCT 21.2* 25.6* 22.0*   .5* 102.8* 101.4*   MCH 32.8 32.5 31.8   MCHC 31.1* 31.6* 31.4*   RDW 16.9* 18.5* 18.7*   * 121* 126*   MPV 11.8 12.1* 11.8       CBC with Differential:    Lab Results   Component Value Date    WBC 8.9 07/18/2020    RBC 2.17 07/18/2020    HGB 6.9 07/18/2020    HCT 22.0 07/18/2020     07/18/2020    .4 07/18/2020    MCH 31.8 07/18/2020    MCHC 31.4 07/18/2020    RDW 18.7 07/18/2020    LYMPHOPCT 6.8 07/18/2020    MONOPCT 7.8 07/18/2020    BASOPCT 0.0 07/18/2020    MONOSABS 0.69 07/18/2020    LYMPHSABS 0.60 07/18/2020    EOSABS 0.00 07/18/2020    BASOSABS 0.00 07/18/2020     BMP:    Lab Results   Component Value Date     07/18/2020    K 4.3 07/18/2020    K 4.9 07/14/2020    CL 98 07/18/2020    CO2 28 07/18/2020    BUN 60 07/18/2020    LABALBU 3.1 07/18/2020    CREATININE 5.1 07/18/2020    CALCIUM 9.2 07/18/2020    GFRAA 13 07/18/2020    LABGLOM 11 07/18/2020    GLUCOSE 121 07/18/2020     Hepatic Function Panel:    Lab Results   Component Value Date    ALKPHOS 52 07/18/2020    ALT 21 07/18/2020    AST 46 07/18/2020    PROT 5.1 07/18/2020    BILITOT 0.3 07/18/2020    BILIDIR <0.2 07/18/2020    IBILI see below 07/18/2020    LABALBU 3.1 07/18/2020        Radiology:   XR CHEST PORTABLE   Final Result      No airspace opacities or pleural effusion. Assessment:    Active Problems:    COVID-19  Resolved Problems:    * No resolved hospital problems. *      Plan:  1. Acute respiratory failure with hypoxia due to covid 19 wean oxygen as able  2. Pneumonia due to covid 19 ID following ID stopping remdesivir. Continue steroids  3. Melena surgery following for possible egd  4.  Anemia likely with acute blood loss component monitor hgb and transfuse prn  5. ESRD HD per nephrology  6. htn monitor bp and continue med  7. Hyperlipidemia continue med  8.  Thrombocytopenia monitor          Electronically signed by Diana Burton DO on 7/18/2020 at 9:51 PM

## 2021-05-29 NOTE — FLOWSHEET NOTE
called Patient's room with no answer by Patient.  called Patient's Spouse and left voice mail message on Spouse's cell phone. [facility] team  [phone]  Pool: HE PA MED ([id number])          PA has been initiated.       PA form completed and faxed insurance via Cover My Meds     Key: [id number]     Medication:  Cyclobenzaprine 10mg     Insurance:  [employer]        Response will be received via fax and may take up to 5-10 business days depending on plan

## 2021-08-07 ENCOUNTER — APPOINTMENT (OUTPATIENT)
Dept: CT IMAGING | Age: 76
End: 2021-08-07
Payer: MEDICARE

## 2021-08-07 ENCOUNTER — APPOINTMENT (OUTPATIENT)
Dept: GENERAL RADIOLOGY | Age: 76
End: 2021-08-07
Payer: MEDICARE

## 2021-08-07 ENCOUNTER — HOSPITAL ENCOUNTER (EMERGENCY)
Age: 76
Discharge: HOME | End: 2021-08-08
Attending: EMERGENCY MEDICINE
Payer: MEDICARE

## 2021-08-07 DIAGNOSIS — R51.9 ACUTE NONINTRACTABLE HEADACHE, UNSPECIFIED HEADACHE TYPE: ICD-10-CM

## 2021-08-07 DIAGNOSIS — Z99.2 ESRD (END STAGE RENAL DISEASE) ON DIALYSIS (HCC): ICD-10-CM

## 2021-08-07 DIAGNOSIS — I10 HYPERTENSION, UNSPECIFIED TYPE: Primary | ICD-10-CM

## 2021-08-07 DIAGNOSIS — N18.6 ESRD (END STAGE RENAL DISEASE) ON DIALYSIS (HCC): ICD-10-CM

## 2021-08-07 DIAGNOSIS — R19.7 DIARRHEA, UNSPECIFIED TYPE: ICD-10-CM

## 2021-08-07 LAB
ALBUMIN SERPL-MCNC: 4.2 G/DL (ref 3.5–5.2)
ALP BLD-CCNC: 90 U/L (ref 40–129)
ALT SERPL-CCNC: <5 U/L (ref 0–40)
ANION GAP SERPL CALCULATED.3IONS-SCNC: 14 MMOL/L (ref 7–16)
ANISOCYTOSIS: ABNORMAL
AST SERPL-CCNC: 11 U/L (ref 0–39)
BASOPHILS ABSOLUTE: 0.06 E9/L (ref 0–0.2)
BASOPHILS RELATIVE PERCENT: 0.9 % (ref 0–2)
BILIRUB SERPL-MCNC: 0.5 MG/DL (ref 0–1.2)
BUN BLDV-MCNC: 27 MG/DL (ref 6–23)
CALCIUM SERPL-MCNC: 9.6 MG/DL (ref 8.6–10.2)
CHLORIDE BLD-SCNC: 100 MMOL/L (ref 98–107)
CO2: 26 MMOL/L (ref 22–29)
CREAT SERPL-MCNC: 6.5 MG/DL (ref 0.7–1.2)
EOSINOPHILS ABSOLUTE: 0.03 E9/L (ref 0.05–0.5)
EOSINOPHILS RELATIVE PERCENT: 0.4 % (ref 0–6)
GFR AFRICAN AMERICAN: 10
GFR NON-AFRICAN AMERICAN: 8 ML/MIN/1.73
GLUCOSE BLD-MCNC: 130 MG/DL (ref 74–99)
HCT VFR BLD CALC: 40.5 % (ref 37–54)
HEMOGLOBIN: 12.3 G/DL (ref 12.5–16.5)
HYPOCHROMIA: ABNORMAL
IMMATURE GRANULOCYTES #: 0.03 E9/L
IMMATURE GRANULOCYTES %: 0.4 % (ref 0–5)
LYMPHOCYTES ABSOLUTE: 0.52 E9/L (ref 1.5–4)
LYMPHOCYTES RELATIVE PERCENT: 7.6 % (ref 20–42)
MCH RBC QN AUTO: 33.8 PG (ref 26–35)
MCHC RBC AUTO-ENTMCNC: 30.4 % (ref 32–34.5)
MCV RBC AUTO: 111.3 FL (ref 80–99.9)
MONOCYTES ABSOLUTE: 0.35 E9/L (ref 0.1–0.95)
MONOCYTES RELATIVE PERCENT: 5.1 % (ref 2–12)
NEUTROPHILS ABSOLUTE: 5.89 E9/L (ref 1.8–7.3)
NEUTROPHILS RELATIVE PERCENT: 85.6 % (ref 43–80)
OVALOCYTES: ABNORMAL
PDW BLD-RTO: 18.1 FL (ref 11.5–15)
PLATELET # BLD: 110 E9/L (ref 130–450)
PMV BLD AUTO: 11.3 FL (ref 7–12)
POIKILOCYTES: ABNORMAL
POLYCHROMASIA: ABNORMAL
POTASSIUM REFLEX MAGNESIUM: 5.3 MMOL/L (ref 3.5–5)
RBC # BLD: 3.64 E12/L (ref 3.8–5.8)
SCHISTOCYTES: ABNORMAL
SODIUM BLD-SCNC: 140 MMOL/L (ref 132–146)
TEAR DROP CELLS: ABNORMAL
TOTAL PROTEIN: 7 G/DL (ref 6.4–8.3)
TROPONIN, HIGH SENSITIVITY: 128 NG/L (ref 0–11)
TROPONIN, HIGH SENSITIVITY: 141 NG/L (ref 0–11)
WBC # BLD: 6.9 E9/L (ref 4.5–11.5)

## 2021-08-07 PROCEDURE — 96374 THER/PROPH/DIAG INJ IV PUSH: CPT

## 2021-08-07 PROCEDURE — 93005 ELECTROCARDIOGRAM TRACING: CPT | Performed by: PHYSICIAN ASSISTANT

## 2021-08-07 PROCEDURE — 99283 EMERGENCY DEPT VISIT LOW MDM: CPT

## 2021-08-07 PROCEDURE — 70450 CT HEAD/BRAIN W/O DYE: CPT

## 2021-08-07 PROCEDURE — 71045 X-RAY EXAM CHEST 1 VIEW: CPT

## 2021-08-07 PROCEDURE — 74176 CT ABD & PELVIS W/O CONTRAST: CPT

## 2021-08-07 PROCEDURE — 80053 COMPREHEN METABOLIC PANEL: CPT

## 2021-08-07 PROCEDURE — 2580000003 HC RX 258: Performed by: EMERGENCY MEDICINE

## 2021-08-07 PROCEDURE — 96375 TX/PRO/DX INJ NEW DRUG ADDON: CPT

## 2021-08-07 PROCEDURE — 6360000002 HC RX W HCPCS: Performed by: EMERGENCY MEDICINE

## 2021-08-07 PROCEDURE — 85025 COMPLETE CBC W/AUTO DIFF WBC: CPT

## 2021-08-07 PROCEDURE — 84484 ASSAY OF TROPONIN QUANT: CPT

## 2021-08-07 RX ORDER — DIPHENHYDRAMINE HYDROCHLORIDE 50 MG/ML
50 INJECTION INTRAMUSCULAR; INTRAVENOUS ONCE
Status: COMPLETED | OUTPATIENT
Start: 2021-08-07 | End: 2021-08-07

## 2021-08-07 RX ORDER — METOCLOPRAMIDE HYDROCHLORIDE 5 MG/ML
10 INJECTION INTRAMUSCULAR; INTRAVENOUS ONCE
Status: COMPLETED | OUTPATIENT
Start: 2021-08-07 | End: 2021-08-07

## 2021-08-07 RX ORDER — 0.9 % SODIUM CHLORIDE 0.9 %
1000 INTRAVENOUS SOLUTION INTRAVENOUS ONCE
Status: COMPLETED | OUTPATIENT
Start: 2021-08-07 | End: 2021-08-08

## 2021-08-07 RX ADMIN — DIPHENHYDRAMINE HYDROCHLORIDE 50 MG: 50 INJECTION, SOLUTION INTRAMUSCULAR; INTRAVENOUS at 20:28

## 2021-08-07 RX ADMIN — SODIUM CHLORIDE 1000 ML: 9 INJECTION, SOLUTION INTRAVENOUS at 20:28

## 2021-08-07 RX ADMIN — METOCLOPRAMIDE 10 MG: 5 INJECTION, SOLUTION INTRAMUSCULAR; INTRAVENOUS at 20:28

## 2021-08-07 ASSESSMENT — PAIN - FUNCTIONAL ASSESSMENT: PAIN_FUNCTIONAL_ASSESSMENT: PREVENTS OR INTERFERES SOME ACTIVE ACTIVITIES AND ADLS

## 2021-08-07 ASSESSMENT — PAIN DESCRIPTION - DIRECTION: RADIATING_TOWARDS: POSTERIOR HEAD

## 2021-08-07 ASSESSMENT — PAIN DESCRIPTION - LOCATION: LOCATION: HEAD

## 2021-08-07 ASSESSMENT — PAIN SCALES - GENERAL: PAINLEVEL_OUTOF10: 9

## 2021-08-07 ASSESSMENT — PAIN DESCRIPTION - DESCRIPTORS: DESCRIPTORS: HEADACHE

## 2021-08-07 ASSESSMENT — PAIN DESCRIPTION - FREQUENCY: FREQUENCY: CONTINUOUS

## 2021-08-07 ASSESSMENT — PAIN DESCRIPTION - ORIENTATION: ORIENTATION: RIGHT

## 2021-08-07 ASSESSMENT — PAIN DESCRIPTION - PAIN TYPE: TYPE: ACUTE PAIN

## 2021-08-08 VITALS
RESPIRATION RATE: 18 BRPM | DIASTOLIC BLOOD PRESSURE: 67 MMHG | SYSTOLIC BLOOD PRESSURE: 190 MMHG | OXYGEN SATURATION: 98 % | TEMPERATURE: 97.1 F | HEART RATE: 72 BPM

## 2021-08-08 LAB
EKG ATRIAL RATE: 61 BPM
EKG P AXIS: 77 DEGREES
EKG P-R INTERVAL: 118 MS
EKG Q-T INTERVAL: 430 MS
EKG QRS DURATION: 78 MS
EKG QTC CALCULATION (BAZETT): 432 MS
EKG R AXIS: 56 DEGREES
EKG T AXIS: 60 DEGREES
EKG VENTRICULAR RATE: 61 BPM

## 2021-08-08 PROCEDURE — 93010 ELECTROCARDIOGRAM REPORT: CPT | Performed by: INTERNAL MEDICINE

## 2021-08-08 RX ORDER — AMOXICILLIN AND CLAVULANATE POTASSIUM 500; 125 MG/1; MG/1
1 TABLET, FILM COATED ORAL EVERY 24 HOURS
Qty: 10 TABLET | Refills: 0 | Status: ON HOLD | OUTPATIENT
Start: 2021-08-08 | End: 2021-08-13 | Stop reason: HOSPADM

## 2021-08-08 NOTE — ED NOTES
Bed: 03  Expected date:   Expected time:   Means of arrival:   Comments:  nupur Moreland RN  08/07/21 2000

## 2021-08-08 NOTE — ED PROVIDER NOTES
HPI:  8/7/21,   Time: 8:22 PM EDT       Tavares Mcclain is a 67 y.o. male presenting to the ED for elevated blood pressure and headache, beginning 1 day ago. The complaint has been persistent, mild in severity, and worsened by nothing. The patient states that he fell on Tuesday and hit his head. He states he was evaluated and told everything was okay and was discharged home. He states that today he began having a headache. He states it is diffuse in nature. He states he took over-the-counter medications with no relief. He states that after getting the headache he took his blood pressure and it was elevated therefore he came to the ED to be evaluated. He states that also over the last 2 weeks he has been having watery nonbloody diarrhea. He states today he did have some nausea and vomiting that was nonbilious and nonbloody. Denies any fevers or chills. No chest pain shortness of breath. No numbness tingling. No focal deficits. Review of Systems:   Pertinent positives and negatives are stated within HPI, all other systems reviewed and are negative.          --------------------------------------------- PAST HISTORY ---------------------------------------------  Past Medical History:  has a past medical history of Blind left eye, Chronic kidney disease, Dialysis patient [facility]), [facility] Hyperlipidemia, and Hypertension. Past Surgical History:  has a past surgical history that includes AV fistula creation (Left, 2015); HEMODIALYSIS ACCESS PERCUTANEOUS REVISION (Left, 2019); PERIPHERAL VASCULAR BYPASS (2008); Upper gastrointestinal endoscopy (N/A, 7/20/2020); Cardiac surgery; hip surgery (Left, 10/16/2020); and Upper gastrointestinal endoscopy (N/A, 10/28/2020). Social History:  reports that he quit smoking about 20 years ago. His smoking use included cigarettes. He has a 30.00 pack-year smoking history. He has never used smokeless tobacco. He reports previous alcohol use.  He reports that he does not use drugs. Family History: family history is not on file. The patients home medications have been reviewed. Allergies: Patient has no known allergies. ---------------------------------------------------PHYSICAL EXAM--------------------------------------    Constitutional/General: Alert and oriented x3, well appearing, non toxic in NAD  Head: Normocephalic and healing ecchymosis to right side of face  Eyes: PERRL, EOMI, conjunctive normal, sclera non icteric  Mouth: Oropharynx clear, handling secretions, no trismus, no asymmetry of the posterior oropharynx or uvular edema  Neck: Supple, full ROM, non tender to palpation in the midline, no stridor, no crepitus, no meningeal signs  Respiratory: Lungs clear to auscultation bilaterally, no wheezes, rales, or rhonchi. Not in respiratory distress  Cardiovascular:  Regular rate. Regular rhythm. No murmurs, gallops, or rubs. 2+ distal pulses  GI:  Abdomen Soft, Non tender, Non distended. +BS. No organomegaly, no palpable masses,  No rebound, guarding, or rigidity. Musculoskeletal: Moves all extremities x 4. Warm and well perfused, no clubbing, cyanosis, or edema. Capillary refill <3 seconds  Integument: skin warm and dry. No rashes. Neurologic: GCS 15, no focal deficits, symmetric strength 5/5 in the upper and lower extremities bilaterally, sensation intact all 4 extremities, cranial nerves II to XII intact  Psychiatric: Normal Affect    -------------------------------------------------- RESULTS -------------------------------------------------  I have personally reviewed all laboratory and imaging results for this patient. Results are listed below.      LABS:  Results for orders placed or performed during the hospital encounter of 08/07/21   CBC Auto Differential   Result Value Ref Range    WBC 6.9 4.5 - 11.5 E9/L    RBC 3.64 (L) 3.80 - 5.80 E12/L    Hemoglobin 12.3 (L) 12.5 - 16.5 g/dL    Hematocrit 40.5 37.0 - 54.0 %    .3 (H) 80.0 - 99.9 fL    MCH 33.8 26.0 - 35.0 pg    MCHC 30.4 (L) 32.0 - 34.5 %    RDW 18.1 (H) 11.5 - 15.0 fL    Platelets 094 (L) 801 - 450 E9/L    MPV 11.3 7.0 - 12.0 fL    Neutrophils % 85.6 (H) 43.0 - 80.0 %    Immature Granulocytes % 0.4 0.0 - 5.0 %    Lymphocytes % 7.6 (L) 20.0 - 42.0 %    Monocytes % 5.1 2.0 - 12.0 %    Eosinophils % 0.4 0.0 - 6.0 %    Basophils % 0.9 0.0 - 2.0 %    Neutrophils Absolute 5.89 1.80 - 7.30 E9/L    Immature Granulocytes # 0.03 E9/L    Lymphocytes Absolute 0.52 (L) 1.50 - 4.00 E9/L    Monocytes Absolute 0.35 0.10 - 0.95 E9/L    Eosinophils Absolute 0.03 (L) 0.05 - 0.50 E9/L    Basophils Absolute 0.06 0.00 - 0.20 E9/L    Anisocytosis 2+     Polychromasia 1+     Hypochromia 1+     Poikilocytes 1+     Schistocytes 1+     Ovalocytes 1+     Tear Drop Cells 1+    Comprehensive Metabolic Panel w/ Reflex to MG   Result Value Ref Range    Sodium 140 132 - 146 mmol/L    Potassium reflex Magnesium 5.3 (H) 3.5 - 5.0 mmol/L    Chloride 100 98 - 107 mmol/L    CO2 26 22 - 29 mmol/L    Anion Gap 14 7 - 16 mmol/L    Glucose 130 (H) 74 - 99 mg/dL    BUN 27 (H) 6 - 23 mg/dL    CREATININE 6.5 (H) 0.7 - 1.2 mg/dL    GFR Non-African American 8 >=60 mL/min/1.73    GFR African American 10     Calcium 9.6 8.6 - 10.2 mg/dL    Total Protein 7.0 6.4 - 8.3 g/dL    Albumin 4.2 3.5 - 5.2 g/dL    Total Bilirubin 0.5 0.0 - 1.2 mg/dL    Alkaline Phosphatase 90 40 - 129 U/L    ALT <5 0 - 40 U/L    AST 11 0 - 39 U/L   Troponin   Result Value Ref Range    Troponin, High Sensitivity 141 (H) 0 - 11 ng/L   Troponin   Result Value Ref Range    Troponin, High Sensitivity 128 (H) 0 - 11 ng/L   EKG 12 Lead   Result Value Ref Range    Ventricular Rate 61 BPM    Atrial Rate 61 BPM    P-R Interval 118 ms    QRS Duration 78 ms    Q-T Interval 430 ms    QTc Calculation (Bazett) 432 ms    P Axis 77 degrees    R Axis 56 degrees    T Axis 60 degrees       RADIOLOGY:  Interpreted by Radiologist.  CT Head WO Contrast   Final Result   There are motion artifacts which obscures detail. Grossly there is no indication for acute cardiopulmonary process. However recommended the repeat examination as soon as patient can be more   cooperative for the procedure. The present study is limited. CT ABDOMEN PELVIS WO CONTRAST Additional Contrast? None   Final Result   No acute specific abdominopelvic process is identified. Uncomplicated diverticulosis of predominantly sigmoid colon. Sigmoid wall thickness is thought within upper limits of normal, allowing for   an element of mild muscular hypertrophy in the context of diverticulosis,   luminal underdistention and small amount of intraluminal contrast.      Bilateral renal atrophy, advanced intrarenal vascular calcifications, and   numerous hypodense to hyperdense round foci involving both kidneys, most   likely variable density cysts, however indolent neoplasia can have a similar   appearance with respect to the hyperdense foci. Consider follow-up renal   protocol CT or abdominal MRI in 6 months if no interval studies. Stable prominent bladder diverticulum on the right, 3.2 cm. Postsurgical and other chronic/nonemergent findings, as described. XR CHEST 1 VIEW   Final Result   Stable radiograph with no evidence of acute cardiopulmonary disease. EKG:  This EKG is signed and interpreted by the EP. EKG shows normal sinus rhythm 61 bpm.  Normal axis. Normal QRS. No STEMI.      ------------------------- NURSING NOTES AND VITALS REVIEWED ---------------------------   The nursing notes within the ED encounter and vital signs as below have been reviewed by myself. BP (!) 190/67   Pulse 72   Temp 97.1 °F (36.2 °C)   Resp 18   SpO2 98%   Oxygen Saturation Interpretation: Normal    The patients available past medical records and past encounters were reviewed.         ------------------------------ ED COURSE/MEDICAL DECISION MAKING----------------------  Medications   0.9 % sodium chloride bolus (0 mLs Intravenous Stopped 8/8/21 0026)   diphenhydrAMINE (BENADRYL) injection 50 mg (50 mg Intravenous Given 8/7/21 2028)   metoclopramide (REGLAN) injection 10 mg (10 mg Intravenous Given 8/7/21 2028)         ED COURSE:       Medical Decision Making: This is a 58-year-old male presents to the ED for headache and elevated blood pressure. Patient underwent laboratory work-up which showed a normal CBC except for hemoglobin of 12.3 likely related to patient's end-stage renal disease. Chemistry showed a potassium 5.3 with BUN/creatinine 27/6.5 also consistent with the patient's end-stage renal disease. Troponin was 141 and 128 with no ischemic findings on EKG. Patient has no chest pain. ACS less likely. Head CT shows no abnormalities although it is a limited study. CT abdomen pelvis that showed some thickening of the sigmoid wall as well as renal atrophy but otherwise no other acute findings. Patient made aware of incidental findings on imaging and appropriate follow-up. Patient given IV fluids Benadryl and Reglan. On reevaluation headache resolved. Patient does have an elevated blood pressure but patient is due for his nighttime meds. Patient is neurologically intact. GCS of 15. Patient will be treated supportively and follow-up with his PCP. Return precautions given. Patient agrees with plan. I, Dr. Ladona Galeazzi, am the primary provider for this encounter    This patient's ED course included: a personal history and physicial examination, re-evaluation prior to disposition, multiple bedside re-evaluations, IV medications, cardiac monitoring and continuous pulse oximetry    This patient has remained hemodynamically stable during their ED course. Re-Evaluations:             Re-evaluation. Patients symptoms are improving        Counseling:    The emergency provider has spoken with the patient and discussed todays results, in addition to providing specific details for the plan of care and counseling regarding the diagnosis and prognosis. Questions are answered at this time and they are agreeable with the plan.       --------------------------------- IMPRESSION AND DISPOSITION ---------------------------------    IMPRESSION  1. Hypertension, unspecified type    2. Acute nonintractable headache, unspecified headache type    3. Diarrhea, unspecified type    4. ESRD (end stage renal disease) on dialysis [facility])        DISPOSITION  Disposition: Discharge to home  Patient condition is stable    NOTE: This report was transcribed using voice recognition software.  Every effort was made to ensure accuracy; however, inadvertent computerized transcription errors may be present        Geraldine Xie DO  08/08/21 0119

## 2021-08-11 ENCOUNTER — APPOINTMENT (OUTPATIENT)
Dept: CT IMAGING | Age: 76
DRG: 304 | End: 2021-08-11
Payer: MEDICARE

## 2021-08-11 ENCOUNTER — HOSPITAL ENCOUNTER (INPATIENT)
Age: 76
LOS: 3 days | Discharge: HOME | DRG: 304 | End: 2021-08-14
Attending: EMERGENCY MEDICINE | Admitting: INTERNAL MEDICINE
Payer: MEDICARE

## 2021-08-11 DIAGNOSIS — I10 UNCONTROLLED HYPERTENSION: Primary | ICD-10-CM

## 2021-08-11 DIAGNOSIS — R51.9 NONINTRACTABLE HEADACHE, UNSPECIFIED CHRONICITY PATTERN, UNSPECIFIED HEADACHE TYPE: ICD-10-CM

## 2021-08-11 PROBLEM — N18.6 ESRD ON DIALYSIS (HCC): Status: ACTIVE | Noted: 2021-08-11

## 2021-08-11 PROBLEM — Z99.2 ESRD ON DIALYSIS (HCC): Status: ACTIVE | Noted: 2021-08-11

## 2021-08-11 PROBLEM — K21.9 GASTROESOPHAGEAL REFLUX DISEASE WITHOUT ESOPHAGITIS: Status: ACTIVE | Noted: 2021-08-11

## 2021-08-11 LAB
ALBUMIN SERPL-MCNC: 3.9 G/DL (ref 3.5–5.2)
ALP BLD-CCNC: 77 U/L (ref 40–129)
ALT SERPL-CCNC: 5 U/L (ref 0–40)
ANION GAP SERPL CALCULATED.3IONS-SCNC: 9 MMOL/L (ref 7–16)
AST SERPL-CCNC: 12 U/L (ref 0–39)
BILIRUB SERPL-MCNC: 0.7 MG/DL (ref 0–1.2)
BUN BLDV-MCNC: 10 MG/DL (ref 6–23)
CALCIUM SERPL-MCNC: 8.9 MG/DL (ref 8.6–10.2)
CHLORIDE BLD-SCNC: 98 MMOL/L (ref 98–107)
CO2: 30 MMOL/L (ref 22–29)
CREAT SERPL-MCNC: 3.9 MG/DL (ref 0.7–1.2)
EKG ATRIAL RATE: 59 BPM
EKG P AXIS: 69 DEGREES
EKG P-R INTERVAL: 134 MS
EKG Q-T INTERVAL: 454 MS
EKG QRS DURATION: 82 MS
EKG QTC CALCULATION (BAZETT): 449 MS
EKG R AXIS: 71 DEGREES
EKG T AXIS: 65 DEGREES
EKG VENTRICULAR RATE: 59 BPM
GFR AFRICAN AMERICAN: 18
GFR NON-AFRICAN AMERICAN: 15 ML/MIN/1.73
GLUCOSE BLD-MCNC: 87 MG/DL (ref 74–99)
HCT VFR BLD CALC: 40.7 % (ref 37–54)
HEMOGLOBIN: 12.7 G/DL (ref 12.5–16.5)
MCH RBC QN AUTO: 33.4 PG (ref 26–35)
MCHC RBC AUTO-ENTMCNC: 31.2 % (ref 32–34.5)
MCV RBC AUTO: 107.1 FL (ref 80–99.9)
PDW BLD-RTO: 17.9 FL (ref 11.5–15)
PLATELET # BLD: 91 E9/L (ref 130–450)
PLATELET CONFIRMATION: NORMAL
PMV BLD AUTO: 11.5 FL (ref 7–12)
POTASSIUM SERPL-SCNC: 3.9 MMOL/L (ref 3.5–5)
RBC # BLD: 3.8 E12/L (ref 3.8–5.8)
SODIUM BLD-SCNC: 137 MMOL/L (ref 132–146)
TOTAL PROTEIN: 6.2 G/DL (ref 6.4–8.3)
WBC # BLD: 5.2 E9/L (ref 4.5–11.5)

## 2021-08-11 PROCEDURE — 96374 THER/PROPH/DIAG INJ IV PUSH: CPT

## 2021-08-11 PROCEDURE — 6360000002 HC RX W HCPCS: Performed by: NURSE PRACTITIONER

## 2021-08-11 PROCEDURE — 6370000000 HC RX 637 (ALT 250 FOR IP): Performed by: PHYSICIAN ASSISTANT

## 2021-08-11 PROCEDURE — 85027 COMPLETE CBC AUTOMATED: CPT

## 2021-08-11 PROCEDURE — 6360000002 HC RX W HCPCS: Performed by: PHYSICIAN ASSISTANT

## 2021-08-11 PROCEDURE — 99284 EMERGENCY DEPT VISIT MOD MDM: CPT

## 2021-08-11 PROCEDURE — 6360000002 HC RX W HCPCS: Performed by: EMERGENCY MEDICINE

## 2021-08-11 PROCEDURE — 6370000000 HC RX 637 (ALT 250 FOR IP): Performed by: NURSE PRACTITIONER

## 2021-08-11 PROCEDURE — 1200000000 HC SEMI PRIVATE

## 2021-08-11 PROCEDURE — 93010 ELECTROCARDIOGRAM REPORT: CPT | Performed by: INTERNAL MEDICINE

## 2021-08-11 PROCEDURE — 96375 TX/PRO/DX INJ NEW DRUG ADDON: CPT

## 2021-08-11 PROCEDURE — 80053 COMPREHEN METABOLIC PANEL: CPT

## 2021-08-11 PROCEDURE — 70450 CT HEAD/BRAIN W/O DYE: CPT

## 2021-08-11 PROCEDURE — 2580000003 HC RX 258: Performed by: NURSE PRACTITIONER

## 2021-08-11 PROCEDURE — 93005 ELECTROCARDIOGRAM TRACING: CPT | Performed by: NURSE PRACTITIONER

## 2021-08-11 PROCEDURE — 72125 CT NECK SPINE W/O DYE: CPT

## 2021-08-11 RX ORDER — METOPROLOL TARTRATE 50 MG/1
50 TABLET, FILM COATED ORAL 2 TIMES DAILY
Status: DISCONTINUED | OUTPATIENT
Start: 2021-08-11 | End: 2021-08-12

## 2021-08-11 RX ORDER — DOXAZOSIN MESYLATE 4 MG/1
4 TABLET ORAL NIGHTLY
Status: DISCONTINUED | OUTPATIENT
Start: 2021-08-11 | End: 2021-08-14 | Stop reason: HOSPADM

## 2021-08-11 RX ORDER — HYDRALAZINE HYDROCHLORIDE 20 MG/ML
10 INJECTION INTRAMUSCULAR; INTRAVENOUS ONCE
Status: COMPLETED | OUTPATIENT
Start: 2021-08-11 | End: 2021-08-11

## 2021-08-11 RX ORDER — HYDRALAZINE HYDROCHLORIDE 20 MG/ML
10 INJECTION INTRAMUSCULAR; INTRAVENOUS EVERY 6 HOURS PRN
Status: DISCONTINUED | OUTPATIENT
Start: 2021-08-11 | End: 2021-08-14 | Stop reason: HOSPADM

## 2021-08-11 RX ORDER — DIPHENHYDRAMINE HYDROCHLORIDE 50 MG/ML
25 INJECTION INTRAMUSCULAR; INTRAVENOUS ONCE
Status: COMPLETED | OUTPATIENT
Start: 2021-08-11 | End: 2021-08-11

## 2021-08-11 RX ORDER — HYDRALAZINE HYDROCHLORIDE 50 MG/1
50 TABLET, FILM COATED ORAL 3 TIMES DAILY
Status: DISCONTINUED | OUTPATIENT
Start: 2021-08-11 | End: 2021-08-12

## 2021-08-11 RX ORDER — ACETAMINOPHEN 650 MG/1
650 SUPPOSITORY RECTAL EVERY 6 HOURS PRN
Status: DISCONTINUED | OUTPATIENT
Start: 2021-08-11 | End: 2021-08-14 | Stop reason: HOSPADM

## 2021-08-11 RX ORDER — POLYETHYLENE GLYCOL 3350 17 G/17G
17 POWDER, FOR SOLUTION ORAL DAILY PRN
Status: DISCONTINUED | OUTPATIENT
Start: 2021-08-11 | End: 2021-08-14 | Stop reason: HOSPADM

## 2021-08-11 RX ORDER — ACETAMINOPHEN 500 MG
1000 TABLET ORAL ONCE
Status: COMPLETED | OUTPATIENT
Start: 2021-08-11 | End: 2021-08-11

## 2021-08-11 RX ORDER — ONDANSETRON 2 MG/ML
4 INJECTION INTRAMUSCULAR; INTRAVENOUS EVERY 6 HOURS PRN
Status: DISCONTINUED | OUTPATIENT
Start: 2021-08-11 | End: 2021-08-14 | Stop reason: HOSPADM

## 2021-08-11 RX ORDER — PANTOPRAZOLE SODIUM 40 MG/1
40 TABLET, DELAYED RELEASE ORAL
Status: DISCONTINUED | OUTPATIENT
Start: 2021-08-12 | End: 2021-08-14 | Stop reason: HOSPADM

## 2021-08-11 RX ORDER — SODIUM CHLORIDE 0.9 % (FLUSH) 0.9 %
5-40 SYRINGE (ML) INJECTION EVERY 12 HOURS SCHEDULED
Status: DISCONTINUED | OUTPATIENT
Start: 2021-08-11 | End: 2021-08-14 | Stop reason: HOSPADM

## 2021-08-11 RX ORDER — SODIUM CHLORIDE 0.9 % (FLUSH) 0.9 %
5-40 SYRINGE (ML) INJECTION PRN
Status: DISCONTINUED | OUTPATIENT
Start: 2021-08-11 | End: 2021-08-14 | Stop reason: HOSPADM

## 2021-08-11 RX ORDER — SODIUM CHLORIDE 9 MG/ML
25 INJECTION, SOLUTION INTRAVENOUS PRN
Status: DISCONTINUED | OUTPATIENT
Start: 2021-08-11 | End: 2021-08-14 | Stop reason: HOSPADM

## 2021-08-11 RX ORDER — ROSUVASTATIN CALCIUM 10 MG/1
5 TABLET, COATED ORAL NIGHTLY
Status: DISCONTINUED | OUTPATIENT
Start: 2021-08-11 | End: 2021-08-14 | Stop reason: HOSPADM

## 2021-08-11 RX ORDER — ONDANSETRON 4 MG/1
4 TABLET, ORALLY DISINTEGRATING ORAL EVERY 8 HOURS PRN
Status: DISCONTINUED | OUTPATIENT
Start: 2021-08-11 | End: 2021-08-14 | Stop reason: HOSPADM

## 2021-08-11 RX ORDER — ACETAMINOPHEN 325 MG/1
650 TABLET ORAL EVERY 6 HOURS PRN
Status: DISCONTINUED | OUTPATIENT
Start: 2021-08-11 | End: 2021-08-14 | Stop reason: HOSPADM

## 2021-08-11 RX ORDER — PROCHLORPERAZINE EDISYLATE 5 MG/ML
10 INJECTION INTRAMUSCULAR; INTRAVENOUS ONCE
Status: COMPLETED | OUTPATIENT
Start: 2021-08-11 | End: 2021-08-11

## 2021-08-11 RX ADMIN — HYDRALAZINE HYDROCHLORIDE 10 MG: 20 INJECTION INTRAMUSCULAR; INTRAVENOUS at 12:50

## 2021-08-11 RX ADMIN — ROSUVASTATIN 5 MG: 10 TABLET, FILM COATED ORAL at 20:32

## 2021-08-11 RX ADMIN — HYDRALAZINE HYDROCHLORIDE 10 MG: 20 INJECTION INTRAMUSCULAR; INTRAVENOUS at 18:18

## 2021-08-11 RX ADMIN — HYDRALAZINE HYDROCHLORIDE 10 MG: 20 INJECTION INTRAMUSCULAR; INTRAVENOUS at 18:16

## 2021-08-11 RX ADMIN — PROCHLORPERAZINE EDISYLATE 10 MG: 5 INJECTION INTRAMUSCULAR; INTRAVENOUS at 13:48

## 2021-08-11 RX ADMIN — HYDRALAZINE HYDROCHLORIDE 50 MG: 50 TABLET, FILM COATED ORAL at 20:31

## 2021-08-11 RX ADMIN — DOXAZOSIN 4 MG: 4 TABLET ORAL at 20:31

## 2021-08-11 RX ADMIN — METOPROLOL TARTRATE 50 MG: 50 TABLET, FILM COATED ORAL at 20:31

## 2021-08-11 RX ADMIN — Medication 10 ML: at 20:32

## 2021-08-11 RX ADMIN — ACETAMINOPHEN 1000 MG: 500 TABLET, FILM COATED ORAL at 12:50

## 2021-08-11 RX ADMIN — DIPHENHYDRAMINE HYDROCHLORIDE 25 MG: 50 INJECTION, SOLUTION INTRAMUSCULAR; INTRAVENOUS at 13:48

## 2021-08-11 ASSESSMENT — PAIN SCALES - GENERAL
PAINLEVEL_OUTOF10: 0
PAINLEVEL_OUTOF10: 8
PAINLEVEL_OUTOF10: 8
PAINLEVEL_OUTOF10: 3
PAINLEVEL_OUTOF10: 0

## 2021-08-11 ASSESSMENT — ENCOUNTER SYMPTOMS
CHEST TIGHTNESS: 0
PHOTOPHOBIA: 1
SHORTNESS OF BREATH: 0
CONSTIPATION: 0
COUGH: 0
BACK PAIN: 0
EYE REDNESS: 0
DIARRHEA: 0
ABDOMINAL PAIN: 0
EYE ITCHING: 0
NAUSEA: 0
COLOR CHANGE: 0
EYE PAIN: 0
VOMITING: 0

## 2021-08-11 ASSESSMENT — PAIN DESCRIPTION - LOCATION: LOCATION: HEAD

## 2021-08-11 NOTE — ED PROVIDER NOTES
ED Attending  CC: Brenda        Department of Emergency Medicine   ED  Provider Note  Admit Date/RoomTime: 8/11/2021 12:10 PM  ED Room: 35/35  HPI:  8/11/21, Time: 4:08 PM EDT      The patient is a 65-year-old male with a history of ESRD (On HD M-W-F), hypertension and hyperlipidemia presenting to the emergency department with a severe headache and concerns about elevated blood pressure. Patient had a mechanical fall 1 week ago and hit the front of his head. He states he had a CAT scan at [facility] at that time which was negative. He then started having severe headaches and went to the emergency department 6 days ago. He again had another negative head CT but has continued to have severe headaches. He also states that his blood pressure has been over 200 at home and they have been having a hard time getting it down. He did contact his nephrologist and was advised to take an extra dose of hydralazine. Patient states the headache did go away yesterday but then returned today while at dialysis. He was also told his blood pressure was still high today despite taking the extra dose of hydralazine. He did take all of his blood pressure medications today. He also has associated photophobia but denies any vision changes, nausea/vomiting, numbness/tingling/weakness, chest pain, shortness of breath. The history is provided by the patient and the spouse. No  was used. REVIEW OF SYSTEMS:  Review of Systems   Constitutional: Negative for activity change, chills, fatigue and fever. Eyes: Positive for photophobia. Negative for pain, redness, itching and visual disturbance. Respiratory: Negative for cough, chest tightness and shortness of breath. Cardiovascular: Negative for chest pain, palpitations and leg swelling. Gastrointestinal: Negative for abdominal pain, constipation, diarrhea, nausea and vomiting. Genitourinary: Negative for dysuria, flank pain, frequency and hematuria. Musculoskeletal: Negative for back pain, neck pain and neck stiffness. Skin: Negative for color change, pallor and rash. Neurological: Positive for headaches. Negative for dizziness and light-headedness. Psychiatric/Behavioral: Negative for agitation, behavioral problems and confusion. Pertinent positives and negatives are stated within HPI, all other systems reviewed and are negative.      --------------------------------------------- PAST HISTORY ---------------------------------------------  Past Medical History:  has a past medical history of Blind left eye, Chronic kidney disease, Dialysis patient [facility]), [facility] Hyperlipidemia, and Hypertension. Past Surgical History:  has a past surgical history that includes AV fistula creation (Left, 2015); HEMODIALYSIS ACCESS PERCUTANEOUS REVISION (Left, 2019); PERIPHERAL VASCULAR BYPASS (2008); Upper gastrointestinal endoscopy (N/A, 7/20/2020); Cardiac surgery; hip surgery (Left, 10/16/2020); and Upper gastrointestinal endoscopy (N/A, 10/28/2020). Social History:  reports that he quit smoking about 20 years ago. His smoking use included cigarettes. He has a 30.00 pack-year smoking history. He has never used smokeless tobacco. He reports previous alcohol use. He reports that he does not use drugs. Family History: family history is not on file. The patients home medications have been reviewed. Allergies: Patient has no known allergies.     -------------------------------------------------- RESULTS -------------------------------------------------  All laboratory and radiology results have been personally reviewed by myself   LABS:  Results for orders placed or performed during the hospital encounter of 08/11/21   CBC   Result Value Ref Range    WBC 5.2 4.5 - 11.5 E9/L    RBC 3.80 3.80 - 5.80 E12/L    Hemoglobin 12.7 12.5 - 16.5 g/dL    Hematocrit 40.7 37.0 - 54.0 %    .1 (H) 80.0 - 99.9 fL    MCH 33.4 26.0 - 35.0 pg MCHC 31.2 (L) 32.0 - 34.5 %    RDW 17.9 (H) 11.5 - 15.0 fL    Platelets 91 (L) 457 - 450 E9/L    MPV 11.5 7.0 - 12.0 fL   Comprehensive Metabolic Panel   Result Value Ref Range    Sodium 137 132 - 146 mmol/L    Potassium 3.9 3.5 - 5.0 mmol/L    Chloride 98 98 - 107 mmol/L    CO2 30 (H) 22 - 29 mmol/L    Anion Gap 9 7 - 16 mmol/L    Glucose 87 74 - 99 mg/dL    BUN 10 6 - 23 mg/dL    CREATININE 3.9 (H) 0.7 - 1.2 mg/dL    GFR Non-African American 15 >=60 mL/min/1.73    GFR African American 18     Calcium 8.9 8.6 - 10.2 mg/dL    Total Protein 6.2 (L) 6.4 - 8.3 g/dL    Albumin 3.9 3.5 - 5.2 g/dL    Total Bilirubin 0.7 0.0 - 1.2 mg/dL    Alkaline Phosphatase 77 40 - 129 U/L    ALT 5 0 - 40 U/L    AST 12 0 - 39 U/L   Platelet Confirmation   Result Value Ref Range    Platelet Confirmation CONFIRMED    EKG 12 Lead   Result Value Ref Range    Ventricular Rate 59 BPM    Atrial Rate 59 BPM    P-R Interval 134 ms    QRS Duration 82 ms    Q-T Interval 454 ms    QTc Calculation (Bazett) 449 ms    P Axis 69 degrees    R Axis 71 degrees    T Axis 65 degrees       RADIOLOGY:  Interpreted by Radiologist.  CT HEAD WO CONTRAST   Final Result   No acute intracranial abnormality. CT CERVICAL SPINE WO CONTRAST   Final Result   No acute fracture or subluxation. Degenerative changes. ------------------------- NURSING NOTES AND VITALS REVIEWED ---------------------------   The nursing notes within the ED encounter and vital signs as below have been reviewed. BP (!) 219/76   Pulse 55   Temp 98.2 °F (36.8 °C)   Resp 17   Wt 145 lb (65.8 kg)   SpO2 97%   BMI 22.71 kg/m²   Oxygen Saturation Interpretation: Normal      ---------------------------------------------------PHYSICAL EXAM--------------------------------------    Physical Exam  Vitals and nursing note reviewed. Constitutional:       General: He is not in acute distress. Appearance: Normal appearance. He is well-developed. He is not ill-appearing. HENT:      Head: Normocephalic and atraumatic. Comments: Aging ecchymosis to the forehead. Mouth/Throat:      Mouth: Mucous membranes are moist.      Pharynx: Oropharynx is clear. Neck:      Comments: No midline cervical tenderness, crepitus or step-off. Cardiovascular:      Rate and Rhythm: Normal rate and regular rhythm. Heart sounds: Normal heart sounds. No murmur heard. Pulmonary:      Effort: Pulmonary effort is normal. No respiratory distress. Breath sounds: Normal breath sounds. Abdominal:      General: Bowel sounds are normal. There is no distension. Palpations: Abdomen is soft. Tenderness: There is no abdominal tenderness. Musculoskeletal:         General: No swelling, tenderness or deformity. Normal range of motion. Cervical back: Normal range of motion and neck supple. No rigidity. Skin:     General: Skin is warm and dry. Capillary Refill: Capillary refill takes less than 2 seconds. Findings: No erythema. Neurological:      General: No focal deficit present. Mental Status: He is alert and oriented to person, place, and time. Mental status is at baseline. Coordination: Coordination normal.      Comments: Cn iii-xii intact. Psychiatric:         Mood and Affect: Mood normal.         Behavior: Behavior normal.         Thought Content: Thought content normal.            ------------------------------ ED COURSE/MEDICAL DECISION MAKING----------------------  Medications   hydrALAZINE (APRESOLINE) injection 10 mg (10 mg Intravenous Given 8/11/21 1250)   acetaminophen (TYLENOL) tablet 1,000 mg (1,000 mg Oral Given 8/11/21 1250)   prochlorperazine (COMPAZINE) injection 10 mg (10 mg Intravenous Given 8/11/21 1348)   diphenhydrAMINE (BENADRYL) injection 25 mg (25 mg Intravenous Given 8/11/21 1348)         ED COURSE:      CT HEAD WO CONTRAST   Final Result   No acute intracranial abnormality.          CT CERVICAL SPINE WO CONTRAST   Final Result No acute fracture or subluxation. Degenerative changes. Procedures:  Procedures     Medical Decision Making:   MDM   58-year-old male who is on dialysis presenting the ED with headache and hypertension. Patient has no neurological deficits. These headaches have been coming and going since falling and hitting his head a week ago. This is his third head CT that is unremarkable. Patient is extremely hypertensive with highest being 219/76 despite taking an extra dose of oral hydralazine and a 10 mg IV dose of hydralazine. Patient's labs are unremarkable other than a mild thrombocytopenia which appears to be chronic. Given the uncontrolled hypertension, patient will be admitted for further treatment and management under the care of Dr. Aide Corrales with consult into his nephrologist.  Patient's headache did improve after Benadryl and Compazine. Counseling: The emergency provider has spoken with the patient and spouse/SO and discussed todays results, in addition to providing specific details for the plan of care and counseling regarding the diagnosis and prognosis. Questions are answered at this time and they are agreeable with the plan.      --------------------------------- IMPRESSION AND DISPOSITION ---------------------------------    IMPRESSION  1. Uncontrolled hypertension    2. Nonintractable headache, unspecified chronicity pattern, unspecified headache type        DISPOSITION  Disposition: Admit to telemetry  Patient condition is good      Electronically signed by Jassi Hamilton PA-C   DD: 8/11/21  **This report was transcribed using voice recognition software. Every effort was made to ensure accuracy; however, inadvertent computerized transcription errors may be present.   END OF ED PROVIDER NOTE          Jassi Hamliton PA-C  08/11/21 5519

## 2021-08-11 NOTE — ED TRIAGE NOTES
FIRST PROVIDER CONTACT ASSESSMENT NOTE                                                                                                Department of Emergency Medicine                                                      First Provider Note  21  10:56 AM EDT  NAME: Dulce Maria Wang  : 1945  MRN: [id number]    Chief Complaint: Headache (has been going on for a few days, went to [facility] Saturday, they didnt find anything wrong, went to dialysis today and was experiencing headache and high BP again) and Hypertension      History of Present Illness:   Dulce Maria Wang is a 67 y.o. male who presents to the ED for headache, HTN. Just started hydralizine by renal, first dose this AM    Focused Physical Exam:  VS:    ED Triage Vitals   BP Temp Temp src Pulse Resp SpO2 Height Weight   21 1054 21 1021 -- 21 1021 21 1054 21 1021 -- 21 1054   (!) 187/71 97.5 °F (36.4 °C)  60 18 94 %  145 lb (65.8 kg)        General: Alert and in no apparent distress. Medical History:  has a past medical history of Blind left eye, Chronic kidney disease, Dialysis patient [facility]), Hemodialysis patient ([facility] [address] Hyperlipidemia, and Hypertension. Surgical History:  has a past surgical history that includes AV fistula creation (Left, ); HEMODIALYSIS ACCESS PERCUTANEOUS REVISION (Left, ); PERIPHERAL VASCULAR BYPASS (); Upper gastrointestinal endoscopy (N/A, 2020); Cardiac surgery; hip surgery (Left, 10/16/2020); and Upper gastrointestinal endoscopy (N/A, 10/28/2020). Social History:  reports that he quit smoking about 20 years ago. His smoking use included cigarettes. He has a 30.00 pack-year smoking history. He has never used smokeless tobacco. He reports previous alcohol use. He reports that he does not use drugs. Family History: family history is not on file. Allergies: Patient has no known allergies.      Initial Plan of Care:  Initiate Treatment-Testing, Proceed toTreatment Area When Bed Available for ED Attending/MLP to Continue Care    -------------------------------------------------END OF FIRST PROVIDER CONTACT ASSESSMENT NOTE--------------------------------------------------------  Electronically signed by TOMMY Porter CNP   DD: 8/11/21

## 2021-08-12 PROBLEM — I16.1 HYPERTENSIVE EMERGENCY: Status: ACTIVE | Noted: 2021-08-12

## 2021-08-12 LAB
ANION GAP SERPL CALCULATED.3IONS-SCNC: 15 MMOL/L (ref 7–16)
BUN BLDV-MCNC: 15 MG/DL (ref 6–23)
C-REACTIVE PROTEIN: 0.5 MG/DL (ref 0–0.4)
CALCIUM SERPL-MCNC: 9.1 MG/DL (ref 8.6–10.2)
CHLORIDE BLD-SCNC: 99 MMOL/L (ref 98–107)
CO2: 25 MMOL/L (ref 22–29)
CREAT SERPL-MCNC: 5.6 MG/DL (ref 0.7–1.2)
GFR AFRICAN AMERICAN: 12
GFR NON-AFRICAN AMERICAN: 10 ML/MIN/1.73
GLUCOSE BLD-MCNC: 74 MG/DL (ref 74–99)
POTASSIUM REFLEX MAGNESIUM: 4.3 MMOL/L (ref 3.5–5)
SEDIMENTATION RATE, ERYTHROCYTE: 0 MM/HR (ref 0–15)
SODIUM BLD-SCNC: 139 MMOL/L (ref 132–146)

## 2021-08-12 PROCEDURE — 97161 PT EVAL LOW COMPLEX 20 MIN: CPT | Performed by: PHYSICAL THERAPIST

## 2021-08-12 PROCEDURE — 86140 C-REACTIVE PROTEIN: CPT

## 2021-08-12 PROCEDURE — 85651 RBC SED RATE NONAUTOMATED: CPT

## 2021-08-12 PROCEDURE — 80048 BASIC METABOLIC PNL TOTAL CA: CPT

## 2021-08-12 PROCEDURE — 1200000000 HC SEMI PRIVATE

## 2021-08-12 PROCEDURE — 97535 SELF CARE MNGMENT TRAINING: CPT

## 2021-08-12 PROCEDURE — 6360000002 HC RX W HCPCS: Performed by: NURSE PRACTITIONER

## 2021-08-12 PROCEDURE — 2580000003 HC RX 258: Performed by: NURSE PRACTITIONER

## 2021-08-12 PROCEDURE — 6370000000 HC RX 637 (ALT 250 FOR IP): Performed by: NURSE PRACTITIONER

## 2021-08-12 PROCEDURE — 97165 OT EVAL LOW COMPLEX 30 MIN: CPT

## 2021-08-12 PROCEDURE — 6370000000 HC RX 637 (ALT 250 FOR IP): Performed by: INTERNAL MEDICINE

## 2021-08-12 PROCEDURE — 36415 COLL VENOUS BLD VENIPUNCTURE: CPT

## 2021-08-12 RX ORDER — HYDRALAZINE HYDROCHLORIDE 50 MG/1
50 TABLET, FILM COATED ORAL EVERY 6 HOURS SCHEDULED
Status: DISCONTINUED | OUTPATIENT
Start: 2021-08-12 | End: 2021-08-13

## 2021-08-12 RX ORDER — AMLODIPINE BESYLATE 10 MG/1
10 TABLET ORAL DAILY
Status: DISCONTINUED | OUTPATIENT
Start: 2021-08-13 | End: 2021-08-14 | Stop reason: HOSPADM

## 2021-08-12 RX ORDER — TRAMADOL HYDROCHLORIDE 50 MG/1
50 TABLET ORAL EVERY 6 HOURS PRN
Status: DISCONTINUED | OUTPATIENT
Start: 2021-08-12 | End: 2021-08-14 | Stop reason: HOSPADM

## 2021-08-12 RX ORDER — AMLODIPINE BESYLATE 5 MG/1
5 TABLET ORAL DAILY
Status: DISCONTINUED | OUTPATIENT
Start: 2021-08-12 | End: 2021-08-12

## 2021-08-12 RX ORDER — CARVEDILOL 6.25 MG/1
12.5 TABLET ORAL 2 TIMES DAILY WITH MEALS
Status: DISCONTINUED | OUTPATIENT
Start: 2021-08-12 | End: 2021-08-13

## 2021-08-12 RX ADMIN — CARVEDILOL 12.5 MG: 6.25 TABLET, FILM COATED ORAL at 09:11

## 2021-08-12 RX ADMIN — HYDRALAZINE HYDROCHLORIDE 10 MG: 20 INJECTION INTRAMUSCULAR; INTRAVENOUS at 00:53

## 2021-08-12 RX ADMIN — HYDRALAZINE HYDROCHLORIDE 50 MG: 50 TABLET, FILM COATED ORAL at 23:19

## 2021-08-12 RX ADMIN — Medication 10 ML: at 20:25

## 2021-08-12 RX ADMIN — Medication 10 ML: at 09:13

## 2021-08-12 RX ADMIN — HYDRALAZINE HYDROCHLORIDE 50 MG: 50 TABLET, FILM COATED ORAL at 12:41

## 2021-08-12 RX ADMIN — ROSUVASTATIN 5 MG: 10 TABLET, FILM COATED ORAL at 20:24

## 2021-08-12 RX ADMIN — HYDRALAZINE HYDROCHLORIDE 10 MG: 20 INJECTION INTRAMUSCULAR; INTRAVENOUS at 16:02

## 2021-08-12 RX ADMIN — AMLODIPINE BESYLATE 5 MG: 5 TABLET ORAL at 09:11

## 2021-08-12 RX ADMIN — HYDRALAZINE HYDROCHLORIDE 50 MG: 50 TABLET, FILM COATED ORAL at 18:01

## 2021-08-12 RX ADMIN — CARVEDILOL 12.5 MG: 6.25 TABLET, FILM COATED ORAL at 18:01

## 2021-08-12 RX ADMIN — DOXAZOSIN 4 MG: 4 TABLET ORAL at 20:24

## 2021-08-12 ASSESSMENT — PAIN SCALES - GENERAL
PAINLEVEL_OUTOF10: 0

## 2021-08-12 NOTE — CARE COORDINATION
Met with patient  And his wife at beside to explain CM role and discuss transition of care. Patient admitted to hospital after presenting to ED with headache and elevated BP at dialysis. Awaiting renal consult. Patient receives dialysis on MWF at 6 AM at [facility]. His PCP is Dr. Dar Rubalcava and [address]. Patient and spouse do not anticipate and discharge needs at this time. Will  await therapy evaluations to further assist with transition of care, Spouse will transport home when medically cleared. CM/SW will continue to follow

## 2021-08-12 NOTE — CONSULTS
Department of Internal Medicine  Nephrology Consult Note      Reason for Consult:  ESRD on HD and uncontrolled HTN  Requesting Physician:  Mac Weber PA-C    CHIEF COMPLAINT:  Headache    History Obtained From:  patient, electronic medical record    HISTORY OF PRESENT ILLNESS:  Briefly, Mr. Anjel Valencia is a 67year old male with a PMH of ESRD on IHD three times weekly MWF, via left upper arm AVF, HTN, HFpEF, HDL, BPH, COVID-19 infection, herpes zoster with encephalitis, who was admitted on August 11, 2021 after presenting to the ER with ongoing headache and HTN. Apparently, he went to [facility] ER on Saturday with the same complaints and he was discharged home. He does admit to falling about a week ago and hitting his head. A CT of the head was done at [facility] which was negative. He follows with Dr. Greer Mercado at [facility] dialysis and he was advised by him to take an extra dose of hydralazine due to systolic blood pressure at home being in the 200s, however, it did not help much. He states that while at dialysis yesterday his BP was still very high. Upon arrival to ER, his BP was 219/76 and was complaining of severe headache with nausea. He received diphenhydramine, prochlorperazine, and hydralazine 10 mg IV x2 doses in ER. We are consulted for ESRD and management of dialysis as well and uncontrolled HTN.         Past Medical History:        Diagnosis Date    Blind left eye     Chronic kidney disease     [facility] patient ([facility] [address] [facility]     Hemodialysis patient ([facility]     Hyperlipidemia     Hypertension      Past Surgical History:        Procedure Laterality Date    AV FISTULA CREATION Left 2015    Dr. Dony Alegre Left 2019    2 x Dr. Randee Devine, [facility]    HIP SURGERY Left 10/16/2020    HIP HEMIARTHROPLASTY performed by Kristeen Claude, MD at [facility]  2008    Aortobifemoral bypass for claudicatio - Dr. Lula Richardson  UPPER GASTROINTESTINAL ENDOSCOPY N/A 7/20/2020    EGD ESOPHAGOGASTRODUODENOSCOPY WITH ANTRAL BIOPSY performed by Sheri Martinez MD at [facility] N/A 10/28/2020    EGD ESOPHAGOGASTRODUODENOSCOPY performed by Sheri Martinez MD at [facility]     Current Medications:    Current Facility-Administered Medications: hydrALAZINE (APRESOLINE) tablet 50 mg, 50 mg, Oral, 4 times per day  carvedilol (COREG) tablet 12.5 mg, 12.5 mg, Oral, BID WC  amLODIPine (NORVASC) tablet 5 mg, 5 mg, Oral, Daily  traMADol (ULTRAM) tablet 50 mg, 50 mg, Oral, Q6H PRN  sodium chloride flush 0.9 % injection 5-40 mL, 5-40 mL, Intravenous, 2 times per day  sodium chloride flush 0.9 % injection 5-40 mL, 5-40 mL, Intravenous, PRN  0.9 % sodium chloride infusion, 25 mL, Intravenous, PRN  ondansetron (ZOFRAN-ODT) disintegrating tablet 4 mg, 4 mg, Oral, Q8H PRN **OR** ondansetron (ZOFRAN) injection 4 mg, 4 mg, Intravenous, Q6H PRN  polyethylene glycol (GLYCOLAX) packet 17 g, 17 g, Oral, Daily PRN  acetaminophen (TYLENOL) tablet 650 mg, 650 mg, Oral, Q6H PRN **OR** acetaminophen (TYLENOL) suppository 650 mg, 650 mg, Rectal, Q6H PRN  hydrALAZINE (APRESOLINE) injection 10 mg, 10 mg, Intravenous, Q6H PRN  doxazosin (CARDURA) tablet 4 mg, 4 mg, Oral, Nightly  pantoprazole (PROTONIX) tablet 40 mg, 40 mg, Oral, QAM AC  rosuvastatin (CRESTOR) tablet 5 mg, 5 mg, Oral, Nightly  Allergies:  Patient has no known allergies. Social History:    TOBACCO:   reports that he quit smoking about 20 years ago. His smoking use included cigarettes. He has a 30.00 pack-year smoking history. He has never used smokeless tobacco.  ETOH:   reports previous alcohol use. Family History:   History reviewed. No pertinent family history.      REVIEW OF SYSTEMS:    CONSTITUTIONAL:  negative for  fevers and chills  EYES:  negative for  double vision and blurred vision  HEENT:  negative for  epistaxis  RESPIRATORY:  negative for dyspnea  CARDIOVASCULAR:  negative for  chest pain, dyspnea, edema  GASTROINTESTINAL:  negative for nausea, vomiting, diarrhea and abdominal pain  GENITOURINARY:  negative for frequency and dysuria  INTEGUMENT/BREAST:  negative  HEMATOLOGIC/LYMPHATIC:  negative  ALLERGIC/IMMUNOLOGIC:  negative  ENDOCRINE:  negative  MUSCULOSKELETAL:  negative for  decreased range of motion and muscle weakness  NEUROLOGICAL:  positive for headaches  BEHAVIOR/PSYCH:  negative for poor appetite    PHYSICAL EXAM:      Vitals:    VITALS:  BP (!) 180/78   Pulse 58   Temp 98.1 °F (36.7 °C) (Temporal)   Resp 20   Ht 5' 7\" (1.702 m)   Wt 153 lb (69.4 kg)   SpO2 95%   BMI 23.96 kg/m²   24HR INTAKE/OUTPUT:  No intake or output data in the 24 hours ending 08/12/21 1132    Access: KATRINA AVF +thrill and bruit  Constitutional:  Alert and oriented, NAD  HEENT:  Normocephalic, PERRL  Respiratory:  CTA bilaterally  Cardiovascular/Edema:  RRR, S1,S2   Gastrointestinal:  Soft, rounded, nontender, nondistended  Neurologic:  Nonfocal, MOORE  Skin:  Warm, dry, no lesions, ecchymosis to right forehead  Other:  No edema    DATA:    CBC:   Lab Results   Component Value Date    WBC 5.2 08/11/2021    RBC 3.80 08/11/2021    HGB 12.7 08/11/2021    HCT 40.7 08/11/2021    .1 08/11/2021    MCH 33.4 08/11/2021    MCHC 31.2 08/11/2021    RDW 17.9 08/11/2021    PLT 91 08/11/2021    MPV 11.5 08/11/2021     CMP:    Lab Results   Component Value Date     08/12/2021    K 4.3 08/12/2021    CL 99 08/12/2021    CO2 25 08/12/2021    BUN 15 08/12/2021    CREATININE 5.6 08/12/2021    GFRAA 12 08/12/2021    LABGLOM 10 08/12/2021    GLUCOSE 74 08/12/2021    PROT 6.2 08/11/2021    LABALBU 3.9 08/11/2021    CALCIUM 9.1 08/12/2021    BILITOT 0.7 08/11/2021    ALKPHOS 77 08/11/2021    AST 12 08/11/2021    ALT 5 08/11/2021     Magnesium:    Lab Results   Component Value Date    MG 1.8 10/21/2020     Phosphorus:    Lab Results   Component Value Date    PHOS 3.7 10/18/2020     Radiology Review:      CT head without IV contrast 8/11/21   No acute intracranial abnormality. IMPRESSION/RECOMMENDATIONS:      Briefly, Mr. Sabine Rivers is a 67year old male with a PMH of ESRD on IHD three times weekly MWF, via left upper arm AVF, HTN, HFpEF, HDL, BPH, COVID-19 infection, herpes zoster with encephalitis, who was admitted on August 11, 2021 after presenting to the ER with ongoing headache and HTN. Apparently, he went to [facility] ER on Saturday with the same complaints and he was discharged home. He does admit to falling about a week ago and hitting his head. A CT of the head was done at [facility] which was negative. He follows with Dr. Josefina Emmanuel at [facility] dialysis and he was advised by him to take an extra dose of hydralazine due to systolic blood pressure at home being in the 200s, however, it did not help much. He states that while at dialysis yesterday his BP was still very high. Upon arrival to ER, his BP was 219/76 and was complaining of severe headache with nausea. He received diphenhydramine, prochlorperazine, and hydralazine 10 mg IV x2 doses in ER. We are consulted for ESRD and management of dialysis as well and uncontrolled HTN. ESRD on IHD three times weekly MWF, via left upper arm AVF. To continue while in the hospital.   Resistant HTN, on carvedilol, doxazosin, and hydralazine. Amlodipine was added today for better BP control. BPH, on doxazosin  Recent fall, CT head negative  MBD of CKD, to obtain phosphorus level  Anemia of CKD, monitor H&H    Plan:    HD three times weekly MWF as scheduled  Increase amlodipine 10 mg PO daily  Monitor BP  Monitor labs daily  Obtain magnesium and phosphorus levels    Case discussed with Dr. Josefina Emmanuel    Thank you so much Theo Collins PA-C for allowing us to participate in the care of Mr. Sabine Rivers.     Electronically signed by TOMMY Wilson CNP on 8/12/2021 at 1:38 PM    Agree with above as annotated  Mina Mendoza MD

## 2021-08-12 NOTE — PROGRESS NOTES
Physical Therapy  Physical Therapy Initial Assessment     Name: Pasquale Bolanos  : 1945  MRN: [id number]      Date of Service: 2021    Evaluating PT:  Brody Cannon PT, DPT  [id number]      Room #:  7553/7373-H  Diagnosis:  Uncontrolled hypertension [I10]  Nonintractable headache, unspecified chronicity pattern, unspecified headache type [R51.9]  PMHx/PSHx:  CKD, dialysis patient, HLD, HTN, blind in L eye, L UE HD fistula, L hip hemiarthroplasty  Procedure/Surgery:  None this admission  Precautions:  Falls  Equipment Needs:  Pt owns necessary DME    SUBJECTIVE:    Pt lives with his wife in a single story condo with 1 step to enter and no rail. Bed is on first floor and bath is on first floor. Pt ambulated with FWW recently (s/p fall) PTA. OBJECTIVE:   Initial Evaluation  Date: 21 Treatment Short Term/ Long Term   Goals   AM-PAC 6 Clicks      Was pt agreeable to Eval/treatment? yes     Does pt have pain? No c/o pain     Bed Mobility  Rolling: Supervision  Supine to sit: Supervision  Sit to supine: Supervision  Scooting: Supervision  Rolling: Mod Independent   Supine to sit: Mod Independent   Sit to supine: Mod Independent   Scooting: Mod Independent    Transfers Sit to stand: Supervision  Stand to sit: Supervision    Stand pivot: NT  Sit to stand: Mod Independent   Stand to sit: Mod Independent   Stand pivot: Mod Independent    Ambulation    75 feet with FWW with Supervision   150 feet with FWW with Mod Independent    Stair negotiation: ascended and descended  NT  1 steps with no rail Mod Independent    ROM BUE:  WNL  BLE:  WNL     Strength BUE:  WNL  BLE:  WNL     Balance Sitting EOB:  Supervision   Dynamic Standing:  Supervision with FWW  Sitting EOB: Independent   Dynamic Standing:   Mod Independent      Pt is A & O x 4  Sensation:  Pt denies numbness and tingling to extremities  Edema:  unremarkable    Patient education  Pt educated on role of therapy, safety with mobility, transfer and gait mechanics    Patient response to education:   Pt verbalized understanding Pt demonstrated skill Pt requires further education in this area   yes yes yes     ASSESSMENT:    Conditions Requiring Skilled Therapeutic Intervention:    [x]Decreased strength     []Decreased ROM  [x]Decreased functional mobility  [x]Decreased balance   [x]Decreased endurance   [x]Decreased posture  []Decreased sensation  []Decreased coordination   []Decreased vision  [x]Decreased safety awareness   []Increased pain       Comments:  Pt resting semi-supine upon arrival, agreeable to PT eval. Pt completed bed mobility and transfers with fair technique requiring cues to assess self for symptoms with change in position. Pt amb with slow gait with slight antalgic pattern which pt reports is due to hx of L hip fracture. Pt reported fatigue of B LEs limiting additional distance and requested to return to room. Pt seated EOB upon completion of session with all needs in reach and RN notified of pt performance. He would benefit from short course of skilled PT services to improve high level balance and endurance. Pt will be added to gait team to maximize mobility during admission. Treatment:  Patient practiced and was instructed in the following treatment:     Bed mobility: cues for assessment of self for symptoms with change in posture   Gait training: cues for improved walker use and balance education to decrease risk of falls    Pt's/ family goals   1. To return home independently with wife    Prognosis is good for reaching above PT goals. Patient and or family understand(s) diagnosis, prognosis, and plan of care.   yes    PHYSICAL THERAPY PLAN OF CARE:    PT POC is established based on physician order and patient diagnosis     Referring provider/PT Order:    08/11/21 1815  PT eval and treat Start: 08/11/21 1815, End: 08/11/21 1815, ONE TIME, Standing Count: 1 Occurrences, R      TOMMY Johnson - CNP     Diagnosis:  Uncontrolled hypertension [I10]  Nonintractable headache, unspecified chronicity pattern, unspecified headache type [R51.9]  Specific instructions for next treatment:  Progress mobility as tolerated    Current Treatment Recommendations:     [x] Strengthening to improve independence with functional mobility   [x] ROM to improve independence with functional mobility   [x] Balance Training to improve static/dynamic balance and to reduce fall risk  [x] Endurance Training to improve activity tolerance during functional mobility   [x] Transfer Training to improve safety and independence with all functional transfers   [x] Gait Training to improve gait mechanics, endurance and asses need for appropriate assistive device  [x] Stair Training in preparation for safe discharge home and/or into the community   [x] Positioning to prevent skin breakdown and contractures  [x] Safety and Education Training   [x] Patient/Caregiver Education   [] HEP  [] Other     PT long term treatment goals are located in above grid    Frequency of treatments: 2-5x/week x 1-2 weeks. Time in  1104  Time out  1118    Total Treatment Time  0 minutes     Evaluation Time includes thorough review of current medical information, gathering information on past medical history/social history and prior level of function, completion of standardized testing/informal observation of tasks, assessment of data and education on plan of care and goals.     CPT codes:  [x] Low Complexity PT evaluation 13442  [] Moderate Complexity PT evaluation 91785  [] High Complexity PT evaluation 73443  [] PT Re-evaluation 52486  [] Gait training 55460 0 minutes  [] Manual therapy 78937 0 minutes  [] Therapeutic activities 00797 0 minutes  [] Therapeutic exercises 35204 0 minutes  [] Neuromuscular reeducation 78011 0 minutes     No Pettit, PT, DPT  [id number]

## 2021-08-12 NOTE — H&P
[address] [facility]  History and Physical      CHIEF COMPLAINT:    Chief Complaint   Patient presents with    Headache     has been going on for a few days, went to [place] Saturday, they didnt find anything wrong, went to dialysis today and was experiencing headache and high BP again    Hypertension        Patient of Lara Almaraz DO presents with:  Uncontrolled hypertension    History of Present Illness:   Patient reports that about a week ago he suffered a mechanical fall and hit the right side of his head. He does not think he syncopized. He is not sure if he tripped on something. Ever since then he has had remittent headaches which come and go without clear reproducible pattern, no clear alleviating or exacerbating factors. Tends to last for hours when they do occur, generally right-sided, sharp, radiates down towards the face. No jaw claudication or pain when eating. No lacrimation. Sometimes can go a few days between having an episode. Minimal nausea. No photophobia or phonophobia. No prior history of headaches. REVIEW OF SYSTEMS:  Pertinent negatives are above in HPI. 10 point ROS otherwise negative.       Past Medical History:   Diagnosis Date    Blind left eye     Chronic kidney disease     [facility]     Hemodialysis patient ([facility]     Hyperlipidemia     Hypertension          Past Surgical History:   Procedure Laterality Date    AV FISTULA CREATION Left 2015    Dr. Green Members Left 2019    2 x Dr. Ruth Wei, [facility]    HIP SURGERY Left 10/16/2020    HIP HEMIARTHROPLASTY performed by Lissette Márquez MD at [facility]  2008    Aortobifemoral bypass for claudicatio - Dr. Areli Bull N/A 7/20/2020    EGD ESOPHAGOGASTRODUODENOSCOPY WITH ANTRAL BIOPSY performed by Annia Jean MD at [facility] 10/28/2020    EGD ESOPHAGOGASTRODUODENOSCOPY performed by Marylu Trimble MD at [address]       Medications Prior to Admission:    Medications Prior to Admission: doxazosin (CARDURA) 4 MG tablet, Take 1 tablet by mouth nightly  hydrALAZINE (APRESOLINE) 50 MG tablet, Take 50 mg by mouth 4 times daily   rosuvastatin (CRESTOR) 10 MG tablet, Take 5 mg by mouth nightly  metoprolol tartrate (LOPRESSOR) 50 MG tablet, Take 50 mg by mouth 2 times daily  amoxicillin-clavulanate (AUGMENTIN) 500-125 MG per tablet, Take 1 tablet by mouth every 24 hours for 10 days Administer after dialysis on dialysis days  Nutritional Supplements (ENSURE HIGH PROTEIN PO), Take 8 oz by mouth  acetaminophen (TYLENOL) 325 MG tablet, Take 650 mg by mouth every 4 hours as needed for Pain or Fever  magnesium hydroxide (MILK OF MAGNESIA) 400 MG/5ML suspension, Take 30 mLs by mouth daily as needed for Constipation  pantoprazole (PROTONIX) 40 MG tablet, Take 1 tablet by mouth 2 times daily (before meals) (Patient taking differently: Take 40 mg by mouth every morning (before breakfast) )  epoetin delmer-epbx (RETACRIT) 3000 UNIT/ML SOLN injection, Inject 1 mL into the skin three times a week With dialysis (Patient not taking: Reported on 1/27/2021)  QUEtiapine (SEROQUEL) 25 MG tablet, Take 0.5 tablets by mouth every 6 hours as needed for Agitation or Other (anxiety) (Patient not taking: Reported on 12/14/2020)  sucralfate (CARAFATE) 1 GM tablet, Take 1 tablet by mouth 4 times daily (Patient not taking: Reported on 12/14/2020)  Cholecalciferol (VITAMIN D) 50 MCG (2000 UT) CAPS capsule, Take by mouth  Coenzyme Q10 (Q-10 CO-ENZYME PO), Take 100 mg by mouth Daily    Note that the patient's home medications were reviewed and the above list is accurate to the best of my knowledge at the time of the exam.    Allergies:    Patient has no known allergies. Social History:    reports that he quit smoking about 20 years ago.  His smoking use included cigarettes. He has a 30.00 pack-year smoking history. He has never used smokeless tobacco. He reports previous alcohol use. He reports that he does not use drugs. Family History:   No fhx esrd      PHYSICAL EXAM:    Vitals:  BP (!) 184/62   Pulse 55   Temp 98.2 °F (36.8 °C) (Oral)   Resp 18   Ht 5' 7\" (1.702 m)   Wt 153 lb (69.4 kg)   SpO2 94%   BMI 23.96 kg/m²       General appearance: NAD, conversant  Eyes: Sclerae anicteric, PERRLA  HEENT: Minor bruising R face. No scalp tenderness. Neck: FROM, supple, no thyromegaly  Lymph: No cervical / supraclavicular lymphadenopathy  Lungs: Clear to auscultation, WOB normal  CV: RRR, no MRGs, no lower extremity edema  Abdomen: Soft, non-tender; no masses or HSM, +BS  Extremities: FROM without synovitis. No clubbing or cyanosis of the hands. Skin: no rash, induration, lesions, or ulcers  Psych: Calm and cooperative. Normal judgement and insight. Normal mood and affect. Neuro: Alert and interactive, face symmetric, speech fluent. EOMI, PERRLA, tongue midline, handgrips / elbow flexors / hip flexors / foot plantar / dorsiflexors 5/5 b/l. LABS:  All labs reviewed.   Of note:  CBC with Differential:    Lab Results   Component Value Date    WBC 5.2 08/11/2021    RBC 3.80 08/11/2021    HGB 12.7 08/11/2021    HCT 40.7 08/11/2021    PLT 91 08/11/2021    .1 08/11/2021    MCH 33.4 08/11/2021    MCHC 31.2 08/11/2021    RDW 17.9 08/11/2021    NRBC 1.7 07/19/2020    LYMPHOPCT 7.6 08/07/2021    MONOPCT 5.1 08/07/2021    BASOPCT 0.9 08/07/2021    MONOSABS 0.35 08/07/2021    LYMPHSABS 0.52 08/07/2021    EOSABS 0.03 08/07/2021    BASOSABS 0.06 08/07/2021     CMP:    Lab Results   Component Value Date     08/11/2021    K 3.9 08/11/2021    K 5.3 08/07/2021    CL 98 08/11/2021    CO2 30 08/11/2021    BUN 10 08/11/2021    CREATININE 3.9 08/11/2021    GFRAA 18 08/11/2021    LABGLOM 15 08/11/2021    GLUCOSE 87 08/11/2021    PROT 6.2 08/11/2021    LABALBU 3.9 08/11/2021    CALCIUM 8.9 08/11/2021    BILITOT 0.7 08/11/2021    ALKPHOS 77 08/11/2021    AST 12 08/11/2021    ALT 5 08/11/2021       Imaging:  I've personally reviewed the patient's CT head  No acute intracranial abnormality. EKG:  I've personally reviewed the patient's EKG:  NSR no acute ischemic changes       ASSESSMENT/PLAN:  Principal Problem:    Hypertensive emergency  Active Problems:    Hyperlipidemia    Uncontrolled hypertension    ESRD on dialysis [facility])  Resolved Problems:    * No resolved hospital problems. *      Not totally convinced that his headache is from his hypertension. They might just be unrelated. He may just have some pain related to his fall.   CT head personally reviewed, no relevant abnormalities    ESR, CRP    Tylenol, tramadol    BP admittedly is not very well controlled    Continue hydralazine    Switch metop to carvedilol    Add norvasc    Nephro consult     Code status: Full  Requires inpatient level of care  Raquel Talamantes MD    7:42 AM  8/12/2021

## 2021-08-12 NOTE — PLAN OF CARE
Problem: Falls - Risk of:  Goal: Will remain free from falls  Description: Will remain free from falls  8/12/2021 1048 by Madhavi Bonilla RN  Outcome: Met This Shift  8/11/2021 2306 by Paul Robb RN  Outcome: Met This Shift  Goal: Absence of physical injury  Description: Absence of physical injury  8/12/2021 1048 by Madhavi Bonilla RN  Outcome: Met This Shift  8/11/2021 2306 by Paul Robb RN  Outcome: Met This Shift

## 2021-08-12 NOTE — PROGRESS NOTES
OCCUPATIONAL THERAPY INITIAL EVALUATION      Date:2021  Patient Name: Gustavo Wright  MRN: [id number]  : 1945  Room: [address] [place], [place] [address]      Evaluating [facility], OTR/L #[id number]    AM-PAC Daily Activity Raw Score:   Recommended Adaptive Equipment: TBD     Diagnosis: Uncontrolled hypertension [I10]  Nonintractable headache, unspecified chronicity pattern, unspecified headache type [R51.9]     Referring physician: TOMMY Johnson CNP  OT orders: OT eval and treat, 21  Pertinent Medical History: blind L eye, CKD, ESRD on HD, HTN    Precautions:  Falls, HD MWF, blind L eye     Home Living: Pt lives with spouse in 1 floor condo. Bathroom setup: walk in shower with grab bars   Equipment owned: w/w    Prior Level of Function: independent/mod I with ADLs , shares IADLs; ambulated w/ w/w (since recent fall - ~1 wk prior to admission)  Driving: yes however \"spouse usually drives\"  Occupation: retired    Pain Level: Pt c/o no pain this session    Cognition: A&O: 4/4; Follows 1-2 step directions   Memory:  good    Sequencing:  good    Problem solving:  fair    Judgement/safety:  fair      Functional Assessment:   Initial Eval Status  Date: 21 Treatment Status  Date: Short Term Goals/LTG  Treatment frequency: 1-4x/wk   Feeding Independent   -   Grooming Supervision  Standing at sink   Modified [place]    UB Dressing Supervision   Modified [place]    LB Dressing Stand by Assist   B socks  Modified [place]    Bathing Stand by Assist  Modified [place]    Toileting Stand by Assist   Simulated hygiene task  Modified [place]    Bed Mobility  Rolling: Independent   Supine to sit: Supervision   Sit to supine: Supervision   Supine to sit:  Independent   Sit to supine: Independent    Functional Transfers Supervision  Various surfaces  Mod I   Functional Mobility SBA w/ w/w  To/from bathrm and functional distance in hallway  Abandoned walker in bathrm - safety reinforced  Mod I Balance Sitting: Supervision (dynamic)  Standing: S/SBA w/ w/w     Activity Tolerance Fair  Good   Visual/  Perceptual Glasses: yes  Blind L eye                 Hand dominance: R   Strength ROM Additional Info:    RUE  4/5  WFL   good  and wfl FMC/dexterity noted during ADL tasks       LUE 4/5  WFL   good  and wfl FMC/dexterity noted during ADL tasks       Hearing: mild hearing impairment  Sensation:  No c/o numbness or tingling  Tone: WFL   Edema: none noted                   Comments: Upon arrival patient lying in bed. Pt agreeable to OT session this date. Charge RN clearance. At end of session, patient lying in bed (per pt request, spouse present) with call light and phone within reach, all lines and tubes intact. Overall patient demonstrated decreased independence and safety during completion of ADL/functional transfer/mobility tasks. Pt would benefit from continued skilled OT to increase safety and independence with completion of ADL/IADL tasks for functional independence and quality of life. Treatment: OT treatment provided this date includes:  Facilitation of bed mobility, unsupported sitting balance (addressing posture, weight shifting and dynamic reaching to prep for ADL's), functional transfers (various surfaces (EOB, commode) w/ education/min cues for safety/hand placement), standing tolerance tasks (addressing posture, balance and activity tolerance while incorporating light functional reaching impacting ADLs and functional activity) and functional ambulation tasks with w/w (including to/ from bathroom and in preparation for item retrieval tasks w/ education/cuing on posture, w/w management and safety. Pt abandoned w/w during bathrm mobility - safety reinforced). Therapist facilitated self-care retraining: UB/LB self-care tasks (gown, socks), simulated toileting task and standing grooming tasks while educating pt on modified techniques, posture, safety and energy conservation techniques. Skilled monitoring of HR, O2 sats and pts response to treatment. Eval Complexity: Low    Evaluation Time includes thorough review of current medical information, gathering information on past medical history/social history and prior level of function, completion of standardized testing/informal observation of tasks, assessment of data and education on plan of care and goals. Assessment of current deficits   Functional mobility [x]  ADLs [x] Strength [x]  Cognition []  Functional transfers  [x] IADLs [x] Safety Awareness [x]  Endurance [x]  Fine Motor Coordination [] Balance [x] Vision/perception [] Sensation []   Gross Motor Coordination [] ROM [] Delirium []                  Motor Control []    Plan of Care: 1-3 days/week for 1-2 weeks PRN   * Instruction/training on adapted ADL techniques and AE recommendations to increase functional independence within precautions       * Training on energy conservation strategies, correct breathing pattern and techniques to improve independence/tolerance for self-care routine  * Functional transfer/mobility training/DME recommendations for increased independence, safety, and fall prevention  * Patient/Family education to increase follow through with safety techniques and functional independence  * Recommendation of environmental modifications for increased safety with functional transfers/mobility and ADLs  * Therapeutic exercise to improve motor endurance, ROM, and functional strength for ADLs/functional transfers  * Therapeutic activities to facilitate/challenge dynamic balance, stand tolerance for increased safety and independence with ADLs  * Therapeutic activities to facilitate gross/fine motor skills for increased independence with ADLs    Rehab Potential: Good for established goals    Patient / Family Goal: Not stated     Patient and/or family were instructed on diagnosis, prognosis/goals and plan of care. Demonstrated good understanding.     [] Malnutrition indicators have been identified and nursing has been notified to ensure a dietitian consult is ordered.        Low Evaluation completed +              Time In: 13:56  Time Out: 14:20  Total Treatment Time: 11 minutes   Min Units   OT Eval Low 97165 X 1   OT Eval Medium 33494     OT Eval High [id number]     OT Re-Eval [id number]     Therapeutic Ex 41229     Therapeutic Activities 79835     ADL/Self Care 86176 11 1   Orthotic Management 96220     Neuro Re-Ed [address], OTR/L #[id number]

## 2021-08-13 LAB
ANION GAP SERPL CALCULATED.3IONS-SCNC: 12 MMOL/L (ref 7–16)
BUN BLDV-MCNC: 9 MG/DL (ref 6–23)
CALCIUM SERPL-MCNC: 9 MG/DL (ref 8.6–10.2)
CHLORIDE BLD-SCNC: 97 MMOL/L (ref 98–107)
CO2: 28 MMOL/L (ref 22–29)
CREAT SERPL-MCNC: 4 MG/DL (ref 0.7–1.2)
GFR AFRICAN AMERICAN: 18
GFR NON-AFRICAN AMERICAN: 15 ML/MIN/1.73
GLUCOSE BLD-MCNC: 148 MG/DL (ref 74–99)
MAGNESIUM: 2 MG/DL (ref 1.6–2.6)
PHOSPHORUS: 2.4 MG/DL (ref 2.5–4.5)
POTASSIUM SERPL-SCNC: 4 MMOL/L (ref 3.5–5)
SODIUM BLD-SCNC: 137 MMOL/L (ref 132–146)

## 2021-08-13 PROCEDURE — 36415 COLL VENOUS BLD VENIPUNCTURE: CPT

## 2021-08-13 PROCEDURE — 6370000000 HC RX 637 (ALT 250 FOR IP): Performed by: INTERNAL MEDICINE

## 2021-08-13 PROCEDURE — 83735 ASSAY OF MAGNESIUM: CPT

## 2021-08-13 PROCEDURE — 90935 HEMODIALYSIS ONE EVALUATION: CPT

## 2021-08-13 PROCEDURE — 6360000002 HC RX W HCPCS: Performed by: NURSE PRACTITIONER

## 2021-08-13 PROCEDURE — 1200000000 HC SEMI PRIVATE

## 2021-08-13 PROCEDURE — 80048 BASIC METABOLIC PNL TOTAL CA: CPT

## 2021-08-13 PROCEDURE — 2580000003 HC RX 258: Performed by: NURSE PRACTITIONER

## 2021-08-13 PROCEDURE — 84100 ASSAY OF PHOSPHORUS: CPT

## 2021-08-13 PROCEDURE — 6370000000 HC RX 637 (ALT 250 FOR IP): Performed by: NURSE PRACTITIONER

## 2021-08-13 PROCEDURE — 5A1D70Z PERFORMANCE OF URINARY FILTRATION, INTERMITTENT, LESS THAN 6 HOURS PER DAY: ICD-10-PCS | Performed by: INTERNAL MEDICINE

## 2021-08-13 RX ORDER — AMLODIPINE BESYLATE 10 MG/1
10 TABLET ORAL DAILY
Qty: 30 TABLET | Refills: 3 | Status: SHIPPED | OUTPATIENT
Start: 2021-08-14

## 2021-08-13 RX ORDER — HYDRALAZINE HYDROCHLORIDE 25 MG/1
75 TABLET, FILM COATED ORAL 4 TIMES DAILY
Qty: 360 TABLET | Refills: 3 | Status: ON HOLD | OUTPATIENT
Start: 2021-08-13 | End: 2022-10-16 | Stop reason: HOSPADM

## 2021-08-13 RX ORDER — LOSARTAN POTASSIUM 25 MG/1
25 TABLET ORAL DAILY
Status: DISCONTINUED | OUTPATIENT
Start: 2021-08-13 | End: 2021-08-14 | Stop reason: HOSPADM

## 2021-08-13 RX ORDER — CARVEDILOL 6.25 MG/1
12.5 TABLET ORAL 2 TIMES DAILY WITH MEALS
Status: DISCONTINUED | OUTPATIENT
Start: 2021-08-13 | End: 2021-08-14 | Stop reason: HOSPADM

## 2021-08-13 RX ORDER — CARVEDILOL 12.5 MG/1
12.5 TABLET ORAL 2 TIMES DAILY WITH MEALS
Qty: 60 TABLET | Refills: 3 | Status: SHIPPED | OUTPATIENT
Start: 2021-08-14

## 2021-08-13 RX ORDER — LOSARTAN POTASSIUM 25 MG/1
25 TABLET ORAL DAILY
Qty: 30 TABLET | Refills: 3 | Status: SHIPPED | OUTPATIENT
Start: 2021-08-13

## 2021-08-13 RX ORDER — CARVEDILOL 25 MG/1
25 TABLET ORAL 2 TIMES DAILY WITH MEALS
Status: DISCONTINUED | OUTPATIENT
Start: 2021-08-13 | End: 2021-08-13

## 2021-08-13 RX ADMIN — CARVEDILOL 12.5 MG: 6.25 TABLET, FILM COATED ORAL at 10:14

## 2021-08-13 RX ADMIN — PANTOPRAZOLE SODIUM 40 MG: 40 TABLET, DELAYED RELEASE ORAL at 07:16

## 2021-08-13 RX ADMIN — HYDRALAZINE HYDROCHLORIDE 10 MG: 20 INJECTION INTRAMUSCULAR; INTRAVENOUS at 06:52

## 2021-08-13 RX ADMIN — HYDRALAZINE HYDROCHLORIDE 75 MG: 50 TABLET, FILM COATED ORAL at 23:18

## 2021-08-13 RX ADMIN — Medication 10 ML: at 10:15

## 2021-08-13 RX ADMIN — ROSUVASTATIN 5 MG: 10 TABLET, FILM COATED ORAL at 21:46

## 2021-08-13 RX ADMIN — HYDRALAZINE HYDROCHLORIDE 75 MG: 50 TABLET, FILM COATED ORAL at 17:53

## 2021-08-13 RX ADMIN — AMLODIPINE BESYLATE 10 MG: 10 TABLET ORAL at 10:14

## 2021-08-13 RX ADMIN — DOXAZOSIN 4 MG: 4 TABLET ORAL at 21:46

## 2021-08-13 RX ADMIN — HYDRALAZINE HYDROCHLORIDE 50 MG: 50 TABLET, FILM COATED ORAL at 05:35

## 2021-08-13 RX ADMIN — CARVEDILOL 12.5 MG: 6.25 TABLET, FILM COATED ORAL at 17:53

## 2021-08-13 ASSESSMENT — PAIN SCALES - GENERAL
PAINLEVEL_OUTOF10: 0

## 2021-08-13 NOTE — CARE COORDINATION
Social Work Discharge/Planning:    Patient is currently off the floor for dialysis. Per prior Internal Med note, possible discharge today if BP a little better this afternoon. Does not need to be perfect but at least better than 180's. SW met with patient to explain role and follow. Patient's plan is to return home with no anticipated needs. Patient's wife is able to transport him home. SW/CM to follow.     Christopher Alford, NOE  [phone]

## 2021-08-13 NOTE — PROGRESS NOTES
Department of Internal Medicine  Nephrology Consult Note    SUBJECTIVE:  We are following Mr. Garg Outlduane:      Vitals:    VITALS:  BP (!) 168/61   Pulse 66   Temp 97.6 °F (36.4 °C) (Oral)   Resp 18   Ht 5' 7\" (1.702 m)   Wt 153 lb (69.4 kg)   SpO2 97%   BMI 23.96 kg/m²   24HR INTAKE/OUTPUT:      Intake/Output Summary (Last 24 hours) at 8/13/2021 1306  Last data filed at 8/13/2021 0536  Gross per 24 hour   Intake 600 ml   Output --   Net 600 ml       Access: KATRINA AVF +thrill and bruit  Constitutional:  Alert and oriented, NAD  HEENT:  Normocephalic, PERRL  Respiratory:  CTA bilaterally  Cardiovascular/Edema:  RRR, S1,S2   Gastrointestinal:  Soft, rounded, nontender, nondistended  Neurologic:  Nonfocal, MOORE  Skin:  Warm, dry, no lesions, ecchymosis to right forehead  Other:  No edema    DATA:    CBC:   Lab Results   Component Value Date    WBC 5.2 08/11/2021    RBC 3.80 08/11/2021    HGB 12.7 08/11/2021    HCT 40.7 08/11/2021    .1 08/11/2021    MCH 33.4 08/11/2021    MCHC 31.2 08/11/2021    RDW 17.9 08/11/2021    PLT 91 08/11/2021    MPV 11.5 08/11/2021     CMP:    Lab Results   Component Value Date     08/12/2021    K 4.3 08/12/2021    CL 99 08/12/2021    CO2 25 08/12/2021    BUN 15 08/12/2021    CREATININE 5.6 08/12/2021    GFRAA 12 08/12/2021    LABGLOM 10 08/12/2021    GLUCOSE 74 08/12/2021    PROT 6.2 08/11/2021    LABALBU 3.9 08/11/2021    CALCIUM 9.1 08/12/2021    BILITOT 0.7 08/11/2021    ALKPHOS 77 08/11/2021    AST 12 08/11/2021    ALT 5 08/11/2021     Magnesium:    Lab Results   Component Value Date    MG 1.8 10/21/2020     Phosphorus:    Lab Results   Component Value Date    PHOS 3.7 10/18/2020     Radiology Review:      CT head without IV contrast 8/11/21   No acute intracranial abnormality.        IMPRESSION/RECOMMENDATIONS:      Briefly, Mr. Rosalinda Wells is a 67year old male with a PMH of ESRD on IHD three times weekly MWF, via left upper arm AVF, HTN, HFpEF, HDL, BPH, COVID-19 infection, herpes zoster with encephalitis, who was admitted on August 11, 2021 after presenting to the ER with ongoing headache and HTN. Apparently, he went to [facility] ER on Saturday with the same complaints and he was discharged home. He does admit to falling about a week ago and hitting his head. A CT of the head was done at [facility] which was negative. He follows with Dr. Laurence Art at [facility] dialysis and he was advised by him to take an extra dose of hydralazine due to systolic blood pressure at home being in the 200s, however, it did not help much. He states that while at dialysis yesterday his BP was still very high. Upon arrival to ER, his BP was 219/76 and was complaining of severe headache with nausea. He received diphenhydramine, prochlorperazine, and hydralazine 10 mg IV x2 doses in ER. We are consulted for ESRD and management of dialysis as well and uncontrolled HTN. ESRD on IHD three times weekly MWF, via left upper arm AVF. To continue while in the hospital. For dialysis today. Resistant HTN, on carvedilol, doxazosin, and hydralazine. Amlodipine was added yesterday and losartan added today for better BP control.   BPH, on doxazosin  Recent fall, CT head negative  MBD of CKD, to obtain phosphorus level  Anemia of CKD, monitor H&H    Plan:    HD three times weekly MWF as scheduled  Increase amlodipine 10 mg PO daily  Monitor BP  Monitor labs daily  Obtain magnesium and phosphorus levels  Start losartan 25 mg daily    Case and plan discussed with Dr. Laurence Art    Electronically signed by TOMMY Jose - CNP on 8/13/2021 at 1:06 PM    Tolerated 2 liter volume removal with HD    Jairon Rush MD  Agree as annotated

## 2021-08-13 NOTE — DISCHARGE SUMMARY
Physician Discharge Summary     Patient ID:  Noah Oliver  [id number]  67 y.o.  1945    Admit date: 8/11/2021    Discharge date and time:  8/13/2021     Admission Diagnoses:   Chief Complaint   Patient presents with    Headache     has been going on for a few days, went to [facility] Saturday, they didnt find anything wrong, went to dialysis today and was experiencing headache and high BP again    Hypertension      Hypertensive emergency     Discharge Diagnoses:   Principal Problem:    Hypertensive emergency  Active Problems:    Hyperlipidemia    Uncontrolled hypertension    ESRD on dialysis [facility])  Resolved Problems:    * No resolved hospital problems. *       Consults: nephrology    Procedures: None    Hospital Course:   Patient recently suffered a mechanical fall and ended up with some bruising on the right side of the head and developed a headache afterwards. The headache was not resolving so he came to the hospital.  He was noted to also have pretty high blood pressures although is not entirely clear to me if the headache is from the blood pressure, to me it seems more posttraumatic. In any case his headache improved with supportive care. He made some changes to his blood pressure regimen as noted below. His blood pressure has been improving now.      Discharge Exam:  Vitals:    08/13/21 1500 08/13/21 1530 08/13/21 1616 08/13/21 1745   BP: (!) 145/81 (!) 149/72 (!) 140/68 (!) 152/62   Pulse: 76 75 74 76   Resp:   16 18   Temp:   98.3 °F (36.8 °C) 97.9 °F (36.6 °C)   TempSrc:    Oral   SpO2:    96%   Weight:   153 lb 10.6 oz (69.7 kg)    Height:            General appearance: NAD, conversant  HEENT: Slight bruising R face, MMM  Neck: FROM, supple  Lungs: Clear to auscultation, WOB normal  CV: RRR, no MRGs  Abdomen: Soft, non-tender; no masses or HSM, +BS  Extremities: No peripheral edema or digital cyanosis  Skin: no rash, lesions or ulcers  Psych: Calm and cooperative  Neuro: Alert and interactive, nonfocal     Condition:  Stable    Disposition: home    Patient Instructions:   Current Discharge Medication List      START taking these medications    Details   losartan (COZAAR) 25 MG tablet Take 1 tablet by mouth daily  Qty: 30 tablet, Refills: 3      carvedilol (COREG) 12.5 MG tablet Take 1 tablet by mouth 2 times daily (with meals)  Qty: 60 tablet, Refills: 3      amLODIPine (NORVASC) 10 MG tablet Take 1 tablet by mouth daily  Qty: 30 tablet, Refills: 3         CONTINUE these medications which have CHANGED    Details   hydrALAZINE (APRESOLINE) 25 MG tablet Take 3 tablets by mouth 4 times daily  Qty: 360 tablet, Refills: 3         CONTINUE these medications which have NOT CHANGED    Details   doxazosin (CARDURA) 4 MG tablet Take 1 tablet by mouth nightly      rosuvastatin (CRESTOR) 10 MG tablet Take 5 mg by mouth nightly      Nutritional Supplements (ENSURE HIGH PROTEIN PO) Take 8 oz by mouth      acetaminophen (TYLENOL) 325 MG tablet Take 650 mg by mouth every 4 hours as needed for Pain or Fever      magnesium hydroxide (MILK OF MAGNESIA) 400 MG/5ML suspension Take 30 mLs by mouth daily as needed for Constipation      pantoprazole (PROTONIX) 40 MG tablet Take 1 tablet by mouth 2 times daily (before meals)  Qty: 30 tablet, Refills: 3      epoetin delmer-epbx (RETACRIT) 3000 UNIT/ML SOLN injection Inject 1 mL into the skin three times a week With dialysis  Qty: 21.9 mL      QUEtiapine (SEROQUEL) 25 MG tablet Take 0.5 tablets by mouth every 6 hours as needed for Agitation or Other (anxiety)  Qty: 60 tablet, Refills: 3      sucralfate (CARAFATE) 1 GM tablet Take 1 tablet by mouth 4 times daily  Qty: 120 tablet, Refills: 3      Cholecalciferol (VITAMIN D) 50 MCG (2000 UT) CAPS capsule Take by mouth      Coenzyme Q10 (Q-10 CO-ENZYME PO) Take 100 mg by mouth Daily         STOP taking these medications       amoxicillin-clavulanate (AUGMENTIN) 500-125 MG per tablet Comments:   Reason for Stopping:         metoprolol tartrate (LOPRESSOR) 50 MG tablet Comments:   Reason for Stopping:                Activity: activity as tolerated  Diet: renal diet    Follow-up with PCP in 1 week.     Note that over 30 minutes was spent in preparing discharge papers, discussing discharge with patient, medication review, etc.    Signed:  Epifanio Delong MD    8/13/2021  7:17 PM

## 2021-08-13 NOTE — FLOWSHEET NOTE
08/13/21 1616   Vital Signs   BP (!) 140/68   Temp 98.3 °F (36.8 °C)   Pulse 74   Resp 16   Weight 153 lb 10.6 oz (69.7 kg)   Weight Method Bed scale   Percent Weight Change -1.97   Post-Hemodialysis Assessment   Post-Treatment Procedures Blood returned; Access bleeding time < 10 minutes   Machine Disinfection Process Acid/Vinegar Clean;Heat Disinfect; Exterior Machine Disinfection   Rinseback Volume (ml) 300 ml   Total Liters Processed (l/min) 74.6 l/min   Dialyzer Clearance Lightly streaked   Duration of Treatment (minutes) 195 minutes   Heparin amount administered during treatment (units) 0 units   Hemodialysis Intake (ml) 300 ml   Hemodialysis Output (ml) 2000 ml   NET Removed (ml) 1700 ml   Tolerated Treatment Good   Patient Response to Treatment tolerated well, blood returned, needles pulled, sites held, stasis achieved, bandaid applied, +thirll, +bruit

## 2021-08-13 NOTE — PLAN OF CARE
Problem: Falls - Risk of:  Goal: Will remain free from falls  Description: Will remain free from falls  8/13/2021 1223 by Janie Langston RN  Outcome: Met This Shift  8/12/2021 2356 by Hernandez Juarez RN  Outcome: Met This Shift  Goal: Absence of physical injury  Description: Absence of physical injury  Outcome: Met This Shift

## 2021-08-13 NOTE — PROGRESS NOTES
HA better, possible d/c today if BP a little better this afternoon. Doesn't need to be perfect but at least better than 180's.   I've increased his hydralazine

## 2021-08-14 VITALS
BODY MASS INDEX: 24.91 KG/M2 | DIASTOLIC BLOOD PRESSURE: 63 MMHG | HEIGHT: 67 IN | TEMPERATURE: 97.5 F | WEIGHT: 158.73 LBS | RESPIRATION RATE: 16 BRPM | OXYGEN SATURATION: 96 % | HEART RATE: 66 BPM | SYSTOLIC BLOOD PRESSURE: 132 MMHG

## 2021-08-14 LAB
ANION GAP SERPL CALCULATED.3IONS-SCNC: 11 MMOL/L (ref 7–16)
BUN BLDV-MCNC: 11 MG/DL (ref 6–23)
CALCIUM SERPL-MCNC: 8.9 MG/DL (ref 8.6–10.2)
CHLORIDE BLD-SCNC: 95 MMOL/L (ref 98–107)
CO2: 30 MMOL/L (ref 22–29)
CREAT SERPL-MCNC: 5.1 MG/DL (ref 0.7–1.2)
GFR AFRICAN AMERICAN: 13
GFR NON-AFRICAN AMERICAN: 11 ML/MIN/1.73
GLUCOSE BLD-MCNC: 78 MG/DL (ref 74–99)
MAGNESIUM: 2 MG/DL (ref 1.6–2.6)
PHOSPHORUS: 3.2 MG/DL (ref 2.5–4.5)
POTASSIUM SERPL-SCNC: 4 MMOL/L (ref 3.5–5)
SODIUM BLD-SCNC: 136 MMOL/L (ref 132–146)

## 2021-08-14 PROCEDURE — 84100 ASSAY OF PHOSPHORUS: CPT

## 2021-08-14 PROCEDURE — 6370000000 HC RX 637 (ALT 250 FOR IP): Performed by: INTERNAL MEDICINE

## 2021-08-14 PROCEDURE — 83735 ASSAY OF MAGNESIUM: CPT

## 2021-08-14 PROCEDURE — 36415 COLL VENOUS BLD VENIPUNCTURE: CPT

## 2021-08-14 PROCEDURE — 6370000000 HC RX 637 (ALT 250 FOR IP): Performed by: NURSE PRACTITIONER

## 2021-08-14 PROCEDURE — 80048 BASIC METABOLIC PNL TOTAL CA: CPT

## 2021-08-14 RX ADMIN — AMLODIPINE BESYLATE 10 MG: 10 TABLET ORAL at 09:09

## 2021-08-14 RX ADMIN — CARVEDILOL 12.5 MG: 6.25 TABLET, FILM COATED ORAL at 09:09

## 2021-08-14 RX ADMIN — PANTOPRAZOLE SODIUM 40 MG: 40 TABLET, DELAYED RELEASE ORAL at 06:05

## 2021-08-14 RX ADMIN — LOSARTAN POTASSIUM 25 MG: 25 TABLET, FILM COATED ORAL at 09:09

## 2021-08-14 RX ADMIN — HYDRALAZINE HYDROCHLORIDE 75 MG: 50 TABLET, FILM COATED ORAL at 06:05

## 2021-08-14 ASSESSMENT — PAIN SCALES - GENERAL: PAINLEVEL_OUTOF10: 0

## 2022-02-17 ENCOUNTER — HOSPITAL ENCOUNTER (OUTPATIENT)
Dept: ULTRASOUND IMAGING | Age: 77
Discharge: HOME | End: 2022-02-19
Payer: MEDICARE

## 2022-02-17 DIAGNOSIS — R10.11 RIGHT UPPER QUADRANT PAIN: ICD-10-CM

## 2022-02-17 PROCEDURE — 76705 ECHO EXAM OF ABDOMEN: CPT

## 2022-07-26 ENCOUNTER — HOSPITAL ENCOUNTER (OUTPATIENT)
Age: 77
Discharge: HOME | End: 2022-07-28

## 2022-07-26 PROCEDURE — 88305 TISSUE EXAM BY PATHOLOGIST: CPT

## 2022-10-06 ENCOUNTER — HOSPITAL ENCOUNTER (OUTPATIENT)
Age: 77
Discharge: HOME | End: 2022-10-08

## 2022-10-06 PROCEDURE — 88304 TISSUE EXAM BY PATHOLOGIST: CPT

## 2022-10-07 ENCOUNTER — HOSPITAL ENCOUNTER (OUTPATIENT)
Age: 77
Discharge: HOME | End: 2022-10-09

## 2022-10-07 LAB
ANION GAP SERPL CALCULATED.3IONS-SCNC: 9 MMOL/L (ref 7–16)
BUN BLDV-MCNC: 17 MG/DL (ref 6–23)
CALCIUM SERPL-MCNC: 8.9 MG/DL (ref 8.6–10.2)
CHLORIDE BLD-SCNC: 107 MMOL/L (ref 98–107)
CO2: 25 MMOL/L (ref 22–29)
CREAT SERPL-MCNC: 4.7 MG/DL (ref 0.7–1.2)
GFR AFRICAN AMERICAN: 15
GFR NON-AFRICAN AMERICAN: 12 ML/MIN/1.73
GLUCOSE BLD-MCNC: 70 MG/DL (ref 74–99)
HCT VFR BLD CALC: 32.3 % (ref 37–54)
HEMOGLOBIN: 9.7 G/DL (ref 12.5–16.5)
MCH RBC QN AUTO: 32.4 PG (ref 26–35)
MCHC RBC AUTO-ENTMCNC: 30 % (ref 32–34.5)
MCV RBC AUTO: 108 FL (ref 80–99.9)
PDW BLD-RTO: 18.6 FL (ref 11.5–15)
PLATELET # BLD: 71 E9/L (ref 130–450)
PLATELET CONFIRMATION: NORMAL
PMV BLD AUTO: 12.3 FL (ref 7–12)
POTASSIUM SERPL-SCNC: 4.7 MMOL/L (ref 3.5–5)
RBC # BLD: 2.99 E12/L (ref 3.8–5.8)
SODIUM BLD-SCNC: 141 MMOL/L (ref 132–146)
WBC # BLD: 3.9 E9/L (ref 4.5–11.5)

## 2022-10-07 PROCEDURE — 85027 COMPLETE CBC AUTOMATED: CPT

## 2022-10-07 PROCEDURE — 80048 BASIC METABOLIC PNL TOTAL CA: CPT

## 2022-10-14 ENCOUNTER — HOSPITAL ENCOUNTER (INPATIENT)
Age: 77
LOS: 2 days | Discharge: HOME | DRG: 808 | End: 2022-10-16
Attending: EMERGENCY MEDICINE | Admitting: INTERNAL MEDICINE
Payer: MEDICARE

## 2022-10-14 ENCOUNTER — APPOINTMENT (OUTPATIENT)
Dept: GENERAL RADIOLOGY | Age: 77
DRG: 808 | End: 2022-10-14
Payer: MEDICARE

## 2022-10-14 DIAGNOSIS — K92.2 GASTROINTESTINAL HEMORRHAGE, UNSPECIFIED GASTROINTESTINAL HEMORRHAGE TYPE: Primary | ICD-10-CM

## 2022-10-14 DIAGNOSIS — D69.6 THROMBOCYTOPENIA (HCC): ICD-10-CM

## 2022-10-14 LAB
ALBUMIN SERPL-MCNC: 4.1 G/DL (ref 3.5–5.2)
ALP BLD-CCNC: 83 U/L (ref 40–129)
ALT SERPL-CCNC: <5 U/L (ref 0–40)
ANION GAP SERPL CALCULATED.3IONS-SCNC: 9 MMOL/L (ref 7–16)
AST SERPL-CCNC: 16 U/L (ref 0–39)
BASOPHILS ABSOLUTE: 0.06 E9/L (ref 0–0.2)
BASOPHILS RELATIVE PERCENT: 1.4 % (ref 0–2)
BILIRUB SERPL-MCNC: 1 MG/DL (ref 0–1.2)
BUN BLDV-MCNC: 9 MG/DL (ref 6–23)
CALCIUM SERPL-MCNC: 9.5 MG/DL (ref 8.6–10.2)
CHLORIDE BLD-SCNC: 99 MMOL/L (ref 98–107)
CO2: 32 MMOL/L (ref 22–29)
CREAT SERPL-MCNC: 2.8 MG/DL (ref 0.7–1.2)
EKG ATRIAL RATE: 67 BPM
EKG P AXIS: 49 DEGREES
EKG P-R INTERVAL: 142 MS
EKG Q-T INTERVAL: 424 MS
EKG QRS DURATION: 82 MS
EKG QTC CALCULATION (BAZETT): 448 MS
EKG R AXIS: 61 DEGREES
EKG T AXIS: 91 DEGREES
EKG VENTRICULAR RATE: 67 BPM
EOSINOPHILS ABSOLUTE: 0.22 E9/L (ref 0.05–0.5)
EOSINOPHILS RELATIVE PERCENT: 5.1 % (ref 0–6)
GFR AFRICAN AMERICAN: 27
GFR NON-AFRICAN AMERICAN: 22 ML/MIN/1.73
GLUCOSE BLD-MCNC: 109 MG/DL (ref 74–99)
HCT VFR BLD CALC: 33.5 % (ref 37–54)
HEMOGLOBIN: 10.5 G/DL (ref 12.5–16.5)
IMMATURE GRANULOCYTES #: 0.02 E9/L
IMMATURE GRANULOCYTES %: 0.5 % (ref 0–5)
LYMPHOCYTES ABSOLUTE: 0.79 E9/L (ref 1.5–4)
LYMPHOCYTES RELATIVE PERCENT: 18.3 % (ref 20–42)
MAGNESIUM: 1.9 MG/DL (ref 1.6–2.6)
MCH RBC QN AUTO: 32.6 PG (ref 26–35)
MCHC RBC AUTO-ENTMCNC: 31.3 % (ref 32–34.5)
MCV RBC AUTO: 104 FL (ref 80–99.9)
MONOCYTES ABSOLUTE: 0.41 E9/L (ref 0.1–0.95)
MONOCYTES RELATIVE PERCENT: 9.5 % (ref 2–12)
NEUTROPHILS ABSOLUTE: 2.81 E9/L (ref 1.8–7.3)
NEUTROPHILS RELATIVE PERCENT: 65.2 % (ref 43–80)
PDW BLD-RTO: 17.3 FL (ref 11.5–15)
PLATELET # BLD: 92 E9/L (ref 130–450)
PLATELET CONFIRMATION: NORMAL
PMV BLD AUTO: 11.2 FL (ref 7–12)
POTASSIUM REFLEX MAGNESIUM: 3.4 MMOL/L (ref 3.5–5)
PRO-BNP: ABNORMAL PG/ML (ref 0–450)
RBC # BLD: 3.22 E12/L (ref 3.8–5.8)
SODIUM BLD-SCNC: 140 MMOL/L (ref 132–146)
TOTAL PROTEIN: 6.5 G/DL (ref 6.4–8.3)
TROPONIN, HIGH SENSITIVITY: 115 NG/L (ref 0–11)
WBC # BLD: 4.3 E9/L (ref 4.5–11.5)

## 2022-10-14 PROCEDURE — 80053 COMPREHEN METABOLIC PANEL: CPT

## 2022-10-14 PROCEDURE — 84484 ASSAY OF TROPONIN QUANT: CPT

## 2022-10-14 PROCEDURE — 83735 ASSAY OF MAGNESIUM: CPT

## 2022-10-14 PROCEDURE — 83880 ASSAY OF NATRIURETIC PEPTIDE: CPT

## 2022-10-14 PROCEDURE — 93005 ELECTROCARDIOGRAM TRACING: CPT | Performed by: PHYSICIAN ASSISTANT

## 2022-10-14 PROCEDURE — G0378 HOSPITAL OBSERVATION PER HR: HCPCS

## 2022-10-14 PROCEDURE — 99285 EMERGENCY DEPT VISIT HI MDM: CPT

## 2022-10-14 PROCEDURE — 71045 X-RAY EXAM CHEST 1 VIEW: CPT

## 2022-10-14 PROCEDURE — 2060000000 HC ICU INTERMEDIATE R&B

## 2022-10-14 PROCEDURE — 85025 COMPLETE CBC W/AUTO DIFF WBC: CPT

## 2022-10-14 RX ORDER — LOSARTAN POTASSIUM 25 MG/1
25 TABLET ORAL DAILY
Status: DISCONTINUED | OUTPATIENT
Start: 2022-10-15 | End: 2022-10-16 | Stop reason: HOSPADM

## 2022-10-14 RX ORDER — ONDANSETRON 2 MG/ML
4 INJECTION INTRAMUSCULAR; INTRAVENOUS EVERY 6 HOURS PRN
Status: DISCONTINUED | OUTPATIENT
Start: 2022-10-14 | End: 2022-10-16 | Stop reason: HOSPADM

## 2022-10-14 RX ORDER — PANTOPRAZOLE SODIUM 40 MG/1
40 TABLET, DELAYED RELEASE ORAL
Status: DISCONTINUED | OUTPATIENT
Start: 2022-10-15 | End: 2022-10-16 | Stop reason: HOSPADM

## 2022-10-14 RX ORDER — ACETAMINOPHEN 325 MG/1
650 TABLET ORAL EVERY 6 HOURS PRN
Status: DISCONTINUED | OUTPATIENT
Start: 2022-10-14 | End: 2022-10-16 | Stop reason: HOSPADM

## 2022-10-14 RX ORDER — ROSUVASTATIN CALCIUM 5 MG/1
5 TABLET, COATED ORAL NIGHTLY
Status: DISCONTINUED | OUTPATIENT
Start: 2022-10-15 | End: 2022-10-16 | Stop reason: HOSPADM

## 2022-10-14 RX ORDER — ASPIRIN 81 MG/1
81 TABLET, CHEWABLE ORAL DAILY
Status: ON HOLD | COMMUNITY
End: 2022-10-16 | Stop reason: HOSPADM

## 2022-10-14 RX ORDER — ONDANSETRON 4 MG/1
4 TABLET, ORALLY DISINTEGRATING ORAL EVERY 8 HOURS PRN
Status: DISCONTINUED | OUTPATIENT
Start: 2022-10-14 | End: 2022-10-16 | Stop reason: HOSPADM

## 2022-10-14 RX ORDER — ACETAMINOPHEN 325 MG/1
650 TABLET ORAL EVERY 4 HOURS PRN
Status: DISCONTINUED | OUTPATIENT
Start: 2022-10-14 | End: 2022-10-16 | Stop reason: HOSPADM

## 2022-10-14 RX ORDER — HYDRALAZINE HYDROCHLORIDE 50 MG/1
50 TABLET, FILM COATED ORAL 4 TIMES DAILY
Status: DISCONTINUED | OUTPATIENT
Start: 2022-10-15 | End: 2022-10-16 | Stop reason: HOSPADM

## 2022-10-14 RX ORDER — SODIUM CHLORIDE 0.9 % (FLUSH) 0.9 %
5-40 SYRINGE (ML) INJECTION PRN
Status: DISCONTINUED | OUTPATIENT
Start: 2022-10-14 | End: 2022-10-16 | Stop reason: HOSPADM

## 2022-10-14 RX ORDER — CARVEDILOL 6.25 MG/1
12.5 TABLET ORAL 2 TIMES DAILY WITH MEALS
Status: DISCONTINUED | OUTPATIENT
Start: 2022-10-15 | End: 2022-10-16 | Stop reason: HOSPADM

## 2022-10-14 RX ORDER — AMLODIPINE BESYLATE 10 MG/1
10 TABLET ORAL DAILY
Status: DISCONTINUED | OUTPATIENT
Start: 2022-10-15 | End: 2022-10-16 | Stop reason: HOSPADM

## 2022-10-14 RX ORDER — SODIUM CHLORIDE 0.9 % (FLUSH) 0.9 %
5-40 SYRINGE (ML) INJECTION EVERY 12 HOURS SCHEDULED
Status: DISCONTINUED | OUTPATIENT
Start: 2022-10-14 | End: 2022-10-16 | Stop reason: HOSPADM

## 2022-10-14 RX ORDER — SODIUM CHLORIDE 9 MG/ML
INJECTION, SOLUTION INTRAVENOUS PRN
Status: DISCONTINUED | OUTPATIENT
Start: 2022-10-14 | End: 2022-10-16 | Stop reason: HOSPADM

## 2022-10-14 ASSESSMENT — PAIN - FUNCTIONAL ASSESSMENT: PAIN_FUNCTIONAL_ASSESSMENT: NONE - DENIES PAIN

## 2022-10-14 NOTE — ED NOTES
Department of Emergency Medicine  FIRST PROVIDER TRIAGE NOTE             Independent MLP           10/14/22  6:49 PM EDT    Date of Encounter: 10/14/22   MRN: [id number]      HPI: Rockne Sever is a 65 y.o. male who presents to the ED for Abnormal Lab (abnormal labs, low platelet.)     Pt had GB removed last week and has been having continuous SOB and was called today and told he has low platelets. No history of this in the past.     ROS: Negative for cp or fever. PE: Gen Appearance/Constitutional: alert  Musculoskeletal: moves all extremities x 4     Initial Plan of Care: All treatment areas with department are currently occupied. Plan to order/Initiate the following while awaiting opening in ED: labs, EKG, and imaging studies.   Initiate Treatment-Testing, Proceed toTreatment Area When Bed Available for ED Attending/MLP to Continue Care    Electronically signed by Rob Mcdonald PA-C   DD: 10/14/22       Rob Mcdonald PA-C  10/14/22 5119

## 2022-10-15 LAB
ANISOCYTOSIS: ABNORMAL
BASOPHILS ABSOLUTE: 0.07 E9/L (ref 0–0.2)
BASOPHILS RELATIVE PERCENT: 1.7 % (ref 0–2)
EOSINOPHILS ABSOLUTE: 0.19 E9/L (ref 0.05–0.5)
EOSINOPHILS RELATIVE PERCENT: 4.5 % (ref 0–6)
FERRITIN: 656 NG/ML
FOLATE: 5.3 NG/ML (ref 4.8–24.2)
HCT VFR BLD CALC: 28 % (ref 37–54)
HCT VFR BLD CALC: 29.9 % (ref 37–54)
HEMOGLOBIN: 9.2 G/DL (ref 12.5–16.5)
IMMATURE GRANULOCYTES #: 0.02 E9/L
IMMATURE GRANULOCYTES %: 0.5 % (ref 0–5)
IMMATURE RETIC FRACT: 21.8 % (ref 2.3–13.4)
IRON SATURATION: 57 % (ref 20–55)
IRON: 102 MCG/DL (ref 59–158)
LYMPHOCYTES ABSOLUTE: 0.74 E9/L (ref 1.5–4)
LYMPHOCYTES RELATIVE PERCENT: 17.5 % (ref 20–42)
MCH RBC QN AUTO: 32.2 PG (ref 26–35)
MCHC RBC AUTO-ENTMCNC: 30.8 % (ref 32–34.5)
MCV RBC AUTO: 104.5 FL (ref 80–99.9)
MONOCYTES ABSOLUTE: 0.41 E9/L (ref 0.1–0.95)
MONOCYTES RELATIVE PERCENT: 9.7 % (ref 2–12)
NEUTROPHILS ABSOLUTE: 2.81 E9/L (ref 1.8–7.3)
NEUTROPHILS RELATIVE PERCENT: 66.1 % (ref 43–80)
OVALOCYTES: ABNORMAL
PDW BLD-RTO: 17.4 FL (ref 11.5–15)
PLATELET # BLD: 86 E9/L (ref 130–450)
PLATELET CONFIRMATION: NORMAL
PMV BLD AUTO: 11.4 FL (ref 7–12)
POIKILOCYTES: ABNORMAL
RBC # BLD: 2.86 E12/L (ref 3.8–5.8)
RETIC HGB EQUIVALENT: 33 PG (ref 28.2–36.6)
RETICULOCYTE ABSOLUTE COUNT: 0.07 E12/L
RETICULOCYTE COUNT PCT: 2.6 % (ref 0.4–1.9)
TEAR DROP CELLS: ABNORMAL
TOTAL IRON BINDING CAPACITY: 179 MCG/DL (ref 250–450)
VITAMIN B-12: 327 PG/ML (ref 211–946)
WBC # BLD: 4.2 E9/L (ref 4.5–11.5)

## 2022-10-15 PROCEDURE — 82728 ASSAY OF FERRITIN: CPT

## 2022-10-15 PROCEDURE — 36415 COLL VENOUS BLD VENIPUNCTURE: CPT

## 2022-10-15 PROCEDURE — 83550 IRON BINDING TEST: CPT

## 2022-10-15 PROCEDURE — 85045 AUTOMATED RETICULOCYTE COUNT: CPT

## 2022-10-15 PROCEDURE — 2060000000 HC ICU INTERMEDIATE R&B

## 2022-10-15 PROCEDURE — 82607 VITAMIN B-12: CPT

## 2022-10-15 PROCEDURE — 82746 ASSAY OF FOLIC ACID SERUM: CPT

## 2022-10-15 PROCEDURE — 6370000000 HC RX 637 (ALT 250 FOR IP): Performed by: INTERNAL MEDICINE

## 2022-10-15 PROCEDURE — 85025 COMPLETE CBC W/AUTO DIFF WBC: CPT

## 2022-10-15 PROCEDURE — 83540 ASSAY OF IRON: CPT

## 2022-10-15 PROCEDURE — 2580000003 HC RX 258: Performed by: INTERNAL MEDICINE

## 2022-10-15 PROCEDURE — G0378 HOSPITAL OBSERVATION PER HR: HCPCS

## 2022-10-15 RX ADMIN — LOSARTAN POTASSIUM 25 MG: 25 TABLET, FILM COATED ORAL at 10:01

## 2022-10-15 RX ADMIN — Medication 10 ML: at 10:10

## 2022-10-15 RX ADMIN — CARVEDILOL 12.5 MG: 6.25 TABLET, FILM COATED ORAL at 15:54

## 2022-10-15 RX ADMIN — HYDRALAZINE HYDROCHLORIDE 50 MG: 50 TABLET, FILM COATED ORAL at 10:01

## 2022-10-15 RX ADMIN — PANTOPRAZOLE SODIUM 40 MG: 40 TABLET, DELAYED RELEASE ORAL at 15:54

## 2022-10-15 RX ADMIN — CARVEDILOL 12.5 MG: 6.25 TABLET, FILM COATED ORAL at 10:02

## 2022-10-15 RX ADMIN — PANTOPRAZOLE SODIUM 40 MG: 40 TABLET, DELAYED RELEASE ORAL at 06:04

## 2022-10-15 RX ADMIN — HYDRALAZINE HYDROCHLORIDE 50 MG: 50 TABLET, FILM COATED ORAL at 12:50

## 2022-10-15 RX ADMIN — ROSUVASTATIN CALCIUM 5 MG: 5 TABLET, FILM COATED ORAL at 20:09

## 2022-10-15 RX ADMIN — HYDRALAZINE HYDROCHLORIDE 50 MG: 50 TABLET, FILM COATED ORAL at 15:54

## 2022-10-15 RX ADMIN — HYDRALAZINE HYDROCHLORIDE 50 MG: 50 TABLET, FILM COATED ORAL at 20:09

## 2022-10-15 RX ADMIN — AMLODIPINE BESYLATE 10 MG: 10 TABLET ORAL at 10:02

## 2022-10-15 ASSESSMENT — ENCOUNTER SYMPTOMS
PHOTOPHOBIA: 0
ABDOMINAL DISTENTION: 0
COUGH: 0
BACK PAIN: 0
DIARRHEA: 0
ANAL BLEEDING: 0
CHEST TIGHTNESS: 0
VOMITING: 0
COLOR CHANGE: 0
NAUSEA: 0
CONSTIPATION: 0
SHORTNESS OF BREATH: 0

## 2022-10-15 NOTE — CONSULTS
Department of Medicine  Division of Hematology/Oncology  Attending Consult Note      Requesting Physician:  Karmen Shen DO  Reason for Consult:  pancytopenia    CHIEF COMPLAINT:  thrombocytopenia    History Obtained From:  chart review, patient    HISTORY OF PRESENT ILLNESS:      The patient is a 65 y.o. 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 Patient completed work-up and was admitted with diagnosis(es) of GI hemorrhage, thrombocytopenia. FOBT+ in ED. on aspirin 81 mg daily outpatient. Blood work was positive for pancytopenia 10/11/2022 and remains positive but improved on admission. Patient with anemia chronically with chronic kidney disease on Retacrit. MCV elevated and RDW elevated. Recent EGD, 7/26/2022 negative for active bleeding. Patient seen at bedside, his wife is present. He is feeling better since admission and actually wants to go home. He denies any nosebleeds, visible bleeding per rectum after bowel movement this morning. He denies fever chills. His abdomen is slightly tender postop still but he denies nausea vomiting. He has had colonoscopies in the past but none recently. He has some shortness of breath. He did have a fall recently as well landing on his bottom but he denies any residual pain or new areas of pain.     Past Medical History:        Diagnosis Date    Blind left eye     Chronic kidney disease     Dialysis patient [facility])     Hemodialysis patient ([facility] [address]     Hyperlipidemia     Hypertension        Past Surgical History:        Procedure Laterality Date    AV FISTULA CREATION Left 2015    Dr. Wilma Holden REVISION Left 2019    2 x Dr. Shantell Leblanc, [facility]    HIP SURGERY Left 10/16/2020    HIP HEMIARTHROPLASTY performed by Gato Blackmon MD at [address] [place] [address].  2008    Aortobifemoral bypass for claudniitio - Dr. Gina Martinez N/A 7/20/2020    EGD ESOPHAGOGASTRODUODENOSCOPY WITH ANTRAL BIOPSY performed by Shante Hawkins MD at [facility] N/A 10/28/2020    EGD ESOPHAGOGASTRODUODENOSCOPY performed by Shante Hawkins MD at [address] [place] [address]       Current Medications:    Current Facility-Administered Medications: sodium chloride flush 0.9 % injection 5-40 mL, 5-40 mL, IntraVENous, 2 times per day  sodium chloride flush 0.9 % injection 5-40 mL, 5-40 mL, IntraVENous, PRN  0.9 % sodium chloride infusion, , IntraVENous, PRN  acetaminophen (TYLENOL) tablet 650 mg, 650 mg, Oral, Q4H PRN  ondansetron (ZOFRAN-ODT) disintegrating tablet 4 mg, 4 mg, Oral, Q8H PRN **OR** ondansetron (ZOFRAN) injection 4 mg, 4 mg, IntraVENous, Q6H PRN  acetaminophen (TYLENOL) tablet 650 mg, 650 mg, Oral, Q6H PRN  amLODIPine (NORVASC) tablet 10 mg, 10 mg, Oral, Daily  carvedilol (COREG) tablet 12.5 mg, 12.5 mg, Oral, BID WC  hydrALAZINE (APRESOLINE) tablet 50 mg, 50 mg, Oral, 4x Daily  losartan (COZAAR) tablet 25 mg, 25 mg, Oral, Daily  magnesium hydroxide (MILK OF MAGNESIA) 400 MG/5ML suspension 30 mL, 30 mL, Oral, Daily PRN  pantoprazole (PROTONIX) tablet 40 mg, 40 mg, Oral, BID AC  rosuvastatin (CRESTOR) tablet 5 mg, 5 mg, Oral, Nightly    Allergies:  Patient has no known allergies.     Social History:   Social History     Socioeconomic History    Marital status:      Spouse name: Not on file    Number of children: Not on file    Years of education: Not on file    Highest education level: Not on file   Occupational History    Not on file   Tobacco Use    Smoking status: Former     Packs/day: 1.00     Years: 30.00     Pack years: 30.00     Types: Cigarettes Quit date: 10/3/2000     Years since quittin.0    Smokeless tobacco: Never   Vaping Use    Vaping Use: Never used   Substance and Sexual Activity    Alcohol use: Not Currently     Comment: occasional    Drug use: Never    Sexual activity: Not Currently   Other Topics Concern    Not on file   Social History Narrative    Not on file     Social Determinants of Health     Financial Resource Strain: Not on file   Food Insecurity: Not on file   Transportation Needs: Not on file   Physical Activity: Not on file   Stress: Not on file   Social Connections: Not on file   Intimate Partner Violence: Not on file   Housing Stability: Not on file       Family History:     History reviewed. No pertinent family history. REVIEW OF SYSTEMS:    As per HPI, remaining ROS negative. PHYSICAL EXAM:      Vitals:  BP (!) 195/65   Pulse 80   Temp 98.8 °F (37.1 °C) (Oral)   Resp 16   Ht 5' 8\" (1.727 m)   Wt 135 lb (61.2 kg)   SpO2 94%   BMI 20.53 kg/m²     CONSTITUTIONAL:  awake, alert, cooperative, no apparent distress, and appears stated age  HEENT:  Normocepalic, atraumatic, no obvious abnormality.  Lids and lashes normal, PERRLA, EOMI, sclera clear, conjunctiva normal, mucosa moist without ulcers, erythema, bleeding  NECK:  Supple, full ROM, symmetrical, trachea midline, no adenopathy  HEMATOLOGIC/LYMPHATICS:  no cervical or supraclavicular lymphadenopathy  LUNGS: Diminished breath sounds bilaterally, respirations easy and not labored, clear to auscultation bilaterally, no crackles or wheezing  CARDIOVASCULAR:  Normal apical impulse, regular rate and rhythm, normal S1 and S2, no S3 or S4, and no murmur noted  ABDOMEN: Small laparoscopic scars well-healed, lower abdominal ecchymoses, positive bowel sounds, soft, non-distended, slight tenderness, no masses palpated, no hepatosplenomegaly  MUSCULOSKELETAL:  there is no redness, warmth, or swelling of the joints; tone is normal, no edema of LE bilaterally, AV fistula upper left arm with palpable thrill  : Deferred  SKIN: intact without petechiae, ecchymoses on abdomen, rash  NEUROLOGIC:  Awake, alert, oriented to name, place and time. No gross neurologic deficits      DATA:    CMP:    Lab Results   Component Value Date/Time     10/14/2022 07:13 PM    K 3.4 10/14/2022 07:13 PM    CL 99 10/14/2022 07:13 PM    CO2 32 10/14/2022 07:13 PM    BUN 9 10/14/2022 07:13 PM    PROT 6.5 10/14/2022 07:13 PM       CBC:    Recent Labs     10/14/22  1913 10/15/22  1039   WBC 4.3* 4.2*   HGB 10.5* 9.2*   PLT 92* 86*       Radiology:    XR CHEST PORTABLE   Final Result   Stable chest with no acute cardiopulmonary process. IMPRESSION:     69-year-old male with end-stage renal disease on hemodialysis status post recent cholecystectomy admitted for thrombocytopenia found to have pancytopenia. Patient's pancytopenia was present a week prior and his white count has improved slightly. Review of records, patient with similar pancytopenia when acutely ill in 2020. Anemia is chronic and patient is on NICOLE due to end-stage renal disease. Patient with heme positive stools on admission. Patient's pancytopenia likely secondary to recent surgery with underlying comorbidities. Patient should be assessed for lower GI blood loss, surgery consulted and patient likely to need colonoscopy at some point. His iron studies, B12 and folate should be checked to ensure he is not have iron deficiency.     RECOMMENDATIONS:    -Check iron studies, B12 and folate, reticulocyte count  -Peripheral smear ordered and pending  -Surgery consulted, postop and evaluation for possible colonoscopy at some point  -Monitor CBC differential and platelets daily and maintain hemoglobin greater than 7.5 and platelets greater than 30,000 with possible active bleeding  -Consider risks and benefits and possibly hold aspirin at this point until hemoglobin stable and patient further assessed for bleed    We will follow patient supportively. Thank you for allowing us to participate in the care of Quintin Xiao III. [place] [facility] [phone]    Electronically signed by ESTEFANI Whitaker on 10/15/2022 at 11:13 AM    Note: This report was completed using [employer] voiced recognition software. Every effort has been made to ensure accuracy; however, inadvertent computerized transcription errors may be present. Patient was seen and examined. Chart reviewed. Case discussed with Frantz Maldonado. I agree with above documentation including impression and recommendations. Cytopenias are considered mild. Anemia is related to history of end-stage renal disease and dialysis. Keep hemoglobin above 7.5 g/dL. Transfuse as needed. White blood cell count is near normal range. ANC is adequate. Thrombocytopenia is only mild and probably reactive to nonspecific inflammatory process. Repeat labs daily. Follow-up with the [facility] ([place], [place]) in 1 month if platelet count is still less than 100 before discharge.

## 2022-10-15 NOTE — ED PROVIDER NOTES
Department of Emergency Medicine   ED  Provider Note  Admit Date/RoomTime: 10/14/2022  8:05 PM  ED Room: 28/28          History of Present Illness:  10/14/22, Time: 8:09 PM EDT  Chief Complaint   Patient presents with    Abnormal Lab     abnormal labs, low platelet. Daily Miller is a 65 y.o. male presenting to the ED for reported low platelets, beginning yesterday after blood draw at PCP. Patient's wife states that he saw PCP yesterday as he was not feeling well. There was concern for pneumonia but chest x-ray is unremarkable. Today they were told that they needed to come to ED for evaluation as his platelets were low. Family does not know of any history of thrombocytopenia. He is on hemodialysis Monday/Wednesday/Friday. Had hemodialysis today without complication. Wife states that approximately 2 weeks ago he had cholecystectomy with Dr. Misty Maravilla, has been doing well other than having difficulty moving his bowels. Patient states he feels very fatigued and weak. He denies any headache, no chest pain. There has been no cough or fever. Patient denies any nausea or vomiting. Review of Systems:   Pertinent positives and negatives are stated within HPI, all other systems reviewed and are negative.        --------------------------------------------- PAST HISTORY ---------------------------------------------  Past Medical History:  has a past medical history of Blind left eye, Chronic kidney disease, Dialysis patient [facility]), Hemodialysis patient ([facility] [address] Hyperlipidemia, and Hypertension. Past Surgical History:  has a past surgical history that includes AV fistula creation (Left, 2015); HEMODIALYSIS ACCESS PERCUTANEOUS REVISION (Left, 2019); PERIPHERAL VASCULAR BYPASS (2008); Upper gastrointestinal endoscopy (N/A, 7/20/2020); Cardiac surgery; hip surgery (Left, 10/16/2020); and Upper gastrointestinal endoscopy (N/A, 10/28/2020).     Social History:  reports that he quit smoking about 22 years ago. His smoking use included cigarettes. He has a 30.00 pack-year smoking history. He has never used smokeless tobacco. He reports that he does not currently use alcohol. He reports that he does not use drugs. Family History: family history is not on file. . Unless otherwise noted, family history is non contributory    The patients home medications have been reviewed. Allergies: Patient has no known allergies. ---------------------------------------------------PHYSICAL EXAM--------------------------------------    Constitutional/General: Alert and oriented x3  Head: Normocephalic and atraumatic  Eyes: PERRL, EOMI, sclera non icteric  Mouth: Oropharynx clear, handling secretions, no trismus, no asymmetry of the posterior oropharynx or uvular edema  Neck: Supple, full ROM, no stridor, no meningeal signs  Respiratory: Lungs clear to auscultation bilaterally, no wheezes, rales, or rhonchi. Not in respiratory distress  Cardiovascular:  Regular rate. Regular rhythm. No murmurs, no aortic murmurs, no gallops, or rubs. 2+ distal pulses. Equal extremity pulses. Chest: No chest wall tenderness  GI:  Abdomen Soft, Non tender, Non distended. No rebound, guarding, or rigidity. No pulsatile masses. Ecchymosis to the lower abdomen with no induration or fluctuance. Surgical sites are all clean and dry with no surrounding erythema or bleeding. Chest rectal exam is guaiac positive, dark brown stool  Musculoskeletal: Moves all extremities x 4. Warm and well perfused, no clubbing, cyanosis, or edema. Capillary refill <3 seconds  Integument: skin warm and dry. No rashes. Neurologic: GCS 15, no focal deficits, symmetric strength 5/5 in the upper and lower extremities bilaterally  Psychiatric: Normal Affect          -------------------------------------------------- RESULTS -------------------------------------------------  I have personally reviewed all laboratory and imaging results for this patient.  Results are listed below.      LABS: (Lab results interpreted by me)  Results for orders placed or performed during the hospital encounter of 10/14/22   CBC with Auto Differential   Result Value Ref Range    WBC 4.3 (L) 4.5 - 11.5 E9/L    RBC 3.22 (L) 3.80 - 5.80 E12/L    Hemoglobin 10.5 (L) 12.5 - 16.5 g/dL    Hematocrit 33.5 (L) 37.0 - 54.0 %    .0 (H) 80.0 - 99.9 fL    MCH 32.6 26.0 - 35.0 pg    MCHC 31.3 (L) 32.0 - 34.5 %    RDW 17.3 (H) 11.5 - 15.0 fL    Platelets 92 (L) 568 - 450 E9/L    MPV 11.2 7.0 - 12.0 fL    Neutrophils % 65.2 43.0 - 80.0 %    Immature Granulocytes % 0.5 0.0 - 5.0 %    Lymphocytes % 18.3 (L) 20.0 - 42.0 %    Monocytes % 9.5 2.0 - 12.0 %    Eosinophils % 5.1 0.0 - 6.0 %    Basophils % 1.4 0.0 - 2.0 %    Neutrophils Absolute 2.81 1.80 - 7.30 E9/L    Immature Granulocytes # 0.02 E9/L    Lymphocytes Absolute 0.79 (L) 1.50 - 4.00 E9/L    Monocytes Absolute 0.41 0.10 - 0.95 E9/L    Eosinophils Absolute 0.22 0.05 - 0.50 E9/L    Basophils Absolute 0.06 0.00 - 0.20 E9/L   Comprehensive Metabolic Panel w/ Reflex to MG   Result Value Ref Range    Sodium 140 132 - 146 mmol/L    Potassium reflex Magnesium 3.4 (L) 3.5 - 5.0 mmol/L    Chloride 99 98 - 107 mmol/L    CO2 32 (H) 22 - 29 mmol/L    Anion Gap 9 7 - 16 mmol/L    Glucose 109 (H) 74 - 99 mg/dL    BUN 9 6 - 23 mg/dL    Creatinine 2.8 (H) 0.7 - 1.2 mg/dL    GFR Non-African American 22 >=60 mL/min/1.73    GFR African American 27     Calcium 9.5 8.6 - 10.2 mg/dL    Total Protein 6.5 6.4 - 8.3 g/dL    Albumin 4.1 3.5 - 5.2 g/dL    Total Bilirubin 1.0 0.0 - 1.2 mg/dL    Alkaline Phosphatase 83 40 - 129 U/L    ALT <5 0 - 40 U/L    AST 16 0 - 39 U/L   Troponin   Result Value Ref Range    Troponin, High Sensitivity 115 (H) 0 - 11 ng/L   Brain Natriuretic Peptide   Result Value Ref Range    Pro-BNP >70,000 (H) 0 - 450 pg/mL   Platelet Confirmation   Result Value Ref Range    Platelet Confirmation CONFIRMED    Magnesium   Result Value Ref Range    Magnesium 1.9 1.6 - 2.6 mg/dL   EKG 12 Lead   Result Value Ref Range    Ventricular Rate 67 BPM    Atrial Rate 67 BPM    P-R Interval 142 ms    QRS Duration 82 ms    Q-T Interval 424 ms    QTc Calculation (Bazett) 448 ms    P Axis 49 degrees    R Axis 61 degrees    T Axis 91 degrees   ,       RADIOLOGY:  Interpreted by Radiologist unless otherwise specified  XR CHEST PORTABLE   Final Result   Stable chest with no acute cardiopulmonary process. EKG Interpretation  Interpreted by emergency department physician, Dr. Shana Lay    Date of EKG: 10/14/22  Time: 1924    Rhythm: normal sinus   Rate: 67  Axis: normal  Conduction: normal  ST Segments: no acute change  T Waves: no acute change    Clinical Impression: No findings suggestive of acute ischemia or injury  Comparison to prior EKG: stable as compared to patient's most recent EKG      ------------------------- NURSING NOTES AND VITALS REVIEWED ---------------------------   The nursing notes within the ED encounter and vital signs as below have been reviewed by myself  BP (!) 187/75   Pulse 79   Temp 98.5 °F (36.9 °C) (Oral)   Resp 16   Ht 5' 8\" (1.727 m)   Wt 135 lb (61.2 kg)   SpO2 97%   BMI 20.53 kg/m²     Oxygen Saturation Interpretation: Normal    The patients available past medical records and past encounters were reviewed.         ------------------------------ ED COURSE/MEDICAL DECISION MAKING----------------------  Medications   sodium chloride flush 0.9 % injection 5-40 mL (has no administration in time range)   sodium chloride flush 0.9 % injection 5-40 mL (has no administration in time range)   0.9 % sodium chloride infusion (has no administration in time range)   acetaminophen (TYLENOL) tablet 650 mg (has no administration in time range)   ondansetron (ZOFRAN-ODT) disintegrating tablet 4 mg (has no administration in time range)     Or   ondansetron (ZOFRAN) injection 4 mg (has no administration in time range)           The cardiac monitor revealed sinus rhythm with a heart rate in the 60s as interpreted by me. The cardiac monitor was ordered secondary to the patient's chest pain and to monitor for patient for dysrhythmia. CPT [id number]           Medical Decision Making:     I, Dr. Estrellita Artis, am the primary provider of record    79-year-old male presenting with thrombocytopenia. He arrives hemodynamically stable, appearing frail and weak. He does have subtle ecchymosis across the lower abdomen from his laparoscopic cholecystectomy but no abdominal tenderness. There is no induration to suggest hematoma. I do not feel that there is enough ecchymosis to suggest that this is the reason for his low platelets. He is guaiac positive on rectal exam.  With platelets of 91, will defer any platelet transfusion as he is otherwise stable. Metabolic panel is within acceptable limits. No leukocytosis, hemoglobin of 10 is at patient baseline. Chest x-ray is unremarkable. Patient remained hemodynamically stable during ED course, will be admitted after speaking with Dr. Tabitha Casas. Re-Evaluations: This patient's ED course included: a personal history and physicial examination, re-evaluation prior to disposition, cardiac monitoring, and continuous pulse oximetry    This patient has remained hemodynamically stable and been closely monitored during their ED course. Counseling: The emergency provider has spoken with the patient and spouse/SO and discussed todays results, in addition to providing specific details for the plan of care and counseling regarding the diagnosis and prognosis. Questions are answered at this time and they are agreeable with the plan.       --------------------------------- IMPRESSION AND DISPOSITION ---------------------------------    IMPRESSION  1. Gastrointestinal hemorrhage, unspecified gastrointestinal hemorrhage type    2.  Thrombocytopenia ([facility] [address]        DISPOSITION  Disposition: Admit to telemetry  Patient condition is stable        NOTE: This report was transcribed using voice recognition software.  Every effort was made to ensure accuracy; however, inadvertent computerized transcription errors may be present        Minor DO Junior  10/14/22 1158

## 2022-10-15 NOTE — CONSULTS
Department of Internal Medicine  Nephrology Attending Consult Note      Reason for Consult:  End-Stage Renal Disease  Requesting Physician: Chel Shen DO    CHIEF COMPLAINT: Admitted with abnormal labs, low platelets    History Obtained From:  patient, electronic medical record    HISTORY OF PRESENT ILLNESS:  Briefly, Mr. Kenny Ro is a 65year old male with a PMH of ESRD on IHD three times weekly MWF, via left upper arm AVF, HTN, HFpEF, HDL, BPH, COVID-19 infection, herpes zoster with encephalitis, who was admitted October 14, 2022 after he was instructed by his PCP to present to the ER due to the finding of a low platelet count. In the ER his platelet count was 92. He reports having some blood in stools. Denies chest pain or shortness of breath. He had dialysis yesterday.       Past Medical History:        Diagnosis Date    Blind left eye     Chronic kidney disease     Dialysis patient [facility])     Hemodialysis patient ([facility] [address]     Hyperlipidemia     Hypertension      Past Surgical History:        Procedure Laterality Date    AV FISTULA CREATION Left 2015    Dr. Augie Vergara Left 2019    2 x Dr. Nato Arshad, [facility]    HIP SURGERY Left 10/16/2020    HIP HEMIARTHROPLASTY performed by Juan David Asencio MD at [facility].  2008    Aortobifemoral bypass for claudicakeirao - Dr. Hazel Duran N/A 7/20/2020    EGD ESOPHAGOGASTRODUODENOSCOPY WITH ANTRAL BIOPSY performed by Ravinder Alexander MD at [facility] N/A 10/28/2020    EGD ESOPHAGOGASTRODUODENOSCOPY performed by Ravinder Alexander MD at [facility]     Current Medications:    Current Facility-Administered Medications: sodium chloride flush 0.9 % injection 5-40 mL, 5-40 mL, IntraVENous, 2 times per day  sodium chloride flush 0.9 % injection 5-40 mL, 5-40 mL, IntraVENous, PRN  0.9 % sodium chloride infusion, , IntraVENous, PRN  acetaminophen (TYLENOL) tablet 650 mg, 650 mg, Oral, Q4H PRN  ondansetron (ZOFRAN-ODT) disintegrating tablet 4 mg, 4 mg, Oral, Q8H PRN **OR** ondansetron (ZOFRAN) injection 4 mg, 4 mg, IntraVENous, Q6H PRN  acetaminophen (TYLENOL) tablet 650 mg, 650 mg, Oral, Q6H PRN  amLODIPine (NORVASC) tablet 10 mg, 10 mg, Oral, Daily  carvedilol (COREG) tablet 12.5 mg, 12.5 mg, Oral, BID WC  hydrALAZINE (APRESOLINE) tablet 50 mg, 50 mg, Oral, 4x Daily  losartan (COZAAR) tablet 25 mg, 25 mg, Oral, Daily  magnesium hydroxide (MILK OF MAGNESIA) 400 MG/5ML suspension 30 mL, 30 mL, Oral, Daily PRN  pantoprazole (PROTONIX) tablet 40 mg, 40 mg, Oral, BID AC  rosuvastatin (CRESTOR) tablet 5 mg, 5 mg, Oral, Nightly  Allergies:  Patient has no known allergies. Social History:    TOBACCO:   reports that he quit smoking about 22 years ago. His smoking use included cigarettes. He has a 30.00 pack-year smoking history. He has never used smokeless tobacco.  ETOH:   reports that he does not currently use alcohol. Family History:   History reviewed. No pertinent family history.     REVIEW OF SYSTEMS:    CONSTITUTIONAL:  negative  EYES:  negative  HEENT:  negative  RESPIRATORY:  negative  CARDIOVASCULAR:  negative  GASTROINTESTINAL:  positive for BRBPR  GENITOURINARY:  negative  INTEGUMENT/BREAST:  negative  HEMATOLOGIC/LYMPHATIC:  negative  ALLERGIC/IMMUNOLOGIC:  negative  ENDOCRINE:  negative  MUSCULOSKELETAL:  negative  NEUROLOGICAL:  negative    PHYSICAL EXAM:      Vitals:    VITALS:  BP (!) 195/65   Pulse 80   Temp 98.8 °F (37.1 °C) (Oral)   Resp 16   Ht 5' 8\" (1.727 m)   Wt 135 lb (61.2 kg)   SpO2 94%   BMI 20.53 kg/m²   24HR INTAKE/OUTPUT:  No intake or output data in the 24 hours ending 10/15/22 0801    Access: Left upper arm AV fistula  Constitutional:  Alert and oriented, NAD  HEENT:  Normocephalic, PERRL  Respiratory:  CTA bilaterally  Cardiovascular/Edema:  RRR, S1,S2            Gastrointestinal:  Soft, rounded, nontender, nondistended  Neurologic:  Nonfocal, MOORE  Skin:  Warm, dry, no lesions, ecchymosis to right forehead  Other:  No edema    DATA:    CBC:   Lab Results   Component Value Date/Time    WBC 4.3 10/14/2022 07:13 PM    RBC 3.22 10/14/2022 07:13 PM    HGB 10.5 10/14/2022 07:13 PM    HCT 33.5 10/14/2022 07:13 PM    .0 10/14/2022 07:13 PM    MCH 32.6 10/14/2022 07:13 PM    MCHC 31.3 10/14/2022 07:13 PM    RDW 17.3 10/14/2022 07:13 PM    PLT 92 10/14/2022 07:13 PM    MPV 11.2 10/14/2022 07:13 PM     CMP:    Lab Results   Component Value Date/Time     10/14/2022 07:13 PM    K 3.4 10/14/2022 07:13 PM    CL 99 10/14/2022 07:13 PM    CO2 32 10/14/2022 07:13 PM    BUN 9 10/14/2022 07:13 PM    CREATININE 2.8 10/14/2022 07:13 PM    GFRAA 27 10/14/2022 07:13 PM    LABGLOM 22 10/14/2022 07:13 PM    GLUCOSE 109 10/14/2022 07:13 PM    PROT 6.5 10/14/2022 07:13 PM    LABALBU 4.1 10/14/2022 07:13 PM    CALCIUM 9.5 10/14/2022 07:13 PM    BILITOT 1.0 10/14/2022 07:13 PM    ALKPHOS 83 10/14/2022 07:13 PM    AST 16 10/14/2022 07:13 PM    ALT <5 10/14/2022 07:13 PM     Magnesium:    Lab Results   Component Value Date/Time    MG 1.9 10/14/2022 07:13 PM     Phosphorus:    Lab Results   Component Value Date/Time    PHOS 3.2 08/14/2021 04:13 AM     Radiology Review:      Chest x-ray October 14, 2022   Stable chest with no acute cardiopulmonary process. IMPRESSION/RECOMMENDATIONS:      Briefly, Mr. Suzanne Jasmine is a 65year old male with a PMH of ESRD on IHD three times weekly MWF, via left upper arm AVF, HTN, HFpEF, HDL, BPH, COVID-19 infection, herpes zoster with encephalitis, who was admitted October 14, 2022 after he was instructed by his PCP to present to the ER due to the finding of a low platelet count. In the ER his platelet count was 92. He reports having some dark tarry stools. Denies chest pain or shortness of breath. He had dialysis yesterday.     ESRD, continue hemodialysis 3 times per week- MWF via AVF left arm. S/p HD yesterday. Resistant HTN, on amlodipine, losartan, carvedilol, hydralazine  MBD of CKD, holding binders  Anemia of CKD, to start NICOLE  History of HFpEF, proBNP >70,000, chest x-ray clear  ---------------------------------------------  Dark tarry stools, rule out GI bleed, on pantoprazole  Hyperlipidemia, on rosuvastatin  Thrombocytopenia, etiology? PLAN:     HD 3 times weekly MWF   Epoetin alpha 3000 units 3 times a week  Monitor H&H      Thank you very much Dr. Asiya Peng for allowing us to participate in the care of Mr. Kellen Cisse.

## 2022-10-15 NOTE — H&P
History & Physicial  10/15/22  Primary Care:  Fatoumata King, DO  [address] [facility] [address]        Chief Complaint   Patient presents with    Abnormal Lab     abnormal labs, low platelet. HPI:  Patient is a 65year old male who presented to [address] [place] [address] due to abnormal labs. Patient was seen by NP on 10/12/2022 for follow up after cholecystectomy and fall. Patient was found to have platelets of 95. His oxygen was 91 % and he was sent to er. Patient has had low platelets since August of 2021. He states that he is feeling ok just weak. He has not seen any blood in his stools but states that he has had dark tarry stools. He states that his last bowel movement was small and yesterday. He denies any other complaints. Prior to Visit Medications    Medication Sig Taking?  Authorizing Provider   aspirin (ASPIRIN 81) 81 MG chewable tablet Take 81 mg by mouth daily Yes Historical Provider, MD   hydrALAZINE (APRESOLINE) 25 MG tablet Take 3 tablets by mouth 4 times daily  Patient taking differently: Take 50 mg by mouth 4 times daily  Nenita Avalos MD   losartan (COZAAR) 25 MG tablet Take 1 tablet by mouth daily  Nenita Avalos MD   carvedilol (COREG) 12.5 MG tablet Take 1 tablet by mouth 2 times daily (with meals)  Nenita Avalos MD   amLODIPine (NORVASC) 10 MG tablet Take 1 tablet by mouth daily  Nenita Avalos MD   Nutritional Supplements (ENSURE HIGH PROTEIN PO) Take 8 oz by mouth  Patient not taking: Reported on 10/14/2022  Historical Provider, MD   acetaminophen (TYLENOL) 325 MG tablet Take 650 mg by mouth every 4 hours as needed for Pain or Fever  Historical Provider, MD   magnesium hydroxide (MILK OF MAGNESIA) 400 MG/5ML suspension Take 30 mLs by mouth daily as needed for Constipation  Historical Provider, MD   pantoprazole (PROTONIX) 40 MG tablet Take 1 tablet by mouth 2 times daily (before meals)  Jazmine Glez MD   epoetin delmer-epbx (RETACRIT) 3000 UNIT/ML SOLN injection Inject 1 mL into the skin three times a week With dialysis  Patient not taking: No sig reported  Elisa Alvarenga MD   QUEtiapine (SEROQUEL) 25 MG tablet Take 0.5 tablets by mouth every 6 hours as needed for Agitation or Other (anxiety)  Patient not taking: No sig reported  Elisa Alvarenga MD   doxazosin (CARDURA) 4 MG tablet Take 1 tablet by mouth nightly  Patient not taking: Reported on 10/14/2022  Historical Provider, MD   sucralfate (CARAFATE) 1 GM tablet Take 1 tablet by mouth 4 times daily  Patient not taking: No sig reported  Alonzo Watters MD   Cholecalciferol (VITAMIN D) 50 MCG ( UT) CAPS capsule Take by mouth  Historical Provider, MD   rosuvastatin (CRESTOR) 10 MG tablet Take 5 mg by mouth nightly  Historical Provider, MD   Coenzyme Q10 (Q-10 CO-ENZYME PO) Take 100 mg by mouth Daily  Historical Provider, MD     Social History     Tobacco Use    Smoking status: Former     Packs/day: 1.00     Years: 30.00     Pack years: 30.00     Types: Cigarettes     Quit date: 10/3/2000     Years since quittin.0    Smokeless tobacco: Never   Vaping Use    Vaping Use: Never used   Substance Use Topics    Alcohol use: Not Currently     Comment: occasional    Drug use: Never     History reviewed. No pertinent family history.   Past Surgical History:   Procedure Laterality Date    AV FISTULA CREATION Left     Dr. Garcia Rodgers Left     2 x Dr. Dixie Bernheim, F    HIP SURGERY Left 10/16/2020    HIP HEMIARTHROPLASTY performed by Camilo Vale MD at [facility].      Aortobifemoral bypass for claudicatio - Dr. Arlette Russo N/A 2020    EGD ESOPHAGOGASTRODUODENOSCOPY WITH ANTRAL BIOPSY performed by Alla Nicholas MD at [facility] [address] N/A 10/28/2020    EGD ESOPHAGOGASTRODUODENOSCOPY performed by Alla Nicholas MD at [address] [place] [address] History:   Diagnosis Date    Blind left eye     Chronic kidney disease     Dialysis patient [facility])     Hemodialysis patient ([facility] [address]     Hyperlipidemia     Hypertension      Review of Systems   Constitutional:  Positive for activity change and fatigue. Negative for chills and fever. HENT:  Negative for congestion, ear discharge and ear pain. Eyes:  Negative for photophobia and visual disturbance. Respiratory:  Negative for cough, chest tightness and shortness of breath. Cardiovascular:  Negative for chest pain, palpitations and leg swelling. Gastrointestinal:  Negative for abdominal distention, anal bleeding, constipation, diarrhea, nausea and vomiting. Endocrine: Negative for polydipsia, polyphagia and polyuria. Genitourinary:  Negative for dysuria, flank pain and frequency. Musculoskeletal:  Negative for back pain. Skin:  Negative for color change, pallor and rash. Allergic/Immunologic: Negative for environmental allergies and food allergies. Neurological:  Negative for dizziness and light-headedness. Hematological:  Negative for adenopathy. Does not bruise/bleed easily. Psychiatric/Behavioral:  Negative for agitation and confusion. Vitals:    10/14/22 1846 10/14/22 2200 10/14/22 2300 10/15/22 0730   BP: (!) 167/55 (!) 187/75 (!) 176/91 (!) 195/65   Pulse: 67 79 81 80   Resp: 16 16 17 16   Temp: 99.8 °F (37.7 °C) 98.5 °F (36.9 °C) 98.8 °F (37.1 °C) 98.8 °F (37.1 °C)   TempSrc: Temporal Oral Oral Oral   SpO2: 97% 97%  94%   Weight: 135 lb (61.2 kg)      Height: 5' 8\" (1.727 m)          Physical Exam  Constitutional:       Appearance: Normal appearance. HENT:      Head: Normocephalic and atraumatic. Right Ear: External ear normal.      Left Ear: External ear normal.      Nose: Nose normal. No congestion. Mouth/Throat:      Mouth: Mucous membranes are moist.      Pharynx: Oropharynx is clear. No oropharyngeal exudate. Eyes:      General:         Right eye: No discharge. Left eye: No discharge. Extraocular Movements: Extraocular movements intact. Conjunctiva/sclera: Conjunctivae normal.      Pupils: Pupils are equal, round, and reactive to light. Cardiovascular:      Rate and Rhythm: Normal rate and regular rhythm. Pulses: Normal pulses. Heart sounds:     No gallop. Pulmonary:      Effort: Pulmonary effort is normal. No respiratory distress. Breath sounds: Normal breath sounds. No wheezing, rhonchi or rales. Abdominal:      General: Bowel sounds are normal. There is no distension. Palpations: Abdomen is soft. Tenderness: There is no abdominal tenderness. There is no guarding or rebound. Musculoskeletal:         General: Normal range of motion. Cervical back: Normal range of motion and neck supple. Right lower leg: No edema. Left lower leg: No edema. Skin:     General: Skin is warm and dry. Capillary Refill: Capillary refill takes less than 2 seconds. Comments: AV fistula in left upper extremity with holosystolic murmur and palpable thrill. Neurological:      General: No focal deficit present. Mental Status: He is alert and oriented to person, place, and time. Mental status is at baseline. Psychiatric:         Mood and Affect: Mood normal.         Behavior: Behavior normal.       Principal Problem:    GI bleeding  Resolved Problems:    * No resolved hospital problems. *  Pancytopenia  Thrombocytopenia  Dark tarry stools  ESRD on HD   Generalized weakness     Plan:  Protonix 40 mg oral bid. Check iron panel, tibc, ferritin, vitamin M57 and folic acid. Consult General surgery, Hematology and Nephrology.      DVT Prophylaxis: Pcds  Code Status: Full     Esmer Menendez DO      Electronically signed by Esmer Menendez DO on 10/15/2022 at 8:09 AM

## 2022-10-16 VITALS
WEIGHT: 135 LBS | HEART RATE: 73 BPM | BODY MASS INDEX: 20.46 KG/M2 | HEIGHT: 68 IN | DIASTOLIC BLOOD PRESSURE: 66 MMHG | OXYGEN SATURATION: 97 % | SYSTOLIC BLOOD PRESSURE: 164 MMHG | RESPIRATION RATE: 16 BRPM | TEMPERATURE: 97.4 F

## 2022-10-16 LAB
ANION GAP SERPL CALCULATED.3IONS-SCNC: 10 MMOL/L (ref 7–16)
BUN BLDV-MCNC: 22 MG/DL (ref 6–23)
CALCIUM SERPL-MCNC: 9 MG/DL (ref 8.6–10.2)
CHLORIDE BLD-SCNC: 103 MMOL/L (ref 98–107)
CO2: 28 MMOL/L (ref 22–29)
CREAT SERPL-MCNC: 5 MG/DL (ref 0.7–1.2)
GFR AFRICAN AMERICAN: 14
GFR NON-AFRICAN AMERICAN: 11 ML/MIN/1.73
GLUCOSE BLD-MCNC: 78 MG/DL (ref 74–99)
HCT VFR BLD CALC: 27.8 % (ref 37–54)
HEMOGLOBIN: 8.8 G/DL (ref 12.5–16.5)
MCH RBC QN AUTO: 33.1 PG (ref 26–35)
MCHC RBC AUTO-ENTMCNC: 31.7 % (ref 32–34.5)
MCV RBC AUTO: 104.5 FL (ref 80–99.9)
PDW BLD-RTO: 17.4 FL (ref 11.5–15)
PLATELET # BLD: 89 E9/L (ref 130–450)
PLATELET CONFIRMATION: NORMAL
PMV BLD AUTO: 11.4 FL (ref 7–12)
POTASSIUM SERPL-SCNC: 3.4 MMOL/L (ref 3.5–5)
RBC # BLD: 2.66 E12/L (ref 3.8–5.8)
SODIUM BLD-SCNC: 141 MMOL/L (ref 132–146)
WBC # BLD: 4.5 E9/L (ref 4.5–11.5)

## 2022-10-16 PROCEDURE — 85027 COMPLETE CBC AUTOMATED: CPT

## 2022-10-16 PROCEDURE — 36415 COLL VENOUS BLD VENIPUNCTURE: CPT

## 2022-10-16 PROCEDURE — 6360000002 HC RX W HCPCS: Performed by: INTERNAL MEDICINE

## 2022-10-16 PROCEDURE — G0378 HOSPITAL OBSERVATION PER HR: HCPCS

## 2022-10-16 PROCEDURE — 96372 THER/PROPH/DIAG INJ SC/IM: CPT

## 2022-10-16 PROCEDURE — 2580000003 HC RX 258: Performed by: INTERNAL MEDICINE

## 2022-10-16 PROCEDURE — 80048 BASIC METABOLIC PNL TOTAL CA: CPT

## 2022-10-16 PROCEDURE — 6370000000 HC RX 637 (ALT 250 FOR IP): Performed by: INTERNAL MEDICINE

## 2022-10-16 RX ORDER — HYDRALAZINE HYDROCHLORIDE 50 MG/1
50 TABLET, FILM COATED ORAL 4 TIMES DAILY
Qty: 90 TABLET | Refills: 3 | Status: SHIPPED | OUTPATIENT
Start: 2022-10-16

## 2022-10-16 RX ORDER — CYANOCOBALAMIN 1000 UG/ML
1000 INJECTION INTRAMUSCULAR; SUBCUTANEOUS ONCE
Status: COMPLETED | OUTPATIENT
Start: 2022-10-16 | End: 2022-10-16

## 2022-10-16 RX ADMIN — LOSARTAN POTASSIUM 25 MG: 25 TABLET, FILM COATED ORAL at 08:56

## 2022-10-16 RX ADMIN — AMLODIPINE BESYLATE 10 MG: 10 TABLET ORAL at 08:56

## 2022-10-16 RX ADMIN — CARVEDILOL 12.5 MG: 6.25 TABLET, FILM COATED ORAL at 08:56

## 2022-10-16 RX ADMIN — CYANOCOBALAMIN 1000 MCG: 1000 INJECTION, SOLUTION INTRAMUSCULAR at 08:56

## 2022-10-16 RX ADMIN — PANTOPRAZOLE SODIUM 40 MG: 40 TABLET, DELAYED RELEASE ORAL at 06:56

## 2022-10-16 RX ADMIN — Medication 10 ML: at 09:07

## 2022-10-16 RX ADMIN — HYDRALAZINE HYDROCHLORIDE 50 MG: 50 TABLET, FILM COATED ORAL at 08:56

## 2022-10-16 ASSESSMENT — ENCOUNTER SYMPTOMS
COUGH: 0
VOMITING: 0
DIARRHEA: 0
SHORTNESS OF BREATH: 0
NAUSEA: 0

## 2022-10-16 NOTE — PROGRESS NOTES
Progress Note  Date:10/16/2022       [facility]:[id number]  Patient [address] [facility]     YOB: 1945     Age:77 y.o. Patient seen for follow up of GI bleed and thrombocytopenia. Patient this am laying in bed. He states he is feeling well. He wants to go home. Subjective    Subjective:  Symptoms:  No shortness of breath, cough, chest pain, headache, chest pressure or diarrhea. Diet:  No nausea or vomiting. Review of Systems   Respiratory:  Negative for cough and shortness of breath. Cardiovascular:  Negative for chest pain. Gastrointestinal:  Negative for diarrhea, nausea and vomiting. Objective         Vitals Last 24 Hours:  TEMPERATURE:  Temp  Av.8 °F (37.1 °C)  Min: 98.8 °F (37.1 °C)  Max: 98.8 °F (37.1 °C)  RESPIRATIONS RANGE: Resp  Av  Min: 16  Max: 16  PULSE OXIMETRY RANGE: SpO2  Av %  Min: 94 %  Max: 94 %  PULSE RANGE: Pulse  Av  Min: 80  Max: 80  BLOOD PRESSURE RANGE: Systolic (96ZYT), JYP:005 , Min:195 , WHF:251   ; Diastolic (76YTR), XOB:55, Min:65, Max:65    I/O (24Hr): No intake or output data in the 24 hours ending 10/16/22 0603  Objective:  General Appearance:  Comfortable, well-appearing and in no acute distress. Vital signs: (most recent): Blood pressure (!) 195/65, pulse 80, temperature 98.8 °F (37.1 °C), temperature source Oral, resp. rate 16, height 5' 8\" (1.727 m), weight 135 lb (61.2 kg), SpO2 94 %. Lungs:  Normal effort and normal respiratory rate. Breath sounds clear to auscultation. He is not in respiratory distress. No rales, decreased breath sounds, wheezes or rhonchi. Heart: Normal rate. Regular rhythm. S1 normal and S2 normal.  No gallop. Abdomen: Abdomen is soft and non-distended. Bowel sounds are normal.   There is incisional tenderness. Extremities: Normal range of motion. There is no dependent edema. Skin:  Warm and dry.     Labs/Imaging/Diagnostics    Labs:  CBC:  Recent Labs     10/14/22  1913 10/15/22  1039 10/15/22  1237 10/16/22  0238   WBC 4.3* 4.2*  --  4.5   RBC 3.22* 2.86*  --  2.66*   HGB 10.5* 9.2*  --  8.8*   HCT 33.5* 29.9* 28.0* 27.8*   .0* 104.5*  --  104.5*   RDW 17.3* 17.4*  --  17.4*   PLT 92* 86*  --  89*     CHEMISTRIES:  Recent Labs     10/14/22  1913 10/16/22  0238    141   K 3.4* 3.4*   CL 99 103   CO2 32* 28   BUN 9 22   CREATININE 2.8* 5.0*   GLUCOSE 109* 78   MG 1.9  --      PT/INR:No results for input(s): PROTIME, INR in the last 72 hours. APTT:No results for input(s): APTT in the last 72 hours. LIVER PROFILE:  Recent Labs     10/14/22  1913   AST 16   ALT <5   BILITOT 1.0   ALKPHOS 83       Imaging Last 24 Hours:  XR CHEST PORTABLE    Result Date: 10/14/2022  EXAMINATION: ONE XRAY VIEW OF THE CHEST 10/14/2022 8:38 pm COMPARISON: 7 August 2021 HISTORY: ORDERING SYSTEM PROVIDED HISTORY: SOB TECHNOLOGIST PROVIDED HISTORY: Reason for exam:->SOB FINDINGS: Single erect AP upright portable chest demonstrates tortuosity of the aorta with mild cardiomegaly. There are no focal alveolar infiltrates or effusions. There is no evidence of a pneumothorax. Stable chest with no acute cardiopulmonary process. Assessment//Plan           Hospital Problems             Last Modified POA    * (Principal) GI bleeding 10/14/2022 Yes     Assessment & Plan  Pancytopenia- improving. Thrombocytopenia  Dark tarry stools  ESRD on HD   Generalized weakness     Continue on protonix. Reviewed iron, tibc and ferritin. Supplement b12. Await general surgery input. If no interventions possibly home later today.      Luke Parra DO    Electronically signed by Luke Parra DO on 10/16/22 at 6:03 AM EDT

## 2022-10-16 NOTE — CONSULTS
Surgery Consult    Reason for consult: Anemia rule out GI bleed    HPI  65 y.o. male who is known to our practice. Underwent robotic cholecystectomy at [facility] October 6. Reportedly was told to come to the hospital because of low platelets. He was feeling weak. Reportedly fell at home as well. Denies any hematemesis, melena, hematochezia. Recently had an EGD in the hospital several weeks ago. Last colonoscopy 2019 by Dr. Wagner Montano. Hemoglobin was slightly lower than his baseline. Stool was heme positive. Past Medical History:   Diagnosis Date    Blind left eye     Chronic kidney disease     Dialysis patient [facility])     Hemodialysis patient ([facility] [address]     Hyperlipidemia     Hypertension        Past Surgical History:   Procedure Laterality Date    AV FISTULA CREATION Left 2015    Dr. Kirby Sky Left 2019    2 x Dr. Nelson Manzano, [facility]    HIP SURGERY Left 10/16/2020    HIP HEMIARTHROPLASTY performed by Sandhya Saleem MD at [address] [facility].  2008    Aortobifemoral bypass for claudicatio - Dr. Steve Shepherd N/A 7/20/2020    EGD ESOPHAGOGASTRODUODENOSCOPY WITH ANTRAL BIOPSY performed by Masha iPttman MD at [address] [facility] N/A 10/28/2020    EGD ESOPHAGOGASTRODUODENOSCOPY performed by Masha Pittman MD at [address] [facility]       Medications Prior to Admission:    Prior to Admission medications    Medication Sig Start Date End Date Taking?  Authorizing Provider   hydrALAZINE (APRESOLINE) 50 MG tablet Take 1 tablet by mouth 4 times daily 10/16/22  Yes Rebeca Shen, DO   losartan (COZAAR) 25 MG tablet Take 1 tablet by mouth daily 8/13/21   Guzman Garcia MD   carvedilol (COREG) 12.5 MG tablet Take 1 tablet by mouth 2 times daily (with meals) 8/14/21   Guzman Garcia MD   amLODIPine (NORVASC) 10 MG tablet Take 1 tablet by mouth daily 8/14/21   Guzman Garcia MD Nutritional Supplements (ENSURE HIGH PROTEIN PO) Take 8 oz by mouth  Patient not taking: Reported on 10/14/2022    Historical Provider, MD   acetaminophen (TYLENOL) 325 MG tablet Take 650 mg by mouth every 4 hours as needed for Pain or Fever    Historical Provider, MD   pantoprazole (PROTONIX) 40 MG tablet Take 1 tablet by mouth 2 times daily (before meals) 11/3/20   Mihai Sands MD   epoetin delmer-epbx (RETACRIT) 3000 UNIT/ML SOLN injection Inject 1 mL into the skin three times a week With dialysis 20   Mihai Sands MD   Cholecalciferol (VITAMIN D) 50 MCG (2000) CAPS capsule Take by mouth    Historical Provider, MD   rosuvastatin (CRESTOR) 10 MG tablet Take 5 mg by mouth nightly    Historical Provider, MD   Coenzyme Q10 (Q-10 CO-ENZYME PO) Take 100 mg by mouth Daily    Historical Provider, MD       No Known Allergies    History reviewed. No pertinent family history. Social History     Tobacco Use    Smoking status: Former     Packs/day: 1.00     Years: 30.00     Pack years: 30.00     Types: Cigarettes     Quit date: 10/3/2000     Years since quittin.0    Smokeless tobacco: Never   Vaping Use    Vaping Use: Never used   Substance Use Topics    Alcohol use: Not Currently     Comment: occasional    Drug use: Never         Review of Systems:  General ROS: Negative  Hematological and Lymphatic ROS: Negative  Respiratory ROS: Negative  Cardiovascular ROS: Negative  Gastrointestinal ROS: Negative  Genito-Urinary ROS: Negative  Musculoskeletal ROS: Negative      PHYSICAL EXAM:    Vitals:    10/16/22 0815   BP: (!) 164/66   Pulse: 73   Resp: 16   Temp: 97.4 °F (36.3 °C)   SpO2: 97%       General Appearance:  awake, alert, oriented, in no acute distress  Skin:  Skin color, texture, turgor normal. No rashes or lesions. Head/face:  NCAT  Eyes:  No gross abnormalities. Lungs:  Normal expansion. Clear to auscultation. No rales, rhonchi, or wheezing.   Heart:  Heart sounds are normal.  Regular rate and rhythm without murmur, gallop or rub. Abdomen: Soft, appropriately tender, abdominal wall ecchymosis with slight port site hematomas  Extremities: Extremities warm to touch, pink, with no edema. LABS:    CBC  Recent Labs     10/16/22  0238   WBC 4.5   HGB 8.8*   HCT 27.8*   PLT 89*     BMP  Recent Labs     10/16/22  0238      K 3.4*      CO2 28   BUN 22   CREATININE 5.0*   CALCIUM 9.0     Liver Function  Recent Labs     10/14/22  1913   BILITOT 1.0   AST 16   ALT <5   ALKPHOS 83   PROT 6.5   LABALBU 4.1     No results for input(s): LACTATE in the last 72 hours. No results for input(s): INR, PTT in the last 72 hours. Invalid input(s): PT    RADIOLOGY    XR CHEST PORTABLE    Result Date: 10/14/2022  EXAMINATION: ONE XRAY VIEW OF THE CHEST 10/14/2022 8:38 pm COMPARISON: 7 August 2021 HISTORY: ORDERING SYSTEM PROVIDED HISTORY: SOB TECHNOLOGIST PROVIDED HISTORY: Reason for exam:->SOB FINDINGS: Single erect AP upright portable chest demonstrates tortuosity of the aorta with mild cardiomegaly. There are no focal alveolar infiltrates or effusions. There is no evidence of a pneumothorax. Stable chest with no acute cardiopulmonary process. ASSESSMENT:  65 y.o. male with chronic anemia, renal disease, thrombocytopenia.   Worsened by recent fall and blood loss from surgery    PLAN:  No indication for repeat endoscopy at this time  Patient can be discharged from a surgery standpoint  Follow-up with Dr. Christine Arias in the office    Electronically signed by Cassius Dunham MD on 10/16/22 at 9:22 AM EDT

## 2022-10-16 NOTE — DISCHARGE SUMMARY
Discharge Summary     Patient ID:  Kelsey Matthews  [id number]  65 y.o. 1945 male  Jasbir Cisneros DO        Admit date: 10/14/2022    Discharge date and time:  10/16/2022  10:04 AM      Activity level: As tolerated  Diet: Renal  Labs:CBC in 1 week   Disposition: Home  Condition on Discharge:Stable  DME: None     Admit Diagnoses:   Patient Active Problem List   Diagnosis    Encounter regarding vascular access for dialysis for ESRD ([facility]    COVID-19    Acute respiratory failure due to COVID-19 [facility])    Pneumonia due to COVID-19 virus    [facility] ([facility]    Thrombocytopenia ([facility]    Melena    Acute blood loss anemia    AMS (altered mental status)    Herpes zoster    Hypertension    Hyperlipidemia    Herpes zoster encephalitis    Fracture of femoral neck, left, closed ([facility]    History of left hip hemiarthroplasty    Severe protein-calorie malnutrition POA    GI bleeding    Uncontrolled hypertension    ESRD on dialysis ([facility]    Gastroesophageal reflux disease without esophagitis    Hypertensive emergency       Discharge Diagnoses: Principal Problem:    GI bleeding  Resolved Problems:    * No resolved hospital problems. *      Consults:  IP CONSULT TO INTERNAL MEDICINE  IP CONSULT TO GENERAL SURGERY  IP CONSULT TO NEPHROLOGY  IP CONSULT TO ONCOLOGY    Procedures: None    Hospital Course: Patient is a 65year old male who presented to [facility] due to abnormal labs. Patient was seen by NP on 10/12/2022 for follow up after cholecystectomy and fall. Patient was found to have platelets of 95. His oxygen was 91 % and he was sent to er. Patient has had low platelets since August of 2021. He states that he is feeling ok just weak. He has not seen any blood in his stools but states that he has had dark tarry stools. He states that his last bowel movement was small and yesterday. He denies any other complaints. Patient was seen by hematology and had further blood work done.  He was seen by general surgery and no indication for repeat endoscopy. He was seen by nephrology due to ESRD on hemodialysis. Blood count and platelets stable. Follow up with PCP, General surgery and Hematology as directed. Discharge Exam:  General Appearance:  Comfortable, well-appearing and in no acute distress. Vital signs: (most recent): Blood pressure (!) 195/65, pulse 80, temperature 98.8 °F (37.1 °C), temperature source Oral, resp. rate 16, height 5' 8\" (1.727 m), weight 135 lb (61.2 kg), SpO2 94 %. Lungs:  Normal effort and normal respiratory rate. Breath sounds clear to auscultation. He is not in respiratory distress. No rales, decreased breath sounds, wheezes or rhonchi. Heart: Normal rate. Regular rhythm. S1 normal and S2 normal.  No gallop. Abdomen: Abdomen is soft and non-distended. Bowel sounds are normal.   There is incisional tenderness. Extremities: Normal range of motion. There is no dependent edema. Skin:  Warm and dry. No intake/output data recorded. No intake/output data recorded. LABS:  Recent Labs     10/14/22  1913 10/16/22  0238    141   K 3.4* 3.4*   CL 99 103   CO2 32* 28   BUN 9 22   CREATININE 2.8* 5.0*   GLUCOSE 109* 78   CALCIUM 9.5 9.0       Recent Labs     10/14/22  1913 10/15/22  1039 10/15/22  1237 10/16/22  0238   WBC 4.3* 4.2*  --  4.5   RBC 3.22* 2.86*  --  2.66*   HGB 10.5* 9.2*  --  8.8*   HCT 33.5* 29.9* 28.0* 27.8*   .0* 104.5*  --  104.5*   MCH 32.6 32.2  --  33.1   MCHC 31.3* 30.8*  --  31.7*   RDW 17.3* 17.4*  --  17.4*   PLT 92* 86*  --  89*   MPV 11.2 11.4  --  11.4       No results for input(s): GLUMET in the last 72 hours.       Imaging:  XR CHEST PORTABLE    Result Date: 10/14/2022  EXAMINATION: ONE XRAY VIEW OF THE CHEST 10/14/2022 8:38 pm COMPARISON: 7 August 2021 HISTORY: ORDERING SYSTEM PROVIDED HISTORY: SOB TECHNOLOGIST PROVIDED HISTORY: Reason for exam:->SOB FINDINGS: Single erect AP upright portable chest demonstrates tortuosity of the aorta with mild cardiomegaly. There are no focal alveolar infiltrates or effusions. There is no evidence of a pneumothorax. Stable chest with no acute cardiopulmonary process.        Patient Instructions:   Current Discharge Medication List        CONTINUE these medications which have CHANGED    Details   hydrALAZINE (APRESOLINE) 50 MG tablet Take 1 tablet by mouth 4 times daily  Qty: 90 tablet, Refills: 3           CONTINUE these medications which have NOT CHANGED    Details   losartan (COZAAR) 25 MG tablet Take 1 tablet by mouth daily  Qty: 30 tablet, Refills: 3      carvedilol (COREG) 12.5 MG tablet Take 1 tablet by mouth 2 times daily (with meals)  Qty: 60 tablet, Refills: 3      amLODIPine (NORVASC) 10 MG tablet Take 1 tablet by mouth daily  Qty: 30 tablet, Refills: 3      Nutritional Supplements (ENSURE HIGH PROTEIN PO) Take 8 oz by mouth      acetaminophen (TYLENOL) 325 MG tablet Take 650 mg by mouth every 4 hours as needed for Pain or Fever      pantoprazole (PROTONIX) 40 MG tablet Take 1 tablet by mouth 2 times daily (before meals)  Qty: 30 tablet, Refills: 3      epoetin delmer-epbx (RETACRIT) 3000 UNIT/ML SOLN injection Inject 1 mL into the skin three times a week With dialysis  Qty: 21.9 mL      Cholecalciferol (VITAMIN D) 50 MCG (2000 UT) CAPS capsule Take by mouth      rosuvastatin (CRESTOR) 10 MG tablet Take 5 mg by mouth nightly      Coenzyme Q10 (Q-10 CO-ENZYME PO) Take 100 mg by mouth Daily           STOP taking these medications       aspirin (ASPIRIN 81) 81 MG chewable tablet Comments:   Reason for Stopping:         magnesium hydroxide (MILK OF MAGNESIA) 400 MG/5ML suspension Comments:   Reason for Stopping:         QUEtiapine (SEROQUEL) 25 MG tablet Comments:   Reason for Stopping:         doxazosin (CARDURA) 4 MG tablet Comments:   Reason for Stopping:         sucralfate (CARAFATE) 1 GM tablet Comments:   Reason for Stopping:                 Note that more than 30 minutes was spent in preparing discharge papers, discussing discharge with patient, medication review, etc.      Signed:    Bertha Avendano DO    Electronically signed by Bertha Avendano DO on 10/16/2022 at 10:04 AM

## 2022-10-17 LAB — PATHOLOGIST REVIEW: NORMAL

## 2023-02-07 ENCOUNTER — HOSPITAL ENCOUNTER (EMERGENCY)
Age: 78
Discharge: HOME | End: 2023-02-07
Attending: EMERGENCY MEDICINE
Payer: MEDICARE

## 2023-02-07 ENCOUNTER — APPOINTMENT (OUTPATIENT)
Dept: GENERAL RADIOLOGY | Age: 78
End: 2023-02-07
Payer: MEDICARE

## 2023-02-07 ENCOUNTER — APPOINTMENT (OUTPATIENT)
Dept: CT IMAGING | Age: 78
End: 2023-02-07
Payer: MEDICARE

## 2023-02-07 VITALS
SYSTOLIC BLOOD PRESSURE: 153 MMHG | DIASTOLIC BLOOD PRESSURE: 47 MMHG | BODY MASS INDEX: 20.46 KG/M2 | WEIGHT: 135 LBS | HEART RATE: 66 BPM | OXYGEN SATURATION: 95 % | RESPIRATION RATE: 18 BRPM | HEIGHT: 68 IN | TEMPERATURE: 99.1 F

## 2023-02-07 DIAGNOSIS — R53.83 FATIGUE, UNSPECIFIED TYPE: ICD-10-CM

## 2023-02-07 DIAGNOSIS — N18.6 ESRD ON HEMODIALYSIS (HCC): ICD-10-CM

## 2023-02-07 DIAGNOSIS — U07.1 COVID-19: Primary | ICD-10-CM

## 2023-02-07 DIAGNOSIS — Z99.2 ESRD ON HEMODIALYSIS (HCC): ICD-10-CM

## 2023-02-07 LAB
ALBUMIN SERPL-MCNC: 3.8 G/DL (ref 3.5–5.2)
ALP BLD-CCNC: 79 U/L (ref 40–129)
ALT SERPL-CCNC: 6 U/L (ref 0–40)
ANION GAP SERPL CALCULATED.3IONS-SCNC: 10 MMOL/L (ref 7–16)
ANISOCYTOSIS: ABNORMAL
AST SERPL-CCNC: 8 U/L (ref 0–39)
BASOPHILS ABSOLUTE: 0.05 E9/L (ref 0–0.2)
BASOPHILS RELATIVE PERCENT: 0.9 % (ref 0–2)
BILIRUB SERPL-MCNC: 0.4 MG/DL (ref 0–1.2)
BUN BLDV-MCNC: 37 MG/DL (ref 6–23)
CALCIUM SERPL-MCNC: 9.3 MG/DL (ref 8.6–10.2)
CHLORIDE BLD-SCNC: 96 MMOL/L (ref 98–107)
CO2: 30 MMOL/L (ref 22–29)
CREAT SERPL-MCNC: 4.6 MG/DL (ref 0.7–1.2)
EOSINOPHILS ABSOLUTE: 0.14 E9/L (ref 0.05–0.5)
EOSINOPHILS RELATIVE PERCENT: 2.6 % (ref 0–6)
GFR SERPL CREATININE-BSD FRML MDRD: 12 ML/MIN/1.73
GLUCOSE BLD-MCNC: 96 MG/DL (ref 74–99)
HCT VFR BLD CALC: 43 % (ref 37–54)
HEMOGLOBIN: 13.7 G/DL (ref 12.5–16.5)
INFLUENZA A BY PCR: NOT DETECTED
INFLUENZA B BY PCR: NOT DETECTED
LYMPHOCYTES ABSOLUTE: 0.42 E9/L (ref 1.5–4)
LYMPHOCYTES RELATIVE PERCENT: 7.8 % (ref 20–42)
MAGNESIUM: 1.9 MG/DL (ref 1.6–2.6)
MCH RBC QN AUTO: 34.3 PG (ref 26–35)
MCHC RBC AUTO-ENTMCNC: 31.9 % (ref 32–34.5)
MCV RBC AUTO: 107.8 FL (ref 80–99.9)
MONOCYTES ABSOLUTE: 0.16 E9/L (ref 0.1–0.95)
MONOCYTES RELATIVE PERCENT: 3.4 % (ref 2–12)
NEUTROPHILS ABSOLUTE: 4.51 E9/L (ref 1.8–7.3)
NEUTROPHILS RELATIVE PERCENT: 85.3 % (ref 43–80)
NUCLEATED RED BLOOD CELLS: 0.9 /100 WBC
PDW BLD-RTO: 16.9 FL (ref 11.5–15)
PLATELET # BLD: 93 E9/L (ref 130–450)
PLATELET CONFIRMATION: NORMAL
PMV BLD AUTO: 10.9 FL (ref 7–12)
POIKILOCYTES: ABNORMAL
POTASSIUM SERPL-SCNC: 5.1 MMOL/L (ref 3.5–5)
RBC # BLD: 3.99 E12/L (ref 3.8–5.8)
SARS-COV-2, NAAT: DETECTED
SCHISTOCYTES: ABNORMAL
SODIUM BLD-SCNC: 136 MMOL/L (ref 132–146)
TEAR DROP CELLS: ABNORMAL
TOTAL PROTEIN: 6.2 G/DL (ref 6.4–8.3)
TSH SERPL DL<=0.05 MIU/L-ACNC: 5.47 UIU/ML (ref 0.27–4.2)
WBC # BLD: 5.3 E9/L (ref 4.5–11.5)

## 2023-02-07 PROCEDURE — 70450 CT HEAD/BRAIN W/O DYE: CPT

## 2023-02-07 PROCEDURE — 84443 ASSAY THYROID STIM HORMONE: CPT

## 2023-02-07 PROCEDURE — 83735 ASSAY OF MAGNESIUM: CPT

## 2023-02-07 PROCEDURE — 80053 COMPREHEN METABOLIC PANEL: CPT

## 2023-02-07 PROCEDURE — 87502 INFLUENZA DNA AMP PROBE: CPT

## 2023-02-07 PROCEDURE — 99285 EMERGENCY DEPT VISIT HI MDM: CPT

## 2023-02-07 PROCEDURE — 85025 COMPLETE CBC W/AUTO DIFF WBC: CPT

## 2023-02-07 PROCEDURE — 71045 X-RAY EXAM CHEST 1 VIEW: CPT | Performed by: RADIOLOGY

## 2023-02-07 PROCEDURE — 96374 THER/PROPH/DIAG INJ IV PUSH: CPT

## 2023-02-07 PROCEDURE — 6360000002 HC RX W HCPCS

## 2023-02-07 PROCEDURE — 6370000000 HC RX 637 (ALT 250 FOR IP)

## 2023-02-07 PROCEDURE — 87635 SARS-COV-2 COVID-19 AMP PRB: CPT

## 2023-02-07 PROCEDURE — 36415 COLL VENOUS BLD VENIPUNCTURE: CPT

## 2023-02-07 PROCEDURE — 93005 ELECTROCARDIOGRAM TRACING: CPT

## 2023-02-07 PROCEDURE — 71045 X-RAY EXAM CHEST 1 VIEW: CPT

## 2023-02-07 RX ORDER — DEXAMETHASONE 6 MG/1
6 TABLET ORAL 2 TIMES DAILY WITH MEALS
Qty: 10 TABLET | Refills: 0 | Status: SHIPPED | OUTPATIENT
Start: 2023-02-07 | End: 2023-02-12

## 2023-02-07 RX ORDER — ACETAMINOPHEN 325 MG/1
650 TABLET ORAL ONCE
Status: COMPLETED | OUTPATIENT
Start: 2023-02-07 | End: 2023-02-07

## 2023-02-07 RX ORDER — ZINC SULFATE 50(220)MG
50 CAPSULE ORAL DAILY
Qty: 7 CAPSULE | Refills: 0 | Status: SHIPPED | OUTPATIENT
Start: 2023-02-07 | End: 2023-02-14

## 2023-02-07 RX ORDER — DEXAMETHASONE SODIUM PHOSPHATE 4 MG/ML
10 INJECTION, SOLUTION INTRA-ARTICULAR; INTRALESIONAL; INTRAMUSCULAR; INTRAVENOUS; SOFT TISSUE ONCE
Status: COMPLETED | OUTPATIENT
Start: 2023-02-07 | End: 2023-02-07

## 2023-02-07 RX ADMIN — ACETAMINOPHEN 650 MG: 325 TABLET ORAL at 20:38

## 2023-02-07 RX ADMIN — DEXAMETHASONE SODIUM PHOSPHATE 10 MG: 4 INJECTION, SOLUTION INTRAMUSCULAR; INTRAVENOUS at 23:12

## 2023-02-07 ASSESSMENT — PAIN SCALES - GENERAL: PAINLEVEL_OUTOF10: 5

## 2023-02-07 ASSESSMENT — PAIN DESCRIPTION - LOCATION: LOCATION: ABDOMEN

## 2023-02-07 ASSESSMENT — PAIN - FUNCTIONAL ASSESSMENT: PAIN_FUNCTIONAL_ASSESSMENT: 0-10

## 2023-02-08 LAB
EKG ATRIAL RATE: 60 BPM
EKG P AXIS: 74 DEGREES
EKG P-R INTERVAL: 142 MS
EKG Q-T INTERVAL: 398 MS
EKG QRS DURATION: 78 MS
EKG QTC CALCULATION (BAZETT): 398 MS
EKG R AXIS: 68 DEGREES
EKG T AXIS: 72 DEGREES
EKG VENTRICULAR RATE: 60 BPM

## 2023-02-08 PROCEDURE — 93010 ELECTROCARDIOGRAM REPORT: CPT | Performed by: INTERNAL MEDICINE

## 2023-02-08 NOTE — ED TRIAGE NOTES
Self tested for covid, was positive. Extreme weakness  Dialysis patient- had dialysis today  Lives with wife at home, ambulates on his own no assistance.

## 2023-02-08 NOTE — ED PROVIDER NOTES
[facility] ENCOUNTER        Pt Name: Elizabeth Patel  MRN: [id number]  [place] 1945  Date of evaluation: 2/7/2023  Provider: Gerry Freeman DO  PCP: Cecelia Gonsalez DO  Note Started: 8:49 PM EST 2/7/23    CHIEF COMPLAINT       Chief Complaint   Patient presents with    Positive For Covid-19     Self tested for covid, was positive. Extreme weakness  Dialysis patient       HISTORY OF PRESENT ILLNESS: 1 or more Elements   History From: Patient        Jaziel Alonzo III is a 65 y.o. male who presents with 3 days of weakness. Patient states that he took a cell test at home approximately 3 days ago and which was positive for COVID-19. Patient is also a dialysis patient with a fistula in the left arm who had his last dialysis treatment today. Patient is not having any pain anywhere, nausea, vomiting, diarrhea. Patient states he does feel chills. Patient also has a cough. Patient has a history of smoking approximately 20 years ago. Patient still is producing urine but denies dysuria. Patient denies any swelling. Patient is alert and oriented x2 to self and location. Patient thinks it is 2021. Nursing Notes were all reviewed and agreed with or any disagreements were addressed in the HPI. REVIEW OF SYSTEMS :      Positives and Pertinent negatives as per HPI.      SURGICAL HISTORY     Past Surgical History:   Procedure Laterality Date    AV FISTULA CREATION Left 2015    Dr. Chery Moore Left 2019    2 x Dr. Tom Beck, [facility]    HIP SURGERY Left 10/16/2020    HIP HEMIARTHROPLASTY performed by Josephine Camacho MD at [facility].  2008    Aortobifemoral bypass for claudicatio - Dr. Sveta Stark N/A 7/20/2020    EGD ESOPHAGOGASTRODUODENOSCOPY WITH ANTRAL BIOPSY performed by Bernabe Buckley MD at [address] UPPER GASTROINTESTINAL ENDOSCOPY N/A 10/28/2020    EGD ESOPHAGOGASTRODUODENOSCOPY performed by Claudia Metzger MD at [address]       Previous Medications    ACETAMINOPHEN (TYLENOL) 325 MG TABLET    Take 650 mg by mouth every 4 hours as needed for Pain or Fever    AMLODIPINE (NORVASC) 10 MG TABLET    Take 1 tablet by mouth daily    CARVEDILOL (COREG) 12.5 MG TABLET    Take 1 tablet by mouth 2 times daily (with meals)    CHOLECALCIFEROL (VITAMIN D) 50 MCG (2000) CAPS CAPSULE    Take by mouth    COENZYME Q10 (Q-10 CO-ENZYME PO)    Take 100 mg by mouth Daily    EPOETIN MARTA-EPBX (RETACRIT) 3000 UNIT/ML SOLN INJECTION    Inject 1 mL into the skin three times a week With dialysis    HYDRALAZINE (APRESOLINE) 50 MG TABLET    Take 1 tablet by mouth 4 times daily    LOSARTAN (COZAAR) 25 MG TABLET    Take 1 tablet by mouth daily    NUTRITIONAL SUPPLEMENTS (ENSURE HIGH PROTEIN PO)    Take 8 oz by mouth    PANTOPRAZOLE (PROTONIX) 40 MG TABLET    Take 1 tablet by mouth 2 times daily (before meals)    ROSUVASTATIN (CRESTOR) 10 MG TABLET    Take 5 mg by mouth nightly       ALLERGIES     Patient has no known allergies. FAMILYHISTORY     History reviewed. No pertinent family history.      SOCIAL HISTORY       Social History     Tobacco Use    Smoking status: Former     Packs/day: 1.00     Years: 30.00     Pack years: 30.00     Types: Cigarettes     Quit date: 10/3/2000     Years since quittin.3    Smokeless tobacco: Never   Vaping Use    Vaping Use: Never used   Substance Use Topics    Alcohol use: Not Currently     Comment: occasional    Drug use: Never       SCREENINGS        Sonia Coma Scale  Eye Opening: Spontaneous  Best Verbal Response: Oriented  Best Motor Response: Obeys commands  Dover Coma Scale Score: 15                CIWA Assessment  BP: (!) 139/51  Heart Rate: 63           PHYSICAL EXAM  1 or more Elements     ED Triage Vitals   BP Temp Temp Source Heart Rate Resp SpO2 Height Weight   02/07/23 2029 02/07/23 2029 02/07/23 2029 02/07/23 2029 02/07/23 2029 02/07/23 2029 -- 02/07/23 2030   (!) 139/51 100 °F (37.8 °C) Oral 64 16 95 %  135 lb (61.2 kg)         Constitutional/General: Alert and oriented x3  Head: Normocephalic and atraumatic  Eyes: PERRL, EOMI, conjunctiva normal, sclera non icteric  ENT:  Oropharynx clear, handling secretions  Neck: Supple, full ROM, no stridor  Respiratory: Lungs clear to auscultation bilaterally, no wheezes, rales, or rhonchi. Not in respiratory distress  Cardiovascular:  Regular rate. Regular rhythm. 2+ distal pulses. Equal extremity pulses. Chest: No chest wall tenderness  GI:  Abdomen Soft, Non tender, Non distended. No rebound, guarding, or rigidity. Musculoskeletal: Moves all extremities x 4. Warm and well perfused, no cyanosis, no edema. Capillary refill <3 seconds  Integument: skin warm and dry. No rashes. Neurologic: GCS 15, no focal deficits, symmetric strength 5/5 in the upper and lower extremities bilaterally  Psychiatric: Normal Affect      DIAGNOSTIC RESULTS   LABS:    Labs Reviewed   COVID-19, RAPID   RAPID INFLUENZA A/B ANTIGENS   CBC WITH AUTO DIFFERENTIAL   COMPREHENSIVE METABOLIC PANEL   MAGNESIUM   TSH       As interpreted by me, the above displayed labs are abnormal. All other labs obtained during this visit were within normal range or not returned as of this dictation. EKG Interpretation  Interpreted by emergency department resident physician, Terence De La Rosa, DO  Rate: 60 bpm  Rhythm: Sinus rhythm  Interpretation: Normal sinus rhythm  Comparison: Stable compared rhythm strip from 10/15/2010      RADIOLOGY:   Non-plain film images such as CT, Ultrasound and MRI are read by the radiologist. Plain radiographic images are visualized and preliminarily interpreted by the ED Provider with the below findings:    No acute pathology appreciated on preliminary examination.   No wide mediastinum, no pneumothorax, no lobar consolidations    Interpretation per the Radiologist below, if available at the time of this note:    CT HEAD WO CONTRAST   Final Result   1. No acute intracranial hemorrhage or edema. 2. Stable chronic sites of lacunar stroke associated with head of the caudate   nucleus on the left and right basal ganglia. XR CHEST PORTABLE   Final Result   No acute process. Bilateral lung hyperinflation. Stable exam.           No results found. No results found. PROCEDURES   Unless otherwise noted below, none    PAST MEDICAL HISTORY/Chronic Conditions Affecting Care      has a past medical history of Blind left eye, Chronic kidney disease, Dialysis patient [facility]), Hemodialysis patient ([facility] [address] Hyperlipidemia, and Hypertension. EMERGENCY DEPARTMENT COURSE    Vitals:    Vitals:    02/07/23 2029 02/07/23 2030 02/07/23 2035   BP: (!) 139/51     Pulse: 64  63   Resp: 16     Temp: 100 °F (37.8 °C)     TempSrc: Oral     SpO2: 95%     Weight:  135 lb (61.2 kg)        Patient was given the following medications:  Medications   acetaminophen (TYLENOL) tablet 650 mg (650 mg Oral Given 2/7/23 2038)       Medical Decision Making/Differential Diagnosis:  CC/HPI Summary, Social Determinants of health, Records Reviewed, DDx, testing done/not done, ED Course, Reassessment, disposition considerations/shared decision making with patient, consults, disposition:        CC/HPI Summary, DDx, ED Course, Reassessment, Tests Considered, Patient expectation:   Patient presented via EMS for generalized fatigue. Patient last received dialysis 2-day. Upon arrival patient had a temperature of 100 °F and was given Tylenol. Patient was recently diagnosed with COVID on 2/6/2023. Patient was alert and oriented to person, place, and purpose. Patient had remarkable improvement after administration of Tylenol. Patient stated that he would like to go home.   CMP was ordered to determine if electrolyte abnormalities were the cause of his fatigue. Electrolytes are within normal limits for patient receiving hemodialysis. Patient is set to receive hemodialysis on 2/9/2023. Patient's EKG showed no bradycardia or concerns for hyperkalemia. Influenza was also considered as a diagnosis but this was ruled out due to a negative influenza test.  Patient's magnesium was found to be 1.9 which is within normal limits. Patient was not found to be anemic with a hemoglobin of 13.7. Patient is most likely suffering from fatigue from COVID-19. Patient was ambulated with pulse ox and did not desaturate below 92. Patient was ambulating with his walker which is his baseline. Intracranial causes of his fatigue were also considered but subdural and epidural hematoma were negative. No acute masses were detected as well. Patient was discharged with pulse oximetry and steroids. ED Course as of 02/08/23 0210 Tue Feb 07, 2023 2111 EKG @ 2057: This EKG is signed and interpreted by Dr Maggy Mckenzie. Rate: 60  Rhythm: Sinus  Interpretation: Sinus rhythm, significant motion artifact, appears to be normal axis, W[place] is 142, QRS is 78, QTc is 398, no evidence of ST elevation, prominent T waves, nonspecific, compared to prior EKG from 10/14/2022 it appears generally stable except for motion artifact  Comparison: stable as compared to patient's most recent EKG   [ME]   2156 Spoke to patient's wife he states that he has been more fatigued over the last 2 days and that he tested positive for COVID on Monday. Both son and wife have [place] currently.  []   2316 Comprehensive Metabolic Panel(!):    Sodium 136   Potassium 5.1(!)   Chloride 96(!)   CO2 30(!)   Anion Gap 10   Glucose, Random 96   BUN,BUNPL 37(!)   Creatinine 4.6(!)   Est, Glom Filt Rate 12   CALCIUM, SERUM, 949320 9.3   Total Protein 6.2(!)   Albumin 3.8   Bilirubin 0.4   Alk Phos 79   ALT 6   AST 8  At baseline, had dialysis today  []   2316 CBC with Auto Differential(!):    WBC 5.3   RBC 3.99   Hemoglobin Quant 13.7   Hematocrit 43.0   .8(!)   MCH 34.3   MCHC 31.9(!)   RDW 16.9(!)   Platelet Count 93(!)   MPV 10.9   Neutrophils % 85.3(!)   Lymphocyte % 7.8(!)   Monocytes % 3.4   Eosinophils % 2.6   Basophils % 0.9   Neutrophils Absolute 4.51   Lymphocytes Absolute 0.42(!)   Monocytes Absolute 0.16   Eosinophils Absolute 0.14   Basophils Absolute 0.05   Nucleated Red Blood Cells 0.9   Anisocytosis 1+   Poikilocytosis 1+   Schistocytes 1+   Tear Drop Cells 1+  No leukocytosis, no evidence of acute anemia requiring transfusion [JH]      ED Course User Index  [] Drea Pollard DO  [ME] Rosenda Powell DO        Social Determinants affecting Dx or Tx:     Chronic Conditions:    has a past medical history of Blind left eye, Chronic kidney disease, Dialysis patient [facility]), Hemodialysis patient ([facility] [address] Hyperlipidemia, and Hypertension. Records Reviewed: Dialysis note from 2/7/2023    CONSULTS: (Who and What was discussed)  None      FINAL IMPRESSION      1. COVID-19    2. Fatigue, unspecified type    3. ESRD on hemodialysis ([facility] [address]          DISPOSITION/PLAN     DISPOSITION    Discharge to home      PATIENT REFERRED TO:  No follow-up provider specified.     DISCHARGE MEDICATIONS:  New Prescriptions    No medications on file            (Please note that portions of this note were completed with a voice recognition program.  Efforts were made to edit the dictations but occasionally words are mis-transcribed.)    Drea Pollard DO (electronically signed)           Drea Pollard DO  Resident  02/08/23 5215

## 2023-06-23 ENCOUNTER — HOSPITAL ENCOUNTER (INPATIENT)
Age: 78
LOS: 2 days | Discharge: HOME | DRG: 291 | End: 2023-06-25
Attending: STUDENT IN AN ORGANIZED HEALTH CARE EDUCATION/TRAINING PROGRAM | Admitting: INTERNAL MEDICINE
Payer: MEDICARE

## 2023-06-23 ENCOUNTER — APPOINTMENT (OUTPATIENT)
Dept: GENERAL RADIOLOGY | Age: 78
DRG: 291 | End: 2023-06-23
Payer: MEDICARE

## 2023-06-23 DIAGNOSIS — I50.9 ACUTE ON CHRONIC CONGESTIVE HEART FAILURE, UNSPECIFIED HEART FAILURE TYPE (HCC): Primary | ICD-10-CM

## 2023-06-23 DIAGNOSIS — Z99.2 END-STAGE RENAL DISEASE NEEDING DIALYSIS (HCC): ICD-10-CM

## 2023-06-23 DIAGNOSIS — N18.6 END-STAGE RENAL DISEASE NEEDING DIALYSIS (HCC): ICD-10-CM

## 2023-06-23 LAB
ALBUMIN SERPL-MCNC: 4 G/DL (ref 3.5–5.2)
ALP SERPL-CCNC: 80 U/L (ref 40–129)
ALT SERPL-CCNC: 10 U/L (ref 0–40)
ANION GAP SERPL CALCULATED.3IONS-SCNC: 11 MMOL/L (ref 7–16)
AST SERPL-CCNC: 16 U/L (ref 0–39)
BASOPHILS # BLD: 0.07 E9/L (ref 0–0.2)
BASOPHILS NFR BLD: 1.5 % (ref 0–2)
BILIRUB SERPL-MCNC: 0.4 MG/DL (ref 0–1.2)
BNP BLD-MCNC: ABNORMAL PG/ML (ref 0–450)
BUN SERPL-MCNC: 50 MG/DL (ref 6–23)
CALCIUM SERPL-MCNC: 10.1 MG/DL (ref 8.6–10.2)
CHLORIDE SERPL-SCNC: 98 MMOL/L (ref 98–107)
CO2 SERPL-SCNC: 28 MMOL/L (ref 22–29)
CREAT SERPL-MCNC: 5.5 MG/DL (ref 0.7–1.2)
EOSINOPHIL # BLD: 0.22 E9/L (ref 0.05–0.5)
EOSINOPHIL NFR BLD: 4.9 % (ref 0–6)
ERYTHROCYTE [DISTWIDTH] IN BLOOD BY AUTOMATED COUNT: 14.6 FL (ref 11.5–15)
GLUCOSE SERPL-MCNC: 144 MG/DL (ref 74–99)
HCT VFR BLD AUTO: 34.6 % (ref 37–54)
HGB BLD-MCNC: 10.9 G/DL (ref 12.5–16.5)
IMM GRANULOCYTES # BLD: 0.01 E9/L
IMM GRANULOCYTES NFR BLD: 0.2 % (ref 0–5)
LYMPHOCYTES # BLD: 0.79 E9/L (ref 1.5–4)
LYMPHOCYTES NFR BLD: 17.4 % (ref 20–42)
MCH RBC QN AUTO: 33.3 PG (ref 26–35)
MCHC RBC AUTO-ENTMCNC: 31.5 % (ref 32–34.5)
MCV RBC AUTO: 105.8 FL (ref 80–99.9)
MONOCYTES # BLD: 0.37 E9/L (ref 0.1–0.95)
MONOCYTES NFR BLD: 8.2 % (ref 2–12)
NEUTROPHILS # BLD: 3.07 E9/L (ref 1.8–7.3)
NEUTS SEG NFR BLD: 67.8 % (ref 43–80)
OVALOCYTES: ABNORMAL
PLATELET # BLD AUTO: 89 E9/L (ref 130–450)
PLATELET CONFIRMATION: NORMAL
PMV BLD AUTO: 11.3 FL (ref 7–12)
POIKILOCYTES: ABNORMAL
POTASSIUM SERPL-SCNC: 4.3 MMOL/L (ref 3.5–5)
PROT SERPL-MCNC: 6.3 G/DL (ref 6.4–8.3)
RBC # BLD AUTO: 3.27 E12/L (ref 3.8–5.8)
SARS-COV-2 RDRP RESP QL NAA+PROBE: NOT DETECTED
SODIUM SERPL-SCNC: 137 MMOL/L (ref 132–146)
TROPONIN, HIGH SENSITIVITY: 183 NG/L (ref 0–11)
TROPONIN, HIGH SENSITIVITY: 196 NG/L (ref 0–11)
WBC # BLD: 4.5 E9/L (ref 4.5–11.5)

## 2023-06-23 PROCEDURE — 87635 SARS-COV-2 COVID-19 AMP PRB: CPT

## 2023-06-23 PROCEDURE — 99285 EMERGENCY DEPT VISIT HI MDM: CPT

## 2023-06-23 PROCEDURE — 71045 X-RAY EXAM CHEST 1 VIEW: CPT

## 2023-06-23 PROCEDURE — 93005 ELECTROCARDIOGRAM TRACING: CPT | Performed by: STUDENT IN AN ORGANIZED HEALTH CARE EDUCATION/TRAINING PROGRAM

## 2023-06-23 PROCEDURE — 5A1D70Z PERFORMANCE OF URINARY FILTRATION, INTERMITTENT, LESS THAN 6 HOURS PER DAY: ICD-10-PCS | Performed by: INTERNAL MEDICINE

## 2023-06-23 PROCEDURE — 1200000000 HC SEMI PRIVATE

## 2023-06-23 PROCEDURE — 6360000002 HC RX W HCPCS: Performed by: INTERNAL MEDICINE

## 2023-06-23 PROCEDURE — 85025 COMPLETE CBC W/AUTO DIFF WBC: CPT

## 2023-06-23 PROCEDURE — 83880 ASSAY OF NATRIURETIC PEPTIDE: CPT

## 2023-06-23 PROCEDURE — 84484 ASSAY OF TROPONIN QUANT: CPT

## 2023-06-23 PROCEDURE — 6370000000 HC RX 637 (ALT 250 FOR IP): Performed by: INTERNAL MEDICINE

## 2023-06-23 PROCEDURE — 80053 COMPREHEN METABOLIC PANEL: CPT

## 2023-06-23 PROCEDURE — 90935 HEMODIALYSIS ONE EVALUATION: CPT

## 2023-06-23 RX ORDER — OMEPRAZOLE 20 MG/1
CAPSULE, DELAYED RELEASE ORAL
COMMUNITY
Start: 2022-03-08

## 2023-06-23 RX ORDER — ALBUTEROL SULFATE 2.5 MG/3ML
2.5 SOLUTION RESPIRATORY (INHALATION) 4 TIMES DAILY
Status: DISCONTINUED | OUTPATIENT
Start: 2023-06-23 | End: 2023-06-25 | Stop reason: HOSPADM

## 2023-06-23 RX ORDER — HEPARIN SODIUM 10000 [USP'U]/ML
5000 INJECTION, SOLUTION INTRAVENOUS; SUBCUTANEOUS EVERY 8 HOURS SCHEDULED
Status: DISCONTINUED | OUTPATIENT
Start: 2023-06-23 | End: 2023-06-25 | Stop reason: HOSPADM

## 2023-06-23 RX ORDER — ACETAMINOPHEN 325 MG/1
650 TABLET ORAL EVERY 4 HOURS PRN
Status: DISCONTINUED | OUTPATIENT
Start: 2023-06-23 | End: 2023-06-25 | Stop reason: HOSPADM

## 2023-06-23 RX ORDER — HYDRALAZINE HYDROCHLORIDE 50 MG/1
50 TABLET, FILM COATED ORAL 4 TIMES DAILY
Status: DISCONTINUED | OUTPATIENT
Start: 2023-06-23 | End: 2023-06-24

## 2023-06-23 RX ORDER — ROSUVASTATIN CALCIUM 5 MG/1
5 TABLET, COATED ORAL NIGHTLY
Status: DISCONTINUED | OUTPATIENT
Start: 2023-06-23 | End: 2023-06-25 | Stop reason: HOSPADM

## 2023-06-23 RX ORDER — CARVEDILOL 6.25 MG/1
12.5 TABLET ORAL 2 TIMES DAILY WITH MEALS
Status: DISCONTINUED | OUTPATIENT
Start: 2023-06-24 | End: 2023-06-25 | Stop reason: HOSPADM

## 2023-06-23 RX ORDER — LOSARTAN POTASSIUM 25 MG/1
25 TABLET ORAL DAILY
Status: DISCONTINUED | OUTPATIENT
Start: 2023-06-24 | End: 2023-06-24 | Stop reason: CLARIF

## 2023-06-23 RX ORDER — AMLODIPINE BESYLATE 10 MG/1
10 TABLET ORAL DAILY
Status: DISCONTINUED | OUTPATIENT
Start: 2023-06-24 | End: 2023-06-24

## 2023-06-23 RX ORDER — PANTOPRAZOLE SODIUM 40 MG/1
40 TABLET, DELAYED RELEASE ORAL
Status: DISCONTINUED | OUTPATIENT
Start: 2023-06-24 | End: 2023-06-25 | Stop reason: HOSPADM

## 2023-06-23 RX ORDER — CEPHALEXIN 500 MG/1
500 CAPSULE ORAL 2 TIMES DAILY
Status: ON HOLD | COMMUNITY
Start: 2023-06-19 | End: 2023-06-25 | Stop reason: HOSPADM

## 2023-06-23 RX ADMIN — HYDRALAZINE HYDROCHLORIDE 50 MG: 50 TABLET, FILM COATED ORAL at 22:54

## 2023-06-23 RX ADMIN — HEPARIN SODIUM 5000 UNITS: 10000 INJECTION INTRAVENOUS; SUBCUTANEOUS at 22:54

## 2023-06-23 RX ADMIN — ROSUVASTATIN CALCIUM 5 MG: 5 TABLET, FILM COATED ORAL at 22:54

## 2023-06-23 ASSESSMENT — LIFESTYLE VARIABLES
HOW MANY STANDARD DRINKS CONTAINING ALCOHOL DO YOU HAVE ON A TYPICAL DAY: PATIENT DOES NOT DRINK
HOW OFTEN DO YOU HAVE A DRINK CONTAINING ALCOHOL: NEVER

## 2023-06-24 PROBLEM — E43 SEVERE PROTEIN-CALORIE MALNUTRITION (HCC): Status: ACTIVE | Noted: 2023-06-24

## 2023-06-24 LAB
ALBUMIN SERPL-MCNC: 3.6 G/DL (ref 3.5–5.2)
ALP SERPL-CCNC: 85 U/L (ref 40–129)
ALT SERPL-CCNC: 10 U/L (ref 0–40)
ANION GAP SERPL CALCULATED.3IONS-SCNC: 9 MMOL/L (ref 7–16)
AST SERPL-CCNC: 17 U/L (ref 0–39)
BASOPHILS # BLD: 0.08 E9/L (ref 0–0.2)
BASOPHILS NFR BLD: 1.5 % (ref 0–2)
BILIRUB SERPL-MCNC: 1.8 MG/DL (ref 0–1.2)
BUN SERPL-MCNC: 20 MG/DL (ref 6–23)
CALCIUM SERPL-MCNC: 8.7 MG/DL (ref 8.6–10.2)
CHLORIDE SERPL-SCNC: 95 MMOL/L (ref 98–107)
CO2 SERPL-SCNC: 30 MMOL/L (ref 22–29)
CREAT SERPL-MCNC: 3.1 MG/DL (ref 0.7–1.2)
EKG ATRIAL RATE: 63 BPM
EKG P AXIS: 77 DEGREES
EKG P-R INTERVAL: 138 MS
EKG Q-T INTERVAL: 432 MS
EKG QRS DURATION: 84 MS
EKG QTC CALCULATION (BAZETT): 442 MS
EKG R AXIS: 76 DEGREES
EKG T AXIS: 62 DEGREES
EKG VENTRICULAR RATE: 63 BPM
EOSINOPHIL # BLD: 0.31 E9/L (ref 0.05–0.5)
EOSINOPHIL NFR BLD: 5.9 % (ref 0–6)
ERYTHROCYTE [DISTWIDTH] IN BLOOD BY AUTOMATED COUNT: 14.5 FL (ref 11.5–15)
GLUCOSE SERPL-MCNC: 96 MG/DL (ref 74–99)
HCT VFR BLD AUTO: 31.8 % (ref 37–54)
HGB BLD-MCNC: 10.6 G/DL (ref 12.5–16.5)
IMM GRANULOCYTES # BLD: 0.02 E9/L
IMM GRANULOCYTES NFR BLD: 0.4 % (ref 0–5)
LYMPHOCYTES # BLD: 1.05 E9/L (ref 1.5–4)
LYMPHOCYTES NFR BLD: 19.8 % (ref 20–42)
MCH RBC QN AUTO: 34 PG (ref 26–35)
MCHC RBC AUTO-ENTMCNC: 33.3 % (ref 32–34.5)
MCV RBC AUTO: 101.9 FL (ref 80–99.9)
MONOCYTES # BLD: 0.6 E9/L (ref 0.1–0.95)
MONOCYTES NFR BLD: 11.3 % (ref 2–12)
NEUTROPHILS # BLD: 3.23 E9/L (ref 1.8–7.3)
NEUTS SEG NFR BLD: 61.1 % (ref 43–80)
PLATELET # BLD AUTO: 94 E9/L (ref 130–450)
PLATELET CONFIRMATION: NORMAL
PMV BLD AUTO: 11.8 FL (ref 7–12)
POTASSIUM SERPL-SCNC: 3.8 MMOL/L (ref 3.5–5)
PROT SERPL-MCNC: 6 G/DL (ref 6.4–8.3)
RBC # BLD AUTO: 3.12 E12/L (ref 3.8–5.8)
SODIUM SERPL-SCNC: 134 MMOL/L (ref 132–146)
WBC # BLD: 5.3 E9/L (ref 4.5–11.5)

## 2023-06-24 PROCEDURE — 2500000003 HC RX 250 WO HCPCS: Performed by: INTERNAL MEDICINE

## 2023-06-24 PROCEDURE — 6370000000 HC RX 637 (ALT 250 FOR IP): Performed by: INTERNAL MEDICINE

## 2023-06-24 PROCEDURE — 80053 COMPREHEN METABOLIC PANEL: CPT

## 2023-06-24 PROCEDURE — 85025 COMPLETE CBC W/AUTO DIFF WBC: CPT

## 2023-06-24 PROCEDURE — 1200000000 HC SEMI PRIVATE

## 2023-06-24 PROCEDURE — 93010 ELECTROCARDIOGRAM REPORT: CPT | Performed by: INTERNAL MEDICINE

## 2023-06-24 PROCEDURE — 6360000002 HC RX W HCPCS: Performed by: INTERNAL MEDICINE

## 2023-06-24 PROCEDURE — 36415 COLL VENOUS BLD VENIPUNCTURE: CPT

## 2023-06-24 PROCEDURE — 94640 AIRWAY INHALATION TREATMENT: CPT

## 2023-06-24 RX ORDER — CEPHALEXIN 500 MG/1
500 CAPSULE ORAL EVERY 12 HOURS SCHEDULED
Status: DISCONTINUED | OUTPATIENT
Start: 2023-06-24 | End: 2023-06-25 | Stop reason: HOSPADM

## 2023-06-24 RX ORDER — CALCIUM ACETATE 667 MG/1
667 TABLET ORAL
COMMUNITY

## 2023-06-24 RX ORDER — HYDRALAZINE HYDROCHLORIDE 50 MG/1
50 TABLET, FILM COATED ORAL 2 TIMES DAILY
Status: DISCONTINUED | OUTPATIENT
Start: 2023-06-24 | End: 2023-06-25 | Stop reason: HOSPADM

## 2023-06-24 RX ORDER — ASPIRIN 81 MG/1
81 TABLET, CHEWABLE ORAL DAILY
Status: DISCONTINUED | OUTPATIENT
Start: 2023-06-24 | End: 2023-06-25 | Stop reason: HOSPADM

## 2023-06-24 RX ORDER — AMLODIPINE BESYLATE 5 MG/1
5 TABLET ORAL DAILY
Status: DISCONTINUED | OUTPATIENT
Start: 2023-06-24 | End: 2023-06-25 | Stop reason: HOSPADM

## 2023-06-24 RX ORDER — CALCIUM ACETATE 667 MG/1
1 CAPSULE ORAL
Status: DISCONTINUED | OUTPATIENT
Start: 2023-06-24 | End: 2023-06-25 | Stop reason: HOSPADM

## 2023-06-24 RX ORDER — ASPIRIN 81 MG/1
81 TABLET ORAL DAILY
COMMUNITY

## 2023-06-24 RX ADMIN — CEPHALEXIN 500 MG: 500 CAPSULE ORAL at 09:36

## 2023-06-24 RX ADMIN — CARVEDILOL 12.5 MG: 6.25 TABLET, FILM COATED ORAL at 09:35

## 2023-06-24 RX ADMIN — ALBUTEROL SULFATE 2.5 MG: 2.5 SOLUTION RESPIRATORY (INHALATION) at 13:20

## 2023-06-24 RX ADMIN — AMLODIPINE BESYLATE 5 MG: 5 TABLET ORAL at 11:26

## 2023-06-24 RX ADMIN — CALCIUM ACETATE 667 MG: 667 CAPSULE ORAL at 16:30

## 2023-06-24 RX ADMIN — ALBUTEROL SULFATE 2.5 MG: 2.5 SOLUTION RESPIRATORY (INHALATION) at 08:14

## 2023-06-24 RX ADMIN — HYDRALAZINE HYDROCHLORIDE 50 MG: 50 TABLET, FILM COATED ORAL at 21:19

## 2023-06-24 RX ADMIN — CARVEDILOL 12.5 MG: 6.25 TABLET, FILM COATED ORAL at 16:30

## 2023-06-24 RX ADMIN — PANTOPRAZOLE SODIUM 40 MG: 40 TABLET, DELAYED RELEASE ORAL at 05:32

## 2023-06-24 RX ADMIN — ROSUVASTATIN CALCIUM 5 MG: 5 TABLET, FILM COATED ORAL at 21:19

## 2023-06-24 RX ADMIN — HEPARIN SODIUM 5000 UNITS: 10000 INJECTION INTRAVENOUS; SUBCUTANEOUS at 21:17

## 2023-06-24 RX ADMIN — CEPHALEXIN 500 MG: 500 CAPSULE ORAL at 21:19

## 2023-06-24 RX ADMIN — ALBUTEROL SULFATE 2.5 MG: 2.5 SOLUTION RESPIRATORY (INHALATION) at 17:02

## 2023-06-24 RX ADMIN — HEPARIN SODIUM 5000 UNITS: 10000 INJECTION INTRAVENOUS; SUBCUTANEOUS at 05:32

## 2023-06-24 RX ADMIN — ALBUTEROL SULFATE 2.5 MG: 2.5 SOLUTION RESPIRATORY (INHALATION) at 19:58

## 2023-06-24 RX ADMIN — PANTOPRAZOLE SODIUM 40 MG: 40 TABLET, DELAYED RELEASE ORAL at 16:30

## 2023-06-24 RX ADMIN — HYDRALAZINE HYDROCHLORIDE 50 MG: 50 TABLET, FILM COATED ORAL at 09:36

## 2023-06-24 RX ADMIN — HEPARIN SODIUM 5000 UNITS: 10000 INJECTION INTRAVENOUS; SUBCUTANEOUS at 14:18

## 2023-06-24 RX ADMIN — CALCIUM ACETATE 667 MG: 667 CAPSULE ORAL at 11:26

## 2023-06-24 RX ADMIN — ASPIRIN 81 MG CHEWABLE TABLET 81 MG: 81 TABLET CHEWABLE at 11:26

## 2023-06-24 ASSESSMENT — ENCOUNTER SYMPTOMS
NAUSEA: 0
ABDOMINAL PAIN: 0
CHEST TIGHTNESS: 1
VOMITING: 0
COLOR CHANGE: 0
SHORTNESS OF BREATH: 1
EYES NEGATIVE: 1

## 2023-06-25 VITALS
RESPIRATION RATE: 16 BRPM | SYSTOLIC BLOOD PRESSURE: 150 MMHG | HEART RATE: 64 BPM | HEIGHT: 68 IN | OXYGEN SATURATION: 95 % | WEIGHT: 141 LBS | TEMPERATURE: 97.8 F | DIASTOLIC BLOOD PRESSURE: 62 MMHG | BODY MASS INDEX: 21.37 KG/M2

## 2023-06-25 LAB
ALBUMIN SERPL-MCNC: 3.7 G/DL (ref 3.5–5.2)
ALP SERPL-CCNC: 86 U/L (ref 40–129)
ALT SERPL-CCNC: 11 U/L (ref 0–40)
ANION GAP SERPL CALCULATED.3IONS-SCNC: 9 MMOL/L (ref 7–16)
AST SERPL-CCNC: 18 U/L (ref 0–39)
BASOPHILS # BLD: 0.06 E9/L (ref 0–0.2)
BASOPHILS NFR BLD: 1.1 % (ref 0–2)
BILIRUB SERPL-MCNC: 0.5 MG/DL (ref 0–1.2)
BUN SERPL-MCNC: 39 MG/DL (ref 6–23)
CALCIUM SERPL-MCNC: 9.5 MG/DL (ref 8.6–10.2)
CHLORIDE SERPL-SCNC: 94 MMOL/L (ref 98–107)
CHOLESTEROL, TOTAL: 98 MG/DL (ref 0–199)
CO2 SERPL-SCNC: 29 MMOL/L (ref 22–29)
CREAT SERPL-MCNC: 5.1 MG/DL (ref 0.7–1.2)
EOSINOPHIL # BLD: 0.25 E9/L (ref 0.05–0.5)
EOSINOPHIL NFR BLD: 4.7 % (ref 0–6)
ERYTHROCYTE [DISTWIDTH] IN BLOOD BY AUTOMATED COUNT: 14.4 FL (ref 11.5–15)
GLUCOSE SERPL-MCNC: 89 MG/DL (ref 74–99)
HCT VFR BLD AUTO: 33.8 % (ref 37–54)
HDLC SERPL-MCNC: 44 MG/DL
HGB BLD-MCNC: 10.9 G/DL (ref 12.5–16.5)
IMM GRANULOCYTES # BLD: 0.02 E9/L
IMM GRANULOCYTES NFR BLD: 0.4 % (ref 0–5)
LDLC SERPL CALC-MCNC: 43 MG/DL (ref 0–99)
LYMPHOCYTES # BLD: 1.19 E9/L (ref 1.5–4)
LYMPHOCYTES NFR BLD: 22.5 % (ref 20–42)
MCH RBC QN AUTO: 32.9 PG (ref 26–35)
MCHC RBC AUTO-ENTMCNC: 32.2 % (ref 32–34.5)
MCV RBC AUTO: 102.1 FL (ref 80–99.9)
MONOCYTES # BLD: 0.54 E9/L (ref 0.1–0.95)
MONOCYTES NFR BLD: 10.2 % (ref 2–12)
NEUTROPHILS # BLD: 3.24 E9/L (ref 1.8–7.3)
NEUTS SEG NFR BLD: 61.1 % (ref 43–80)
PLATELET # BLD AUTO: 98 E9/L (ref 130–450)
PLATELET CONFIRMATION: NORMAL
PMV BLD AUTO: 11.2 FL (ref 7–12)
POTASSIUM SERPL-SCNC: 4.4 MMOL/L (ref 3.5–5)
PROT SERPL-MCNC: 6.3 G/DL (ref 6.4–8.3)
RBC # BLD AUTO: 3.31 E12/L (ref 3.8–5.8)
SODIUM SERPL-SCNC: 132 MMOL/L (ref 132–146)
TRIGL SERPL-MCNC: 55 MG/DL (ref 0–149)
VLDLC SERPL CALC-MCNC: 11 MG/DL
WBC # BLD: 5.3 E9/L (ref 4.5–11.5)

## 2023-06-25 PROCEDURE — 80061 LIPID PANEL: CPT

## 2023-06-25 PROCEDURE — 94640 AIRWAY INHALATION TREATMENT: CPT

## 2023-06-25 PROCEDURE — 36415 COLL VENOUS BLD VENIPUNCTURE: CPT

## 2023-06-25 PROCEDURE — 6360000002 HC RX W HCPCS: Performed by: INTERNAL MEDICINE

## 2023-06-25 PROCEDURE — 6370000000 HC RX 637 (ALT 250 FOR IP): Performed by: INTERNAL MEDICINE

## 2023-06-25 PROCEDURE — 2500000003 HC RX 250 WO HCPCS: Performed by: INTERNAL MEDICINE

## 2023-06-25 PROCEDURE — 85025 COMPLETE CBC W/AUTO DIFF WBC: CPT

## 2023-06-25 PROCEDURE — 80053 COMPREHEN METABOLIC PANEL: CPT

## 2023-06-25 RX ADMIN — CARVEDILOL 12.5 MG: 6.25 TABLET, FILM COATED ORAL at 08:06

## 2023-06-25 RX ADMIN — ASPIRIN 81 MG CHEWABLE TABLET 81 MG: 81 TABLET CHEWABLE at 08:06

## 2023-06-25 RX ADMIN — AMLODIPINE BESYLATE 5 MG: 5 TABLET ORAL at 08:06

## 2023-06-25 RX ADMIN — PANTOPRAZOLE SODIUM 40 MG: 40 TABLET, DELAYED RELEASE ORAL at 06:00

## 2023-06-25 RX ADMIN — CALCIUM ACETATE 667 MG: 667 CAPSULE ORAL at 08:06

## 2023-06-25 RX ADMIN — CEPHALEXIN 500 MG: 500 CAPSULE ORAL at 08:06

## 2023-06-25 RX ADMIN — HEPARIN SODIUM 5000 UNITS: 10000 INJECTION INTRAVENOUS; SUBCUTANEOUS at 05:59

## 2023-06-25 RX ADMIN — ALBUTEROL SULFATE 2.5 MG: 2.5 SOLUTION RESPIRATORY (INHALATION) at 09:20

## 2023-06-25 RX ADMIN — HYDRALAZINE HYDROCHLORIDE 50 MG: 50 TABLET, FILM COATED ORAL at 08:06

## 2024-01-15 ENCOUNTER — HOSPITAL ENCOUNTER (OUTPATIENT)
Age: 79
Setting detail: OBSERVATION
Discharge: HOME | End: 2024-01-16
Attending: STUDENT IN AN ORGANIZED HEALTH CARE EDUCATION/TRAINING PROGRAM | Admitting: HOSPITALIST
Payer: MEDICARE

## 2024-01-15 ENCOUNTER — APPOINTMENT (OUTPATIENT)
Dept: GENERAL RADIOLOGY | Age: 79
End: 2024-01-15
Payer: MEDICARE

## 2024-01-15 DIAGNOSIS — N18.6 ESRD (END STAGE RENAL DISEASE) (HCC): ICD-10-CM

## 2024-01-15 DIAGNOSIS — R07.9 CHEST PAIN, UNSPECIFIED TYPE: Primary | ICD-10-CM

## 2024-01-15 DIAGNOSIS — R07.2 PRECORDIAL PAIN: ICD-10-CM

## 2024-01-15 LAB
ALBUMIN SERPL-MCNC: 4.1 G/DL (ref 3.5–5.2)
ALP SERPL-CCNC: 102 U/L (ref 40–129)
ALT SERPL-CCNC: 6 U/L (ref 0–40)
ANION GAP SERPL CALCULATED.3IONS-SCNC: 14 MMOL/L (ref 7–16)
AST SERPL-CCNC: 14 U/L (ref 0–39)
BASOPHILS # BLD: 0.04 K/UL (ref 0–0.2)
BASOPHILS NFR BLD: 1 % (ref 0–2)
BILIRUB SERPL-MCNC: 0.4 MG/DL (ref 0–1.2)
BNP SERPL-MCNC: ABNORMAL PG/ML (ref 0–450)
BUN SERPL-MCNC: 63 MG/DL (ref 6–23)
CALCIUM SERPL-MCNC: 10.2 MG/DL (ref 8.6–10.2)
CHLORIDE SERPL-SCNC: 95 MMOL/L (ref 98–107)
CO2 SERPL-SCNC: 27 MMOL/L (ref 22–29)
CREAT SERPL-MCNC: 8.4 MG/DL (ref 0.7–1.2)
EOSINOPHIL # BLD: 0.22 K/UL (ref 0.05–0.5)
EOSINOPHILS RELATIVE PERCENT: 4 % (ref 0–6)
ERYTHROCYTE [DISTWIDTH] IN BLOOD BY AUTOMATED COUNT: 14.6 % (ref 11.5–15)
GFR SERPL CREATININE-BSD FRML MDRD: 6 ML/MIN/1.73M2
GLUCOSE SERPL-MCNC: 110 MG/DL (ref 74–99)
HCT VFR BLD AUTO: 32.1 % (ref 37–54)
HGB BLD-MCNC: 10.3 G/DL (ref 12.5–16.5)
IMM GRANULOCYTES # BLD AUTO: <0.03 K/UL (ref 0–0.58)
IMM GRANULOCYTES NFR BLD: 0 % (ref 0–5)
INR PPP: 1.1
LYMPHOCYTES NFR BLD: 0.91 K/UL (ref 1.5–4)
LYMPHOCYTES RELATIVE PERCENT: 17 % (ref 20–42)
MCH RBC QN AUTO: 32.6 PG (ref 26–35)
MCHC RBC AUTO-ENTMCNC: 32.1 G/DL (ref 32–34.5)
MCV RBC AUTO: 101.6 FL (ref 80–99.9)
MONOCYTES NFR BLD: 0.67 K/UL (ref 0.1–0.95)
MONOCYTES NFR BLD: 12 % (ref 2–12)
NEUTROPHILS NFR BLD: 66 % (ref 43–80)
NEUTS SEG NFR BLD: 3.56 K/UL (ref 1.8–7.3)
PLATELET CONFIRMATION: NORMAL
PLATELET, FLUORESCENCE: 94 K/UL (ref 130–450)
PMV BLD AUTO: 10.9 FL (ref 7–12)
POTASSIUM SERPL-SCNC: 4.9 MMOL/L (ref 3.5–5)
PROT SERPL-MCNC: 6.6 G/DL (ref 6.4–8.3)
PROTHROMBIN TIME: 11.8 SEC (ref 9.3–12.4)
RBC # BLD AUTO: 3.16 M/UL (ref 3.8–5.8)
SODIUM SERPL-SCNC: 136 MMOL/L (ref 132–146)
TROPONIN I SERPL HS-MCNC: 158 NG/L (ref 0–11)
TROPONIN I SERPL HS-MCNC: 171 NG/L (ref 0–11)
WBC OTHER # BLD: 5.4 K/UL (ref 4.5–11.5)

## 2024-01-15 PROCEDURE — 85025 COMPLETE CBC W/AUTO DIFF WBC: CPT

## 2024-01-15 PROCEDURE — 99285 EMERGENCY DEPT VISIT HI MDM: CPT

## 2024-01-15 PROCEDURE — 90935 HEMODIALYSIS ONE EVALUATION: CPT

## 2024-01-15 PROCEDURE — 84484 ASSAY OF TROPONIN QUANT: CPT

## 2024-01-15 PROCEDURE — 71045 X-RAY EXAM CHEST 1 VIEW: CPT

## 2024-01-15 PROCEDURE — G0378 HOSPITAL OBSERVATION PER HR: HCPCS

## 2024-01-15 PROCEDURE — 85610 PROTHROMBIN TIME: CPT

## 2024-01-15 PROCEDURE — 96372 THER/PROPH/DIAG INJ SC/IM: CPT

## 2024-01-15 PROCEDURE — 80053 COMPREHEN METABOLIC PANEL: CPT

## 2024-01-15 PROCEDURE — 83880 ASSAY OF NATRIURETIC PEPTIDE: CPT

## 2024-01-15 PROCEDURE — 2580000003 HC RX 258: Performed by: HOSPITALIST

## 2024-01-15 PROCEDURE — 6360000002 HC RX W HCPCS: Performed by: HOSPITALIST

## 2024-01-15 PROCEDURE — 6370000000 HC RX 637 (ALT 250 FOR IP): Performed by: HOSPITALIST

## 2024-01-15 RX ORDER — SODIUM CHLORIDE 0.9 % (FLUSH) 0.9 %
5-40 SYRINGE (ML) INJECTION PRN
Status: DISCONTINUED | OUTPATIENT
Start: 2024-01-15 | End: 2024-01-16 | Stop reason: HOSPADM

## 2024-01-15 RX ORDER — SODIUM CHLORIDE 0.9 % (FLUSH) 0.9 %
5-40 SYRINGE (ML) INJECTION EVERY 12 HOURS SCHEDULED
Status: DISCONTINUED | OUTPATIENT
Start: 2024-01-15 | End: 2024-01-16 | Stop reason: HOSPADM

## 2024-01-15 RX ORDER — CARVEDILOL 6.25 MG/1
12.5 TABLET ORAL 2 TIMES DAILY WITH MEALS
Status: DISCONTINUED | OUTPATIENT
Start: 2024-01-15 | End: 2024-01-16 | Stop reason: HOSPADM

## 2024-01-15 RX ORDER — HYDRALAZINE HYDROCHLORIDE 50 MG/1
50 TABLET, FILM COATED ORAL 3 TIMES DAILY
Status: DISCONTINUED | OUTPATIENT
Start: 2024-01-15 | End: 2024-01-16 | Stop reason: HOSPADM

## 2024-01-15 RX ORDER — SODIUM CHLORIDE 9 MG/ML
INJECTION, SOLUTION INTRAVENOUS PRN
Status: DISCONTINUED | OUTPATIENT
Start: 2024-01-15 | End: 2024-01-16 | Stop reason: HOSPADM

## 2024-01-15 RX ORDER — POLYETHYLENE GLYCOL 3350 17 G/17G
17 POWDER, FOR SOLUTION ORAL DAILY PRN
Status: DISCONTINUED | OUTPATIENT
Start: 2024-01-15 | End: 2024-01-16 | Stop reason: HOSPADM

## 2024-01-15 RX ORDER — PANTOPRAZOLE SODIUM 40 MG/1
40 TABLET, DELAYED RELEASE ORAL
Status: DISCONTINUED | OUTPATIENT
Start: 2024-01-16 | End: 2024-01-16 | Stop reason: HOSPADM

## 2024-01-15 RX ORDER — ASPIRIN 81 MG/1
81 TABLET ORAL DAILY
Status: DISCONTINUED | OUTPATIENT
Start: 2024-01-16 | End: 2024-01-16 | Stop reason: HOSPADM

## 2024-01-15 RX ORDER — BUDESONIDE 0.25 MG/2ML
250 INHALANT ORAL 2 TIMES DAILY
Status: DISCONTINUED | OUTPATIENT
Start: 2024-01-15 | End: 2024-01-16 | Stop reason: HOSPADM

## 2024-01-15 RX ORDER — ROSUVASTATIN CALCIUM 5 MG/1
5 TABLET, COATED ORAL NIGHTLY
Status: DISCONTINUED | OUTPATIENT
Start: 2024-01-15 | End: 2024-01-16 | Stop reason: HOSPADM

## 2024-01-15 RX ORDER — ARFORMOTEROL TARTRATE 15 UG/2ML
15 SOLUTION RESPIRATORY (INHALATION)
Status: DISCONTINUED | OUTPATIENT
Start: 2024-01-15 | End: 2024-01-16 | Stop reason: HOSPADM

## 2024-01-15 RX ORDER — ACETAMINOPHEN 325 MG/1
650 TABLET ORAL EVERY 6 HOURS PRN
Status: DISCONTINUED | OUTPATIENT
Start: 2024-01-15 | End: 2024-01-16 | Stop reason: HOSPADM

## 2024-01-15 RX ORDER — ACETAMINOPHEN 160 MG
1 TABLET,DISINTEGRATING ORAL DAILY
COMMUNITY

## 2024-01-15 RX ORDER — ONDANSETRON 2 MG/ML
4 INJECTION INTRAMUSCULAR; INTRAVENOUS EVERY 6 HOURS PRN
Status: DISCONTINUED | OUTPATIENT
Start: 2024-01-15 | End: 2024-01-16 | Stop reason: HOSPADM

## 2024-01-15 RX ORDER — NITROGLYCERIN 0.4 MG/1
0.4 TABLET SUBLINGUAL EVERY 5 MIN PRN
Status: DISCONTINUED | OUTPATIENT
Start: 2024-01-15 | End: 2024-01-16 | Stop reason: HOSPADM

## 2024-01-15 RX ORDER — HEPARIN SODIUM 10000 [USP'U]/ML
5000 INJECTION, SOLUTION INTRAVENOUS; SUBCUTANEOUS EVERY 8 HOURS SCHEDULED
Status: DISCONTINUED | OUTPATIENT
Start: 2024-01-15 | End: 2024-01-16 | Stop reason: HOSPADM

## 2024-01-15 RX ORDER — ALBUTEROL SULFATE 2.5 MG/3ML
2.5 SOLUTION RESPIRATORY (INHALATION) EVERY 6 HOURS PRN
Status: DISCONTINUED | OUTPATIENT
Start: 2024-01-15 | End: 2024-01-16 | Stop reason: HOSPADM

## 2024-01-15 RX ORDER — CALCIUM ACETATE 667 MG/1
667 CAPSULE ORAL
Status: DISCONTINUED | OUTPATIENT
Start: 2024-01-15 | End: 2024-01-16 | Stop reason: HOSPADM

## 2024-01-15 RX ORDER — ACETAMINOPHEN 650 MG/1
650 SUPPOSITORY RECTAL EVERY 6 HOURS PRN
Status: DISCONTINUED | OUTPATIENT
Start: 2024-01-15 | End: 2024-01-16 | Stop reason: HOSPADM

## 2024-01-15 RX ORDER — ONDANSETRON 4 MG/1
4 TABLET, ORALLY DISINTEGRATING ORAL EVERY 8 HOURS PRN
Status: DISCONTINUED | OUTPATIENT
Start: 2024-01-15 | End: 2024-01-16 | Stop reason: HOSPADM

## 2024-01-15 RX ADMIN — HYDRALAZINE HYDROCHLORIDE 50 MG: 50 TABLET ORAL at 21:52

## 2024-01-15 RX ADMIN — HEPARIN SODIUM 5000 UNITS: 10000 INJECTION INTRAVENOUS; SUBCUTANEOUS at 21:52

## 2024-01-15 RX ADMIN — ROSUVASTATIN CALCIUM 5 MG: 5 TABLET, FILM COATED ORAL at 21:52

## 2024-01-15 RX ADMIN — SODIUM CHLORIDE, PRESERVATIVE FREE 10 ML: 5 INJECTION INTRAVENOUS at 21:52

## 2024-01-15 RX ADMIN — CARVEDILOL 12.5 MG: 6.25 TABLET, FILM COATED ORAL at 21:52

## 2024-01-15 ASSESSMENT — PAIN - FUNCTIONAL ASSESSMENT: PAIN_FUNCTIONAL_ASSESSMENT: NONE - DENIES PAIN

## 2024-01-15 NOTE — FLOWSHEET NOTE
01/15/24 1703   Vital Signs   BP (!) 148/56   Temp 97.3 °F (36.3 °C)   Pulse 60   Respirations 18   Pain Assessment   Pain Assessment None - Denies Pain   Post-Hemodialysis Assessment   Post-Treatment Procedures Blood returned;Access bleeding time < 10 minutes   Machine Disinfection Process Acid/Vinegar Clean;Heat Disinfect;Exterior Machine Disinfection   Blood Volume Processed (Liters) 74.7 L   Dialyzer Clearance Lightly streaked   Duration of Treatment (minutes) 195 minutes   Hemodialysis Intake (ml) 300 ml   Hemodialysis Output (ml) 2000 ml   NET Removed (ml) 1700   Tolerated Treatment Good   Bilateral Breath Sounds Clear   Edema Right upper extremity;Left upper extremity;Right lower extremity;Left lower extremity   RUE Edema Trace   LUE Edema Trace   RLE Edema Trace   LLE Edema Trace   Time Off 1704   Patient Disposition Return to room   Observations & Evaluations   Level of Consciousness 0   Heart Rhythm Regular   Respiratory Quality/Effort Unlabored   O2 Device None (Room air)   Skin Color Pink   Skin Condition/Temp Dry;Warm   Abdomen Inspection Soft   Bowel Sounds (All Quadrants) Active

## 2024-01-15 NOTE — ED PROVIDER NOTES
fistula noted to left upper extremity.  Plan for labs, imaging, supportive care.       [BB]   1310 Spoke with Shaw who is on-call for patient's nephrologist Dr. Stone, who is okay with starting dialysis on patient while he is here. [KENNEDI]   1407 Spoke with Dr. West's team, who is comfortable consulting patient during his admission [KENNEDI]      ED Course User Index  [BB] Rafita Nice DO  [KENNEDI] Raymon Dumont II, DO         Medical Decision Making  Amount and/or Complexity of Data Reviewed  Labs: ordered.  Radiology: ordered.  ECG/medicine tests: ordered.    Risk  Decision regarding hospitalization.        78-year-old with hx of ESRD, presents ER for chest pressure that started last night.  Patient had a second episode of chest pressure that started this morning.  Both episodes lasted approximately 10 minutes and spontaneously resolved.  Patient did have associated shortness of breath with this chest pain, no diaphoresis, no nausea or vomiting.  Patient states first episode of chest pain woke him up from his sleep.  On exam patient is nontoxic in appearance, no evidence of respiratory distress, hemodynamically stable.  Patient with a systolic murmur, equal pulses in all 4 extremities.  Lungs clear to auscultation bilaterally.  No chest wall or abdominal tenderness.  No peripheral edema.  Differential diagnosis includes but limited to MAREN, ACS, heart failure, pneumonia, pneumothorax.  Will order blood work and imaging including chest x-ray, troponin, BNP, INR, CBC, CMP.  Patient's workup shows no leukocytosis, no anemia.  proBNP is elevated at 66,000 in the setting of missing dialysis today.  Troponin also elevated at 171, will repeat, also in the setting of missing dialysis.  Patient's creatinine is 8.4, GFR 6.  Chest x-ray shows no concern for infiltrates or effusions.  Repeat troponin was downtrending at 158.  Patient's nephrologist consulted.  Consulted with Dr. Squires who is on-call for patient's nephrologist who

## 2024-01-15 NOTE — CARE COORDINATION
Patient being admitted from ER with chest pain. Consult to nephro and cardio to eval patient. HEART score 7. Cardio stated ok to stay here for evaluation. Orders placed, will see in AM.    Electronically signed by Kenji Peguero MD on 1/15/2024 at 2:52 PM

## 2024-01-15 NOTE — PROGRESS NOTES
4 Eyes Skin Assessment     NAME:  James Vazquez III  YOB: 1945  MEDICAL RECORD NUMBER:  [id number]    The patient is being assessed for  Admission    I agree that at least one RN has performed a thorough Head to Toe Skin Assessment on the patient. ALL assessment sites listed below have been assessed.      Areas assessed by both nurses:    Head, Face, Ears, Shoulders, Back, Chest, Arms, Elbows, Hands, Sacrum. Buttock, Coccyx, Ischium, and Legs. Feet and Heels        Does the Patient have a Wound? Yes wound(s) were present on assessment. LDA wound assessment was Initiated and completed by RN       Luis Antonio Prevention initiated by RN: Yes  Wound Care Orders initiated by RN: No    Pressure Injury (Stage 3,4, Unstageable, DTI, NWPT, and Complex wounds) if present, place Wound referral order by RN under : No    New Ostomies, if present place, Ostomy referral order under : No     Nurse 1 eSignature: Electronically signed by Noreen Tinajero RN on 1/15/24 at 6:42 PM EST    **SHARE this note so that the co-signing nurse can place an eSignature**    Nurse 2 eSignature: Electronically signed by Rachel Steel RN on 1/15/24 at 6:44 PM EST

## 2024-01-16 ENCOUNTER — APPOINTMENT (OUTPATIENT)
Age: 79
End: 2024-01-16
Attending: INTERNAL MEDICINE
Payer: MEDICARE

## 2024-01-16 ENCOUNTER — APPOINTMENT (OUTPATIENT)
Dept: NUCLEAR MEDICINE | Age: 79
End: 2024-01-16
Attending: INTERNAL MEDICINE
Payer: MEDICARE

## 2024-01-16 VITALS
RESPIRATION RATE: 18 BRPM | HEIGHT: 67 IN | WEIGHT: 150.35 LBS | SYSTOLIC BLOOD PRESSURE: 131 MMHG | DIASTOLIC BLOOD PRESSURE: 61 MMHG | HEART RATE: 74 BPM | TEMPERATURE: 97.9 F | BODY MASS INDEX: 23.6 KG/M2 | OXYGEN SATURATION: 96 %

## 2024-01-16 LAB
ANION GAP SERPL CALCULATED.3IONS-SCNC: 11 MMOL/L (ref 7–16)
BUN SERPL-MCNC: 22 MG/DL (ref 6–23)
CALCIUM SERPL-MCNC: 9.5 MG/DL (ref 8.6–10.2)
CHLORIDE SERPL-SCNC: 94 MMOL/L (ref 98–107)
CHOLEST SERPL-MCNC: 101 MG/DL
CO2 SERPL-SCNC: 31 MMOL/L (ref 22–29)
CREAT SERPL-MCNC: 4.7 MG/DL (ref 0.7–1.2)
ECHO BSA: 1.8 M2
EKG ATRIAL RATE: 58 BPM
EKG P AXIS: 61 DEGREES
EKG P-R INTERVAL: 142 MS
EKG Q-T INTERVAL: 432 MS
EKG QRS DURATION: 94 MS
EKG QTC CALCULATION (BAZETT): 424 MS
EKG R AXIS: 55 DEGREES
EKG T AXIS: 42 DEGREES
EKG VENTRICULAR RATE: 58 BPM
ERYTHROCYTE [DISTWIDTH] IN BLOOD BY AUTOMATED COUNT: 14.6 % (ref 11.5–15)
GFR SERPL CREATININE-BSD FRML MDRD: 12 ML/MIN/1.73M2
GLUCOSE SERPL-MCNC: 78 MG/DL (ref 74–99)
HCT VFR BLD AUTO: 31.2 % (ref 37–54)
HDLC SERPL-MCNC: 37 MG/DL
HGB BLD-MCNC: 9.9 G/DL (ref 12.5–16.5)
LDLC SERPL CALC-MCNC: 48 MG/DL
MCH RBC QN AUTO: 31.5 PG (ref 26–35)
MCHC RBC AUTO-ENTMCNC: 31.7 G/DL (ref 32–34.5)
MCV RBC AUTO: 99.4 FL (ref 80–99.9)
NUC STRESS EJECTION FRACTION: 69 %
PLATELET CONFIRMATION: NORMAL
PLATELET, FLUORESCENCE: 85 K/UL (ref 130–450)
PMV BLD AUTO: 10.8 FL (ref 7–12)
POTASSIUM SERPL-SCNC: 4 MMOL/L (ref 3.5–5)
RBC # BLD AUTO: 3.14 M/UL (ref 3.8–5.8)
SODIUM SERPL-SCNC: 136 MMOL/L (ref 132–146)
STRESS BASELINE DIAS BP: 52 MMHG
STRESS BASELINE HR: 57 BPM
STRESS BASELINE ST DEPRESSION: 0 MM
STRESS BASELINE SYS BP: 143 MMHG
STRESS ESTIMATED WORKLOAD: 1 METS
STRESS PEAK DIAS BP: 48 MMHG
STRESS PEAK SYS BP: 151 MMHG
STRESS PERCENT HR ACHIEVED: 55 %
STRESS POST PEAK HR: 77 BPM
STRESS RATE PRESSURE PRODUCT: NORMAL BPM*MMHG
STRESS ST DEPRESSION: 0 MM
STRESS TARGET HR: 141 BPM
TRIGL SERPL-MCNC: 80 MG/DL
TROPONIN I SERPL HS-MCNC: 165 NG/L (ref 0–11)
VLDLC SERPL CALC-MCNC: 16 MG/DL
WBC OTHER # BLD: 4.4 K/UL (ref 4.5–11.5)

## 2024-01-16 PROCEDURE — 78452 HT MUSCLE IMAGE SPECT MULT: CPT

## 2024-01-16 PROCEDURE — 80048 BASIC METABOLIC PNL TOTAL CA: CPT

## 2024-01-16 PROCEDURE — 6370000000 HC RX 637 (ALT 250 FOR IP): Performed by: HOSPITALIST

## 2024-01-16 PROCEDURE — 6360000002 HC RX W HCPCS: Performed by: HOSPITALIST

## 2024-01-16 PROCEDURE — 36415 COLL VENOUS BLD VENIPUNCTURE: CPT

## 2024-01-16 PROCEDURE — 94640 AIRWAY INHALATION TREATMENT: CPT

## 2024-01-16 PROCEDURE — G0378 HOSPITAL OBSERVATION PER HR: HCPCS

## 2024-01-16 PROCEDURE — A9500 TC99M SESTAMIBI: HCPCS | Performed by: RADIOLOGY

## 2024-01-16 PROCEDURE — 93017 CV STRESS TEST TRACING ONLY: CPT

## 2024-01-16 PROCEDURE — 80061 LIPID PANEL: CPT

## 2024-01-16 PROCEDURE — 6370000000 HC RX 637 (ALT 250 FOR IP): Performed by: INTERNAL MEDICINE

## 2024-01-16 PROCEDURE — 97165 OT EVAL LOW COMPLEX 30 MIN: CPT

## 2024-01-16 PROCEDURE — 6360000002 HC RX W HCPCS

## 2024-01-16 PROCEDURE — 94664 DEMO&/EVAL PT USE INHALER: CPT

## 2024-01-16 PROCEDURE — 85027 COMPLETE CBC AUTOMATED: CPT

## 2024-01-16 PROCEDURE — 6360000002 HC RX W HCPCS: Performed by: INTERNAL MEDICINE

## 2024-01-16 PROCEDURE — 2500000003 HC RX 250 WO HCPCS: Performed by: HOSPITALIST

## 2024-01-16 PROCEDURE — 96372 THER/PROPH/DIAG INJ SC/IM: CPT

## 2024-01-16 PROCEDURE — 84484 ASSAY OF TROPONIN QUANT: CPT

## 2024-01-16 PROCEDURE — 3430000000 HC RX DIAGNOSTIC RADIOPHARMACEUTICAL: Performed by: RADIOLOGY

## 2024-01-16 RX ORDER — HYDRALAZINE HYDROCHLORIDE 50 MG/1
50 TABLET, FILM COATED ORAL 2 TIMES DAILY
Qty: 30 TABLET | Refills: 0
Start: 2024-01-16 | End: 2024-02-15

## 2024-01-16 RX ORDER — CLOPIDOGREL BISULFATE 75 MG/1
75 TABLET ORAL DAILY
Status: DISCONTINUED | OUTPATIENT
Start: 2024-01-16 | End: 2024-01-16 | Stop reason: HOSPADM

## 2024-01-16 RX ORDER — CLOPIDOGREL BISULFATE 75 MG/1
75 TABLET ORAL DAILY
Qty: 30 TABLET | Refills: 3 | Status: SHIPPED | OUTPATIENT
Start: 2024-01-17

## 2024-01-16 RX ORDER — TETRAKIS(2-METHOXYISOBUTYLISOCYANIDE)COPPER(I) TETRAFLUOROBORATE 1 MG/ML
30 INJECTION, POWDER, LYOPHILIZED, FOR SOLUTION INTRAVENOUS
Status: COMPLETED | OUTPATIENT
Start: 2024-01-16 | End: 2024-01-16

## 2024-01-16 RX ORDER — AMLODIPINE BESYLATE 10 MG/1
5 TABLET ORAL DAILY
Qty: 15 TABLET | Refills: 0 | Status: SHIPPED | OUTPATIENT
Start: 2024-01-16 | End: 2024-02-15

## 2024-01-16 RX ORDER — AMLODIPINE BESYLATE 10 MG/1
5 TABLET ORAL DAILY
Qty: 15 TABLET | Refills: 0 | Status: SHIPPED | OUTPATIENT
Start: 2024-01-16 | End: 2024-01-16

## 2024-01-16 RX ORDER — TETRAKIS(2-METHOXYISOBUTYLISOCYANIDE)COPPER(I) TETRAFLUOROBORATE 1 MG/ML
10 INJECTION, POWDER, LYOPHILIZED, FOR SOLUTION INTRAVENOUS
Status: COMPLETED | OUTPATIENT
Start: 2024-01-16 | End: 2024-01-16

## 2024-01-16 RX ORDER — REGADENOSON 0.08 MG/ML
0.4 INJECTION, SOLUTION INTRAVENOUS
Status: COMPLETED | OUTPATIENT
Start: 2024-01-16 | End: 2024-01-16

## 2024-01-16 RX ORDER — ISOSORBIDE MONONITRATE 30 MG/1
30 TABLET, EXTENDED RELEASE ORAL DAILY
Qty: 30 TABLET | Refills: 3 | Status: SHIPPED | OUTPATIENT
Start: 2024-01-17

## 2024-01-16 RX ORDER — ISOSORBIDE MONONITRATE 30 MG/1
30 TABLET, EXTENDED RELEASE ORAL DAILY
Status: DISCONTINUED | OUTPATIENT
Start: 2024-01-16 | End: 2024-01-16 | Stop reason: HOSPADM

## 2024-01-16 RX ADMIN — HEPARIN SODIUM 5000 UNITS: 10000 INJECTION INTRAVENOUS; SUBCUTANEOUS at 14:12

## 2024-01-16 RX ADMIN — PANTOPRAZOLE SODIUM 40 MG: 40 TABLET, DELAYED RELEASE ORAL at 05:45

## 2024-01-16 RX ADMIN — BUDESONIDE 250 MCG: 0.25 SUSPENSION RESPIRATORY (INHALATION) at 08:27

## 2024-01-16 RX ADMIN — CLOPIDOGREL BISULFATE 75 MG: 75 TABLET ORAL at 13:58

## 2024-01-16 RX ADMIN — IPRATROPIUM BROMIDE 0.5 MG: 0.5 SOLUTION RESPIRATORY (INHALATION) at 12:17

## 2024-01-16 RX ADMIN — CALCIUM ACETATE 667 MG: 667 CAPSULE ORAL at 17:52

## 2024-01-16 RX ADMIN — EPOETIN ALFA-EPBX 3000 UNITS: 3000 INJECTION, SOLUTION INTRAVENOUS; SUBCUTANEOUS at 17:52

## 2024-01-16 RX ADMIN — ARFORMOTEROL TARTRATE 15 MCG: 15 SOLUTION RESPIRATORY (INHALATION) at 08:27

## 2024-01-16 RX ADMIN — HYDRALAZINE HYDROCHLORIDE 50 MG: 50 TABLET ORAL at 13:58

## 2024-01-16 RX ADMIN — HEPARIN SODIUM 5000 UNITS: 10000 INJECTION INTRAVENOUS; SUBCUTANEOUS at 05:46

## 2024-01-16 RX ADMIN — ISOSORBIDE MONONITRATE 30 MG: 30 TABLET, EXTENDED RELEASE ORAL at 13:58

## 2024-01-16 RX ADMIN — IPRATROPIUM BROMIDE 0.5 MG: 0.5 SOLUTION RESPIRATORY (INHALATION) at 15:37

## 2024-01-16 RX ADMIN — Medication 30 MILLICURIE: at 10:15

## 2024-01-16 RX ADMIN — Medication 10 MILLICURIE: at 09:26

## 2024-01-16 RX ADMIN — CARVEDILOL 12.5 MG: 6.25 TABLET, FILM COATED ORAL at 17:52

## 2024-01-16 RX ADMIN — IPRATROPIUM BROMIDE 0.5 MG: 0.5 SOLUTION RESPIRATORY (INHALATION) at 08:27

## 2024-01-16 RX ADMIN — REGADENOSON 0.4 MG: 0.08 INJECTION, SOLUTION INTRAVENOUS at 10:33

## 2024-01-16 NOTE — H&P
Objective  VITALS:  BP (!) 155/71   Pulse 59   Temp 97.6 °F (36.4 °C) (Oral)   Resp 16   Ht 1.702 m (5' 7\")   Wt 68.2 kg (150 lb 5.7 oz)   SpO2 96%   BMI 23.55 kg/m²     Physical Exam:   General: awake, alert, oriented to person, place, time, and purpose, appears stated age, cooperative, no acute distress, pleasant, appropriate mood  Eyes: conjunctivae/corneas clear, sclera non icteric, EOMI  Ears: no obvious scars, no lesions, no masses, hearing intact  Mouth: mucous membranes moist, no obvious oral sores  Head: normocephalic, atraumatic  Neck: no JVD, no adenopathy, no thyromegaly, neck is supple, trachea is midline  Back: ROM normal, no CVA tenderness.  Chest: no pain on palpation  Lungs: clear to auscultation bilaterally, without rhonchi, crackle, wheezing, or rale, no retractions or use of accessory muscles  Heart: regular rate and regular rhythm, no murmur, normal S1, S2  Abdomen: soft, non-tender; bowel sounds normal; no masses, no organomegaly  : Deferred   Extremities: no lower extremity edema, extremities atraumatic, no cyanosis, no clubbing, 2+ pedal pulses palpated  Skin: normal color, normal texture, normal turgor, no rashes, no lesions  Neurologic:5/5 muscle strength throughout, normal muscle tone throughout, face symmetric, hearing intact, tongue midline, speech appropriate without slurring, sensation to fine touch intact in upper and lower extremities    Labs-   Lab Results   Component Value Date    WBC 4.4 (L) 01/16/2024    HGB 9.9 (L) 01/16/2024    HCT 31.2 (L) 01/16/2024    PLT 98 (L) 06/25/2023     01/16/2024    K 4.0 01/16/2024    CL 94 (L) 01/16/2024    CREATININE 4.7 (H) 01/16/2024    BUN 22 01/16/2024    CO2 31 (H) 01/16/2024    GLUCOSE 78 01/16/2024    ALT 6 01/15/2024    AST 14 01/15/2024    INR 1.1 01/15/2024    APTT 35.9 (H) 11/03/2020     Lab Results   Component Value Date    TROPONINI 0.11 (H) 10/11/2020         Recent Radiological Studies:  XR CHEST PORTABLE   Final

## 2024-01-16 NOTE — PROGRESS NOTES
Department of Internal Medicine  Nephrology Attending Consult Note    Events reviewed.    SUBJECTIVE: We are following James Vazquez III for ESRD on HD. Patient reports no complaints.      PHYSICAL EXAM:      Vitals:    VITALS:  BP (!) 155/71   Pulse 59   Temp 97.6 °F (36.4 °C) (Oral)   Resp 16   Ht 1.702 m (5' 7\")   Wt 68.2 kg (150 lb 5.7 oz)   SpO2 96%   BMI 23.55 kg/m²   24HR INTAKE/OUTPUT:    Intake/Output Summary (Last 24 hours) at 1/16/2024 1008  Last data filed at 1/16/2024 0128  Gross per 24 hour   Intake 420 ml   Output 2000 ml   Net -1580 ml       Constitutional:  Awake, alert, oriented, in NAD  HEENT:  PERRLA, normocephalic, atraumatic  Respiratory:  CTA  Cardiovascular/Edema:  RRR, normal S1, normal S2  Gastrointestinal:  Soft, flat, non-distended, non-tender  Neurologic:  Nonfocal  Skin:  warm, dry, no rashes, no lesions  Access: Left upper arm AV fistula      BRIEF SUMMARY OF INITIAL CONSULT:     Briefly, Mr. James Vazquez is a 77 year old male with a PMH of ESRD on IHD three times weekly MWF, via left upper arm AVF, HTN, HFpEF, HDL, BPH, herpes zoster with encephalitis, who was admitted 1/15/2024 for chest pain.  We are consulted for hemodialysis management.    IMPRESSION/RECOMMENDATIONS:      ESRD, continue hemodialysis 3 times per week- MWF via AVF left arm.  HD tomorrow  HTN, on hydralazine and carvedilol  MBD of CKD, on calcium acetate  Anemia of CKD, to continue NICOLE  History of HFpEF, proBNP 66,066  ---------------------------------------------  Hyperlipidemia, on rosuvastatin  Chest pain for stress test today     PLAN:     HD 3 times weekly MWF, HD tomorrow  Continue epoetin alpha 3000 units 3 times a week  Monitor labs  Okay to discharge when cleared with others    Electronically signed by TOMMY Chamberlain CNP on 1/16/2024 at 12:48 PM

## 2024-01-16 NOTE — ACP (ADVANCE CARE PLANNING)
Advance Care Planning   Healthcare Decision Maker:    Primary Decision Maker: Laura Vazquez - [facility] - [phone]      Today we documented Decision Maker(s) consistent with Legal Next of Kin hierarchy.

## 2024-01-16 NOTE — PROGRESS NOTES
[facility] Quality Flow/Interdisciplinary Rounds Progress Note        Quality Flow Rounds held on January 16, 2024    Disciplines Attending:  Bedside Nurse, , , and Nursing Unit Leadership    James Vazquez III was admitted on 1/15/2024 10:52 AM    Anticipated Discharge Date:       Disposition:    Luis Antonio Score:  Luis Antonio Scale Score: 19    Readmission Risk              Risk of Unplanned Readmission:  0           Discussed patient goal for the day, patient clinical progression, and barriers to discharge.  The following Goal(s) of the Day/Commitment(s) have been identified:   stress and check d/c if negative, ONI Christine RN  January 16, 2024

## 2024-01-16 NOTE — CARE COORDINATION
Pt admitted obs w/CP, stress test pending. Chart review completed pt is home and I. HD MWF at [facility], [phone]. No needs identified, will follow.  ALLYSSA OdonnellN, RN  [facility] Case Management  [phone]

## 2024-01-16 NOTE — PROGRESS NOTES
Occupational Therapy  OCCUPATIONAL THERAPY INITIAL EVALUATION  [facility]  [address], [place]    Date: 2024     Patient Name: James Vazquez III  MRN: [id number]  : 1945  Room: [address] [place] [address]    Evaluating OT: Lou Hackett, OTR/L - [id number]    Referring Provider: Kenji Peguero MD   Specific Provider Orders/Date: \"OT eval and treat\" - 1/15/2024    Diagnosis: Chest pain [R07.9]  ESRD (end stage renal disease) (HCC) [N18.6]  Chest pain, unspecified type [R07.9]      Pertinent Medical History: blind in L eye, CKD, dialysis, HLD, HTN, ABHISHEK    Precautions: fall risk    Assessment of Current Deficits:    [x] Functional mobility   [x]ADLs  [x] Strength               []Cognition   [x] Functional transfers   [x] IADLs         [x] Safety Awareness   [x]Endurance   [] Fine Coordination              [x] Balance      [] Vision/perception   []Sensation    []Gross Motor Coordination  [] ROM  [] Delirium                   [] Motor Control     OT PLAN OF CARE   OT POC is based on physician orders, patient diagnosis, and results of clinical assessment.  Frequency/Duration 2-5 days/week for 2 weeks PRN   Specific OT Treatment Interventions to Include:   * Instruction/training on adapted ADL techniques and AE recommendations to increase functional independence within precautions       * Training on energy conservation strategies, correct breathing pattern and techniques to improve independence/tolerance for self-care routine  * Functional transfer/mobility training/DME recommendations for increased independence, safety, and fall prevention  * Patient/Family education to increase follow through with safety techniques and functional independence  * Recommendation of environmental modifications for increased safety with functional transfers/mobility and ADLs  * Therapeutic exercise to improve motor endurance, ROM, and functional strength for ADLs/functional

## 2024-01-16 NOTE — CONSULTS
Department of Internal Medicine  Nephrology Attending Consult Note      Reason for Consult:  End-Stage Renal Disease  Requesting Physician:  Dr Nice    CHIEF COMPLAINT:  Chest pain    History Obtained From:  patient, electronic medical record    HISTORY OF PRESENT ILLNESS:                The patient is a 78 y.o. male with significant past medical history of end stage renal disease secondary to HTN, on hemodialysis [id number]-[id number] via Catheter, last hemodialysis treatment on Fariday, presents with chest pain.    Past Medical History:        Diagnosis Date    Blind left eye     Chronic kidney disease     Dialysis patient (HCC)     Hemodialysis patient (HCC)     Hyperlipidemia     Hypertension      Past Surgical History:        Procedure Laterality Date    AV FISTULA CREATION Left 2015    Dr. Phillips [facility]    CARDIAC SURGERY      HEMODIALYSIS ACCESS PERCUTANEOUS REVISION Left 2019    2 x Dr. Phillips, [facility]    HIP SURGERY Left 10/16/2020    HIP HEMIARTHROPLASTY performed by Abel Birmingham MD at [facility] OR    PERIPHERAL VASCULAR BYPASS  2008    Aortobifemoral bypass for claudicatio - Dr. Phillips [facility]    UPPER GASTROINTESTINAL ENDOSCOPY N/A 7/20/2020    EGD ESOPHAGOGASTRODUODENOSCOPY WITH ANTRAL BIOPSY performed by Lloyd Styles MD at [facility] OR    UPPER GASTROINTESTINAL ENDOSCOPY N/A 10/28/2020    EGD ESOPHAGOGASTRODUODENOSCOPY performed by Lloyd Styles MD at [facility] ENDOSCOPY     Current Medications:    No current facility-administered medications for this encounter.  Allergies:  Patient has no known allergies.    Social History:    TOBACCO:   reports that he quit smoking about 23 years ago. His smoking use included cigarettes. He started smoking about 53 years ago. He has a 30.0 pack-year smoking history. He has never used smokeless tobacco.  ETOH:   reports that he does not currently use alcohol.  DRUGS:   reports no history of drug use.    Family History:   No family history on file.  REVIEW OF SYSTEMS:  
aortobifemoral bypass about 15 years ago.    Continue to follow low-sodium low-fat low carbohydrate diet.  Also advance activity as tolerated.    If stress test does not show any significant ischemia today can be discharged to home this afternoon from cardiology viewpoint and be seen back [facility] cardiology 1 month.            Electronically signed by Cipriano West MD on 1/16/2024 at 12:53 PM

## 2024-01-18 NOTE — DISCHARGE SUMMARY
Patient discharged within 24 hours after admission.  Patient doing well at this time.  Ready for discharge.  Meds reviewed.  See below.  See H&P for further details.       Medication List        START taking these medications      clopidogrel 75 MG tablet  Commonly known as: PLAVIX  Take 1 tablet by mouth daily     isosorbide mononitrate 30 MG extended release tablet  Commonly known as: IMDUR  Take 1 tablet by mouth daily            CONTINUE taking these medications      acetaminophen 325 MG tablet  Commonly known as: TYLENOL     albuterol sulfate  (90 Base) MCG/ACT inhaler  Commonly known as: Proventil HFA  Inhale 2 puffs into the lungs every 6 hours as needed for Wheezing     amLODIPine 10 MG tablet  Commonly known as: NORVASC  Take 0.5 tablets by mouth daily     aspirin 81 MG EC tablet     calcium acetate 667 MG Tabs     carvedilol 12.5 MG tablet  Commonly known as: COREG  Take 1 tablet by mouth 2 times daily (with meals)     epoetin delmer-epbx 3000 UNIT/ML Soln injection  Commonly known as: RETACRIT  Inject 1 mL into the skin three times a week With dialysis     fluticasone-umeclidin-vilant 100-62.5-25 MCG/ACT Aepb inhaler  Commonly known as: TRELEGY ELLIPTA  Inhale 1 puff into the lungs daily     hydrALAZINE 50 MG tablet  Commonly known as: APRESOLINE  Take 1 tablet by mouth 2 times daily     omeprazole 20 MG delayed release capsule  Commonly known as: PRILOSEC     Q-10 CO-ENZYME PO     rosuvastatin 10 MG tablet  Commonly known as: CRESTOR     Vitamin D3 50 MCG (2000 UT) Caps               Where to Get Your Medications        These medications were sent to [id number] - [place], [place] - [address] [place] [address] [phone] - F [phone]  [address] [facility] [place] [address]      Phone: [phone]   amLODIPine 10 MG tablet  clopidogrel 75 MG tablet  isosorbide mononitrate 30 MG extended release tablet       Information about where to get these medications is not yet available    Ask your nurse or

## 2024-01-22 LAB
EKG ATRIAL RATE: 58 BPM
EKG P AXIS: 61 DEGREES
EKG P-R INTERVAL: 142 MS
EKG Q-T INTERVAL: 432 MS
EKG QRS DURATION: 94 MS
EKG QTC CALCULATION (BAZETT): 424 MS
EKG R AXIS: 55 DEGREES
EKG T AXIS: 42 DEGREES
EKG VENTRICULAR RATE: 58 BPM

## 2024-03-01 NOTE — CONSULTS
Department of Orthopedic Surgery  Resident Consult Note          Reason for Consult: Left Hip Pain    HISTORY OF PRESENT ILLNESS:       Patient is a 67 y.o. male who presents with hip pain after a fall. The patient wife states that he had altered mental status on Sunday night into Monday and he fell out of bed and had pain afterwards. The patient went to the ED at [facility] and was transferred to [facility] for further care and orthopedics was then consulted. The patient has end stage renal disease requiring dialysis. Prior to the fall the patient was walking with a walker and denies previous hip pain. Denies hitting his head or loss of consciousness. The patient is on heparin per his wife. Denies numbness/tingling/paresthesias. Denies any other orthopedic complaints at this time.        Past Medical History:        Diagnosis Date    Blind left eye     Chronic kidney disease     [facility]     [facility]od[facility]     Hyperlipidemia     Hypertension      Past Surgical History:        Procedure Laterality Date    AV FISTULA CREATION Left 2015    Dr. Aleyda Dillard Left 2019    2 x Dr. Dominic Hays, [facility]    PERIPHERAL VASCULAR BYPASS  2008    Aortobifemoral bypass for claudicatio - Dr. Gracie Garcia N/A 7/20/2020    EGD ESOPHAGOGASTRODUODENOSCOPY WITH ANTRAL BIOPSY performed by Jamarcus Bardales MD at [facility] OR     Current Medications:   Current Facility-Administered Medications: acetaminophen (TYLENOL) tablet 1,000 mg, 1,000 mg, Oral, 3 times per day  oxyCODONE-acetaminophen (PERCOCET) 5-325 MG per tablet 1 tablet, 1 tablet, Oral, Q4H PRN **OR** oxyCODONE-acetaminophen (PERCOCET) 5-325 MG per tablet 2 tablet, 2 tablet, Oral, Q4H PRN  acyclovir (ZOVIRAX) 260 mg in dextrose 5 % 50 mL IVPB, 5 mg/kg, Intravenous, Q24H  doxazosin (CARDURA) tablet 4 mg, 4 mg, Oral, Nightly  heparin (porcine) Patient contacted nephrology office on 2/27/24 with concerns regarding Bumex    Writer contacted [facility] regarding Bumex to f/u on message. Was informed that message was addressed this morning by a nurse and that the Bumex will be dropped to 4 mg daily w/ an afternoon dose PRN. Nephrology office should be contacting patient regarding med change    PCP advised   injection 5,000 Units, 5,000 Units, Subcutaneous, Q12H  hydrALAZINE (APRESOLINE) injection 10 mg, 10 mg, Intravenous, Q6H PRN  hydrALAZINE (APRESOLINE) tablet 50 mg, 50 mg, Oral, TID  metoprolol tartrate (LOPRESSOR) tablet 50 mg, 50 mg, Oral, BID  pantoprazole (PROTONIX) tablet 40 mg, 40 mg, Oral, QAM AC  potassium & sodium phosphates (PHOS-NAK) 280-160-250 MG packet 250 mg, 1 packet, Oral, Daily  rosuvastatin (CRESTOR) tablet 5 mg, 5 mg, Oral, Nightly  sucralfate (CARAFATE) tablet 1 g, 1 g, Oral, 4x Daily  sodium chloride flush 0.9 % injection 10 mL, 10 mL, Intravenous, PRN  Allergies:  Patient has no known allergies. Social History:   TOBACCO:   reports that he has quit smoking. He has never used smokeless tobacco.  ETOH:   reports previous alcohol use. DRUGS:   reports no history of drug use. ACTIVITIES OF DAILY LIVING:    OCCUPATION:    Family History:   No family history on file.     REVIEW OF SYSTEMS:  CONSTITUTIONAL:  negative for  fevers, chills  EYES:  negative for blurred vision, visual disturbance  HEENT:  negative for  hearing loss, voice change  RESPIRATORY:  negative for  dyspnea, wheezing  CARDIOVASCULAR:  negative for  chest pain, palpitations  GASTROINTESTINAL:  negative for nausea, vomiting  GENITOURINARY:  negative for frequency, urinary incontinence  HEMATOLOGIC/LYMPHATIC:  negative for bleeding and petechiae  MUSCULOSKELETAL:  positive for  pain  NEUROLOGICAL:  negative for headaches, dizziness  BEHAVIOR/PSYCH:  negative for increased agitation and anxiety    PHYSICAL EXAM:    VITALS:  BP (!) 164/70   Pulse 75   Temp 97.3 °F (36.3 °C) (Temporal)   Resp 18   Ht 5' 8\" (1.727 m)   Wt 114 lb 6.7 oz (51.9 kg)   SpO2 96%   BMI 17.40 kg/m²   CONSTITUTIONAL:  awake, alert, cooperative, no apparent distress, and appears stated age  MUSCULOSKELETAL:  Left lower Extremity:  Shortened and externally rotated  +Log roll  -TTP over Knee and Ankle  Skin intact with no signs of degloving  Compartments soft and compressible, calf non-tender  +DP & PT pulses, Brisk Cap refill, Toes warm and perfused  Sensation grossly intact superficial/deep peroneal,saphenous,sural,tibial n. distributions  · +GS/TA/EHL. Secondary Exam:   bilateralUE: No obvious signs of trauma. -TTP to fingers, hand, wrist, forearm, elbow, humerus, shoulder or clavicle. · rightLE: No obvious signs of trauma. -TTP to foot, ankle, leg, knee, thigh, hip. · Pelvis: -TTP, -Log roll, -Heel strike     DATA:    CBC:   Lab Results   Component Value Date    WBC 7.6 10/13/2020    RBC 3.60 10/13/2020    HGB 11.9 10/13/2020    HCT 35.9 10/13/2020    MCV 99.7 10/13/2020    MCH 33.1 10/13/2020    MCHC 33.1 10/13/2020    RDW 14.5 10/13/2020    PLT 91 10/13/2020    MPV 10.6 10/13/2020     PT/INR:    Lab Results   Component Value Date    PROTIME 11.6 10/12/2020    INR 1.0 10/12/2020     Lactic Acid :   Lab Results   Component Value Date    LACTA 0.9 10/11/2020       Radiology Review:  10/14/20 - XR of the left hip shows a displaced transcervical femoral neck fracture with shortening       IMPRESSION:   · Closed, Left Femoral neck Fracture    PLAN:  Plan for surgery with Dr. Nivia Warner on 10/15/2020  NPO after midnight, Needs medical assessment for surgery  LLE Non-weight bearing  Pre-op Labs and Imaging  Pain control per primary  Hold Anticoagulation   Review and discuss with Dr. Nivia Warner      I have seen and evaluated the patient and agree with the above assessment on today's visit. I have performed the key components of the history and physical examination and concur completely with the findings and plans as documented. Agree with ROS, examination, FMH, PMH, PSH, SocHx, and allergies as above. Seen and examined. Patient status post fall with left femoral neck fracture displaced. When she fell out of bed. He does have poor health including currently on chronic dialysis. Talk to the patient and the son in detail.   Explained the operation of hemiarthroplasty in detail. I explained the surgery in detail. I explained the risks and complications of surgery with the patient including but not limited to death from anesthesia, possible neurovascular damage, possible infection,  Possible wound healing issues, possible perioperative fracture, leg length discrepancy, implant failure, possible need for further surgery, etc.  Patient understood this, asked appropriate questions and decided to go forward with the procedure. Talked about dislocation and infection requiring explantation he understood this and decided to go forward with the procedure. Physical Examination:   General appearance: alert, well appearing, and in no distress,  normal appearing weight. No visible signs of trauma   Mental status: alert, oriented to person, place, and time, normal mood, behavior, speech, dress, motor activity, and thought processes  Abdomen: soft, nondistended  Resp:   resp easy and unlabored, no audible wheezes note, normal symmetrical expansion of both hemithoraces  Cardiac: distal pulses palpable, skin and extremities well perfused  Neurological: alert, oriented X3, normal speech, no focal findings or movement disorder noted, motor and sensory grossly normal bilaterally, normal muscle tone, no tremors  HEENT: normochephalic atraumatic, external ears and eyes normal, sclera normal, neck supple, no nasal discharge.    Extremities:   peripheral pulses normal, no edema, redness or tenderness in the calves   Skin: normal coloration, no rashes or open wounds, no suspicious skin lesions noted  Psych: Affect euthymic   Musculoskeletal:   Extremity:  Left hip   Skin intact over hip   Compartments soft and compressible   Leg externally rotated   Calves soft and NT   2/4 DP and PT pulses   Sensation intact   Wiggles toes   Fires L4 L5 and S1   Pain with log roll and heel strike          ELECTRONICALLY signed by:    Abhi Pyle MD  10/16/20   This is been dictated utilizing voice recognition software. All efforts have been made to make the note accurate although inadvertent errors may be present.

## 2024-07-18 ENCOUNTER — HOSPITAL ENCOUNTER (INPATIENT)
Age: 79
LOS: 3 days | Discharge: NURSING, REHAB OR LONG-TERM CARE | DRG: 152 | End: 2024-07-23
Attending: EMERGENCY MEDICINE | Admitting: INTERNAL MEDICINE
Payer: MEDICARE

## 2024-07-18 ENCOUNTER — APPOINTMENT (OUTPATIENT)
Dept: CT IMAGING | Age: 79
DRG: 152 | End: 2024-07-18
Payer: MEDICARE

## 2024-07-18 ENCOUNTER — APPOINTMENT (OUTPATIENT)
Dept: GENERAL RADIOLOGY | Age: 79
DRG: 152 | End: 2024-07-18
Payer: MEDICARE

## 2024-07-18 DIAGNOSIS — N18.6 ESRD (END STAGE RENAL DISEASE) (HCC): ICD-10-CM

## 2024-07-18 DIAGNOSIS — R06.02 SHORTNESS OF BREATH: Primary | ICD-10-CM

## 2024-07-18 DIAGNOSIS — R79.89 ELEVATED TROPONIN: ICD-10-CM

## 2024-07-18 DIAGNOSIS — J06.9 ACUTE UPPER RESPIRATORY INFECTION: ICD-10-CM

## 2024-07-18 LAB
ANION GAP SERPL CALCULATED.3IONS-SCNC: 13 MMOL/L (ref 7–16)
B PARAP IS1001 DNA NPH QL NAA+NON-PROBE: NOT DETECTED
B PERT DNA SPEC QL NAA+PROBE: NOT DETECTED
BASOPHILS # BLD: 0.01 K/UL (ref 0–0.2)
BASOPHILS NFR BLD: 0 % (ref 0–2)
BNP SERPL-MCNC: ABNORMAL PG/ML (ref 0–450)
BUN SERPL-MCNC: 44 MG/DL (ref 6–23)
C PNEUM DNA NPH QL NAA+NON-PROBE: NOT DETECTED
CALCIUM SERPL-MCNC: 9.4 MG/DL (ref 8.6–10.2)
CHLORIDE SERPL-SCNC: 94 MMOL/L (ref 98–107)
CO2 SERPL-SCNC: 29 MMOL/L (ref 22–29)
CREAT SERPL-MCNC: 4.6 MG/DL (ref 0.7–1.2)
EKG ATRIAL RATE: 57 BPM
EKG P AXIS: 45 DEGREES
EKG P-R INTERVAL: 142 MS
EKG Q-T INTERVAL: 454 MS
EKG QRS DURATION: 94 MS
EKG QTC CALCULATION (BAZETT): 441 MS
EKG R AXIS: 46 DEGREES
EKG T AXIS: 21 DEGREES
EKG VENTRICULAR RATE: 57 BPM
EOSINOPHIL # BLD: 0.03 K/UL (ref 0.05–0.5)
EOSINOPHILS RELATIVE PERCENT: 1 % (ref 0–6)
ERYTHROCYTE [DISTWIDTH] IN BLOOD BY AUTOMATED COUNT: 16.4 % (ref 11.5–15)
FLUAV RNA NPH QL NAA+NON-PROBE: NOT DETECTED
FLUBV RNA NPH QL NAA+NON-PROBE: NOT DETECTED
GFR, ESTIMATED: 12 ML/MIN/1.73M2
GLUCOSE SERPL-MCNC: 76 MG/DL (ref 74–99)
HADV DNA NPH QL NAA+NON-PROBE: NOT DETECTED
HCOV 229E RNA NPH QL NAA+NON-PROBE: NOT DETECTED
HCOV HKU1 RNA NPH QL NAA+NON-PROBE: NOT DETECTED
HCOV NL63 RNA NPH QL NAA+NON-PROBE: NOT DETECTED
HCOV OC43 RNA NPH QL NAA+NON-PROBE: NOT DETECTED
HCT VFR BLD AUTO: 36.7 % (ref 37–54)
HGB BLD-MCNC: 11.5 G/DL (ref 12.5–16.5)
HMPV RNA NPH QL NAA+NON-PROBE: NOT DETECTED
HPIV1 RNA NPH QL NAA+NON-PROBE: NOT DETECTED
HPIV2 RNA NPH QL NAA+NON-PROBE: NOT DETECTED
HPIV3 RNA NPH QL NAA+NON-PROBE: NOT DETECTED
HPIV4 RNA NPH QL NAA+NON-PROBE: NOT DETECTED
IMM GRANULOCYTES # BLD AUTO: 0.06 K/UL (ref 0–0.58)
IMM GRANULOCYTES NFR BLD: 1 % (ref 0–5)
LYMPHOCYTES NFR BLD: 0.9 K/UL (ref 1.5–4)
LYMPHOCYTES RELATIVE PERCENT: 14 % (ref 20–42)
M PNEUMO DNA NPH QL NAA+NON-PROBE: NOT DETECTED
MCH RBC QN AUTO: 32.4 PG (ref 26–35)
MCHC RBC AUTO-ENTMCNC: 31.3 G/DL (ref 32–34.5)
MCV RBC AUTO: 103.4 FL (ref 80–99.9)
MONOCYTES NFR BLD: 0.71 K/UL (ref 0.1–0.95)
MONOCYTES NFR BLD: 11 % (ref 2–12)
NEUTROPHILS NFR BLD: 74 % (ref 43–80)
NEUTS SEG NFR BLD: 4.91 K/UL (ref 1.8–7.3)
PLATELET CONFIRMATION: NORMAL
PLATELET, FLUORESCENCE: 86 K/UL (ref 130–450)
PMV BLD AUTO: 11.9 FL (ref 7–12)
POTASSIUM SERPL-SCNC: 4.1 MMOL/L (ref 3.5–5)
RBC # BLD AUTO: 3.55 M/UL (ref 3.8–5.8)
RSV RNA NPH QL NAA+NON-PROBE: NOT DETECTED
RV+EV RNA NPH QL NAA+NON-PROBE: DETECTED
SARS-COV-2 RDRP RESP QL NAA+PROBE: NOT DETECTED
SARS-COV-2 RNA NPH QL NAA+NON-PROBE: NOT DETECTED
SODIUM SERPL-SCNC: 136 MMOL/L (ref 132–146)
SPECIMEN DESCRIPTION: ABNORMAL
SPECIMEN DESCRIPTION: NORMAL
TROPONIN I SERPL HS-MCNC: 821 NG/L (ref 0–11)
TROPONIN I SERPL HS-MCNC: 895 NG/L (ref 0–11)
WBC OTHER # BLD: 6.6 K/UL (ref 4.5–11.5)

## 2024-07-18 PROCEDURE — 6360000004 HC RX CONTRAST MEDICATION: Performed by: RADIOLOGY

## 2024-07-18 PROCEDURE — 6370000000 HC RX 637 (ALT 250 FOR IP): Performed by: INTERNAL MEDICINE

## 2024-07-18 PROCEDURE — 71045 X-RAY EXAM CHEST 1 VIEW: CPT

## 2024-07-18 PROCEDURE — G0378 HOSPITAL OBSERVATION PER HR: HCPCS

## 2024-07-18 PROCEDURE — 6360000002 HC RX W HCPCS: Performed by: INTERNAL MEDICINE

## 2024-07-18 PROCEDURE — 94664 DEMO&/EVAL PT USE INHALER: CPT

## 2024-07-18 PROCEDURE — 85025 COMPLETE CBC W/AUTO DIFF WBC: CPT

## 2024-07-18 PROCEDURE — 0202U NFCT DS 22 TRGT SARS-COV-2: CPT

## 2024-07-18 PROCEDURE — 83880 ASSAY OF NATRIURETIC PEPTIDE: CPT

## 2024-07-18 PROCEDURE — 80048 BASIC METABOLIC PNL TOTAL CA: CPT

## 2024-07-18 PROCEDURE — 93010 ELECTROCARDIOGRAM REPORT: CPT | Performed by: INTERNAL MEDICINE

## 2024-07-18 PROCEDURE — 87635 SARS-COV-2 COVID-19 AMP PRB: CPT

## 2024-07-18 PROCEDURE — 2580000003 HC RX 258: Performed by: INTERNAL MEDICINE

## 2024-07-18 PROCEDURE — 71275 CT ANGIOGRAPHY CHEST: CPT

## 2024-07-18 PROCEDURE — 96372 THER/PROPH/DIAG INJ SC/IM: CPT

## 2024-07-18 PROCEDURE — 93005 ELECTROCARDIOGRAM TRACING: CPT | Performed by: EMERGENCY MEDICINE

## 2024-07-18 PROCEDURE — 99285 EMERGENCY DEPT VISIT HI MDM: CPT

## 2024-07-18 PROCEDURE — 84484 ASSAY OF TROPONIN QUANT: CPT

## 2024-07-18 RX ORDER — ACETAMINOPHEN 650 MG/1
650 SUPPOSITORY RECTAL EVERY 6 HOURS PRN
Status: DISCONTINUED | OUTPATIENT
Start: 2024-07-18 | End: 2024-07-23 | Stop reason: HOSPADM

## 2024-07-18 RX ORDER — PREDNISONE 10 MG/1
5 TABLET ORAL EVERY EVENING
COMMUNITY
Start: 2024-07-18 | End: 2024-07-19

## 2024-07-18 RX ORDER — HYDRALAZINE HYDROCHLORIDE 50 MG/1
50 TABLET, FILM COATED ORAL 2 TIMES DAILY
COMMUNITY

## 2024-07-18 RX ORDER — ASPIRIN 81 MG/1
81 TABLET ORAL DAILY
Status: DISCONTINUED | OUTPATIENT
Start: 2024-07-18 | End: 2024-07-19

## 2024-07-18 RX ORDER — LANOLIN ALCOHOL/MO/W.PET/CERES
3 CREAM (GRAM) TOPICAL NIGHTLY PRN
Status: DISCONTINUED | OUTPATIENT
Start: 2024-07-18 | End: 2024-07-23 | Stop reason: HOSPADM

## 2024-07-18 RX ORDER — UBIDECARENONE/VIT E/VIT E MIX 100-20-15
100 CAPSULE ORAL NIGHTLY
COMMUNITY

## 2024-07-18 RX ORDER — CALCIUM ACETATE 667 MG/1
667 CAPSULE ORAL
Status: DISCONTINUED | OUTPATIENT
Start: 2024-07-18 | End: 2024-07-19

## 2024-07-18 RX ORDER — AMLODIPINE BESYLATE 5 MG/1
5 TABLET ORAL EVERY MORNING
COMMUNITY

## 2024-07-18 RX ORDER — PREDNISONE 10 MG/1
10 TABLET ORAL EVERY EVENING
Status: ON HOLD | COMMUNITY
Start: 2024-07-16 | End: 2024-07-19 | Stop reason: HOSPADM

## 2024-07-18 RX ORDER — SENNOSIDES A AND B 8.6 MG/1
1 TABLET, FILM COATED ORAL DAILY PRN
Status: DISCONTINUED | OUTPATIENT
Start: 2024-07-18 | End: 2024-07-23 | Stop reason: HOSPADM

## 2024-07-18 RX ORDER — CLOPIDOGREL BISULFATE 75 MG/1
75 TABLET ORAL DAILY
Status: DISCONTINUED | OUTPATIENT
Start: 2024-07-18 | End: 2024-07-19

## 2024-07-18 RX ORDER — BUDESONIDE 0.25 MG/2ML
0.25 INHALANT ORAL
Status: DISCONTINUED | OUTPATIENT
Start: 2024-07-18 | End: 2024-07-23 | Stop reason: HOSPADM

## 2024-07-18 RX ORDER — PANTOPRAZOLE SODIUM 40 MG/1
40 TABLET, DELAYED RELEASE ORAL
Status: DISCONTINUED | OUTPATIENT
Start: 2024-07-19 | End: 2024-07-19

## 2024-07-18 RX ORDER — ISOSORBIDE MONONITRATE 30 MG/1
30 TABLET, EXTENDED RELEASE ORAL DAILY
Status: DISCONTINUED | OUTPATIENT
Start: 2024-07-18 | End: 2024-07-19

## 2024-07-18 RX ORDER — ONDANSETRON 4 MG/1
4 TABLET, ORALLY DISINTEGRATING ORAL EVERY 8 HOURS PRN
Status: DISCONTINUED | OUTPATIENT
Start: 2024-07-18 | End: 2024-07-23 | Stop reason: HOSPADM

## 2024-07-18 RX ORDER — ONDANSETRON 2 MG/ML
4 INJECTION INTRAMUSCULAR; INTRAVENOUS EVERY 6 HOURS PRN
Status: DISCONTINUED | OUTPATIENT
Start: 2024-07-18 | End: 2024-07-23 | Stop reason: HOSPADM

## 2024-07-18 RX ORDER — CHOLECALCIFEROL (VITAMIN D3) 50 MCG
2000 TABLET ORAL DAILY
Status: DISCONTINUED | OUTPATIENT
Start: 2024-07-18 | End: 2024-07-19

## 2024-07-18 RX ORDER — ROSUVASTATIN CALCIUM 10 MG/1
10 TABLET, COATED ORAL NIGHTLY
Status: DISCONTINUED | OUTPATIENT
Start: 2024-07-18 | End: 2024-07-23 | Stop reason: HOSPADM

## 2024-07-18 RX ORDER — SODIUM CHLORIDE 0.9 % (FLUSH) 0.9 %
10 SYRINGE (ML) INJECTION PRN
Status: DISCONTINUED | OUTPATIENT
Start: 2024-07-18 | End: 2024-07-23 | Stop reason: HOSPADM

## 2024-07-18 RX ORDER — SEVELAMER CARBONATE 800 MG/1
1 TABLET, FILM COATED ORAL 3 TIMES DAILY
COMMUNITY
Start: 2024-06-20

## 2024-07-18 RX ORDER — HEPARIN SODIUM 5000 [USP'U]/ML
5000 INJECTION, SOLUTION INTRAVENOUS; SUBCUTANEOUS EVERY 8 HOURS SCHEDULED
Status: DISCONTINUED | OUTPATIENT
Start: 2024-07-18 | End: 2024-07-23 | Stop reason: HOSPADM

## 2024-07-18 RX ORDER — FLUTICASONE FUROATE, UMECLIDINIUM BROMIDE AND VILANTEROL TRIFENATATE 100; 62.5; 25 UG/1; UG/1; UG/1
1 POWDER RESPIRATORY (INHALATION) DAILY
COMMUNITY

## 2024-07-18 RX ORDER — ARFORMOTEROL TARTRATE 15 UG/2ML
15 SOLUTION RESPIRATORY (INHALATION)
Status: DISCONTINUED | OUTPATIENT
Start: 2024-07-18 | End: 2024-07-23 | Stop reason: HOSPADM

## 2024-07-18 RX ORDER — SODIUM CHLORIDE 0.9 % (FLUSH) 0.9 %
10 SYRINGE (ML) INJECTION EVERY 12 HOURS SCHEDULED
Status: DISCONTINUED | OUTPATIENT
Start: 2024-07-18 | End: 2024-07-23 | Stop reason: HOSPADM

## 2024-07-18 RX ORDER — PANTOPRAZOLE SODIUM 40 MG/1
40 TABLET, DELAYED RELEASE ORAL EVERY MORNING
COMMUNITY

## 2024-07-18 RX ORDER — SODIUM CHLORIDE 9 MG/ML
INJECTION, SOLUTION INTRAVENOUS PRN
Status: DISCONTINUED | OUTPATIENT
Start: 2024-07-18 | End: 2024-07-23 | Stop reason: HOSPADM

## 2024-07-18 RX ORDER — CARVEDILOL 6.25 MG/1
12.5 TABLET ORAL 2 TIMES DAILY WITH MEALS
Status: DISCONTINUED | OUTPATIENT
Start: 2024-07-18 | End: 2024-07-23 | Stop reason: HOSPADM

## 2024-07-18 RX ORDER — ACETAMINOPHEN 325 MG/1
650 TABLET ORAL EVERY 6 HOURS PRN
Status: DISCONTINUED | OUTPATIENT
Start: 2024-07-18 | End: 2024-07-23 | Stop reason: HOSPADM

## 2024-07-18 RX ADMIN — IOPAMIDOL 75 ML: 755 INJECTION, SOLUTION INTRAVENOUS at 15:00

## 2024-07-18 RX ADMIN — ARFORMOTEROL TARTRATE 15 MCG: 15 SOLUTION RESPIRATORY (INHALATION) at 19:40

## 2024-07-18 RX ADMIN — HEPARIN SODIUM 5000 UNITS: 5000 INJECTION INTRAVENOUS; SUBCUTANEOUS at 21:08

## 2024-07-18 RX ADMIN — Medication 3 MG: at 21:08

## 2024-07-18 RX ADMIN — HEPARIN SODIUM 5000 UNITS: 5000 INJECTION INTRAVENOUS; SUBCUTANEOUS at 18:01

## 2024-07-18 RX ADMIN — BUDESONIDE 250 MCG: 0.25 SUSPENSION RESPIRATORY (INHALATION) at 19:40

## 2024-07-18 RX ADMIN — CARVEDILOL 12.5 MG: 6.25 TABLET, FILM COATED ORAL at 18:01

## 2024-07-18 RX ADMIN — SODIUM CHLORIDE, PRESERVATIVE FREE 10 ML: 5 INJECTION INTRAVENOUS at 21:11

## 2024-07-18 RX ADMIN — IPRATROPIUM BROMIDE 0.5 MG: 0.5 SOLUTION RESPIRATORY (INHALATION) at 16:20

## 2024-07-18 RX ADMIN — ROSUVASTATIN CALCIUM 10 MG: 10 TABLET, FILM COATED ORAL at 21:08

## 2024-07-18 RX ADMIN — IPRATROPIUM BROMIDE 0.5 MG: 0.5 SOLUTION RESPIRATORY (INHALATION) at 19:40

## 2024-07-18 NOTE — ED NOTES
ED to Inpatient Handoff Report    Notified gail that electronic handoff available and patient ready for transport to room 445.    Safety Risks: None identified    Patient in Restraints: no    Constant Observer or Patient : no    Telemetry Monitoring Ordered :Yes           Order to transfer to unit without monitor:N/A    Last MEWS: 2 Time completed: 1706    Deterioration Index Score:   Predictive Model Details          28 (Normal)  Factor Value    Calculated 7/18/2024 17:06 44% Age 78 years old    Deterioration Index Model 28% Respiratory rate 22     18% Systolic 174     3% BUN abnormal (44 mg/dL)     2% Pulse 59     2% Sodium 136 mmol/L     2% Potassium 4.1 mmol/L     0% Hematocrit abnormal (36.7 %)     0% Temperature 97.6 °F (36.4 °C)     0% Pulse oximetry 95 %     0% WBC count 6.6 k/uL        Vitals:    07/18/24 1050 07/18/24 1416 07/18/24 1703   BP: (!) 139/43  (!) 174/64   Pulse: 59  59   Resp: 18  22   Temp: 97.1 °F (36.2 °C)  97.6 °F (36.4 °C)   TempSrc:   Oral   SpO2: 97%  95%   Weight:  69.9 kg (154 lb)          Opportunity for questions and clarification was provided.

## 2024-07-18 NOTE — ED PROVIDER NOTES
78-year-old male presents to the emergency department shortness of breath and cough.  Patient was recently treated with azithromycin and prednisone for the symptoms.  He states his symptoms have gotten worse and when he was laying down he felt increasing shortness of breath so was brought in for further evaluation today.  He reports he is taken prednisone for the extent of the treatment is on his last day of it without improvement.  He states no nausea vomiting diarrhea abdominal pain urinary symptoms leg swelling or other complaints    The history is provided by the patient.   Shortness of Breath  Severity:  Mild  Onset quality:  Gradual  Duration:  1 week  Timing:  Intermittent  Progression:  Waxing and waning  Chronicity:  New  Relieved by:  Nothing  Worsened by:  Nothing  Ineffective treatments:  None tried  Associated symptoms: cough    Associated symptoms: no abdominal pain, no chest pain, no claudication, no ear pain, no fever, no headaches, no rash, no sore throat, no syncope, no swollen glands, no vomiting and no wheezing         Review of Systems   Constitutional:  Negative for chills and fever.   HENT:  Negative for ear pain, sinus pressure and sore throat.    Eyes:  Negative for pain, discharge and redness.   Respiratory:  Positive for cough and shortness of breath. Negative for wheezing.    Cardiovascular:  Negative for chest pain, claudication and syncope.   Gastrointestinal:  Negative for abdominal pain, diarrhea, nausea and vomiting.   Genitourinary:  Negative for dysuria and frequency.   Musculoskeletal:  Negative for arthralgias and back pain.   Skin:  Negative for rash and wound.   Neurological:  Negative for weakness and headaches.   Hematological:  Negative for adenopathy.   All other systems reviewed and are negative.       Physical Exam  Constitutional:       Appearance: He is well-developed.   HENT:      Head: Normocephalic and atraumatic.   Eyes:      Extraocular Movements: Extraocular

## 2024-07-18 NOTE — PROGRESS NOTES
Database initiated. Patient is A&O comes in from home with wife. States he uses a walker and is RA at baseline. 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 He is hard of hearing.

## 2024-07-18 NOTE — CARE COORDINATION
Internal Medicine On-call Care Coordination Note    I was called by the ED physician because they recommended admission for this patient and I cover their PCP.  The history as I understand it after discussion with the ED physician is as follows:    The patient presented with SOB and cough  In the ED they found and elevated troponin     I placed admission orders.  Including:    Dyspnea-- angina equivalent?  Elevated troponin noted  CTA chest noted  Telemetry  Cardiology consultation  Defer need for further cardiac testing to cardiology  Troponin noted  ASA  Lipid panel    Lung mass:  Pulm consult    ESRD on HD:   Defer HD, electrolyte replacement, and fluid management to renal  Follow electrolytes    Dr. Mccord or his coverage will see the patient tomorrow for H&P.    Electronically signed by Luisito Mccord DO on 7/18/2024 at 3:51 PM

## 2024-07-18 NOTE — PROGRESS NOTES
4 Eyes Skin Assessment     NAME:  James Vazquez III  YOB: 1945  MEDICAL RECORD NUMBER:  [id number]    The patient is being assessed for  Admission    I agree that at least one RN has performed a thorough Head to Toe Skin Assessment on the patient. ALL assessment sites listed below have been assessed.      Areas assessed by both nurses:    Head, Face, Ears, Shoulders, Back, Chest, Arms, Elbows, Hands, Sacrum. Buttock, Coccyx, Ischium, Legs. Feet and Heels, and Under Medical Devices         Does the Patient have a Wound? No noted wound(s)       Luis Antonio Prevention initiated by RN: No  Wound Care Orders initiated by RN: No    Pressure Injury (Stage 3,4, Unstageable, DTI, NWPT, and Complex wounds) if present, place Wound referral order by RN under : No    New Ostomies, if present place, Ostomy referral order under : No     Nurse 1 eSignature: Electronically signed by Abhinav San RN on 7/18/24 at 5:46 PM EDT    **SHARE this note so that the co-signing nurse can place an eSignature**    Nurse 2 eSignature: Electronically signed by Livia Choi RN on 7/18/24 at 5:47 PM EDT   V2.0  [facility] Daily Progress Note      Name:  Marcus Izquierdo /Age/Sex: 1962  (63 y.o. female)   MRN & CSN:  [id number] & [id number] Encounter Date/Time: 2023 8:40 AM EST    Location:  [id number]-3056/1024-14 PCP: Wagner Gaffney Day: 2    Assessment and Plan:   Marcus Izquierdo is a 63 y.o. female with medical history significant for cerebral palsy, hypothyroidism, hypertension, depression, chronic hypoxic respiratory failure on 4 L oxygen via nasal cannula at baseline who was admitted on 2023 with acute encephalopathy and right-sided pneumonia. Assessment/Plan :   1. Pneumonia on CT chest.  Procalcitonin 0.25. Blood cultures are pending. Check pneumonia panel. Monitor CBC for resolution of leukocytosis. Consult speech therapy for swallowing evaluation. Nasal MRSA swab was negative so we will discontinue vancomycin. Continue cefepime. 2.  Acute encephalopathy. Resolved. 3.  Chronic respiratory failure on 4 L oxygen via nasal cannula. 4.  Recent COVID-19 infection    5. History of cerebral palsy. 6.  Depression. Continue Zoloft 150 mg daily and amitriptyline 50 mg daily. 7.  Hypothyroidism. Continue levothyroxine. 8.  Hypertension. Continue metoprolol 25 mg twice daily with hold parameters in place. 9.  Possible GI bleed. There is coffee-ground fluid in the PEG tube. Start IV PPI. Will consult GI. Monitor H&H.    DVT prophylaxis:Lovenox     Disposition: 3 to 4 days. Subjective:     Chief Complaint: Pneumonia and altered mental status    Alma Perry is a 63 y.o. female who presents with pneumonia and altered mental status. She is sleepy but woke up to verbal command and can answer questions appropriately. She is bedbound at baseline and has a PEG tube. She is tachycardic and her blood pressure is on the lower side. Review of Systems:    As above     Objective:      Intake/Output Summary (Last 24 hours) at 2023 0840  Last Duration 88 ms    Q-T Interval 366 ms    QTc Calculation (Bazett) 440 ms    P Axis 42 degrees    R Axis 17 degrees    T Axis 174 degrees    Diagnosis       Normal sinus rhythmST & T wave abnormality, consider inferolateral ischemiaAbnormal ECGWhen compared with ECG of 27-NOV-2022 00:41,Vent. rate has decreased BY  50 BPMST no longer depressed in Lateral leads   CBC with Auto Differential    Collection Time: 12/16/23  3:31 PM   Result Value Ref Range    WBC 11.3 (H) 4.0 - 11.0 K/uL    RBC 4.66 4.00 - 5.20 M/uL    Hemoglobin 14.8 12.0 - 16.0 g/dL    Hematocrit 45.1 36.0 - 48.0 %    MCV 96.7 80.0 - 100.0 fL    MCH 31.7 26.0 - 34.0 pg    MCHC 32.8 31.0 - 36.0 g/dL    RDW 15.7 (H) 12.4 - 15.4 %    Platelets 132 (L) 135 - 450 K/uL    MPV 10.4 5.0 - 10.5 fL    Neutrophils % 72.8 %    Lymphocytes % 17.8 %    Monocytes % 7.1 %    Eosinophils % 1.6 %    Basophils % 0.7 %    Neutrophils Absolute 8.2 (H) 1.7 - 7.7 K/uL    Lymphocytes Absolute 2.0 1.0 - 5.1 K/uL    Monocytes Absolute 0.8 0.0 - 1.3 K/uL    Eosinophils Absolute 0.2 0.0 - 0.6 K/uL    Basophils Absolute 0.1 0.0 - 0.2 K/uL   CMP w/ Reflex to MG    Collection Time: 12/16/23  3:31 PM   Result Value Ref Range    Sodium 140 136 - 145 mmol/L    Potassium reflex Magnesium 3.8 3.5 - 5.1 mmol/L    Chloride 94 (L) 99 - 110 mmol/L    CO2 38 (H) 21 - 32 mmol/L    Anion Gap 8 3 - 16    Glucose 114 (H) 70 - 99 mg/dL    BUN 7 7 - 20 mg/dL    Creatinine <0.5 (L) 0.6 - 1.2 mg/dL    Est, Glom Filt Rate >60 >60    Calcium 9.0 8.3 - 10.6 mg/dL    Total Protein 7.0 6.4 - 8.2 g/dL    Albumin 3.5 3.4 - 5.0 g/dL    Albumin/Globulin Ratio 1.0 (L) 1.1 - 2.2    Total Bilirubin 0.7 0.0 - 1.0 mg/dL    Alkaline Phosphatase 300 (H) 40 - 129 U/L    ALT 22 10 - 40 U/L    AST 44 (H) 15 - 37 U/L   Troponin    Collection Time: 12/16/23  3:31 PM   Result Value Ref Range    Troponin, High Sensitivity 23 (H) 0 - 14 ng/L   Brain Natriuretic Peptide    Collection Time: 12/16/23  3:31 PM   Result Value Ref  pleural effusion. Mild increased dilatation of common bile duct. Hepatic steatosis. Additional findings noted above.     XR CHEST PORTABLE    Result Date: 12/16/2023  EXAMINATION: ONE XRAY VIEW OF THE CHEST 12/16/2023 3:16 pm COMPARISON: November 27, 2022 HISTORY: ORDERING SYSTEM PROVIDED HISTORY: Shortness of Breath TECHNOLOGIST PROVIDED HISTORY: Reason for exam:->Shortness of Breath Reason for Exam: sob FINDINGS: Small right-sided pleural effusion with fluid extending to the minor fissure. No confluent infiltrate or pneumothorax.  Cardiac and mediastinal silhouettes are within normal limits. No acute osseous abnormality identified.     Small right-sided pleural effusions with fluid extending into the minor fissure.  No confluent infiltrate identified.       Electronically signed by Chintan Mendes MD on 12/17/2023 at 8:40 AM

## 2024-07-18 NOTE — ED NOTES
RN has spoken with patient about plan of care and interventions. All questions and concerns addressed at this time. No further issues at this time.

## 2024-07-18 NOTE — ED NOTES
Name: James Vazquez III  : 1945  MRN: [id number]    Date: 2024    Benefits of immediately proceeding with Radiology exam outweigh the risks and therefore the following is being waived:      [] Pregnancy test    [] Protocol for Iodine allergy    [] MRI questionnaire    [x] BUN/Creatinine        DO Mikael Menjivar Charles, DO  24 [id number]

## 2024-07-19 ENCOUNTER — APPOINTMENT (OUTPATIENT)
Dept: CT IMAGING | Age: 79
DRG: 152 | End: 2024-07-19
Payer: MEDICARE

## 2024-07-19 ENCOUNTER — APPOINTMENT (OUTPATIENT)
Age: 79
DRG: 152 | End: 2024-07-19
Attending: INTERNAL MEDICINE
Payer: MEDICARE

## 2024-07-19 LAB
ALBUMIN SERPL-MCNC: 3.4 G/DL (ref 3.5–5.2)
ALBUMIN SERPL-MCNC: 3.9 G/DL (ref 3.5–5.2)
ALP SERPL-CCNC: 77 U/L (ref 40–129)
ALP SERPL-CCNC: 91 U/L (ref 40–129)
ALT SERPL-CCNC: 10 U/L (ref 0–40)
ALT SERPL-CCNC: 8 U/L (ref 0–40)
ANION GAP SERPL CALCULATED.3IONS-SCNC: 11 MMOL/L (ref 7–16)
ANION GAP SERPL CALCULATED.3IONS-SCNC: 13 MMOL/L (ref 7–16)
AST SERPL-CCNC: 13 U/L (ref 0–39)
AST SERPL-CCNC: 18 U/L (ref 0–39)
BASOPHILS # BLD: 0.02 K/UL (ref 0–0.2)
BASOPHILS # BLD: 0.02 K/UL (ref 0–0.2)
BASOPHILS NFR BLD: 0 % (ref 0–2)
BASOPHILS NFR BLD: 0 % (ref 0–2)
BILIRUB SERPL-MCNC: 0.5 MG/DL (ref 0–1.2)
BILIRUB SERPL-MCNC: 0.9 MG/DL (ref 0–1.2)
BUN SERPL-MCNC: 24 MG/DL (ref 6–23)
BUN SERPL-MCNC: 52 MG/DL (ref 6–23)
CALCIUM SERPL-MCNC: 9 MG/DL (ref 8.6–10.2)
CALCIUM SERPL-MCNC: 9.5 MG/DL (ref 8.6–10.2)
CHLORIDE SERPL-SCNC: 92 MMOL/L (ref 98–107)
CHLORIDE SERPL-SCNC: 96 MMOL/L (ref 98–107)
CHOLEST SERPL-MCNC: 94 MG/DL
CO2 SERPL-SCNC: 29 MMOL/L (ref 22–29)
CO2 SERPL-SCNC: 29 MMOL/L (ref 22–29)
CREAT SERPL-MCNC: 3.9 MG/DL (ref 0.7–1.2)
CREAT SERPL-MCNC: 6 MG/DL (ref 0.7–1.2)
EOSINOPHIL # BLD: 0.07 K/UL (ref 0.05–0.5)
EOSINOPHIL # BLD: 0.12 K/UL (ref 0.05–0.5)
EOSINOPHILS RELATIVE PERCENT: 1 % (ref 0–6)
EOSINOPHILS RELATIVE PERCENT: 3 % (ref 0–6)
ERYTHROCYTE [DISTWIDTH] IN BLOOD BY AUTOMATED COUNT: 16.4 % (ref 11.5–15)
ERYTHROCYTE [DISTWIDTH] IN BLOOD BY AUTOMATED COUNT: 16.5 % (ref 11.5–15)
GFR, ESTIMATED: 15 ML/MIN/1.73M2
GFR, ESTIMATED: 9 ML/MIN/1.73M2
GLUCOSE SERPL-MCNC: 70 MG/DL (ref 74–99)
GLUCOSE SERPL-MCNC: 82 MG/DL (ref 74–99)
HCT VFR BLD AUTO: 31.9 % (ref 37–54)
HCT VFR BLD AUTO: 36.3 % (ref 37–54)
HDLC SERPL-MCNC: 38 MG/DL
HGB BLD-MCNC: 10.1 G/DL (ref 12.5–16.5)
HGB BLD-MCNC: 11.5 G/DL (ref 12.5–16.5)
IMM GRANULOCYTES # BLD AUTO: 0.03 K/UL (ref 0–0.58)
IMM GRANULOCYTES # BLD AUTO: 0.05 K/UL (ref 0–0.58)
IMM GRANULOCYTES NFR BLD: 1 % (ref 0–5)
IMM GRANULOCYTES NFR BLD: 1 % (ref 0–5)
LDLC SERPL CALC-MCNC: 42 MG/DL
LYMPHOCYTES NFR BLD: 0.71 K/UL (ref 1.5–4)
LYMPHOCYTES NFR BLD: 0.78 K/UL (ref 1.5–4)
LYMPHOCYTES RELATIVE PERCENT: 11 % (ref 20–42)
LYMPHOCYTES RELATIVE PERCENT: 17 % (ref 20–42)
MCH RBC QN AUTO: 32.5 PG (ref 26–35)
MCH RBC QN AUTO: 32.6 PG (ref 26–35)
MCHC RBC AUTO-ENTMCNC: 31.7 G/DL (ref 32–34.5)
MCHC RBC AUTO-ENTMCNC: 31.7 G/DL (ref 32–34.5)
MCV RBC AUTO: 102.6 FL (ref 80–99.9)
MCV RBC AUTO: 102.8 FL (ref 80–99.9)
MONOCYTES NFR BLD: 0.53 K/UL (ref 0.1–0.95)
MONOCYTES NFR BLD: 0.75 K/UL (ref 0.1–0.95)
MONOCYTES NFR BLD: 12 % (ref 2–12)
MONOCYTES NFR BLD: 12 % (ref 2–12)
NEUTROPHILS NFR BLD: 67 % (ref 43–80)
NEUTROPHILS NFR BLD: 76 % (ref 43–80)
NEUTS SEG NFR BLD: 3.09 K/UL (ref 1.8–7.3)
NEUTS SEG NFR BLD: 4.91 K/UL (ref 1.8–7.3)
PLATELET # BLD AUTO: 79 K/UL (ref 130–450)
PLATELET CONFIRMATION: NORMAL
PLATELET, FLUORESCENCE: 110 K/UL (ref 130–450)
PMV BLD AUTO: 11.2 FL (ref 7–12)
PMV BLD AUTO: 12.2 FL (ref 7–12)
POTASSIUM SERPL-SCNC: 4.2 MMOL/L (ref 3.5–5)
POTASSIUM SERPL-SCNC: 4.5 MMOL/L (ref 3.5–5)
PROT SERPL-MCNC: 5.9 G/DL (ref 6.4–8.3)
PROT SERPL-MCNC: 6.7 G/DL (ref 6.4–8.3)
RBC # BLD AUTO: 3.11 M/UL (ref 3.8–5.8)
RBC # BLD AUTO: 3.53 M/UL (ref 3.8–5.8)
SODIUM SERPL-SCNC: 134 MMOL/L (ref 132–146)
SODIUM SERPL-SCNC: 136 MMOL/L (ref 132–146)
TRIGL SERPL-MCNC: 69 MG/DL
VLDLC SERPL CALC-MCNC: 14 MG/DL
WBC OTHER # BLD: 4.6 K/UL (ref 4.5–11.5)
WBC OTHER # BLD: 6.5 K/UL (ref 4.5–11.5)

## 2024-07-19 PROCEDURE — 85025 COMPLETE CBC W/AUTO DIFF WBC: CPT

## 2024-07-19 PROCEDURE — 2580000003 HC RX 258: Performed by: INTERNAL MEDICINE

## 2024-07-19 PROCEDURE — 6370000000 HC RX 637 (ALT 250 FOR IP): Performed by: INTERNAL MEDICINE

## 2024-07-19 PROCEDURE — 96372 THER/PROPH/DIAG INJ SC/IM: CPT

## 2024-07-19 PROCEDURE — 2500000003 HC RX 250 WO HCPCS: Performed by: INTERNAL MEDICINE

## 2024-07-19 PROCEDURE — G0378 HOSPITAL OBSERVATION PER HR: HCPCS

## 2024-07-19 PROCEDURE — 5A1D70Z PERFORMANCE OF URINARY FILTRATION, INTERMITTENT, LESS THAN 6 HOURS PER DAY: ICD-10-PCS | Performed by: INTERNAL MEDICINE

## 2024-07-19 PROCEDURE — 6360000002 HC RX W HCPCS: Performed by: INTERNAL MEDICINE

## 2024-07-19 PROCEDURE — 6370000000 HC RX 637 (ALT 250 FOR IP)

## 2024-07-19 PROCEDURE — 90935 HEMODIALYSIS ONE EVALUATION: CPT

## 2024-07-19 PROCEDURE — 94640 AIRWAY INHALATION TREATMENT: CPT

## 2024-07-19 PROCEDURE — 80061 LIPID PANEL: CPT

## 2024-07-19 PROCEDURE — 93306 TTE W/DOPPLER COMPLETE: CPT

## 2024-07-19 PROCEDURE — 80053 COMPREHEN METABOLIC PANEL: CPT

## 2024-07-19 PROCEDURE — 70450 CT HEAD/BRAIN W/O DYE: CPT

## 2024-07-19 RX ORDER — ALBUTEROL SULFATE 2.5 MG/3ML
2.5 SOLUTION RESPIRATORY (INHALATION) EVERY 6 HOURS PRN
Status: DISCONTINUED | OUTPATIENT
Start: 2024-07-19 | End: 2024-07-23 | Stop reason: HOSPADM

## 2024-07-19 RX ORDER — IPRATROPIUM BROMIDE AND ALBUTEROL SULFATE 2.5; .5 MG/3ML; MG/3ML
1 SOLUTION RESPIRATORY (INHALATION)
Status: DISCONTINUED | OUTPATIENT
Start: 2024-07-19 | End: 2024-07-23 | Stop reason: HOSPADM

## 2024-07-19 RX ADMIN — HEPARIN SODIUM 5000 UNITS: 5000 INJECTION INTRAVENOUS; SUBCUTANEOUS at 05:57

## 2024-07-19 RX ADMIN — Medication 2000 UNITS: at 11:29

## 2024-07-19 RX ADMIN — SODIUM CHLORIDE, PRESERVATIVE FREE 10 ML: 5 INJECTION INTRAVENOUS at 21:28

## 2024-07-19 RX ADMIN — PANTOPRAZOLE SODIUM 40 MG: 40 TABLET, DELAYED RELEASE ORAL at 05:57

## 2024-07-19 RX ADMIN — SODIUM CHLORIDE, PRESERVATIVE FREE 10 ML: 5 INJECTION INTRAVENOUS at 11:29

## 2024-07-19 RX ADMIN — CLOPIDOGREL BISULFATE 75 MG: 75 TABLET ORAL at 11:29

## 2024-07-19 RX ADMIN — IPRATROPIUM BROMIDE AND ALBUTEROL SULFATE 1 DOSE: 2.5; .5 SOLUTION RESPIRATORY (INHALATION) at 16:50

## 2024-07-19 RX ADMIN — HEPARIN SODIUM 5000 UNITS: 5000 INJECTION INTRAVENOUS; SUBCUTANEOUS at 14:10

## 2024-07-19 RX ADMIN — HEPARIN SODIUM 5000 UNITS: 5000 INJECTION INTRAVENOUS; SUBCUTANEOUS at 21:21

## 2024-07-19 RX ADMIN — BUDESONIDE 250 MCG: 0.25 SUSPENSION RESPIRATORY (INHALATION) at 19:47

## 2024-07-19 RX ADMIN — CARVEDILOL 12.5 MG: 6.25 TABLET, FILM COATED ORAL at 11:29

## 2024-07-19 RX ADMIN — ISOSORBIDE MONONITRATE 30 MG: 30 TABLET, EXTENDED RELEASE ORAL at 11:29

## 2024-07-19 RX ADMIN — CARVEDILOL 12.5 MG: 6.25 TABLET, FILM COATED ORAL at 16:27

## 2024-07-19 RX ADMIN — ARFORMOTEROL TARTRATE 15 MCG: 15 SOLUTION RESPIRATORY (INHALATION) at 19:48

## 2024-07-19 RX ADMIN — IPRATROPIUM BROMIDE AND ALBUTEROL SULFATE 1 DOSE: 2.5; .5 SOLUTION RESPIRATORY (INHALATION) at 19:48

## 2024-07-19 RX ADMIN — IPRATROPIUM BROMIDE AND ALBUTEROL SULFATE 1 DOSE: 2.5; .5 SOLUTION RESPIRATORY (INHALATION) at 12:37

## 2024-07-19 RX ADMIN — CALCIUM ACETATE 667 MG: 667 CAPSULE ORAL at 11:29

## 2024-07-19 RX ADMIN — ROSUVASTATIN CALCIUM 10 MG: 10 TABLET, FILM COATED ORAL at 21:21

## 2024-07-19 NOTE — CONSULTS
Thomas Sinclair M.D.,[facility]  Jori Carty D.O., BRIELLE., [facility]  Nima Monet M.D.  Mattie Goldman M.D.   Kenji Rowley D.O.  Carlos Gruber M.D.       Patient:  James Vazquez III 78 y.o. male MRN: [id number]           PULMONARY CONSULTATION    Reason for Consultation: lung  mass  Referring Physician: Luisito Mccord DO    Communication with the referring physician will be sent via the electronic medical record.    Chief Complaint: SOB, cough    CODE STATUS: FULL CODE    SUBJECTIVE:  HPI:  James Vazquez III is a 78 y.o. male we were asked to evaluate for lung mass. He is known to our practice and follows with Dr. Kenji Rowley for severe Gold Stage III COPD. He was last seen by our NP in the office in August of last year. His home regimen consists of Trelegy daily and albuterol for rescue.    James came to the hospital with shortness of breath and cough. He was recently treated with prednisone and azithromycin but symptoms got worse. In the ED, he was positive for Rhinovirus. Pulmonary CTA  was negative for PE but did find a suspicious RUL cavitating pulmonary nodule. ProBNBP grossly elevated at >70,000 and troponin 895, 821. Cardiology has been consulted and is reporting the elevated troponins are chronic and likely from ESRD/iHD and an echocardiogram has been ordered.    James was seen today lying in bed after returning from dialysis. He reports having some shortness of breath from time to time. He does report overall not feeling well and very fatigued. He currently appears comfortable and is not in any distress.      Past Medical History:   Diagnosis Date    Blind left eye     Chronic kidney disease     Dialysis patient (HCC)     Hemodialysis patient (HCC)     Hyperlipidemia     Hypertension        Past Surgical History:   Procedure Laterality Date    AV FISTULA CREATION Left 2015    Dr. Phillips [facility]    CARDIAC SURGERY      HEMODIALYSIS ACCESS PERCUTANEOUS REVISION Left 2019    2 x Dr. Phillips, [facility]    HIP 
Department of Internal Medicine  Nephrology Nurse Practitioner Consult Note      Reason for Consult:  End-Stage Renal Disease  Requesting Physician:  Melecio Youssef DO     CHIEF COMPLAINT:  shortness of breath    History Obtained From:  patient, electronic medical record    HISTORY OF PRESENT ILLNESS:  Briefly, Mr. James Vazquez is a 78 year old male with a PMH of ESRD on IHD three times weekly MWF, via left upper arm AVF, HTN, HFpEF, HDL, BPH, herpes zoster with encephalitis, who was admitted 7/18/24 for shortness of breath.  We are consulted for hemodialysis management.     Past Medical History:        Diagnosis Date    Blind left eye     Chronic kidney disease     Dialysis patient (HCC)     Hemodialysis patient (HCC)     Hyperlipidemia     Hypertension      Past Surgical History:        Procedure Laterality Date    AV FISTULA CREATION Left 2015    Dr. Phillips [facility]    CARDIAC SURGERY      HEMODIALYSIS ACCESS PERCUTANEOUS REVISION Left 2019    2 x Dr. Phillips, [facility]    HIP SURGERY Left 10/16/2020    HIP HEMIARTHROPLASTY performed by Abel Birmingham MD at [facility] OR    PERIPHERAL VASCULAR BYPASS  2008    Aortobifemoral bypass for claudicatio - Dr. Phillips [facility]    UPPER GASTROINTESTINAL ENDOSCOPY N/A 7/20/2020    EGD ESOPHAGOGASTRODUODENOSCOPY WITH ANTRAL BIOPSY performed by Lloyd Styles MD at [facility] OR    UPPER GASTROINTESTINAL ENDOSCOPY N/A 10/28/2020    EGD ESOPHAGOGASTRODUODENOSCOPY performed by Lloyd Styles MD at [facility] ENDOSCOPY     Current Medications:    Current Facility-Administered Medications: perflutren lipid microspheres (DEFINITY) injection 1.5 mL, 1.5 mL, IntraVENous, ONCE PRN  ipratropium 0.5 mg-albuterol 2.5 mg (DUONEB) nebulizer solution 1 Dose, 1 Dose, Inhalation, Q4H WA RT  albuterol (PROVENTIL) (2.5 MG/3ML) 0.083% nebulizer solution 2.5 mg, 2.5 mg, Nebulization, Q6H PRN  sodium chloride flush 0.9 % injection 10 mL, 10 mL, IntraVENous, 2 times per day  sodium chloride flush 0.9 % injection 10 
HPI    Physical Exam:  BP (!) 130/45   Pulse 60   Temp 97.9 °F (36.6 °C) (Oral)   Resp 18   Ht 1.702 m (5' 7\")   Wt 69.8 kg (153 lb 14.4 oz)   SpO2 97%   BMI 24.10 kg/m²   Weight change:   Wt Readings from Last 3 Encounters:   07/19/24 69.8 kg (153 lb 14.4 oz)   01/15/24 68.2 kg (150 lb 5.7 oz)   08/03/23 61.7 kg (136 lb)       General: Awake, alert, oriented x3, no acute distress    HEENT: Normocephalic and atraumatic, pupils equal and round.  Sclera nonicteric and oral mucosa moist.  Tongue and trachea midline.    Neck: No JVD or bruits.  No thyroid enlargement or adenopathy    Cardiac: Regular rate and rhythm, normal S1 and S2, no S3.  Apical impulse is normal.  No murmurs, rubs or clicks.  No carotid bruits and no abdominal bruits.  No peripheral edema, cyanosis or clubbing.  Normal pulses in the upper and lower extremities bilaterally.    Resp: Unlabored respirations at rest.  No wheezes, rales or rhonchi.    Abdomen: soft, nontender, nondistended, active bowel sounds    Skin: Warm and dry, no cyanosis.    Musculoskeletal: normal tone and strength in the upper and lower extremities bilaterally    Neuro: Moves all extremities appropriately to command.  Normal sensation to light touch in the upper and lower extremities bilaterally    Psych: Cooperative, and normal affect    Intake/Output:  No intake or output data in the 24 hours ending 07/19/24 0430  No intake/output data recorded.    Laboratory Tests:  Lab Results   Component Value Date    CREATININE 6.0 (H) 07/19/2024    BUN 52 (H) 07/19/2024     07/19/2024    K 4.2 07/19/2024    CL 96 (L) 07/19/2024    CO2 29 07/19/2024     No results for input(s): \"CKTOTAL\", \"CKMB\", \"TROPONINI\" in the last 72 hours.  Lab Results   Component Value Date    PROBNP >70,000 (H) 07/18/2024     Lab Results   Component Value Date/Time    WBC 4.6 07/19/2024 02:34 AM    RBC 3.11 07/19/2024 02:34 AM    HGB 10.1 07/19/2024 02:34 AM    HCT 31.9 07/19/2024 02:34 AM    MCV

## 2024-07-19 NOTE — PROGRESS NOTES
SPIRITUAL HEALTH SERVICES - [facility] [place] Encounter    Name: James Vazquez III                  Referral: Routine Visit    Sacraments  Anointed (Last Rites): No  Apostolic Sayre: No  Confession: No  Communion: No     Assessment:  Patient unavailable for visit.      Intervention:  None.     Outcome:  None.    Plan:  Chaplains will remain available to offer spiritual and emotional support as needed.      Electronically signed by Chaplain Salvador, on 7/19/2024 at 3:29 PM.  Spiritual Care Department  [facility]  [phone]

## 2024-07-19 NOTE — H&P
but clear to auscultation bilaterally, without rhonchi, crackle, wheezing, or rale, no retractions or use of accessory muscles  Heart: regular rate and regular rhythm, no murmur, normal S1, S2  Abdomen: soft, non-tender; bowel sounds normal; no masses, no organomegaly  : Deferred   Extremities: no lower extremity edema, extremities atraumatic, no cyanosis, no clubbing, 2+ pedal pulses palpated, LUE fistula   Skin: normal color, normal texture, normal turgor, no rashes, no lesions  Neurologic:5/5 muscle strength throughout, normal muscle tone throughout, face symmetric, hearing intact, tongue midline, speech appropriate without slurring, sensation to fine touch intact in upper and lower extremities    Labs-   Lab Results   Component Value Date    WBC 4.6 07/19/2024    HGB 10.1 (L) 07/19/2024    HCT 31.9 (L) 07/19/2024    PLT 79 (L) 07/19/2024     07/19/2024    K 4.2 07/19/2024    CL 96 (L) 07/19/2024    CREATININE 6.0 (H) 07/19/2024    BUN 52 (H) 07/19/2024    CO2 29 07/19/2024    GLUCOSE 70 (L) 07/19/2024    ALT 8 07/19/2024    AST 13 07/19/2024    INR 1.1 01/15/2024    APTT 35.9 (H) 11/03/2020       Lab Results   Component Value Date    IRON 102 10/15/2022    FERRITIN 656 10/15/2022    TIBC 179 (L) 10/15/2022       Lab Results   Component Value Date    TROPHS 821 (H) 07/18/2024    TROPHS 895 (H) 07/18/2024    TROPHS 165 (H) 01/16/2024        Lab Results   Component Value Date    TSH 5.470 (H) 02/07/2023       Lab Results   Component Value Date    MG 1.9 02/07/2023    PHOS 3.2 08/14/2021       Recent Radiological Studies:  CTA PULMONARY W CONTRAST   Final Result   There is no evidence of pulmonary arterial embolization.      Spiculated 15 mm right upper lobe pulmonary nodule.  Finding is worrisome for   malignancy and PET-CT is recommended for further evaluation.         XR CHEST PORTABLE   Final Result   No acute process.             Assessment  Active Hospital Problems    Diagnosis     Elevated troponin  [R79.89]      Priority: High    Hyperlipidemia [E78.5]     Hypertension [I10]     Thrombocytopenia (HCC) [D69.6]     ESRD (end stage renal disease) (HCC) [N18.6]        Plan  Dyspnea-- angina equivalent?  Elevated troponin noted  CTA chest noted  Telemetry  Cardiology consultation appreciated-- echo pending   Troponin noted  ASA    Lung mass:  Pulm consult appreciated  Biopsy eventually needed-- defer to pulm     ESRD on HD:   Defer HD, electrolyte replacement, and fluid management to renal  Follow electrolytes  Sp HD 7/19 as an IP    Rhinovirus URI:  Symptomatic tx    PT/OT  Continue home medications other than as noted above.  Follow labs  DVT prophylaxis with heparin  Please see orders for further management and care.   for discharge planning  Discharge plan: home soon pending the echo    Luisito Mccord DO   7/19/2024  11:11 AM    I can be reached through [employer].    NOTE:  This report was transcribed using voice recognition software.  Every effort was made to ensure accuracy; however, inadvertent computerized transcription errors may be present.

## 2024-07-19 NOTE — ACP (ADVANCE CARE PLANNING)
Advance Care Planning   Healthcare Decision Maker:    Primary Decision Maker: Laura Vazquez - [facility] - [phone]    Click here to complete Healthcare Decision Makers including selection of the Healthcare Decision Maker Relationship (ie \"Primary\").

## 2024-07-19 NOTE — CARE COORDINATION
Social Work/Discharge Planning:  Met with patient and his son Jesus Manuel and completed assessment.  Explained Social Work role and discussed transition of care/discharge planning.  Patient lives with his wife in a one story house.  PTA he uses a rollator walker.  He has a standard walker, wheelchair and shower chair.  He has dialysis every Monday, Wednesday, and Friday at [facility].  Family denies any home care needs.  Patient to discharge home today. Patient had dialysis today.  Notified Patricia with [facility] (ph: [phone]) of discharge.  Electronically signed by NOE Esquivel on 7/19/2024 at 1:17 PM

## 2024-07-20 LAB
ALBUMIN SERPL-MCNC: 3.3 G/DL (ref 3.5–5.2)
ALP SERPL-CCNC: 76 U/L (ref 40–129)
ALT SERPL-CCNC: 8 U/L (ref 0–40)
ANION GAP SERPL CALCULATED.3IONS-SCNC: 9 MMOL/L (ref 7–16)
AST SERPL-CCNC: 13 U/L (ref 0–39)
BASOPHILS # BLD: 0.01 K/UL (ref 0–0.2)
BASOPHILS NFR BLD: 0 % (ref 0–2)
BILIRUB SERPL-MCNC: 0.7 MG/DL (ref 0–1.2)
BUN SERPL-MCNC: 29 MG/DL (ref 6–23)
CALCIUM SERPL-MCNC: 9.1 MG/DL (ref 8.6–10.2)
CHLORIDE SERPL-SCNC: 94 MMOL/L (ref 98–107)
CO2 SERPL-SCNC: 31 MMOL/L (ref 22–29)
CREAT SERPL-MCNC: 4.9 MG/DL (ref 0.7–1.2)
ECHO AO ASC DIAM: 3.4 CM
ECHO AO ASCENDING AORTA INDEX: 1.88 CM/M2
ECHO AR MAX VEL PISA: 3.8 M/S
ECHO AV AREA PEAK VELOCITY: 0.9 CM2
ECHO AV AREA VTI: 1 CM2
ECHO AV AREA/BSA PEAK VELOCITY: 0.5 CM2/M2
ECHO AV AREA/BSA VTI: 0.6 CM2/M2
ECHO AV CUSP MM: 1 CM
ECHO AV MEAN GRADIENT: 30 MMHG
ECHO AV MEAN VELOCITY: 2.7 M/S
ECHO AV PEAK GRADIENT: 44 MMHG
ECHO AV PEAK VELOCITY: 3.3 M/S
ECHO AV REGURGITANT PHT: 311.8 MILLISECOND
ECHO AV VELOCITY RATIO: 0.3
ECHO AV VTI: 73 CM
ECHO BSA: 1.8 M2
ECHO LA DIAMETER INDEX: 1.99 CM/M2
ECHO LA DIAMETER: 3.6 CM
ECHO LA VOL A-L A2C: 96 ML (ref 18–58)
ECHO LA VOL A-L A4C: 103 ML (ref 18–58)
ECHO LA VOL MOD A2C: 85 ML (ref 18–58)
ECHO LA VOL MOD A4C: 102 ML (ref 18–58)
ECHO LA VOLUME AREA LENGTH: 108 ML
ECHO LA VOLUME INDEX A-L A2C: 53 ML/M2 (ref 16–34)
ECHO LA VOLUME INDEX A-L A4C: 57 ML/M2 (ref 16–34)
ECHO LA VOLUME INDEX AREA LENGTH: 60 ML/M2 (ref 16–34)
ECHO LA VOLUME INDEX MOD A2C: 47 ML/M2 (ref 16–34)
ECHO LA VOLUME INDEX MOD A4C: 56 ML/M2 (ref 16–34)
ECHO LV FRACTIONAL SHORTENING: 33 % (ref 28–44)
ECHO LV INTERNAL DIMENSION DIASTOLE INDEX: 2.82 CM/M2
ECHO LV INTERNAL DIMENSION DIASTOLIC: 5.1 CM (ref 4.2–5.9)
ECHO LV INTERNAL DIMENSION SYSTOLIC INDEX: 1.88 CM/M2
ECHO LV INTERNAL DIMENSION SYSTOLIC: 3.4 CM
ECHO LV IVSD: 1.1 CM (ref 0.6–1)
ECHO LV IVSS: 1.1 CM
ECHO LV MASS 2D: 241.2 G (ref 88–224)
ECHO LV MASS INDEX 2D: 133.3 G/M2 (ref 49–115)
ECHO LV POSTERIOR WALL DIASTOLIC: 1.3 CM (ref 0.6–1)
ECHO LV POSTERIOR WALL SYSTOLIC: 1.4 CM
ECHO LV RELATIVE WALL THICKNESS RATIO: 0.51
ECHO LVOT AREA: 3.1 CM2
ECHO LVOT AV VTI INDEX: 0.32
ECHO LVOT DIAM: 2 CM
ECHO LVOT MEAN GRADIENT: 2 MMHG
ECHO LVOT PEAK GRADIENT: 4 MMHG
ECHO LVOT PEAK VELOCITY: 1 M/S
ECHO LVOT STROKE VOLUME INDEX: 39.9 ML/M2
ECHO LVOT SV: 72.2 ML
ECHO LVOT VTI: 23 CM
ECHO MV "A" WAVE DURATION: 114.2 MSEC
ECHO MV A VELOCITY: 1.01 M/S
ECHO MV AREA PHT: 3.7 CM2
ECHO MV AREA VTI: 2.4 CM2
ECHO MV E DECELERATION TIME (DT): 291.1 MS
ECHO MV E VELOCITY: 0.91 M/S
ECHO MV E/A RATIO: 0.9
ECHO MV LVOT VTI INDEX: 1.29
ECHO MV MAX VELOCITY: 1.1 M/S
ECHO MV MEAN GRADIENT: 2 MMHG
ECHO MV MEAN VELOCITY: 0.7 M/S
ECHO MV PEAK GRADIENT: 5 MMHG
ECHO MV PRESSURE HALF TIME (PHT): 59.8 MS
ECHO MV VTI: 29.6 CM
ECHO PV MAX VELOCITY: 1.1 M/S
ECHO PV MEAN GRADIENT: 2 MMHG
ECHO PV MEAN VELOCITY: 0.7 M/S
ECHO PV PEAK GRADIENT: 5 MMHG
ECHO PV VTI: 18.7 CM
ECHO TV REGURGITANT MAX VELOCITY: 3.02 M/S
ECHO TV REGURGITANT PEAK GRADIENT: 36 MMHG
EOSINOPHIL # BLD: 0.06 K/UL (ref 0.05–0.5)
EOSINOPHILS RELATIVE PERCENT: 1 % (ref 0–6)
ERYTHROCYTE [DISTWIDTH] IN BLOOD BY AUTOMATED COUNT: 16.5 % (ref 11.5–15)
GFR, ESTIMATED: 11 ML/MIN/1.73M2
GLUCOSE SERPL-MCNC: 165 MG/DL (ref 74–99)
HCT VFR BLD AUTO: 32.4 % (ref 37–54)
HGB BLD-MCNC: 10.1 G/DL (ref 12.5–16.5)
IMM GRANULOCYTES # BLD AUTO: 0.03 K/UL (ref 0–0.58)
IMM GRANULOCYTES NFR BLD: 0 % (ref 0–5)
LYMPHOCYTES NFR BLD: 0.51 K/UL (ref 1.5–4)
LYMPHOCYTES RELATIVE PERCENT: 7 % (ref 20–42)
MCH RBC QN AUTO: 32.3 PG (ref 26–35)
MCHC RBC AUTO-ENTMCNC: 31.2 G/DL (ref 32–34.5)
MCV RBC AUTO: 103.5 FL (ref 80–99.9)
MONOCYTES NFR BLD: 0.79 K/UL (ref 0.1–0.95)
MONOCYTES NFR BLD: 11 % (ref 2–12)
NEUTROPHILS NFR BLD: 81 % (ref 43–80)
NEUTS SEG NFR BLD: 6.02 K/UL (ref 1.8–7.3)
PLATELET, FLUORESCENCE: 103 K/UL (ref 130–450)
PMV BLD AUTO: 11.9 FL (ref 7–12)
POTASSIUM SERPL-SCNC: 4.1 MMOL/L (ref 3.5–5)
PROT SERPL-MCNC: 5.9 G/DL (ref 6.4–8.3)
RBC # BLD AUTO: 3.13 M/UL (ref 3.8–5.8)
RBC # BLD: ABNORMAL 10*6/UL
SODIUM SERPL-SCNC: 134 MMOL/L (ref 132–146)
WBC # BLD: ABNORMAL 10*3/UL
WBC OTHER # BLD: 7.4 K/UL (ref 4.5–11.5)

## 2024-07-20 PROCEDURE — 6370000000 HC RX 637 (ALT 250 FOR IP)

## 2024-07-20 PROCEDURE — 2060000000 HC ICU INTERMEDIATE R&B

## 2024-07-20 PROCEDURE — 94640 AIRWAY INHALATION TREATMENT: CPT

## 2024-07-20 PROCEDURE — 6360000002 HC RX W HCPCS: Performed by: INTERNAL MEDICINE

## 2024-07-20 PROCEDURE — 80053 COMPREHEN METABOLIC PANEL: CPT

## 2024-07-20 PROCEDURE — 96372 THER/PROPH/DIAG INJ SC/IM: CPT

## 2024-07-20 PROCEDURE — 85025 COMPLETE CBC W/AUTO DIFF WBC: CPT

## 2024-07-20 PROCEDURE — G0378 HOSPITAL OBSERVATION PER HR: HCPCS

## 2024-07-20 PROCEDURE — 6370000000 HC RX 637 (ALT 250 FOR IP): Performed by: INTERNAL MEDICINE

## 2024-07-20 PROCEDURE — 2580000003 HC RX 258: Performed by: INTERNAL MEDICINE

## 2024-07-20 PROCEDURE — 96374 THER/PROPH/DIAG INJ IV PUSH: CPT

## 2024-07-20 PROCEDURE — 36415 COLL VENOUS BLD VENIPUNCTURE: CPT

## 2024-07-20 RX ADMIN — HEPARIN SODIUM 5000 UNITS: 5000 INJECTION INTRAVENOUS; SUBCUTANEOUS at 21:42

## 2024-07-20 RX ADMIN — ARFORMOTEROL TARTRATE 15 MCG: 15 SOLUTION RESPIRATORY (INHALATION) at 09:05

## 2024-07-20 RX ADMIN — HEPARIN SODIUM 5000 UNITS: 5000 INJECTION INTRAVENOUS; SUBCUTANEOUS at 13:04

## 2024-07-20 RX ADMIN — ARFORMOTEROL TARTRATE 15 MCG: 15 SOLUTION RESPIRATORY (INHALATION) at 19:56

## 2024-07-20 RX ADMIN — IPRATROPIUM BROMIDE AND ALBUTEROL SULFATE 1 DOSE: 2.5; .5 SOLUTION RESPIRATORY (INHALATION) at 09:05

## 2024-07-20 RX ADMIN — BUDESONIDE 250 MCG: 0.25 SUSPENSION RESPIRATORY (INHALATION) at 09:05

## 2024-07-20 RX ADMIN — IPRATROPIUM BROMIDE AND ALBUTEROL SULFATE 1 DOSE: 2.5; .5 SOLUTION RESPIRATORY (INHALATION) at 19:56

## 2024-07-20 RX ADMIN — IPRATROPIUM BROMIDE AND ALBUTEROL SULFATE 1 DOSE: 2.5; .5 SOLUTION RESPIRATORY (INHALATION) at 15:57

## 2024-07-20 RX ADMIN — SODIUM CHLORIDE, PRESERVATIVE FREE 10 ML: 5 INJECTION INTRAVENOUS at 21:42

## 2024-07-20 RX ADMIN — CARVEDILOL 12.5 MG: 6.25 TABLET, FILM COATED ORAL at 17:13

## 2024-07-20 RX ADMIN — HEPARIN SODIUM 5000 UNITS: 5000 INJECTION INTRAVENOUS; SUBCUTANEOUS at 06:19

## 2024-07-20 RX ADMIN — CARVEDILOL 12.5 MG: 6.25 TABLET, FILM COATED ORAL at 06:18

## 2024-07-20 RX ADMIN — ONDANSETRON 4 MG: 2 INJECTION INTRAMUSCULAR; INTRAVENOUS at 07:54

## 2024-07-20 RX ADMIN — IPRATROPIUM BROMIDE AND ALBUTEROL SULFATE 1 DOSE: 2.5; .5 SOLUTION RESPIRATORY (INHALATION) at 12:56

## 2024-07-20 RX ADMIN — ROSUVASTATIN CALCIUM 10 MG: 10 TABLET, FILM COATED ORAL at 21:42

## 2024-07-20 RX ADMIN — BUDESONIDE 250 MCG: 0.25 SUSPENSION RESPIRATORY (INHALATION) at 19:56

## 2024-07-20 ASSESSMENT — PAIN SCALES - GENERAL: PAINLEVEL_OUTOF10: 0

## 2024-07-20 NOTE — PROGRESS NOTES
Notified Patricia JACKSON of patient's new onset confusion and visual hallucinations. New orders received.

## 2024-07-20 NOTE — PROGRESS NOTES
Department of Internal Medicine  Nephrology Progress Note      Reason for Consult:  End-Stage Renal Disease  Requesting Physician:  Melecio Youssef DO     CHIEF COMPLAINT:  shortness of breath    History Obtained From:  patient, electronic medical record    HISTORY OF PRESENT ILLNESS:  Briefly, Mr. James Vazquez is a 78 year old male with a PMH of ESRD on IHD three times weekly MWF, via left upper arm AVF, HTN, HFpEF, HDL, BPH, herpes zoster with encephalitis, who was admitted 7/18/24 for shortness of breath.  We are consulted for hemodialysis management.     Past Medical History:        Diagnosis Date    Blind left eye     Chronic kidney disease     Dialysis patient (HCC)     Hemodialysis patient (HCC)     Hyperlipidemia     Hypertension      Past Surgical History:        Procedure Laterality Date    AV FISTULA CREATION Left 2015    Dr. Phillips [facility]    CARDIAC SURGERY      HEMODIALYSIS ACCESS PERCUTANEOUS REVISION Left 2019    2 x Dr. Phillips, [facility]    HIP SURGERY Left 10/16/2020    HIP HEMIARTHROPLASTY performed by Abel Birmingham MD at [facility] OR    PERIPHERAL VASCULAR BYPASS  2008    Aortobifemoral bypass for claudicatio - Dr. Phillips [facility]    UPPER GASTROINTESTINAL ENDOSCOPY N/A 7/20/2020    EGD ESOPHAGOGASTRODUODENOSCOPY WITH ANTRAL BIOPSY performed by Lloyd Styles MD at [facility] OR    UPPER GASTROINTESTINAL ENDOSCOPY N/A 10/28/2020    EGD ESOPHAGOGASTRODUODENOSCOPY performed by Lloyd Styles MD at [facility] ENDOSCOPY     Current Medications:    Current Facility-Administered Medications: perflutren lipid microspheres (DEFINITY) injection 1.5 mL, 1.5 mL, IntraVENous, ONCE PRN  ipratropium 0.5 mg-albuterol 2.5 mg (DUONEB) nebulizer solution 1 Dose, 1 Dose, Inhalation, Q4H WA RT  albuterol (PROVENTIL) (2.5 MG/3ML) 0.083% nebulizer solution 2.5 mg, 2.5 mg, Nebulization, Q6H PRN  sodium chloride flush 0.9 % injection 10 mL, 10 mL, IntraVENous, 2 times per day  sodium chloride flush 0.9 % injection 10 mL, 10 mL,

## 2024-07-20 NOTE — CARE COORDINATION
Social work / Discharge planning:          Social work consult noted regarding \"discharge planning, family now requesting rehab\".       PT/OT evaluations ordered.  Awaiting outcome.    Will need to determine if patient qualifies for [facility].      Insurance approval will be needed prior to discharge to [facility] if patient qualifies.     Per social work note on 7/19/24, patient goes to dialysis at [facility] [phone].     Awaiting therapy evaluations.    Electronically signed by NOE Mendoza on 7/20/2024 at 11:40 AM

## 2024-07-20 NOTE — PROGRESS NOTES
of 60 - 65%. Left ventricle size is normal. Mildly increased wall thickness. Normal wall motion. Indeterminate diastolic function.   Left Atrium Left atrium is mildly dilated.   Right Ventricle Right ventricle size is normal. Normal systolic function.   Right Atrium Right atrium size is normal.   Aortic Valve Severely calcified cusps. Moderate regurgitation with a centrally directed jet. Moderate stenosis of the aortic valve. AV mean gradient is 30 mmHg. AV area by continuity VTI is 1.0 cm2.   Mitral Valve Mild annular calcification. Physiologically normal regurgitation. No stenosis noted.   Tricuspid Valve Valve structure is normal. Trace regurgitation. No stenosis noted.   Pulmonic Valve No regurgitation. No stenosis noted.   Aorta Normal sized aortic root.   Pericardium No pericardial effusion.       ASSESSMENT / PLAN:    #1 chronically elevated troponin in setting of end-stage renal disease/hemodialysis with no anginal symptoms or acute EKG changes.  -Niormal LVEF on echo-no further ischemic workup planned. Home when you wish.     #2 peripheral vascular disease with remote aortobifem bypass.  Stable with no claudication.  -Antiplatelet monotherapy with clopidogrel.  Can stop aspirin      #3 essential hypertension appears controlled on combination of hydralazine, carvedilol and amlodipine.     #4 hyperlipidemia with LDL at goal (42) on current dose of rosuvastatin.  Continue same     #5 end-stage renal disease/hemodialysis.  Nephrology consult pending dialysis MWF     #6 cough and shortness of breath with spiculated right upper lobe lung lesion.  Nothing to suggest CHF.  proBNP not useful with end-stage renal disease and no radiographic or clinical features of CHF.  Pulmonary consult pending      #7 moderate AS/AI with normal EF- monitor with serial echos          Tae Ma MD,  [facility]  [facility]    Electronically signed by Tae Ma MD on 7/20/2024 at 9:14 AM

## 2024-07-20 NOTE — PROGRESS NOTES
Internal Medicine Progress Note    Patient's name: James Vazquez III  : 1945  Chief complaints (on day of admission): Shortness of Breath (Per wife pt just finished azithromycin and prednisone and cough is not getting better ) and Cough  Admission date: 2024  Date of service: 2024   Room: [place] [address]  Primary care physician: Cipriano Durham DO  Reason for visit: Follow-up for shortness of breath, weakness    Subjective  James was seen and examined at the bedside this morning.  Patient's son and wife are present at time of exam.  Patient is fatigued, will briefly wake to verbal stimuli but does not provide significant history.  Wife and son state that he is continue to feel weak and fatigued.  The do not feel they can care for him at home at this time and are requesting rehab.  Was seen by cardiology this morning, echocardiogram was reviewed and they do not feel any further cardiac testing is necessary.  Patient states that he is feeling \"lousy\".  Reports complaints of fatigue, weakness.  Denies any other complaints at this time.     Review of Systems  There are no new complaints of chest pain, shortness of breath, abdominal pain, nausea, vomiting, diarrhea, constipation.    Hospital Medications  Current Facility-Administered Medications   Medication Dose Route Frequency Provider Last Rate Last Admin    perflutren lipid microspheres (DEFINITY) injection 1.5 mL  1.5 mL IntraVENous ONCE PRN Carlos Roberts MD        ipratropium 0.5 mg-albuterol 2.5 mg (DUONEB) nebulizer solution 1 Dose  1 Dose Inhalation Q4H WA RT Talya Gates APRN - CNP   1 Dose at 24    albuterol (PROVENTIL) (2.5 MG/3ML) 0.083% nebulizer solution 2.5 mg  2.5 mg Nebulization Q6H PRN Talya Gates APRN - CNP        sodium chloride flush 0.9 % injection 10 mL  10 mL IntraVENous 2 times per day Luisito Mccord DO   10 mL at 24    sodium chloride flush 0.9 % injection 10 mL  10 mL IntraVENous  adequate.    Assessment   Active Hospital Problems    Diagnosis     Elevated troponin [R79.89]     Hyperlipidemia [E78.5]     Hypertension [I10]     Thrombocytopenia (HCC) [D69.6]     ESRD (end stage renal disease) (HCC) [N18.6]          Plan  Dyspnea-- angina equivalent?  Elevated troponin noted  CTA chest noted  Telemetry  Cardiology consultation appreciated-- echo noted, no further cardiac testing  Troponin noted  ASA     Lung mass:  Pulm consult appreciated  Biopsy eventually needed-- defer to pulm     ESRD on HD:   Defer HD, electrolyte replacement, and fluid management to renal  Follow electrolytes  Sp HD 7/19 as an IP     Rhinovirus URI:  Symptomatic tx    PT AM-PAC-- TBD  OT AM- PAC-- TBD    Follow labs   DVT prophylaxis  Please see orders for further management and care.  Discharge plan: SNF when accepted    Pertinent details of this case were discussed with Dr. Vasquez    Electronically signed by Leon Marc PA-C on 7/20/2024 at 8:13 AM    I can be reached through [email or web address].      Physician Attestation:    The RUFINO saw the patient independently. I discussed the case with the RUFINO and reviewed the chart. I did not see the patient in person. My thoughts:    Reviewed cardiology eval - no further issues  Pulmonary outpatient followup on lung mass  Pending SNF    Electronically signed by Camelia Vasquez MD on 7/20/2024 at 2:52 PM

## 2024-07-20 NOTE — PLAN OF CARE
Problem: ABCDS Injury Assessment  Goal: Absence of physical injury  Outcome: Progressing     Problem: Skin/Tissue Integrity  Goal: Absence of new skin breakdown  Outcome: Progressing     Problem: Discharge Planning  Goal: Discharge to home or other facility with appropriate resources  Outcome: Progressing     Problem: Safety - Adult  Goal: Free from fall injury  Outcome: Progressing

## 2024-07-21 LAB
ALBUMIN SERPL-MCNC: 3.4 G/DL (ref 3.5–5.2)
ALP SERPL-CCNC: 80 U/L (ref 40–129)
ALT SERPL-CCNC: 7 U/L (ref 0–40)
ANION GAP SERPL CALCULATED.3IONS-SCNC: 15 MMOL/L (ref 7–16)
AST SERPL-CCNC: 12 U/L (ref 0–39)
BASOPHILS # BLD: 0.02 K/UL (ref 0–0.2)
BASOPHILS NFR BLD: 0 % (ref 0–2)
BILIRUB SERPL-MCNC: 0.6 MG/DL (ref 0–1.2)
BUN SERPL-MCNC: 40 MG/DL (ref 6–23)
CALCIUM SERPL-MCNC: 9 MG/DL (ref 8.6–10.2)
CHLORIDE SERPL-SCNC: 95 MMOL/L (ref 98–107)
CO2 SERPL-SCNC: 28 MMOL/L (ref 22–29)
CREAT SERPL-MCNC: 6.5 MG/DL (ref 0.7–1.2)
EOSINOPHIL # BLD: 0.05 K/UL (ref 0.05–0.5)
EOSINOPHILS RELATIVE PERCENT: 1 % (ref 0–6)
ERYTHROCYTE [DISTWIDTH] IN BLOOD BY AUTOMATED COUNT: 16.6 % (ref 11.5–15)
GFR, ESTIMATED: 8 ML/MIN/1.73M2
GLUCOSE SERPL-MCNC: 72 MG/DL (ref 74–99)
HCT VFR BLD AUTO: 33.7 % (ref 37–54)
HGB BLD-MCNC: 10.3 G/DL (ref 12.5–16.5)
IMM GRANULOCYTES # BLD AUTO: 0.05 K/UL (ref 0–0.58)
IMM GRANULOCYTES NFR BLD: 1 % (ref 0–5)
LYMPHOCYTES NFR BLD: 0.81 K/UL (ref 1.5–4)
LYMPHOCYTES RELATIVE PERCENT: 10 % (ref 20–42)
MCH RBC QN AUTO: 32.3 PG (ref 26–35)
MCHC RBC AUTO-ENTMCNC: 30.6 G/DL (ref 32–34.5)
MCV RBC AUTO: 105.6 FL (ref 80–99.9)
MONOCYTES NFR BLD: 0.65 K/UL (ref 0.1–0.95)
MONOCYTES NFR BLD: 8 % (ref 2–12)
NEUTROPHILS NFR BLD: 80 % (ref 43–80)
NEUTS SEG NFR BLD: 6.39 K/UL (ref 1.8–7.3)
PLATELET # BLD AUTO: 110 K/UL (ref 130–450)
PMV BLD AUTO: 11.9 FL (ref 7–12)
POTASSIUM SERPL-SCNC: 4.6 MMOL/L (ref 3.5–5)
PROT SERPL-MCNC: 6 G/DL (ref 6.4–8.3)
RBC # BLD AUTO: 3.19 M/UL (ref 3.8–5.8)
SODIUM SERPL-SCNC: 138 MMOL/L (ref 132–146)
WBC OTHER # BLD: 8 K/UL (ref 4.5–11.5)

## 2024-07-21 PROCEDURE — 6370000000 HC RX 637 (ALT 250 FOR IP)

## 2024-07-21 PROCEDURE — 6360000002 HC RX W HCPCS: Performed by: INTERNAL MEDICINE

## 2024-07-21 PROCEDURE — 80053 COMPREHEN METABOLIC PANEL: CPT

## 2024-07-21 PROCEDURE — 6370000000 HC RX 637 (ALT 250 FOR IP): Performed by: INTERNAL MEDICINE

## 2024-07-21 PROCEDURE — 94640 AIRWAY INHALATION TREATMENT: CPT

## 2024-07-21 PROCEDURE — 85025 COMPLETE CBC W/AUTO DIFF WBC: CPT

## 2024-07-21 PROCEDURE — 2580000003 HC RX 258: Performed by: INTERNAL MEDICINE

## 2024-07-21 PROCEDURE — 2060000000 HC ICU INTERMEDIATE R&B

## 2024-07-21 PROCEDURE — 36415 COLL VENOUS BLD VENIPUNCTURE: CPT

## 2024-07-21 RX ORDER — SEVELAMER CARBONATE 800 MG/1
800 TABLET, FILM COATED ORAL 3 TIMES DAILY
Status: DISCONTINUED | OUTPATIENT
Start: 2024-07-21 | End: 2024-07-23 | Stop reason: HOSPADM

## 2024-07-21 RX ADMIN — ROSUVASTATIN CALCIUM 10 MG: 10 TABLET, FILM COATED ORAL at 21:02

## 2024-07-21 RX ADMIN — IPRATROPIUM BROMIDE AND ALBUTEROL SULFATE 1 DOSE: 2.5; .5 SOLUTION RESPIRATORY (INHALATION) at 20:03

## 2024-07-21 RX ADMIN — CARVEDILOL 12.5 MG: 6.25 TABLET, FILM COATED ORAL at 08:21

## 2024-07-21 RX ADMIN — SEVELAMER CARBONATE 800 MG: 800 TABLET, FILM COATED ORAL at 16:47

## 2024-07-21 RX ADMIN — HEPARIN SODIUM 5000 UNITS: 5000 INJECTION INTRAVENOUS; SUBCUTANEOUS at 21:02

## 2024-07-21 RX ADMIN — HEPARIN SODIUM 5000 UNITS: 5000 INJECTION INTRAVENOUS; SUBCUTANEOUS at 06:30

## 2024-07-21 RX ADMIN — BUDESONIDE 250 MCG: 0.25 SUSPENSION RESPIRATORY (INHALATION) at 20:03

## 2024-07-21 RX ADMIN — IPRATROPIUM BROMIDE AND ALBUTEROL SULFATE 1 DOSE: 2.5; .5 SOLUTION RESPIRATORY (INHALATION) at 12:35

## 2024-07-21 RX ADMIN — BUDESONIDE 250 MCG: 0.25 SUSPENSION RESPIRATORY (INHALATION) at 09:15

## 2024-07-21 RX ADMIN — CARVEDILOL 12.5 MG: 6.25 TABLET, FILM COATED ORAL at 16:47

## 2024-07-21 RX ADMIN — IPRATROPIUM BROMIDE AND ALBUTEROL SULFATE 1 DOSE: 2.5; .5 SOLUTION RESPIRATORY (INHALATION) at 09:15

## 2024-07-21 RX ADMIN — HEPARIN SODIUM 5000 UNITS: 5000 INJECTION INTRAVENOUS; SUBCUTANEOUS at 13:36

## 2024-07-21 RX ADMIN — SODIUM CHLORIDE, PRESERVATIVE FREE 10 ML: 5 INJECTION INTRAVENOUS at 21:02

## 2024-07-21 RX ADMIN — ARFORMOTEROL TARTRATE 15 MCG: 15 SOLUTION RESPIRATORY (INHALATION) at 20:03

## 2024-07-21 RX ADMIN — IPRATROPIUM BROMIDE AND ALBUTEROL SULFATE 1 DOSE: 2.5; .5 SOLUTION RESPIRATORY (INHALATION) at 15:38

## 2024-07-21 RX ADMIN — ARFORMOTEROL TARTRATE 15 MCG: 15 SOLUTION RESPIRATORY (INHALATION) at 09:15

## 2024-07-21 RX ADMIN — SODIUM CHLORIDE, PRESERVATIVE FREE 10 ML: 5 INJECTION INTRAVENOUS at 08:21

## 2024-07-21 RX ADMIN — SEVELAMER CARBONATE 800 MG: 800 TABLET, FILM COATED ORAL at 13:36

## 2024-07-21 ASSESSMENT — PAIN SCALES - GENERAL
PAINLEVEL_OUTOF10: 0
PAINLEVEL_OUTOF10: 0

## 2024-07-21 NOTE — PLAN OF CARE
Problem: ABCDS Injury Assessment  Goal: Absence of physical injury  7/21/2024 1329 by Aga Gaffney RN  Outcome: Progressing  7/21/2024 0312 by Jo Ann Gore RN  Outcome: Progressing     Problem: Skin/Tissue Integrity  Goal: Absence of new skin breakdown  Description: 1.  Monitor for areas of redness and/or skin breakdown  2.  Assess vascular access sites hourly  3.  Every 4-6 hours minimum:  Change oxygen saturation probe site  4.  Every 4-6 hours:  If on nasal continuous positive airway pressure, respiratory therapy assess nares and determine need for appliance change or resting period.  7/21/2024 1329 by Aga Gaffney RN  Outcome: Progressing  7/21/2024 0312 by Jo Ann Gore RN  Outcome: Progressing     Problem: Discharge Planning  Goal: Discharge to home or other facility with appropriate resources  7/21/2024 1329 by Aga Gaffney RN  Outcome: Progressing  7/21/2024 0312 by Jo Ann Gore RN  Outcome: Progressing  Flowsheets (Taken 7/20/2024 2136)  Discharge to home or other facility with appropriate resources: Identify barriers to discharge with patient and caregiver     Problem: Safety - Adult  Goal: Free from fall injury  7/21/2024 1329 by Aga Gaffney RN  Outcome: Progressing  7/21/2024 0312 by Jo Ann Gore RN  Outcome: Progressing     Problem: Pain  Goal: Verbalizes/displays adequate comfort level or baseline comfort level  7/21/2024 1329 by Aga Gaffney RN  Outcome: Progressing  7/21/2024 0312 by Jo Ann Gore RN  Outcome: Progressing     Problem: Chronic Conditions and Co-morbidities  Goal: Patient's chronic conditions and co-morbidity symptoms are monitored and maintained or improved  7/21/2024 1329 by Aga Gaffney RN  Outcome: Progressing  7/21/2024 0312 by Jo Ann Gore RN  Outcome: Progressing

## 2024-07-21 NOTE — PROGRESS NOTES
(ZOFRAN-ODT) disintegrating tablet 4 mg  4 mg Oral Q8H PRN Volino, Luisito E, DO        Or    ondansetron (ZOFRAN) injection 4 mg  4 mg IntraVENous Q6H PRN Edithino, Luisito E, DO   4 mg at 07/20/24 0754    senna (SENOKOT) tablet 8.6 mg  1 tablet Oral Daily PRN Volino, Luisito E, DO        acetaminophen (TYLENOL) tablet 650 mg  650 mg Oral Q6H PRN Volino, Luisito E, DO        Or    acetaminophen (TYLENOL) suppository 650 mg  650 mg Rectal Q6H PRN Volino, Luisito E, DO        melatonin tablet 3 mg  3 mg Oral Nightly PRN Volino, Luisito E, DO   3 mg at 07/18/24 2108    rosuvastatin (CRESTOR) tablet 10 mg  10 mg Oral Nightly Volino, Luisito E, DO   10 mg at 07/20/24 2142    carvedilol (COREG) tablet 12.5 mg  12.5 mg Oral BID WC EdithinoLuisito E, DO   12.5 mg at 07/20/24 1713    budesonide (PULMICORT) nebulizer suspension 250 mcg  0.25 mg Nebulization BID RT Luisito Mccord E, DO   250 mcg at 07/20/24 1956    And    arformoterol tartrate (BROVANA) nebulizer solution 15 mcg  15 mcg Nebulization BID RT EdithinoLuisito E, DO   15 mcg at 07/20/24 1956       PRN Medications  perflutren lipid microspheres, albuterol, sodium chloride flush, sodium chloride, ondansetron **OR** ondansetron, senna, acetaminophen **OR** acetaminophen, melatonin    Objective  Most Recent Recorded Vitals  BP (!) 139/50   Pulse 74   Temp 98.8 °F (37.1 °C) (Axillary)   Resp 16   Ht 1.702 m (5' 7\")   Wt 67.4 kg (148 lb 9.4 oz)   SpO2 94%   BMI 23.27 kg/m²   I/O last 3 completed shifts:  In: 360 [P.O.:360]  Out: -   No intake/output data recorded.    Physical Exam:  General: Awake but drowsy, alert and oriented to person/place/time/purpose, NAD, no labored breathing  Eyes: conjunctivae/corneas clear, sclera non icteric  Skin: color/texture/turgor normal, no rashes or lesions  Lungs: CTAB, no retractions/use of accessory muscles, no vocal fremitus, no rhonchi, no crackle, no rales  Heart: regular rate, regular rhythm, no murmur  Abdomen: soft, NT, bowel sounds  normal  Extremities: atraumatic, no edema  Neurologic: cranial nerves 2-12 grossly intact, no slurred speech    Most Recent Labs  Lab Results   Component Value Date    WBC 7.4 07/20/2024    HGB 10.1 (L) 07/20/2024    HCT 32.4 (L) 07/20/2024    PLT 79 (L) 07/19/2024     07/20/2024    K 4.1 07/20/2024    CL 94 (L) 07/20/2024    CREATININE 4.9 (H) 07/20/2024    BUN 29 (H) 07/20/2024    CO2 31 (H) 07/20/2024    GLUCOSE 165 (H) 07/20/2024    ALT 8 07/20/2024    AST 13 07/20/2024    INR 1.1 01/15/2024    APTT 35.9 (H) 11/03/2020    TSH 5.470 (H) 02/07/2023       CT HEAD WO CONTRAST   Final Result   1. No acute intracranial abnormality.   2. Moderate cerebral atrophy with periventricular white matter changes.   3. Stable chronic sites of lacunar infarction within the head of the caudate   nucleus on the left and the right basal ganglia.         CTA PULMONARY W CONTRAST   Final Result   There is no evidence of pulmonary arterial embolization.      Spiculated 15 mm right upper lobe pulmonary nodule.  Finding is worrisome for   malignancy and PET-CT is recommended for further evaluation.         XR CHEST PORTABLE   Final Result   No acute process.             Echocardiogram 7/19/24    Left Ventricle: Normal left ventricular systolic function with a visually estimated EF of 60 - 65%. Left ventricle size is normal. Mildly increased wall thickness. Normal wall motion.    Aortic Valve: Severely calcified cusps. Moderate regurgitation with a centrally directed jet. Moderate stenosis of the aortic valve. AV mean gradient is 30 mmHg. AV area by continuity VTI is 1.0 cm2.    Mitral Valve: Mild annular calcification.    Left Atrium: Left atrium is mildly dilated.    Image quality is adequate.    Assessment   Active Hospital Problems    Diagnosis     Elevated troponin [R79.89]     Hyperlipidemia [E78.5]     Hypertension [I10]     Thrombocytopenia (HCC) [D69.6]     ESRD (end stage renal disease) (HCC) [N18.6]

## 2024-07-21 NOTE — PLAN OF CARE
Problem: ABCDS Injury Assessment  Goal: Absence of physical injury  7/21/2024 1822 by Deborah Rubalcava RN  Outcome: Progressing  Flowsheets (Taken 7/21/2024 1530)  Absence of Physical Injury: Implement safety measures based on patient assessment  7/21/2024 1329 by Aga Gaffney RN  Outcome: Progressing     Problem: Skin/Tissue Integrity  Goal: Absence of new skin breakdown  Description: 1.  Monitor for areas of redness and/or skin breakdown  2.  Assess vascular access sites hourly  3.  Every 4-6 hours minimum:  Change oxygen saturation probe site  4.  Every 4-6 hours:  If on nasal continuous positive airway pressure, respiratory therapy assess nares and determine need for appliance change or resting period.  7/21/2024 1822 by Deborah Rubalcava RN  Outcome: Progressing  7/21/2024 1329 by Aga Gaffney RN  Outcome: Progressing     Problem: Discharge Planning  Goal: Discharge to home or other facility with appropriate resources  7/21/2024 1822 by Deborah Rubalcava RN  Outcome: Progressing  7/21/2024 1329 by Aga Gaffney RN  Outcome: Progressing     Problem: Safety - Adult  Goal: Free from fall injury  7/21/2024 1822 by Deborah Rubalcava RN  Outcome: Progressing  Flowsheets (Taken 7/21/2024 1530)  Free From Fall Injury: Instruct family/caregiver on patient safety  7/21/2024 1329 by Aga Gaffney RN  Outcome: Progressing     Problem: Pain  Goal: Verbalizes/displays adequate comfort level or baseline comfort level  7/21/2024 1822 by Deborah Rubalcava RN  Outcome: Progressing  7/21/2024 1329 by Aga Gaffney RN  Outcome: Progressing     Problem: Chronic Conditions and Co-morbidities  Goal: Patient's chronic conditions and co-morbidity symptoms are monitored and maintained or improved  7/21/2024 1822 by Deborah Rubalcava RN  Outcome: Progressing  7/21/2024 1329 by Aga Gaffney RN  Outcome: Progressing

## 2024-07-21 NOTE — PLAN OF CARE
Problem: ABCDS Injury Assessment  Goal: Absence of physical injury  7/21/2024 0312 by Jo Ann Gore RN  Outcome: Progressing  7/20/2024 1417 by Maureen Jacques RN  Outcome: Progressing     Problem: Skin/Tissue Integrity  Goal: Absence of new skin breakdown  Description: 1.  Monitor for areas of redness and/or skin breakdown  2.  Assess vascular access sites hourly  3.  Every 4-6 hours minimum:  Change oxygen saturation probe site  4.  Every 4-6 hours:  If on nasal continuous positive airway pressure, respiratory therapy assess nares and determine need for appliance change or resting period.  7/21/2024 0312 by Jo Ann Gore, RN  Outcome: Progressing  7/20/2024 1417 by Maureen Jacques RN  Outcome: Progressing     Problem: Discharge Planning  Goal: Discharge to home or other facility with appropriate resources  7/21/2024 0312 by Jo Ann Gore RN  Outcome: Progressing  Flowsheets (Taken 7/20/2024 2136)  Discharge to home or other facility with appropriate resources: Identify barriers to discharge with patient and caregiver  7/20/2024 1417 by Maureen Jacques RN  Outcome: Progressing     Problem: Safety - Adult  Goal: Free from fall injury  7/21/2024 0312 by Jo Ann Gore RN  Outcome: Progressing  7/20/2024 1417 by Maureen Jacques RN  Outcome: Progressing     Problem: Pain  Goal: Verbalizes/displays adequate comfort level or baseline comfort level  Outcome: Progressing     Problem: Chronic Conditions and Co-morbidities  Goal: Patient's chronic conditions and co-morbidity symptoms are monitored and maintained or improved  Outcome: Progressing

## 2024-07-21 NOTE — PROGRESS NOTES
day  sodium chloride flush 0.9 % injection 10 mL, 10 mL, IntraVENous, PRN  0.9 % sodium chloride infusion, , IntraVENous, PRN  heparin (porcine) injection 5,000 Units, 5,000 Units, SubCUTAneous, 3 times per day  ondansetron (ZOFRAN-ODT) disintegrating tablet 4 mg, 4 mg, Oral, Q8H PRN **OR** ondansetron (ZOFRAN) injection 4 mg, 4 mg, IntraVENous, Q6H PRN  senna (SENOKOT) tablet 8.6 mg, 1 tablet, Oral, Daily PRN  acetaminophen (TYLENOL) tablet 650 mg, 650 mg, Oral, Q6H PRN **OR** acetaminophen (TYLENOL) suppository 650 mg, 650 mg, Rectal, Q6H PRN  melatonin tablet 3 mg, 3 mg, Oral, Nightly PRN  rosuvastatin (CRESTOR) tablet 10 mg, 10 mg, Oral, Nightly  carvedilol (COREG) tablet 12.5 mg, 12.5 mg, Oral, BID WC  budesonide (PULMICORT) nebulizer suspension 250 mcg, 0.25 mg, Nebulization, BID RT **AND** arformoterol tartrate (BROVANA) nebulizer solution 15 mcg, 15 mcg, Nebulization, BID RT **AND** [DISCONTINUED] ipratropium (ATROVENT) 0.02 % nebulizer solution 0.5 mg, 0.5 mg, Nebulization, Q6H RT  Allergies:  Patient has no known allergies.    Social History:    TOBACCO:   reports that he quit smoking about 23 years ago. His smoking use included cigarettes. He started smoking about 53 years ago. He has a 30.0 pack-year smoking history. He has never used smokeless tobacco.  ETOH:   reports that he does not currently use alcohol.  DRUGS:   reports no history of drug use.    Family History:   No family history on file.  REVIEW OF SYSTEMS:    CONSTITUTIONAL:  negative  EYES:  negative  HEENT:  negative  RESPIRATORY:  positive for  dyspnea  CARDIOVASCULAR:  negative  GASTROINTESTINAL:  negative  GENITOURINARY:  negative  INTEGUMENT/BREAST:  negative  HEMATOLOGIC/LYMPHATIC:  negative  ALLERGIC/IMMUNOLOGIC:  negative  ENDOCRINE:  negative  MUSCULOSKELETAL:  negative  NEUROLOGICAL:  negative  PHYSICAL EXAM:      Vitals:    VITALS:  BP (!) 118/42   Pulse 66   Temp 99 °F (37.2 °C) (Oral)   Resp 16   Ht 1.702 m (5' 7\")   Wt 67.4  kg (148 lb 9.4 oz)   SpO2 97%   BMI 23.27 kg/m²   24HR INTAKE/OUTPUT:    Intake/Output Summary (Last 24 hours) at 7/21/2024 1346  Last data filed at 7/21/2024 1339  Gross per 24 hour   Intake 600 ml   Output --   Net 600 ml         Constitutional:  Awake, alert, oriented, in NAD  HEENT:  PERRLA, normocephalic, atraumatic  Respiratory:  CTA  Cardiovascular/Edema:  RRR, normal S1, normal S2  Gastrointestinal:  Soft, flat, non-distended, non-tender  Neurologic:  Nonfocal  Skin:  warm, dry, no rashes, no lesions  Access:  Left upper arm AV fistula    DATA:    CBC:   Lab Results   Component Value Date/Time    WBC 8.0 07/21/2024 04:43 AM    RBC 3.19 07/21/2024 04:43 AM    HGB 10.3 07/21/2024 04:43 AM    HCT 33.7 07/21/2024 04:43 AM    .6 07/21/2024 04:43 AM    MCH 32.3 07/21/2024 04:43 AM    MCHC 30.6 07/21/2024 04:43 AM    RDW 16.6 07/21/2024 04:43 AM     07/21/2024 04:43 AM    MPV 11.9 07/21/2024 04:43 AM     CMP:    Lab Results   Component Value Date/Time     07/21/2024 04:43 AM    K 4.6 07/21/2024 04:43 AM    K 3.4 10/14/2022 07:13 PM    CL 95 07/21/2024 04:43 AM    CO2 28 07/21/2024 04:43 AM    BUN 40 07/21/2024 04:43 AM    CREATININE 6.5 07/21/2024 04:43 AM    GFRAA 14 10/16/2022 02:38 AM    LABGLOM 8 07/21/2024 04:43 AM    LABGLOM 12 01/16/2024 05:18 AM    GLUCOSE 72 07/21/2024 04:43 AM    CALCIUM 9.0 07/21/2024 04:43 AM    BILITOT 0.6 07/21/2024 04:43 AM    ALKPHOS 80 07/21/2024 04:43 AM    AST 12 07/21/2024 04:43 AM    ALT 7 07/21/2024 04:43 AM     Magnesium:    Lab Results   Component Value Date/Time    MG 1.9 02/07/2023 08:42 PM     Phosphorus:    Lab Results   Component Value Date/Time    PHOS 3.2 08/14/2021 04:13 AM     Radiology Review:    XR CHEST PORTABLE 7/18/24  IMPRESSION:  No acute process.      CTA PULMONARY W CONTRAST 7/18/24  IMPRESSION:  There is no evidence of pulmonary arterial embolization.     Spiculated 15 mm right upper lobe pulmonary nodule.  Finding is worrisome

## 2024-07-22 LAB
ALBUMIN SERPL-MCNC: 3.3 G/DL (ref 3.5–5.2)
ALP SERPL-CCNC: 75 U/L (ref 40–129)
ALT SERPL-CCNC: 5 U/L (ref 0–40)
ANION GAP SERPL CALCULATED.3IONS-SCNC: 14 MMOL/L (ref 7–16)
AST SERPL-CCNC: 11 U/L (ref 0–39)
BASOPHILS # BLD: 0.03 K/UL (ref 0–0.2)
BASOPHILS NFR BLD: 0 % (ref 0–2)
BILIRUB SERPL-MCNC: 0.5 MG/DL (ref 0–1.2)
BUN SERPL-MCNC: 56 MG/DL (ref 6–23)
CALCIUM SERPL-MCNC: 9 MG/DL (ref 8.6–10.2)
CHLORIDE SERPL-SCNC: 96 MMOL/L (ref 98–107)
CO2 SERPL-SCNC: 27 MMOL/L (ref 22–29)
CREAT SERPL-MCNC: 8.4 MG/DL (ref 0.7–1.2)
EOSINOPHIL # BLD: 0.11 K/UL (ref 0.05–0.5)
EOSINOPHILS RELATIVE PERCENT: 1 % (ref 0–6)
ERYTHROCYTE [DISTWIDTH] IN BLOOD BY AUTOMATED COUNT: 16.8 % (ref 11.5–15)
GFR, ESTIMATED: 6 ML/MIN/1.73M2
GLUCOSE SERPL-MCNC: 84 MG/DL (ref 74–99)
HCT VFR BLD AUTO: 31.8 % (ref 37–54)
HGB BLD-MCNC: 10 G/DL (ref 12.5–16.5)
IMM GRANULOCYTES # BLD AUTO: 0.06 K/UL (ref 0–0.58)
IMM GRANULOCYTES NFR BLD: 1 % (ref 0–5)
LYMPHOCYTES NFR BLD: 0.96 K/UL (ref 1.5–4)
LYMPHOCYTES RELATIVE PERCENT: 11 % (ref 20–42)
MCH RBC QN AUTO: 33 PG (ref 26–35)
MCHC RBC AUTO-ENTMCNC: 31.4 G/DL (ref 32–34.5)
MCV RBC AUTO: 105 FL (ref 80–99.9)
MONOCYTES NFR BLD: 0.79 K/UL (ref 0.1–0.95)
MONOCYTES NFR BLD: 9 % (ref 2–12)
NEUTROPHILS NFR BLD: 77 % (ref 43–80)
NEUTS SEG NFR BLD: 6.58 K/UL (ref 1.8–7.3)
PLATELET, FLUORESCENCE: 104 K/UL (ref 130–450)
PMV BLD AUTO: 11.4 FL (ref 7–12)
POTASSIUM SERPL-SCNC: 4.8 MMOL/L (ref 3.5–5)
PROT SERPL-MCNC: 5.8 G/DL (ref 6.4–8.3)
RBC # BLD AUTO: 3.03 M/UL (ref 3.8–5.8)
SODIUM SERPL-SCNC: 137 MMOL/L (ref 132–146)
WBC OTHER # BLD: 8.5 K/UL (ref 4.5–11.5)

## 2024-07-22 PROCEDURE — 6370000000 HC RX 637 (ALT 250 FOR IP): Performed by: INTERNAL MEDICINE

## 2024-07-22 PROCEDURE — 90935 HEMODIALYSIS ONE EVALUATION: CPT

## 2024-07-22 PROCEDURE — 2700000000 HC OXYGEN THERAPY PER DAY

## 2024-07-22 PROCEDURE — 6370000000 HC RX 637 (ALT 250 FOR IP)

## 2024-07-22 PROCEDURE — 6360000002 HC RX W HCPCS: Performed by: INTERNAL MEDICINE

## 2024-07-22 PROCEDURE — 94640 AIRWAY INHALATION TREATMENT: CPT

## 2024-07-22 PROCEDURE — 36415 COLL VENOUS BLD VENIPUNCTURE: CPT

## 2024-07-22 PROCEDURE — 2060000000 HC ICU INTERMEDIATE R&B

## 2024-07-22 PROCEDURE — 97161 PT EVAL LOW COMPLEX 20 MIN: CPT

## 2024-07-22 PROCEDURE — 2580000003 HC RX 258: Performed by: INTERNAL MEDICINE

## 2024-07-22 PROCEDURE — 97165 OT EVAL LOW COMPLEX 30 MIN: CPT

## 2024-07-22 PROCEDURE — 85025 COMPLETE CBC W/AUTO DIFF WBC: CPT

## 2024-07-22 PROCEDURE — 80053 COMPREHEN METABOLIC PANEL: CPT

## 2024-07-22 RX ORDER — GUAIFENESIN 400 MG/1
400 TABLET ORAL 2 TIMES DAILY
Qty: 14 TABLET | Refills: 0 | DISCHARGE
Start: 2024-07-22 | End: 2024-07-29

## 2024-07-22 RX ORDER — GUAIFENESIN 400 MG/1
400 TABLET ORAL 2 TIMES DAILY
Status: DISCONTINUED | OUTPATIENT
Start: 2024-07-22 | End: 2024-07-23 | Stop reason: HOSPADM

## 2024-07-22 RX ADMIN — SODIUM CHLORIDE, PRESERVATIVE FREE 10 ML: 5 INJECTION INTRAVENOUS at 11:09

## 2024-07-22 RX ADMIN — ROSUVASTATIN CALCIUM 10 MG: 10 TABLET, FILM COATED ORAL at 21:00

## 2024-07-22 RX ADMIN — GUAIFENESIN 400 MG: 400 TABLET ORAL at 21:00

## 2024-07-22 RX ADMIN — HEPARIN SODIUM 5000 UNITS: 5000 INJECTION INTRAVENOUS; SUBCUTANEOUS at 13:48

## 2024-07-22 RX ADMIN — SEVELAMER CARBONATE 800 MG: 800 TABLET, FILM COATED ORAL at 11:09

## 2024-07-22 RX ADMIN — CARVEDILOL 12.5 MG: 6.25 TABLET, FILM COATED ORAL at 11:09

## 2024-07-22 RX ADMIN — SODIUM CHLORIDE, PRESERVATIVE FREE 10 ML: 5 INJECTION INTRAVENOUS at 21:00

## 2024-07-22 RX ADMIN — SEVELAMER CARBONATE 800 MG: 800 TABLET, FILM COATED ORAL at 16:54

## 2024-07-22 RX ADMIN — SEVELAMER CARBONATE 800 MG: 800 TABLET, FILM COATED ORAL at 05:51

## 2024-07-22 RX ADMIN — CARVEDILOL 12.5 MG: 6.25 TABLET, FILM COATED ORAL at 16:54

## 2024-07-22 RX ADMIN — IPRATROPIUM BROMIDE AND ALBUTEROL SULFATE 1 DOSE: 2.5; .5 SOLUTION RESPIRATORY (INHALATION) at 15:50

## 2024-07-22 RX ADMIN — HEPARIN SODIUM 5000 UNITS: 5000 INJECTION INTRAVENOUS; SUBCUTANEOUS at 21:30

## 2024-07-22 RX ADMIN — HEPARIN SODIUM 5000 UNITS: 5000 INJECTION INTRAVENOUS; SUBCUTANEOUS at 05:51

## 2024-07-22 RX ADMIN — IPRATROPIUM BROMIDE AND ALBUTEROL SULFATE 1 DOSE: 2.5; .5 SOLUTION RESPIRATORY (INHALATION) at 12:31

## 2024-07-22 RX ADMIN — GUAIFENESIN 400 MG: 400 TABLET ORAL at 16:54

## 2024-07-22 ASSESSMENT — PAIN SCALES - GENERAL
PAINLEVEL_OUTOF10: 0
PAINLEVEL_OUTOF10: 0

## 2024-07-22 NOTE — FLOWSHEET NOTE
Pt completed 3.15 hrs of HD on a 3K bath with 2L of UF removed safely.   07/22/24 1030   Vital Signs   BP (!) 142/57   Temp 97.8 °F (36.6 °C)   Pulse 68   Respirations 18   Weight - Scale 60.6 kg (133 lb 9.6 oz)   Percent Weight Change -3.2   Post-Hemodialysis Assessment   Post-Treatment Procedures Blood returned;Access bleeding time < 10 minutes   Machine Disinfection Process Acid/Vinegar Clean;Heat Disinfect;Exterior Machine Disinfection   Rinseback Volume (ml) 300 ml   Blood Volume Processed (Liters) 73.4 L   Dialyzer Clearance Moderately streaked   Duration of Treatment (minutes) 195 minutes   Hemodialysis Intake (ml) 300 ml   Hemodialysis Output (ml) 2300 ml   NET Removed (ml) 2000   Tolerated Treatment Good   Patient Response to Treatment tolerated well; blood returned; stasis achieved; post report to Patti CORRALES   Bilateral Breath Sounds Diminished   Time Off 1027   Patient Disposition Return to room   Observations & Evaluations   Level of Consciousness 0   Oriented X 3   Heart Rhythm Regular   Respiratory Quality/Effort Unlabored   Abdomen Inspection Soft   Bowel Sounds (All Quadrants) Active

## 2024-07-22 NOTE — DISCHARGE SUMMARY
Internal Medicine Discharge Summary    NAME: James Vazquez III :  1945  MRN:  [id number] PCP:Cipriano Durham DO    ADMITTED: 2024   DISCHARGED: 2024  5:09 PM    ADMITTING PHYSICIAN: Luisito Mccord DO    PCP: Cipriano Durham DO    CONSULTANT(S):   IP CONSULT TO INTERNAL MEDICINE  IP CONSULT TO NEPHROLOGY  IP CONSULT TO CARDIOLOGY  IP CONSULT TO PULMONOLOGY  IP CONSULT TO SOCIAL WORK     ADMITTING DIAGNOSIS:   Elevated troponin [R79.89]     Please see H&P for further details    DISCHARGE DIAGNOSES:   Active Hospital Problems    Diagnosis     Elevated troponin [R79.89]     Hyperlipidemia [E78.5]     Hypertension [I10]     Thrombocytopenia (HCC) [D69.6]     ESRD (end stage renal disease) (HCC) [N18.6]        BRIEF HISTORY OF PRESENT ILLNESS: James Vazquez III is a 78 y.o. male patient of Cipriano Durham DO who  has a past medical history of Blind left eye, Chronic kidney disease, Dialysis patient (HCC), Hemodialysis patient (HCC), Hyperlipidemia, and Hypertension. who originally had concerns including Shortness of Breath (Per wife pt just finished azithromycin and prednisone and cough is not getting better ) and Cough. at presentation on 2024, and was found to have Elevated troponin [R79.89] after workup.    Please see H&P for further details.    HOSPITAL COURSE:   The patient presented to the hospital with the chief complaint of Shortness of Breath (Per wife pt just finished azithromycin and prednisone and cough is not getting better ) and Cough  . The patient was admitted to the hospital.     Elevated troponin  Troponin noted  CTA chest noted  Echocardiogram noted   Monitor telemetry  Cardiology consult appreciated -- no further cardiac testing at this time; following prn     Lung mass  Pulmonary consult appreciated  Plan is for outpatient PET to further evaluate his nodule/mediastinum  Pulmonary consult appreciated     ESRD on HD  Continue HD as per nephrology  Monitor BMP and electrolytes --  directed in discharge paperwork.  Please review results of imaging studies with PCP.  Follow-up with consultants as directed by them.  If recurrence or worsening of symptoms go to the ED or call primary care physician.  Diet: ADULT DIET; Regular; No Added Salt (3-4 gm)    FOLLOW-IP  Cipriano Durham DO  [address] [address]  [phone]    Schedule an appointment as soon as possible for a visit in 1 week(s)  for follow-up appointment from this hospital stay    Cipriano West MD  [address] [facility] [address]  [place] [id number] [phone]    Call  for follow-up appointment from this hospital stay    Harry Rivas MD  [address] [address]  [phone]    Call  for follow-up appointment from this hospital stay    Kenji Rowley DO  [address] [address]  [phone]    Follow up  have PET/CT scan completed prior to appointment    [address] [place] [address] [address]  [facility]  [phone]          Preparing for this patient's discharge, including paperwork, orders, instructions, and meeting with patient did require greater than 35 minutes.    Electronically signed by TOMMY Sutton CNP on 7/23/2024 at 5:09 PM

## 2024-07-22 NOTE — CARE COORDINATION
Social Work/Discharge Planning:  Discharge order noted.  Met with patient and his wife Laura and reviewed therapy note indicating need for rehab.  Laura states she prefers first [facility], 2nd-[facility] and 3rd-[facility].  Meghna with [facility] facility has no beds available.  Referral made to Samanta with [facility] and [facility] will review patient information.  Referral made to Dea with [facility] and [facility] will review patient information.  Will continue to follow.  Electronically signed by NOE Esquivel on 7/22/2024 at 3:06 PM

## 2024-07-22 NOTE — PROGRESS NOTES
Physical Therapy  Facility/Department: [address] INTERMEDIATE 1  Physical Therapy Initial Assessment    Name: James Vazquez III  : 1945  MRN: [id number]  Date of Service: 2024      Patient Diagnosis(es): The primary encounter diagnosis was Shortness of breath. Diagnoses of ESRD (end stage renal disease) (HCC), Elevated troponin, and Acute upper respiratory infection were also pertinent to this visit.  Past Medical History:  has a past medical history of Blind left eye, Chronic kidney disease, Dialysis patient (HCC), Hemodialysis patient (HCC), Hyperlipidemia, and Hypertension.  Past Surgical History:  has a past surgical history that includes AV fistula creation (Left, ); HEMODIALYSIS ACCESS PERCUTANEOUS REVISION (Left, ); PERIPHERAL VASCULAR BYPASS (); Upper gastrointestinal endoscopy (N/A, 2020); Cardiac surgery; hip surgery (Left, 10/16/2020); and Upper gastrointestinal endoscopy (N/A, 10/28/2020).      Evaluating Therapist: Rita Brooke PT      Room #:  0445/0445-A  Diagnosis:  Elevated troponin [R79.89]  PMHx/PSHx:  CKD on dialysis, HTN, blind L eye  Precautions:  falls, alarm, droplet isolation      Social:  Pt lives with wife in a 1 floor plan 1 step to enter.  Prior to admission independent. Uses ww as needed     Initial Evaluation  Date: 24 Treatment      Short Term/ Long Term   Goals   Was pt agreeable to Eval/treatment? yes     Does pt have pain? No c/o pain     Bed Mobility  Rolling: max assist  Supine to sit: max assist  Sit to supine: NT  Scooting: max assist  Min assist   Transfers Sit to stand: max assist  Stand to sit: max assist  Stand pivot: max assist of 2  Min assist   Ambulation    NT, pt unable to move LE's once standing   50 feet with ww with min assist   Stair Negotiation  Ascended and descended  NT   1 steps with 1 rail with min assist   LE strength     3/5    4-/5   balance      poor     AM-PAC Raw score                        Pt is alert, follows

## 2024-07-22 NOTE — PROGRESS NOTES
Thomas Sinclair M.D.,[facility]  Jori Carty D.O., BRIELLE., [facility]  Keira Monet M.D.  Mattie Goldman M.D.   Kenji Rowley D.O.  Carlos Gruber M.D.         Daily Pulmonary Progress Note    Patient:  James Vazquez III 78 y.o. male MRN: [id number]            Synopsis     We are following patient for lung mass    \"CC\" SOB, cough    Code status: FULL CODE      Subjective      Patient was seen and examined in dialysis, just was taken off the machine. He is stable on room air. Denies any increase in shortness of breath, denies any cough.    Review of Systems:  Constitutional:+fatigue   Skin: Denies pigmentation, dark lesions, and rashes   HEENT: Denies hearing loss, tinnitus, ear drainage, epistaxis, sore throat, and hoarseness.  Cardiovascular: Denies palpitations, chest pain, and chest pressure.  Respiratory: occasional shortness of breath   Gastrointestinal: Denies nausea, vomiting, poor appetite, diarrhea, heartburn or reflux  Genitourinary: Denies dysuria, frequency, urgency or hematuria  Musculoskeletal: Denies myalgias, muscle weakness, and bone pain  Neurological: Denies dizziness, vertigo, headache, and focal weakness  Psychological: Denies anxiety and depression  Endocrine: Denies heat intolerance and cold intolerance  Hematopoietic/Lymphatic: Denies bleeding problems and blood transfusions    24-hour events:  No new events    Objective   OBJECTIVE:   BP (!) 123/42   Pulse 64   Temp 98.9 °F (37.2 °C) (Oral)   Resp 18   Ht 1.702 m (5' 7\")   Wt 62.6 kg (138 lb 0.1 oz)   SpO2 98%   BMI 21.62 kg/m²   SpO2 Readings from Last 1 Encounters:   07/22/24 98%        I/O:    Intake/Output Summary (Last 24 hours) at 7/22/2024 1036  Last data filed at 7/21/2024 1603  Gross per 24 hour   Intake 120 ml   Output 0 ml   Net 120 ml        CURRENT MEDS :  Scheduled Meds:   sevelamer  800 mg Oral TID    ipratropium 0.5 mg-albuterol 2.5 mg  1 Dose Inhalation Q4H WA RT    sodium chloride flush  10 mL IntraVENous 2  echocardiogram results  Fluid removal via iHD per Nephrology  DVT/PE prophylaxis - SQ heparin  Discharge planning - LIANA  Will have office arrange for OP PET/CT scan followed by appointment to determine further plans - routed to office  Nothing further to add from a pulmonary standpoint.    This plan of care was reviewed in collaboration with Dr. Goldman    Electronically signed by TOMMY Ambrose CNP on 7/22/2024 at 10:36 AM    This is confirmation that I have personally performed a substantial portion of medical decision making (>50%) related to this patient encounter.  The medications & laboratory data and imagery were discussed and adjusted where necessary. Key issues of the case were discussed among consultants.  Review of CNP documentation was conducted and revisions were made as appropriate. I agree with the above documented exam, problem list and plan of care with the following additions:     Outpatient PET to further evaluate his nodule/mediastinum  Pulmonary will sign off please call if needed    Mattie Goldman MD

## 2024-07-22 NOTE — PROGRESS NOTES
Department of Internal Medicine  Nephrology Progress Note    Events reviewed.    SUBJECTIVE:  We are following Mr. Vazquez for ESRD on HD. Patient reports no new complaints today.    PHYSICAL EXAM:    Vitals:    VITALS:  BP (!) 133/49   Pulse 79   Temp 97.8 °F (36.6 °C) (Oral)   Resp 16   Ht 1.702 m (5' 7\")   Wt 60.6 kg (133 lb 9.6 oz)   SpO2 97%   BMI 20.92 kg/m²   24HR INTAKE/OUTPUT:    Intake/Output Summary (Last 24 hours) at 7/22/2024 1447  Last data filed at 7/22/2024 1030  Gross per 24 hour   Intake 300 ml   Output 2300 ml   Net -2000 ml         Constitutional:  Awake, alert, oriented, in NAD  HEENT:  PERRLA, normocephalic, atraumatic  Respiratory:  CTA  Cardiovascular/Edema:  RRR, normal S1, normal S2  Gastrointestinal:  Soft, flat, non-distended, non-tender  Neurologic:  Nonfocal  Skin:  warm, dry, no rashes, no lesions  Access:  Left upper arm AV fistula    DATA:    CBC:   Lab Results   Component Value Date/Time    WBC 8.5 07/22/2024 05:12 AM    RBC 3.03 07/22/2024 05:12 AM    HGB 10.0 07/22/2024 05:12 AM    HCT 31.8 07/22/2024 05:12 AM    .0 07/22/2024 05:12 AM    MCH 33.0 07/22/2024 05:12 AM    MCHC 31.4 07/22/2024 05:12 AM    RDW 16.8 07/22/2024 05:12 AM     07/21/2024 04:43 AM    MPV 11.4 07/22/2024 05:12 AM     CMP:    Lab Results   Component Value Date/Time     07/22/2024 05:12 AM    K 4.8 07/22/2024 05:12 AM    K 3.4 10/14/2022 07:13 PM    CL 96 07/22/2024 05:12 AM    CO2 27 07/22/2024 05:12 AM    BUN 56 07/22/2024 05:12 AM    CREATININE 8.4 07/22/2024 05:12 AM    GFRAA 14 10/16/2022 02:38 AM    LABGLOM 6 07/22/2024 05:12 AM    LABGLOM 12 01/16/2024 05:18 AM    GLUCOSE 84 07/22/2024 05:12 AM    CALCIUM 9.0 07/22/2024 05:12 AM    BILITOT 0.5 07/22/2024 05:12 AM    ALKPHOS 75 07/22/2024 05:12 AM    AST 11 07/22/2024 05:12 AM    ALT 5 07/22/2024 05:12 AM     Magnesium:    Lab Results   Component Value Date/Time    MG 1.9 02/07/2023 08:42 PM     Phosphorus:    Lab Results

## 2024-07-22 NOTE — PROGRESS NOTES
Occupational Therapy  OCCUPATIONAL THERAPY INITIAL EVALUATION  [facility]  [address], [place]    Date: 2024     Patient Name: James Vazquez III  MRN: [id number]  : 1945  Room: [address] [place] [address]    Evaluating OT: Katalina Ch, OTR/L - [id number]    Referring Provider: Leon Marc PA-C  Specific Provider Orders/Date: \"OT eval and treat\" - 2024    Diagnosis: Elevated troponin [R79.89]     Pertinent Medical History: HTN, CKD, blind in L eye     Precautions: fall risk, droplet isolation (rhinovirus), blind L eye, chair alarm  Additional Precautions: skin integrity, [place]    Assessment of Current Deficits:   [x] Functional mobility   [x] ADLs  [x] Strength               [] Cognition   [x] Functional transfers   [x] IADLs         [x] Safety Awareness   [x] Endurance   [] Fine Motor Coordination  [x] Balance      [] Vision/Perception   [x] Sensation    [] Gross Motor Coordination [] ROM          [] Delirium                  [] Motor Control     OT PLAN OF CARE   OT POC is based on physician orders, patient diagnosis, and results of clinical assessment.  Frequency/Duration 2-5 days/week for 2-4 weeks PRN   Specific OT Treatment Interventions to Include:   * Instruction/training on adapted ADL techniques and AE recommendations to increase functional independence within precautions       * Training on energy conservation strategies, correct breathing pattern and techniques to improve independence/tolerance for self-care routine  * Functional transfer/mobility training/DME recommendations for increased independence, safety, and fall prevention  * Patient/Family education to increase follow through with safety techniques and functional independence  * Recommendation of environmental modifications for increased safety with functional transfers/mobility and ADLs  * Therapeutic exercise to improve motor endurance, ROM, and functional strength for

## 2024-07-22 NOTE — PLAN OF CARE
Problem: ABCDS Injury Assessment  Goal: Absence of physical injury  7/22/2024 1228 by Maureen Jacques RN  Outcome: Progressing  7/22/2024 0049 by Francesca Sarabia RN  Outcome: Progressing     Problem: Skin/Tissue Integrity  Goal: Absence of new skin breakdown  7/22/2024 1228 by Maureen Jacques RN  Outcome: Progressing  7/22/2024 0049 by Francesca Sarabia RN  Outcome: Progressing     Problem: Discharge Planning  Goal: Discharge to home or other facility with appropriate resources  7/22/2024 1228 by Maureen Jacques RN  Outcome: Progressing  7/22/2024 0049 by Francesca Sarabia RN  Outcome: Progressing     Problem: Safety - Adult  Goal: Free from fall injury  7/22/2024 1228 by Maureen Jacques RN  Outcome: Progressing     Problem: Pain  Goal: Verbalizes/displays adequate comfort level or baseline comfort level  7/22/2024 1228 by Maureen Jacques RN  Outcome: Progressing  7/22/2024 0049 by Francesca Sarabia RN  Outcome: Progressing     Problem: Chronic Conditions and Co-morbidities  Goal: Patient's chronic conditions and co-morbidity symptoms are monitored and maintained or improved  7/22/2024 1228 by Maureen Jacques RN  Outcome: Progressing  7/22/2024 0049 by Francesca Sarabia RN  Outcome: Progressing

## 2024-07-22 NOTE — PLAN OF CARE
Problem: ABCDS Injury Assessment  Goal: Absence of physical injury  7/22/2024 0049 by Francesca Sarabia RN  Outcome: Progressing  7/21/2024 1822 by Deborah Rubalcava RN  Outcome: Progressing  Flowsheets (Taken 7/21/2024 1530)  Absence of Physical Injury: Implement safety measures based on patient assessment  7/21/2024 1329 by Aga Gaffney RN  Outcome: Progressing     Problem: Skin/Tissue Integrity  Goal: Absence of new skin breakdown  Description: 1.  Monitor for areas of redness and/or skin breakdown  2.  Assess vascular access sites hourly  3.  Every 4-6 hours minimum:  Change oxygen saturation probe site  4.  Every 4-6 hours:  If on nasal continuous positive airway pressure, respiratory therapy assess nares and determine need for appliance change or resting period.  7/22/2024 0049 by Francesca Sarabia RN  Outcome: Progressing  7/21/2024 1822 by Deborah Rubalcava RN  Outcome: Progressing  7/21/2024 1329 by Aga Gaffney RN  Outcome: Progressing     Problem: Discharge Planning  Goal: Discharge to home or other facility with appropriate resources  7/22/2024 0049 by Francesca Sarabia RN  Outcome: Progressing  7/21/2024 1822 by Deborah Rubalcava RN  Outcome: Progressing  7/21/2024 1329 by Aga Gaffney RN  Outcome: Progressing     Problem: Safety - Adult  Goal: Free from fall injury  7/22/2024 0049 by Francesca Sarabia RN  Outcome: Progressing  7/21/2024 1822 by Deborah Rubalcava RN  Outcome: Progressing  Flowsheets (Taken 7/21/2024 1530)  Free From Fall Injury: Instruct family/caregiver on patient safety  7/21/2024 1329 by Aga Gaffney RN  Outcome: Progressing     Problem: Pain  Goal: Verbalizes/displays adequate comfort level or baseline comfort level  7/22/2024 0049 by Francesca Sarabia RN  Outcome: Progressing  7/21/2024 1822 by Deborah Rubalcava RN  Outcome: Progressing  7/21/2024 1329 by Aga Gaffney RN  Outcome: Progressing     Problem: Chronic Conditions and Co-morbidities  Goal: Patient's chronic conditions and co-morbidity

## 2024-07-22 NOTE — PROGRESS NOTES
E, DO   5,000 Units at 07/22/24 0551    ondansetron (ZOFRAN-ODT) disintegrating tablet 4 mg  4 mg Oral Q8H PRN Luisito Mccord, DO        Or    ondansetron (ZOFRAN) injection 4 mg  4 mg IntraVENous Q6H PRN Luisito Mccord, DO   4 mg at 07/20/24 0754    senna (SENOKOT) tablet 8.6 mg  1 tablet Oral Daily PRN Luisito Mccord, DO        acetaminophen (TYLENOL) tablet 650 mg  650 mg Oral Q6H PRN Luisito Mccord, DO        Or    acetaminophen (TYLENOL) suppository 650 mg  650 mg Rectal Q6H PRN Luisito Mccord, DO        melatonin tablet 3 mg  3 mg Oral Nightly PRN Luisito Mccord, DO   3 mg at 07/18/24 2108    rosuvastatin (CRESTOR) tablet 10 mg  10 mg Oral Nightly Luisito Mccord, DO   10 mg at 07/21/24 2102    carvedilol (COREG) tablet 12.5 mg  12.5 mg Oral BID WC Luisito Mccord, DO   12.5 mg at 07/21/24 1647    budesonide (PULMICORT) nebulizer suspension 250 mcg  0.25 mg Nebulization BID RT Luisito Mccord, DO   250 mcg at 07/21/24 2003    And    arformoterol tartrate (BROVANA) nebulizer solution 15 mcg  15 mcg Nebulization BID RT Luisito Mccord, DO   15 mcg at 07/21/24 2003       PRN Medications  perflutren lipid microspheres, albuterol, sodium chloride flush, sodium chloride, ondansetron **OR** ondansetron, senna, acetaminophen **OR** acetaminophen, melatonin    Objective  Most Recent Recorded Vitals  BP (!) 123/42   Pulse 64   Temp 98.9 °F (37.2 °C) (Oral)   Resp 18   Ht 1.702 m (5' 7\")   Wt 62.6 kg (138 lb 0.1 oz)   SpO2 98%   BMI 21.62 kg/m²   I/O last 3 completed shifts:  In: 240 [P.O.:240]  Out: 0   No intake/output data recorded.    Physical Exam:  General: Asleep, easily awakens to voice, oriented, seems tired but comfortable, frail appearing  Eyes: conjunctivae/corneas clear, sclera non icteric  Skin: warm and dry, scattered ecchymosis to BUE, LUE fistula  Lungs: Expiratory rhonchi which clears with cough, respirations even and nonlabored on room air (1L NC currently off)  Heart: regular rate,  regular rhythm, S1S2 present, +murmur noted  Abdomen: soft, non-tender, non-distended, normal bowel sounds  Extremities: Generalized weakness, no edema noted  Neurologic: following simple commands, speech is clear but soft    Most Recent Labs  Lab Results   Component Value Date    WBC 8.5 07/22/2024    HGB 10.0 (L) 07/22/2024    HCT 31.8 (L) 07/22/2024     (L) 07/21/2024     07/22/2024    K 4.8 07/22/2024    CL 96 (L) 07/22/2024    CREATININE 8.4 (HH) 07/22/2024    BUN 56 (H) 07/22/2024    CO2 27 07/22/2024    GLUCOSE 84 07/22/2024    ALT 5 07/22/2024    AST 11 07/22/2024    INR 1.1 01/15/2024    APTT 35.9 (H) 11/03/2020    TSH 5.470 (H) 02/07/2023       CT HEAD WO CONTRAST   Final Result   1. No acute intracranial abnormality.   2. Moderate cerebral atrophy with periventricular white matter changes.   3. Stable chronic sites of lacunar infarction within the head of the caudate   nucleus on the left and the right basal ganglia.         CTA PULMONARY W CONTRAST   Final Result   There is no evidence of pulmonary arterial embolization.      Spiculated 15 mm right upper lobe pulmonary nodule.  Finding is worrisome for   malignancy and PET-CT is recommended for further evaluation.         XR CHEST PORTABLE   Final Result   No acute process.             Echocardiogram 7/19/24    Left Ventricle: Normal left ventricular systolic function with a visually estimated EF of 60 - 65%. Left ventricle size is normal. Mildly increased wall thickness. Normal wall motion.    Aortic Valve: Severely calcified cusps. Moderate regurgitation with a centrally directed jet. Moderate stenosis of the aortic valve. AV mean gradient is 30 mmHg. AV area by continuity VTI is 1.0 cm2.    Mitral Valve: Mild annular calcification.    Left Atrium: Left atrium is mildly dilated.    Image quality is adequate.    Assessment   Active Hospital Problems    Diagnosis     Elevated troponin [R79.89]     Hyperlipidemia [E78.5]     Hypertension

## 2024-07-22 NOTE — PROGRESS NOTES
SPIRITUAL HEALTH SERVICES - [facility] Encounter    Name: James Vazquez III                  Referral: Routine Visit    Sacraments  Anointed (Last Rites): Yes  Apostolic [place]: No  Confession: No  Communion: Yes     Assessment:  Patient receptive to  visit.      Intervention:   provided spiritual support and sacramental ministry for patient.     Outcome:  Patient expressed gratitude for visit.    Plan:  Chaplains will remain available to offer spiritual and emotional support as needed.      Electronically signed by Chaplain Salvador, on 7/22/2024 at 1:36 PM.  Spiritual Care Department  [facility]  [phone]

## 2024-07-22 NOTE — PROGRESS NOTES
Physical Therapy  Facility/Department: [address] INTERMEDIATE 1  Physical Therapy Initial Assessment    Name: James Vazquez III  : 1945  MRN: [id number]  Date of Service: 2024    Attempted to see pt for PT evaluation, pt off unit for dialysis.   Rita Brooke PT [id number]

## 2024-07-22 NOTE — DISCHARGE INSTR - COC
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    Fracture of femoral neck, left, closed ([facility]) S72.002A    History of left hip hemiarthroplasty Z96.642    Severe protein-calorie malnutrition POA E43    GI bleeding K92.2    Uncontrolled hypertension I10    ESRD on dialysis ([facility]) N18.6, Z99.2    Gastroesophageal reflux disease without esophagitis K21.9    Hypertensive emergency I16.1    Chest pain R07.9    Elevated troponin R79.89       Isolation/Infection:   Isolation            Droplet          Patient Infection Status       Infection Onset Added Last Indicated Last Indicated By Review Planned Expiration Resolved Resolved By    Rhinovirus 24 Respiratory Panel, Molecular, with COVID-19 (Restricted: peds pts or suitable admitted adults) 24      Resolved    COVID-19 23 COVID-19, Rapid   23 Infection     COVID-19 20 Covid-19 Ambulatory   20 Infection     DETECTED 2020                       Nurse Assessment:  Last Vital Signs: BP (!) 146/69   Pulse 76   Temp 97.9 °F (36.6 °C) (Oral)   Resp 16   Ht 1.702 m (5' 7\")   Wt 60.6 kg (133 lb 9.6 oz)   SpO2 97%   BMI 20.92 kg/m²     Last documented pain score (0-10 scale): Pain Level: 0  Last Weight:   Wt Readings from Last 1 Encounters:   24 60.6 kg (133 lb 9.6 oz)     Mental Status:  oriented and alert    IV Access:  - Dialysis Catheter  - site  left, insertion date: left  arm    Nursing Mobility/ADLs:  Walking   Dependent  Transfer  Dependent  Bathing  Dependent  Dressing  Dependent  Toileting     Name:  Address:  Dialysis Schedule:  Phone:  Fax:    / signature: Electronically signed by NOE Esquivel on 7/22/24 at 3:10 PM EDT    PHYSICIAN SECTION    Prognosis: Fair    Condition at Discharge: Stable    Rehab Potential (if transferring to Rehab): Fair    Recommended Labs or Other Treatments After Discharge: none    Physician Certification: I certify the above information and transfer of James Vazquez III  is necessary for the continuing treatment of the diagnosis listed and that he requires Skilled Nursing Facility for less 30 days.     Update Admission H&P: Changes in H&P as follows - see Dc summary    PHYSICIAN SIGNATURE:  Electronically signed by Luisito Mccord DO on 7/22/24 at 12:10 PM EDT

## 2024-07-23 VITALS
TEMPERATURE: 98.3 F | OXYGEN SATURATION: 94 % | RESPIRATION RATE: 16 BRPM | HEIGHT: 67 IN | DIASTOLIC BLOOD PRESSURE: 46 MMHG | BODY MASS INDEX: 22.49 KG/M2 | HEART RATE: 64 BPM | SYSTOLIC BLOOD PRESSURE: 137 MMHG | WEIGHT: 143.3 LBS

## 2024-07-23 LAB
ALBUMIN SERPL-MCNC: 3.5 G/DL (ref 3.5–5.2)
ALP SERPL-CCNC: 88 U/L (ref 40–129)
ALT SERPL-CCNC: 7 U/L (ref 0–40)
ANION GAP SERPL CALCULATED.3IONS-SCNC: 17 MMOL/L (ref 7–16)
AST SERPL-CCNC: 14 U/L (ref 0–39)
BASOPHILS # BLD: 0.05 K/UL (ref 0–0.2)
BASOPHILS NFR BLD: 1 % (ref 0–2)
BILIRUB SERPL-MCNC: 0.9 MG/DL (ref 0–1.2)
BUN SERPL-MCNC: 32 MG/DL (ref 6–23)
CALCIUM SERPL-MCNC: 9.8 MG/DL (ref 8.6–10.2)
CHLORIDE SERPL-SCNC: 93 MMOL/L (ref 98–107)
CO2 SERPL-SCNC: 25 MMOL/L (ref 22–29)
CREAT SERPL-MCNC: 5.6 MG/DL (ref 0.7–1.2)
EOSINOPHIL # BLD: 0.08 K/UL (ref 0.05–0.5)
EOSINOPHILS RELATIVE PERCENT: 1 % (ref 0–6)
ERYTHROCYTE [DISTWIDTH] IN BLOOD BY AUTOMATED COUNT: 16.6 % (ref 11.5–15)
GFR, ESTIMATED: 10 ML/MIN/1.73M2
GLUCOSE SERPL-MCNC: 63 MG/DL (ref 74–99)
HCT VFR BLD AUTO: 35.5 % (ref 37–54)
HGB BLD-MCNC: 11.1 G/DL (ref 12.5–16.5)
IMM GRANULOCYTES # BLD AUTO: 0.03 K/UL (ref 0–0.58)
IMM GRANULOCYTES NFR BLD: 0 % (ref 0–5)
LYMPHOCYTES NFR BLD: 0.82 K/UL (ref 1.5–4)
LYMPHOCYTES RELATIVE PERCENT: 9 % (ref 20–42)
MCH RBC QN AUTO: 32.5 PG (ref 26–35)
MCHC RBC AUTO-ENTMCNC: 31.3 G/DL (ref 32–34.5)
MCV RBC AUTO: 103.8 FL (ref 80–99.9)
MONOCYTES NFR BLD: 0.85 K/UL (ref 0.1–0.95)
MONOCYTES NFR BLD: 9 % (ref 2–12)
NEUTROPHILS NFR BLD: 81 % (ref 43–80)
NEUTS SEG NFR BLD: 7.76 K/UL (ref 1.8–7.3)
PLATELET # BLD AUTO: 121 K/UL (ref 130–450)
PMV BLD AUTO: 10.4 FL (ref 7–12)
POTASSIUM SERPL-SCNC: 4.7 MMOL/L (ref 3.5–5)
PROT SERPL-MCNC: 6.8 G/DL (ref 6.4–8.3)
RBC # BLD AUTO: 3.42 M/UL (ref 3.8–5.8)
SODIUM SERPL-SCNC: 135 MMOL/L (ref 132–146)
WBC OTHER # BLD: 9.6 K/UL (ref 4.5–11.5)

## 2024-07-23 PROCEDURE — 6360000002 HC RX W HCPCS: Performed by: INTERNAL MEDICINE

## 2024-07-23 PROCEDURE — 94640 AIRWAY INHALATION TREATMENT: CPT

## 2024-07-23 PROCEDURE — 6370000000 HC RX 637 (ALT 250 FOR IP): Performed by: INTERNAL MEDICINE

## 2024-07-23 PROCEDURE — 2580000003 HC RX 258: Performed by: INTERNAL MEDICINE

## 2024-07-23 PROCEDURE — 85025 COMPLETE CBC W/AUTO DIFF WBC: CPT

## 2024-07-23 PROCEDURE — 36415 COLL VENOUS BLD VENIPUNCTURE: CPT

## 2024-07-23 PROCEDURE — 6370000000 HC RX 637 (ALT 250 FOR IP)

## 2024-07-23 PROCEDURE — 80053 COMPREHEN METABOLIC PANEL: CPT

## 2024-07-23 RX ADMIN — CARVEDILOL 12.5 MG: 6.25 TABLET, FILM COATED ORAL at 08:47

## 2024-07-23 RX ADMIN — IPRATROPIUM BROMIDE AND ALBUTEROL SULFATE 1 DOSE: 2.5; .5 SOLUTION RESPIRATORY (INHALATION) at 15:57

## 2024-07-23 RX ADMIN — SEVELAMER CARBONATE 800 MG: 800 TABLET, FILM COATED ORAL at 10:54

## 2024-07-23 RX ADMIN — ARFORMOTEROL TARTRATE 15 MCG: 15 SOLUTION RESPIRATORY (INHALATION) at 08:17

## 2024-07-23 RX ADMIN — GUAIFENESIN 400 MG: 400 TABLET ORAL at 08:47

## 2024-07-23 RX ADMIN — SEVELAMER CARBONATE 800 MG: 800 TABLET, FILM COATED ORAL at 05:52

## 2024-07-23 RX ADMIN — IPRATROPIUM BROMIDE AND ALBUTEROL SULFATE 1 DOSE: 2.5; .5 SOLUTION RESPIRATORY (INHALATION) at 11:51

## 2024-07-23 RX ADMIN — BUDESONIDE 250 MCG: 0.25 SUSPENSION RESPIRATORY (INHALATION) at 08:17

## 2024-07-23 RX ADMIN — SENNOSIDES 8.6 MG: 8.6 TABLET, COATED ORAL at 08:54

## 2024-07-23 RX ADMIN — SODIUM CHLORIDE, PRESERVATIVE FREE 10 ML: 5 INJECTION INTRAVENOUS at 08:47

## 2024-07-23 RX ADMIN — IPRATROPIUM BROMIDE AND ALBUTEROL SULFATE 1 DOSE: 2.5; .5 SOLUTION RESPIRATORY (INHALATION) at 08:17

## 2024-07-23 RX ADMIN — HEPARIN SODIUM 5000 UNITS: 5000 INJECTION INTRAVENOUS; SUBCUTANEOUS at 05:52

## 2024-07-23 RX ADMIN — HEPARIN SODIUM 5000 UNITS: 5000 INJECTION INTRAVENOUS; SUBCUTANEOUS at 13:34

## 2024-07-23 ASSESSMENT — PAIN SCALES - GENERAL
PAINLEVEL_OUTOF10: 0
PAINLEVEL_OUTOF10: 0

## 2024-07-23 NOTE — CARE COORDINATION
CASE MANAGEMENT....Discharge order noted on chart. Auth obtained for [facility]. Ambulance transport arranged for 4pm with [facility]. Nursing, facility and wife/patient updated. N 17 in epic. Transport forms and [id number] in chart.

## 2024-07-23 NOTE — PROGRESS NOTES
Internal Medicine Progress Note    Patient's name: James Vazquez III  : 1945  Chief complaints (on day of admission): Shortness of Breath (Per wife pt just finished azithromycin and prednisone and cough is not getting better ) and Cough  Admission date: 2024  Date of service: 2024   Room: [place] [address]  Primary care physician: Cipriano Durham DO  Reason for visit: Follow-up for shortness of breath, weakness    Subjective  James is seen lying in bed awake and alert, in no distress.  He is currently watching television.  He continues to report generalized weakness and fatigue.  He expresses frustration due to his current weakness and does not understand why he is not improving faster.  He reports a cough but denies much sputum production.  He denies any shortness of breath at this time.  He denies any fever or chills.  Denies any nausea or vomiting.  He is asking when he will be going to rehab.  No other issues or concerns from nursing.    Review of Systems  Full 10 point review of systems negative unless mentioned above.    Hospital Medications  Current Facility-Administered Medications   Medication Dose Route Frequency Provider Last Rate Last Admin    guaiFENesin tablet 400 mg  400 mg Oral BID Luisito Mccord DO   400 mg at 24 2100    sevelamer (RENVELA) tablet 800 mg  800 mg Oral TID WinstonCamelia MD   800 mg at 24 0552    perflutren lipid microspheres (DEFINITY) injection 1.5 mL  1.5 mL IntraVENous ONCE PRN Carlos Roberts MD        ipratropium 0.5 mg-albuterol 2.5 mg (DUONEB) nebulizer solution 1 Dose  1 Dose Inhalation Q4H WA RT Talya Gates APRN - CNP   1 Dose at 24 1550    albuterol (PROVENTIL) (2.5 MG/3ML) 0.083% nebulizer solution 2.5 mg  2.5 mg Nebulization Q6H PRN Talya Gates APRN - CNP        sodium chloride flush 0.9 % injection 10 mL  10 mL IntraVENous 2 times per day Luisito Mccord DO   10 mL at 24 2100    sodium chloride flush 0.9 %  icteric  Skin: warm and dry, scattered ecchymosis to BUE, LUE fistula  Lungs: Coarse throughout, respirations even and nonlabored on room air, cough noted  Heart: regular rate, regular rhythm, S1S2 present, +murmur noted  Abdomen: soft, non-tender, non-distended, normal bowel sounds  Extremities: Generalized weakness, no edema noted  Neurologic: following simple commands, speech is clear    Most Recent Labs  Lab Results   Component Value Date    WBC 9.6 07/23/2024    HGB 11.1 (L) 07/23/2024    HCT 35.5 (L) 07/23/2024     (L) 07/23/2024     07/22/2024    K 4.8 07/22/2024    CL 96 (L) 07/22/2024    CREATININE 8.4 (HH) 07/22/2024    BUN 56 (H) 07/22/2024    CO2 27 07/22/2024    GLUCOSE 84 07/22/2024    ALT 5 07/22/2024    AST 11 07/22/2024    INR 1.1 01/15/2024    APTT 35.9 (H) 11/03/2020    TSH 5.470 (H) 02/07/2023       CT HEAD WO CONTRAST   Final Result   1. No acute intracranial abnormality.   2. Moderate cerebral atrophy with periventricular white matter changes.   3. Stable chronic sites of lacunar infarction within the head of the caudate   nucleus on the left and the right basal ganglia.         CTA PULMONARY W CONTRAST   Final Result   There is no evidence of pulmonary arterial embolization.      Spiculated 15 mm right upper lobe pulmonary nodule.  Finding is worrisome for   malignancy and PET-CT is recommended for further evaluation.         XR CHEST PORTABLE   Final Result   No acute process.             Echocardiogram 7/19/24    Left Ventricle: Normal left ventricular systolic function with a visually estimated EF of 60 - 65%. Left ventricle size is normal. Mildly increased wall thickness. Normal wall motion.    Aortic Valve: Severely calcified cusps. Moderate regurgitation with a centrally directed jet. Moderate stenosis of the aortic valve. AV mean gradient is 30 mmHg. AV area by continuity VTI is 1.0 cm2.    Mitral Valve: Mild annular calcification.    Left Atrium: Left atrium is mildly

## 2024-07-23 NOTE — PROGRESS NOTES
Attempted to call report to [facility]  [phone], nurse busy, unable to take report. She will return call.

## 2024-07-23 NOTE — PROGRESS NOTES
Physician Progress Note      PATIENT:               SKYLA ENCISO  CSN #:                  [id number]  :                       1945  ADMIT DATE:       2024 12:15 PM  DISCH DATE:  RESPONDING  PROVIDER #:        Kenji Mills DO          QUERY TEXT:    Patient admitted with shortness of breath, noted to have COPD. If possible,   please document in progress notes and discharge summary if you are evaluating   and /or treating any of the following:    The medical record reflects the following:  Risk Factors: Severe Gold Stage III COPD, CHF, former nicotine dependence,   ESRD on HD  Clinical Indicators: Pulm consult - Severe Gold Stage III COPD. Chest   imaging reviewed, RUL 15 mm cavitary nodule. Lung LIZ probability of   malignancy calculated as 68.8% probability of malignancy. Positive rhinovirus   infection.  presents to the emergency department shortness of breath and   cough.  Treatment: Scheduled bronchodilators - brovana and pulmicort BID with DuoNebs   q4h WA. Albuterol nebs PRN    Thank You  GABY Krishnan, RN  Clinical Documentation Integrity  Options provided:  -- Acute COPD exacerbation  -- COPD without exacerbation  -- Other - I will add my own diagnosis  -- Disagree - Not applicable / Not valid  -- Disagree - Clinically unable to determine / Unknown  -- Refer to Clinical Documentation Reviewer    PROVIDER RESPONSE TEXT:    This patient is being treated for COPD exacerbation.    Query created by: Martín Krishna on 2024 10:44 AM      Electronically signed by:  Kenji Mills DO 2024 8:52 AM

## 2024-07-23 NOTE — PROGRESS NOTES
Department of Internal Medicine  Nephrology Progress Note    Events reviewed.    SUBJECTIVE:  We are following Mr. Vazquez for ESRD on HD. Patient reports no new complaints today.    PHYSICAL EXAM:    Vitals:    VITALS:  BP (!) 145/49   Pulse 70   Temp 98 °F (36.7 °C) (Oral)   Resp 18   Ht 1.702 m (5' 7\")   Wt 65 kg (143 lb 4.8 oz)   SpO2 92%   BMI 22.44 kg/m²   24HR INTAKE/OUTPUT:    Intake/Output Summary (Last 24 hours) at 7/23/2024 0910  Last data filed at 7/22/2024 1030  Gross per 24 hour   Intake 300 ml   Output 2300 ml   Net -2000 ml         Constitutional:  Awake, alert, oriented, in NAD  HEENT:  PERRLA, normocephalic, atraumatic  Respiratory:  CTA  Cardiovascular/Edema:  RRR, normal S1, normal S2  Gastrointestinal:  Soft, flat, non-distended, non-tender  Neurologic:  Nonfocal  Skin:  warm, dry, no rashes, no lesions  Access:  Left upper arm AV fistula    DATA:    CBC:   Lab Results   Component Value Date/Time    WBC 9.6 07/23/2024 06:45 AM    RBC 3.42 07/23/2024 06:45 AM    HGB 11.1 07/23/2024 06:45 AM    HCT 35.5 07/23/2024 06:45 AM    .8 07/23/2024 06:45 AM    MCH 32.5 07/23/2024 06:45 AM    MCHC 31.3 07/23/2024 06:45 AM    RDW 16.6 07/23/2024 06:45 AM     07/23/2024 06:45 AM    MPV 10.4 07/23/2024 06:45 AM     CMP:    Lab Results   Component Value Date/Time     07/23/2024 06:45 AM    K 4.7 07/23/2024 06:45 AM    K 3.4 10/14/2022 07:13 PM    CL 93 07/23/2024 06:45 AM    CO2 25 07/23/2024 06:45 AM    BUN 32 07/23/2024 06:45 AM    CREATININE 5.6 07/23/2024 06:45 AM    GFRAA 14 10/16/2022 02:38 AM    LABGLOM 10 07/23/2024 06:45 AM    LABGLOM 12 01/16/2024 05:18 AM    GLUCOSE 63 07/23/2024 06:45 AM    CALCIUM 9.8 07/23/2024 06:45 AM    BILITOT 0.9 07/23/2024 06:45 AM    ALKPHOS 88 07/23/2024 06:45 AM    AST 14 07/23/2024 06:45 AM    ALT 7 07/23/2024 06:45 AM     Magnesium:    Lab Results   Component Value Date/Time    MG 1.9 02/07/2023 08:42 PM     Phosphorus:    Lab Results

## 2024-07-23 NOTE — PLAN OF CARE
Problem: ABCDS Injury Assessment  Goal: Absence of physical injury  Outcome: Progressing     Problem: Skin/Tissue Integrity  Goal: Absence of new skin breakdown  Outcome: Progressing     Problem: Pain  Goal: Verbalizes/displays adequate comfort level or baseline comfort level  Outcome: Progressing     Problem: Chronic Conditions and Co-morbidities  Goal: Patient's chronic conditions and co-morbidity symptoms are monitored and maintained or improved  Outcome: Progressing  Flowsheets (Taken 7/23/2024 1693)  Care Plan - Patient's Chronic Conditions and Co-Morbidity Symptoms are Monitored and Maintained or Improved: Monitor and assess patient's chronic conditions and comorbid symptoms for stability, deterioration, or improvement

## 2024-07-23 NOTE — CARE COORDINATION
Social Work/Discharge Planning:  Samanta Vela of the [facility] facility is unable to accept.  Dea with [facility] they are waiting to hear from [facility] regarding patient dialysis.  Requested for facility to start pre-cert if they can accept patient, since patient is ready for discharge.  Will continue to follow. Electronically signed by NOE Esquivel on 7/23/2024 at 9:57 AM    Addendum:  Dea with [facility] facility can accept and will start pre-cert.  Updated patient and his wife.  Will continue to follow and wait for pre-cert.  Electronically signed by NOE Esquivel on 7/23/2024 at 10:24 AM    Addendum:  Dea with [facility] called with pre-cert approval.  Notified patient.  Patient wife not present in room.  Notified charge nurse. Electronically signed by NOE Esquivel on 7/23/2024 at 12:22 PM

## 2024-07-23 NOTE — CARE COORDINATION
Social Work/Discharge Planning:   sent [employer] to Dr. Rivas and confirmed he is agreeable for patient having dialysis Thursday at [facility].  Updated Dea with [facility].  Electronically signed by NOE Esquivel on 7/23/2024 at 1:12 PM

## 2024-07-24 ENCOUNTER — OUTSIDE SERVICES (OUTPATIENT)
Dept: PRIMARY CARE CLINIC | Age: 79
End: 2024-07-24
Payer: MEDICARE

## 2024-07-24 VITALS
TEMPERATURE: 98.3 F | DIASTOLIC BLOOD PRESSURE: 62 MMHG | BODY MASS INDEX: 21.61 KG/M2 | WEIGHT: 138 LBS | OXYGEN SATURATION: 93 % | SYSTOLIC BLOOD PRESSURE: 158 MMHG | RESPIRATION RATE: 18 BRPM | HEART RATE: 74 BPM

## 2024-07-24 DIAGNOSIS — B34.8 RHINOVIRUS: Primary | ICD-10-CM

## 2024-07-24 DIAGNOSIS — J44.1 COPD WITH EXACERBATION (HCC): ICD-10-CM

## 2024-07-24 DIAGNOSIS — N18.6 END-STAGE RENAL DISEASE ON HEMODIALYSIS (HCC): ICD-10-CM

## 2024-07-24 DIAGNOSIS — R53.1 GENERALIZED WEAKNESS: ICD-10-CM

## 2024-07-24 DIAGNOSIS — R05.9 COUGH, UNSPECIFIED TYPE: ICD-10-CM

## 2024-07-24 DIAGNOSIS — I10 ESSENTIAL (PRIMARY) HYPERTENSION: ICD-10-CM

## 2024-07-24 DIAGNOSIS — Z99.2 END-STAGE RENAL DISEASE ON HEMODIALYSIS (HCC): ICD-10-CM

## 2024-07-24 DIAGNOSIS — J96.91 RESPIRATORY FAILURE WITH HYPOXIA, UNSPECIFIED CHRONICITY (HCC): ICD-10-CM

## 2024-07-24 DIAGNOSIS — K21.9 GASTROESOPHAGEAL REFLUX DISEASE WITHOUT ESOPHAGITIS: ICD-10-CM

## 2024-07-24 DIAGNOSIS — R26.2 AMBULATORY DYSFUNCTION: ICD-10-CM

## 2024-07-24 DIAGNOSIS — R06.2 WHEEZE: ICD-10-CM

## 2024-07-24 DIAGNOSIS — E78.5 HYPERLIPIDEMIA, UNSPECIFIED HYPERLIPIDEMIA TYPE: ICD-10-CM

## 2024-07-24 PROCEDURE — 99306 1ST NF CARE HIGH MDM 50: CPT | Performed by: INTERNAL MEDICINE

## 2024-07-24 NOTE — PROGRESS NOTES
Visit Date: 2024  James Vazquez III (:  1945) is a 78 y.o. male.    History & Physical    Subjective   SUBJECTIVE/OBJECTIVE:  Past Medical History:   Diagnosis Date    Blind left eye     Chronic kidney disease     Dialysis patient (HCC)     Hemodialysis patient (HCC)     Hyperlipidemia     Hypertension       Past Surgical History:   Procedure Laterality Date    AV FISTULA CREATION Left     Dr. Phillips [facility]    CARDIAC SURGERY      HEMODIALYSIS ACCESS PERCUTANEOUS REVISION Left     2 x Dr. Phillips, [facility]    HIP SURGERY Left 10/16/2020    HIP HEMIARTHROPLASTY performed by Abel Birmingham MD at [facility] OR    PERIPHERAL VASCULAR BYPASS      Aortobifemoral bypass for claudicatio - Dr. Phillips [facility]    UPPER GASTROINTESTINAL ENDOSCOPY N/A 2020    EGD ESOPHAGOGASTRODUODENOSCOPY WITH ANTRAL BIOPSY performed by Lloyd Styles MD at [facility] OR    UPPER GASTROINTESTINAL ENDOSCOPY N/A 10/28/2020    EGD ESOPHAGOGASTRODUODENOSCOPY performed by Lloyd Styles MD at [facility] ENDOSCOPY      No family history on file.   Social History     Socioeconomic History    Marital status:    Tobacco Use    Smoking status: Former     Current packs/day: 0.00     Average packs/day: 1 pack/day for 30.0 years (30.0 ttl pk-yrs)     Types: Cigarettes     Start date: 10/3/1970     Quit date: 10/3/2000     Years since quittin.8    Smokeless tobacco: Never   Vaping Use    Vaping Use: Never used   Substance and Sexual Activity    Alcohol use: Not Currently     Comment: occasional    Drug use: Never    Sexual activity: Not Currently     Social Determinants of Health     Food Insecurity: No Food Insecurity (2024)    Hunger Vital Sign     Worried About Running Out of Food in the Last Year: Never true     Ran Out of Food in the Last Year: Never true   Transportation Needs: No Transportation Needs (2024)    PRAPARE - Transportation     Lack of Transportation (Medical): No     Lack of Transportation (Non-Medical):

## 2024-07-25 LAB
ALBUMIN SERPL-MCNC: 3.7 G/DL (ref 3.5–5.2)
ALP SERPL-CCNC: 88 U/L (ref 40–129)
ALT SERPL-CCNC: 6 U/L (ref 0–40)
ANION GAP SERPL CALCULATED.3IONS-SCNC: 20 MMOL/L (ref 7–16)
AST SERPL-CCNC: 12 U/L (ref 0–39)
BILIRUB SERPL-MCNC: 0.6 MG/DL (ref 0–1.2)
BUN SERPL-MCNC: 53 MG/DL (ref 6–23)
CALCIUM SERPL-MCNC: 10.2 MG/DL (ref 8.6–10.2)
CHLORIDE SERPL-SCNC: 94 MMOL/L (ref 98–107)
CHOLEST SERPL-MCNC: 118 MG/DL
CO2 SERPL-SCNC: 26 MMOL/L (ref 22–29)
CREAT SERPL-MCNC: 8.4 MG/DL (ref 0.7–1.2)
ERYTHROCYTE [DISTWIDTH] IN BLOOD BY AUTOMATED COUNT: 16.9 % (ref 11.5–15)
GFR, ESTIMATED: 6 ML/MIN/1.73M2
GLUCOSE SERPL-MCNC: 82 MG/DL (ref 74–99)
HCT VFR BLD AUTO: 36.4 % (ref 37–54)
HDLC SERPL-MCNC: 40 MG/DL
HGB BLD-MCNC: 11.7 G/DL (ref 12.5–16.5)
LDLC SERPL CALC-MCNC: 61 MG/DL
MCH RBC QN AUTO: 33.5 PG (ref 26–35)
MCHC RBC AUTO-ENTMCNC: 32.1 G/DL (ref 32–34.5)
MCV RBC AUTO: 104.3 FL (ref 80–99.9)
PLATELET # BLD AUTO: 125 K/UL (ref 130–450)
PMV BLD AUTO: 11.2 FL (ref 7–12)
POTASSIUM SERPL-SCNC: 4.8 MMOL/L (ref 3.5–5)
PROT SERPL-MCNC: 6.9 G/DL (ref 6.4–8.3)
RBC # BLD AUTO: 3.49 M/UL (ref 3.8–5.8)
SODIUM SERPL-SCNC: 140 MMOL/L (ref 132–146)
TRIGL SERPL-MCNC: 84 MG/DL
VLDLC SERPL CALC-MCNC: 17 MG/DL
WBC OTHER # BLD: 9.3 K/UL (ref 4.5–11.5)

## 2024-07-25 ASSESSMENT — ENCOUNTER SYMPTOMS
EYE PAIN: 0
SHORTNESS OF BREATH: 1
CONSTIPATION: 0
ABDOMINAL PAIN: 0
VOMITING: 0
BLOOD IN STOOL: 0
SORE THROAT: 0
DIARRHEA: 0
CHEST TIGHTNESS: 1
BACK PAIN: 0
WHEEZING: 1
TROUBLE SWALLOWING: 0
COUGH: 1
NAUSEA: 0
PHOTOPHOBIA: 0

## 2024-08-02 ENCOUNTER — OUTSIDE SERVICES (OUTPATIENT)
Dept: PRIMARY CARE CLINIC | Age: 79
End: 2024-08-02
Payer: MEDICARE

## 2024-08-02 DIAGNOSIS — E43 SEVERE PROTEIN-CALORIE MALNUTRITION (HCC): ICD-10-CM

## 2024-08-02 DIAGNOSIS — Z99.2 END-STAGE RENAL DISEASE ON HEMODIALYSIS (HCC): Primary | ICD-10-CM

## 2024-08-02 DIAGNOSIS — J96.91 RESPIRATORY FAILURE WITH HYPOXIA, UNSPECIFIED CHRONICITY (HCC): ICD-10-CM

## 2024-08-02 DIAGNOSIS — K21.9 GASTROESOPHAGEAL REFLUX DISEASE WITHOUT ESOPHAGITIS: ICD-10-CM

## 2024-08-02 DIAGNOSIS — U07.1 ACUTE RESPIRATORY FAILURE DUE TO COVID-19 (HCC): ICD-10-CM

## 2024-08-02 DIAGNOSIS — R06.2 WHEEZE: ICD-10-CM

## 2024-08-02 DIAGNOSIS — E78.5 HYPERLIPIDEMIA, UNSPECIFIED HYPERLIPIDEMIA TYPE: ICD-10-CM

## 2024-08-02 DIAGNOSIS — J44.1 COPD WITH EXACERBATION (HCC): ICD-10-CM

## 2024-08-02 DIAGNOSIS — N18.6 END-STAGE RENAL DISEASE ON HEMODIALYSIS (HCC): Primary | ICD-10-CM

## 2024-08-02 DIAGNOSIS — B34.8 RHINOVIRUS: ICD-10-CM

## 2024-08-02 DIAGNOSIS — R53.1 GENERALIZED WEAKNESS: ICD-10-CM

## 2024-08-02 DIAGNOSIS — J96.00 ACUTE RESPIRATORY FAILURE DUE TO COVID-19 (HCC): ICD-10-CM

## 2024-08-02 DIAGNOSIS — R05.9 COUGH, UNSPECIFIED TYPE: ICD-10-CM

## 2024-08-02 DIAGNOSIS — I10 ESSENTIAL (PRIMARY) HYPERTENSION: ICD-10-CM

## 2024-08-02 PROCEDURE — 99309 SBSQ NF CARE MODERATE MDM 30: CPT | Performed by: NURSE PRACTITIONER

## 2024-08-05 ENCOUNTER — OUTSIDE SERVICES (OUTPATIENT)
Dept: PRIMARY CARE CLINIC | Age: 79
End: 2024-08-05
Payer: MEDICARE

## 2024-08-05 DIAGNOSIS — E78.5 HYPERLIPIDEMIA, UNSPECIFIED HYPERLIPIDEMIA TYPE: ICD-10-CM

## 2024-08-05 DIAGNOSIS — N18.6 END-STAGE RENAL DISEASE ON HEMODIALYSIS (HCC): Primary | ICD-10-CM

## 2024-08-05 DIAGNOSIS — U07.1 ACUTE RESPIRATORY FAILURE DUE TO COVID-19 (HCC): ICD-10-CM

## 2024-08-05 DIAGNOSIS — J96.91 RESPIRATORY FAILURE WITH HYPOXIA, UNSPECIFIED CHRONICITY (HCC): ICD-10-CM

## 2024-08-05 DIAGNOSIS — K21.9 GASTROESOPHAGEAL REFLUX DISEASE WITHOUT ESOPHAGITIS: ICD-10-CM

## 2024-08-05 DIAGNOSIS — R53.1 GENERALIZED WEAKNESS: ICD-10-CM

## 2024-08-05 DIAGNOSIS — R06.2 WHEEZE: ICD-10-CM

## 2024-08-05 DIAGNOSIS — R05.9 COUGH, UNSPECIFIED TYPE: ICD-10-CM

## 2024-08-05 DIAGNOSIS — Z99.2 END-STAGE RENAL DISEASE ON HEMODIALYSIS (HCC): Primary | ICD-10-CM

## 2024-08-05 DIAGNOSIS — J96.00 ACUTE RESPIRATORY FAILURE DUE TO COVID-19 (HCC): ICD-10-CM

## 2024-08-05 DIAGNOSIS — I10 ESSENTIAL (PRIMARY) HYPERTENSION: ICD-10-CM

## 2024-08-05 DIAGNOSIS — J44.1 COPD WITH EXACERBATION (HCC): ICD-10-CM

## 2024-08-05 DIAGNOSIS — E43 SEVERE PROTEIN-CALORIE MALNUTRITION (HCC): ICD-10-CM

## 2024-08-05 DIAGNOSIS — B34.8 RHINOVIRUS: ICD-10-CM

## 2024-08-05 PROCEDURE — 99309 SBSQ NF CARE MODERATE MDM 30: CPT | Performed by: NURSE PRACTITIONER

## 2024-08-07 LAB
ALBUMIN SERPL-MCNC: 3.6 G/DL (ref 3.5–5.2)
ALP SERPL-CCNC: 96 U/L (ref 40–129)
ALT SERPL-CCNC: 6 U/L (ref 0–40)
ANION GAP SERPL CALCULATED.3IONS-SCNC: 10 MMOL/L (ref 7–16)
AST SERPL-CCNC: 14 U/L (ref 0–39)
BILIRUB SERPL-MCNC: 0.8 MG/DL (ref 0–1.2)
BUN SERPL-MCNC: 20 MG/DL (ref 6–23)
CALCIUM SERPL-MCNC: 9.7 MG/DL (ref 8.6–10.2)
CHLORIDE SERPL-SCNC: 92 MMOL/L (ref 98–107)
CO2 SERPL-SCNC: 31 MMOL/L (ref 22–29)
CREAT SERPL-MCNC: 4.6 MG/DL (ref 0.7–1.2)
ERYTHROCYTE [DISTWIDTH] IN BLOOD BY AUTOMATED COUNT: 14.8 % (ref 11.5–15)
GFR, ESTIMATED: 12 ML/MIN/1.73M2
GLUCOSE SERPL-MCNC: 74 MG/DL (ref 74–99)
HCT VFR BLD AUTO: 32.1 % (ref 37–54)
HGB BLD-MCNC: 10.5 G/DL (ref 12.5–16.5)
MCH RBC QN AUTO: 33.5 PG (ref 26–35)
MCHC RBC AUTO-ENTMCNC: 32.7 G/DL (ref 32–34.5)
MCV RBC AUTO: 102.6 FL (ref 80–99.9)
PLATELET # BLD AUTO: 116 K/UL (ref 130–450)
PMV BLD AUTO: 10.8 FL (ref 7–12)
POTASSIUM SERPL-SCNC: 4.3 MMOL/L (ref 3.5–5)
PROT SERPL-MCNC: 6.3 G/DL (ref 6.4–8.3)
RBC # BLD AUTO: 3.13 M/UL (ref 3.8–5.8)
SODIUM SERPL-SCNC: 133 MMOL/L (ref 132–146)
WBC OTHER # BLD: 4.2 K/UL (ref 4.5–11.5)

## 2024-08-12 ASSESSMENT — ENCOUNTER SYMPTOMS
WHEEZING: 0
ABDOMINAL DISTENTION: 0
SHORTNESS OF BREATH: 0
SHORTNESS OF BREATH: 0
NAUSEA: 0
SINUS PRESSURE: 0
COUGH: 0
NAUSEA: 0
CONSTIPATION: 0
VOMITING: 0
RHINORRHEA: 0
EYE PAIN: 0
EYE ITCHING: 0
SINUS PRESSURE: 0
EYE REDNESS: 0
CHEST TIGHTNESS: 0
SORE THROAT: 0
CONSTIPATION: 0
EYE DISCHARGE: 0
DIARRHEA: 0
ABDOMINAL PAIN: 0
CHEST TIGHTNESS: 0
DIARRHEA: 0
WHEEZING: 0
SORE THROAT: 0
ABDOMINAL DISTENTION: 0
ABDOMINAL PAIN: 0
EYE DISCHARGE: 0
COUGH: 0
VOMITING: 0
RHINORRHEA: 0
EYE ITCHING: 0
EYE PAIN: 0
EYE REDNESS: 0

## 2024-08-12 NOTE — PROGRESS NOTES
James Vazquez III (:  1945) is a 78 y.o. male that is seen today for skilled assessment    Subjective     Location: [facility]  All nursing and progress notes reviewed.    James is awake alert and in no obvious distress.  He is sitting up in wheelchair working with his therapist.  He has no complaints of pain or discomfort when asked. He continues to work in therapies as tolerated.  He did have some stomach discomfort over the weekend and has not had a bowel movement for a few days. Ordered a KUB that was negative. Today his abdominal discomfort is no longer present. He continues dialysis at the facility every . He will follow-up with nephrology. Nursing has no concerns and will let Dr. JACKSON or PA know of any changes.      Past Medical History:   Diagnosis Date    Blind left eye     Chronic kidney disease     Dialysis patient (HCC)     Hemodialysis patient (HCC)     Hyperlipidemia     Hypertension        No Known Allergies   Current Outpatient Medications   Medication Sig Dispense Refill    amLODIPine (NORVASC) 5 MG tablet Take 1 tablet by mouth every morning      sevelamer (RENVELA) 800 MG tablet Take 1 tablet by mouth 3 times daily      Co-Enzyme Q10 100 MG CAPS Take 100 mg by mouth nightly      hydrALAZINE (APRESOLINE) 50 MG tablet Take 1 tablet by mouth 2 times daily      pantoprazole (PROTONIX) 40 MG tablet Take 1 tablet by mouth every morning      fluticasone-umeclidin-vilant (TRELEGY ELLIPTA) 100-62.5-25 MCG/ACT AEPB inhaler Inhale 1 puff into the lungs daily      albuterol sulfate HFA (PROVENTIL HFA) 108 (90 Base) MCG/ACT inhaler Inhale 2 puffs into the lungs every 6 hours as needed for Wheezing 18 g 3    aspirin 81 MG EC tablet Take 1 tablet by mouth every morning      carvedilol (COREG) 12.5 MG tablet Take 1 tablet by mouth 2 times daily (with meals) 60 tablet 3    acetaminophen (TYLENOL) 325 MG tablet Take 2 tablets by mouth every 4 hours as

## 2024-08-12 NOTE — PROGRESS NOTES
James Vazquez III (:  1945) is a 78 y.o. male that is seen today for skilled assessment    Subjective     Location: [facility]  All nursing and progress notes reviewed.    James is awake alert and in no obvious distress.  He is sitting up in wheelchair working with his therapist.  He has no complaints of pain or discomfort at this time.  He continues to work in therapies as tolerated.  He remains on dialysis at the facility and is followed by nephrology.  No concerns from nursing.  Nursing will let Dr. JACKSON or PA know of any changes.      Past Medical History:   Diagnosis Date   • Blind left eye    • Chronic kidney disease    • Dialysis patient (HCC)    • Hemodialysis patient (HCC)    • Hyperlipidemia    • Hypertension        No Known Allergies   Current Outpatient Medications   Medication Sig Dispense Refill   • amLODIPine (NORVASC) 5 MG tablet Take 1 tablet by mouth every morning     • sevelamer (RENVELA) 800 MG tablet Take 1 tablet by mouth 3 times daily     • Co-Enzyme Q10 100 MG CAPS Take 100 mg by mouth nightly     • hydrALAZINE (APRESOLINE) 50 MG tablet Take 1 tablet by mouth 2 times daily     • pantoprazole (PROTONIX) 40 MG tablet Take 1 tablet by mouth every morning     • fluticasone-umeclidin-vilant (TRELEGY ELLIPTA) 100-62.5-25 MCG/ACT AEPB inhaler Inhale 1 puff into the lungs daily     • albuterol sulfate HFA (PROVENTIL HFA) 108 (90 Base) MCG/ACT inhaler Inhale 2 puffs into the lungs every 6 hours as needed for Wheezing 18 g 3   • aspirin 81 MG EC tablet Take 1 tablet by mouth every morning     • carvedilol (COREG) 12.5 MG tablet Take 1 tablet by mouth 2 times daily (with meals) 60 tablet 3   • acetaminophen (TYLENOL) 325 MG tablet Take 2 tablets by mouth every 4 hours as needed for Pain or Fever     • rosuvastatin (CRESTOR) 10 MG tablet Take 1 tablet by mouth every other day QHS  LAST DOSE: 2024  NEXT DOSE DUE: 2024       No current facility-administered

## 2024-08-17 PROBLEM — R79.89 ELEVATED TROPONIN: Status: RESOLVED | Noted: 2024-07-18 | Resolved: 2024-08-17

## 2024-09-03 ENCOUNTER — APPOINTMENT (OUTPATIENT)
Dept: GENERAL RADIOLOGY | Age: 79
End: 2024-09-03
Payer: MEDICARE

## 2024-09-03 ENCOUNTER — APPOINTMENT (OUTPATIENT)
Dept: CT IMAGING | Age: 79
End: 2024-09-03
Payer: MEDICARE

## 2024-09-03 ENCOUNTER — HOSPITAL ENCOUNTER (EMERGENCY)
Age: 79
Discharge: HOME | End: 2024-09-03
Payer: MEDICARE

## 2024-09-03 VITALS
SYSTOLIC BLOOD PRESSURE: 131 MMHG | DIASTOLIC BLOOD PRESSURE: 59 MMHG | BODY MASS INDEX: 21.66 KG/M2 | TEMPERATURE: 97.8 F | WEIGHT: 138 LBS | HEIGHT: 67 IN | OXYGEN SATURATION: 99 % | RESPIRATION RATE: 18 BRPM | HEART RATE: 61 BPM

## 2024-09-03 DIAGNOSIS — M53.9 MULTILEVEL DEGENERATIVE DISC DISEASE: ICD-10-CM

## 2024-09-03 DIAGNOSIS — M51.24 PROTRUSION OF THORACIC INTERVERTEBRAL DISC: ICD-10-CM

## 2024-09-03 DIAGNOSIS — K76.89 HEPATIC CYST: ICD-10-CM

## 2024-09-03 DIAGNOSIS — S20.229A CONTUSION OF BACK, UNSPECIFIED LATERALITY, INITIAL ENCOUNTER: ICD-10-CM
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
## 2024-09-03 DIAGNOSIS — N28.1 RENAL CYST: ICD-10-CM

## 2024-09-03 DIAGNOSIS — R91.1 INCIDENTAL LUNG NODULE, > 3MM AND < 8MM: ICD-10-CM

## 2024-09-03 DIAGNOSIS — S32.010A CLOSED COMPRESSION FRACTURE OF BODY OF L1 VERTEBRA (HCC): Primary | ICD-10-CM

## 2024-09-03 DIAGNOSIS — M54.50 ACUTE MIDLINE LOW BACK PAIN WITHOUT SCIATICA: ICD-10-CM

## 2024-09-03 PROCEDURE — 99284 EMERGENCY DEPT VISIT MOD MDM: CPT

## 2024-09-03 PROCEDURE — 70450 CT HEAD/BRAIN W/O DYE: CPT

## 2024-09-03 PROCEDURE — 73521 X-RAY EXAM HIPS BI 2 VIEWS: CPT

## 2024-09-03 PROCEDURE — 72128 CT CHEST SPINE W/O DYE: CPT

## 2024-09-03 PROCEDURE — 72131 CT LUMBAR SPINE W/O DYE: CPT

## 2024-09-03 PROCEDURE — 72125 CT NECK SPINE W/O DYE: CPT

## 2024-09-03 ASSESSMENT — PAIN SCALES - GENERAL: PAINLEVEL_OUTOF10: 7

## 2024-09-03 ASSESSMENT — PAIN DESCRIPTION - LOCATION: LOCATION: BACK

## 2024-09-03 ASSESSMENT — PAIN DESCRIPTION - ORIENTATION: ORIENTATION: LOWER

## 2024-09-03 ASSESSMENT — PAIN - FUNCTIONAL ASSESSMENT
PAIN_FUNCTIONAL_ASSESSMENT: 0-10
PAIN_FUNCTIONAL_ASSESSMENT: PREVENTS OR INTERFERES WITH MANY ACTIVE NOT PASSIVE ACTIVITIES

## 2024-09-03 ASSESSMENT — PAIN DESCRIPTION - PAIN TYPE: TYPE: ACUTE PAIN

## 2024-09-03 ASSESSMENT — PAIN DESCRIPTION - DESCRIPTORS: DESCRIPTORS: ACHING

## 2024-09-03 NOTE — ED PROVIDER NOTES
MEDICATIONS:  Discontinued Medications    No medications on file     DIAGNOSIS:     1. Closed compression fracture of body of L1 vertebra (HCC)    2. Fall on same level from slipping, tripping or stumbling, initial encounter    3. Acute midline low back pain without sciatica    4. Contusion of back, unspecified laterality, initial encounter    5. Hepatic cyst    6. Multilevel degenerative disc disease    7. Protrusion of thoracic intervertebral disc    8. Incidental lung nodule, > 3mm and < 8mm    9. Renal cyst      This patient's ED course included: a personal history and physicial examination  This patient has remained hemodynamically stable during their ED course.    DISPOSITION:   Discharge to home.  Patient condition is good.    Discharge Instructions:   Patient referred to  Cipriano Durham DO  [address] [id number]  [phone]    Call today  for follow-up on ED visit    Jean-Pierre Ferrell MD  [address] [facility] [facility]  [facility] [address]  [phone]    Call   to schedule appointment for ED follow-up      Electronically signed by Hailey Fuller PA-C   DD: 9/3/24  I am the Primary Clinician of Record.    **This report was transcribed using voice recognition software. Every effort was made to ensure accuracy; however, inadvertent computerized transcription errors may be present.    END OF PROVIDER NOTE       Hailey Fuller PA-C  09/03/24 2081

## 2024-09-12 ENCOUNTER — HOSPITAL ENCOUNTER (INPATIENT)
Age: 79
LOS: 5 days | Discharge: HOME | DRG: 555 | End: 2024-09-18
Attending: STUDENT IN AN ORGANIZED HEALTH CARE EDUCATION/TRAINING PROGRAM | Admitting: INTERNAL MEDICINE
Payer: MEDICARE

## 2024-09-12 ENCOUNTER — APPOINTMENT (OUTPATIENT)
Dept: CT IMAGING | Age: 79
DRG: 555 | End: 2024-09-12
Payer: MEDICARE

## 2024-09-12 ENCOUNTER — APPOINTMENT (OUTPATIENT)
Dept: GENERAL RADIOLOGY | Age: 79
DRG: 555 | End: 2024-09-12
Payer: MEDICARE

## 2024-09-12 DIAGNOSIS — R26.2 AMBULATORY DYSFUNCTION: ICD-10-CM

## 2024-09-12 DIAGNOSIS — M54.50 ACUTE MIDLINE LOW BACK PAIN, UNSPECIFIED WHETHER SCIATICA PRESENT: ICD-10-CM

## 2024-09-12 DIAGNOSIS — R29.6 FREQUENT FALLS: Primary | ICD-10-CM

## 2024-09-12 LAB
ALBUMIN SERPL-MCNC: 3.7 G/DL (ref 3.5–5.2)
ALP SERPL-CCNC: 103 U/L (ref 40–129)
ALT SERPL-CCNC: 5 U/L (ref 0–40)
ANION GAP SERPL CALCULATED.3IONS-SCNC: 12 MMOL/L (ref 7–16)
AST SERPL-CCNC: 15 U/L (ref 0–39)
BASOPHILS # BLD: 0.06 K/UL (ref 0–0.2)
BASOPHILS NFR BLD: 1 % (ref 0–2)
BILIRUB SERPL-MCNC: 0.4 MG/DL (ref 0–1.2)
BUN SERPL-MCNC: 30 MG/DL (ref 6–23)
CALCIUM SERPL-MCNC: 10.1 MG/DL (ref 8.6–10.2)
CHLORIDE SERPL-SCNC: 98 MMOL/L (ref 98–107)
CO2 SERPL-SCNC: 28 MMOL/L (ref 22–29)
CREAT SERPL-MCNC: 4.5 MG/DL (ref 0.7–1.2)
EOSINOPHIL # BLD: 0.14 K/UL (ref 0.05–0.5)
EOSINOPHILS RELATIVE PERCENT: 2 % (ref 0–6)
ERYTHROCYTE [DISTWIDTH] IN BLOOD BY AUTOMATED COUNT: 17.1 % (ref 11.5–15)
GFR, ESTIMATED: 13 ML/MIN/1.73M2
GLUCOSE SERPL-MCNC: 170 MG/DL (ref 74–99)
HCT VFR BLD AUTO: 32.6 % (ref 37–54)
HGB BLD-MCNC: 10.4 G/DL (ref 12.5–16.5)
IMM GRANULOCYTES # BLD AUTO: <0.03 K/UL (ref 0–0.58)
IMM GRANULOCYTES NFR BLD: 0 % (ref 0–5)
LYMPHOCYTES NFR BLD: 0.72 K/UL (ref 1.5–4)
LYMPHOCYTES RELATIVE PERCENT: 12 % (ref 20–42)
MCH RBC QN AUTO: 33.2 PG (ref 26–35)
MCHC RBC AUTO-ENTMCNC: 31.9 G/DL (ref 32–34.5)
MCV RBC AUTO: 104.2 FL (ref 80–99.9)
MONOCYTES NFR BLD: 0.49 K/UL (ref 0.1–0.95)
MONOCYTES NFR BLD: 8 % (ref 2–12)
NEUTROPHILS NFR BLD: 76 % (ref 43–80)
NEUTS SEG NFR BLD: 4.41 K/UL (ref 1.8–7.3)
PLATELET, FLUORESCENCE: 130 K/UL (ref 130–450)
PMV BLD AUTO: 10.8 FL (ref 7–12)
POTASSIUM SERPL-SCNC: 3.8 MMOL/L (ref 3.5–5)
PROT SERPL-MCNC: 6.7 G/DL (ref 6.4–8.3)
RBC # BLD AUTO: 3.13 M/UL (ref 3.8–5.8)
SODIUM SERPL-SCNC: 138 MMOL/L (ref 132–146)
WBC OTHER # BLD: 5.8 K/UL (ref 4.5–11.5)

## 2024-09-12 PROCEDURE — 72128 CT CHEST SPINE W/O DYE: CPT

## 2024-09-12 PROCEDURE — 85025 COMPLETE CBC W/AUTO DIFF WBC: CPT

## 2024-09-12 PROCEDURE — 99285 EMERGENCY DEPT VISIT HI MDM: CPT

## 2024-09-12 PROCEDURE — 80053 COMPREHEN METABOLIC PANEL: CPT

## 2024-09-12 PROCEDURE — 2580000003 HC RX 258: Performed by: HOSPITALIST

## 2024-09-12 PROCEDURE — 6360000002 HC RX W HCPCS: Performed by: HOSPITALIST

## 2024-09-12 PROCEDURE — 72131 CT LUMBAR SPINE W/O DYE: CPT

## 2024-09-12 PROCEDURE — G0378 HOSPITAL OBSERVATION PER HR: HCPCS

## 2024-09-12 PROCEDURE — 71045 X-RAY EXAM CHEST 1 VIEW: CPT

## 2024-09-12 PROCEDURE — 72125 CT NECK SPINE W/O DYE: CPT

## 2024-09-12 PROCEDURE — 70450 CT HEAD/BRAIN W/O DYE: CPT

## 2024-09-12 RX ORDER — ONDANSETRON 4 MG/1
4 TABLET, ORALLY DISINTEGRATING ORAL EVERY 8 HOURS PRN
Status: DISCONTINUED | OUTPATIENT
Start: 2024-09-12 | End: 2024-09-18 | Stop reason: HOSPADM

## 2024-09-12 RX ORDER — UBIDECARENONE/VIT E/VIT E MIX 100-20-15
100 CAPSULE ORAL NIGHTLY
Status: DISCONTINUED | OUTPATIENT
Start: 2024-09-12 | End: 2024-09-12 | Stop reason: CLARIF

## 2024-09-12 RX ORDER — CARVEDILOL 6.25 MG/1
12.5 TABLET ORAL 2 TIMES DAILY WITH MEALS
Status: DISCONTINUED | OUTPATIENT
Start: 2024-09-13 | End: 2024-09-18 | Stop reason: HOSPADM

## 2024-09-12 RX ORDER — ACETAMINOPHEN 325 MG/1
650 TABLET ORAL EVERY 4 HOURS PRN
Status: DISCONTINUED | OUTPATIENT
Start: 2024-09-12 | End: 2024-09-18 | Stop reason: HOSPADM

## 2024-09-12 RX ORDER — AMLODIPINE BESYLATE 5 MG/1
5 TABLET ORAL EVERY MORNING
Status: DISCONTINUED | OUTPATIENT
Start: 2024-09-13 | End: 2024-09-18 | Stop reason: HOSPADM

## 2024-09-12 RX ORDER — ACETAMINOPHEN 325 MG/1
650 TABLET ORAL EVERY 6 HOURS PRN
Status: DISCONTINUED | OUTPATIENT
Start: 2024-09-12 | End: 2024-09-13 | Stop reason: SDUPTHER

## 2024-09-12 RX ORDER — PANTOPRAZOLE SODIUM 40 MG/1
40 TABLET, DELAYED RELEASE ORAL EVERY MORNING
Status: DISCONTINUED | OUTPATIENT
Start: 2024-09-13 | End: 2024-09-18 | Stop reason: HOSPADM

## 2024-09-12 RX ORDER — BUDESONIDE 0.25 MG/2ML
0.25 INHALANT ORAL
Status: DISCONTINUED | OUTPATIENT
Start: 2024-09-12 | End: 2024-09-18 | Stop reason: HOSPADM

## 2024-09-12 RX ORDER — ALBUTEROL SULFATE 0.83 MG/ML
2.5 SOLUTION RESPIRATORY (INHALATION) EVERY 4 HOURS PRN
Status: DISCONTINUED | OUTPATIENT
Start: 2024-09-12 | End: 2024-09-18 | Stop reason: HOSPADM

## 2024-09-12 RX ORDER — SODIUM CHLORIDE 9 MG/ML
INJECTION, SOLUTION INTRAVENOUS PRN
Status: DISCONTINUED | OUTPATIENT
Start: 2024-09-12 | End: 2024-09-18 | Stop reason: HOSPADM

## 2024-09-12 RX ORDER — SODIUM CHLORIDE 0.9 % (FLUSH) 0.9 %
5-40 SYRINGE (ML) INJECTION PRN
Status: DISCONTINUED | OUTPATIENT
Start: 2024-09-12 | End: 2024-09-18 | Stop reason: HOSPADM

## 2024-09-12 RX ORDER — ACETAMINOPHEN 650 MG/1
650 SUPPOSITORY RECTAL EVERY 6 HOURS PRN
Status: DISCONTINUED | OUTPATIENT
Start: 2024-09-12 | End: 2024-09-18 | Stop reason: HOSPADM

## 2024-09-12 RX ORDER — POLYETHYLENE GLYCOL 3350 17 G/17G
17 POWDER, FOR SOLUTION ORAL DAILY PRN
Status: DISCONTINUED | OUTPATIENT
Start: 2024-09-12 | End: 2024-09-18 | Stop reason: HOSPADM

## 2024-09-12 RX ORDER — ROSUVASTATIN CALCIUM 10 MG/1
10 TABLET, COATED ORAL EVERY OTHER DAY
Status: DISCONTINUED | OUTPATIENT
Start: 2024-09-13 | End: 2024-09-18 | Stop reason: HOSPADM

## 2024-09-12 RX ORDER — ASPIRIN 81 MG/1
81 TABLET ORAL EVERY MORNING
Status: DISCONTINUED | OUTPATIENT
Start: 2024-09-13 | End: 2024-09-18 | Stop reason: HOSPADM

## 2024-09-12 RX ORDER — SODIUM CHLORIDE 0.9 % (FLUSH) 0.9 %
5-40 SYRINGE (ML) INJECTION EVERY 12 HOURS SCHEDULED
Status: DISCONTINUED | OUTPATIENT
Start: 2024-09-12 | End: 2024-09-18 | Stop reason: HOSPADM

## 2024-09-12 RX ORDER — ONDANSETRON 2 MG/ML
4 INJECTION INTRAMUSCULAR; INTRAVENOUS EVERY 6 HOURS PRN
Status: DISCONTINUED | OUTPATIENT
Start: 2024-09-12 | End: 2024-09-18 | Stop reason: HOSPADM

## 2024-09-12 RX ORDER — ALBUTEROL SULFATE 0.83 MG/ML
2.5 SOLUTION RESPIRATORY (INHALATION) EVERY 6 HOURS PRN
Status: DISCONTINUED | OUTPATIENT
Start: 2024-09-12 | End: 2024-09-12 | Stop reason: ALTCHOICE

## 2024-09-12 RX ORDER — ARFORMOTEROL TARTRATE 15 UG/2ML
15 SOLUTION RESPIRATORY (INHALATION)
Status: DISCONTINUED | OUTPATIENT
Start: 2024-09-12 | End: 2024-09-18 | Stop reason: HOSPADM

## 2024-09-12 RX ORDER — HEPARIN SODIUM 5000 [USP'U]/ML
5000 INJECTION, SOLUTION INTRAVENOUS; SUBCUTANEOUS EVERY 8 HOURS SCHEDULED
Status: DISCONTINUED | OUTPATIENT
Start: 2024-09-12 | End: 2024-09-18 | Stop reason: HOSPADM

## 2024-09-12 RX ORDER — SEVELAMER CARBONATE 800 MG/1
800 TABLET, FILM COATED ORAL
Status: DISCONTINUED | OUTPATIENT
Start: 2024-09-13 | End: 2024-09-18 | Stop reason: HOSPADM

## 2024-09-12 RX ADMIN — SODIUM CHLORIDE, PRESERVATIVE FREE 10 ML: 5 INJECTION INTRAVENOUS at 23:18

## 2024-09-12 RX ADMIN — HEPARIN SODIUM 5000 UNITS: 5000 INJECTION INTRAVENOUS; SUBCUTANEOUS at 23:18

## 2024-09-12 ASSESSMENT — PAIN SCALES - GENERAL
PAINLEVEL_OUTOF10: 4
PAINLEVEL_OUTOF10: 3

## 2024-09-12 ASSESSMENT — LIFESTYLE VARIABLES
HOW OFTEN DO YOU HAVE A DRINK CONTAINING ALCOHOL: NEVER
HOW MANY STANDARD DRINKS CONTAINING ALCOHOL DO YOU HAVE ON A TYPICAL DAY: PATIENT DOES NOT DRINK

## 2024-09-12 ASSESSMENT — ENCOUNTER SYMPTOMS
SINUS PRESSURE: 0
EYE REDNESS: 0
WHEEZING: 0
ABDOMINAL PAIN: 0
EYE DISCHARGE: 0
NAUSEA: 0
EYE PAIN: 0
DIARRHEA: 0
SORE THROAT: 0
COUGH: 0
BACK PAIN: 1
VOMITING: 0
SHORTNESS OF BREATH: 0

## 2024-09-12 ASSESSMENT — PAIN - FUNCTIONAL ASSESSMENT
PAIN_FUNCTIONAL_ASSESSMENT: 0-10
PAIN_FUNCTIONAL_ASSESSMENT: 0-10

## 2024-09-12 ASSESSMENT — PAIN DESCRIPTION - LOCATION
LOCATION: BACK
LOCATION: BACK

## 2024-09-12 ASSESSMENT — PAIN DESCRIPTION - ORIENTATION: ORIENTATION: LOWER

## 2024-09-13 PROCEDURE — 2580000003 HC RX 258: Performed by: HOSPITALIST

## 2024-09-13 PROCEDURE — 1200000000 HC SEMI PRIVATE

## 2024-09-13 PROCEDURE — G0378 HOSPITAL OBSERVATION PER HR: HCPCS

## 2024-09-13 PROCEDURE — 97161 PT EVAL LOW COMPLEX 20 MIN: CPT

## 2024-09-13 PROCEDURE — 6360000002 HC RX W HCPCS: Performed by: HOSPITALIST

## 2024-09-13 PROCEDURE — 97165 OT EVAL LOW COMPLEX 30 MIN: CPT

## 2024-09-13 PROCEDURE — 6370000000 HC RX 637 (ALT 250 FOR IP): Performed by: HOSPITALIST

## 2024-09-13 RX ADMIN — SEVELAMER CARBONATE 800 MG: 800 TABLET, FILM COATED ORAL at 16:31

## 2024-09-13 RX ADMIN — AMLODIPINE BESYLATE 5 MG: 5 TABLET ORAL at 08:41

## 2024-09-13 RX ADMIN — PANTOPRAZOLE SODIUM 40 MG: 40 TABLET, DELAYED RELEASE ORAL at 08:42

## 2024-09-13 RX ADMIN — CARVEDILOL 12.5 MG: 6.25 TABLET, FILM COATED ORAL at 08:42

## 2024-09-13 RX ADMIN — SODIUM CHLORIDE, PRESERVATIVE FREE 10 ML: 5 INJECTION INTRAVENOUS at 22:48

## 2024-09-13 RX ADMIN — HEPARIN SODIUM 5000 UNITS: 5000 INJECTION INTRAVENOUS; SUBCUTANEOUS at 07:27

## 2024-09-13 RX ADMIN — HEPARIN SODIUM 5000 UNITS: 5000 INJECTION INTRAVENOUS; SUBCUTANEOUS at 22:48

## 2024-09-13 RX ADMIN — SEVELAMER CARBONATE 800 MG: 800 TABLET, FILM COATED ORAL at 08:41

## 2024-09-13 RX ADMIN — ROSUVASTATIN CALCIUM 10 MG: 10 TABLET, FILM COATED ORAL at 08:42

## 2024-09-13 RX ADMIN — HEPARIN SODIUM 5000 UNITS: 5000 INJECTION INTRAVENOUS; SUBCUTANEOUS at 14:06

## 2024-09-13 RX ADMIN — ASPIRIN 81 MG: 81 TABLET, COATED ORAL at 08:42

## 2024-09-13 RX ADMIN — CARVEDILOL 12.5 MG: 6.25 TABLET, FILM COATED ORAL at 16:35

## 2024-09-13 RX ADMIN — SEVELAMER CARBONATE 800 MG: 800 TABLET, FILM COATED ORAL at 11:30

## 2024-09-13 RX ADMIN — ACETAMINOPHEN 650 MG: 325 TABLET ORAL at 18:03

## 2024-09-13 RX ADMIN — SODIUM CHLORIDE, PRESERVATIVE FREE 10 ML: 5 INJECTION INTRAVENOUS at 08:42

## 2024-09-13 ASSESSMENT — PAIN DESCRIPTION - ONSET: ONSET: GRADUAL

## 2024-09-13 ASSESSMENT — PAIN - FUNCTIONAL ASSESSMENT: PAIN_FUNCTIONAL_ASSESSMENT: ACTIVITIES ARE NOT PREVENTED

## 2024-09-13 ASSESSMENT — PAIN DESCRIPTION - DESCRIPTORS: DESCRIPTORS: DISCOMFORT;ACHING;NAGGING

## 2024-09-13 ASSESSMENT — PAIN DESCRIPTION - FREQUENCY: FREQUENCY: INTERMITTENT

## 2024-09-13 ASSESSMENT — PAIN DESCRIPTION - PAIN TYPE: TYPE: CHRONIC PAIN

## 2024-09-13 ASSESSMENT — PAIN DESCRIPTION - ORIENTATION: ORIENTATION: LOWER

## 2024-09-13 ASSESSMENT — PAIN DESCRIPTION - LOCATION: LOCATION: BACK

## 2024-09-13 ASSESSMENT — PAIN SCALES - GENERAL: PAINLEVEL_OUTOF10: 6

## 2024-09-14 PROBLEM — E44.0 MODERATE PROTEIN-CALORIE MALNUTRITION (HCC): Status: ACTIVE | Noted: 2024-09-14

## 2024-09-14 PROBLEM — E44.0 MODERATE PROTEIN-CALORIE MALNUTRITION (HCC): Status: ACTIVE | Noted: 2023-06-24

## 2024-09-14 LAB
ANION GAP SERPL CALCULATED.3IONS-SCNC: 13 MMOL/L (ref 7–16)
BASOPHILS # BLD: 0.05 K/UL (ref 0–0.2)
BASOPHILS NFR BLD: 1 % (ref 0–2)
BUN SERPL-MCNC: 44 MG/DL (ref 6–23)
CALCIUM SERPL-MCNC: 9.7 MG/DL (ref 8.6–10.2)
CHLORIDE SERPL-SCNC: 103 MMOL/L (ref 98–107)
CO2 SERPL-SCNC: 25 MMOL/L (ref 22–29)
CREAT SERPL-MCNC: 7.1 MG/DL (ref 0.7–1.2)
EOSINOPHIL # BLD: 0.16 K/UL (ref 0.05–0.5)
EOSINOPHILS RELATIVE PERCENT: 4 % (ref 0–6)
ERYTHROCYTE [DISTWIDTH] IN BLOOD BY AUTOMATED COUNT: 16.8 % (ref 11.5–15)
GFR, ESTIMATED: 7 ML/MIN/1.73M2
GLUCOSE SERPL-MCNC: 80 MG/DL (ref 74–99)
HCT VFR BLD AUTO: 28.8 % (ref 37–54)
HGB BLD-MCNC: 9 G/DL (ref 12.5–16.5)
IMM GRANULOCYTES # BLD AUTO: <0.03 K/UL (ref 0–0.58)
IMM GRANULOCYTES NFR BLD: 1 % (ref 0–5)
LYMPHOCYTES NFR BLD: 0.83 K/UL (ref 1.5–4)
LYMPHOCYTES RELATIVE PERCENT: 19 % (ref 20–42)
MAGNESIUM SERPL-MCNC: 2 MG/DL (ref 1.6–2.6)
MCH RBC QN AUTO: 33.2 PG (ref 26–35)
MCHC RBC AUTO-ENTMCNC: 31.3 G/DL (ref 32–34.5)
MCV RBC AUTO: 106.3 FL (ref 80–99.9)
MONOCYTES NFR BLD: 0.46 K/UL (ref 0.1–0.95)
MONOCYTES NFR BLD: 11 % (ref 2–12)
NEUTROPHILS NFR BLD: 65 % (ref 43–80)
NEUTS SEG NFR BLD: 2.84 K/UL (ref 1.8–7.3)
PHOSPHATE SERPL-MCNC: 4 MG/DL (ref 2.5–4.5)
PLATELET # BLD AUTO: 118 K/UL (ref 130–450)
PMV BLD AUTO: 10.5 FL (ref 7–12)
POTASSIUM SERPL-SCNC: 4.2 MMOL/L (ref 3.5–5)
RBC # BLD AUTO: 2.71 M/UL (ref 3.8–5.8)
SODIUM SERPL-SCNC: 141 MMOL/L (ref 132–146)
WBC OTHER # BLD: 4.4 K/UL (ref 4.5–11.5)

## 2024-09-14 PROCEDURE — 6370000000 HC RX 637 (ALT 250 FOR IP): Performed by: HOSPITALIST

## 2024-09-14 PROCEDURE — 84100 ASSAY OF PHOSPHORUS: CPT

## 2024-09-14 PROCEDURE — 83735 ASSAY OF MAGNESIUM: CPT

## 2024-09-14 PROCEDURE — 2580000003 HC RX 258: Performed by: HOSPITALIST

## 2024-09-14 PROCEDURE — 5A1D70Z PERFORMANCE OF URINARY FILTRATION, INTERMITTENT, LESS THAN 6 HOURS PER DAY: ICD-10-PCS | Performed by: INTERNAL MEDICINE

## 2024-09-14 PROCEDURE — 1200000000 HC SEMI PRIVATE

## 2024-09-14 PROCEDURE — 6360000002 HC RX W HCPCS: Performed by: HOSPITALIST

## 2024-09-14 PROCEDURE — 85025 COMPLETE CBC W/AUTO DIFF WBC: CPT

## 2024-09-14 PROCEDURE — 6370000000 HC RX 637 (ALT 250 FOR IP): Performed by: NURSE PRACTITIONER

## 2024-09-14 PROCEDURE — 80048 BASIC METABOLIC PNL TOTAL CA: CPT

## 2024-09-14 PROCEDURE — 90935 HEMODIALYSIS ONE EVALUATION: CPT

## 2024-09-14 RX ORDER — METHOCARBAMOL 500 MG/1
500 TABLET, FILM COATED ORAL 3 TIMES DAILY
Status: DISCONTINUED | OUTPATIENT
Start: 2024-09-14 | End: 2024-09-18 | Stop reason: HOSPADM

## 2024-09-14 RX ADMIN — CARVEDILOL 12.5 MG: 6.25 TABLET, FILM COATED ORAL at 17:29

## 2024-09-14 RX ADMIN — METHOCARBAMOL TABLETS 500 MG: 500 TABLET, COATED ORAL at 21:31

## 2024-09-14 RX ADMIN — SODIUM CHLORIDE, PRESERVATIVE FREE 10 ML: 5 INJECTION INTRAVENOUS at 21:33

## 2024-09-14 RX ADMIN — SODIUM CHLORIDE, PRESERVATIVE FREE 10 ML: 5 INJECTION INTRAVENOUS at 09:24

## 2024-09-14 RX ADMIN — ASPIRIN 81 MG: 81 TABLET, COATED ORAL at 09:20

## 2024-09-14 RX ADMIN — SEVELAMER CARBONATE 800 MG: 800 TABLET, FILM COATED ORAL at 15:18

## 2024-09-14 RX ADMIN — AMLODIPINE BESYLATE 5 MG: 5 TABLET ORAL at 09:21

## 2024-09-14 RX ADMIN — HEPARIN SODIUM 5000 UNITS: 5000 INJECTION INTRAVENOUS; SUBCUTANEOUS at 07:34

## 2024-09-14 RX ADMIN — METHOCARBAMOL TABLETS 500 MG: 500 TABLET, COATED ORAL at 15:38

## 2024-09-14 RX ADMIN — HEPARIN SODIUM 5000 UNITS: 5000 INJECTION INTRAVENOUS; SUBCUTANEOUS at 21:31

## 2024-09-14 RX ADMIN — HEPARIN SODIUM 5000 UNITS: 5000 INJECTION INTRAVENOUS; SUBCUTANEOUS at 15:39

## 2024-09-14 RX ADMIN — CARVEDILOL 12.5 MG: 6.25 TABLET, FILM COATED ORAL at 09:21

## 2024-09-14 RX ADMIN — PANTOPRAZOLE SODIUM 40 MG: 40 TABLET, DELAYED RELEASE ORAL at 09:21

## 2024-09-14 RX ADMIN — METHOCARBAMOL TABLETS 500 MG: 500 TABLET, COATED ORAL at 09:21

## 2024-09-14 RX ADMIN — SEVELAMER CARBONATE 800 MG: 800 TABLET, FILM COATED ORAL at 09:20

## 2024-09-14 ASSESSMENT — PAIN SCALES - GENERAL
PAINLEVEL_OUTOF10: 0

## 2024-09-15 VITALS
TEMPERATURE: 97.8 F | SYSTOLIC BLOOD PRESSURE: 130 MMHG | DIASTOLIC BLOOD PRESSURE: 44 MMHG | HEART RATE: 60 BPM | OXYGEN SATURATION: 95 % | BODY MASS INDEX: 21.11 KG/M2 | WEIGHT: 134.48 LBS | RESPIRATION RATE: 16 BRPM | HEIGHT: 67 IN

## 2024-09-15 LAB
ALBUMIN SERPL-MCNC: 3.5 G/DL (ref 3.5–5.2)
ALP SERPL-CCNC: 105 U/L (ref 40–129)
ALT SERPL-CCNC: <5 U/L (ref 0–40)
ANION GAP SERPL CALCULATED.3IONS-SCNC: 13 MMOL/L (ref 7–16)
ANION GAP SERPL CALCULATED.3IONS-SCNC: 13 MMOL/L (ref 7–16)
AST SERPL-CCNC: 11 U/L (ref 0–39)
BASOPHILS # BLD: 0.05 K/UL (ref 0–0.2)
BASOPHILS NFR BLD: 1 % (ref 0–2)
BILIRUB SERPL-MCNC: 0.5 MG/DL (ref 0–1.2)
BUN SERPL-MCNC: 23 MG/DL (ref 6–23)
BUN SERPL-MCNC: 24 MG/DL (ref 6–23)
CALCIUM SERPL-MCNC: 10.1 MG/DL (ref 8.6–10.2)
CALCIUM SERPL-MCNC: 9.9 MG/DL (ref 8.6–10.2)
CHLORIDE SERPL-SCNC: 97 MMOL/L (ref 98–107)
CHLORIDE SERPL-SCNC: 98 MMOL/L (ref 98–107)
CO2 SERPL-SCNC: 27 MMOL/L (ref 22–29)
CO2 SERPL-SCNC: 27 MMOL/L (ref 22–29)
CREAT SERPL-MCNC: 4.6 MG/DL (ref 0.7–1.2)
CREAT SERPL-MCNC: 4.8 MG/DL (ref 0.7–1.2)
EOSINOPHIL # BLD: 0.17 K/UL (ref 0.05–0.5)
EOSINOPHILS RELATIVE PERCENT: 3 % (ref 0–6)
ERYTHROCYTE [DISTWIDTH] IN BLOOD BY AUTOMATED COUNT: 17 % (ref 11.5–15)
GFR, ESTIMATED: 12 ML/MIN/1.73M2
GFR, ESTIMATED: 12 ML/MIN/1.73M2
GLUCOSE SERPL-MCNC: 79 MG/DL (ref 74–99)
GLUCOSE SERPL-MCNC: 84 MG/DL (ref 74–99)
HCT VFR BLD AUTO: 30.3 % (ref 37–54)
HGB BLD-MCNC: 9.8 G/DL (ref 12.5–16.5)
IMM GRANULOCYTES # BLD AUTO: <0.03 K/UL (ref 0–0.58)
IMM GRANULOCYTES NFR BLD: 0 % (ref 0–5)
LYMPHOCYTES NFR BLD: 1.05 K/UL (ref 1.5–4)
LYMPHOCYTES RELATIVE PERCENT: 19 % (ref 20–42)
MCH RBC QN AUTO: 33 PG (ref 26–35)
MCHC RBC AUTO-ENTMCNC: 32.3 G/DL (ref 32–34.5)
MCV RBC AUTO: 102 FL (ref 80–99.9)
MONOCYTES NFR BLD: 0.57 K/UL (ref 0.1–0.95)
MONOCYTES NFR BLD: 10 % (ref 2–12)
NEUTROPHILS NFR BLD: 67 % (ref 43–80)
NEUTS SEG NFR BLD: 3.71 K/UL (ref 1.8–7.3)
PLATELET # BLD AUTO: 148 K/UL (ref 130–450)
PMV BLD AUTO: 11 FL (ref 7–12)
POTASSIUM SERPL-SCNC: 4.4 MMOL/L (ref 3.5–5)
POTASSIUM SERPL-SCNC: 5.3 MMOL/L (ref 3.5–5)
PROT SERPL-MCNC: 6.5 G/DL (ref 6.4–8.3)
RBC # BLD AUTO: 2.97 M/UL (ref 3.8–5.8)
SODIUM SERPL-SCNC: 137 MMOL/L (ref 132–146)
SODIUM SERPL-SCNC: 138 MMOL/L (ref 132–146)
WBC OTHER # BLD: 5.6 K/UL (ref 4.5–11.5)

## 2024-09-15 PROCEDURE — 80048 BASIC METABOLIC PNL TOTAL CA: CPT

## 2024-09-15 PROCEDURE — 6370000000 HC RX 637 (ALT 250 FOR IP): Performed by: NURSE PRACTITIONER

## 2024-09-15 PROCEDURE — 97530 THERAPEUTIC ACTIVITIES: CPT

## 2024-09-15 PROCEDURE — 80053 COMPREHEN METABOLIC PANEL: CPT

## 2024-09-15 PROCEDURE — 6360000002 HC RX W HCPCS: Performed by: HOSPITALIST

## 2024-09-15 PROCEDURE — 97116 GAIT TRAINING THERAPY: CPT

## 2024-09-15 PROCEDURE — 2580000003 HC RX 258: Performed by: HOSPITALIST

## 2024-09-15 PROCEDURE — 1200000000 HC SEMI PRIVATE

## 2024-09-15 PROCEDURE — 85025 COMPLETE CBC W/AUTO DIFF WBC: CPT

## 2024-09-15 PROCEDURE — 6370000000 HC RX 637 (ALT 250 FOR IP): Performed by: HOSPITALIST

## 2024-09-15 PROCEDURE — 99221 1ST HOSP IP/OBS SF/LOW 40: CPT | Performed by: NURSE PRACTITIONER

## 2024-09-15 RX ADMIN — CARVEDILOL 12.5 MG: 6.25 TABLET, FILM COATED ORAL at 16:49

## 2024-09-15 RX ADMIN — SODIUM CHLORIDE, PRESERVATIVE FREE 10 ML: 5 INJECTION INTRAVENOUS at 21:32

## 2024-09-15 RX ADMIN — HEPARIN SODIUM 5000 UNITS: 5000 INJECTION INTRAVENOUS; SUBCUTANEOUS at 13:56

## 2024-09-15 RX ADMIN — PANTOPRAZOLE SODIUM 40 MG: 40 TABLET, DELAYED RELEASE ORAL at 09:06

## 2024-09-15 RX ADMIN — METHOCARBAMOL TABLETS 500 MG: 500 TABLET, COATED ORAL at 09:06

## 2024-09-15 RX ADMIN — ASPIRIN 81 MG: 81 TABLET, COATED ORAL at 09:07

## 2024-09-15 RX ADMIN — AMLODIPINE BESYLATE 5 MG: 5 TABLET ORAL at 09:07

## 2024-09-15 RX ADMIN — SEVELAMER CARBONATE 800 MG: 800 TABLET, FILM COATED ORAL at 09:07

## 2024-09-15 RX ADMIN — METHOCARBAMOL TABLETS 500 MG: 500 TABLET, COATED ORAL at 13:54

## 2024-09-15 RX ADMIN — SEVELAMER CARBONATE 800 MG: 800 TABLET, FILM COATED ORAL at 16:48

## 2024-09-15 RX ADMIN — METHOCARBAMOL TABLETS 500 MG: 500 TABLET, COATED ORAL at 21:32

## 2024-09-15 RX ADMIN — ROSUVASTATIN CALCIUM 10 MG: 10 TABLET, FILM COATED ORAL at 09:06

## 2024-09-15 RX ADMIN — SODIUM CHLORIDE, PRESERVATIVE FREE 10 ML: 5 INJECTION INTRAVENOUS at 09:08

## 2024-09-15 RX ADMIN — CARVEDILOL 12.5 MG: 6.25 TABLET, FILM COATED ORAL at 09:06

## 2024-09-15 RX ADMIN — SEVELAMER CARBONATE 800 MG: 800 TABLET, FILM COATED ORAL at 12:02

## 2024-09-15 ASSESSMENT — PAIN SCALES - GENERAL
PAINLEVEL_OUTOF10: 0
PAINLEVEL_OUTOF10: 0

## 2024-09-15 ASSESSMENT — PAIN DESCRIPTION - LOCATION: LOCATION: BACK

## 2024-09-16 LAB
ANION GAP SERPL CALCULATED.3IONS-SCNC: 14 MMOL/L (ref 7–16)
BASOPHILS # BLD: 0.06 K/UL (ref 0–0.2)
BASOPHILS NFR BLD: 1 % (ref 0–2)
BUN SERPL-MCNC: 34 MG/DL (ref 6–23)
CALCIUM SERPL-MCNC: 10.3 MG/DL (ref 8.6–10.2)
CHLORIDE SERPL-SCNC: 99 MMOL/L (ref 98–107)
CO2 SERPL-SCNC: 26 MMOL/L (ref 22–29)
CREAT SERPL-MCNC: 6.5 MG/DL (ref 0.7–1.2)
EOSINOPHIL # BLD: 0.15 K/UL (ref 0.05–0.5)
EOSINOPHILS RELATIVE PERCENT: 3 % (ref 0–6)
ERYTHROCYTE [DISTWIDTH] IN BLOOD BY AUTOMATED COUNT: 16.9 % (ref 11.5–15)
GFR, ESTIMATED: 8 ML/MIN/1.73M2
GLUCOSE SERPL-MCNC: 83 MG/DL (ref 74–99)
HCT VFR BLD AUTO: 32.2 % (ref 37–54)
HGB BLD-MCNC: 10 G/DL (ref 12.5–16.5)
IMM GRANULOCYTES # BLD AUTO: <0.03 K/UL (ref 0–0.58)
IMM GRANULOCYTES NFR BLD: 0 % (ref 0–5)
LYMPHOCYTES NFR BLD: 1.02 K/UL (ref 1.5–4)
LYMPHOCYTES RELATIVE PERCENT: 17 % (ref 20–42)
MCH RBC QN AUTO: 32.7 PG (ref 26–35)
MCHC RBC AUTO-ENTMCNC: 31.1 G/DL (ref 32–34.5)
MCV RBC AUTO: 105.2 FL (ref 80–99.9)
MONOCYTES NFR BLD: 0.56 K/UL (ref 0.1–0.95)
MONOCYTES NFR BLD: 9 % (ref 2–12)
NEUTROPHILS NFR BLD: 70 % (ref 43–80)
NEUTS SEG NFR BLD: 4.13 K/UL (ref 1.8–7.3)
PLATELET # BLD AUTO: 134 K/UL (ref 130–450)
PMV BLD AUTO: 10.5 FL (ref 7–12)
POTASSIUM SERPL-SCNC: 4.4 MMOL/L (ref 3.5–5)
RBC # BLD AUTO: 3.06 M/UL (ref 3.8–5.8)
SODIUM SERPL-SCNC: 139 MMOL/L (ref 132–146)
WBC OTHER # BLD: 5.9 K/UL (ref 4.5–11.5)

## 2024-09-16 PROCEDURE — 85025 COMPLETE CBC W/AUTO DIFF WBC: CPT

## 2024-09-16 PROCEDURE — 2580000003 HC RX 258: Performed by: HOSPITALIST

## 2024-09-16 PROCEDURE — 90935 HEMODIALYSIS ONE EVALUATION: CPT

## 2024-09-16 PROCEDURE — 80048 BASIC METABOLIC PNL TOTAL CA: CPT

## 2024-09-16 PROCEDURE — 97535 SELF CARE MNGMENT TRAINING: CPT

## 2024-09-16 PROCEDURE — 1200000000 HC SEMI PRIVATE

## 2024-09-16 PROCEDURE — 6370000000 HC RX 637 (ALT 250 FOR IP): Performed by: HOSPITALIST

## 2024-09-16 PROCEDURE — 97530 THERAPEUTIC ACTIVITIES: CPT

## 2024-09-16 PROCEDURE — 6360000002 HC RX W HCPCS: Performed by: HOSPITALIST

## 2024-09-16 PROCEDURE — 6370000000 HC RX 637 (ALT 250 FOR IP): Performed by: NURSE PRACTITIONER

## 2024-09-16 RX ORDER — POLYVINYL ALCOHOL 14 MG/ML
2 SOLUTION/ DROPS OPHTHALMIC 4 TIMES DAILY
Status: DISCONTINUED | OUTPATIENT
Start: 2024-09-16 | End: 2024-09-18 | Stop reason: HOSPADM

## 2024-09-16 RX ORDER — METHOCARBAMOL 500 MG/1
500 TABLET, FILM COATED ORAL 3 TIMES DAILY
DISCHARGE
Start: 2024-09-16 | End: 2024-09-26

## 2024-09-16 RX ORDER — POLYVINYL ALCOHOL 14 MG/ML
2 SOLUTION/ DROPS OPHTHALMIC 4 TIMES DAILY
DISCHARGE
Start: 2024-09-16 | End: 2024-10-16

## 2024-09-16 RX ORDER — POLYETHYLENE GLYCOL 3350 17 G/17G
17 POWDER, FOR SOLUTION ORAL DAILY PRN
DISCHARGE
Start: 2024-09-16 | End: 2024-10-16

## 2024-09-16 RX ADMIN — AMLODIPINE BESYLATE 5 MG: 5 TABLET ORAL at 12:56

## 2024-09-16 RX ADMIN — POLYVINYL ALCOHOL 2 DROP: 1.4 SOLUTION/ DROPS OPHTHALMIC at 17:43

## 2024-09-16 RX ADMIN — PANTOPRAZOLE SODIUM 40 MG: 40 TABLET, DELAYED RELEASE ORAL at 12:56

## 2024-09-16 RX ADMIN — METHOCARBAMOL TABLETS 500 MG: 500 TABLET, COATED ORAL at 12:55

## 2024-09-16 RX ADMIN — ASPIRIN 81 MG: 81 TABLET, COATED ORAL at 12:56

## 2024-09-16 RX ADMIN — SODIUM CHLORIDE, PRESERVATIVE FREE 10 ML: 5 INJECTION INTRAVENOUS at 20:50

## 2024-09-16 RX ADMIN — POLYVINYL ALCOHOL 2 DROP: 1.4 SOLUTION/ DROPS OPHTHALMIC at 13:58

## 2024-09-16 RX ADMIN — HEPARIN SODIUM 5000 UNITS: 5000 INJECTION INTRAVENOUS; SUBCUTANEOUS at 13:58

## 2024-09-16 RX ADMIN — SEVELAMER CARBONATE 800 MG: 800 TABLET, FILM COATED ORAL at 12:56

## 2024-09-16 RX ADMIN — METHOCARBAMOL TABLETS 500 MG: 500 TABLET, COATED ORAL at 20:42

## 2024-09-16 RX ADMIN — CARVEDILOL 12.5 MG: 6.25 TABLET, FILM COATED ORAL at 12:55

## 2024-09-16 RX ADMIN — POLYVINYL ALCOHOL 2 DROP: 1.4 SOLUTION/ DROPS OPHTHALMIC at 20:50

## 2024-09-17 LAB
ANION GAP SERPL CALCULATED.3IONS-SCNC: 10 MMOL/L (ref 7–16)
BUN SERPL-MCNC: 20 MG/DL (ref 6–23)
CALCIUM SERPL-MCNC: 9.9 MG/DL (ref 8.6–10.2)
CHLORIDE SERPL-SCNC: 99 MMOL/L (ref 98–107)
CO2 SERPL-SCNC: 29 MMOL/L (ref 22–29)
CREAT SERPL-MCNC: 4.7 MG/DL (ref 0.7–1.2)
GFR, ESTIMATED: 12 ML/MIN/1.73M2
GLUCOSE SERPL-MCNC: 90 MG/DL (ref 74–99)
POTASSIUM SERPL-SCNC: 3.9 MMOL/L (ref 3.5–5)
SODIUM SERPL-SCNC: 138 MMOL/L (ref 132–146)

## 2024-09-17 PROCEDURE — 1200000000 HC SEMI PRIVATE

## 2024-09-17 PROCEDURE — 80048 BASIC METABOLIC PNL TOTAL CA: CPT

## 2024-09-17 PROCEDURE — 6360000002 HC RX W HCPCS: Performed by: HOSPITALIST

## 2024-09-17 PROCEDURE — 6370000000 HC RX 637 (ALT 250 FOR IP): Performed by: NURSE PRACTITIONER

## 2024-09-17 PROCEDURE — 36415 COLL VENOUS BLD VENIPUNCTURE: CPT

## 2024-09-17 PROCEDURE — 6370000000 HC RX 637 (ALT 250 FOR IP): Performed by: HOSPITALIST

## 2024-09-17 PROCEDURE — 2580000003 HC RX 258: Performed by: HOSPITALIST

## 2024-09-17 PROCEDURE — 97530 THERAPEUTIC ACTIVITIES: CPT

## 2024-09-17 RX ADMIN — PANTOPRAZOLE SODIUM 40 MG: 40 TABLET, DELAYED RELEASE ORAL at 09:14

## 2024-09-17 RX ADMIN — POLYVINYL ALCOHOL 2 DROP: 1.4 SOLUTION/ DROPS OPHTHALMIC at 18:05

## 2024-09-17 RX ADMIN — METHOCARBAMOL TABLETS 500 MG: 500 TABLET, COATED ORAL at 09:13

## 2024-09-17 RX ADMIN — HEPARIN SODIUM 5000 UNITS: 5000 INJECTION INTRAVENOUS; SUBCUTANEOUS at 14:38

## 2024-09-17 RX ADMIN — CARVEDILOL 12.5 MG: 6.25 TABLET, FILM COATED ORAL at 18:05

## 2024-09-17 RX ADMIN — SODIUM CHLORIDE, PRESERVATIVE FREE 10 ML: 5 INJECTION INTRAVENOUS at 09:15

## 2024-09-17 RX ADMIN — SEVELAMER CARBONATE 800 MG: 800 TABLET, FILM COATED ORAL at 09:14

## 2024-09-17 RX ADMIN — POLYVINYL ALCOHOL 2 DROP: 1.4 SOLUTION/ DROPS OPHTHALMIC at 12:22

## 2024-09-17 RX ADMIN — POLYVINYL ALCOHOL 2 DROP: 1.4 SOLUTION/ DROPS OPHTHALMIC at 09:21

## 2024-09-17 RX ADMIN — METHOCARBAMOL TABLETS 500 MG: 500 TABLET, COATED ORAL at 14:38

## 2024-09-17 RX ADMIN — SEVELAMER CARBONATE 800 MG: 800 TABLET, FILM COATED ORAL at 17:30

## 2024-09-17 RX ADMIN — ASPIRIN 81 MG: 81 TABLET, COATED ORAL at 09:14

## 2024-09-17 RX ADMIN — ROSUVASTATIN CALCIUM 10 MG: 10 TABLET, FILM COATED ORAL at 09:14

## 2024-09-17 ASSESSMENT — PAIN SCALES - GENERAL
PAINLEVEL_OUTOF10: 4
PAINLEVEL_OUTOF10: 0

## 2024-09-17 ASSESSMENT — PAIN DESCRIPTION - LOCATION: LOCATION: BACK

## 2024-09-18 VITALS
DIASTOLIC BLOOD PRESSURE: 46 MMHG | RESPIRATION RATE: 17 BRPM | BODY MASS INDEX: 20.07 KG/M2 | HEART RATE: 67 BPM | WEIGHT: 127.87 LBS | SYSTOLIC BLOOD PRESSURE: 119 MMHG | TEMPERATURE: 97.9 F | OXYGEN SATURATION: 100 % | HEIGHT: 67 IN

## 2024-09-18 LAB
ANION GAP SERPL CALCULATED.3IONS-SCNC: 12 MMOL/L (ref 7–16)
BUN SERPL-MCNC: 30 MG/DL (ref 6–23)
CALCIUM SERPL-MCNC: 10.3 MG/DL (ref 8.6–10.2)
CHLORIDE SERPL-SCNC: 98 MMOL/L (ref 98–107)
CO2 SERPL-SCNC: 28 MMOL/L (ref 22–29)
CREAT SERPL-MCNC: 6.7 MG/DL (ref 0.7–1.2)
GFR, ESTIMATED: 8 ML/MIN/1.73M2
GLUCOSE SERPL-MCNC: 103 MG/DL (ref 74–99)
POTASSIUM SERPL-SCNC: 4.4 MMOL/L (ref 3.5–5)
SODIUM SERPL-SCNC: 138 MMOL/L (ref 132–146)

## 2024-09-18 PROCEDURE — 90935 HEMODIALYSIS ONE EVALUATION: CPT

## 2024-09-18 PROCEDURE — 6370000000 HC RX 637 (ALT 250 FOR IP): Performed by: NURSE PRACTITIONER

## 2024-09-18 PROCEDURE — 6360000002 HC RX W HCPCS: Performed by: HOSPITALIST

## 2024-09-18 PROCEDURE — 2580000003 HC RX 258: Performed by: HOSPITALIST

## 2024-09-18 PROCEDURE — 80048 BASIC METABOLIC PNL TOTAL CA: CPT

## 2024-09-18 PROCEDURE — 6370000000 HC RX 637 (ALT 250 FOR IP): Performed by: INTERNAL MEDICINE

## 2024-09-18 PROCEDURE — 6370000000 HC RX 637 (ALT 250 FOR IP): Performed by: HOSPITALIST

## 2024-09-18 RX ORDER — CIPROFLOXACIN HYDROCHLORIDE 3.5 MG/ML
1 SOLUTION/ DROPS TOPICAL
Status: DISCONTINUED | OUTPATIENT
Start: 2024-09-18 | End: 2024-09-18 | Stop reason: HOSPADM

## 2024-09-18 RX ADMIN — POLYVINYL ALCOHOL 2 DROP: 1.4 SOLUTION/ DROPS OPHTHALMIC at 07:56

## 2024-09-18 RX ADMIN — PANTOPRAZOLE SODIUM 40 MG: 40 TABLET, DELAYED RELEASE ORAL at 08:08

## 2024-09-18 RX ADMIN — CARVEDILOL 12.5 MG: 6.25 TABLET, FILM COATED ORAL at 08:07

## 2024-09-18 RX ADMIN — METHOCARBAMOL TABLETS 500 MG: 500 TABLET, COATED ORAL at 14:30

## 2024-09-18 RX ADMIN — SODIUM CHLORIDE, PRESERVATIVE FREE 10 ML: 5 INJECTION INTRAVENOUS at 08:13

## 2024-09-18 RX ADMIN — METHOCARBAMOL TABLETS 500 MG: 500 TABLET, COATED ORAL at 08:08

## 2024-09-18 RX ADMIN — SEVELAMER CARBONATE 800 MG: 800 TABLET, FILM COATED ORAL at 08:08

## 2024-09-18 RX ADMIN — POLYVINYL ALCOHOL 2 DROP: 1.4 SOLUTION/ DROPS OPHTHALMIC at 13:30

## 2024-09-18 RX ADMIN — Medication 1 DROP: at 14:29

## 2024-09-18 RX ADMIN — ASPIRIN 81 MG: 81 TABLET, COATED ORAL at 08:08

## 2024-09-18 RX ADMIN — HEPARIN SODIUM 5000 UNITS: 5000 INJECTION INTRAVENOUS; SUBCUTANEOUS at 14:32

## 2024-09-18 RX ADMIN — AMLODIPINE BESYLATE 5 MG: 5 TABLET ORAL at 08:08

## 2024-09-19 ENCOUNTER — OUTSIDE SERVICES (OUTPATIENT)
Dept: PRIMARY CARE CLINIC | Age: 79
End: 2024-09-19
Payer: MEDICARE

## 2024-09-19 VITALS
OXYGEN SATURATION: 95 % | RESPIRATION RATE: 18 BRPM | SYSTOLIC BLOOD PRESSURE: 119 MMHG | HEART RATE: 70 BPM | WEIGHT: 127 LBS | DIASTOLIC BLOOD PRESSURE: 46 MMHG | BODY MASS INDEX: 19.89 KG/M2 | TEMPERATURE: 98.2 F

## 2024-09-19 DIAGNOSIS — R26.2 AMBULATORY DYSFUNCTION: ICD-10-CM

## 2024-09-19 DIAGNOSIS — E43 SEVERE PROTEIN-CALORIE MALNUTRITION (HCC): ICD-10-CM

## 2024-09-19 DIAGNOSIS — J44.9 CHRONIC OBSTRUCTIVE PULMONARY DISEASE, UNSPECIFIED COPD TYPE (HCC): ICD-10-CM

## 2024-09-19 DIAGNOSIS — R53.1 GENERALIZED WEAKNESS: ICD-10-CM

## 2024-09-19 DIAGNOSIS — I10 ESSENTIAL (PRIMARY) HYPERTENSION: ICD-10-CM

## 2024-09-19 DIAGNOSIS — S32.010A CLOSED COMPRESSION FRACTURE OF BODY OF L1 VERTEBRA (HCC): ICD-10-CM

## 2024-09-19 DIAGNOSIS — N18.6 END-STAGE RENAL DISEASE ON HEMODIALYSIS (HCC): ICD-10-CM

## 2024-09-19 DIAGNOSIS — Z99.2 END-STAGE RENAL DISEASE ON HEMODIALYSIS (HCC): ICD-10-CM

## 2024-09-19 DIAGNOSIS — R49.0 HOARSENESS OF VOICE: Primary | ICD-10-CM

## 2024-09-19 DIAGNOSIS — E78.5 HYPERLIPIDEMIA, UNSPECIFIED HYPERLIPIDEMIA TYPE: ICD-10-CM

## 2024-09-19 DIAGNOSIS — R91.8 LUNG MASS: ICD-10-CM

## 2024-09-19 PROCEDURE — 99306 1ST NF CARE HIGH MDM 50: CPT | Performed by: INTERNAL MEDICINE

## 2024-10-22 PROBLEM — J44.9 CHRONIC OBSTRUCTIVE PULMONARY DISEASE (HCC): Status: ACTIVE | Noted: 2024-10-22

## 2024-10-28 ENCOUNTER — APPOINTMENT (OUTPATIENT)
Dept: GENERAL RADIOLOGY | Age: 79
DRG: 193 | End: 2024-10-28
Payer: MEDICARE

## 2024-10-28 ENCOUNTER — HOSPITAL ENCOUNTER (INPATIENT)
Age: 79
LOS: 10 days | Discharge: NURSING, REHAB OR LONG-TERM CARE | DRG: 193 | End: 2024-11-07
Attending: STUDENT IN AN ORGANIZED HEALTH CARE EDUCATION/TRAINING PROGRAM | Admitting: INTERNAL MEDICINE
Payer: MEDICARE

## 2024-10-28 DIAGNOSIS — I33.9 OTHER SUBACUTE ENDOCARDITIS: ICD-10-CM

## 2024-10-28 DIAGNOSIS — R78.81 BACTEREMIA: ICD-10-CM

## 2024-10-28 DIAGNOSIS — J18.9 PNEUMONIA OF BOTH LUNGS DUE TO INFECTIOUS ORGANISM, UNSPECIFIED PART OF LUNG: Primary | ICD-10-CM

## 2024-10-28 LAB
ANION GAP SERPL CALCULATED.3IONS-SCNC: 15 MMOL/L (ref 7–16)
BASOPHILS # BLD: 0.07 K/UL (ref 0–0.2)
BASOPHILS NFR BLD: 1 % (ref 0–2)
BNP SERPL-MCNC: ABNORMAL PG/ML (ref 0–450)
BUN SERPL-MCNC: 24 MG/DL (ref 6–23)
CALCIUM SERPL-MCNC: 9.8 MG/DL (ref 8.6–10.2)
CHLORIDE SERPL-SCNC: 97 MMOL/L (ref 98–107)
CO2 SERPL-SCNC: 25 MMOL/L (ref 22–29)
CREAT SERPL-MCNC: 4.3 MG/DL (ref 0.7–1.2)
EOSINOPHIL # BLD: 0.04 K/UL (ref 0.05–0.5)
EOSINOPHILS RELATIVE PERCENT: 1 % (ref 0–6)
ERYTHROCYTE [DISTWIDTH] IN BLOOD BY AUTOMATED COUNT: 18 % (ref 11.5–15)
GFR, ESTIMATED: 13 ML/MIN/1.73M2
GLUCOSE SERPL-MCNC: 102 MG/DL (ref 74–99)
HCT VFR BLD AUTO: 33.7 % (ref 37–54)
HGB BLD-MCNC: 10 G/DL (ref 12.5–16.5)
IMM GRANULOCYTES # BLD AUTO: 0.03 K/UL (ref 0–0.58)
IMM GRANULOCYTES NFR BLD: 1 % (ref 0–5)
INFLUENZA A BY PCR: NOT DETECTED
INFLUENZA B BY PCR: NOT DETECTED
LYMPHOCYTES NFR BLD: 0.76 K/UL (ref 1.5–4)
LYMPHOCYTES RELATIVE PERCENT: 12 % (ref 20–42)
MCH RBC QN AUTO: 32.1 PG (ref 26–35)
MCHC RBC AUTO-ENTMCNC: 29.7 G/DL (ref 32–34.5)
MCV RBC AUTO: 108 FL (ref 80–99.9)
MONOCYTES NFR BLD: 0.47 K/UL (ref 0.1–0.95)
MONOCYTES NFR BLD: 7 % (ref 2–12)
NEUTROPHILS NFR BLD: 79 % (ref 43–80)
NEUTS SEG NFR BLD: 5.15 K/UL (ref 1.8–7.3)
PLATELET # BLD AUTO: 128 K/UL (ref 130–450)
PMV BLD AUTO: 10.3 FL (ref 7–12)
POTASSIUM SERPL-SCNC: 3.7 MMOL/L (ref 3.5–5)
RBC # BLD AUTO: 3.12 M/UL (ref 3.8–5.8)
SARS-COV-2 RDRP RESP QL NAA+PROBE: NOT DETECTED
SODIUM SERPL-SCNC: 137 MMOL/L (ref 132–146)
SPECIMEN DESCRIPTION: NORMAL
TROPONIN I SERPL HS-MCNC: 153 NG/L (ref 0–11)
TROPONIN I SERPL HS-MCNC: 163 NG/L (ref 0–11)
WBC OTHER # BLD: 6.5 K/UL (ref 4.5–11.5)

## 2024-10-28 PROCEDURE — 94664 DEMO&/EVAL PT USE INHALER: CPT

## 2024-10-28 PROCEDURE — 2580000003 HC RX 258

## 2024-10-28 PROCEDURE — 6370000000 HC RX 637 (ALT 250 FOR IP): Performed by: STUDENT IN AN ORGANIZED HEALTH CARE EDUCATION/TRAINING PROGRAM

## 2024-10-28 PROCEDURE — 6360000002 HC RX W HCPCS: Performed by: NURSE PRACTITIONER

## 2024-10-28 PROCEDURE — 2060000000 HC ICU INTERMEDIATE R&B

## 2024-10-28 PROCEDURE — 87635 SARS-COV-2 COVID-19 AMP PRB: CPT

## 2024-10-28 PROCEDURE — 87502 INFLUENZA DNA AMP PROBE: CPT

## 2024-10-28 PROCEDURE — 6360000002 HC RX W HCPCS

## 2024-10-28 PROCEDURE — 83880 ASSAY OF NATRIURETIC PEPTIDE: CPT

## 2024-10-28 PROCEDURE — 85025 COMPLETE CBC W/AUTO DIFF WBC: CPT

## 2024-10-28 PROCEDURE — 87154 CUL TYP ID BLD PTHGN 6+ TRGT: CPT

## 2024-10-28 PROCEDURE — 93005 ELECTROCARDIOGRAM TRACING: CPT | Performed by: NURSE PRACTITIONER

## 2024-10-28 PROCEDURE — 80048 BASIC METABOLIC PNL TOTAL CA: CPT

## 2024-10-28 PROCEDURE — 87040 BLOOD CULTURE FOR BACTERIA: CPT

## 2024-10-28 PROCEDURE — 71046 X-RAY EXAM CHEST 2 VIEWS: CPT

## 2024-10-28 PROCEDURE — 94640 AIRWAY INHALATION TREATMENT: CPT

## 2024-10-28 PROCEDURE — 72220 X-RAY EXAM SACRUM TAILBONE: CPT

## 2024-10-28 PROCEDURE — 87077 CULTURE AEROBIC IDENTIFY: CPT

## 2024-10-28 PROCEDURE — 2500000003 HC RX 250 WO HCPCS

## 2024-10-28 PROCEDURE — 6370000000 HC RX 637 (ALT 250 FOR IP)

## 2024-10-28 PROCEDURE — 84484 ASSAY OF TROPONIN QUANT: CPT

## 2024-10-28 PROCEDURE — 99285 EMERGENCY DEPT VISIT HI MDM: CPT

## 2024-10-28 RX ORDER — ARFORMOTEROL TARTRATE 15 UG/2ML
15 SOLUTION RESPIRATORY (INHALATION)
Status: DISCONTINUED | OUTPATIENT
Start: 2024-10-28 | End: 2024-11-07 | Stop reason: HOSPADM

## 2024-10-28 RX ORDER — ACETAMINOPHEN 325 MG/1
650 TABLET ORAL ONCE
Status: COMPLETED | OUTPATIENT
Start: 2024-10-28 | End: 2024-10-28

## 2024-10-28 RX ORDER — ACETAMINOPHEN 325 MG/1
650 TABLET ORAL EVERY 6 HOURS PRN
Status: DISCONTINUED | OUTPATIENT
Start: 2024-10-28 | End: 2024-11-07 | Stop reason: HOSPADM

## 2024-10-28 RX ORDER — HEPARIN SODIUM 5000 [USP'U]/ML
5000 INJECTION, SOLUTION INTRAVENOUS; SUBCUTANEOUS EVERY 8 HOURS
Status: DISCONTINUED | OUTPATIENT
Start: 2024-10-28 | End: 2024-11-07 | Stop reason: HOSPADM

## 2024-10-28 RX ORDER — ONDANSETRON 4 MG/1
4 TABLET, ORALLY DISINTEGRATING ORAL EVERY 8 HOURS PRN
Status: DISCONTINUED | OUTPATIENT
Start: 2024-10-28 | End: 2024-11-07 | Stop reason: HOSPADM

## 2024-10-28 RX ORDER — NITROGLYCERIN 0.4 MG/1
0.4 TABLET SUBLINGUAL EVERY 5 MIN PRN
Status: DISCONTINUED | OUTPATIENT
Start: 2024-10-28 | End: 2024-11-07 | Stop reason: HOSPADM

## 2024-10-28 RX ORDER — SENNOSIDES A AND B 8.6 MG/1
1 TABLET, FILM COATED ORAL DAILY PRN
Status: DISCONTINUED | OUTPATIENT
Start: 2024-10-28 | End: 2024-11-07 | Stop reason: HOSPADM

## 2024-10-28 RX ORDER — PANTOPRAZOLE SODIUM 40 MG/1
40 TABLET, DELAYED RELEASE ORAL EVERY MORNING
Status: DISCONTINUED | OUTPATIENT
Start: 2024-10-29 | End: 2024-11-03

## 2024-10-28 RX ORDER — SODIUM CHLORIDE 0.9 % (FLUSH) 0.9 %
10 SYRINGE (ML) INJECTION EVERY 12 HOURS SCHEDULED
Status: DISCONTINUED | OUTPATIENT
Start: 2024-10-28 | End: 2024-11-07 | Stop reason: HOSPADM

## 2024-10-28 RX ORDER — ALBUTEROL SULFATE 0.83 MG/ML
2.5 SOLUTION RESPIRATORY (INHALATION) EVERY 6 HOURS PRN
Status: DISCONTINUED | OUTPATIENT
Start: 2024-10-28 | End: 2024-11-07 | Stop reason: HOSPADM

## 2024-10-28 RX ORDER — SODIUM CHLORIDE 0.9 % (FLUSH) 0.9 %
10 SYRINGE (ML) INJECTION PRN
Status: DISCONTINUED | OUTPATIENT
Start: 2024-10-28 | End: 2024-11-07 | Stop reason: HOSPADM

## 2024-10-28 RX ORDER — DOXYCYCLINE 100 MG/1
100 CAPSULE ORAL EVERY 12 HOURS SCHEDULED
Status: DISPENSED | OUTPATIENT
Start: 2024-10-29 | End: 2024-11-05

## 2024-10-28 RX ORDER — SODIUM CHLORIDE 9 MG/ML
INJECTION, SOLUTION INTRAVENOUS PRN
Status: DISCONTINUED | OUTPATIENT
Start: 2024-10-28 | End: 2024-11-07 | Stop reason: HOSPADM

## 2024-10-28 RX ORDER — BUDESONIDE 0.25 MG/2ML
0.25 INHALANT ORAL
Status: DISCONTINUED | OUTPATIENT
Start: 2024-10-28 | End: 2024-11-07 | Stop reason: HOSPADM

## 2024-10-28 RX ORDER — SEVELAMER CARBONATE 800 MG/1
800 TABLET, FILM COATED ORAL
Status: DISCONTINUED | OUTPATIENT
Start: 2024-10-29 | End: 2024-11-07 | Stop reason: HOSPADM

## 2024-10-28 RX ORDER — ONDANSETRON 2 MG/ML
4 INJECTION INTRAMUSCULAR; INTRAVENOUS EVERY 6 HOURS PRN
Status: DISCONTINUED | OUTPATIENT
Start: 2024-10-28 | End: 2024-11-07 | Stop reason: HOSPADM

## 2024-10-28 RX ORDER — ASPIRIN 81 MG/1
81 TABLET ORAL EVERY MORNING
Status: DISCONTINUED | OUTPATIENT
Start: 2024-10-29 | End: 2024-11-07 | Stop reason: HOSPADM

## 2024-10-28 RX ORDER — ROSUVASTATIN CALCIUM 10 MG/1
10 TABLET, COATED ORAL EVERY OTHER DAY
Status: DISCONTINUED | OUTPATIENT
Start: 2024-10-29 | End: 2024-11-07 | Stop reason: HOSPADM

## 2024-10-28 RX ORDER — ACETAMINOPHEN 650 MG/1
650 SUPPOSITORY RECTAL EVERY 6 HOURS PRN
Status: DISCONTINUED | OUTPATIENT
Start: 2024-10-28 | End: 2024-11-07 | Stop reason: HOSPADM

## 2024-10-28 RX ORDER — IPRATROPIUM BROMIDE AND ALBUTEROL SULFATE 2.5; .5 MG/3ML; MG/3ML
1 SOLUTION RESPIRATORY (INHALATION) ONCE
Status: COMPLETED | OUTPATIENT
Start: 2024-10-28 | End: 2024-10-28

## 2024-10-28 RX ADMIN — ARFORMOTEROL TARTRATE 15 MCG: 15 SOLUTION RESPIRATORY (INHALATION) at 22:38

## 2024-10-28 RX ADMIN — IPRATROPIUM BROMIDE 0.5 MG: 0.5 SOLUTION RESPIRATORY (INHALATION) at 22:38

## 2024-10-28 RX ADMIN — ACETAMINOPHEN 650 MG: 325 TABLET ORAL at 17:43

## 2024-10-28 RX ADMIN — IPRATROPIUM BROMIDE AND ALBUTEROL SULFATE 1 DOSE: .5; 2.5 SOLUTION RESPIRATORY (INHALATION) at 17:34

## 2024-10-28 RX ADMIN — BUDESONIDE 250 MCG: 0.25 SUSPENSION RESPIRATORY (INHALATION) at 22:38

## 2024-10-28 RX ADMIN — DOXYCYCLINE 100 MG: 100 INJECTION, POWDER, LYOPHILIZED, FOR SOLUTION INTRAVENOUS at 22:25

## 2024-10-28 RX ADMIN — CEFTRIAXONE 1000 MG: 1 INJECTION, POWDER, FOR SOLUTION INTRAMUSCULAR; INTRAVENOUS at 22:25

## 2024-10-28 ASSESSMENT — PAIN - FUNCTIONAL ASSESSMENT: PAIN_FUNCTIONAL_ASSESSMENT: 0-10

## 2024-10-28 ASSESSMENT — PAIN SCALES - GENERAL: PAINLEVEL_OUTOF10: 9

## 2024-10-28 NOTE — ED PROVIDER NOTES
[facility] EMERGENCY DEPARTMENT  EMERGENCY DEPARTMENT ENCOUNTER        Pt Name: James Vazquez III  MRN: [id number]  Birthdate 1945  Date of evaluation: 10/28/2024  Provider: Vanessa Chan MD  PCP: Cipriano Durham DO  Note Started: 4:33 PM EDT 10/28/24    CHIEF COMPLAINT       Chief Complaint   Patient presents with    Shortness of Breath       HISTORY OF PRESENT ILLNESS: 1 or more Elements   History From: Patient    Limitations to history : None    James Vazquez III is a 79 y.o. male who presents for shortness of breath starting today as well as body aches.  He also endorses nonproductive cough over the past couple days.  Denies chest pain, fever, congestion, runny nose, abdominal pain, nausea, vomiting, diarrhea.  Denies recent long travels, recent surgeries.  Denies known lung issues.  He denies cigarette use.  In addition the patient states he did have a mechanical fall 3 days ago where he fell onto his bottom.  He is endorsing coccyx pain at this time.  Denies blood thinner use.    Nursing Notes were all reviewed and agreed with or any disagreements were addressed in the HPI.        REVIEW OF EXTERNAL NOTE :       Patient was last seen and admitted on 9/12/2024 for frequent falls, acute midline low back pain, ambulatory dysfunction    REVIEW OF SYSTEMS :           Positives and Pertinent negatives as per HPI.     SURGICAL HISTORY     Past Surgical History:   Procedure Laterality Date    AV FISTULA CREATION Left 2015    Dr. Phillips [facility]    CARDIAC SURGERY      HEMODIALYSIS ACCESS PERCUTANEOUS REVISION Left 2019    2 x Dr. Phillips, [facility]    HIP SURGERY Left 10/16/2020    HIP HEMIARTHROPLASTY performed by Abel Birmingham MD at [facility] OR    PERIPHERAL VASCULAR BYPASS  2008    Aortobifemoral bypass for claudicatio - Dr. Phillips [facility]    UPPER GASTROINTESTINAL ENDOSCOPY N/A 7/20/2020    EGD ESOPHAGOGASTRODUODENOSCOPY WITH ANTRAL BIOPSY performed by Lloyd Styles MD

## 2024-10-28 NOTE — ED PROVIDER NOTES
Department of Emergency Medicine  FIRST PROVIDER TRIAGE NOTE             Independent MLP           10/28/24  1:58 PM EDT    Date of Encounter: 10/28/24   MRN: [id number]      HPI: James Vazquez III is a 79 y.o. male who presents to the ED for Shortness of Breath       Patient arrived by private vehicle with his wife and reports that he had a fall on Thursday in his kitchen where he fell onto his buttocks and he did not go to his dialysis on Friday.  He normally goes Monday Wednesday Friday and he did go to dialysis today.  He is complaining of shortness of breath and they had to put oxygen on him at dialysis currently he is 98% on room air and he denies any pain with inspiration.  He does complain of lower back pain he ambulates with the assistance of a walker he denied any head injury or loss of consciousness.  He is not on any anticoagulation.  He denies any chest pain.  He is alert and oriented and speech is clear.  He denies any extremity weakness.    Vitals:    10/28/24 1352   BP: (!) 152/83   Pulse: 94   Resp: 18   Temp: 98.2 °F (36.8 °C)   SpO2: 98%         ROS: Negative for cp, abd pain, fever, cough, vomiting, headache, or dizziness.    PE: Gen Appearance/Constitutional: alert  HEENT: NC/NT. PERRLA,  Airway patent.  Neck: supple no midline bony tenderness to palpation  CV: regular rate  Pulm: CTA bilat  GI: soft and NT  Musculoskeletal: moves all extremities x 4 bilateral lower lumbar pain midline.      Initial Plan of Care:  Plan to order/Initiate the following while awaiting opening in ED: EKG.   Instructed patient and/or family member with patient if any worsening condition to notify staff.    Provider-Patient relationship only established for Provider In Triage (PIT) secondary to high volume, low staffing, and/or boarding- patient to await bed availability..  Full assessment, HPI and examination not performed.  Discussed with patient that the provider in triage evaluation is not a comprehensive medical

## 2024-10-29 LAB
ALBUMIN SERPL-MCNC: 3.4 G/DL (ref 3.5–5.2)
ALP SERPL-CCNC: 97 U/L (ref 40–129)
ALT SERPL-CCNC: <5 U/L (ref 0–40)
ANION GAP SERPL CALCULATED.3IONS-SCNC: 15 MMOL/L (ref 7–16)
AST SERPL-CCNC: 11 U/L (ref 0–39)
BASOPHILS # BLD: 0.05 K/UL (ref 0–0.2)
BASOPHILS NFR BLD: 1 % (ref 0–2)
BILIRUB SERPL-MCNC: 0.4 MG/DL (ref 0–1.2)
BUN SERPL-MCNC: 27 MG/DL (ref 6–23)
CALCIUM SERPL-MCNC: 9.5 MG/DL (ref 8.6–10.2)
CHLORIDE SERPL-SCNC: 99 MMOL/L (ref 98–107)
CO2 SERPL-SCNC: 25 MMOL/L (ref 22–29)
CREAT SERPL-MCNC: 5 MG/DL (ref 0.7–1.2)
EKG ATRIAL RATE: 96 BPM
EKG P AXIS: 43 DEGREES
EKG P-R INTERVAL: 150 MS
EKG Q-T INTERVAL: 380 MS
EKG QRS DURATION: 90 MS
EKG QTC CALCULATION (BAZETT): 480 MS
EKG R AXIS: 62 DEGREES
EKG T AXIS: 117 DEGREES
EKG VENTRICULAR RATE: 96 BPM
EOSINOPHIL # BLD: 0.09 K/UL (ref 0.05–0.5)
EOSINOPHILS RELATIVE PERCENT: 2 % (ref 0–6)
ERYTHROCYTE [DISTWIDTH] IN BLOOD BY AUTOMATED COUNT: 17.9 % (ref 11.5–15)
GFR, ESTIMATED: 11 ML/MIN/1.73M2
GLUCOSE SERPL-MCNC: 86 MG/DL (ref 74–99)
HCT VFR BLD AUTO: 29 % (ref 37–54)
HGB BLD-MCNC: 8.9 G/DL (ref 12.5–16.5)
IMM GRANULOCYTES # BLD AUTO: <0.03 K/UL (ref 0–0.58)
IMM GRANULOCYTES NFR BLD: 0 % (ref 0–5)
LYMPHOCYTES NFR BLD: 0.96 K/UL (ref 1.5–4)
LYMPHOCYTES RELATIVE PERCENT: 18 % (ref 20–42)
MAGNESIUM SERPL-MCNC: 1.8 MG/DL (ref 1.6–2.6)
MCH RBC QN AUTO: 32.7 PG (ref 26–35)
MCHC RBC AUTO-ENTMCNC: 30.7 G/DL (ref 32–34.5)
MCV RBC AUTO: 106.6 FL (ref 80–99.9)
MONOCYTES NFR BLD: 0.51 K/UL (ref 0.1–0.95)
MONOCYTES NFR BLD: 9 % (ref 2–12)
NEUTROPHILS NFR BLD: 70 % (ref 43–80)
NEUTS SEG NFR BLD: 3.82 K/UL (ref 1.8–7.3)
PLATELET # BLD AUTO: 103 K/UL (ref 130–450)
PMV BLD AUTO: 10.8 FL (ref 7–12)
POTASSIUM SERPL-SCNC: 3.4 MMOL/L (ref 3.5–5)
PROT SERPL-MCNC: 6.2 G/DL (ref 6.4–8.3)
RBC # BLD AUTO: 2.72 M/UL (ref 3.8–5.8)
SODIUM SERPL-SCNC: 139 MMOL/L (ref 132–146)
WBC OTHER # BLD: 5.4 K/UL (ref 4.5–11.5)

## 2024-10-29 PROCEDURE — 2060000000 HC ICU INTERMEDIATE R&B

## 2024-10-29 PROCEDURE — 94640 AIRWAY INHALATION TREATMENT: CPT

## 2024-10-29 PROCEDURE — 6360000002 HC RX W HCPCS: Performed by: NURSE PRACTITIONER

## 2024-10-29 PROCEDURE — 97530 THERAPEUTIC ACTIVITIES: CPT

## 2024-10-29 PROCEDURE — 83735 ASSAY OF MAGNESIUM: CPT

## 2024-10-29 PROCEDURE — 85025 COMPLETE CBC W/AUTO DIFF WBC: CPT

## 2024-10-29 PROCEDURE — 2580000003 HC RX 258: Performed by: NURSE PRACTITIONER

## 2024-10-29 PROCEDURE — 6370000000 HC RX 637 (ALT 250 FOR IP)

## 2024-10-29 PROCEDURE — 6370000000 HC RX 637 (ALT 250 FOR IP): Performed by: NURSE PRACTITIONER

## 2024-10-29 PROCEDURE — 90945 DIALYSIS ONE EVALUATION: CPT

## 2024-10-29 PROCEDURE — 97161 PT EVAL LOW COMPLEX 20 MIN: CPT

## 2024-10-29 PROCEDURE — 80053 COMPREHEN METABOLIC PANEL: CPT

## 2024-10-29 PROCEDURE — 93010 ELECTROCARDIOGRAM REPORT: CPT | Performed by: INTERNAL MEDICINE

## 2024-10-29 RX ORDER — CARVEDILOL 6.25 MG/1
12.5 TABLET ORAL 2 TIMES DAILY WITH MEALS
Status: DISCONTINUED | OUTPATIENT
Start: 2024-10-29 | End: 2024-11-07 | Stop reason: HOSPADM

## 2024-10-29 RX ORDER — POTASSIUM CHLORIDE 1500 MG/1
20 TABLET, EXTENDED RELEASE ORAL ONCE
Status: COMPLETED | OUTPATIENT
Start: 2024-10-29 | End: 2024-10-29

## 2024-10-29 RX ADMIN — ROSUVASTATIN CALCIUM 10 MG: 10 TABLET, FILM COATED ORAL at 09:08

## 2024-10-29 RX ADMIN — IPRATROPIUM BROMIDE 0.5 MG: 0.5 SOLUTION RESPIRATORY (INHALATION) at 19:54

## 2024-10-29 RX ADMIN — HEPARIN SODIUM 5000 UNITS: 5000 INJECTION INTRAVENOUS; SUBCUTANEOUS at 21:14

## 2024-10-29 RX ADMIN — POTASSIUM CHLORIDE 20 MEQ: 1500 TABLET, EXTENDED RELEASE ORAL at 12:46

## 2024-10-29 RX ADMIN — SEVELAMER CARBONATE 800 MG: 800 TABLET, FILM COATED ORAL at 06:38

## 2024-10-29 RX ADMIN — HEPARIN SODIUM 5000 UNITS: 5000 INJECTION INTRAVENOUS; SUBCUTANEOUS at 12:46

## 2024-10-29 RX ADMIN — ARFORMOTEROL TARTRATE 15 MCG: 15 SOLUTION RESPIRATORY (INHALATION) at 08:39

## 2024-10-29 RX ADMIN — SODIUM CHLORIDE, PRESERVATIVE FREE 10 ML: 5 INJECTION INTRAVENOUS at 21:17

## 2024-10-29 RX ADMIN — SEVELAMER CARBONATE 800 MG: 800 TABLET, FILM COATED ORAL at 12:46

## 2024-10-29 RX ADMIN — ACETAMINOPHEN 650 MG: 325 TABLET ORAL at 16:36

## 2024-10-29 RX ADMIN — ASPIRIN 81 MG: 81 TABLET, COATED ORAL at 09:08

## 2024-10-29 RX ADMIN — BUDESONIDE 250 MCG: 0.25 SUSPENSION RESPIRATORY (INHALATION) at 08:38

## 2024-10-29 RX ADMIN — SODIUM CHLORIDE, PRESERVATIVE FREE 10 ML: 5 INJECTION INTRAVENOUS at 09:09

## 2024-10-29 RX ADMIN — IPRATROPIUM BROMIDE 0.5 MG: 0.5 SOLUTION RESPIRATORY (INHALATION) at 08:38

## 2024-10-29 RX ADMIN — DOXYCYCLINE HYCLATE 100 MG: 100 CAPSULE ORAL at 21:14

## 2024-10-29 RX ADMIN — HEPARIN SODIUM 5000 UNITS: 5000 INJECTION INTRAVENOUS; SUBCUTANEOUS at 06:38

## 2024-10-29 RX ADMIN — WATER 1000 MG: 1 INJECTION INTRAMUSCULAR; INTRAVENOUS; SUBCUTANEOUS at 21:17

## 2024-10-29 RX ADMIN — CARVEDILOL 12.5 MG: 6.25 TABLET, FILM COATED ORAL at 16:34

## 2024-10-29 RX ADMIN — ONDANSETRON 4 MG: 2 INJECTION INTRAMUSCULAR; INTRAVENOUS at 09:48

## 2024-10-29 RX ADMIN — DOXYCYCLINE HYCLATE 100 MG: 100 CAPSULE ORAL at 09:08

## 2024-10-29 RX ADMIN — SEVELAMER CARBONATE 800 MG: 800 TABLET, FILM COATED ORAL at 16:34

## 2024-10-29 RX ADMIN — PANTOPRAZOLE SODIUM 40 MG: 40 TABLET, DELAYED RELEASE ORAL at 09:08

## 2024-10-29 RX ADMIN — BUDESONIDE 250 MCG: 0.25 SUSPENSION RESPIRATORY (INHALATION) at 19:53

## 2024-10-29 RX ADMIN — ARFORMOTEROL TARTRATE 15 MCG: 15 SOLUTION RESPIRATORY (INHALATION) at 19:53

## 2024-10-29 ASSESSMENT — PAIN DESCRIPTION - LOCATION: LOCATION: BACK

## 2024-10-29 ASSESSMENT — PAIN SCALES - GENERAL
PAINLEVEL_OUTOF10: 0
PAINLEVEL_OUTOF10: 3
PAINLEVEL_OUTOF10: 0
PAINLEVEL_OUTOF10: 0

## 2024-10-29 ASSESSMENT — PAIN DESCRIPTION - ORIENTATION: ORIENTATION: RIGHT;LEFT;MID;UPPER

## 2024-10-29 ASSESSMENT — PAIN DESCRIPTION - DESCRIPTORS: DESCRIPTORS: ACHING;DISCOMFORT

## 2024-10-29 NOTE — FLOWSHEET NOTE
Pt completed a 2 hr UF with 1.5L removed safely. Post report to Jyothi CORRALES   10/29/24 1225   Vital Signs   BP (!) 147/76   Temp 97.8 °F (36.6 °C)   Pulse 96   Respirations 16   Weight - Scale 62.3 kg (137 lb 5.6 oz)   Weight Method Bed scale   Percent Weight Change -2.39   Pain Assessment   Pain Assessment None - Denies Pain   Pain Level 0   Post-Hemodialysis Assessment   Machine Disinfection Process Acid/Vinegar Clean;Heat Disinfect;Exterior Machine Disinfection   Rinseback Volume (ml) 300 ml   Dialyzer Clearance Clear   Duration of Treatment (minutes) 120 minutes   Hemodialysis Intake (ml) 300 ml   Hemodialysis Output (ml) 1800 ml   NET Removed (ml) 1500   Tolerated Treatment Good   Patient Response to Treatment tolerated 2 hrs of UF with 1.5L removed safely. Post report to Jyothi CORRALES   Bilateral Breath Sounds Diminished   Time Off 1221   Patient Disposition Return to room   Observations & Evaluations   Level of Consciousness 0   Oriented X 3   Heart Rhythm Regular   Respiratory Quality/Effort Unlabored   O2 Device None (Room air)   Skin Color Pale   Skin Condition/Temp Dry   Appetite Good   Abdomen Inspection Soft   Bowel Sounds (All Quadrants) Active

## 2024-10-29 NOTE — CARE COORDINATION
Internal Medicine On-call Care Coordination Note    I was called by the ED physician because they recommended admission for this patient and we cover their PCP.  The history as I understand it after discussion with the ED physician is as follows:    Presents with SOB  Found to have bilat pneumonia  Hx ESRD on HD    I placed admission orders.  Including:    General admission orders  Reconciled home meds  Nephrology consult  OVIDIO Mccord, or our coverage will see the patient tomorrow for H&P.    Electronically signed by TOMMY Soto CNP on 10/28/2024 at 8:59 PM

## 2024-10-29 NOTE — H&P
History and Physical      CHIEF COMPLAINT: Shortness of breath      HISTORY OF PRESENT ILLNESS:      The patient is a 79 y.o. male patient of Dr Durham who presents with shortness of breath from home.  He has been on dialysis for approximately 10 years.  He went to his usual session on Monday.  While he was receiving dialysis he complained of shortness of breath he was placed on oxygen.  After returning home he was still short of breath and came to the emergency room for evaluation.  He denies any fever chills cough nausea vomiting.  He had a second session of dialysis today for 2 hours just for fluid removal and his shortness of breath resolved after dialysis.  In the emergency room chest x-ray was interpreted as showing bilateral infiltrates consistent with pneumonia versus atelectasis.  He is currently not short of breath and denies any cough fever or chills.    Past Medical History:    Past Medical History:   Diagnosis Date    Blind left eye     Chronic kidney disease     Dialysis patient (HCC)     Hemodialysis patient (HCC)     Hyperlipidemia     Hypertension        Past Surgical History:    Past Surgical History:   Procedure Laterality Date    AV FISTULA CREATION Left 2015    Dr. Phillips [facility]    CARDIAC SURGERY      HEMODIALYSIS ACCESS PERCUTANEOUS REVISION Left 2019    2 x Dr. Phillips, [facility]    HIP SURGERY Left 10/16/2020    HIP HEMIARTHROPLASTY performed by Abel Birmingham MD at [facility] [place]    PERIPHERAL VASCULAR BYPASS  2008    Aortobifemoral bypass for claudicatio - Dr. Phillips [facility]    UPPER GASTROINTESTINAL ENDOSCOPY N/A 7/20/2020    EGD ESOPHAGOGASTRODUODENOSCOPY WITH ANTRAL BIOPSY performed by Lloyd Styles MD at [facility] OR    UPPER GASTROINTESTINAL ENDOSCOPY N/A 10/28/2020    EGD ESOPHAGOGASTRODUODENOSCOPY performed by Lloyd Styles MD at [facility]       Medications Prior to Admission:    Medications Prior to Admission: nitroGLYCERIN (NITROSTAT) 0.4 MG SL tablet, Place 1 tablet under the

## 2024-10-30 LAB
ALBUMIN SERPL-MCNC: 3.1 G/DL (ref 3.5–5.2)
ALP SERPL-CCNC: 96 U/L (ref 40–129)
ALT SERPL-CCNC: <5 U/L (ref 0–40)
ANION GAP SERPL CALCULATED.3IONS-SCNC: 12 MMOL/L (ref 7–16)
AST SERPL-CCNC: 13 U/L (ref 0–39)
B PARAP IS1001 DNA NPH QL NAA+NON-PROBE: NOT DETECTED
B PERT DNA SPEC QL NAA+PROBE: NOT DETECTED
BASOPHILS # BLD: 0.07 K/UL (ref 0–0.2)
BASOPHILS NFR BLD: 2 % (ref 0–2)
BILIRUB SERPL-MCNC: 0.3 MG/DL (ref 0–1.2)
BUN SERPL-MCNC: 38 MG/DL (ref 6–23)
C PNEUM DNA NPH QL NAA+NON-PROBE: NOT DETECTED
CALCIUM SERPL-MCNC: 10 MG/DL (ref 8.6–10.2)
CHLORIDE SERPL-SCNC: 103 MMOL/L (ref 98–107)
CO2 SERPL-SCNC: 23 MMOL/L (ref 22–29)
CREAT SERPL-MCNC: 6.4 MG/DL (ref 0.7–1.2)
CRP SERPL HS-MCNC: 41 MG/L (ref 0–5)
EOSINOPHIL # BLD: 0.14 K/UL (ref 0.05–0.5)
EOSINOPHILS RELATIVE PERCENT: 3 % (ref 0–6)
ERYTHROCYTE [DISTWIDTH] IN BLOOD BY AUTOMATED COUNT: 18.3 % (ref 11.5–15)
ERYTHROCYTE [SEDIMENTATION RATE] IN BLOOD BY WESTERGREN METHOD: 12 MM/HR (ref 0–15)
FLUAV RNA NPH QL NAA+NON-PROBE: NOT DETECTED
FLUBV RNA NPH QL NAA+NON-PROBE: NOT DETECTED
GFR, ESTIMATED: 8 ML/MIN/1.73M2
GLUCOSE SERPL-MCNC: 91 MG/DL (ref 74–99)
HADV DNA NPH QL NAA+NON-PROBE: NOT DETECTED
HCOV 229E RNA NPH QL NAA+NON-PROBE: NOT DETECTED
HCOV HKU1 RNA NPH QL NAA+NON-PROBE: NOT DETECTED
HCOV NL63 RNA NPH QL NAA+NON-PROBE: NOT DETECTED
HCOV OC43 RNA NPH QL NAA+NON-PROBE: NOT DETECTED
HCT VFR BLD AUTO: 31.5 % (ref 37–54)
HGB BLD-MCNC: 8.8 G/DL (ref 12.5–16.5)
HMPV RNA NPH QL NAA+NON-PROBE: NOT DETECTED
HPIV1 RNA NPH QL NAA+NON-PROBE: NOT DETECTED
HPIV2 RNA NPH QL NAA+NON-PROBE: NOT DETECTED
HPIV3 RNA NPH QL NAA+NON-PROBE: NOT DETECTED
HPIV4 RNA NPH QL NAA+NON-PROBE: NOT DETECTED
IMM GRANULOCYTES # BLD AUTO: <0.03 K/UL (ref 0–0.58)
IMM GRANULOCYTES NFR BLD: 1 % (ref 0–5)
LYMPHOCYTES NFR BLD: 1.04 K/UL (ref 1.5–4)
LYMPHOCYTES RELATIVE PERCENT: 25 % (ref 20–42)
M PNEUMO DNA NPH QL NAA+NON-PROBE: NOT DETECTED
MCH RBC QN AUTO: 32.4 PG (ref 26–35)
MCHC RBC AUTO-ENTMCNC: 27.9 G/DL (ref 32–34.5)
MCV RBC AUTO: 115.8 FL (ref 80–99.9)
MONOCYTES NFR BLD: 0.43 K/UL (ref 0.1–0.95)
MONOCYTES NFR BLD: 10 % (ref 2–12)
NEUTROPHILS NFR BLD: 59 % (ref 43–80)
NEUTS SEG NFR BLD: 2.48 K/UL (ref 1.8–7.3)
PLATELET # BLD AUTO: 104 K/UL (ref 130–450)
PMV BLD AUTO: 11 FL (ref 7–12)
POTASSIUM SERPL-SCNC: 4.6 MMOL/L (ref 3.5–5)
PROT SERPL-MCNC: 6 G/DL (ref 6.4–8.3)
RBC # BLD AUTO: 2.72 M/UL (ref 3.8–5.8)
RBC # BLD: ABNORMAL 10*6/UL
RSV RNA NPH QL NAA+NON-PROBE: NOT DETECTED
RV+EV RNA NPH QL NAA+NON-PROBE: NOT DETECTED
SARS-COV-2 RNA NPH QL NAA+NON-PROBE: NOT DETECTED
SODIUM SERPL-SCNC: 138 MMOL/L (ref 132–146)
SPECIMEN DESCRIPTION: NORMAL
WBC OTHER # BLD: 4.2 K/UL (ref 4.5–11.5)

## 2024-10-30 PROCEDURE — 94640 AIRWAY INHALATION TREATMENT: CPT

## 2024-10-30 PROCEDURE — 86140 C-REACTIVE PROTEIN: CPT

## 2024-10-30 PROCEDURE — 6360000002 HC RX W HCPCS: Performed by: NURSE PRACTITIONER

## 2024-10-30 PROCEDURE — 85652 RBC SED RATE AUTOMATED: CPT

## 2024-10-30 PROCEDURE — 97165 OT EVAL LOW COMPLEX 30 MIN: CPT

## 2024-10-30 PROCEDURE — 2580000003 HC RX 258: Performed by: NURSE PRACTITIONER

## 2024-10-30 PROCEDURE — 2060000000 HC ICU INTERMEDIATE R&B

## 2024-10-30 PROCEDURE — 0202U NFCT DS 22 TRGT SARS-COV-2: CPT

## 2024-10-30 PROCEDURE — 85025 COMPLETE CBC W/AUTO DIFF WBC: CPT

## 2024-10-30 PROCEDURE — 6370000000 HC RX 637 (ALT 250 FOR IP): Performed by: NURSE PRACTITIONER

## 2024-10-30 PROCEDURE — 97530 THERAPEUTIC ACTIVITIES: CPT

## 2024-10-30 PROCEDURE — 90935 HEMODIALYSIS ONE EVALUATION: CPT

## 2024-10-30 PROCEDURE — 86063 ANTISTREPTOLYSIN O SCREEN: CPT

## 2024-10-30 PROCEDURE — 6370000000 HC RX 637 (ALT 250 FOR IP)

## 2024-10-30 PROCEDURE — 80053 COMPREHEN METABOLIC PANEL: CPT

## 2024-10-30 PROCEDURE — 87040 BLOOD CULTURE FOR BACTERIA: CPT

## 2024-10-30 PROCEDURE — 36415 COLL VENOUS BLD VENIPUNCTURE: CPT

## 2024-10-30 RX ADMIN — IPRATROPIUM BROMIDE 0.5 MG: 0.5 SOLUTION RESPIRATORY (INHALATION) at 20:02

## 2024-10-30 RX ADMIN — BUDESONIDE 250 MCG: 0.25 SUSPENSION RESPIRATORY (INHALATION) at 20:01

## 2024-10-30 RX ADMIN — SEVELAMER CARBONATE 800 MG: 800 TABLET, FILM COATED ORAL at 07:28

## 2024-10-30 RX ADMIN — IPRATROPIUM BROMIDE 0.5 MG: 0.5 SOLUTION RESPIRATORY (INHALATION) at 07:53

## 2024-10-30 RX ADMIN — DOXYCYCLINE HYCLATE 100 MG: 100 CAPSULE ORAL at 07:28

## 2024-10-30 RX ADMIN — ARFORMOTEROL TARTRATE 15 MCG: 15 SOLUTION RESPIRATORY (INHALATION) at 07:53

## 2024-10-30 RX ADMIN — HEPARIN SODIUM 5000 UNITS: 5000 INJECTION INTRAVENOUS; SUBCUTANEOUS at 05:44

## 2024-10-30 RX ADMIN — HEPARIN SODIUM 5000 UNITS: 5000 INJECTION INTRAVENOUS; SUBCUTANEOUS at 12:41

## 2024-10-30 RX ADMIN — SEVELAMER CARBONATE 800 MG: 800 TABLET, FILM COATED ORAL at 15:45

## 2024-10-30 RX ADMIN — CARVEDILOL 12.5 MG: 6.25 TABLET, FILM COATED ORAL at 15:45

## 2024-10-30 RX ADMIN — SEVELAMER CARBONATE 800 MG: 800 TABLET, FILM COATED ORAL at 12:41

## 2024-10-30 RX ADMIN — SODIUM CHLORIDE, PRESERVATIVE FREE 10 ML: 5 INJECTION INTRAVENOUS at 07:29

## 2024-10-30 RX ADMIN — CARVEDILOL 12.5 MG: 6.25 TABLET, FILM COATED ORAL at 07:28

## 2024-10-30 RX ADMIN — WATER 1000 MG: 1 INJECTION INTRAMUSCULAR; INTRAVENOUS; SUBCUTANEOUS at 21:46

## 2024-10-30 RX ADMIN — ARFORMOTEROL TARTRATE 15 MCG: 15 SOLUTION RESPIRATORY (INHALATION) at 20:02

## 2024-10-30 RX ADMIN — BUDESONIDE 250 MCG: 0.25 SUSPENSION RESPIRATORY (INHALATION) at 07:53

## 2024-10-30 RX ADMIN — HEPARIN SODIUM 5000 UNITS: 5000 INJECTION INTRAVENOUS; SUBCUTANEOUS at 21:46

## 2024-10-30 RX ADMIN — PANTOPRAZOLE SODIUM 40 MG: 40 TABLET, DELAYED RELEASE ORAL at 07:28

## 2024-10-30 RX ADMIN — SODIUM CHLORIDE, PRESERVATIVE FREE 10 ML: 5 INJECTION INTRAVENOUS at 21:47

## 2024-10-30 RX ADMIN — ASPIRIN 81 MG: 81 TABLET, COATED ORAL at 07:28

## 2024-10-30 RX ADMIN — DOXYCYCLINE HYCLATE 100 MG: 100 CAPSULE ORAL at 21:46

## 2024-10-30 ASSESSMENT — PAIN SCALES - GENERAL: PAINLEVEL_OUTOF10: 0

## 2024-10-30 NOTE — CARE COORDINATION
CASE MANAGEMENT.... Met with patient and his wife at the bedside. Mr Vazquez is from home and plans to return at [place]. He is current with [facility] -confirmed with Mary from the agency and he is accepted back. Resume orders for skilled nursing, pt/ot obtained. PT 16/OT pending. Has history at [facility] and recent stay at [facility]. He is not interested in [facility] at this time. Mr Vazquez has walker, wc, walk in shower and raised toilet seat. There is one step to enter and they have a ramp ordered. He had HD this am. Goes to [facility] for outpt HD. Attempted to verify with [facility]. Called [phone], but unable to speak with a representative. Renal on board and ID consulted for antibiotic management. Currently on iv rocephin and po doxy. Following blood cx/resp panel/labs. 2 decho ordered to r/o endocarditis. Will follow along and assist with needs accordingly.

## 2024-10-30 NOTE — FLOWSHEET NOTE
10/30/24 1217   Vital Signs   BP (!) 136/56   Temp 98 °F (36.7 °C)   Pulse 71   Respirations 16   Weight - Scale 63.2 kg (139 lb 5.3 oz)   Weight Method Bed scale   Percent Weight Change -1.25   Pain Assessment   Pain Assessment None - Denies Pain   Post-Hemodialysis Assessment   Post-Treatment Procedures Blood returned;Access bleeding time < 10 minutes   Machine Disinfection Process Acid/Vinegar Clean;Heat Disinfect;Exterior Machine Disinfection   Blood Volume Processed (Liters) 63.6 L   Dialyzer Clearance Lightly streaked   Duration of Treatment (minutes) 195 minutes   Hemodialysis Intake (ml) 300 ml   Hemodialysis Output (ml) 1500 ml   NET Removed (ml) 1200   Tolerated Treatment Good   Bilateral Breath Sounds Clear   Edema Right upper extremity;Left upper extremity;Right lower extremity;Left lower extremity   RUE Edema None   LUE Edema None   RLE Edema Trace   LLE Edema Trace   Time Off 1202   Patient Disposition Return to room   Observations & Evaluations   Level of Consciousness 0   Heart Rhythm Irregular   Respiratory Quality/Effort Unlabored   O2 Device None (Room air)   Skin Color Pink   Skin Condition/Temp Dry;Warm   Abdomen Inspection Soft   Bowel Sounds (All Quadrants) Active

## 2024-10-30 NOTE — ACP (ADVANCE CARE PLANNING)
Advance Care Planning   Healthcare Decision Maker:    Primary Decision Maker: Laura Vazquez - [facility] - [phone]

## 2024-10-31 ENCOUNTER — APPOINTMENT (OUTPATIENT)
Age: 79
DRG: 193 | End: 2024-10-31
Attending: SPECIALIST
Payer: MEDICARE

## 2024-10-31 LAB
ALBUMIN SERPL-MCNC: 3.3 G/DL (ref 3.5–5.2)
ALP SERPL-CCNC: 94 U/L (ref 40–129)
ALT SERPL-CCNC: <5 U/L (ref 0–40)
ANION GAP SERPL CALCULATED.3IONS-SCNC: 11 MMOL/L (ref 7–16)
ASO AB SERPL-ACNC: 20 IU/ML (ref 0–200)
AST SERPL-CCNC: 13 U/L (ref 0–39)
BASOPHILS # BLD: 0.06 K/UL (ref 0–0.2)
BASOPHILS NFR BLD: 1 % (ref 0–2)
BILIRUB SERPL-MCNC: 0.4 MG/DL (ref 0–1.2)
BNP SERPL-MCNC: ABNORMAL PG/ML (ref 0–450)
BUN SERPL-MCNC: 19 MG/DL (ref 6–23)
CALCIUM SERPL-MCNC: 9.9 MG/DL (ref 8.6–10.2)
CHLORIDE SERPL-SCNC: 100 MMOL/L (ref 98–107)
CO2 SERPL-SCNC: 26 MMOL/L (ref 22–29)
CREAT SERPL-MCNC: 4.4 MG/DL (ref 0.7–1.2)
ECHO BSA: 1.74 M2
ECHO LV EDV A4C: 72 ML
ECHO LV EDV INDEX A4C: 42 ML/M2
ECHO LV EF PHYSICIAN: 0 %
ECHO LV EJECTION FRACTION A4C: 52 %
ECHO LV ESV A4C: 34 ML
ECHO LV ESV INDEX A4C: 20 ML/M2
ECHO LV FRACTIONAL SHORTENING: 24 % (ref 28–44)
ECHO LV INTERNAL DIMENSION DIASTOLE INDEX: 3.18 CM/M2
ECHO LV INTERNAL DIMENSION DIASTOLIC: 5.5 CM (ref 4.2–5.9)
ECHO LV INTERNAL DIMENSION SYSTOLIC INDEX: 2.43 CM/M2
ECHO LV INTERNAL DIMENSION SYSTOLIC: 4.2 CM
ECHO LV IVSD: 0.9 CM (ref 0.6–1)
ECHO LV IVSS: 1 CM
ECHO LV MASS 2D: 199.3 G (ref 88–224)
ECHO LV MASS INDEX 2D: 115.2 G/M2 (ref 49–115)
ECHO LV POSTERIOR WALL DIASTOLIC: 1 CM (ref 0.6–1)
ECHO LV POSTERIOR WALL SYSTOLIC: 1.4 CM
ECHO LV RELATIVE WALL THICKNESS RATIO: 0.36
ECHO RV INTERNAL DIMENSION: 2.6 CM
ECHO RV TAPSE: 2 CM (ref 1.7–?)
EOSINOPHIL # BLD: 0.27 K/UL (ref 0.05–0.5)
EOSINOPHILS RELATIVE PERCENT: 6 % (ref 0–6)
ERYTHROCYTE [DISTWIDTH] IN BLOOD BY AUTOMATED COUNT: 18.5 % (ref 11.5–15)
GFR, ESTIMATED: 13 ML/MIN/1.73M2
GLUCOSE SERPL-MCNC: 83 MG/DL (ref 74–99)
HCT VFR BLD AUTO: 31 % (ref 37–54)
HGB BLD-MCNC: 9.2 G/DL (ref 12.5–16.5)
IMM GRANULOCYTES # BLD AUTO: <0.03 K/UL (ref 0–0.58)
IMM GRANULOCYTES NFR BLD: 0 % (ref 0–5)
LYMPHOCYTES NFR BLD: 0.8 K/UL (ref 1.5–4)
LYMPHOCYTES RELATIVE PERCENT: 18 % (ref 20–42)
MCH RBC QN AUTO: 32.2 PG (ref 26–35)
MCHC RBC AUTO-ENTMCNC: 29.7 G/DL (ref 32–34.5)
MCV RBC AUTO: 108.4 FL (ref 80–99.9)
MONOCYTES NFR BLD: 0.42 K/UL (ref 0.1–0.95)
MONOCYTES NFR BLD: 10 % (ref 2–12)
NEUTROPHILS NFR BLD: 64 % (ref 43–80)
NEUTS SEG NFR BLD: 2.78 K/UL (ref 1.8–7.3)
PLATELET # BLD AUTO: 99 K/UL (ref 130–450)
PLATELET CONFIRMATION: NORMAL
PMV BLD AUTO: 10.7 FL (ref 7–12)
POTASSIUM SERPL-SCNC: 4 MMOL/L (ref 3.5–5)
PROT SERPL-MCNC: 6.1 G/DL (ref 6.4–8.3)
RBC # BLD AUTO: 2.86 M/UL (ref 3.8–5.8)
SODIUM SERPL-SCNC: 137 MMOL/L (ref 132–146)
WBC OTHER # BLD: 4.3 K/UL (ref 4.5–11.5)

## 2024-10-31 PROCEDURE — 85025 COMPLETE CBC W/AUTO DIFF WBC: CPT

## 2024-10-31 PROCEDURE — 6360000002 HC RX W HCPCS: Performed by: NURSE PRACTITIONER

## 2024-10-31 PROCEDURE — 80053 COMPREHEN METABOLIC PANEL: CPT

## 2024-10-31 PROCEDURE — 2060000000 HC ICU INTERMEDIATE R&B

## 2024-10-31 PROCEDURE — 2580000003 HC RX 258: Performed by: NURSE PRACTITIONER

## 2024-10-31 PROCEDURE — 83880 ASSAY OF NATRIURETIC PEPTIDE: CPT

## 2024-10-31 PROCEDURE — 6370000000 HC RX 637 (ALT 250 FOR IP): Performed by: NURSE PRACTITIONER

## 2024-10-31 PROCEDURE — 93308 TTE F-UP OR LMTD: CPT

## 2024-10-31 PROCEDURE — 94640 AIRWAY INHALATION TREATMENT: CPT

## 2024-10-31 PROCEDURE — 6370000000 HC RX 637 (ALT 250 FOR IP)

## 2024-10-31 RX ADMIN — SODIUM CHLORIDE, PRESERVATIVE FREE 10 ML: 5 INJECTION INTRAVENOUS at 07:35

## 2024-10-31 RX ADMIN — CARVEDILOL 12.5 MG: 6.25 TABLET, FILM COATED ORAL at 15:53

## 2024-10-31 RX ADMIN — HEPARIN SODIUM 5000 UNITS: 5000 INJECTION INTRAVENOUS; SUBCUTANEOUS at 20:04

## 2024-10-31 RX ADMIN — ARFORMOTEROL TARTRATE 15 MCG: 15 SOLUTION RESPIRATORY (INHALATION) at 19:37

## 2024-10-31 RX ADMIN — CARVEDILOL 12.5 MG: 6.25 TABLET, FILM COATED ORAL at 06:34

## 2024-10-31 RX ADMIN — ASPIRIN 81 MG: 81 TABLET, COATED ORAL at 07:34

## 2024-10-31 RX ADMIN — IPRATROPIUM BROMIDE 0.5 MG: 0.5 SOLUTION RESPIRATORY (INHALATION) at 19:37

## 2024-10-31 RX ADMIN — BUDESONIDE 250 MCG: 0.25 SUSPENSION RESPIRATORY (INHALATION) at 19:37

## 2024-10-31 RX ADMIN — DOXYCYCLINE HYCLATE 100 MG: 100 CAPSULE ORAL at 20:04

## 2024-10-31 RX ADMIN — SEVELAMER CARBONATE 800 MG: 800 TABLET, FILM COATED ORAL at 06:34

## 2024-10-31 RX ADMIN — HEPARIN SODIUM 5000 UNITS: 5000 INJECTION INTRAVENOUS; SUBCUTANEOUS at 13:24

## 2024-10-31 RX ADMIN — WATER 1000 MG: 1 INJECTION INTRAMUSCULAR; INTRAVENOUS; SUBCUTANEOUS at 20:04

## 2024-10-31 RX ADMIN — BUDESONIDE 250 MCG: 0.25 SUSPENSION RESPIRATORY (INHALATION) at 07:45

## 2024-10-31 RX ADMIN — HEPARIN SODIUM 5000 UNITS: 5000 INJECTION INTRAVENOUS; SUBCUTANEOUS at 06:34

## 2024-10-31 RX ADMIN — ARFORMOTEROL TARTRATE 15 MCG: 15 SOLUTION RESPIRATORY (INHALATION) at 07:44

## 2024-10-31 RX ADMIN — DOXYCYCLINE HYCLATE 100 MG: 100 CAPSULE ORAL at 07:34

## 2024-10-31 RX ADMIN — SEVELAMER CARBONATE 800 MG: 800 TABLET, FILM COATED ORAL at 15:53

## 2024-10-31 RX ADMIN — IPRATROPIUM BROMIDE 0.5 MG: 0.5 SOLUTION RESPIRATORY (INHALATION) at 07:45

## 2024-10-31 RX ADMIN — ROSUVASTATIN CALCIUM 10 MG: 10 TABLET, FILM COATED ORAL at 07:34

## 2024-10-31 RX ADMIN — PANTOPRAZOLE SODIUM 40 MG: 40 TABLET, DELAYED RELEASE ORAL at 07:35

## 2024-10-31 RX ADMIN — SODIUM CHLORIDE, PRESERVATIVE FREE 10 ML: 5 INJECTION INTRAVENOUS at 20:04

## 2024-10-31 RX ADMIN — IPRATROPIUM BROMIDE 0.5 MG: 0.5 SOLUTION RESPIRATORY (INHALATION) at 13:11

## 2024-10-31 ASSESSMENT — PAIN SCALES - GENERAL
PAINLEVEL_OUTOF10: 0

## 2024-11-01 ENCOUNTER — HOSPITAL ENCOUNTER (INPATIENT)
Age: 79
Discharge: HOME | DRG: 193 | End: 2024-11-03
Payer: MEDICARE

## 2024-11-01 ENCOUNTER — ANESTHESIA (OUTPATIENT)
Age: 79
End: 2024-11-01
Payer: MEDICARE

## 2024-11-01 ENCOUNTER — ANESTHESIA EVENT (OUTPATIENT)
Age: 79
End: 2024-11-01
Payer: MEDICARE

## 2024-11-01 VITALS
SYSTOLIC BLOOD PRESSURE: 153 MMHG | HEART RATE: 66 BPM | OXYGEN SATURATION: 96 % | RESPIRATION RATE: 22 BRPM | DIASTOLIC BLOOD PRESSURE: 61 MMHG

## 2024-11-01 LAB
ACB COMPLEX DNA BLD POS QL NAA+NON-PROBE: NOT DETECTED
ALBUMIN SERPL-MCNC: 3.4 G/DL (ref 3.5–5.2)
ALP SERPL-CCNC: 93 U/L (ref 40–129)
ALT SERPL-CCNC: <5 U/L (ref 0–40)
ANION GAP SERPL CALCULATED.3IONS-SCNC: 12 MMOL/L (ref 7–16)
AST SERPL-CCNC: 11 U/L (ref 0–39)
B FRAGILIS DNA BLD POS QL NAA+NON-PROBE: NOT DETECTED
BASOPHILS # BLD: 0.07 K/UL (ref 0–0.2)
BASOPHILS NFR BLD: 1 % (ref 0–2)
BILIRUB SERPL-MCNC: 0.5 MG/DL (ref 0–1.2)
BIOFIRE TEST COMMENT: ABNORMAL
BUN SERPL-MCNC: 27 MG/DL (ref 6–23)
C ALBICANS DNA BLD POS QL NAA+NON-PROBE: NOT DETECTED
C AURIS DNA BLD POS QL NAA+NON-PROBE: NOT DETECTED
C GATTII+NEOFOR DNA BLD POS QL NAA+N-PRB: NOT DETECTED
C GLABRATA DNA BLD POS QL NAA+NON-PROBE: NOT DETECTED
C KRUSEI DNA BLD POS QL NAA+NON-PROBE: NOT DETECTED
C PARAP DNA BLD POS QL NAA+NON-PROBE: NOT DETECTED
C TROPICLS DNA BLD POS QL NAA+NON-PROBE: NOT DETECTED
CALCIUM SERPL-MCNC: 9.8 MG/DL (ref 8.6–10.2)
CHLORIDE SERPL-SCNC: 100 MMOL/L (ref 98–107)
CO2 SERPL-SCNC: 27 MMOL/L (ref 22–29)
CREAT SERPL-MCNC: 6.6 MG/DL (ref 0.7–1.2)
E CLOAC COMP DNA BLD POS NAA+NON-PROBE: NOT DETECTED
E COLI DNA BLD POS QL NAA+NON-PROBE: NOT DETECTED
E FAECALIS DNA BLD POS QL NAA+NON-PROBE: NOT DETECTED
E FAECIUM DNA BLD POS QL NAA+NON-PROBE: NOT DETECTED
ECHO AO ASC DIAM: 3.2 CM
ECHO AO ASCENDING AORTA INDEX: 1.85 CM/M2
ECHO AO SINUS VALSALVA DIAM: 3.7 CM
ECHO AO SINUS VALSALVA INDEX: 2.14 CM/M2
ECHO AR MAX VEL PISA: 3.8 M/S
ECHO AV AREA PEAK VELOCITY: 0.6 CM2
ECHO AV AREA VTI: 0.5 CM2
ECHO AV AREA/BSA PEAK VELOCITY: 0.3 CM2/M2
ECHO AV AREA/BSA VTI: 0.3 CM2/M2
ECHO AV MEAN GRADIENT: 28 MMHG
ECHO AV MEAN VELOCITY: 2.5 M/S
ECHO AV PEAK GRADIENT: 47 MMHG
ECHO AV PEAK VELOCITY: 3.4 M/S
ECHO AV REGURGITANT PHT: 410.7 MILLISECOND
ECHO AV VELOCITY RATIO: 0.21
ECHO AV VTI: 85.7 CM
ECHO BSA: 1.74 M2
ECHO EST RA PRESSURE: 8 MMHG
ECHO LVOT AREA: 2.8 CM2
ECHO LVOT AV VTI INDEX: 0.19
ECHO LVOT DIAM: 1.9 CM
ECHO LVOT MEAN GRADIENT: 1 MMHG
ECHO LVOT PEAK GRADIENT: 2 MMHG
ECHO LVOT PEAK VELOCITY: 0.7 M/S
ECHO LVOT STROKE VOLUME INDEX: 26.2 ML/M2
ECHO LVOT SV: 45.3 ML
ECHO LVOT VTI: 16 CM
ECHO RIGHT VENTRICULAR SYSTOLIC PRESSURE (RVSP): 44 MMHG
ECHO TV REGURGITANT MAX VELOCITY: 2.99 M/S
ENTEROBACTERALES DNA BLD POS NAA+N-PRB: NOT DETECTED
EOSINOPHIL # BLD: 0.18 K/UL (ref 0.05–0.5)
EOSINOPHILS RELATIVE PERCENT: 4 % (ref 0–6)
ERYTHROCYTE [DISTWIDTH] IN BLOOD BY AUTOMATED COUNT: 18.3 % (ref 11.5–15)
GFR, ESTIMATED: 8 ML/MIN/1.73M2
GLUCOSE SERPL-MCNC: 88 MG/DL (ref 74–99)
GP B STREP DNA BLD POS QL NAA+NON-PROBE: NOT DETECTED
HAEM INFLU DNA BLD POS QL NAA+NON-PROBE: NOT DETECTED
HCT VFR BLD AUTO: 30 % (ref 37–54)
HGB BLD-MCNC: 9.1 G/DL (ref 12.5–16.5)
IMM GRANULOCYTES # BLD AUTO: <0.03 K/UL (ref 0–0.58)
IMM GRANULOCYTES NFR BLD: 0 % (ref 0–5)
K OXYTOCA DNA BLD POS QL NAA+NON-PROBE: NOT DETECTED
KLEBSIELLA SP DNA BLD POS QL NAA+NON-PRB: NOT DETECTED
KLEBSIELLA SP DNA BLD POS QL NAA+NON-PRB: NOT DETECTED
L MONOCYTOG DNA BLD POS QL NAA+NON-PROBE: NOT DETECTED
LYMPHOCYTES NFR BLD: 0.86 K/UL (ref 1.5–4)
LYMPHOCYTES RELATIVE PERCENT: 17 % (ref 20–42)
MCH RBC QN AUTO: 32.6 PG (ref 26–35)
MCHC RBC AUTO-ENTMCNC: 30.3 G/DL (ref 32–34.5)
MCV RBC AUTO: 107.5 FL (ref 80–99.9)
MECA+MECC ISLT/SPM QL: DETECTED
MICROORGANISM SPEC CULT: ABNORMAL
MICROORGANISM/AGENT SPEC: ABNORMAL
MICROORGANISM/AGENT SPEC: ABNORMAL
MONOCYTES NFR BLD: 0.46 K/UL (ref 0.1–0.95)
MONOCYTES NFR BLD: 9 % (ref 2–12)
N MEN DNA BLD POS QL NAA+NON-PROBE: NOT DETECTED
NEUTROPHILS NFR BLD: 69 % (ref 43–80)
NEUTS SEG NFR BLD: 3.46 K/UL (ref 1.8–7.3)
P AERUGINOSA DNA BLD POS NAA+NON-PROBE: NOT DETECTED
PLATELET, FLUORESCENCE: 102 K/UL (ref 130–450)
PMV BLD AUTO: 10.9 FL (ref 7–12)
POTASSIUM SERPL-SCNC: 4.3 MMOL/L (ref 3.5–5)
PROT SERPL-MCNC: 6.1 G/DL (ref 6.4–8.3)
PROTEUS SP DNA BLD POS QL NAA+NON-PROBE: NOT DETECTED
RBC # BLD AUTO: 2.79 M/UL (ref 3.8–5.8)
S AUREUS DNA BLD POS QL NAA+NON-PROBE: NOT DETECTED
S AUREUS+CONS DNA BLD POS NAA+NON-PROBE: DETECTED
S EPIDERMIDIS DNA BLD POS QL NAA+NON-PRB: DETECTED
S LUGDUNENSIS DNA BLD POS QL NAA+NON-PRB: NOT DETECTED
S MALTOPHILIA DNA BLD POS QL NAA+NON-PRB: NOT DETECTED
S MARCESCENS DNA BLD POS NAA+NON-PROBE: NOT DETECTED
S PNEUM DNA BLD POS QL NAA+NON-PROBE: NOT DETECTED
S PYO DNA BLD POS QL NAA+NON-PROBE: NOT DETECTED
SALMONELLA DNA BLD POS QL NAA+NON-PROBE: NOT DETECTED
SERVICE CMNT-IMP: ABNORMAL
SERVICE CMNT-IMP: ABNORMAL
SODIUM SERPL-SCNC: 139 MMOL/L (ref 132–146)
SPECIMEN DESCRIPTION: ABNORMAL
SPECIMEN DESCRIPTION: ABNORMAL
STREPTOCOCCUS DNA BLD POS NAA+NON-PROBE: DETECTED
WBC OTHER # BLD: 5 K/UL (ref 4.5–11.5)

## 2024-11-01 PROCEDURE — 2580000003 HC RX 258: Performed by: NURSE PRACTITIONER

## 2024-11-01 PROCEDURE — 3700000000 HC ANESTHESIA ATTENDED CARE

## 2024-11-01 PROCEDURE — 80053 COMPREHEN METABOLIC PANEL: CPT

## 2024-11-01 PROCEDURE — 6370000000 HC RX 637 (ALT 250 FOR IP)

## 2024-11-01 PROCEDURE — 7100000011 HC PHASE II RECOVERY - ADDTL 15 MIN

## 2024-11-01 PROCEDURE — 2060000000 HC ICU INTERMEDIATE R&B

## 2024-11-01 PROCEDURE — 6360000002 HC RX W HCPCS: Performed by: NURSE PRACTITIONER

## 2024-11-01 PROCEDURE — 6370000000 HC RX 637 (ALT 250 FOR IP): Performed by: NURSE PRACTITIONER

## 2024-11-01 PROCEDURE — 93312 ECHO TRANSESOPHAGEAL: CPT

## 2024-11-01 PROCEDURE — 6360000002 HC RX W HCPCS

## 2024-11-01 PROCEDURE — 2580000003 HC RX 258

## 2024-11-01 PROCEDURE — 7100000010 HC PHASE II RECOVERY - FIRST 15 MIN

## 2024-11-01 PROCEDURE — 94640 AIRWAY INHALATION TREATMENT: CPT

## 2024-11-01 PROCEDURE — 3700000001 HC ADD 15 MINUTES (ANESTHESIA)

## 2024-11-01 PROCEDURE — 85025 COMPLETE CBC W/AUTO DIFF WBC: CPT

## 2024-11-01 PROCEDURE — 2500000003 HC RX 250 WO HCPCS

## 2024-11-01 RX ORDER — LIDOCAINE HYDROCHLORIDE 20 MG/ML
INJECTION, SOLUTION EPIDURAL; INFILTRATION; INTRACAUDAL; PERINEURAL
Status: DISCONTINUED | OUTPATIENT
Start: 2024-11-01 | End: 2024-11-01 | Stop reason: SDUPTHER

## 2024-11-01 RX ORDER — SODIUM CHLORIDE 9 MG/ML
INJECTION, SOLUTION INTRAVENOUS
Status: DISCONTINUED | OUTPATIENT
Start: 2024-11-01 | End: 2024-11-01 | Stop reason: SDUPTHER

## 2024-11-01 RX ORDER — PROPOFOL 10 MG/ML
INJECTION, EMULSION INTRAVENOUS
Status: DISCONTINUED | OUTPATIENT
Start: 2024-11-01 | End: 2024-11-01 | Stop reason: SDUPTHER

## 2024-11-01 RX ADMIN — IPRATROPIUM BROMIDE 0.5 MG: 0.5 SOLUTION RESPIRATORY (INHALATION) at 12:12

## 2024-11-01 RX ADMIN — LIDOCAINE HYDROCHLORIDE 60 MG: 20 INJECTION, SOLUTION EPIDURAL; INFILTRATION; INTRACAUDAL; PERINEURAL at 14:08

## 2024-11-01 RX ADMIN — SODIUM CHLORIDE, PRESERVATIVE FREE 10 ML: 5 INJECTION INTRAVENOUS at 19:59

## 2024-11-01 RX ADMIN — ARFORMOTEROL TARTRATE 15 MCG: 15 SOLUTION RESPIRATORY (INHALATION) at 07:50

## 2024-11-01 RX ADMIN — SODIUM CHLORIDE, PRESERVATIVE FREE 10 ML: 5 INJECTION INTRAVENOUS at 07:39

## 2024-11-01 RX ADMIN — SODIUM CHLORIDE: 9 INJECTION, SOLUTION INTRAVENOUS at 13:54

## 2024-11-01 RX ADMIN — WATER 1000 MG: 1 INJECTION INTRAMUSCULAR; INTRAVENOUS; SUBCUTANEOUS at 19:59

## 2024-11-01 RX ADMIN — IPRATROPIUM BROMIDE 0.5 MG: 0.5 SOLUTION RESPIRATORY (INHALATION) at 07:50

## 2024-11-01 RX ADMIN — PROPOFOL 50 MG: 10 INJECTION, EMULSION INTRAVENOUS at 14:09

## 2024-11-01 RX ADMIN — CARVEDILOL 12.5 MG: 6.25 TABLET, FILM COATED ORAL at 15:13

## 2024-11-01 RX ADMIN — PROPOFOL 50 MG: 10 INJECTION, EMULSION INTRAVENOUS at 14:08

## 2024-11-01 RX ADMIN — BUDESONIDE 250 MCG: 0.25 SUSPENSION RESPIRATORY (INHALATION) at 07:50

## 2024-11-01 RX ADMIN — HEPARIN SODIUM 5000 UNITS: 5000 INJECTION INTRAVENOUS; SUBCUTANEOUS at 20:04

## 2024-11-01 RX ADMIN — SEVELAMER CARBONATE 800 MG: 800 TABLET, FILM COATED ORAL at 15:13

## 2024-11-01 RX ADMIN — HEPARIN SODIUM 5000 UNITS: 5000 INJECTION INTRAVENOUS; SUBCUTANEOUS at 06:23

## 2024-11-01 RX ADMIN — DOXYCYCLINE HYCLATE 100 MG: 100 CAPSULE ORAL at 19:59

## 2024-11-01 ASSESSMENT — PAIN SCALES - GENERAL
PAINLEVEL_OUTOF10: 0

## 2024-11-01 NOTE — ANESTHESIA POSTPROCEDURE EVALUATION
Department of Anesthesiology  Postprocedure Note    Patient: James Vazquez III  MRN: [id number]  YOB: 1945  Date of evaluation: 11/1/2024    Procedure Summary       Date: 11/01/24 Room / Location: [facility]    Anesthesia Start: 1354 Anesthesia Stop: 1426    Procedure: JENNY (PRN CONTRAST/BUBBLE/3D) Diagnosis: Bacteremia    Scheduled Providers:  Responsible Provider: Volodymyr Whitley MD    Anesthesia Type: MAC ASA Status: 4            Anesthesia Type: MAC    Anisa Phase I: Anisa Score: 10    Anisa Phase II:      Anesthesia Post Evaluation    Patient location during evaluation: PACU  Patient participation: complete - patient participated  Level of consciousness: awake  Pain score: 0  Airway patency: patent  Nausea & Vomiting: no nausea and no vomiting  Cardiovascular status: blood pressure returned to baseline and hemodynamically stable  Respiratory status: acceptable  Hydration status: euvolemic  Pain management: adequate and satisfactory to patient        No notable events documented.

## 2024-11-01 NOTE — DIALYSIS
Pt refusing tx post procedure per floor RN. Spoke w/ Dr. Rivas ok to hold tx today and attempt tx tomorrow.

## 2024-11-01 NOTE — ANESTHESIA PRE PROCEDURE
107.5 11/01/2024 06:30 AM    RDW 18.3 11/01/2024 06:30 AM    PLT 99 10/31/2024 05:10 AM       CMP:   Lab Results   Component Value Date/Time     11/01/2024 06:30 AM    K 4.3 11/01/2024 06:30 AM    K 3.4 10/14/2022 07:13 PM     11/01/2024 06:30 AM    CO2 27 11/01/2024 06:30 AM    BUN 27 11/01/2024 06:30 AM    CREATININE 6.6 11/01/2024 06:30 AM    GFRAA 14 10/16/2022 02:38 AM    LABGLOM 8 11/01/2024 06:30 AM    LABGLOM 12 01/16/2024 05:18 AM    GLUCOSE 88 11/01/2024 06:30 AM    CALCIUM 9.8 11/01/2024 06:30 AM    BILITOT 0.5 11/01/2024 06:30 AM    ALKPHOS 93 11/01/2024 06:30 AM    AST 11 11/01/2024 06:30 AM    ALT <5 11/01/2024 06:30 AM       POC Tests: No results for input(s): \"POCGLU\", \"POCNA\", \"POCK\", \"POCCL\", \"POCBUN\", \"POCHEMO\", \"POCHCT\" in the last 72 hours.    Coags:   Lab Results   Component Value Date/Time    PROTIME 11.8 01/15/2024 11:30 AM    INR 1.1 01/15/2024 11:30 AM    APTT 35.9 11/03/2020 11:02 PM       HCG (If Applicable): No results found for: \"PREGTESTUR\", \"PREGSERUM\", \"HCG\", \"HCGQUANT\"     ABGs: No results found for: \"PHART\", \"PO2ART\", \"FNM2UVI\", \"ZED2SPW\", \"BEART\", \"B3XKWNYM\"     Type & Screen (If Applicable):  No results found for: \"LABABO\"    Drug/Infectious Status (If Applicable):  No results found for: \"HIV\", \"HEPCAB\"    COVID-19 Screening (If Applicable):   Lab Results   Component Value Date/Time    COVID19 Not Detected 10/30/2024 03:48 PM         Anesthesia Evaluation  Patient summary reviewed   no history of anesthetic complications:   Airway: Mallampati: II  TM distance: >3 FB   Neck ROM: full  Mouth opening: > = 3 FB   Dental:    (+) upper dentures and partials      Pulmonary: breath sounds clear to auscultation  (+)  COPD:                                    ROS comment: Former smoker   Cardiovascular:  Exercise tolerance: good (>4 METS)  (+) hypertension: moderate, valvular problems/murmurs: AS, hyperlipidemia      ECG reviewed  Rhythm: regular  Rate: normal

## 2024-11-02 LAB
ALBUMIN SERPL-MCNC: 3.7 G/DL (ref 3.5–5.2)
ALP SERPL-CCNC: 111 U/L (ref 40–129)
ALT SERPL-CCNC: <5 U/L (ref 0–40)
ANION GAP SERPL CALCULATED.3IONS-SCNC: 16 MMOL/L (ref 7–16)
AST SERPL-CCNC: 14 U/L (ref 0–39)
BASOPHILS # BLD: 0.05 K/UL (ref 0–0.2)
BASOPHILS NFR BLD: 1 % (ref 0–2)
BILIRUB SERPL-MCNC: 0.8 MG/DL (ref 0–1.2)
BUN SERPL-MCNC: 13 MG/DL (ref 6–23)
CALCIUM SERPL-MCNC: 9.6 MG/DL (ref 8.6–10.2)
CHLORIDE SERPL-SCNC: 94 MMOL/L (ref 98–107)
CO2 SERPL-SCNC: 27 MMOL/L (ref 22–29)
CREAT SERPL-MCNC: 3.7 MG/DL (ref 0.7–1.2)
EOSINOPHIL # BLD: 0.09 K/UL (ref 0.05–0.5)
EOSINOPHILS RELATIVE PERCENT: 2 % (ref 0–6)
ERYTHROCYTE [DISTWIDTH] IN BLOOD BY AUTOMATED COUNT: 18.6 % (ref 11.5–15)
GFR, ESTIMATED: 16 ML/MIN/1.73M2
GLUCOSE SERPL-MCNC: 88 MG/DL (ref 74–99)
HCT VFR BLD AUTO: 34 % (ref 37–54)
HGB BLD-MCNC: 10.2 G/DL (ref 12.5–16.5)
LYMPHOCYTES NFR BLD: 0.32 K/UL (ref 1.5–4)
LYMPHOCYTES RELATIVE PERCENT: 6 % (ref 20–42)
MCH RBC QN AUTO: 32.2 PG (ref 26–35)
MCHC RBC AUTO-ENTMCNC: 30 G/DL (ref 32–34.5)
MCV RBC AUTO: 107.3 FL (ref 80–99.9)
MONOCYTES NFR BLD: 0.18 K/UL (ref 0.1–0.95)
MONOCYTES NFR BLD: 4 % (ref 2–12)
NEUTROPHILS NFR BLD: 88 % (ref 43–80)
NEUTS SEG NFR BLD: 4.65 K/UL (ref 1.8–7.3)
PHOSPHATE SERPL-MCNC: 2.9 MG/DL (ref 2.5–4.5)
PLATELET, FLUORESCENCE: 102 K/UL (ref 130–450)
PMV BLD AUTO: 10.8 FL (ref 7–12)
POTASSIUM SERPL-SCNC: 3.7 MMOL/L (ref 3.5–5)
PROT SERPL-MCNC: 6.7 G/DL (ref 6.4–8.3)
RBC # BLD AUTO: 3.17 M/UL (ref 3.8–5.8)
RBC # BLD: ABNORMAL 10*6/UL
SODIUM SERPL-SCNC: 137 MMOL/L (ref 132–146)
WBC OTHER # BLD: 5.3 K/UL (ref 4.5–11.5)

## 2024-11-02 PROCEDURE — 90935 HEMODIALYSIS ONE EVALUATION: CPT

## 2024-11-02 PROCEDURE — 36415 COLL VENOUS BLD VENIPUNCTURE: CPT

## 2024-11-02 PROCEDURE — 84100 ASSAY OF PHOSPHORUS: CPT

## 2024-11-02 PROCEDURE — 6370000000 HC RX 637 (ALT 250 FOR IP): Performed by: NURSE PRACTITIONER

## 2024-11-02 PROCEDURE — 6360000002 HC RX W HCPCS: Performed by: NURSE PRACTITIONER

## 2024-11-02 PROCEDURE — 6370000000 HC RX 637 (ALT 250 FOR IP)

## 2024-11-02 PROCEDURE — 2580000003 HC RX 258: Performed by: NURSE PRACTITIONER

## 2024-11-02 PROCEDURE — 94640 AIRWAY INHALATION TREATMENT: CPT

## 2024-11-02 PROCEDURE — 80053 COMPREHEN METABOLIC PANEL: CPT

## 2024-11-02 PROCEDURE — 85025 COMPLETE CBC W/AUTO DIFF WBC: CPT

## 2024-11-02 PROCEDURE — 2060000000 HC ICU INTERMEDIATE R&B

## 2024-11-02 RX ADMIN — DOXYCYCLINE HYCLATE 100 MG: 100 CAPSULE ORAL at 20:35

## 2024-11-02 RX ADMIN — SODIUM CHLORIDE, PRESERVATIVE FREE 10 ML: 5 INJECTION INTRAVENOUS at 13:04

## 2024-11-02 RX ADMIN — BUDESONIDE 250 MCG: 0.25 SUSPENSION RESPIRATORY (INHALATION) at 07:52

## 2024-11-02 RX ADMIN — IPRATROPIUM BROMIDE 0.5 MG: 0.5 SOLUTION RESPIRATORY (INHALATION) at 20:44

## 2024-11-02 RX ADMIN — BUDESONIDE 250 MCG: 0.25 SUSPENSION RESPIRATORY (INHALATION) at 20:44

## 2024-11-02 RX ADMIN — SEVELAMER CARBONATE 800 MG: 800 TABLET, FILM COATED ORAL at 13:00

## 2024-11-02 RX ADMIN — WATER 1000 MG: 1 INJECTION INTRAMUSCULAR; INTRAVENOUS; SUBCUTANEOUS at 20:35

## 2024-11-02 RX ADMIN — DOXYCYCLINE HYCLATE 100 MG: 100 CAPSULE ORAL at 13:00

## 2024-11-02 RX ADMIN — CARVEDILOL 12.5 MG: 6.25 TABLET, FILM COATED ORAL at 18:17

## 2024-11-02 RX ADMIN — PANTOPRAZOLE SODIUM 40 MG: 40 TABLET, DELAYED RELEASE ORAL at 13:00

## 2024-11-02 RX ADMIN — ASPIRIN 81 MG: 81 TABLET, COATED ORAL at 13:00

## 2024-11-02 RX ADMIN — HEPARIN SODIUM 5000 UNITS: 5000 INJECTION INTRAVENOUS; SUBCUTANEOUS at 04:50

## 2024-11-02 RX ADMIN — IPRATROPIUM BROMIDE 0.5 MG: 0.5 SOLUTION RESPIRATORY (INHALATION) at 12:36

## 2024-11-02 RX ADMIN — HEPARIN SODIUM 5000 UNITS: 5000 INJECTION INTRAVENOUS; SUBCUTANEOUS at 13:01

## 2024-11-02 RX ADMIN — ARFORMOTEROL TARTRATE 15 MCG: 15 SOLUTION RESPIRATORY (INHALATION) at 07:52

## 2024-11-02 RX ADMIN — HEPARIN SODIUM 5000 UNITS: 5000 INJECTION INTRAVENOUS; SUBCUTANEOUS at 20:35

## 2024-11-02 RX ADMIN — ARFORMOTEROL TARTRATE 15 MCG: 15 SOLUTION RESPIRATORY (INHALATION) at 20:44

## 2024-11-02 RX ADMIN — IPRATROPIUM BROMIDE 0.5 MG: 0.5 SOLUTION RESPIRATORY (INHALATION) at 07:52

## 2024-11-02 RX ADMIN — SODIUM CHLORIDE, PRESERVATIVE FREE 10 ML: 5 INJECTION INTRAVENOUS at 20:35

## 2024-11-02 RX ADMIN — ROSUVASTATIN CALCIUM 10 MG: 10 TABLET, FILM COATED ORAL at 13:00

## 2024-11-02 ASSESSMENT — PAIN SCALES - GENERAL
PAINLEVEL_OUTOF10: 0
PAINLEVEL_OUTOF10: 0

## 2024-11-03 LAB
ALBUMIN SERPL-MCNC: 3.5 G/DL (ref 3.5–5.2)
ALP SERPL-CCNC: 102 U/L (ref 40–129)
ALT SERPL-CCNC: <5 U/L (ref 0–40)
ANION GAP SERPL CALCULATED.3IONS-SCNC: 16 MMOL/L (ref 7–16)
AST SERPL-CCNC: 10 U/L (ref 0–39)
BASOPHILS # BLD: 0.06 K/UL (ref 0–0.2)
BASOPHILS NFR BLD: 1 % (ref 0–2)
BILIRUB SERPL-MCNC: 0.6 MG/DL (ref 0–1.2)
BUN SERPL-MCNC: 21 MG/DL (ref 6–23)
CALCIUM SERPL-MCNC: 10 MG/DL (ref 8.6–10.2)
CHLORIDE SERPL-SCNC: 95 MMOL/L (ref 98–107)
CO2 SERPL-SCNC: 26 MMOL/L (ref 22–29)
CREAT SERPL-MCNC: 5.1 MG/DL (ref 0.7–1.2)
EOSINOPHIL # BLD: 0.11 K/UL (ref 0.05–0.5)
EOSINOPHILS RELATIVE PERCENT: 2 % (ref 0–6)
ERYTHROCYTE [DISTWIDTH] IN BLOOD BY AUTOMATED COUNT: 18.6 % (ref 11.5–15)
GFR, ESTIMATED: 11 ML/MIN/1.73M2
GLUCOSE SERPL-MCNC: 61 MG/DL (ref 74–99)
HCT VFR BLD AUTO: 35 % (ref 37–54)
HGB BLD-MCNC: 10.9 G/DL (ref 12.5–16.5)
IMM GRANULOCYTES # BLD AUTO: <0.03 K/UL (ref 0–0.58)
IMM GRANULOCYTES NFR BLD: 0 % (ref 0–5)
LYMPHOCYTES NFR BLD: 0.87 K/UL (ref 1.5–4)
LYMPHOCYTES RELATIVE PERCENT: 17 % (ref 20–42)
MCH RBC QN AUTO: 33.2 PG (ref 26–35)
MCHC RBC AUTO-ENTMCNC: 31.1 G/DL (ref 32–34.5)
MCV RBC AUTO: 106.7 FL (ref 80–99.9)
MONOCYTES NFR BLD: 0.55 K/UL (ref 0.1–0.95)
MONOCYTES NFR BLD: 11 % (ref 2–12)
NEUTROPHILS NFR BLD: 69 % (ref 43–80)
NEUTS SEG NFR BLD: 3.6 K/UL (ref 1.8–7.3)
PLATELET # BLD AUTO: 93 K/UL (ref 130–450)
PLATELET CONFIRMATION: NORMAL
PMV BLD AUTO: 11 FL (ref 7–12)
POTASSIUM SERPL-SCNC: 4.3 MMOL/L (ref 3.5–5)
PROT SERPL-MCNC: 6.4 G/DL (ref 6.4–8.3)
RBC # BLD AUTO: 3.28 M/UL (ref 3.8–5.8)
SODIUM SERPL-SCNC: 137 MMOL/L (ref 132–146)
WBC OTHER # BLD: 5.2 K/UL (ref 4.5–11.5)

## 2024-11-03 PROCEDURE — 2580000003 HC RX 258: Performed by: NURSE PRACTITIONER

## 2024-11-03 PROCEDURE — 6370000000 HC RX 637 (ALT 250 FOR IP): Performed by: INTERNAL MEDICINE

## 2024-11-03 PROCEDURE — 36415 COLL VENOUS BLD VENIPUNCTURE: CPT

## 2024-11-03 PROCEDURE — 85025 COMPLETE CBC W/AUTO DIFF WBC: CPT

## 2024-11-03 PROCEDURE — 6370000000 HC RX 637 (ALT 250 FOR IP)

## 2024-11-03 PROCEDURE — 6360000002 HC RX W HCPCS: Performed by: NURSE PRACTITIONER

## 2024-11-03 PROCEDURE — 2060000000 HC ICU INTERMEDIATE R&B

## 2024-11-03 PROCEDURE — 97530 THERAPEUTIC ACTIVITIES: CPT

## 2024-11-03 PROCEDURE — 6370000000 HC RX 637 (ALT 250 FOR IP): Performed by: NURSE PRACTITIONER

## 2024-11-03 PROCEDURE — 94640 AIRWAY INHALATION TREATMENT: CPT

## 2024-11-03 PROCEDURE — 80053 COMPREHEN METABOLIC PANEL: CPT

## 2024-11-03 RX ORDER — PANTOPRAZOLE SODIUM 40 MG/1
40 TABLET, DELAYED RELEASE ORAL
Status: DISCONTINUED | OUTPATIENT
Start: 2024-11-03 | End: 2024-11-07 | Stop reason: HOSPADM

## 2024-11-03 RX ADMIN — WATER 1000 MG: 1 INJECTION INTRAMUSCULAR; INTRAVENOUS; SUBCUTANEOUS at 21:24

## 2024-11-03 RX ADMIN — SEVELAMER CARBONATE 800 MG: 800 TABLET, FILM COATED ORAL at 09:16

## 2024-11-03 RX ADMIN — HEPARIN SODIUM 5000 UNITS: 5000 INJECTION INTRAVENOUS; SUBCUTANEOUS at 21:24

## 2024-11-03 RX ADMIN — BUDESONIDE 250 MCG: 0.25 SUSPENSION RESPIRATORY (INHALATION) at 20:23

## 2024-11-03 RX ADMIN — CARVEDILOL 12.5 MG: 6.25 TABLET, FILM COATED ORAL at 09:15

## 2024-11-03 RX ADMIN — HEPARIN SODIUM 5000 UNITS: 5000 INJECTION INTRAVENOUS; SUBCUTANEOUS at 12:44

## 2024-11-03 RX ADMIN — SODIUM CHLORIDE, PRESERVATIVE FREE 10 ML: 5 INJECTION INTRAVENOUS at 09:16

## 2024-11-03 RX ADMIN — DOXYCYCLINE HYCLATE 100 MG: 100 CAPSULE ORAL at 09:15

## 2024-11-03 RX ADMIN — HEPARIN SODIUM 5000 UNITS: 5000 INJECTION INTRAVENOUS; SUBCUTANEOUS at 05:21

## 2024-11-03 RX ADMIN — SODIUM CHLORIDE, PRESERVATIVE FREE 10 ML: 5 INJECTION INTRAVENOUS at 23:01

## 2024-11-03 RX ADMIN — DOXYCYCLINE HYCLATE 100 MG: 100 CAPSULE ORAL at 21:24

## 2024-11-03 RX ADMIN — PANTOPRAZOLE SODIUM 40 MG: 40 TABLET, DELAYED RELEASE ORAL at 09:16

## 2024-11-03 RX ADMIN — IPRATROPIUM BROMIDE 0.5 MG: 0.5 SOLUTION RESPIRATORY (INHALATION) at 20:23

## 2024-11-03 RX ADMIN — ASPIRIN 81 MG: 81 TABLET, COATED ORAL at 09:15

## 2024-11-03 RX ADMIN — PANTOPRAZOLE SODIUM 40 MG: 40 TABLET, DELAYED RELEASE ORAL at 15:58

## 2024-11-03 RX ADMIN — ARFORMOTEROL TARTRATE 15 MCG: 15 SOLUTION RESPIRATORY (INHALATION) at 20:23

## 2024-11-03 RX ADMIN — CARVEDILOL 12.5 MG: 6.25 TABLET, FILM COATED ORAL at 15:57

## 2024-11-03 ASSESSMENT — PAIN SCALES - GENERAL: PAINLEVEL_OUTOF10: 0

## 2024-11-04 PROBLEM — R78.81 STREPTOCOCCAL BACTEREMIA: Status: ACTIVE | Noted: 2024-11-04

## 2024-11-04 PROBLEM — B95.5 STREPTOCOCCAL BACTEREMIA: Status: ACTIVE | Noted: 2024-11-04

## 2024-11-04 LAB
ALBUMIN SERPL-MCNC: 3.4 G/DL (ref 3.5–5.2)
ALP SERPL-CCNC: 103 U/L (ref 40–129)
ALT SERPL-CCNC: <5 U/L (ref 0–40)
ANION GAP SERPL CALCULATED.3IONS-SCNC: 17 MMOL/L (ref 7–16)
AST SERPL-CCNC: 11 U/L (ref 0–39)
BASOPHILS # BLD: 0.05 K/UL (ref 0–0.2)
BASOPHILS NFR BLD: 1 % (ref 0–2)
BILIRUB SERPL-MCNC: 0.5 MG/DL (ref 0–1.2)
BUN SERPL-MCNC: 37 MG/DL (ref 6–23)
CALCIUM SERPL-MCNC: 9.8 MG/DL (ref 8.6–10.2)
CHLORIDE SERPL-SCNC: 97 MMOL/L (ref 98–107)
CO2 SERPL-SCNC: 25 MMOL/L (ref 22–29)
CREAT SERPL-MCNC: 6.8 MG/DL (ref 0.7–1.2)
EOSINOPHIL # BLD: 0.11 K/UL (ref 0.05–0.5)
EOSINOPHILS RELATIVE PERCENT: 2 % (ref 0–6)
ERYTHROCYTE [DISTWIDTH] IN BLOOD BY AUTOMATED COUNT: 18.6 % (ref 11.5–15)
GFR, ESTIMATED: 8 ML/MIN/1.73M2
GLUCOSE SERPL-MCNC: 80 MG/DL (ref 74–99)
HCT VFR BLD AUTO: 33.9 % (ref 37–54)
HGB BLD-MCNC: 10.5 G/DL (ref 12.5–16.5)
IMM GRANULOCYTES # BLD AUTO: <0.03 K/UL (ref 0–0.58)
IMM GRANULOCYTES NFR BLD: 0 % (ref 0–5)
LYMPHOCYTES NFR BLD: 0.77 K/UL (ref 1.5–4)
LYMPHOCYTES RELATIVE PERCENT: 17 % (ref 20–42)
MCH RBC QN AUTO: 32.8 PG (ref 26–35)
MCHC RBC AUTO-ENTMCNC: 31 G/DL (ref 32–34.5)
MCV RBC AUTO: 105.9 FL (ref 80–99.9)
MICROORGANISM SPEC CULT: NORMAL
MICROORGANISM SPEC CULT: NORMAL
MONOCYTES NFR BLD: 0.58 K/UL (ref 0.1–0.95)
MONOCYTES NFR BLD: 13 % (ref 2–12)
NEUTROPHILS NFR BLD: 67 % (ref 43–80)
NEUTS SEG NFR BLD: 3.08 K/UL (ref 1.8–7.3)
PLATELET CONFIRMATION: NORMAL
PLATELET, FLUORESCENCE: 99 K/UL (ref 130–450)
PMV BLD AUTO: 11 FL (ref 7–12)
POTASSIUM SERPL-SCNC: 4.2 MMOL/L (ref 3.5–5)
PROT SERPL-MCNC: 6.3 G/DL (ref 6.4–8.3)
RBC # BLD AUTO: 3.2 M/UL (ref 3.8–5.8)
SERVICE CMNT-IMP: NORMAL
SERVICE CMNT-IMP: NORMAL
SODIUM SERPL-SCNC: 139 MMOL/L (ref 132–146)
SPECIMEN DESCRIPTION: NORMAL
SPECIMEN DESCRIPTION: NORMAL
WBC OTHER # BLD: 4.6 K/UL (ref 4.5–11.5)

## 2024-11-04 PROCEDURE — 85025 COMPLETE CBC W/AUTO DIFF WBC: CPT

## 2024-11-04 PROCEDURE — 6360000002 HC RX W HCPCS

## 2024-11-04 PROCEDURE — 6360000002 HC RX W HCPCS: Performed by: NURSE PRACTITIONER

## 2024-11-04 PROCEDURE — 94640 AIRWAY INHALATION TREATMENT: CPT

## 2024-11-04 PROCEDURE — 6370000000 HC RX 637 (ALT 250 FOR IP): Performed by: NURSE PRACTITIONER

## 2024-11-04 PROCEDURE — 90935 HEMODIALYSIS ONE EVALUATION: CPT

## 2024-11-04 PROCEDURE — 2060000000 HC ICU INTERMEDIATE R&B

## 2024-11-04 PROCEDURE — 80053 COMPREHEN METABOLIC PANEL: CPT

## 2024-11-04 PROCEDURE — 2580000003 HC RX 258: Performed by: NURSE PRACTITIONER

## 2024-11-04 PROCEDURE — 6370000000 HC RX 637 (ALT 250 FOR IP)

## 2024-11-04 PROCEDURE — 6370000000 HC RX 637 (ALT 250 FOR IP): Performed by: INTERNAL MEDICINE

## 2024-11-04 PROCEDURE — 5A1D70Z PERFORMANCE OF URINARY FILTRATION, INTERMITTENT, LESS THAN 6 HOURS PER DAY: ICD-10-PCS | Performed by: INTERNAL MEDICINE

## 2024-11-04 RX ADMIN — DOXYCYCLINE HYCLATE 100 MG: 100 CAPSULE ORAL at 20:03

## 2024-11-04 RX ADMIN — SEVELAMER CARBONATE 800 MG: 800 TABLET, FILM COATED ORAL at 12:31

## 2024-11-04 RX ADMIN — DOXYCYCLINE HYCLATE 100 MG: 100 CAPSULE ORAL at 12:32

## 2024-11-04 RX ADMIN — CARVEDILOL 12.5 MG: 6.25 TABLET, FILM COATED ORAL at 16:42

## 2024-11-04 RX ADMIN — EPOETIN ALFA-EPBX 3000 UNITS: 3000 INJECTION, SOLUTION INTRAVENOUS; SUBCUTANEOUS at 16:42

## 2024-11-04 RX ADMIN — ASPIRIN 81 MG: 81 TABLET, COATED ORAL at 12:32

## 2024-11-04 RX ADMIN — SEVELAMER CARBONATE 800 MG: 800 TABLET, FILM COATED ORAL at 16:42

## 2024-11-04 RX ADMIN — SODIUM CHLORIDE, PRESERVATIVE FREE 10 ML: 5 INJECTION INTRAVENOUS at 20:04

## 2024-11-04 RX ADMIN — HEPARIN SODIUM 5000 UNITS: 5000 INJECTION INTRAVENOUS; SUBCUTANEOUS at 20:03

## 2024-11-04 RX ADMIN — IPRATROPIUM BROMIDE 0.5 MG: 0.5 SOLUTION RESPIRATORY (INHALATION) at 20:05

## 2024-11-04 RX ADMIN — ARFORMOTEROL TARTRATE 15 MCG: 15 SOLUTION RESPIRATORY (INHALATION) at 20:05

## 2024-11-04 RX ADMIN — HEPARIN SODIUM 5000 UNITS: 5000 INJECTION INTRAVENOUS; SUBCUTANEOUS at 05:48

## 2024-11-04 RX ADMIN — BUDESONIDE 250 MCG: 0.25 SUSPENSION RESPIRATORY (INHALATION) at 20:05

## 2024-11-04 RX ADMIN — PANTOPRAZOLE SODIUM 40 MG: 40 TABLET, DELAYED RELEASE ORAL at 16:42

## 2024-11-04 RX ADMIN — WATER 1000 MG: 1 INJECTION INTRAMUSCULAR; INTRAVENOUS; SUBCUTANEOUS at 20:04

## 2024-11-04 RX ADMIN — ROSUVASTATIN CALCIUM 10 MG: 10 TABLET, FILM COATED ORAL at 12:32

## 2024-11-04 RX ADMIN — IPRATROPIUM BROMIDE 0.5 MG: 0.5 SOLUTION RESPIRATORY (INHALATION) at 13:46

## 2024-11-04 RX ADMIN — HEPARIN SODIUM 5000 UNITS: 5000 INJECTION INTRAVENOUS; SUBCUTANEOUS at 12:31

## 2024-11-04 RX ADMIN — PANTOPRAZOLE SODIUM 40 MG: 40 TABLET, DELAYED RELEASE ORAL at 05:48

## 2024-11-04 ASSESSMENT — PAIN SCALES - GENERAL: PAINLEVEL_OUTOF10: 0

## 2024-11-04 NOTE — FLOWSHEET NOTE
11/04/24 1117   Vital Signs   BP (!) 127/54   Temp 97.4 °F (36.3 °C)   Pulse 71   Respirations 16   Weight - Scale 58.6 kg (129 lb 3 oz)   Weight Method Bed scale   Percent Weight Change 0   Pain Assessment   Pain Assessment None - Denies Pain   Post-Hemodialysis Assessment   Post-Treatment Procedures Blood returned;Access bleeding time > 10 minutes   Machine Disinfection Process Acid/Vinegar Clean;Exterior Machine Disinfection;Heat Disinfect   Rinseback Volume (ml) 300 ml   Blood Volume Processed (Liters) 73.8 L   Dialyzer Clearance Lightly streaked   Duration of Treatment (minutes) 195 minutes   Heparin Amount Administered During Treatment (mL) 0 mL   Hemodialysis Intake (ml) 300 ml   Hemodialysis Output (ml) 1600 ml   NET Removed (ml) 1300   Tolerated Treatment Good   Patient Response to Treatment Pt tolerated tx well.   Bilateral Breath Sounds Diminished   Time Off 1103   Patient Disposition Return to room   Observations & Evaluations   Level of Consciousness 0   Heart Rhythm Regular   Respiratory Quality/Effort Unlabored   O2 Device None (Room air)   Skin Condition/Temp Dry;Warm   Bowel Sounds (All Quadrants) Audible   Comments Pt tolerated well and completed 3 hrs. and 15 min. HD treatment on a 2K, 2.5Ca bath with 1.3L of fluid removed. Pt rinsed back, blood returned. Sites held x 2, hemostasis achieved. Gauze dressings applied and secured with paper tape. Post tx pt awake, alert and talking. VSS. Post report given to primary RN Chrissie.

## 2024-11-04 NOTE — CARE COORDINATION
CASE MANAGEMENT...Noted that patient will require PICC line for iv rocephin daily x 4 weeks. Met with patient and his wife at the bedside to discuss dc options. Original plan was home with [facility], however, they cannot accommodate iv meds. Mary from [facility] will continue to follow until referral is canceled. List of [facility]HC/[facility] choices provided for patient and his wife to review. They are also interested in the [facility] -Referral called to Mary Kate ext [phone]. She will review and call patient/wife. Attempted to check cost with Maryana at the  using code . Called ext [phone]. No answer left vm.     UPDATE.... Referral called to Jennifer with [facility] and iv rocephin script faxed to Rajiv with [facility] [phone] to investigate cost. No [facility] choices given at this time. Inpatient HD facilities were highlighted. No to Mirian of the [facility]. Mr Vazquez receives HD [facility].     UPDATE....An appt for the [facility] was scheduled for Wed 11/6/24 at 2:30pm. Will need to cancel if patient still hospitalized or if dc plans change. [facility] determination still pending. Will follow.

## 2024-11-04 NOTE — DISCHARGE INSTRUCTIONS
the infusion and blood work to accommodate the patient’s home care conditions.  When PICC or VAD is to be removed, documentation of length of inserted PICC. PICC or VAD length is to correlate with inserted length and sent to physician at the time of removal.  Give the patient a list of antibiotics being administered with:  Drug name  Route  Frequency  Start/Stop date      ROUTINE LABS TO BE DRAWN/ORDERED:  Twice weekly (preferably every Monday & Thursday):  CMP  Complete Blood Count with differential (CBC with dif)  Once weekly:  C-Reactive Protein (not high sensitivity CRP)  Erythrocyte/Westergren Sedimentation Rate (WSR or ESR)  Total CPK for patients on Daptomycin (Cubicin®). Obtain CPK more often if the patient is experiencing muscle weakness or myalgias.  Clinical Pharmacist is to adjust Vancomycin and Aminoglycosides.  Clinical pharmacist is  encouraged to follow: \"Therapeutic Monitoring of Vancomycin for Serious Methicillin-resistant Staphylococcus aureus Infections: A Revised Consensus Guideline and Review by the American Society of Health-system Pharmacists, the Infectious Diseases Society of [place], the Pediatric Infectious Diseases Society, and the Society of Infectious Diseases Pharmacists\" (https://doi.org/10.1093/dario/hfqw280).  If a clinical calculator is not available, clinical pharmacist is to follow the orders below:  Draw Vancomycin trough 30 minutes before the third dose  After starting drug, or   After the dosing or interval is changed.  If the trough level is between 5 and 20 continue dose as ordered.  Draw troughs twice weekly thereafter until troughs are stable (i.e. until trough is between 10 and 20 mcg/ml for two consecutive laboratory values).  Once stable check troughs once weekly or every third dose.  Please do not call physician unless the trough is < 5 or >20.  If the trough is <20 continue dosing as ordered.  If the trough is >20 call the office for further orders.  Do not hold the

## 2024-11-05 LAB
ALBUMIN SERPL-MCNC: 3.6 G/DL (ref 3.5–5.2)
ALP SERPL-CCNC: 106 U/L (ref 40–129)
ALT SERPL-CCNC: <5 U/L (ref 0–40)
ANION GAP SERPL CALCULATED.3IONS-SCNC: 15 MMOL/L (ref 7–16)
AST SERPL-CCNC: 12 U/L (ref 0–39)
BASOPHILS # BLD: 0.06 K/UL (ref 0–0.2)
BASOPHILS NFR BLD: 1 % (ref 0–2)
BILIRUB SERPL-MCNC: 0.5 MG/DL (ref 0–1.2)
BUN SERPL-MCNC: 31 MG/DL (ref 6–23)
CALCIUM SERPL-MCNC: 9.8 MG/DL (ref 8.6–10.2)
CHLORIDE SERPL-SCNC: 95 MMOL/L (ref 98–107)
CO2 SERPL-SCNC: 27 MMOL/L (ref 22–29)
CREAT SERPL-MCNC: 5.9 MG/DL (ref 0.7–1.2)
EOSINOPHIL # BLD: 0.03 K/UL (ref 0.05–0.5)
EOSINOPHILS RELATIVE PERCENT: 1 % (ref 0–6)
ERYTHROCYTE [DISTWIDTH] IN BLOOD BY AUTOMATED COUNT: 18.6 % (ref 11.5–15)
GFR, ESTIMATED: 9 ML/MIN/1.73M2
GLUCOSE SERPL-MCNC: 108 MG/DL (ref 74–99)
HCT VFR BLD AUTO: 36.7 % (ref 37–54)
HGB BLD-MCNC: 11.4 G/DL (ref 12.5–16.5)
IMM GRANULOCYTES # BLD AUTO: <0.03 K/UL (ref 0–0.58)
IMM GRANULOCYTES NFR BLD: 0 % (ref 0–5)
LYMPHOCYTES NFR BLD: 0.86 K/UL (ref 1.5–4)
LYMPHOCYTES RELATIVE PERCENT: 16 % (ref 20–42)
MCH RBC QN AUTO: 33.1 PG (ref 26–35)
MCHC RBC AUTO-ENTMCNC: 31.1 G/DL (ref 32–34.5)
MCV RBC AUTO: 106.7 FL (ref 80–99.9)
MONOCYTES NFR BLD: 0.75 K/UL (ref 0.1–0.95)
MONOCYTES NFR BLD: 14 % (ref 2–12)
NEUTROPHILS NFR BLD: 68 % (ref 43–80)
NEUTS SEG NFR BLD: 3.66 K/UL (ref 1.8–7.3)
PLATELET, FLUORESCENCE: 113 K/UL (ref 130–450)
PMV BLD AUTO: 11.8 FL (ref 7–12)
POTASSIUM SERPL-SCNC: 4 MMOL/L (ref 3.5–5)
PROT SERPL-MCNC: 6.6 G/DL (ref 6.4–8.3)
RBC # BLD AUTO: 3.44 M/UL (ref 3.8–5.8)
SODIUM SERPL-SCNC: 137 MMOL/L (ref 132–146)
WBC OTHER # BLD: 5.4 K/UL (ref 4.5–11.5)

## 2024-11-05 PROCEDURE — 2580000003 HC RX 258: Performed by: NURSE PRACTITIONER

## 2024-11-05 PROCEDURE — 94640 AIRWAY INHALATION TREATMENT: CPT

## 2024-11-05 PROCEDURE — 2500000003 HC RX 250 WO HCPCS: Performed by: INTERNAL MEDICINE

## 2024-11-05 PROCEDURE — 2580000003 HC RX 258: Performed by: SPECIALIST

## 2024-11-05 PROCEDURE — 6370000000 HC RX 637 (ALT 250 FOR IP): Performed by: NURSE PRACTITIONER

## 2024-11-05 PROCEDURE — 2060000000 HC ICU INTERMEDIATE R&B

## 2024-11-05 PROCEDURE — 6360000002 HC RX W HCPCS: Performed by: SPECIALIST

## 2024-11-05 PROCEDURE — 6370000000 HC RX 637 (ALT 250 FOR IP): Performed by: INTERNAL MEDICINE

## 2024-11-05 PROCEDURE — 02HV33Z INSERTION OF INFUSION DEVICE INTO SUPERIOR VENA CAVA, PERCUTANEOUS APPROACH: ICD-10-PCS | Performed by: INTERNAL MEDICINE

## 2024-11-05 PROCEDURE — 97535 SELF CARE MNGMENT TRAINING: CPT

## 2024-11-05 PROCEDURE — 6360000002 HC RX W HCPCS: Performed by: NURSE PRACTITIONER

## 2024-11-05 PROCEDURE — 6370000000 HC RX 637 (ALT 250 FOR IP)

## 2024-11-05 PROCEDURE — 85025 COMPLETE CBC W/AUTO DIFF WBC: CPT

## 2024-11-05 PROCEDURE — 97530 THERAPEUTIC ACTIVITIES: CPT

## 2024-11-05 PROCEDURE — 80053 COMPREHEN METABOLIC PANEL: CPT

## 2024-11-05 RX ORDER — LIDOCAINE HYDROCHLORIDE 10 MG/ML
50 INJECTION, SOLUTION EPIDURAL; INFILTRATION; INTRACAUDAL; PERINEURAL ONCE
Status: COMPLETED | OUTPATIENT
Start: 2024-11-05 | End: 2024-11-05

## 2024-11-05 RX ORDER — SODIUM CHLORIDE 0.9 % (FLUSH) 0.9 %
5-40 SYRINGE (ML) INJECTION PRN
Status: DISCONTINUED | OUTPATIENT
Start: 2024-11-05 | End: 2024-11-07 | Stop reason: HOSPADM

## 2024-11-05 RX ORDER — POLYETHYLENE GLYCOL 3350 17 G/17G
17 POWDER, FOR SOLUTION ORAL DAILY
Status: DISCONTINUED | OUTPATIENT
Start: 2024-11-05 | End: 2024-11-07 | Stop reason: HOSPADM

## 2024-11-05 RX ORDER — SODIUM CHLORIDE 9 MG/ML
INJECTION, SOLUTION INTRAVENOUS PRN
Status: DISCONTINUED | OUTPATIENT
Start: 2024-11-05 | End: 2024-11-07 | Stop reason: HOSPADM

## 2024-11-05 RX ORDER — SODIUM CHLORIDE 0.9 % (FLUSH) 0.9 %
5-40 SYRINGE (ML) INJECTION EVERY 12 HOURS SCHEDULED
Status: DISCONTINUED | OUTPATIENT
Start: 2024-11-05 | End: 2024-11-07 | Stop reason: HOSPADM

## 2024-11-05 RX ADMIN — CARVEDILOL 12.5 MG: 6.25 TABLET, FILM COATED ORAL at 15:45

## 2024-11-05 RX ADMIN — WATER 1000 MG: 1 INJECTION INTRAMUSCULAR; INTRAVENOUS; SUBCUTANEOUS at 20:14

## 2024-11-05 RX ADMIN — SODIUM CHLORIDE, PRESERVATIVE FREE 10 ML: 5 INJECTION INTRAVENOUS at 20:15

## 2024-11-05 RX ADMIN — PANTOPRAZOLE SODIUM 40 MG: 40 TABLET, DELAYED RELEASE ORAL at 15:45

## 2024-11-05 RX ADMIN — IPRATROPIUM BROMIDE 0.5 MG: 0.5 SOLUTION RESPIRATORY (INHALATION) at 12:07

## 2024-11-05 RX ADMIN — BUDESONIDE 250 MCG: 0.25 SUSPENSION RESPIRATORY (INHALATION) at 19:47

## 2024-11-05 RX ADMIN — SODIUM CHLORIDE, PRESERVATIVE FREE 10 ML: 5 INJECTION INTRAVENOUS at 07:45

## 2024-11-05 RX ADMIN — ALUMINUM HYDROXIDE, MAGNESIUM HYDROXIDE, AND SIMETHICONE: 1200; 120; 1200 SUSPENSION ORAL at 11:01

## 2024-11-05 RX ADMIN — HEPARIN SODIUM 5000 UNITS: 5000 INJECTION INTRAVENOUS; SUBCUTANEOUS at 20:11

## 2024-11-05 RX ADMIN — ARFORMOTEROL TARTRATE 15 MCG: 15 SOLUTION RESPIRATORY (INHALATION) at 07:49

## 2024-11-05 RX ADMIN — IPRATROPIUM BROMIDE 0.5 MG: 0.5 SOLUTION RESPIRATORY (INHALATION) at 19:47

## 2024-11-05 RX ADMIN — LIDOCAINE HYDROCHLORIDE 10 MG: 10 SOLUTION INTRAVENOUS at 14:57

## 2024-11-05 RX ADMIN — IPRATROPIUM BROMIDE 0.5 MG: 0.5 SOLUTION RESPIRATORY (INHALATION) at 07:49

## 2024-11-05 RX ADMIN — HEPARIN SODIUM 5000 UNITS: 5000 INJECTION INTRAVENOUS; SUBCUTANEOUS at 06:22

## 2024-11-05 RX ADMIN — CARVEDILOL 12.5 MG: 6.25 TABLET, FILM COATED ORAL at 07:45

## 2024-11-05 RX ADMIN — SEVELAMER CARBONATE 800 MG: 800 TABLET, FILM COATED ORAL at 17:27

## 2024-11-05 RX ADMIN — BUDESONIDE 250 MCG: 0.25 SUSPENSION RESPIRATORY (INHALATION) at 07:49

## 2024-11-05 RX ADMIN — ARFORMOTEROL TARTRATE 15 MCG: 15 SOLUTION RESPIRATORY (INHALATION) at 19:47

## 2024-11-05 RX ADMIN — ASPIRIN 81 MG: 81 TABLET, COATED ORAL at 07:45

## 2024-11-05 RX ADMIN — PANTOPRAZOLE SODIUM 40 MG: 40 TABLET, DELAYED RELEASE ORAL at 06:22

## 2024-11-05 ASSESSMENT — PAIN SCALES - GENERAL
PAINLEVEL_OUTOF10: 0

## 2024-11-05 NOTE — PROCEDURES
PICC    Catheter insertion date: 11/5/2024     Product Number:  [id number]   Lot No: [id number]   Gauge: 17   Lumen: single   R Brachial    Vein Diameter: 0.44cm   Arm circumference: 32cm   Catheter Length: 32cm   Internal Length: 32cm   External Length 0cm  VPS Blue [place] confirms PICC tip is placed in the lower 1/3 of the SVC or at the Cavoatrial junction.  Floor nurse notified PICC is okay to use.   : MARYELLEN Cuba RN [facility]    PROCEDURE NOTE  Date: 11/5/2024   Name: James Vazquez III  YOB: 1945    Procedures

## 2024-11-05 NOTE — DISCHARGE INSTR - COC
Continuity of Care Form    Patient Name: James Vazquez III   :  1945  MRN:  [id number]    Admit date:  10/28/2024  Discharge date:  2024    Code Status Order: Full Code   Advance Directives:   Advance Care Flowsheet Documentation             Admitting Physician:  Luisito Mccord DO  PCP: Cipriano Durham DO    Discharging Nurse: Livia  Discharging Hospital Unit/Room#: 0438/0438-A  Discharging Unit Phone Number: [phone]    Emergency Contact:   Extended Emergency Contact Information  Primary Emergency Contact: Laura Vazquez  Address: [address]           [place] [address] [facility]  Home Phone: [phone]  Mobile Phone: [phone]  Relation: Spouse  Preferred language: English   needed? No  Secondary Emergency Contact: GeorgeJosh  Address: [address]           [place] [address] [facility]  Home Phone: [phone]  Mobile Phone: [phone]  Relation: Child    Past Surgical History:  Past Surgical History:   Procedure Laterality Date    AV FISTULA CREATION Left     Dr. Phillips [facility]    CARDIAC SURGERY      HEMODIALYSIS ACCESS PERCUTANEOUS REVISION Left 2019    2 x Dr. Phillips, [facility]    HIP SURGERY Left 10/16/2020    HIP HEMIARTHROPLASTY performed by Abel Birmingham MD at [facility] OR    PERIPHERAL VASCULAR BYPASS      Aortobifemoral bypass for claudicatio - Dr. Phillips [facility]    UPPER GASTROINTESTINAL ENDOSCOPY N/A 2020    EGD ESOPHAGOGASTRODUODENOSCOPY WITH ANTRAL BIOPSY performed by Lloyd Styles MD at [facility] OR    UPPER GASTROINTESTINAL ENDOSCOPY N/A 10/28/2020    EGD ESOPHAGOGASTRODUODENOSCOPY performed by Lloyd Styles MD at [facility] ENDOSCOPY       Immunization History:   Immunization History   Administered Date(s) Administered    COVID-19, MODERNA BLUE border, Primary or Immunocompromised, (age 12y+), IM, 100 mcg/0.5mL 2021, 2021, 10/20/2021, 2022    COVID-19, MODERNA Bivalent, (age 12y+),

## 2024-11-06 ENCOUNTER — APPOINTMENT (OUTPATIENT)
Dept: GENERAL RADIOLOGY | Age: 79
DRG: 193 | End: 2024-11-06
Payer: MEDICARE

## 2024-11-06 ENCOUNTER — APPOINTMENT (OUTPATIENT)
Dept: CT IMAGING | Age: 79
DRG: 193 | End: 2024-11-06
Payer: MEDICARE

## 2024-11-06 ENCOUNTER — HOSPITAL ENCOUNTER (OUTPATIENT)
Dept: INFUSION THERAPY | Age: 79
Setting detail: INFUSION SERIES
End: 2024-11-06

## 2024-11-06 LAB
ALBUMIN SERPL-MCNC: 3.5 G/DL (ref 3.5–5.2)
ALP SERPL-CCNC: 102 U/L (ref 40–129)
ALT SERPL-CCNC: <5 U/L (ref 0–40)
ANION GAP SERPL CALCULATED.3IONS-SCNC: 14 MMOL/L (ref 7–16)
AST SERPL-CCNC: 10 U/L (ref 0–39)
BASOPHILS # BLD: 0.08 K/UL (ref 0–0.2)
BASOPHILS NFR BLD: 1 % (ref 0–2)
BILIRUB SERPL-MCNC: 0.5 MG/DL (ref 0–1.2)
BUN SERPL-MCNC: 40 MG/DL (ref 6–23)
CALCIUM SERPL-MCNC: 10 MG/DL (ref 8.6–10.2)
CHLORIDE SERPL-SCNC: 96 MMOL/L (ref 98–107)
CO2 SERPL-SCNC: 29 MMOL/L (ref 22–29)
CREAT SERPL-MCNC: 6.9 MG/DL (ref 0.7–1.2)
EOSINOPHIL # BLD: 0.07 K/UL (ref 0.05–0.5)
EOSINOPHILS RELATIVE PERCENT: 1 % (ref 0–6)
ERYTHROCYTE [DISTWIDTH] IN BLOOD BY AUTOMATED COUNT: 18.4 % (ref 11.5–15)
GFR, ESTIMATED: 7 ML/MIN/1.73M2
GLUCOSE SERPL-MCNC: 101 MG/DL (ref 74–99)
HCT VFR BLD AUTO: 35.7 % (ref 37–54)
HGB BLD-MCNC: 11.1 G/DL (ref 12.5–16.5)
IMM GRANULOCYTES # BLD AUTO: <0.03 K/UL (ref 0–0.58)
IMM GRANULOCYTES NFR BLD: 0 % (ref 0–5)
LYMPHOCYTES NFR BLD: 1.18 K/UL (ref 1.5–4)
LYMPHOCYTES RELATIVE PERCENT: 21 % (ref 20–42)
MCH RBC QN AUTO: 33.1 PG (ref 26–35)
MCHC RBC AUTO-ENTMCNC: 31.1 G/DL (ref 32–34.5)
MCV RBC AUTO: 106.6 FL (ref 80–99.9)
MONOCYTES NFR BLD: 0.75 K/UL (ref 0.1–0.95)
MONOCYTES NFR BLD: 13 % (ref 2–12)
NEUTROPHILS NFR BLD: 63 % (ref 43–80)
NEUTS SEG NFR BLD: 3.56 K/UL (ref 1.8–7.3)
PLATELET, FLUORESCENCE: 106 K/UL (ref 130–450)
PMV BLD AUTO: 11.5 FL (ref 7–12)
POTASSIUM SERPL-SCNC: 4.2 MMOL/L (ref 3.5–5)
PROT SERPL-MCNC: 6.3 G/DL (ref 6.4–8.3)
RBC # BLD AUTO: 3.35 M/UL (ref 3.8–5.8)
SODIUM SERPL-SCNC: 139 MMOL/L (ref 132–146)
WBC OTHER # BLD: 5.7 K/UL (ref 4.5–11.5)

## 2024-11-06 PROCEDURE — 2580000003 HC RX 258: Performed by: NURSE PRACTITIONER

## 2024-11-06 PROCEDURE — 94640 AIRWAY INHALATION TREATMENT: CPT

## 2024-11-06 PROCEDURE — 6370000000 HC RX 637 (ALT 250 FOR IP): Performed by: NURSE PRACTITIONER

## 2024-11-06 PROCEDURE — 71260 CT THORAX DX C+: CPT

## 2024-11-06 PROCEDURE — 2500000003 HC RX 250 WO HCPCS: Performed by: NURSE PRACTITIONER

## 2024-11-06 PROCEDURE — 6360000002 HC RX W HCPCS: Performed by: NURSE PRACTITIONER

## 2024-11-06 PROCEDURE — 80053 COMPREHEN METABOLIC PANEL: CPT

## 2024-11-06 PROCEDURE — 2580000003 HC RX 258: Performed by: SPECIALIST

## 2024-11-06 PROCEDURE — 85025 COMPLETE CBC W/AUTO DIFF WBC: CPT

## 2024-11-06 PROCEDURE — 6370000000 HC RX 637 (ALT 250 FOR IP): Performed by: INTERNAL MEDICINE

## 2024-11-06 PROCEDURE — 90935 HEMODIALYSIS ONE EVALUATION: CPT

## 2024-11-06 PROCEDURE — 2060000000 HC ICU INTERMEDIATE R&B

## 2024-11-06 PROCEDURE — 74220 X-RAY XM ESOPHAGUS 1CNTRST: CPT

## 2024-11-06 PROCEDURE — 2580000003 HC RX 258: Performed by: INTERNAL MEDICINE

## 2024-11-06 PROCEDURE — 6360000004 HC RX CONTRAST MEDICATION: Performed by: RADIOLOGY

## 2024-11-06 PROCEDURE — 6370000000 HC RX 637 (ALT 250 FOR IP)

## 2024-11-06 PROCEDURE — 6360000002 HC RX W HCPCS: Performed by: SPECIALIST

## 2024-11-06 RX ORDER — POLYETHYLENE GLYCOL 3350 17 G/17G
17 POWDER, FOR SOLUTION ORAL DAILY
DISCHARGE
Start: 2024-11-07 | End: 2024-12-07

## 2024-11-06 RX ORDER — SENNOSIDES A AND B 8.6 MG/1
1 TABLET, FILM COATED ORAL DAILY PRN
DISCHARGE
Start: 2024-11-06 | End: 2024-12-06

## 2024-11-06 RX ORDER — IOPAMIDOL 755 MG/ML
75 INJECTION, SOLUTION INTRAVASCULAR
Status: COMPLETED | OUTPATIENT
Start: 2024-11-06 | End: 2024-11-06

## 2024-11-06 RX ADMIN — WATER 1000 MG: 1 INJECTION INTRAMUSCULAR; INTRAVENOUS; SUBCUTANEOUS at 21:12

## 2024-11-06 RX ADMIN — HEPARIN SODIUM 5000 UNITS: 5000 INJECTION INTRAVENOUS; SUBCUTANEOUS at 12:18

## 2024-11-06 RX ADMIN — HEPARIN SODIUM 5000 UNITS: 5000 INJECTION INTRAVENOUS; SUBCUTANEOUS at 21:12

## 2024-11-06 RX ADMIN — SODIUM CHLORIDE, PRESERVATIVE FREE 10 ML: 5 INJECTION INTRAVENOUS at 12:18

## 2024-11-06 RX ADMIN — ASPIRIN 81 MG: 81 TABLET, COATED ORAL at 09:12

## 2024-11-06 RX ADMIN — BARIUM SULFATE 140 ML: 980 POWDER, FOR SUSPENSION ORAL at 08:54

## 2024-11-06 RX ADMIN — BARIUM SULFATE 176 G: 960 POWDER, FOR SUSPENSION ORAL at 08:53

## 2024-11-06 RX ADMIN — PANTOPRAZOLE SODIUM 40 MG: 40 TABLET, DELAYED RELEASE ORAL at 05:50

## 2024-11-06 RX ADMIN — ROSUVASTATIN CALCIUM 10 MG: 10 TABLET, FILM COATED ORAL at 09:13

## 2024-11-06 RX ADMIN — SEVELAMER CARBONATE 800 MG: 800 TABLET, FILM COATED ORAL at 21:11

## 2024-11-06 RX ADMIN — CARVEDILOL 12.5 MG: 6.25 TABLET, FILM COATED ORAL at 09:12

## 2024-11-06 RX ADMIN — SODIUM CHLORIDE, PRESERVATIVE FREE 10 ML: 5 INJECTION INTRAVENOUS at 21:19

## 2024-11-06 RX ADMIN — IPRATROPIUM BROMIDE 0.5 MG: 0.5 SOLUTION RESPIRATORY (INHALATION) at 13:04

## 2024-11-06 RX ADMIN — IOPAMIDOL 75 ML: 755 INJECTION, SOLUTION INTRAVENOUS at 13:55

## 2024-11-06 RX ADMIN — POLYETHYLENE GLYCOL 3350 17 G: 17 POWDER, FOR SOLUTION ORAL at 09:13

## 2024-11-06 ASSESSMENT — PAIN SCALES - WONG BAKER: WONGBAKER_NUMERICALRESPONSE: NO HURT

## 2024-11-06 ASSESSMENT — PAIN SCALES - GENERAL
PAINLEVEL_OUTOF10: 0
PAINLEVEL_OUTOF10: 0

## 2024-11-06 NOTE — CARE COORDINATION
CASE  MANAGEMENT....PICC line placed yesterday for iv rocephin qd. Ok for dc per ID. Per renal, patient will have HD today and is ok for dc. GI following and CT chest ordered for abnormal esophagram. Requested Dea from [facility] to initiate precert. N 17 in [facility]. Ambulance forms / [id number] in chart. Will follow

## 2024-11-06 NOTE — FLOWSHEET NOTE
11/06/24 1743   Vital Signs   BP (!) 102/55   Temp 97.7 °F (36.5 °C)   Pulse 60   Respirations 18   Weight - Scale 58.3 kg (128 lb 8.5 oz)   Percent Weight Change 0   Pain Assessment   Pain Assessment None - Denies Pain   Post-Hemodialysis Assessment   Post-Treatment Procedures Blood returned;Access bleeding time > 10 minutes   Machine Disinfection Process Acid/Vinegar Clean;Exterior Machine Disinfection;Heat Disinfect   Rinseback Volume (ml) 300 ml   Blood Volume Processed (Liters) 73 L   Dialyzer Clearance Lightly streaked   Duration of Treatment (minutes) 195 minutes   Heparin Amount Administered During Treatment (mL) 0 mL   Hemodialysis Intake (ml) 300 ml   Hemodialysis Output (ml) 300 ml   NET Removed (ml) 0   Tolerated Treatment Good   Interventions Taken Ultrafiltration stopped   Patient Response to Treatment Pt BP <100 early in treatment. Pt assessment negative for any edema. UF turned off.   Bilateral Breath Sounds Clear;Diminished   Edema None   Time Off 1733   Patient Disposition Return to room   Observations & Evaluations   Level of Consciousness 0   Heart Rhythm Regular   Respiratory Quality/Effort Unlabored   Skin Condition/Temp Dry;Warm   Bowel Sounds (All Quadrants) Audible   Comments Pt tolerated well and completed 3 hrs and 15 min. HD treatment on a 3K, 2.5Ca bath with no fluid removal. Dr. Rivas notiified. Pt was rinsed back, blood returned. Sites held x 2x 20 min., hemostasis achieved. Pt alert and talking with VSS post tx. Report called to primary ANTONI Cheek.

## 2024-11-07 VITALS
HEIGHT: 67 IN | TEMPERATURE: 98.1 F | RESPIRATION RATE: 16 BRPM | OXYGEN SATURATION: 98 % | SYSTOLIC BLOOD PRESSURE: 101 MMHG | WEIGHT: 128.53 LBS | BODY MASS INDEX: 20.17 KG/M2 | DIASTOLIC BLOOD PRESSURE: 31 MMHG | HEART RATE: 65 BPM

## 2024-11-07 LAB
ALBUMIN SERPL-MCNC: 3.5 G/DL (ref 3.5–5.2)
ALP SERPL-CCNC: 104 U/L (ref 40–129)
ALT SERPL-CCNC: <5 U/L (ref 0–40)
ANION GAP SERPL CALCULATED.3IONS-SCNC: 13 MMOL/L (ref 7–16)
AST SERPL-CCNC: 11 U/L (ref 0–39)
BASOPHILS # BLD: 0.07 K/UL (ref 0–0.2)
BASOPHILS NFR BLD: 1 % (ref 0–2)
BILIRUB SERPL-MCNC: 0.5 MG/DL (ref 0–1.2)
BUN SERPL-MCNC: 20 MG/DL (ref 6–23)
CALCIUM SERPL-MCNC: 9.5 MG/DL (ref 8.6–10.2)
CHLORIDE SERPL-SCNC: 98 MMOL/L (ref 98–107)
CO2 SERPL-SCNC: 28 MMOL/L (ref 22–29)
CREAT SERPL-MCNC: 4.4 MG/DL (ref 0.7–1.2)
EOSINOPHIL # BLD: 0.13 K/UL (ref 0.05–0.5)
EOSINOPHILS RELATIVE PERCENT: 2 % (ref 0–6)
ERYTHROCYTE [DISTWIDTH] IN BLOOD BY AUTOMATED COUNT: 18.4 % (ref 11.5–15)
GFR, ESTIMATED: 13 ML/MIN/1.73M2
GLUCOSE SERPL-MCNC: 81 MG/DL (ref 74–99)
HCT VFR BLD AUTO: 35.8 % (ref 37–54)
HGB BLD-MCNC: 10.9 G/DL (ref 12.5–16.5)
IMM GRANULOCYTES # BLD AUTO: <0.03 K/UL (ref 0–0.58)
IMM GRANULOCYTES NFR BLD: 0 % (ref 0–5)
LYMPHOCYTES NFR BLD: 1.26 K/UL (ref 1.5–4)
LYMPHOCYTES RELATIVE PERCENT: 23 % (ref 20–42)
MCH RBC QN AUTO: 32.9 PG (ref 26–35)
MCHC RBC AUTO-ENTMCNC: 30.4 G/DL (ref 32–34.5)
MCV RBC AUTO: 108.2 FL (ref 80–99.9)
MONOCYTES NFR BLD: 0.72 K/UL (ref 0.1–0.95)
MONOCYTES NFR BLD: 13 % (ref 2–12)
NEUTROPHILS NFR BLD: 60 % (ref 43–80)
NEUTS SEG NFR BLD: 3.28 K/UL (ref 1.8–7.3)
PLATELET, FLUORESCENCE: 100 K/UL (ref 130–450)
PMV BLD AUTO: 11.2 FL (ref 7–12)
POTASSIUM SERPL-SCNC: 4.1 MMOL/L (ref 3.5–5)
PROT SERPL-MCNC: 6.2 G/DL (ref 6.4–8.3)
RBC # BLD AUTO: 3.31 M/UL (ref 3.8–5.8)
SODIUM SERPL-SCNC: 139 MMOL/L (ref 132–146)
WBC OTHER # BLD: 5.5 K/UL (ref 4.5–11.5)

## 2024-11-07 PROCEDURE — 80053 COMPREHEN METABOLIC PANEL: CPT

## 2024-11-07 PROCEDURE — 6360000002 HC RX W HCPCS: Performed by: NURSE PRACTITIONER

## 2024-11-07 PROCEDURE — 6370000000 HC RX 637 (ALT 250 FOR IP)

## 2024-11-07 PROCEDURE — 97535 SELF CARE MNGMENT TRAINING: CPT

## 2024-11-07 PROCEDURE — 6370000000 HC RX 637 (ALT 250 FOR IP): Performed by: NURSE PRACTITIONER

## 2024-11-07 PROCEDURE — 2580000003 HC RX 258: Performed by: NURSE PRACTITIONER

## 2024-11-07 PROCEDURE — 85025 COMPLETE CBC W/AUTO DIFF WBC: CPT

## 2024-11-07 PROCEDURE — 94640 AIRWAY INHALATION TREATMENT: CPT

## 2024-11-07 PROCEDURE — 97530 THERAPEUTIC ACTIVITIES: CPT

## 2024-11-07 PROCEDURE — 6370000000 HC RX 637 (ALT 250 FOR IP): Performed by: INTERNAL MEDICINE

## 2024-11-07 RX ADMIN — PANTOPRAZOLE SODIUM 40 MG: 40 TABLET, DELAYED RELEASE ORAL at 06:29

## 2024-11-07 RX ADMIN — SODIUM CHLORIDE, PRESERVATIVE FREE 10 ML: 5 INJECTION INTRAVENOUS at 08:19

## 2024-11-07 RX ADMIN — ARFORMOTEROL TARTRATE 15 MCG: 15 SOLUTION RESPIRATORY (INHALATION) at 08:56

## 2024-11-07 RX ADMIN — PANTOPRAZOLE SODIUM 40 MG: 40 TABLET, DELAYED RELEASE ORAL at 15:42

## 2024-11-07 RX ADMIN — IPRATROPIUM BROMIDE 0.5 MG: 0.5 SOLUTION RESPIRATORY (INHALATION) at 08:56

## 2024-11-07 RX ADMIN — HEPARIN SODIUM 5000 UNITS: 5000 INJECTION INTRAVENOUS; SUBCUTANEOUS at 06:29

## 2024-11-07 RX ADMIN — SEVELAMER CARBONATE 800 MG: 800 TABLET, FILM COATED ORAL at 08:19

## 2024-11-07 RX ADMIN — BUDESONIDE 250 MCG: 0.25 SUSPENSION RESPIRATORY (INHALATION) at 08:56

## 2024-11-07 RX ADMIN — CARVEDILOL 12.5 MG: 6.25 TABLET, FILM COATED ORAL at 08:20

## 2024-11-07 RX ADMIN — ASPIRIN 81 MG: 81 TABLET, COATED ORAL at 08:19

## 2024-11-07 NOTE — PLAN OF CARE
Problem: Discharge Planning  Goal: Discharge to home or other facility with appropriate resources    Problem: Pain  Goal: Verbalizes/displays adequate comfort level or baseline comfort level  10/30/2024 0829 by Jessica Fleming RN  Outcome: Progressing     Problem: Safety - Adult  Goal: Free from fall injury  10/30/2024 0829 by Jessica Fleming RN  Outcome: Progressing     Problem: Skin/Tissue Integrity  Goal: Absence of new skin breakdown  Description: 1.  Monitor for areas of redness and/or skin breakdown  2.  Assess vascular access sites hourly  3.  Every 4-6 hours minimum:  Change oxygen saturation probe site  4.  Every 4-6 hours:  If on nasal continuous positive airway pressure, respiratory therapy assess nares and determine need for appliance change or resting period.  Outcome: Progressing     Problem: ABCDS Injury Assessment  Goal: Absence of physical injury  10/30/2024 0829 by Jessica Fleming RN  Outcome: Progressing        
  Problem: Discharge Planning  Goal: Discharge to home or other facility with appropriate resources  11/1/2024 0837 by Abner Ramírez RN  Outcome: Progressing     Problem: Pain  Goal: Verbalizes/displays adequate comfort level or baseline comfort level  11/1/2024 0837 by Abner Ramírez RN  Outcome: Progressing     Problem: Safety - Adult  Goal: Free from fall injury  11/1/2024 0837 by Abner Ramírez RN  Outcome: Progressing     Problem: Skin/Tissue Integrity  Goal: Absence of new skin breakdown  Description: 1.  Monitor for areas of redness and/or skin breakdown  2.  Assess vascular access sites hourly  3.  Every 4-6 hours minimum:  Change oxygen saturation probe site  4.  Every 4-6 hours:  If on nasal continuous positive airway pressure, respiratory therapy assess nares and determine need for appliance change or resting period.  11/1/2024 0837 by Abner Ramírez RN  Outcome: Progressing     Problem: ABCDS Injury Assessment  Goal: Absence of physical injury  11/1/2024 0837 by Abner Ramírez RN  Outcome: Progressing     Problem: Neurosensory - Adult  Goal: Achieves stable or improved neurological status  11/1/2024 0837 by Abner Ramírez RN  Outcome: Progressing     Problem: Neurosensory - Adult  Goal: Achieves maximal functionality and self care  11/1/2024 0837 by Abner Ramírez RN  Outcome: Progressing     Problem: Respiratory - Adult  Goal: Achieves optimal ventilation and oxygenation  11/1/2024 0837 by Abner Ramírez RN  Outcome: Progressing     Problem: Skin/Tissue Integrity - Adult  Goal: Skin integrity remains intact  11/1/2024 0837 by Abner Ramírez RN  Outcome: Progressing     Problem: Musculoskeletal - Adult  Goal: Return mobility to safest level of function  11/1/2024 0837 by Abner Ramírez RN  Outcome: Progressing     Problem: Musculoskeletal - Adult  Goal: Return ADL status to a safe level of function  11/1/2024 0837 by Abner Ramírez RN  Outcome: Progressing     Problem: Gastrointestinal - Adult  Goal: Minimal or 
  Problem: Discharge Planning  Goal: Discharge to home or other facility with appropriate resources  11/5/2024 0813 by Adia Langford RN  Outcome: Progressing     Problem: Pain  Goal: Verbalizes/displays adequate comfort level or baseline comfort level  11/5/2024 0813 by Adia Langford RN  Outcome: Progressing     Problem: Safety - Adult  Goal: Free from fall injury  11/5/2024 0813 by Adia Langford RN  Outcome: Progressing     Problem: Skin/Tissue Integrity  Goal: Absence of new skin breakdown  Description: 1.  Monitor for areas of redness and/or skin breakdown  2.  Assess vascular access sites hourly  3.  Every 4-6 hours minimum:  Change oxygen saturation probe site  4.  Every 4-6 hours:  If on nasal continuous positive airway pressure, respiratory therapy assess nares and determine need for appliance change or resting period.  11/5/2024 0813 by Adia Langford RN  Outcome: Progressing     Problem: ABCDS Injury Assessment  Goal: Absence of physical injury  11/5/2024 0813 by Adia Langford RN  Outcome: Progressing     Problem: Neurosensory - Adult  Goal: Achieves stable or improved neurological status  11/5/2024 0813 by Adia Langford RN  Outcome: Progressing     Problem: Neurosensory - Adult  Goal: Achieves maximal functionality and self care  11/5/2024 0813 by Adia Langford RN  Outcome: Progressing     Problem: Respiratory - Adult  Goal: Achieves optimal ventilation and oxygenation  11/5/2024 0813 by Adia Langford RN  Outcome: Progressing     Problem: Skin/Tissue Integrity - Adult  Goal: Skin integrity remains intact  11/5/2024 0813 by Adia Langford RN  Outcome: Progressing     Problem: Musculoskeletal - Adult  Goal: Return mobility to safest level of function  11/5/2024 0813 by Adia Langford RN  Outcome: Progressing     Problem: Musculoskeletal - Adult  Goal: Return ADL status to a safe level of function  11/5/2024 0813 by Adia Langford RN  Outcome: Progressing     Problem: 
  Problem: Discharge Planning  Goal: Discharge to home or other facility with appropriate resources  Outcome: Adequate for Discharge     Problem: Pain  Goal: Verbalizes/displays adequate comfort level or baseline comfort level  Outcome: Adequate for Discharge     Problem: Safety - Adult  Goal: Free from fall injury  Outcome: Adequate for Discharge     Problem: Skin/Tissue Integrity  Goal: Absence of new skin breakdown  Description: 1.  Monitor for areas of redness and/or skin breakdown  2.  Assess vascular access sites hourly  3.  Every 4-6 hours minimum:  Change oxygen saturation probe site  4.  Every 4-6 hours:  If on nasal continuous positive airway pressure, respiratory therapy assess nares and determine need for appliance change or resting period.  Outcome: Adequate for Discharge     Problem: ABCDS Injury Assessment  Goal: Absence of physical injury  Outcome: Adequate for Discharge     Problem: Neurosensory - Adult  Goal: Achieves stable or improved neurological status  Outcome: Adequate for Discharge  Goal: Achieves maximal functionality and self care  Outcome: Adequate for Discharge     Problem: Respiratory - Adult  Goal: Achieves optimal ventilation and oxygenation  Outcome: Adequate for Discharge     Problem: Skin/Tissue Integrity - Adult  Goal: Skin integrity remains intact  Outcome: Adequate for Discharge     Problem: Musculoskeletal - Adult  Goal: Return mobility to safest level of function  Outcome: Adequate for Discharge  Goal: Return ADL status to a safe level of function  Outcome: Adequate for Discharge     Problem: Gastrointestinal - Adult  Goal: Minimal or absence of nausea and vomiting  Outcome: Adequate for Discharge  Goal: Maintains or returns to baseline bowel function  Outcome: Adequate for Discharge  Goal: Maintains adequate nutritional intake  Outcome: Adequate for Discharge     Problem: Genitourinary - Adult  Goal: Absence of urinary retention  Outcome: Adequate for Discharge     Problem: 
  Problem: Discharge Planning  Goal: Discharge to home or other facility with appropriate resources  Outcome: Progressing     Problem: Pain  Goal: Verbalizes/displays adequate comfort level or baseline comfort level  Outcome: Progressing     Problem: Safety - Adult  Goal: Free from fall injury  Outcome: Progressing     Problem: Skin/Tissue Integrity  Goal: Absence of new skin breakdown  Description: 1.  Monitor for areas of redness and/or skin breakdown  2.  Assess vascular access sites hourly  3.  Every 4-6 hours minimum:  Change oxygen saturation probe site  4.  Every 4-6 hours:  If on nasal continuous positive airway pressure, respiratory therapy assess nares and determine need for appliance change or resting period.  Outcome: Progressing     Problem: ABCDS Injury Assessment  Goal: Absence of physical injury  Outcome: Progressing     
  Problem: Discharge Planning  Goal: Discharge to home or other facility with appropriate resources  Outcome: Progressing     Problem: Pain  Goal: Verbalizes/displays adequate comfort level or baseline comfort level  Outcome: Progressing     Problem: Safety - Adult  Goal: Free from fall injury  Outcome: Progressing     Problem: Skin/Tissue Integrity  Goal: Absence of new skin breakdown  Description: 1.  Monitor for areas of redness and/or skin breakdown  2.  Assess vascular access sites hourly  3.  Every 4-6 hours minimum:  Change oxygen saturation probe site  4.  Every 4-6 hours:  If on nasal continuous positive airway pressure, respiratory therapy assess nares and determine need for appliance change or resting period.  Outcome: Progressing     Problem: ABCDS Injury Assessment  Goal: Absence of physical injury  Outcome: Progressing     Problem: Neurosensory - Adult  Goal: Achieves stable or improved neurological status  Outcome: Progressing     Problem: Neurosensory - Adult  Goal: Achieves maximal functionality and self care  Outcome: Progressing     Problem: Respiratory - Adult  Goal: Achieves optimal ventilation and oxygenation  Outcome: Progressing     Problem: Skin/Tissue Integrity - Adult  Goal: Skin integrity remains intact  Outcome: Progressing     Problem: Musculoskeletal - Adult  Goal: Return mobility to safest level of function  Outcome: Progressing     Problem: Musculoskeletal - Adult  Goal: Return ADL status to a safe level of function  Outcome: Progressing     Problem: Gastrointestinal - Adult  Goal: Minimal or absence of nausea and vomiting  Outcome: Progressing     Problem: Gastrointestinal - Adult  Goal: Maintains or returns to baseline bowel function  Outcome: Progressing     Problem: Gastrointestinal - Adult  Goal: Maintains adequate nutritional intake  Outcome: Progressing     Problem: Genitourinary - Adult  Goal: Absence of urinary retention  Outcome: Progressing     Problem: Metabolic/Fluid and 
  Problem: Discharge Planning  Goal: Discharge to home or other facility with appropriate resources  Outcome: Progressing     Problem: Pain  Goal: Verbalizes/displays adequate comfort level or baseline comfort level  Outcome: Progressing     Problem: Safety - Adult  Goal: Free from fall injury  Outcome: Progressing     Problem: Skin/Tissue Integrity  Goal: Absence of new skin breakdown  Description: 1.  Monitor for areas of redness and/or skin breakdown  2.  Assess vascular access sites hourly  3.  Every 4-6 hours minimum:  Change oxygen saturation probe site  4.  Every 4-6 hours:  If on nasal continuous positive airway pressure, respiratory therapy assess nares and determine need for appliance change or resting period.  Outcome: Progressing     Problem: ABCDS Injury Assessment  Goal: Absence of physical injury  Outcome: Progressing     Problem: Neurosensory - Adult  Goal: Achieves stable or improved neurological status  Outcome: Progressing  Goal: Achieves maximal functionality and self care  Outcome: Progressing     Problem: Respiratory - Adult  Goal: Achieves optimal ventilation and oxygenation  Outcome: Progressing     Problem: Skin/Tissue Integrity - Adult  Goal: Skin integrity remains intact  Outcome: Progressing     Problem: Musculoskeletal - Adult  Goal: Return mobility to safest level of function  Outcome: Progressing  Goal: Return ADL status to a safe level of function  Outcome: Progressing     Problem: Gastrointestinal - Adult  Goal: Minimal or absence of nausea and vomiting  Outcome: Progressing  Goal: Maintains or returns to baseline bowel function  Outcome: Progressing  Goal: Maintains adequate nutritional intake  Outcome: Progressing     Problem: Genitourinary - Adult  Goal: Absence of urinary retention  Outcome: Progressing     Problem: Metabolic/Fluid and Electrolytes - Adult  Goal: Electrolytes maintained within normal limits  Outcome: Progressing  Goal: Hemodynamic stability and optimal renal 
Minimal or absence of nausea and vomiting  10/31/2024 0834 by Abner Ramírez RN  Outcome: Progressing     Problem: Gastrointestinal - Adult  Goal: Maintains or returns to baseline bowel function  10/31/2024 0834 by Abner Ramírez RN  Outcome: Progressing     Problem: Gastrointestinal - Adult  Goal: Maintains adequate nutritional intake  10/31/2024 0834 by Abner Ramírez RN  Outcome: Progressing     Problem: Genitourinary - Adult  Goal: Absence of urinary retention  10/31/2024 0834 by Abner Ramírez RN  Outcome: Progressing     Problem: Metabolic/Fluid and Electrolytes - Adult  Goal: Electrolytes maintained within normal limits  10/31/2024 0834 by Abner Ramírez RN  Outcome: Progressing     
Provide frequent short contacts to provide reality reorientation, refocusing and direction  4. Decrease environmental stimuli, including noise as appropriate  5. Monitor and intervene to maintain adequate nutrition, hydration, elimination, sleep and activity  6. If unable to ensure safety without constant attention obtain sitter and review sitter guidelines with assigned personnel  7. Initiate Psychosocial CNS and Spiritual Care consult, as indicated  11/4/2024 2225 by Patty Ramires, RN  Outcome: Progressing     Problem: Behavior  Goal: Pt/Family maintain appropriate behavior and adhere to behavioral management agreement, if implemented  Description: INTERVENTIONS:  1. Assess patient/family's coping skills and  non-compliant behavior (including use of illegal substances)  2. Notify security of behavior or suspected illegal substances which indicate the need for search of the family and/or belongings  3. Encourage verbalization of thoughts and concerns in a socially appropriate manner  4. Utilize positive, consistent limit setting strategies supporting safety of patient, staff and others  5. Encourage participation in the decision making process about the behavioral management agreement  6. If a visitor's behavior poses a threat to safety call refer to organization policy.  7. Initiate consult with , Psychosocial CNS, Spiritual Care as appropriate  11/4/2024 2225 by Patty Ramires, RN  Outcome: Progressing     Problem: Nutrition Deficit:  Goal: Optimize nutritional status  Outcome: Progressing     
precautions, as indicated  3. Provide frequent short contacts to provide reality reorientation, refocusing and direction  4. Decrease environmental stimuli, including noise as appropriate  5. Monitor and intervene to maintain adequate nutrition, hydration, elimination, sleep and activity  6. If unable to ensure safety without constant attention obtain sitter and review sitter guidelines with assigned personnel  7. Initiate Psychosocial CNS and Spiritual Care consult, as indicated  11/3/2024 1625 by Adia Langford RN  Outcome: Progressing     Problem: Behavior  Goal: Pt/Family maintain appropriate behavior and adhere to behavioral management agreement, if implemented  Description: INTERVENTIONS:  1. Assess patient/family's coping skills and  non-compliant behavior (including use of illegal substances)  2. Notify security of behavior or suspected illegal substances which indicate the need for search of the family and/or belongings  3. Encourage verbalization of thoughts and concerns in a socially appropriate manner  4. Utilize positive, consistent limit setting strategies supporting safety of patient, staff and others  5. Encourage participation in the decision making process about the behavioral management agreement  6. If a visitor's behavior poses a threat to safety call refer to organization policy.  7. Initiate consult with , Psychosocial CNS, Spiritual Care as appropriate  11/3/2024 1625 by Adia Langford, RN  Outcome: Progressing

## 2024-11-07 NOTE — CONSULTS
Thomas Sinclair M.D.,[facility]  oJri Carty D.O., BRIELLE., [facility]  Nima Monet M.D.  Mattie Goldman M.D.   Kenji Rowley D.O.  Carlos Gruber M.D.       Patient:  James Vazquez III 79 y.o. male MRN: [id number]           PULMONARY CONSULTATION    Reason for Consultation: mass seen on CT chest, questionable mets  Referring Physician: Luisito Mccord DO    Communication with the referring physician will be sent via the electronic medical record.    Chief Complaint: Shortness of breath    CODE STATUS: FULL CODE    SUBJECTIVE:  HPI:  James Vazquez III is a 79 y.o. male we were asked to evaluate for a lung mass on C T with questionable mets.    James was admitted to the hospital on 10/28 with shortness of breath. He was at his normally scheduled hemodialysis session when he started having shortness of breath and was placed on oxygen. After the dialysis was completed, he was still short of breath so he came to the hospital where a second session of dialysis was done of 2 hours just for additional fluid removal and his symptoms improved. CXR initially demonstrated bilateral infiltrates and he was started on CAP coverage with Rocephin and Doxycycline. He also had positive blood cultures of Staphylococcus epidermidis as Streptococcus species in 2 of 4 bottles and Infectious Disease was consulted. A JENNY was completed that did not show vegetation however due to his history of aortic stenosis it could not be ruled out. He is to continue Rocephin for a minimum of 4 weeks per ID recommendations.    After the JENNY, James was having odynophagia and GI was consulted. It was felt the new symptoms were due to traumatic JENNY but a chest CT was completed. The CT shows a RUL and LLL nodule leading to our service being consulted.    James is known to our service and has seen Dr. Kenji Rowley. The RUL nodule is known from previous imaging initially discovered 7/18/24. Lung LIZ probability of malignancy calculated as 68.8% probability of 
  [facility]  [facility]  Consultation Note     Admit Date: 10/28/2024  4:05 PM    Reason for Consult:   Positive blood cultures/hemodialysis patient    Attending Physician:  Luisito Mccord DO    HISTORY OF PRESENT ILLNESS:             The history is obtained from extensive review of available past medical records. The patient is a 79 y.o. male who is previously known to the ID service.  The patient has ESRD and is on dialysis on Monday, Wednesday and Friday.  The patient presented to [facility] on 10/20/2024 complaining of shortness of breath.  He had had a fall last week and missed dialysis on Friday.  On presentation he was afebrile and slightly hypertensive.  He has remained afebrile.  White count was normal.  CMP was unremarkable except for elevated BUN and creatinine.  proBNP was >70,000. Chest x-ray showed patchy infiltrates on upper lobes.  Rapid SARS-CoV-2 and influenza were negative.  Blood cultures have turned positive for GPC in chains and in clusters.  BCID is identifying Staphylococcus epidermidis with a mecA gene Streptococcus species.  He currently denies dyspnea.  No sick contacts.    Past Medical History:    October 2020.  Admitted to [facility] with a painful rash on the right forehead.  He had been diagnosed with shingles and treated with Valacyclovir.  He was having visual and auditory hallucinations.  Seen by ophthalmology and had no eye involvement.  CT showed a lacunar infarct.  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  Treated with IV Acyclovir.  He was followed by Dr. Amador.    July 2020.  Admitted to [facility] with fevers after exposure to SARS-CoV-2.  Seen by Dr. Graf for COVID viremia and pneumonia.  Treated with Remdesivir, dexamethasone, vitamin C and zinc.        Diagnosis Date    Blind left eye     Chronic kidney disease     Dialysis 
[facility] Consult Note   SOFIYA Oro with Mohan Hines D.O. Consult Note        Date of Service: 11/4/2024  Reason for Consult: Oropharyngeal dysphagia/esophagitis  Requesting Physician: Dr. Swanson    CHIEF COMPLAINT: Shortness of breath    History Obtained From:  patient, electronic medical record    HISTORY OF PRESENT ILLNESS:       James Vazquez III is a 79 y.o. male with significant past medical history of end-stage renal disease on HD, tobacco use, hypertension and hyperlipidemia presenting to ED for shortness of breath and admitted with pneumonia.  HPI limited by patient currently in dialysis.  Pt reports yesterday he had complaints of odynophagia and dysphagia however states those symptoms has resolved.  Adamant about not wanting procedures or added medications.  Patient somewhat cantankerous and minimally interactive states he wants to go home.  Denies history of dysphagia states he tolerates diet.  No complaints of abdominal pain, nausea or vomiting.  Chart extensively reviewed.  EGD 10/28/2020 with Dr. Styles for anemia-patient had changes in the distal esophagus that were unusual but certainly no definitive source for his melena.  No ulcerations were noted to involve the duodenum or stomach.  EGD 7/20/2020 with Dr. Styles-severe reflux disease with multiple esophageal ulcerations with associated friability and possible underlying malignancy and moderate gastritis.  Biopsies-Stomach, antrum, biopsy: Gastric oxyntoantral mucosa with minimal chronic gastritis. Immunostain for Helicobacter pylori organisms is negative. Negative for intestinal metaplasia.  Colonoscopy biopsies reviewed from 12/10/2019 ascending colon polyp tubular adenoma and colon polyp x 2 tubular adenoma.    Admission labs: Chloride 97, BUN 24, creatinine 4.3, BNP 70,000, hemoglobin 10, platelet 128. Labs today: Hemoglobin 10.5, platelet 99, BUN 37, creatinine 6.8.    Consultation for 
renal  Fluid (ml/day): per nephrology management    Nutrition Diagnosis:   Inadequate oral intake related to inadequate protein-energy intake (in the setting of increased nutrient needs, odynophagia) as evidenced by  (noted poor oral intake)    Nutrition Interventions:   Food and/or Nutrient Delivery: Continue NPO  Nutrition Education/Counseling: No recommendation at this time (pt SUNITA, plan for SNF at discharge)  Coordination of Nutrition Care: Continue to monitor while inpatient       Goals:  Goals: Other, by next RD assessment  Type of Goal: New goal  Previous Goal Met: No Progress toward Goal(s)    Nutrition Monitoring and Evaluation:      Food/Nutrient Intake Outcomes: Diet Advancement/Tolerance  Physical Signs/Symptoms Outcomes: Biochemical Data, GI Status, Skin, Nutrition Focused Physical Findings, Weight, Fluid Status or Edema    Discharge Planning:    Too soon to determine     Miranda D'Amico, RD, LD  Contact: [phone]    
  XR CHEST 10/28/2024    IMPRESSION:  New patchy opacities are present in the upper lung fields which may indicate  new bilateral pneumonia or areas of atelectasis.  Short-term follow-up  recommended.    IMPRESSION/RECOMMENDATIONS:      Briefly, Mr. James Vazquez is a 79 year old male with a PMH of ESRD on IHD three times weekly MWF, via left upper arm AVF, HTN, HFpEF, HDL, BPH, herpes zoster with encephalitis, who was admitted on 10/28/2024 for shortness of breath.  Chest x-ray showed bilateral pneumonia.  Patient also states he had a fall on Thursday and missed his dialysis treatment on Friday however went to dialysis on Monday.  We are consulted for hemodialysis management.    ESRD on HD 3 times weekly MWF via left AV fistula, HD tomorrow, for UF x 2 hours today  Hypokalemia 2/2 removal with dialysis, to replace  HTN, on carvedilol, amlodipine   MBD of CKD, on sevelamer   Anemia of CKD, hold NICOLE for HGB >10   History of HFpEF, proBNP >70,000  ---------------------------------------------  L1, L4, L5 fractures  Hyperlipidemia, on rosuvastatin  Pneumonia, on ceftriaxone and doxycycline     PLAN:     HD 3 times weekly MWF, HD tomorrow, UF x 2 hours today  Replace potassium  Continue carvedilol 12.5 mg PO BID  Continue sevelamer 800 mg PO TID  Obtain phosphorus level   Monitor labs  Monitor blood pressure    Thank you Patricia Hannah APRN - CNP  for allowing us to participate in the care of Jmaes Vazquez III.      Electronically signed by TOMMY Chamberlain CNP on 10/29/2024 at 12:28 PM     I saw and evaluated the patient, performing the key elements of the service. I discussed the findings, assessment and plan with NP and agree with her findings and plans as documented in her note.        Harry Rivas MD

## 2024-11-07 NOTE — CARE COORDINATION
CASE MANAGEMENT.... Pulm saw patient and will plan for outpt workup. Patient declining bx at this time. Ok for dc from all consultants. Awaiting auth for [facility].  N 17 in epic. Ambulance forms/[id number] in chart. Will continue to follow along.     UPDATE, 1:40 pm... Auth obtained for [facility]. [facility] will transport patient to the facility at 4:30 pm. Patient sleeping. Nursing, facility and patients wife updated.

## 2024-11-07 NOTE — PROGRESS NOTES
Subjective:    Awake and alert.  No problems overnight.  Denies chest pain or dyspnea.  Denies abdominal pain.    Tolerating diet.  No nausea or vomiting.    Objective:    BP (!) 132/51   Pulse 74   Temp 98 °F (36.7 °C) (Oral)   Resp 16   Ht 1.702 m (5' 7\")   Wt 63.2 kg (139 lb 5.3 oz)   SpO2 96%   BMI 21.82 kg/m²   Gen: Alert and in no apparent distress   Skin: Warm and dry  Neck: Supple, no JVD  Heart:  RRR, no murmurs, gallops, or rubs.  Lungs:  CTA bilaterally, no wheeze, rales or rhonchi  Abd: bowel sounds present, nontender, nondistended, no masses  Extrem:  No clubbing, cyanosis, or edema, pulses intact    I/O last 3 completed shifts:  In: 493.3 [P.O.:30; IV Piggyback:163.3]  Out: 1800     Results     Component Value Units   EKG Rhythm Strip [[id number]]    Collected: 10/30/24 0800    Updated: 10/30/24 0902    Narrative:     PACS SC/MT   EKG Rhythm Strip [[id number]]    Collected: 10/30/24 0002    Updated: 10/30/24 0513    Narrative:        CBC auto differential [[id number]] (Abnormal)    Collected: 10/30/24 0214    Updated: 10/30/24 0301    Specimen Source: Blood     WBC 4.2 Low  k/uL    RBC 2.72 Low  m/uL    Hemoglobin 8.8 Low  g/dL    Hematocrit 31.5 Low  %    .8 High  fL    MCH 32.4 pg    MCHC 27.9 Low  g/dL    RDW 18.3 High  %    Platelets 104 Low  k/uL    MPV 11.0 fL    Neutrophils % 59 %    Lymphocytes % 25 %    Monocytes % 10 %    Eosinophils % 3 %    Basophils % 2 %    Immature Granulocytes % 1 %    Neutrophils Absolute 2.48 k/uL    Lymphocytes Absolute 1.04 Low  k/uL    Monocytes Absolute 0.43 k/uL    Eosinophils Absolute 0.14 k/uL    Basophils Absolute 0.07 k/uL    Immature Granulocytes Absolute <0.03 k/uL    RBC Morphology 1+ ANISOCYTOSIS    RBC Morphology 1+ OVALOCYTES    RBC Morphology 1+ POIKILOCYTOSIS    RBC Morphology 1+ POLYCHROMASIA   Comprehensive Metabolic Panel w/ Reflex to MG [[id number]] (Abnormal)    Collected: 10/30/24 0214    Updated: 10/30/24 0256  
     Subjective:    No current complaints    Awaiting JENNY    Objective:    BP (!) 153/59   Pulse 72   Temp 97.6 °F (36.4 °C) (Oral)   Resp 18   Ht 1.702 m (5' 7\")   Wt 63 kg (138 lb 14.2 oz)   SpO2 94%   BMI 21.75 kg/m²   Gen: Alert and in no apparent distress   Skin: Warm and dry  Neck: Supple, no JVD  Heart:  RRR, 2/6 systolic murmur right upper sternal border  Lungs:  CTA bilaterally, no wheeze, rales or rhonchi  Abd: bowel sounds present, nontender, nondistended, no masses  Extrem:  No clubbing, cyanosis, or edema, pulses intact, AVF left upper arm    I/O last 3 completed shifts:  In: 10 [I.V.:10]  Out: -     Laboratory:     CBC with Differential:    Lab Results   Component Value Date/Time    WBC 5.0 11/01/2024 06:30 AM    RBC 2.79 11/01/2024 06:30 AM    HGB 9.1 11/01/2024 06:30 AM    HCT 30.0 11/01/2024 06:30 AM    PLT 99 10/31/2024 05:10 AM    .5 11/01/2024 06:30 AM    MCH 32.6 11/01/2024 06:30 AM    MCHC 30.3 11/01/2024 06:30 AM    RDW 18.3 11/01/2024 06:30 AM    NRBC 0.9 02/07/2023 08:42 PM    LYMPHOPCT 17 11/01/2024 06:30 AM    MONOPCT 9 11/01/2024 06:30 AM    EOSPCT 4 11/01/2024 06:30 AM    BASOPCT 1 11/01/2024 06:30 AM    MONOSABS 0.46 11/01/2024 06:30 AM    LYMPHSABS 0.86 11/01/2024 06:30 AM    EOSABS 0.18 11/01/2024 06:30 AM    BASOSABS 0.07 11/01/2024 06:30 AM     CMP:    Lab Results   Component Value Date/Time     11/01/2024 06:30 AM    K 4.3 11/01/2024 06:30 AM    K 3.4 10/14/2022 07:13 PM     11/01/2024 06:30 AM    CO2 27 11/01/2024 06:30 AM    BUN 27 11/01/2024 06:30 AM    CREATININE 6.6 11/01/2024 06:30 AM    GFRAA 14 10/16/2022 02:38 AM    LABGLOM 8 11/01/2024 06:30 AM    LABGLOM 12 01/16/2024 05:18 AM    GLUCOSE 88 11/01/2024 06:30 AM    CALCIUM 9.8 11/01/2024 06:30 AM    BILITOT 0.5 11/01/2024 06:30 AM    ALKPHOS 93 11/01/2024 06:30 AM    AST 11 11/01/2024 06:30 AM    ALT <5 11/01/2024 06:30 AM        XR CHEST (2 VW)   Final Result   New patchy opacities are present in 
     Subjective:    Patient complaining of painful swallowing    Wife relates poor oral oral intake prior to admission    History of GERD    Objective:    BP (!) 136/57   Pulse 70   Temp 97.6 °F (36.4 °C) (Temporal)   Resp 18   Ht 1.702 m (5' 7\")   Wt 60 kg (132 lb 4.4 oz)   SpO2 97%   BMI 20.72 kg/m²   Gen: Alert and in no apparent distress   Skin: Warm and dry  Neck: Supple, no JVD  Heart:  RRR, 2/6 systolic murmur  Lungs:  CTA bilaterally, no wheeze, rales or rhonchi  Abd: bowel sounds present, nontender, nondistended, no masses  Extrem: Left upper extremity AV graft intact    I/O last 3 completed shifts:  In: 360 [P.O.:60]  Out: 2300     Laboratory:     CBC with Differential:    Lab Results   Component Value Date/Time    WBC 5.2 11/03/2024 05:22 AM    RBC 3.28 11/03/2024 05:22 AM    HGB 10.9 11/03/2024 05:22 AM    HCT 35.0 11/03/2024 05:22 AM    PLT 93 11/03/2024 05:22 AM    .7 11/03/2024 05:22 AM    MCH 33.2 11/03/2024 05:22 AM    MCHC 31.1 11/03/2024 05:22 AM    RDW 18.6 11/03/2024 05:22 AM    NRBC 0.9 02/07/2023 08:42 PM    LYMPHOPCT 17 11/03/2024 05:22 AM    MONOPCT 11 11/03/2024 05:22 AM    EOSPCT 2 11/03/2024 05:22 AM    BASOPCT 1 11/03/2024 05:22 AM    MONOSABS 0.55 11/03/2024 05:22 AM    LYMPHSABS 0.87 11/03/2024 05:22 AM    EOSABS 0.11 11/03/2024 05:22 AM    BASOSABS 0.06 11/03/2024 05:22 AM     CMP:    Lab Results   Component Value Date/Time     11/03/2024 05:22 AM    K 4.3 11/03/2024 05:22 AM    K 3.4 10/14/2022 07:13 PM    CL 95 11/03/2024 05:22 AM    CO2 26 11/03/2024 05:22 AM    BUN 21 11/03/2024 05:22 AM    CREATININE 5.1 11/03/2024 05:22 AM    GFRAA 14 10/16/2022 02:38 AM    LABGLOM 11 11/03/2024 05:22 AM    LABGLOM 12 01/16/2024 05:18 AM    GLUCOSE 61 11/03/2024 05:22 AM    CALCIUM 10.0 11/03/2024 05:22 AM    BILITOT 0.6 11/03/2024 05:22 AM    ALKPHOS 102 11/03/2024 05:22 AM    AST 10 11/03/2024 05:22 AM    ALT <5 11/03/2024 05:22 AM        XR CHEST (2 VW)   Final Result 
  [facility]  [facility]  Progress Note    SUBJECTIVE:  Chief Complaint   Patient presents with    Shortness of Breath     The patient is feeling slightly better.  No new complaints.  No fever.  Tolerating IV antibiotic.    Review of systems:  As stated above in the chief complaint, otherwise negative.    Medications:  Scheduled Meds:   [START ON 2024] epoetin delmer-epbx  3,000 Units SubCUTAneous Once per day on     carvedilol  12.5 mg Oral BID WC    aspirin  81 mg Oral QAM    budesonide  0.25 mg Nebulization BID RT    And    arformoterol tartrate  15 mcg Nebulization BID RT    And    ipratropium  0.5 mg Nebulization Q6H RT    pantoprazole  40 mg Oral QAM    rosuvastatin  10 mg Oral Every Other Day    sevelamer  800 mg Oral TID WC    sodium chloride flush  10 mL IntraVENous 2 times per day    heparin (porcine)  5,000 Units SubCUTAneous Q8H    cefTRIAXone (ROCEPHIN) IV  1,000 mg IntraVENous Q24H    doxycycline  100 mg Oral 2 times per day     Continuous Infusions:   sodium chloride       PRN Meds:albuterol, nitroGLYCERIN, sodium chloride flush, sodium chloride, ondansetron **OR** ondansetron, senna, acetaminophen **OR** acetaminophen    OBJECTIVE:  BP (!) 127/50   Pulse 68   Temp 97.8 °F (36.6 °C) (Oral)   Resp 18   Ht 1.702 m (5' 7\")   Wt 62.5 kg (137 lb 12.6 oz)   SpO2 97%   BMI 21.58 kg/m²   Temp  Av.1 °F (36.7 °C)  Min: 97.8 °F (36.6 °C)  Max: 98.7 °F (37.1 °C)  Constitutional: The patient is awake, alert, and oriented.  Lying in bed.  He is in no distress.  Wife at bedside.  Skin: Warm and dry. No rashes were noted.   HEENT: Round and reactive pupils.  Moist mucous membranes.  No ulcerations or thrush.  Neck: Supple to movements.   Chest: No use of accessory muscles to breathe. Symmetrical expansion.  No wheezing, crackles or rhonchi.  Cardiovascular: S1 and S2 are rhythmic and regular.  2/6 high-pitched systolic crescendo/decrescendo 
  [facility]  [facility]  Progress Note    SUBJECTIVE:  Chief Complaint   Patient presents with    Shortness of Breath     No new problems reported.  Tolerating antibiotics.  They have decided to go to a SNF.  They have picked [facility].    Review of systems:  As stated above in the chief complaint, otherwise negative.    Medications:  Scheduled Meds:   sodium chloride flush  5-40 mL IntraVENous 2 times per day    lidocaine 1 % injection  50 mg IntraDERmal Once    polyethylene glycol  17 g Oral Daily    pantoprazole  40 mg Oral BID AC    epoetin delmer-epbx  3,000 Units SubCUTAneous Once per day on     carvedilol  12.5 mg Oral BID WC    aspirin  81 mg Oral QAM    budesonide  0.25 mg Nebulization BID RT    And    arformoterol tartrate  15 mcg Nebulization BID RT    And    ipratropium  0.5 mg Nebulization Q6H RT    rosuvastatin  10 mg Oral Every Other Day    sevelamer  800 mg Oral TID WC    sodium chloride flush  10 mL IntraVENous 2 times per day    heparin (porcine)  5,000 Units SubCUTAneous Q8H     Continuous Infusions:   sodium chloride      sodium chloride       PRN Meds:sodium chloride flush, sodium chloride, aluminum & magnesium hydroxide-simethicone (MAALOX) 30 mL, lidocaine viscous hcl (XYLOCAINE) 5 mL (GI COCKTAIL), albuterol, nitroGLYCERIN, sodium chloride flush, sodium chloride, ondansetron **OR** ondansetron, senna, acetaminophen **OR** acetaminophen    OBJECTIVE:  BP (!) 115/50   Pulse 70   Temp 97.8 °F (36.6 °C) (Oral)   Resp 16   Ht 1.702 m (5' 7\")   Wt 58 kg (127 lb 13.9 oz)   SpO2 99%   BMI 20.03 kg/m²   Temp  Av.8 °F (36.6 °C)  Min: 97.4 °F (36.3 °C)  Max: 98.3 °F (36.8 °C)  Constitutional: The patient is awake, alert, resting in bed  Skin: Warm and dry. No rashes were noted.   HEENT: Round and reactive pupils.  Moist mucous membranes.  No ulcerations or thrush.  Neck: Supple to movements.   Chest: Good breath sounds.  No 
  [facility]  [facility]  Progress Note    SUBJECTIVE:  Chief Complaint   Patient presents with    Shortness of Breath     In bed no nausea vomiting or diarrhea    Review of systems:  As stated above in the chief complaint, otherwise negative.    Medications:  Scheduled Meds:   epoetin delmer-epbx  3,000 Units SubCUTAneous Once per day on     carvedilol  12.5 mg Oral BID WC    aspirin  81 mg Oral QAM    budesonide  0.25 mg Nebulization BID RT    And    arformoterol tartrate  15 mcg Nebulization BID RT    And    ipratropium  0.5 mg Nebulization Q6H RT    pantoprazole  40 mg Oral QAM    rosuvastatin  10 mg Oral Every Other Day    sevelamer  800 mg Oral TID WC    sodium chloride flush  10 mL IntraVENous 2 times per day    heparin (porcine)  5,000 Units SubCUTAneous Q8H    cefTRIAXone (ROCEPHIN) IV  1,000 mg IntraVENous Q24H    doxycycline  100 mg Oral 2 times per day     Continuous Infusions:   sodium chloride       PRN Meds:albuterol, nitroGLYCERIN, sodium chloride flush, sodium chloride, ondansetron **OR** ondansetron, senna, acetaminophen **OR** acetaminophen    OBJECTIVE:  BP (!) 159/55   Pulse 69   Temp 97.5 °F (36.4 °C) (Oral)   Resp 17   Ht 1.702 m (5' 7\")   Wt 60 kg (132 lb 4.4 oz)   SpO2 98%   BMI 20.72 kg/m²   Temp  Av.8 °F (36.6 °C)  Min: 97.4 °F (36.3 °C)  Max: 98.3 °F (36.8 °C)  Constitutional: The patient is awake, alert, resting in bed  Skin: Warm and dry. No rashes were noted.   HEENT: Round and reactive pupils.  Moist mucous membranes.  No ulcerations or thrush.  Neck: Supple to movements.   Chest: Good breath sounds.  No crackles.  Cardiovascular: S1 and S2 are rhythmic and regular.  2/6 high-pitched systolic crescendo/decrescendo murmur.  Abdomen: Positive bowel sounds to auscultation.  Extremities: No edema. Left upper perm AVF  Lines: Peripheral.    Laboratory and Tests:  Lab Results   Component Value Date    CRP 41.0 (H) 10/30/2024    
  [facility]  [facility]  Progress Note    SUBJECTIVE:  Chief Complaint   Patient presents with    Shortness of Breath     Tolerating antibiotics.  No fever.  Which shows that he lost his voice.  He also did this during his previous admission.  No nausea, vomiting or diarrhea.    Review of systems:  As stated above in the chief complaint, otherwise negative.    Medications:  Scheduled Meds:   pantoprazole  40 mg Oral BID AC    epoetin delmer-epbx  3,000 Units SubCUTAneous Once per day on     carvedilol  12.5 mg Oral BID WC    aspirin  81 mg Oral QAM    budesonide  0.25 mg Nebulization BID RT    And    arformoterol tartrate  15 mcg Nebulization BID RT    And    ipratropium  0.5 mg Nebulization Q6H RT    rosuvastatin  10 mg Oral Every Other Day    sevelamer  800 mg Oral TID WC    sodium chloride flush  10 mL IntraVENous 2 times per day    heparin (porcine)  5,000 Units SubCUTAneous Q8H    cefTRIAXone (ROCEPHIN) IV  1,000 mg IntraVENous Q24H    doxycycline  100 mg Oral 2 times per day     Continuous Infusions:   sodium chloride       PRN Meds:albuterol, nitroGLYCERIN, sodium chloride flush, sodium chloride, ondansetron **OR** ondansetron, senna, acetaminophen **OR** acetaminophen    OBJECTIVE:  BP (!) 142/57   Pulse 63   Temp 98.1 °F (36.7 °C) (Oral)   Resp 18   Ht 1.702 m (5' 7\")   Wt 58.6 kg (129 lb 3 oz)   SpO2 98%   BMI 20.23 kg/m²   Temp  Av °F (36.7 °C)  Min: 97.6 °F (36.4 °C)  Max: 98.7 °F (37.1 °C)  Constitutional: The patient is awake, alert, resting in bed  Skin: Warm and dry. No rashes were noted.   HEENT: Round and reactive pupils.  Moist mucous membranes.  No ulcerations or thrush.  Neck: Supple to movements.   Chest: Good breath sounds.  No crackles.  Cardiovascular: S1 and S2 are rhythmic and regular.  2/6 high-pitched systolic crescendo/decrescendo murmur.  Abdomen: Positive bowel sounds to auscultation.  Extremities: No edema. Left upper 
  [facility]  [facility]  Progress Note    SUBJECTIVE:  Chief Complaint   Patient presents with    Shortness of Breath     No new complaints today.  Tolerating IV antibiotic.  No fever.  No chills.    Review of systems:  As stated above in the chief complaint, otherwise negative.    Medications:  Scheduled Meds:   epoetin delmer-epbx  3,000 Units SubCUTAneous Once per day on     carvedilol  12.5 mg Oral BID WC    aspirin  81 mg Oral QAM    budesonide  0.25 mg Nebulization BID RT    And    arformoterol tartrate  15 mcg Nebulization BID RT    And    ipratropium  0.5 mg Nebulization Q6H RT    pantoprazole  40 mg Oral QAM    rosuvastatin  10 mg Oral Every Other Day    sevelamer  800 mg Oral TID WC    sodium chloride flush  10 mL IntraVENous 2 times per day    heparin (porcine)  5,000 Units SubCUTAneous Q8H    cefTRIAXone (ROCEPHIN) IV  1,000 mg IntraVENous Q24H    doxycycline  100 mg Oral 2 times per day     Continuous Infusions:   sodium chloride       PRN Meds:albuterol, nitroGLYCERIN, sodium chloride flush, sodium chloride, ondansetron **OR** ondansetron, senna, acetaminophen **OR** acetaminophen    OBJECTIVE:  BP (!) 153/59   Pulse 72   Temp 97.6 °F (36.4 °C) (Oral)   Resp 18   Ht 1.702 m (5' 7\")   Wt 63 kg (138 lb 14.2 oz)   SpO2 94%   BMI 21.75 kg/m²   Temp  Av °F (36.7 °C)  Min: 97.2 °F (36.2 °C)  Max: 98.4 °F (36.9 °C)  Constitutional: The patient is awake, alert, and oriented.  Lying in bed.  He is in no distress.  Wife at bedside.  Skin: Warm and dry. No rashes were noted.   HEENT: Round and reactive pupils.  Moist mucous membranes.  No ulcerations or thrush.  Neck: Supple to movements.   Chest: Good breath sounds.  No crackles.  Cardiovascular: S1 and S2 are rhythmic and regular.  2/6 high-pitched systolic crescendo/decrescendo murmur.  Abdomen: Positive bowel sounds to auscultation.  Extremities: No edema.  Lines: Peripheral.    Laboratory and 
  [facility]  [facility]  Progress Note    SUBJECTIVE:  Chief Complaint   Patient presents with    Shortness of Breath     HD  No nausea vomiting diarrhea    Review of systems:  As stated above in the chief complaint, otherwise negative.    Medications:  Scheduled Meds:   epoetin delmer-epbx  3,000 Units SubCUTAneous Once per day on     carvedilol  12.5 mg Oral BID WC    aspirin  81 mg Oral QAM    budesonide  0.25 mg Nebulization BID RT    And    arformoterol tartrate  15 mcg Nebulization BID RT    And    ipratropium  0.5 mg Nebulization Q6H RT    pantoprazole  40 mg Oral QAM    rosuvastatin  10 mg Oral Every Other Day    sevelamer  800 mg Oral TID WC    sodium chloride flush  10 mL IntraVENous 2 times per day    heparin (porcine)  5,000 Units SubCUTAneous Q8H    cefTRIAXone (ROCEPHIN) IV  1,000 mg IntraVENous Q24H    doxycycline  100 mg Oral 2 times per day     Continuous Infusions:   sodium chloride       PRN Meds:albuterol, nitroGLYCERIN, sodium chloride flush, sodium chloride, ondansetron **OR** ondansetron, senna, acetaminophen **OR** acetaminophen    OBJECTIVE:  /88   Pulse 68   Temp 97.5 °F (36.4 °C) (Temporal)   Resp 16   Ht 1.702 m (5' 7\")   Wt 63 kg (138 lb 14.2 oz)   SpO2 96%   BMI 21.75 kg/m²   Temp  Av.9 °F (36.6 °C)  Min: 97.5 °F (36.4 °C)  Max: 98.1 °F (36.7 °C)  Constitutional: The patient is awake, alert, and oriented. Juust done w HD  Skin: Warm and dry. No rashes were noted.   HEENT: Round and reactive pupils.  Moist mucous membranes.  No ulcerations or thrush.  Neck: Supple to movements.   Chest: Good breath sounds.  No crackles.  Cardiovascular: S1 and S2 are rhythmic and regular.  2/6 high-pitched systolic crescendo/decrescendo murmur.  Abdomen: Positive bowel sounds to auscultation.  Extremities: No edema.  Lines: Peripheral.    Laboratory and Tests:  Lab Results   Component Value Date    CRP 41.0 (H) 10/30/2024    CRP 0.5 
  [facility]  [facility]  Progress Note    SUBJECTIVE:  Chief Complaint   Patient presents with    Shortness of Breath     Patient resting in bed. Patient is tolerating medications. No reported adverse drug reactions.  No nausea, vomiting, diarrhea.Wife at bedside. Having episodic difficulty swallowing.     Review of systems:  As stated above in the chief complaint, otherwise negative.    Medications:  Scheduled Meds:   sodium chloride flush  5-40 mL IntraVENous 2 times per day    polyethylene glycol  17 g Oral Daily    cefTRIAXone (ROCEPHIN) IV  1,000 mg IntraVENous Q24H    pantoprazole  40 mg Oral BID AC    epoetin delmer-epbx  3,000 Units SubCUTAneous Once per day on     carvedilol  12.5 mg Oral BID WC    aspirin  81 mg Oral QAM    budesonide  0.25 mg Nebulization BID RT    And    arformoterol tartrate  15 mcg Nebulization BID RT    And    ipratropium  0.5 mg Nebulization Q6H RT    rosuvastatin  10 mg Oral Every Other Day    sevelamer  800 mg Oral TID WC    sodium chloride flush  10 mL IntraVENous 2 times per day    heparin (porcine)  5,000 Units SubCUTAneous Q8H     Continuous Infusions:   sodium chloride      sodium chloride       PRN Meds:sodium chloride flush, sodium chloride, aluminum & magnesium hydroxide-simethicone (MAALOX) 30 mL, lidocaine viscous hcl (XYLOCAINE) 5 mL (GI COCKTAIL), albuterol, nitroGLYCERIN, sodium chloride flush, sodium chloride, ondansetron **OR** ondansetron, senna, acetaminophen **OR** acetaminophen    OBJECTIVE:  BP (!) 104/0   Pulse 67   Temp 97.5 °F (36.4 °C) (Oral)   Resp 14   Ht 1.702 m (5' 7\")   Wt 58.3 kg (128 lb 8.5 oz)   SpO2 94%   BMI 20.13 kg/m²   Temp  Av.7 °F (36.5 °C)  Min: 97.5 °F (36.4 °C)  Max: 97.9 °F (36.6 °C)  Constitutional: Appears ill   Skin: Warm and dry. No rashes were noted.   Neuro: Alert and oriented  HEENT: Round and reactive pupils.  Moist mucous membranes.  No ulcerations or thrush.  Chest: 
4 Eyes Skin Assessment     NAME:  James Vazquez III  YOB: 1945  MEDICAL RECORD NUMBER:  [id number]    The patient is being assessed for  Admission    I agree that at least one RN has performed a thorough Head to Toe Skin Assessment on the patient. ALL assessment sites listed below have been assessed.      Areas assessed by both nurses:    Head, Face, Ears, Shoulders, Back, Chest, Arms, Elbows, Hands, Sacrum. Buttock, Coccyx, Ischium, and Legs. Feet and Heels    The following is from a partial skin assessment as patient would not allow for a full assessment nor would they change into proper hospital attire.    Bilateral heels intact, blanchable, boggy  Ecchymosis noted throughout anterior torso, BUE, BLE  Vascular discoloration BUE  Flaky skin noted on BUE and BLE  **Patient had a fall on coccyx at home, but would not allow for RN assessment**        Does the Patient have a Wound? No noted wound(s)       Luis Antonio Prevention initiated by RN: Yes  Wound Care Orders initiated by RN: No    Pressure Injury (Stage 3,4, Unstageable, DTI, NWPT, and Complex wounds) if present, place Wound referral order by RN under : No    New Ostomies, if present place, Ostomy referral order under : No     Nurse 1 eSignature: Electronically signed by Sanjana Ceballos RN on 10/29/24 at 1:33 AM EDT    **SHARE this note so that the co-signing nurse can place an eSignature**    Nurse 2 eSignature: Electronically signed by Mimi Vargas RN on 10/29/24 at 1:38 AM EDT   
Admission chart check complete   
Department of Internal Medicine  Nephrology Nurse Practitioner Consult Note    Events reviewed.  Had JENNY earlier today    PHYSICAL EXAM:      Vitals:    VITALS:  BP (!) 153/59   Pulse 72   Temp 97.6 °F (36.4 °C) (Oral)   Resp 18   Ht 1.702 m (5' 7\")   Wt 63 kg (138 lb 14.2 oz)   SpO2 94%   BMI 21.75 kg/m²   24HR INTAKE/OUTPUT:    Intake/Output Summary (Last 24 hours) at 11/1/2024 1708  Last data filed at 11/1/2024 1420  Gross per 24 hour   Intake 100 ml   Output --   Net 100 ml     Scheduled Meds:   epoetin delmer-epbx  3,000 Units SubCUTAneous Once per day on Monday Wednesday Friday    carvedilol  12.5 mg Oral BID WC    aspirin  81 mg Oral QAM    budesonide  0.25 mg Nebulization BID RT    And    arformoterol tartrate  15 mcg Nebulization BID RT    And    ipratropium  0.5 mg Nebulization Q6H RT    pantoprazole  40 mg Oral QAM    rosuvastatin  10 mg Oral Every Other Day    sevelamer  800 mg Oral TID WC    sodium chloride flush  10 mL IntraVENous 2 times per day    heparin (porcine)  5,000 Units SubCUTAneous Q8H    cefTRIAXone (ROCEPHIN) IV  1,000 mg IntraVENous Q24H    doxycycline  100 mg Oral 2 times per day     Continuous Infusions:   sodium chloride       PRN Meds:.albuterol, nitroGLYCERIN, sodium chloride flush, sodium chloride, ondansetron **OR** ondansetron, senna, acetaminophen **OR** acetaminophen     Constitutional:  Awake, alert, oriented, in NAD  HEENT:  PERRLA, normocephalic, atraumatic  Respiratory:  CTA  Cardiovascular/Edema:  RRR, normal S1, normal S2  Gastrointestinal:  Soft, flat, non-distended, non-tender  Neurologic:  Nonfocal  Skin:  warm, dry, no rashes, no lesions  Access: Left AV fistula    DATA:    CBC:   Lab Results   Component Value Date/Time    WBC 5.0 11/01/2024 06:30 AM    RBC 2.79 11/01/2024 06:30 AM    HGB 9.1 11/01/2024 06:30 AM    HCT 30.0 11/01/2024 06:30 AM    .5 11/01/2024 06:30 AM    MCH 32.6 11/01/2024 06:30 AM    MCHC 30.3 11/01/2024 06:30 AM    RDW 18.3 11/01/2024 
Department of Internal Medicine  Nephrology Nurse Practitioner Consult Note    Events reviewed.  Seen on HD, tolerating well.    PHYSICAL EXAM:      Vitals:    VITALS:  BP (!) 142/77   Pulse 84   Temp 97.6 °F (36.4 °C) (Oral)   Resp 18   Ht 1.702 m (5' 7\")   Wt 64 kg (141 lb 1.6 oz)   SpO2 95%   BMI 22.10 kg/m²   24HR INTAKE/OUTPUT:    Intake/Output Summary (Last 24 hours) at 10/30/2024 0854  Last data filed at 10/29/2024 2115  Gross per 24 hour   Intake 330 ml   Output 1800 ml   Net -1470 ml     Scheduled Meds:   carvedilol  12.5 mg Oral BID WC    aspirin  81 mg Oral QAM    budesonide  0.25 mg Nebulization BID RT    And    arformoterol tartrate  15 mcg Nebulization BID RT    And    ipratropium  0.5 mg Nebulization Q6H RT    pantoprazole  40 mg Oral QAM    rosuvastatin  10 mg Oral Every Other Day    sevelamer  800 mg Oral TID WC    sodium chloride flush  10 mL IntraVENous 2 times per day    heparin (porcine)  5,000 Units SubCUTAneous Q8H    cefTRIAXone (ROCEPHIN) IV  1,000 mg IntraVENous Q24H    doxycycline  100 mg Oral 2 times per day     Continuous Infusions:   sodium chloride       PRN Meds:.albuterol, nitroGLYCERIN, sodium chloride flush, sodium chloride, ondansetron **OR** ondansetron, senna, acetaminophen **OR** acetaminophen     Constitutional:  Awake, alert, oriented, in NAD  HEENT:  PERRLA, normocephalic, atraumatic  Respiratory:  CTA  Cardiovascular/Edema:  RRR, normal S1, normal S2  Gastrointestinal:  Soft, flat, non-distended, non-tender  Neurologic:  Nonfocal  Skin:  warm, dry, no rashes, no lesions  Access: Left AV fistula    DATA:    CBC:   Lab Results   Component Value Date/Time    WBC 4.2 10/30/2024 02:14 AM    RBC 2.72 10/30/2024 02:14 AM    HGB 8.8 10/30/2024 02:14 AM    HCT 31.5 10/30/2024 02:14 AM    .8 10/30/2024 02:14 AM    MCH 32.4 10/30/2024 02:14 AM    MCHC 27.9 10/30/2024 02:14 AM    RDW 18.3 10/30/2024 02:14 AM     10/30/2024 02:14 AM    MPV 11.0 10/30/2024 02:14 AM 
Department of Internal Medicine  Nephrology Progress Note      Events reviewed.    SUBJECTIVE: We are following James Vazquez III for ESRD. Patient reports no complaints.    PHYSICAL EXAM:      Vitals:    VITALS:  BP (!) 104/0   Pulse 67   Temp 97.5 °F (36.4 °C) (Oral)   Resp 14   Ht 1.702 m (5' 7\")   Wt 58.3 kg (128 lb 8.5 oz)   SpO2 94%   BMI 20.13 kg/m²   24HR INTAKE/OUTPUT:    Intake/Output Summary (Last 24 hours) at 11/7/2024 0901  Last data filed at 11/6/2024 1743  Gross per 24 hour   Intake 300 ml   Output 300 ml   Net 0 ml       Scheduled Meds:   sodium chloride flush  5-40 mL IntraVENous 2 times per day    polyethylene glycol  17 g Oral Daily    cefTRIAXone (ROCEPHIN) IV  1,000 mg IntraVENous Q24H    pantoprazole  40 mg Oral BID AC    epoetin delmer-epbx  3,000 Units SubCUTAneous Once per day on Monday Wednesday Friday    carvedilol  12.5 mg Oral BID WC    aspirin  81 mg Oral QAM    budesonide  0.25 mg Nebulization BID RT    And    arformoterol tartrate  15 mcg Nebulization BID RT    And    ipratropium  0.5 mg Nebulization Q6H RT    rosuvastatin  10 mg Oral Every Other Day    sevelamer  800 mg Oral TID WC    sodium chloride flush  10 mL IntraVENous 2 times per day    heparin (porcine)  5,000 Units SubCUTAneous Q8H     Continuous Infusions:   sodium chloride      sodium chloride       PRN Meds:.sodium chloride flush, sodium chloride, aluminum & magnesium hydroxide-simethicone (MAALOX) 30 mL, lidocaine viscous hcl (XYLOCAINE) 5 mL (GI COCKTAIL), albuterol, nitroGLYCERIN, sodium chloride flush, sodium chloride, ondansetron **OR** ondansetron, senna, acetaminophen **OR** acetaminophen     Constitutional:  Awake, alert, oriented, in NAD  HEENT:  PERRLA, normocephalic, atraumatic  Respiratory:  CTA  Cardiovascular/Edema:  RRR, normal S1, normal S2  Gastrointestinal:  Soft, flat, non-distended, non-tender  Neurologic:  Nonfocal  Skin:  warm, dry, no rashes, no lesions  Access: Left AV fistula    DATA:    CBC: 
Department of Internal Medicine  Nephrology Progress Note      Events reviewed.    SUBJECTIVE: We are following James Vazquez III for ESRD. Patient reports no complaints.    PHYSICAL EXAM:      Vitals:    VITALS:  BP (!) 114/51   Pulse 67   Temp 98.1 °F (36.7 °C) (Oral)   Resp 16   Ht 1.702 m (5' 7\")   Wt 58.3 kg (128 lb 8.5 oz)   SpO2 96%   BMI 20.13 kg/m²   24HR INTAKE/OUTPUT:  No intake or output data in the 24 hours ending 11/06/24 0854    Scheduled Meds:   sodium chloride flush  5-40 mL IntraVENous 2 times per day    polyethylene glycol  17 g Oral Daily    cefTRIAXone (ROCEPHIN) IV  1,000 mg IntraVENous Q24H    pantoprazole  40 mg Oral BID AC    epoetin delmer-epbx  3,000 Units SubCUTAneous Once per day on Monday Wednesday Friday    carvedilol  12.5 mg Oral BID WC    aspirin  81 mg Oral QAM    budesonide  0.25 mg Nebulization BID RT    And    arformoterol tartrate  15 mcg Nebulization BID RT    And    ipratropium  0.5 mg Nebulization Q6H RT    rosuvastatin  10 mg Oral Every Other Day    sevelamer  800 mg Oral TID WC    sodium chloride flush  10 mL IntraVENous 2 times per day    heparin (porcine)  5,000 Units SubCUTAneous Q8H     Continuous Infusions:   sodium chloride      sodium chloride       PRN Meds:.barium sulfate, sodium chloride flush, sodium chloride, aluminum & magnesium hydroxide-simethicone (MAALOX) 30 mL, lidocaine viscous hcl (XYLOCAINE) 5 mL (GI COCKTAIL), albuterol, nitroGLYCERIN, sodium chloride flush, sodium chloride, ondansetron **OR** ondansetron, senna, acetaminophen **OR** acetaminophen     Constitutional:  Awake, alert, oriented, in NAD  HEENT:  PERRLA, normocephalic, atraumatic  Respiratory:  CTA  Cardiovascular/Edema:  RRR, normal S1, normal S2  Gastrointestinal:  Soft, flat, non-distended, non-tender  Neurologic:  Nonfocal  Skin:  warm, dry, no rashes, no lesions  Access: Left AV fistula    DATA:    CBC:   Lab Results   Component Value Date/Time    WBC 5.7 11/06/2024 01:25 AM    
Department of Internal Medicine  Nephrology Progress Note      Events reviewed.  Had dialysis earlier today.    PHYSICAL EXAM:      Vitals:    VITALS:  /68   Pulse 74   Temp 97.6 °F (36.4 °C) (Oral)   Resp 18   Ht 1.702 m (5' 7\")   Wt 58.6 kg (129 lb 3 oz)   SpO2 97%   BMI 20.23 kg/m²   24HR INTAKE/OUTPUT:    Intake/Output Summary (Last 24 hours) at 11/4/2024 1316  Last data filed at 11/4/2024 1117  Gross per 24 hour   Intake 300 ml   Output 1600 ml   Net -1300 ml     Scheduled Meds:   pantoprazole  40 mg Oral BID AC    epoetin delmer-epbx  3,000 Units SubCUTAneous Once per day on Monday Wednesday Friday    carvedilol  12.5 mg Oral BID WC    aspirin  81 mg Oral QAM    budesonide  0.25 mg Nebulization BID RT    And    arformoterol tartrate  15 mcg Nebulization BID RT    And    ipratropium  0.5 mg Nebulization Q6H RT    rosuvastatin  10 mg Oral Every Other Day    sevelamer  800 mg Oral TID WC    sodium chloride flush  10 mL IntraVENous 2 times per day    heparin (porcine)  5,000 Units SubCUTAneous Q8H    cefTRIAXone (ROCEPHIN) IV  1,000 mg IntraVENous Q24H    doxycycline  100 mg Oral 2 times per day     Continuous Infusions:   sodium chloride       PRN Meds:.albuterol, nitroGLYCERIN, sodium chloride flush, sodium chloride, ondansetron **OR** ondansetron, senna, acetaminophen **OR** acetaminophen     Constitutional:  Awake, alert, oriented, in NAD  HEENT:  PERRLA, normocephalic, atraumatic  Respiratory:  CTA  Cardiovascular/Edema:  RRR, normal S1, normal S2  Gastrointestinal:  Soft, flat, non-distended, non-tender  Neurologic:  Nonfocal  Skin:  warm, dry, no rashes, no lesions  Access: Left AV fistula    DATA:    CBC:   Lab Results   Component Value Date/Time    WBC 4.6 11/04/2024 04:20 AM    RBC 3.20 11/04/2024 04:20 AM    HGB 10.5 11/04/2024 04:20 AM    HCT 33.9 11/04/2024 04:20 AM    .9 11/04/2024 04:20 AM    MCH 32.8 11/04/2024 04:20 AM    MCHC 31.0 11/04/2024 04:20 AM    RDW 18.6 11/04/2024 04:20 
Department of Internal Medicine  Nephrology progress Note    Events reviewed.  Had JENNY earlier today    PHYSICAL EXAM:      Vitals:    VITALS:  BP (!) 159/55   Pulse 69   Temp 97.5 °F (36.4 °C) (Oral)   Resp 17   Ht 1.702 m (5' 7\")   Wt 60 kg (132 lb 4.4 oz)   SpO2 98%   BMI 20.72 kg/m²   24HR INTAKE/OUTPUT:    Intake/Output Summary (Last 24 hours) at 11/3/2024 0943  Last data filed at 11/2/2024 1157  Gross per 24 hour   Intake 300 ml   Output 2300 ml   Net -2000 ml     Scheduled Meds:   epoetin delmer-epbx  3,000 Units SubCUTAneous Once per day on Monday Wednesday Friday    carvedilol  12.5 mg Oral BID WC    aspirin  81 mg Oral QAM    budesonide  0.25 mg Nebulization BID RT    And    arformoterol tartrate  15 mcg Nebulization BID RT    And    ipratropium  0.5 mg Nebulization Q6H RT    pantoprazole  40 mg Oral QAM    rosuvastatin  10 mg Oral Every Other Day    sevelamer  800 mg Oral TID WC    sodium chloride flush  10 mL IntraVENous 2 times per day    heparin (porcine)  5,000 Units SubCUTAneous Q8H    cefTRIAXone (ROCEPHIN) IV  1,000 mg IntraVENous Q24H    doxycycline  100 mg Oral 2 times per day     Continuous Infusions:   sodium chloride       PRN Meds:.albuterol, nitroGLYCERIN, sodium chloride flush, sodium chloride, ondansetron **OR** ondansetron, senna, acetaminophen **OR** acetaminophen     Constitutional:  Awake, alert, oriented, in NAD  HEENT:  PERRLA, normocephalic, atraumatic  Respiratory:  CTA  Cardiovascular/Edema:  RRR, normal S1, normal S2  Gastrointestinal:  Soft, flat, non-distended, non-tender  Neurologic:  Nonfocal  Skin:  warm, dry, no rashes, no lesions  Access: Left AV fistula    DATA:    CBC:   Lab Results   Component Value Date/Time    WBC 5.2 11/03/2024 05:22 AM    RBC 3.28 11/03/2024 05:22 AM    HGB 10.9 11/03/2024 05:22 AM    HCT 35.0 11/03/2024 05:22 AM    .7 11/03/2024 05:22 AM    MCH 33.2 11/03/2024 05:22 AM    MCHC 31.1 11/03/2024 05:22 AM    RDW 18.6 11/03/2024 05:22 AM    
Department of Internal Medicine  Nephrology progress Note    Events reviewed.  Had JENNY earlier today    PHYSICAL EXAM:      Vitals:    VITALS:  BP (!) 159/55   Pulse 69   Temp 97.5 °F (36.4 °C) (Oral)   Resp 17   Ht 1.702 m (5' 7\")   Wt 60 kg (132 lb 4.4 oz)   SpO2 98%   BMI 20.72 kg/m²   24HR INTAKE/OUTPUT:    Intake/Output Summary (Last 24 hours) at 11/3/2024 0943  Last data filed at 11/2/2024 1157  Gross per 24 hour   Intake 300 ml   Output 2300 ml   Net -2000 ml     Scheduled Meds:   epoetin delmer-epbx  3,000 Units SubCUTAneous Once per day on Monday Wednesday Friday    carvedilol  12.5 mg Oral BID WC    aspirin  81 mg Oral QAM    budesonide  0.25 mg Nebulization BID RT    And    arformoterol tartrate  15 mcg Nebulization BID RT    And    ipratropium  0.5 mg Nebulization Q6H RT    pantoprazole  40 mg Oral QAM    rosuvastatin  10 mg Oral Every Other Day    sevelamer  800 mg Oral TID WC    sodium chloride flush  10 mL IntraVENous 2 times per day    heparin (porcine)  5,000 Units SubCUTAneous Q8H    cefTRIAXone (ROCEPHIN) IV  1,000 mg IntraVENous Q24H    doxycycline  100 mg Oral 2 times per day     Continuous Infusions:   sodium chloride       PRN Meds:.albuterol, nitroGLYCERIN, sodium chloride flush, sodium chloride, ondansetron **OR** ondansetron, senna, acetaminophen **OR** acetaminophen     Constitutional:  Awake, alert, oriented, in NAD  HEENT:  PERRLA, normocephalic, atraumatic  Respiratory:  CTA  Cardiovascular/Edema:  RRR, normal S1, normal S2  Gastrointestinal:  Soft, flat, non-distended, non-tender  Neurologic:  Nonfocal  Skin:  warm, dry, no rashes, no lesions  Access: Left AV fistula    DATA:    CBC:   Lab Results   Component Value Date/Time    WBC 5.2 11/03/2024 05:22 AM    RBC 3.28 11/03/2024 05:22 AM    HGB 10.9 11/03/2024 05:22 AM    HCT 35.0 11/03/2024 05:22 AM    .7 11/03/2024 05:22 AM    MCH 33.2 11/03/2024 05:22 AM    MCHC 31.1 11/03/2024 05:22 AM    RDW 18.6 11/03/2024 05:22 AM    
Dialysis notified patient is refusing dialysis today and would like to schedule it for tomorrow. Dr Rivas notified of the changes.   
Dr Rivas is on call for the group notified of consult via secure text  [facility]  
Dr Rivas notified of repeat Pro-BNP. No new orders received.   
Dr. Rivas messaged regarding morning lab results.   
IV team consult sent for daily labs  
Internal Medicine Progress Note    Patient's name: James Vazquez III  : 1945  Chief complaints (on day of admission): Shortness of Breath  Admission date: 10/28/2024  Date of service: 2024   Room: [address] INTERMEDIATE   Primary care physician: Cipriano Durham DO  Reason for visit: Follow-up for pneumonia and bacteremia     Subjective  James was seen and examined in dialysis today.  He is overall doing better than when he was admitted.  He currently has no complaints and is undergoing dialysis without any difficulties.  He desires to go home today.  Has not been evaluated by SLP yet.     Review of Systems  There are no new complaints of chest pain, shortness of breath, abdominal pain, nausea, vomiting, diarrhea, constipation.    Hospital Medications  Current Facility-Administered Medications   Medication Dose Route Frequency Provider Last Rate Last Admin    pantoprazole (PROTONIX) tablet 40 mg  40 mg Oral BID AC Volodymyr Swanson DO   40 mg at 24 0548    epoetin delmer-epbx (RETACRIT) injection 3,000 Units  3,000 Units SubCUTAneous Once per day on  Nancy Holcomb APRN - CNP        carvedilol (COREG) tablet 12.5 mg  12.5 mg Oral BID  Nancy Holcomb APRN - CNP   12.5 mg at 24 1557    albuterol (PROVENTIL) (2.5 MG/3ML) 0.083% nebulizer solution 2.5 mg  2.5 mg Nebulization Q6H PRN Patricia Hannah APRN - CNP        aspirin EC tablet 81 mg  81 mg Oral QAM Patricia Hannah APRN - CNP   81 mg at 24 0915    budesonide (PULMICORT) nebulizer suspension 250 mcg  0.25 mg Nebulization BID RT Patricia Hannah APRN - CNP   250 mcg at 24    And    arformoterol tartrate (BROVANA) nebulizer solution 15 mcg  15 mcg Nebulization BID RT Patricia Hannah APRN - CNP   15 mcg at 24    And    ipratropium (ATROVENT) 0.02 % nebulizer solution 0.5 mg  0.5 mg Nebulization Q6H RT Patricia Hannah APRN - CNP   0.5 mg at 24    nitroGLYCERIN (NITROSTAT) SL 
Internal Medicine Progress Note    Patient's name: James Vazquez III  : 1945  Chief complaints (on day of admission): Shortness of Breath  Admission date: 10/28/2024  Date of service: 2024   Room: [address] INTERMEDIATE   Primary care physician: Cipriano Durham DO  Reason for visit: Follow-up for pneumonia and bacteremia     Subjective  James was seen and examined in his room this morning.  He is resting comfortably on his bed.  He has no complaints at this time but does state that he is feeling mildly constipated despite the senna that is ordered.  He tolerated dialysis well yesterday.  Is updated regarding the change in his antibiotic regimen.      Review of Systems  There are no new complaints of chest pain, shortness of breath, abdominal pain, nausea, vomiting, diarrhea, constipation.    Hospital Medications  Current Facility-Administered Medications   Medication Dose Route Frequency Provider Last Rate Last Admin    pantoprazole (PROTONIX) tablet 40 mg  40 mg Oral BID AC Volodymyr Swanson DO   40 mg at 24 0622    epoetin delmer-epbx (RETACRIT) injection 3,000 Units  3,000 Units SubCUTAneous Once per day on  Holcomb, TOMMY Pearson - CNP   3,000 Units at 24 1642    carvedilol (COREG) tablet 12.5 mg  12.5 mg Oral BID [facility], Nancy HARMAN APRN - CNP   12.5 mg at 24 0745    albuterol (PROVENTIL) (2.5 MG/3ML) 0.083% nebulizer solution 2.5 mg  2.5 mg Nebulization Q6H PRN Patricia Hannah APRN - CNP        aspirin EC tablet 81 mg  81 mg Oral QAM Patricia Hannah APRN - CNP   81 mg at 24 0745    budesonide (PULMICORT) nebulizer suspension 250 mcg  0.25 mg Nebulization BID RT Patricia Hannah APRN - CNP   250 mcg at 24 0749    And    arformoterol tartrate (BROVANA) nebulizer solution 15 mcg  15 mcg Nebulization BID RT Patricia Hannah APRN - CNP   15 mcg at 24 0749    And    ipratropium (ATROVENT) 0.02 % nebulizer solution 0.5 mg  0.5 mg Nebulization Q6H 
Message sent  to Dr. Mccord in regards to CT chest ruslts and possible pulm consult per GI request.   
Message sent to Dr. Hines in regard to CT chest results being back.   
Message sent to Dr. Rivas in regards to clearance for PICC line placement for long term antibiotics at discharge.   
Message sent to ESTEFANI Dave re: Lab reports 3 blood culture bottles positive.    Electronically signed by Lorie Guadalupe RN on 10/29/2024 at 8:32 PM      
New consult sent to Dr. Goldman via perfect serve.   
Notified Dr Hines of GI consult   
Nurse to nurse called to Jada at [facility]. All questions and concerns addressed and answered at this time. Notified Jada patient's estimated  time is at 1630 w/ [facility].   
Occupational Therapy    Attempted OT treatment, patient off floor for procedure, unavailable. OT to re-attempt at a later date/time as able and appropriate.     Lou Hackett OTR/L  License Number: [id number]    
Occupational Therapy  Date:10/29/2024  Patient Name: James Vazquez III  Room: [facility]     Occupational Therapy (OT) order received, patient's medical record reviewed, and OT evaluation attempted this date; patient unavailable due to being off of unit. OT evaluation to be re-attempted at later time/date, as able/appropriate.    Katalina Ch, OTBEN/L  License Number: [id number]        
Occupational Therapy  OCCUPATIONAL THERAPY INITIAL EVALUATION  [facility]  [address], [place]    Date: 10/30/2024     Patient Name: James Vazquez III  MRN: [id number]  : 1945  Room: [address] [place] [address]    Evaluating OT: Katalina Ch, OTR/L - [id number]    Referring Provider: Luisito Mccord DO  Specific Provider Orders/Date: \"OT eval and treat\" - 10/29/2024    Diagnosis: Pneumonia due to organism [J18.9]  Pneumonia of both lungs due to infectious organism, unspecified part of lung [J18.9]     Pertinent Medical History: HTN, ESRD on HD, hyperlipidemia     Precautions: fall risk, bed alarm, [place], skin integrity    Assessment of Current Deficits:    [x] Functional mobility   [x] ADLs  [x] Strength               [x] Cognition   [x] Functional transfers   [x] IADLs         [x] Safety Awareness   [x] Endurance   [] Fine Motor Coordination  [x] Balance      [] Vision/Perception   [x] Sensation    [] Gross Motor Coordination [] ROM          [] Delirium                  [] Motor Control     OT PLAN OF CARE   OT POC is based on physician orders, patient diagnosis, and results of clinical assessment.  Frequency/Duration 2-5 days/week for 2-4 weeks PRN   Specific OT Treatment Interventions to Include:   * Instruction/training on adapted ADL techniques and AE recommendations to increase functional independence within precautions       * Training on energy conservation strategies, correct breathing pattern and techniques to improve independence/tolerance for self-care routine  * Functional transfer/mobility training/DME recommendations for increased independence, safety, and fall prevention  * Patient/Family education to increase follow through with safety techniques and functional independence  * Recommendation of environmental modifications for increased safety with functional transfers/mobility and ADLs  * Visual-perceptual training to improve 
Occupational Therapy  OT BEDSIDE TREATMENT NOTE      Date:2024  Patient Name: James Vazquez III  MRN: [id number]  : 1945  Room: [address] [place], [place] [address]       Evaluating OT: Katalina Ch, OTR/GISEL - [id number]     Referring Provider: Luisito Mccord DO  Specific Provider Orders/Date: \"OT eval and treat\" - 10/29/2024     Diagnosis: Pneumonia due to organism [J18.9]  Pneumonia of both lungs due to infectious organism, unspecified part of lung [J18.9]      Pertinent Medical History: HTN, ESRD on HD, hyperlipidemia      Precautions: fall risk,     Assessment of Current Deficits:    [x] Functional mobility             [x] ADLs          [x] Strength                  [x] Cognition   [x] Functional transfers           [x] IADLs         [x] Safety Awareness   [x] Endurance   [] Fine Motor Coordination    [x] Balance      [] Vision/Perception   [x] Sensation    [] Gross Motor Coordination [] ROM          [] Delirium                  [] Motor Control      OT PLAN OF CARE   OT POC is based on physician orders, patient diagnosis, and results of clinical assessment.  Frequency/Duration 2-5 days/week for 2-4 weeks PRN   Specific OT Treatment Interventions to Include:   * Instruction/training on adapted ADL techniques and AE recommendations to increase functional independence within precautions       * Training on energy conservation strategies, correct breathing pattern and techniques to improve independence/tolerance for self-care routine  * Functional transfer/mobility training/DME recommendations for increased independence, safety, and fall prevention  * Patient/Family education to increase follow through with safety techniques and functional independence  * Recommendation of environmental modifications for increased safety with functional transfers/mobility and ADLs  * Visual-perceptual training to improve environmental scanning, visual attention/focus, and oculomotor skills for increased safety/independence with functional 
Occupational Therapy  OT BEDSIDE TREATMENT NOTE      Date:2024  Patient Name: James Vazquez III  MRN: [id number]  : 1945  Room: [address] [place], [place] [address]       Evaluating OT: Katalina Ch, OTR/GISEL - [id number]     Referring Provider: Luisito Mccord DO  Specific Provider Orders/Date: \"OT eval and treat\" - 10/29/2024     Diagnosis: Pneumonia due to organism [J18.9]  Pneumonia of both lungs due to infectious organism, unspecified part of lung [J18.9]      Pertinent Medical History: HTN, ESRD on HD, hyperlipidemia      Precautions: fall risk,     Assessment of Current Deficits:    [x] Functional mobility             [x] ADLs          [x] Strength                  [x] Cognition   [x] Functional transfers           [x] IADLs         [x] Safety Awareness   [x] Endurance   [] Fine Motor Coordination    [x] Balance      [] Vision/Perception   [x] Sensation    [] Gross Motor Coordination [] ROM          [] Delirium                  [] Motor Control      OT PLAN OF CARE   OT POC is based on physician orders, patient diagnosis, and results of clinical assessment.  Frequency/Duration 2-5 days/week for 2-4 weeks PRN   Specific OT Treatment Interventions to Include:   * Instruction/training on adapted ADL techniques and AE recommendations to increase functional independence within precautions       * Training on energy conservation strategies, correct breathing pattern and techniques to improve independence/tolerance for self-care routine  * Functional transfer/mobility training/DME recommendations for increased independence, safety, and fall prevention  * Patient/Family education to increase follow through with safety techniques and functional independence  * Recommendation of environmental modifications for increased safety with functional transfers/mobility and ADLs  * Visual-perceptual training to improve environmental scanning, visual attention/focus, and oculomotor skills for increased safety/independence with functional 
Occupational Therapy  Pt off the unit for dialysis. Will attempt another time.   Kathy MARTINO/GISEL [id number]  
PROGRESS NOTE        Patient Presents with/Seen in Consultation For      Reason for Consult: Oropharyngeal dysphagia/esophagitis   CHIEF COMPLAINT: Shortness of breath   Subjective:     Patient seen laying in bed in no apparent distress.  Denies current epigastric pain or nausea.  Esophagram reviewed with patient.  No family currently at bedside.  Review of Systems  Aside from what was mentioned in the PMH and HPI, essentially unremarkable, all others negative.    Objective:     BP (!) 102/44   Pulse 62   Temp 97.9 °F (36.6 °C) (Oral)   Resp 18   Ht 1.702 m (5' 7\")   Wt 58.3 kg (128 lb 8.5 oz)   SpO2 97%   BMI 20.13 kg/m²     General appearance: alert, awake, laying in bed, and cooperative  Eyes: conjunctiva pale, sclera anicteric. PERRL.  Lungs: clear to auscultation bilaterally  Heart: regular rate and rhythm, no murmur, 2+ pulses; no edema  Abdomen: soft, non-tender; bowel sounds normal; no masses,  no organomegaly  Extremities: extremities without edema  Pulses: 2+ and symmetric  Skin: Skin color, texture, turgor normal.   Neurologic: Grossly normal    sodium chloride flush 0.9 % injection 5-40 mL, 2 times per day  sodium chloride flush 0.9 % injection 5-40 mL, PRN  0.9 % sodium chloride infusion, PRN  aluminum & magnesium hydroxide-simethicone (MAALOX) 30 mL, lidocaine viscous hcl (XYLOCAINE) 5 mL (GI COCKTAIL), Daily PRN  polyethylene glycol (GLYCOLAX) packet 17 g, Daily  cefTRIAXone (ROCEPHIN) 1,000 mg in sterile water 10 mL IV syringe, Q24H  pantoprazole (PROTONIX) tablet 40 mg, BID AC  epoetin delmer-epbx (RETACRIT) injection 3,000 Units, Once per day on Monday Wednesday Friday  carvedilol (COREG) tablet 12.5 mg, BID WC  albuterol (PROVENTIL) (2.5 MG/3ML) 0.083% nebulizer solution 2.5 mg, Q6H PRN  aspirin EC tablet 81 mg, QAM  budesonide (PULMICORT) nebulizer suspension 250 mcg, BID RT   And  arformoterol tartrate (BROVANA) nebulizer solution 15 mcg, BID RT   And  ipratropium (ATROVENT) 0.02 % 
PROGRESS NOTE        Patient Presents with/Seen in Consultation For      Reason for Consult: Oropharyngeal dysphagia/esophagitis   CHIEF COMPLAINT: Shortness of breath   Subjective:     Patient seen laying in bed in no apparent distress. Reports continued epigastric discomfort worse when eating. Now agreeable to work up however would like to proceed with invasive procedures only if necessary, d/w wife at bedside.   Review of Systems  Aside from what was mentioned in the PMH and HPI, essentially unremarkable, all others negative.    Objective:     BP (!) 122/50   Pulse 69   Temp 97.4 °F (36.3 °C) (Oral)   Resp 16   Ht 1.702 m (5' 7\")   Wt 58 kg (127 lb 13.9 oz)   SpO2 98%   BMI 20.03 kg/m²     General appearance: alert, awake, laying in bed, and cooperative  Eyes: conjunctiva pale, sclera anicteric. PERRL.  Lungs: clear to auscultation bilaterally  Heart: regular rate and rhythm, no murmur, 2+ pulses; no edema  Abdomen: soft, non-tender; bowel sounds normal; no masses,  no organomegaly  Extremities: extremities without edema  Pulses: 2+ and symmetric  Skin: Skin color, texture, turgor normal.   Neurologic: Grossly normal    sodium chloride flush 0.9 % injection 5-40 mL, 2 times per day  sodium chloride flush 0.9 % injection 5-40 mL, PRN  0.9 % sodium chloride infusion, PRN  lidocaine PF 1 % injection 50 mg, Once  pantoprazole (PROTONIX) tablet 40 mg, BID AC  epoetin delmer-epbx (RETACRIT) injection 3,000 Units, Once per day on Monday Wednesday Friday  carvedilol (COREG) tablet 12.5 mg, BID WC  albuterol (PROVENTIL) (2.5 MG/3ML) 0.083% nebulizer solution 2.5 mg, Q6H PRN  aspirin EC tablet 81 mg, QAM  budesonide (PULMICORT) nebulizer suspension 250 mcg, BID RT   And  arformoterol tartrate (BROVANA) nebulizer solution 15 mcg, BID RT   And  ipratropium (ATROVENT) 0.02 % nebulizer solution 0.5 mg, Q6H RT  nitroGLYCERIN (NITROSTAT) SL tablet 0.4 mg, Q5 Min PRN  rosuvastatin (CRESTOR) tablet 10 mg, Every Other 
Patient complaint of nausea, shortness of breath and chest discomfort after having breathing treatment and working with PT. SPO2 98% on room air and HR in low 100's. Nephrology NP Nancy Holcomb on floor to see patient. She was made aware of complaints. Orders received.   
Per Jada from [facility] request - patient's discharge instructions faxed to number provided to Jada [phone].  
Physical Therapy  Facility/Department: [address] INTERMEDIATE 1  Physical Therapy Treatment Note    Name: James Vazquez III  : 1945  MRN: [id number]  Date of Service: 11/3/2024         Patient Diagnosis(es): The primary encounter diagnosis was Pneumonia of both lungs due to infectious organism, unspecified part of lung. A diagnosis of Bacteremia was also pertinent to this visit.  Past Medical History:  has a past medical history of Blind left eye, Chronic kidney disease, Dialysis patient (HCC), Hemodialysis patient (HCC), Hyperlipidemia, and Hypertension.  Past Surgical History:  has a past surgical history that includes AV fistula creation (Left, ); HEMODIALYSIS ACCESS PERCUTANEOUS REVISION (Left, ); PERIPHERAL VASCULAR BYPASS (); Upper gastrointestinal endoscopy (N/A, 2020); Cardiac surgery; hip surgery (Left, 10/16/2020); and Upper gastrointestinal endoscopy (N/A, 10/28/2020).      Evaluating Therapist: Rita Brooke PT      Room #:  0438/0438-A  Diagnosis:  Pneumonia due to organism [J18.9]  Pneumonia of both lungs due to infectious organism, unspecified part of lung [J18.9]  PMHx/PSHx:  CKD, dialysis, HTN  Precautions:  falls, alarm      Social:  Pt lives with wife in a 1 floor plan 1 steps  to enter.  Prior to admission ambulates with rollator     Initial Evaluation  Date: 10/29/24 Treatment  11/3/2024    Short Term/ Long Term   Goals   Was pt agreeable to Eval/treatment? yes yes    Does pt have pain? No c/o pain at rest B LE pain/weakness during gait    Bed Mobility  Rolling: NT  Supine to sit: min assist  Sit to supine: SBA  Scooting: SBA Rolling: SBA  Supine to sit: Mod A  Sit to supine: SBA  Scooting: Min A seated to EOB independent   Transfers Sit to stand: CGA from bed mod assist from commode  Stand to sit: CGA Sit <> stand: Min A Independent   Ambulation    35 feet and 15 feet x2 with ww  with min assist 40 feet with WW Min A 100 feet with ww independent   Stair Negotiation  Ascended 
Physical Therapy  Facility/Department: [address] INTERMEDIATE 1  Physical Therapy Treatment Note    Name: James Vazquez III  : 1945  MRN: [id number]  Date of Service: 10/31/2024         Patient Diagnosis(es): The primary encounter diagnosis was Pneumonia of both lungs due to infectious organism, unspecified part of lung. A diagnosis of Bacteremia was also pertinent to this visit.  Past Medical History:  has a past medical history of Blind left eye, Chronic kidney disease, Dialysis patient (HCC), Hemodialysis patient (HCC), Hyperlipidemia, and Hypertension.  Past Surgical History:  has a past surgical history that includes AV fistula creation (Left, ); HEMODIALYSIS ACCESS PERCUTANEOUS REVISION (Left, ); PERIPHERAL VASCULAR BYPASS (); Upper gastrointestinal endoscopy (N/A, 2020); Cardiac surgery; hip surgery (Left, 10/16/2020); and Upper gastrointestinal endoscopy (N/A, 10/28/2020).      Evaluating Therapist: Rita Brooke PT      Room #:  0438/0438-A  Diagnosis:  Pneumonia due to organism [J18.9]  Pneumonia of both lungs due to infectious organism, unspecified part of lung [J18.9]  PMHx/PSHx:  CKD, dialysis, HTN  Precautions:  falls, alarm      Social:  Pt lives with wife in a 1 floor plan 1 steps  to enter.  Prior to admission ambulates with rollator     Initial Evaluation  Date: 10/29/24 Treatment  10/31/2024    Short Term/ Long Term   Goals   Was pt agreeable to Eval/treatment? yes yes    Does pt have pain? No c/o pain at rest B LE pain/weakness during gait    Bed Mobility  Rolling: NT  Supine to sit: min assist  Sit to supine: SBA  Scooting: SBA NT independent   Transfers Sit to stand: CGA from bed mod assist from commode  Stand to sit: CGA Sit <> stand: SBA from a WC Independent   Ambulation    35 feet and 15 feet x2 with ww  with min assist 35 x 2 + 70 feet with WW  feet with ww independent   Stair Negotiation  Ascended and descended  NT NT  1 steps with 1 rail independent   LE 
Spiritual Health History and Assessment/Progress Note  [facility]     Encounter, Rituals, Rites and Sacraments,  ,  ,      Name: James Vazquez III MRN: [id number]    Age: 79 y.o.     Sex: male   Language: English   Jainism: Methodist   Pneumonia due to organism     Date: 10/29/2024                           Spiritual Assessment began in [facility] INTERMEDIATE 1        Referral/Consult From: Rounding   Encounter Overview/Reason:  Encounter, Rituals, Rites and Sacraments  Service Provided For: Patient and family together (One member of family also received Holy Communion)    Mirna, Belief, Meaning:   Patient is connected with a mirna tradition or spiritual practice  Family/Friends are connected with a mirna tradition or spiritual practice      Importance and Influence:  Patient has no beliefs influential to healthcare decision-making identified during this visit  Family/Friends have no beliefs influential to healthcare decision-making identified during this visit    Community:  Patient feels well-supported. Support system includes: Spouse/Partner  Family/Friends feel well-supported. Support system includes: Children    Assessment and Plan of Care:     Patient Interventions include: Provided sacramental/Church ritual  Family/Friends Interventions include: Provided sacramental/Church ritual    Patient Plan of Care: Spiritual Care available upon further referral  Family/Friends Plan of Care: Spiritual Care available upon further referral    Electronically signed by Chaplain Salvador on 10/29/2024 at 6:38 PM   
Spoke with patient wife, Laura. Updated her on patient plan of care and condition.   
Upon arrival to unit, patient was wanting to rest. Patient answered some admission questions and allowed for a partial assessment. Patient refused to get into a gown. Patient allowed for lab to be drawn. Bed alarm on for safety.    
Voicemail left with Dr. Sargent office in regards to an order to continue rocephin while inpatient, current order has been listed as completed.   
Units  3,000 Units SubCUTAneous Once per day on Monday Wednesday Friday Nancy HolcombTOMMY CNP   3,000 Units at 11/04/24 1642    carvedilol (COREG) tablet 12.5 mg  12.5 mg Oral BID [facility]   12.5 mg at 11/05/24 1545    albuterol (PROVENTIL) (2.5 MG/3ML) 0.083% nebulizer solution 2.5 mg  2.5 mg Nebulization Q6H PRN Patricia Hannah APRN - CNP        aspirin EC tablet 81 mg  81 mg Oral QAM Patricia Hannah APRN - CNP   81 mg at 11/05/24 0745    budesonide (PULMICORT) nebulizer suspension 250 mcg  0.25 mg Nebulization BID RT Patricia Hannah APRN - CNP   250 mcg at 11/05/24 1947    And    arformoterol tartrate (BROVANA) nebulizer solution 15 mcg  15 mcg Nebulization BID RT Patricia Hannah APRN - CNP   15 mcg at 11/05/24 1947    And    ipratropium (ATROVENT) 0.02 % nebulizer solution 0.5 mg  0.5 mg Nebulization Q6H RT Patricia Hannah APRN - CNP   0.5 mg at 11/05/24 1947    nitroGLYCERIN (NITROSTAT) SL tablet 0.4 mg  0.4 mg SubLINGual Q5 Min PRN Patricia Hannah APRN - CNP        rosuvastatin (CRESTOR) tablet 10 mg  10 mg Oral Every Other Day Patricia Hannah APRN - CNP   10 mg at 11/04/24 1232    sevelamer (RENVELA) tablet 800 mg  800 mg Oral TID  Patricia Hannah APRN - CNP   800 mg at 11/05/24 1727    sodium chloride flush 0.9 % injection 10 mL  10 mL IntraVENous 2 times per day Patricia Hannah APRN - CNP   10 mL at 11/05/24 2015    sodium chloride flush 0.9 % injection 10 mL  10 mL IntraVENous PRN Patricia Hannah APRN - CNP        0.9 % sodium chloride infusion   IntraVENous PRN Patricia Hannah APRN - CNP        ondansetron (ZOFRAN-ODT) disintegrating tablet 4 mg  4 mg Oral Q8H PRN Patricia Hannah APRN - CNP        Or    ondansetron (ZOFRAN) injection 4 mg  4 mg IntraVENous Q6H PRN Patricia Hannah APRN - CNP   4 mg at 10/29/24 0948    senna (SENOKOT) tablet 8.6 mg  1 tablet Oral Daily PRN Patricia Hannah APRN - CNP        acetaminophen (TYLENOL) tablet 650 mg  650 
area   yes With cueing yes     ASSESSMENT:    Comments:  Pt found and left in bed with call light in reach, bed alarm on and pt's wife present.      Treatment:  Patient practiced and was instructed in the following treatment:   Functional mobility performed as documented above.   No report of dizziness during functional mobility.  Cueing required for hand placement during transfers.  Pt ambulates with forward rounded shoulders and slow gait speed.  No LOB.       PLAN:    Patient is making good progress towards established goals.  Will continue with current POC.     Time in  1109  Time out  1118    Total Treatment Time  9 minutes     CPT codes:    [x] Therapeutic activities 72698 9 minutes  [] Therapeutic exercises 57130  minutes      [facility], [place] [id number]    
crackles.  Cardiovascular: S1 and S2 are rhythmic and regular.  2/6 high-pitched systolic crescendo/decrescendo murmur.  Abdomen: Positive bowel sounds to auscultation.  Extremities: No edema. Left upper perm AVF  Lines: Peripheral.    Laboratory and Tests:  Lab Results   Component Value Date    CRP 41.0 (H) 10/30/2024    CRP 0.5 (H) 08/12/2021    CRP 1.1 (H) 10/12/2020     Lab Results   Component Value Date    SEDRATE 12 10/30/2024    SEDRATE 0 08/12/2021    SEDRATE 3 10/12/2020       Radiology:  JENNY 11/1      No echocardiographic evidence of endocarditis.    Microbiology:   Blood cultures 10/28/2024: MS Staphylococcus capitis, Streptococcus parasanguinis (resistant to Levofloxacin.  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  The patient has a history of aortic valve stenosis.  JENNY did not show a vegetation but this does not rule that out  Streptococcus bacteremia associated to the above  Staphylococcus capitis bacteremia.  This could be a possible contaminant  Leukopenia-resolved  ESRD, on hemodialysis    PLAN:  Continue Ceftriaxone for a minimum of 4 weeks.  Reconciled  The patient can be discharged from ID standpoint.  Awaiting acceptance at [facility]  Follow-up in the office    Spoke with nursing.    Melvin Sargent MD  11:13 AM  11/6/2024  
during mobility.    Patient response to education:   Pt verbalized understanding Pt demonstrated skill Pt requires further education in this area   yes With cueing yes     ASSESSMENT:    Comments:  Pt found and left in bed with call light in reach, bed alarm on and wife present.      Treatment:  Patient practiced and was instructed in the following treatment:   Functional mobility  performed as documented above.  No report of dizziness during functional mobility.  Pt with a right lateral lean when sitting EOB and required Mod/Min A for sitting balance.  Pt with posterior lean when standing from the bed and required Mod A for balance.  Pt ambulates with very slow gait speed, flexed posture and B knees flexed.  Pt reported his legs felt too weak to hold him today.  Pt backing up towards the bed and required Max A due to LOB.  Pt only required SBA for sitting balance on EOB after standing.        PLAN:    Patient is making progress towards established goals.  Will continue with current POC.     Time in  1138  Time out  1148    Total Treatment Time  10 minutes     CPT codes:    [x] Therapeutic activities 80216 10 minutes  [] Therapeutic exercises 50908  minutes      Ursula Holcomb, [place] [id number]      
for Fluid Requirements: Standard renal  Fluid (ml/day): per nephrology management    Nutrition Diagnosis:   Increased nutrient needs related to renal dysfunction as evidenced by dialysis    Nutrition Interventions:   Food and/or Nutrient Delivery: Continue Current Diet, Start Oral Nutrition Supplement  Nutrition Education/Counseling: No recommendation at this time (provider w/ pt when rounding)  Coordination of Nutrition Care: Continue to monitor while inpatient       Goals:  Goals: Meet at least 75% of estimated needs, by next RD assessment  Type of Goal: New goal       Nutrition Monitoring and Evaluation:      Food/Nutrient Intake Outcomes: Food and Nutrient Intake, Supplement Intake  Physical Signs/Symptoms Outcomes: Biochemical Data, GI Status, Fluid Status or Edema, Nutrition Focused Physical Findings, Skin, Weight    Discharge Planning:    Continue Oral Nutrition Supplement     Miranda D'Amico, RD, LD  Contact: [phone]    
may indicate   new bilateral pneumonia or areas of atelectasis.  Short-term follow-up   recommended.         XR SACRUM COCCYX (MIN 2 VIEWS)   Final Result   1. New cortical irregularity is seen involving the distal aspect of the   sacrum which may indicate healed distal sacrum fracture.   2. No acute fracture involving the sacrum or coccyx.             Prior to Admission medications    Medication Sig Start Date End Date Taking? Authorizing Provider   nitroGLYCERIN (NITROSTAT) 0.4 MG SL tablet Place 1 tablet under the tongue 1/31/24  Yes Tatiana Valdivia MD   sevelamer (RENVELA) 800 MG tablet Take 1 tablet by mouth 3 times daily (with meals) 6/20/24  Yes Tatiana Valdivia MD   Co-Enzyme Q10 100 MG CAPS Take 100 mg by mouth nightly   Yes Tatiana Valdivia MD   pantoprazole (PROTONIX) 40 MG tablet Take 1 tablet by mouth every morning   Yes Tatiana Valdivia MD   aspirin 81 MG EC tablet Take 1 tablet by mouth every morning   Yes Tatiana Valdivia MD   acetaminophen (TYLENOL) 325 MG tablet Take 2 tablets by mouth every 4 hours as needed for Pain or Fever   Yes ProviderTatiana MD   rosuvastatin (CRESTOR) 10 MG tablet Take 1 tablet by mouth every other day QHS  LAST DOSE: 07/16/2024  NEXT DOSE DUE: 07/18/2024   Yes ProviderTatiana MD   fluticasone-umeclidin-vilant (TRELEGY ELLIPTA) 100-62.5-25 MCG/ACT AEPB inhaler Inhale 1 puff into the lungs daily    ProviderTatiana MD   albuterol sulfate HFA (PROVENTIL HFA) 108 (90 Base) MCG/ACT inhaler Inhale 2 puffs into the lungs every 6 hours as needed for Wheezing 8/3/23   Judith Connolly, APRN - CNP   carvedilol (COREG) 12.5 MG tablet Take 1 tablet by mouth 2 times daily (with meals)  Patient not taking: Reported on 10/22/2024 8/14/21   Aroldo Johnson MD        Current Facility-Administered Medications   Medication Dose Route Frequency Provider Last Rate Last Admin    epoetin delmer-epbx (RETACRIT) injection 3,000 Units  3,000 Units SubCUTAneous 
01/16/2024       Lab Results   Component Value Date    HDL 40 (L) 07/25/2024    HDL 38 (L) 07/19/2024    HDL 37 (L) 01/16/2024     No components found for: \"LDLCALC\"  No components found for: \"LABVLDL\"   PT/INR:    Lab Results   Component Value Date/Time    PROTIME 11.8 01/15/2024 11:30 AM    INR 1.1 01/15/2024 11:30 AM     IRON:    Lab Results   Component Value Date/Time    IRON 102 10/15/2022 10:39 AM     Iron Saturation:  No components found for: \"PERCENTFE\"  FERRITIN:    Lab Results   Component Value Date/Time    FERRITIN 656 10/15/2022 10:39 AM         Assessment:   Odynophagia s/p JENNY  Epigastric pain   Hx GERD, gastritis  Hx colon polyps, TA 2019  Bacteremia-ID following  ESRD on hemodialysis    Plan:   Esophagram and CT chest results noted  Pulmonary following   GI cocktail PRN  Continue pantoprazole 40 mg twice daily  Diet as tolerated  Medical management per primary  Supportive care  Discharge when ok with others, ok from GI POV       Note: This report was completed utilizing computer voice recognition software. Every effort has been made to ensure accuracy, however; inadvertent computerized transcription errors may be present.     Discussed with Dr. Hines  Plan per Dr. Flora Henley APRCHARISMA-NP-C 11/7/2024  10:50 AM For Dr. Hines      
CNP        heparin (porcine) injection 5,000 Units  5,000 Units SubCUTAneous Q8H JeanettePatricia alarcon APRCHARISMA - CNP   5,000 Units at 11/07/24 0629       PRN Medications  sodium chloride flush, sodium chloride, aluminum & magnesium hydroxide-simethicone (MAALOX) 30 mL, lidocaine viscous hcl (XYLOCAINE) 5 mL (GI COCKTAIL), albuterol, nitroGLYCERIN, sodium chloride flush, sodium chloride, ondansetron **OR** ondansetron, senna, acetaminophen **OR** acetaminophen    Objective  Most Recent Recorded Vitals  BP 92/77   Pulse 62   Temp 97.8 °F (36.6 °C) (Oral)   Resp 14   Ht 1.702 m (5' 7\")   Wt 58.3 kg (128 lb 8.5 oz)   SpO2 98%   BMI 20.13 kg/m²   I/O last 3 completed shifts:  In: 300   Out: 300   No intake/output data recorded.    Physical Exam:   General: awake and alert, oriented, frail and chronically ill appearing  Eyes: conjunctivae/corneas clear, sclera non icteric  Skin: warm and dry, no rashes noted  Lungs: diminished throughout, respirations even and non-labored on room air  Heart: regular rate, regular rhythm, S1S2 present, +murmur noted  Abdomen: soft, non-tender, non-distended, normal bowels ounds  Extremities: diffuse generalized weakness and muscle atrophy, no edema noted, LUE fistula noted  Neurologic: following simple commands, speech is clear but soft    Most Recent Labs  Lab Results   Component Value Date    WBC 5.5 11/07/2024    HGB 10.9 (L) 11/07/2024    HCT 35.8 (L) 11/07/2024    PLT 93 (L) 11/03/2024     11/07/2024    K 4.1 11/07/2024    CL 98 11/07/2024    CREATININE 4.4 (H) 11/07/2024    BUN 20 11/07/2024    CO2 28 11/07/2024    GLUCOSE 81 11/07/2024    ALT <5 11/07/2024    AST 11 11/07/2024    INR 1.1 01/15/2024    APTT 35.9 (H) 11/03/2020    TSH 5.470 (H) 02/07/2023       CT CHEST W CONTRAST   Final Result   1. Right upper lobe cavitary nodular density is enlarged from prior   comparison this was 12 mm consumes with progression. Newly developed left   lower lobe masslike density 2.5 cm 
device  [x] Stair Training in preparation for safe discharge home and/or into the community   [] Positioning to prevent skin breakdown and contractures  [x] Safety and Education Training   [x] Patient/Caregiver Education   [] HEP  [] Other     PT long term treatment goals are located in above grid    Frequency of treatments: 2-5x/week x 5 days.    Time in  0925  Time out  0949    Total Treatment Time  10 minutes     Evaluation Time includes thorough review of current medical information, gathering information on past medical history/social history and prior level of function, completion of standardized testing/informal observation of tasks, assessment of data and education on plan of care and goals.      CPT codes:  [x] Low Complexity PT evaluation 64254  [] Moderate Complexity PT evaluation 53488  [] High Complexity PT evaluation 83542  [] PT Re-evaluation 35220  [] Gait training 91242 minutes  [] Manual therapy 92801 minutes  [x] Therapeutic activities 15112 10 minutes  [] Therapeutic exercises 18316 minutes  [] Neuromuscular reeducation 86433 minutes     Rita Brooke PT [id number]  
infective endocarditis of the aortic valve, JENNY negative, needs PICC line for ATB   L1, L4, L5 fractures  Hyperlipidemia, on rosuvastatin  Pneumonia, on ceftriaxone and doxycycline  Positive blood cultures, ID following     PLAN:     HD 3 times weekly MWF, for HD tomorrow  Continue carvedilol 12.5 mg PO BID   Continue sevelamer 800 mg PO TID   Continue retacrit 3000 units SQ MWF  Continue to monitor labs  Continue to monitor blood pressure  Okay to place PICC line from nephrology point of view       Electronically signed by TOMMY Chamberlain CNP on 11/5/2024 at 12:24 PM     I saw and evaluated the patient, performing the key elements of the service. I discussed the findings, assessment and plan with NP and agree with her findings and plans as documented in her note.    Harry Rivas MD          
on ceftriaxone and doxycycline  Positive blood cultures, ID following     PLAN:     HD 3 times weekly MWF, HD tomorrow  Continue carvedilol 12.5 mg PO BID  Continue sevelamer 800 mg PO TID  Obtain phosphorus level   Continue to monitor labs  Continue to monitor blood pressure       Electronically signed by TOMMY Chamberlain - CNP on 10/31/2024 at 8:55 AM     I saw and evaluated the patient, performing the key elements of the service. I discussed the findings, assessment and plan with NP and agree with her findings and plans as documented in her note.    Harry Rivas MD          
(PROVENTIL HFA) 108 (90 Base) MCG/ACT inhaler Inhale 2 puffs into the lungs every 6 hours as needed for Wheezing 8/3/23   Judith Connolly APRN - CNP   carvedilol (COREG) 12.5 MG tablet Take 1 tablet by mouth 2 times daily (with meals)  Patient not taking: Reported on 10/22/2024 8/14/21   Aroldo Johnson MD        Current Facility-Administered Medications   Medication Dose Route Frequency Provider Last Rate Last Admin    [START ON 11/1/2024] epoetin delmer-epbx (RETACRIT) injection 3,000 Units  3,000 Units SubCUTAneous Once per day on Monday Wednesday Friday Nancy Holcomb APRN - CNP        carvedilol (COREG) tablet 12.5 mg  12.5 mg Oral BID  Nancy Holcomb APRN - CNP   12.5 mg at 10/31/24 0634    albuterol (PROVENTIL) (2.5 MG/3ML) 0.083% nebulizer solution 2.5 mg  2.5 mg Nebulization Q6H PRN Patricia Hannah APRN - CNP        aspirin EC tablet 81 mg  81 mg Oral QAM Patricia Hannah APRN - CNP   81 mg at 10/31/24 0734    budesonide (PULMICORT) nebulizer suspension 250 mcg  0.25 mg Nebulization BID RT Patricia Hannah APRN - CNP   250 mcg at 10/31/24 0745    And    arformoterol tartrate (BROVANA) nebulizer solution 15 mcg  15 mcg Nebulization BID RT Patricia Hannah APRN - CNP   15 mcg at 10/31/24 0744    And    ipratropium (ATROVENT) 0.02 % nebulizer solution 0.5 mg  0.5 mg Nebulization Q6H RT Patricia Hannah APRN - CNP   0.5 mg at 10/31/24 1311    nitroGLYCERIN (NITROSTAT) SL tablet 0.4 mg  0.4 mg SubLINGual Q5 Min PRN Patricia Hannah APRN - CNP        pantoprazole (PROTONIX) tablet 40 mg  40 mg Oral QAM Patricia Hannah APRN - CNP   40 mg at 10/31/24 0735    rosuvastatin (CRESTOR) tablet 10 mg  10 mg Oral Every Other Day Patricia Hannah APRN - CNP   10 mg at 10/31/24 0734    sevelamer (RENVELA) tablet 800 mg  800 mg Oral TID  Patricia Hannah APRN - CNP   800 mg at 10/31/24 0634    sodium chloride flush 0.9 % injection 10 mL  10 mL IntraVENous 2 times per day Patricia Hannah APRN - CNP   10 mL

## 2024-11-07 NOTE — DISCHARGE SUMMARY
26.2 mL/m2.  Mitral valve area by direct planimetry 0.9 cm².    Mitral Valve: Mild annular calcification. Mild regurgitation with a centrally directed jet.    Tricuspid Valve: Mild to moderate regurgitation with a centrally directed jet. Mildly elevated RVSP, consistent with mild pulmonary hypertension. The estimated RVSP is 44 mmHg.    Left Atrium: Normal sized appendage. No left atrial appendage thrombus noted. No left atrial appendage mass noted.    Interatrial Septum: Agitated saline study was negative with and without provocation. PFO present with a left to right shunt visible by spectral Doppler.    Aorta: Normal sized aortic root and ascending aorta. Mildly dilated sinus of Valsalva. Ao sinus diameter is 3.7 cm.    Pericardium: No pericardial effusion.    Image quality is adequate.    No echocardiographic evidence of endocarditis.      DISPOSITION:  The patient's condition is fair.   The patient is being discharged to nursing home    DISCHARGE MEDICATIONS:      Medication List        START taking these medications      cefTRIAXone infusion  Commonly known as: ROCEPHIN  Infuse 2,000 mg intravenously every 24 hours for 28 days Compound per protocol     polyethylene glycol 17 g packet  Commonly known as: GLYCOLAX  Take 1 packet by mouth daily     senna 8.6 MG tablet  Commonly known as: SENOKOT  Take 1 tablet by mouth daily as needed (Constipation)            CONTINUE taking these medications      acetaminophen 325 MG tablet  Commonly known as: TYLENOL     albuterol sulfate  (90 Base) MCG/ACT inhaler  Commonly known as: Proventil HFA  Inhale 2 puffs into the lungs every 6 hours as needed for Wheezing     aspirin 81 MG EC tablet     Co-Enzyme Q10 100 MG Caps     nitroGLYCERIN 0.4 MG SL tablet  Commonly known as: NITROSTAT     pantoprazole 40 MG tablet  Commonly known as: PROTONIX     rosuvastatin 10 MG tablet  Commonly known as: CRESTOR     sevelamer 800 MG tablet  Commonly known as: RENVELROSEMARY Cotto

## 2024-11-08 ENCOUNTER — OUTSIDE SERVICES (OUTPATIENT)
Dept: PRIMARY CARE CLINIC | Age: 79
End: 2024-11-08

## 2024-11-08 DIAGNOSIS — B95.5 STREPTOCOCCAL BACTEREMIA: Primary | ICD-10-CM

## 2024-11-08 DIAGNOSIS — R78.81 BACTEREMIA DUE TO STAPHYLOCOCCUS: ICD-10-CM

## 2024-11-08 DIAGNOSIS — B95.8 BACTEREMIA DUE TO STAPHYLOCOCCUS: ICD-10-CM

## 2024-11-08 DIAGNOSIS — N18.6 ESRD ON DIALYSIS (HCC): ICD-10-CM

## 2024-11-08 DIAGNOSIS — R53.81 PHYSICAL DECONDITIONING: ICD-10-CM

## 2024-11-08 DIAGNOSIS — R13.12 OROPHARYNGEAL DYSPHAGIA: ICD-10-CM

## 2024-11-08 DIAGNOSIS — R78.81 STREPTOCOCCAL BACTEREMIA: Primary | ICD-10-CM

## 2024-11-08 DIAGNOSIS — I33.0 INFECTIVE ENDOCARDITIS, DUE TO UNSPECIFIED ORGANISM, UNSPECIFIED CHRONICITY: ICD-10-CM

## 2024-11-08 DIAGNOSIS — J18.9 COMMUNITY ACQUIRED PNEUMONIA, UNSPECIFIED LATERALITY: ICD-10-CM

## 2024-11-08 DIAGNOSIS — R53.1 GENERALIZED WEAKNESS: ICD-10-CM

## 2024-11-08 DIAGNOSIS — R91.8 LUNG MASS: ICD-10-CM

## 2024-11-08 DIAGNOSIS — Z99.2 ESRD ON DIALYSIS (HCC): ICD-10-CM

## 2024-11-08 DIAGNOSIS — Z91.81 AT MAXIMUM RISK FOR FALL: ICD-10-CM

## 2024-11-08 DIAGNOSIS — J44.9 END STAGE COPD (HCC): ICD-10-CM

## 2024-11-08 NOTE — PROGRESS NOTES
Visit Date: 24  James Vazquez III (:  1945) is a 79 y.o. male.    History & Physical    Subjective   SUBJECTIVE/OBJECTIVE:  Past Medical History:   Diagnosis Date    Blind left eye     Chronic kidney disease     Dialysis patient (HCC)     Hemodialysis patient (HCC)     Hyperlipidemia     Hypertension       Past Surgical History:   Procedure Laterality Date    AV FISTULA CREATION Left     Dr. Phillips [facility]    CARDIAC SURGERY      HEMODIALYSIS ACCESS PERCUTANEOUS REVISION Left     2 x Dr. Phillips, [facility]    HIP SURGERY Left 10/16/2020    HIP HEMIARTHROPLASTY performed by Abel Birmingham MD at [facility] [place] [facility] OR    PERIPHERAL VASCULAR BYPASS      Aortobifemoral bypass for claudicatio - Dr. Phillips [facility]    UPPER GASTROINTESTINAL ENDOSCOPY N/A 2020    EGD ESOPHAGOGASTRODUODENOSCOPY WITH ANTRAL BIOPSY performed by Lloyd Styles MD at [facility] OR    UPPER GASTROINTESTINAL ENDOSCOPY N/A 10/28/2020    EGD ESOPHAGOGASTRODUODENOSCOPY performed by Lloyd Styles MD at [facility] ENDOSCOPY      No family history on file.   Social History     Socioeconomic History    Marital status:    Tobacco Use    Smoking status: Former     Current packs/day: 0.00     Average packs/day: 1 pack/day for 30.0 years (30.0 ttl pk-yrs)     Types: Cigarettes     Start date: 10/3/1970     Quit date: 10/3/2000     Years since quittin.1    Smokeless tobacco: Never   Vaping Use    Vaping status: Never Used    Passive vaping exposure: Yes   Substance and Sexual Activity    Alcohol use: Not Currently     Comment: occasional    Drug use: Never    Sexual activity: Not Currently     Social Determinants of Health     Food Insecurity: No Food Insecurity (10/29/2024)    Hunger Vital Sign     Worried About Running Out of Food in the Last Year: Never true     Ran Out of Food in the Last Year: Never true   Transportation Needs: No Transportation Needs (10/29/2024)    PRAPARE - Transportation     Lack of Transportation (Medical): No

## 2024-11-11 ENCOUNTER — OUTSIDE SERVICES (OUTPATIENT)
Dept: PRIMARY CARE CLINIC | Age: 79
End: 2024-11-11

## 2024-11-11 DIAGNOSIS — E78.5 HYPERLIPIDEMIA, UNSPECIFIED HYPERLIPIDEMIA TYPE: ICD-10-CM

## 2024-11-11 DIAGNOSIS — Z99.2 ESRD ON DIALYSIS (HCC): ICD-10-CM

## 2024-11-11 DIAGNOSIS — J44.9 END STAGE COPD (HCC): ICD-10-CM

## 2024-11-11 DIAGNOSIS — I10 ESSENTIAL (PRIMARY) HYPERTENSION: ICD-10-CM

## 2024-11-11 DIAGNOSIS — R53.1 GENERALIZED WEAKNESS: ICD-10-CM

## 2024-11-11 DIAGNOSIS — J18.9 COMMUNITY ACQUIRED PNEUMONIA, UNSPECIFIED LATERALITY: ICD-10-CM

## 2024-11-11 DIAGNOSIS — R78.81 STREPTOCOCCAL BACTEREMIA: Primary | ICD-10-CM

## 2024-11-11 DIAGNOSIS — I33.0 INFECTIVE ENDOCARDITIS, DUE TO UNSPECIFIED ORGANISM, UNSPECIFIED CHRONICITY: ICD-10-CM

## 2024-11-11 DIAGNOSIS — E43 SEVERE PROTEIN-CALORIE MALNUTRITION (HCC): ICD-10-CM

## 2024-11-11 DIAGNOSIS — N18.6 ESRD ON DIALYSIS (HCC): ICD-10-CM

## 2024-11-11 DIAGNOSIS — K21.9 GASTROESOPHAGEAL REFLUX DISEASE WITHOUT ESOPHAGITIS: ICD-10-CM

## 2024-11-11 DIAGNOSIS — R13.12 OROPHARYNGEAL DYSPHAGIA: ICD-10-CM

## 2024-11-11 DIAGNOSIS — B95.5 STREPTOCOCCAL BACTEREMIA: Primary | ICD-10-CM

## 2024-11-11 DIAGNOSIS — R91.8 LUNG MASS: ICD-10-CM

## 2024-11-13 ENCOUNTER — OUTSIDE SERVICES (OUTPATIENT)
Dept: PRIMARY CARE CLINIC | Age: 79
End: 2024-11-13

## 2024-11-13 DIAGNOSIS — E43 SEVERE PROTEIN-CALORIE MALNUTRITION (HCC): ICD-10-CM

## 2024-11-13 DIAGNOSIS — K21.9 GASTROESOPHAGEAL REFLUX DISEASE WITHOUT ESOPHAGITIS: ICD-10-CM

## 2024-11-13 DIAGNOSIS — R78.81 STREPTOCOCCAL BACTEREMIA: Primary | ICD-10-CM

## 2024-11-13 DIAGNOSIS — B95.5 STREPTOCOCCAL BACTEREMIA: Primary | ICD-10-CM

## 2024-11-13 DIAGNOSIS — R91.8 LUNG MASS: ICD-10-CM

## 2024-11-13 DIAGNOSIS — R53.1 GENERALIZED WEAKNESS: ICD-10-CM

## 2024-11-13 DIAGNOSIS — J18.9 COMMUNITY ACQUIRED PNEUMONIA, UNSPECIFIED LATERALITY: ICD-10-CM

## 2024-11-13 DIAGNOSIS — N18.6 ESRD ON DIALYSIS (HCC): ICD-10-CM

## 2024-11-13 DIAGNOSIS — E78.5 HYPERLIPIDEMIA, UNSPECIFIED HYPERLIPIDEMIA TYPE: ICD-10-CM

## 2024-11-13 DIAGNOSIS — I10 ESSENTIAL (PRIMARY) HYPERTENSION: ICD-10-CM

## 2024-11-13 DIAGNOSIS — R13.12 OROPHARYNGEAL DYSPHAGIA: ICD-10-CM

## 2024-11-13 DIAGNOSIS — J44.9 END STAGE COPD (HCC): ICD-10-CM

## 2024-11-13 DIAGNOSIS — Z99.2 ESRD ON DIALYSIS (HCC): ICD-10-CM

## 2024-11-13 DIAGNOSIS — I33.0 INFECTIVE ENDOCARDITIS, DUE TO UNSPECIFIED ORGANISM, UNSPECIFIED CHRONICITY: ICD-10-CM

## 2024-11-18 ENCOUNTER — OUTSIDE SERVICES (OUTPATIENT)
Dept: PRIMARY CARE CLINIC | Age: 79
End: 2024-11-18

## 2024-11-18 DIAGNOSIS — E78.5 HYPERLIPIDEMIA, UNSPECIFIED HYPERLIPIDEMIA TYPE: ICD-10-CM

## 2024-11-18 DIAGNOSIS — Z99.2 ESRD ON DIALYSIS (HCC): ICD-10-CM

## 2024-11-18 DIAGNOSIS — I10 ESSENTIAL (PRIMARY) HYPERTENSION: ICD-10-CM

## 2024-11-18 DIAGNOSIS — K21.9 GASTROESOPHAGEAL REFLUX DISEASE WITHOUT ESOPHAGITIS: ICD-10-CM

## 2024-11-18 DIAGNOSIS — J44.9 END STAGE COPD (HCC): ICD-10-CM

## 2024-11-18 DIAGNOSIS — E43 SEVERE PROTEIN-CALORIE MALNUTRITION (HCC): ICD-10-CM

## 2024-11-18 DIAGNOSIS — R53.1 GENERALIZED WEAKNESS: ICD-10-CM

## 2024-11-18 DIAGNOSIS — J18.9 COMMUNITY ACQUIRED PNEUMONIA, UNSPECIFIED LATERALITY: ICD-10-CM

## 2024-11-18 DIAGNOSIS — R78.81 STREPTOCOCCAL BACTEREMIA: Primary | ICD-10-CM

## 2024-11-18 DIAGNOSIS — I33.0 INFECTIVE ENDOCARDITIS, DUE TO UNSPECIFIED ORGANISM, UNSPECIFIED CHRONICITY: ICD-10-CM

## 2024-11-18 DIAGNOSIS — R91.8 LUNG MASS: ICD-10-CM

## 2024-11-18 DIAGNOSIS — R13.12 OROPHARYNGEAL DYSPHAGIA: ICD-10-CM

## 2024-11-18 DIAGNOSIS — N18.6 ESRD ON DIALYSIS (HCC): ICD-10-CM

## 2024-11-18 DIAGNOSIS — B95.5 STREPTOCOCCAL BACTEREMIA: Primary | ICD-10-CM

## 2024-11-20 ENCOUNTER — OUTSIDE SERVICES (OUTPATIENT)
Dept: PRIMARY CARE CLINIC | Age: 79
End: 2024-11-20

## 2024-11-20 DIAGNOSIS — K21.9 GASTROESOPHAGEAL REFLUX DISEASE WITHOUT ESOPHAGITIS: ICD-10-CM

## 2024-11-20 DIAGNOSIS — I10 ESSENTIAL (PRIMARY) HYPERTENSION: ICD-10-CM

## 2024-11-20 DIAGNOSIS — B95.5 STREPTOCOCCAL BACTEREMIA: Primary | ICD-10-CM

## 2024-11-20 DIAGNOSIS — E43 SEVERE PROTEIN-CALORIE MALNUTRITION (HCC): ICD-10-CM

## 2024-11-20 DIAGNOSIS — J44.9 END STAGE COPD (HCC): ICD-10-CM

## 2024-11-20 DIAGNOSIS — E78.5 HYPERLIPIDEMIA, UNSPECIFIED HYPERLIPIDEMIA TYPE: ICD-10-CM

## 2024-11-20 DIAGNOSIS — Z99.2 ESRD ON DIALYSIS (HCC): ICD-10-CM

## 2024-11-20 DIAGNOSIS — R78.81 STREPTOCOCCAL BACTEREMIA: Primary | ICD-10-CM

## 2024-11-20 DIAGNOSIS — N18.6 ESRD ON DIALYSIS (HCC): ICD-10-CM

## 2024-11-20 DIAGNOSIS — I33.0 INFECTIVE ENDOCARDITIS, DUE TO UNSPECIFIED ORGANISM, UNSPECIFIED CHRONICITY: ICD-10-CM

## 2024-11-20 DIAGNOSIS — R53.1 GENERALIZED WEAKNESS: ICD-10-CM

## 2024-11-20 DIAGNOSIS — J18.9 COMMUNITY ACQUIRED PNEUMONIA, UNSPECIFIED LATERALITY: ICD-10-CM

## 2024-11-20 DIAGNOSIS — R13.12 OROPHARYNGEAL DYSPHAGIA: ICD-10-CM

## 2024-11-20 DIAGNOSIS — R91.8 LUNG MASS: ICD-10-CM

## 2024-11-22 ENCOUNTER — OUTSIDE SERVICES (OUTPATIENT)
Dept: PRIMARY CARE CLINIC | Age: 79
End: 2024-11-22

## 2024-11-22 DIAGNOSIS — R78.81 STREPTOCOCCAL BACTEREMIA: Primary | ICD-10-CM

## 2024-11-22 DIAGNOSIS — R53.1 GENERALIZED WEAKNESS: ICD-10-CM

## 2024-11-22 DIAGNOSIS — J18.9 COMMUNITY ACQUIRED PNEUMONIA, UNSPECIFIED LATERALITY: ICD-10-CM

## 2024-11-22 DIAGNOSIS — Z99.2 ESRD ON DIALYSIS (HCC): ICD-10-CM

## 2024-11-22 DIAGNOSIS — I10 ESSENTIAL (PRIMARY) HYPERTENSION: ICD-10-CM

## 2024-11-22 DIAGNOSIS — E78.5 HYPERLIPIDEMIA, UNSPECIFIED HYPERLIPIDEMIA TYPE: ICD-10-CM

## 2024-11-22 DIAGNOSIS — B95.5 STREPTOCOCCAL BACTEREMIA: Primary | ICD-10-CM

## 2024-11-22 DIAGNOSIS — R13.12 OROPHARYNGEAL DYSPHAGIA: ICD-10-CM

## 2024-11-22 DIAGNOSIS — R91.8 LUNG MASS: ICD-10-CM

## 2024-11-22 DIAGNOSIS — J44.9 END STAGE COPD (HCC): ICD-10-CM

## 2024-11-22 DIAGNOSIS — I33.0 INFECTIVE ENDOCARDITIS, DUE TO UNSPECIFIED ORGANISM, UNSPECIFIED CHRONICITY: ICD-10-CM

## 2024-11-22 DIAGNOSIS — N18.6 ESRD ON DIALYSIS (HCC): ICD-10-CM

## 2024-11-22 DIAGNOSIS — E43 SEVERE PROTEIN-CALORIE MALNUTRITION (HCC): ICD-10-CM

## 2024-11-22 DIAGNOSIS — K21.9 GASTROESOPHAGEAL REFLUX DISEASE WITHOUT ESOPHAGITIS: ICD-10-CM

## 2024-11-25 ENCOUNTER — OUTSIDE SERVICES (OUTPATIENT)
Dept: PRIMARY CARE CLINIC | Age: 79
End: 2024-11-25

## 2024-11-25 DIAGNOSIS — B95.5 STREPTOCOCCAL BACTEREMIA: Primary | ICD-10-CM

## 2024-11-25 DIAGNOSIS — Z99.2 ESRD ON DIALYSIS (HCC): ICD-10-CM

## 2024-11-25 DIAGNOSIS — R13.12 OROPHARYNGEAL DYSPHAGIA: ICD-10-CM

## 2024-11-25 DIAGNOSIS — R78.81 STREPTOCOCCAL BACTEREMIA: Primary | ICD-10-CM

## 2024-11-25 DIAGNOSIS — I10 ESSENTIAL (PRIMARY) HYPERTENSION: ICD-10-CM

## 2024-11-25 DIAGNOSIS — I33.0 INFECTIVE ENDOCARDITIS, DUE TO UNSPECIFIED ORGANISM, UNSPECIFIED CHRONICITY: ICD-10-CM

## 2024-11-25 DIAGNOSIS — K21.9 GASTROESOPHAGEAL REFLUX DISEASE WITHOUT ESOPHAGITIS: ICD-10-CM

## 2024-11-25 DIAGNOSIS — E43 SEVERE PROTEIN-CALORIE MALNUTRITION (HCC): ICD-10-CM

## 2024-11-25 DIAGNOSIS — J18.9 COMMUNITY ACQUIRED PNEUMONIA, UNSPECIFIED LATERALITY: ICD-10-CM

## 2024-11-25 DIAGNOSIS — E78.5 HYPERLIPIDEMIA, UNSPECIFIED HYPERLIPIDEMIA TYPE: ICD-10-CM

## 2024-11-25 DIAGNOSIS — J44.9 END STAGE COPD (HCC): ICD-10-CM

## 2024-11-25 DIAGNOSIS — R91.8 LUNG MASS: ICD-10-CM

## 2024-11-25 DIAGNOSIS — N18.6 ESRD ON DIALYSIS (HCC): ICD-10-CM

## 2024-11-25 DIAGNOSIS — R53.1 GENERALIZED WEAKNESS: ICD-10-CM

## 2024-11-27 ENCOUNTER — OUTSIDE SERVICES (OUTPATIENT)
Dept: PRIMARY CARE CLINIC | Age: 79
End: 2024-11-27

## 2024-11-27 DIAGNOSIS — R91.8 LUNG MASS: ICD-10-CM

## 2024-11-27 DIAGNOSIS — B95.5 STREPTOCOCCAL BACTEREMIA: Primary | ICD-10-CM

## 2024-11-27 DIAGNOSIS — E43 SEVERE PROTEIN-CALORIE MALNUTRITION (HCC): ICD-10-CM

## 2024-11-27 DIAGNOSIS — R13.12 OROPHARYNGEAL DYSPHAGIA: ICD-10-CM

## 2024-11-27 DIAGNOSIS — R78.81 STREPTOCOCCAL BACTEREMIA: Primary | ICD-10-CM

## 2024-11-27 DIAGNOSIS — K21.9 GASTROESOPHAGEAL REFLUX DISEASE WITHOUT ESOPHAGITIS: ICD-10-CM

## 2024-11-27 DIAGNOSIS — J44.9 END STAGE COPD (HCC): ICD-10-CM

## 2024-11-27 DIAGNOSIS — I33.0 INFECTIVE ENDOCARDITIS, DUE TO UNSPECIFIED ORGANISM, UNSPECIFIED CHRONICITY: ICD-10-CM

## 2024-11-27 DIAGNOSIS — Z99.2 ESRD ON DIALYSIS (HCC): ICD-10-CM

## 2024-11-27 DIAGNOSIS — N18.6 ESRD ON DIALYSIS (HCC): ICD-10-CM

## 2024-11-27 DIAGNOSIS — J18.9 COMMUNITY ACQUIRED PNEUMONIA, UNSPECIFIED LATERALITY: ICD-10-CM

## 2024-11-27 DIAGNOSIS — E78.5 HYPERLIPIDEMIA, UNSPECIFIED HYPERLIPIDEMIA TYPE: ICD-10-CM

## 2024-11-27 DIAGNOSIS — I10 ESSENTIAL (PRIMARY) HYPERTENSION: ICD-10-CM

## 2024-11-27 DIAGNOSIS — R53.1 GENERALIZED WEAKNESS: ICD-10-CM

## 2024-12-28 ENCOUNTER — HOSPITAL ENCOUNTER (OUTPATIENT)
Age: 79
Discharge: HOME | End: 2024-12-28
Payer: MEDICARE

## 2024-12-28 DIAGNOSIS — I35.8 ENDOCARDITIS OF AORTIC VALVE: ICD-10-CM

## 2024-12-28 PROCEDURE — 87040 BLOOD CULTURE FOR BACTERIA: CPT

## 2024-12-29 LAB
MICROORGANISM SPEC CULT: NORMAL
MICROORGANISM SPEC CULT: NORMAL
SERVICE CMNT-IMP: NORMAL
SERVICE CMNT-IMP: NORMAL
SPECIMEN DESCRIPTION: NORMAL
SPECIMEN DESCRIPTION: NORMAL

## 2025-02-25 ENCOUNTER — TRANSCRIBE ORDERS (OUTPATIENT)
Dept: ADMINISTRATIVE | Age: 80
End: 2025-02-25

## 2025-02-25 DIAGNOSIS — I73.9 PERIPHERAL VASCULAR DISEASE, UNSPECIFIED: ICD-10-CM

## 2025-03-05 ENCOUNTER — TELEPHONE (OUTPATIENT)
Dept: VASCULAR SURGERY | Age: 80
End: 2025-03-05

## 2025-03-05 NOTE — TELEPHONE ENCOUNTER
Received referral from GINETTE Holder NP for Dr. Davila regarding PVD, left message for patient to return call to schedule.

## 2025-03-06 ENCOUNTER — TELEPHONE (OUTPATIENT)
Dept: VASCULAR SURGERY | Age: 80
End: 2025-03-06

## 2025-03-06 NOTE — TELEPHONE ENCOUNTER
Notified patient's wife of appointment to see Dr. Briceno on 3-27-25 at 2:15 pm, patient is on dialysis and needs a Tuesday or Thursday appointment.

## 2025-03-13 ENCOUNTER — HOSPITAL ENCOUNTER (OUTPATIENT)
Dept: INTERVENTIONAL RADIOLOGY/VASCULAR | Age: 80
Discharge: HOME | End: 2025-03-15
Payer: MEDICARE

## 2025-03-13 DIAGNOSIS — I73.9 PERIPHERAL VASCULAR DISEASE, UNSPECIFIED: ICD-10-CM

## 2025-03-13 PROCEDURE — 93922 UPR/L XTREMITY ART 2 LEVELS: CPT

## 2025-03-27 ENCOUNTER — OFFICE VISIT (OUTPATIENT)
Dept: VASCULAR SURGERY | Age: 80
End: 2025-03-27
Payer: MEDICARE

## 2025-03-27 DIAGNOSIS — R78.81 STREPTOCOCCAL BACTEREMIA: ICD-10-CM

## 2025-03-27 DIAGNOSIS — I70.232 ATHEROSCLEROSIS OF NATIVE ARTERY OF RIGHT LOWER EXTREMITY WITH ULCERATION OF CALF: ICD-10-CM

## 2025-03-27 DIAGNOSIS — N18.6 ESRD ON DIALYSIS (HCC): ICD-10-CM

## 2025-03-27 DIAGNOSIS — L97.521 ULCER OF TOE, LEFT, LIMITED TO BREAKDOWN OF SKIN (HCC): ICD-10-CM

## 2025-03-27 DIAGNOSIS — B95.5 STREPTOCOCCAL BACTEREMIA: ICD-10-CM

## 2025-03-27 DIAGNOSIS — Z99.2 ESRD ON DIALYSIS (HCC): ICD-10-CM

## 2025-03-27 DIAGNOSIS — L97.511 ULCER OF GREAT TOE, RIGHT, LIMITED TO BREAKDOWN OF SKIN (HCC): ICD-10-CM

## 2025-03-27 DIAGNOSIS — Z95.828 HX OF AORTA-ILIAC-FEMORAL BYPASS: ICD-10-CM

## 2025-03-27 DIAGNOSIS — D69.6 THROMBOCYTOPENIA: Primary | ICD-10-CM

## 2025-03-27 PROBLEM — I16.1 HYPERTENSIVE EMERGENCY: Status: RESOLVED | Noted: 2021-08-12 | Resolved: 2025-03-27

## 2025-03-27 PROBLEM — I10 UNCONTROLLED HYPERTENSION: Status: RESOLVED | Noted: 2021-08-11 | Resolved: 2025-03-27

## 2025-03-27 PROBLEM — J18.9 PNEUMONIA DUE TO ORGANISM: Status: RESOLVED | Noted: 2024-10-28 | Resolved: 2025-03-27

## 2025-03-27 PROBLEM — R41.82 AMS (ALTERED MENTAL STATUS): Status: RESOLVED | Noted: 2020-10-11 | Resolved: 2025-03-27

## 2025-03-27 PROBLEM — R07.9 CHEST PAIN: Status: RESOLVED | Noted: 2024-01-15 | Resolved: 2025-03-27

## 2025-03-27 PROBLEM — B02.9 HERPES ZOSTER: Status: RESOLVED | Noted: 2020-10-13 | Resolved: 2025-03-27

## 2025-03-27 PROCEDURE — G8427 DOCREV CUR MEDS BY ELIG CLIN: HCPCS | Performed by: SURGERY

## 2025-03-27 PROCEDURE — 99204 OFFICE O/P NEW MOD 45 MIN: CPT | Performed by: SURGERY

## 2025-03-27 PROCEDURE — 1159F MED LIST DOCD IN RCRD: CPT | Performed by: SURGERY

## 2025-03-27 PROCEDURE — 1123F ACP DISCUSS/DSCN MKR DOCD: CPT | Performed by: SURGERY

## 2025-03-27 PROCEDURE — G8420 CALC BMI NORM PARAMETERS: HCPCS | Performed by: SURGERY

## 2025-03-27 PROCEDURE — 1036F TOBACCO NON-USER: CPT | Performed by: SURGERY

## 2025-03-27 RX ORDER — ASCORBIC ACID, THIAMINE MONONITRATE,RIBOFLAVIN, NIACINAMIDE, PYRIDOXINE HYDROCHLORIDE, FOLIC ACID, CYANOCOBALAMIN, BIOTIN, CALCIUM PANTOTHENATE, 100; 1.5; 1.7; 20; 10; 1; 6000; 150000; 5 MG/1; MG/1; MG/1; MG/1; MG/1; MG/1; UG/1; UG/1; MG/1
1 CAPSULE, LIQUID FILLED ORAL DAILY
COMMUNITY
Start: 2024-12-22

## 2025-03-27 RX ORDER — CARVEDILOL 12.5 MG/1
12.5 TABLET ORAL 2 TIMES DAILY WITH MEALS
COMMUNITY
Start: 2024-12-22

## 2025-03-27 NOTE — PROGRESS NOTES
Chief Complaint:   Chief Complaint   Patient presents with    Surgical Consult     PVD, just in big toes, both feet.          HPI: Patient, came to the office in a wheelchair, does walk at home with help of walker, extremely frail looking, for the evaluation of vascular status of the legs, ulcers of the tips of the great toes bilaterally as well as the fourth toe mainly skin, ulcer on the right calf anteriorly being seen by his podiatrist Dr. [place], referred by his primary care for further evaluation    Patient is currently on hemodialysis through a left upper arm AV fistula inserted in [place], recently underwent balloon angioplasty at the access center for bleeding after dialysis    Patient also has bruising and ecchymosis of the legs mainly below the knee, lab work revealed evidence of thrombocytopenia last platelet count being 81,000    Patient also has a complicated history, had streptococcal bacteremia underwent extensive eval to the [facility] in fact seen by Department of vascular surgery Dr. Daugherty, the possibility of graft infection suspected, after complete workup evaluation, patient recommended medical therapy and he was told that he is not a good candidate for surgery anyway      Patient denies any focal lateralizing neurological symptoms like loss of speech, vision or loss of function of extremity    No Known Allergies    Current Outpatient Medications   Medication Sig Dispense Refill    B Complex-C-Folic Acid (RENAL) 1 MG CAPS Take 1 capsule by mouth daily      carvedilol (COREG) 12.5 MG tablet Take 1 tablet by mouth 2 times daily (with meals)      nitroGLYCERIN (NITROSTAT) 0.4 MG SL tablet Place 1 tablet under the tongue      sevelamer (RENVELA) 800 MG tablet Take 1 tablet by mouth 3 times daily (with meals)      Co-Enzyme Q10 100 MG CAPS Take 100 mg by mouth nightly      fluticasone-umeclidin-vilant (TRELEGY ELLIPTA) 100-62.5-25 MCG/ACT AEPB inhaler Inhale 1 puff into the lungs daily

## 2025-04-07 ENCOUNTER — HOSPITAL ENCOUNTER (EMERGENCY)
Age: 80
Discharge: HOME | End: 2025-04-07
Attending: EMERGENCY MEDICINE
Payer: MEDICARE

## 2025-04-07 VITALS
SYSTOLIC BLOOD PRESSURE: 108 MMHG | HEART RATE: 73 BPM | OXYGEN SATURATION: 100 % | DIASTOLIC BLOOD PRESSURE: 49 MMHG | TEMPERATURE: 96.9 F | RESPIRATION RATE: 16 BRPM

## 2025-04-07 DIAGNOSIS — N18.6 ESRD (END STAGE RENAL DISEASE) (HCC): Primary | ICD-10-CM

## 2025-04-07 DIAGNOSIS — M79.602 PAIN OF LEFT UPPER EXTREMITY: ICD-10-CM

## 2025-04-07 LAB
ANION GAP SERPL CALCULATED.3IONS-SCNC: 22 MMOL/L (ref 7–16)
BASOPHILS # BLD: 0 K/UL (ref 0–0.2)
BASOPHILS NFR BLD: 0 % (ref 0–2)
BUN SERPL-MCNC: 91 MG/DL (ref 6–23)
CALCIUM SERPL-MCNC: 10 MG/DL (ref 8.6–10.2)
CHLORIDE SERPL-SCNC: 94 MMOL/L (ref 98–107)
CO2 SERPL-SCNC: 24 MMOL/L (ref 22–29)
CREAT SERPL-MCNC: 8.2 MG/DL (ref 0.7–1.2)
EOSINOPHIL # BLD: 0 K/UL (ref 0.05–0.5)
EOSINOPHILS RELATIVE PERCENT: 0 % (ref 0–6)
ERYTHROCYTE [DISTWIDTH] IN BLOOD BY AUTOMATED COUNT: 16.4 % (ref 11.5–15)
GFR, ESTIMATED: 6 ML/MIN/1.73M2
GLUCOSE SERPL-MCNC: 173 MG/DL (ref 74–99)
HCT VFR BLD AUTO: 43.7 % (ref 37–54)
HGB BLD-MCNC: 14 G/DL (ref 12.5–16.5)
LYMPHOCYTES NFR BLD: 1.02 K/UL (ref 1.5–4)
LYMPHOCYTES RELATIVE PERCENT: 17 % (ref 20–42)
MAGNESIUM SERPL-MCNC: 2.5 MG/DL (ref 1.6–2.6)
MCH RBC QN AUTO: 35.6 PG (ref 26–35)
MCHC RBC AUTO-ENTMCNC: 32 G/DL (ref 32–34.5)
MCV RBC AUTO: 111.2 FL (ref 80–99.9)
MONOCYTES NFR BLD: 0.38 K/UL (ref 0.1–0.95)
MONOCYTES NFR BLD: 6 % (ref 2–12)
NEUTROPHILS NFR BLD: 77 % (ref 43–80)
NEUTS SEG NFR BLD: 4.8 K/UL (ref 1.8–7.3)
NUCLEATED RED BLOOD CELLS: 1 PER 100 WBC
PLATELET # BLD AUTO: 75 K/UL (ref 130–450)
PLATELET CONFIRMATION: NORMAL
PMV BLD AUTO: 10.8 FL (ref 7–12)
POTASSIUM SERPL-SCNC: 5.8 MMOL/L (ref 3.5–5)
RBC # BLD AUTO: 3.93 M/UL (ref 3.8–5.8)
RBC # BLD: ABNORMAL 10*6/UL
SODIUM SERPL-SCNC: 140 MMOL/L (ref 132–146)
WBC OTHER # BLD: 6.2 K/UL (ref 4.5–11.5)

## 2025-04-07 PROCEDURE — 93005 ELECTROCARDIOGRAM TRACING: CPT | Performed by: EMERGENCY MEDICINE

## 2025-04-07 PROCEDURE — 90935 HEMODIALYSIS ONE EVALUATION: CPT

## 2025-04-07 PROCEDURE — 80048 BASIC METABOLIC PNL TOTAL CA: CPT

## 2025-04-07 PROCEDURE — 6370000000 HC RX 637 (ALT 250 FOR IP): Performed by: EMERGENCY MEDICINE

## 2025-04-07 PROCEDURE — 99284 EMERGENCY DEPT VISIT MOD MDM: CPT

## 2025-04-07 PROCEDURE — 83735 ASSAY OF MAGNESIUM: CPT

## 2025-04-07 PROCEDURE — 85025 COMPLETE CBC W/AUTO DIFF WBC: CPT

## 2025-04-07 RX ORDER — OXYCODONE AND ACETAMINOPHEN 5; 325 MG/1; MG/1
2 TABLET ORAL ONCE
Refills: 0 | Status: COMPLETED | OUTPATIENT
Start: 2025-04-07 | End: 2025-04-07

## 2025-04-07 RX ORDER — OXYCODONE AND ACETAMINOPHEN 5; 325 MG/1; MG/1
1 TABLET ORAL EVERY 6 HOURS PRN
Qty: 12 TABLET | Refills: 0 | Status: SHIPPED | OUTPATIENT
Start: 2025-04-07 | End: 2025-04-10

## 2025-04-07 RX ORDER — ONDANSETRON 4 MG/1
4 TABLET, ORALLY DISINTEGRATING ORAL ONCE
Status: COMPLETED | OUTPATIENT
Start: 2025-04-07 | End: 2025-04-07

## 2025-04-07 RX ADMIN — OXYCODONE HYDROCHLORIDE AND ACETAMINOPHEN 2 TABLET: 5; 325 TABLET ORAL at 14:13

## 2025-04-07 RX ADMIN — ONDANSETRON 4 MG: 4 TABLET, ORALLY DISINTEGRATING ORAL at 14:14

## 2025-04-07 ASSESSMENT — PAIN SCALES - GENERAL: PAINLEVEL_OUTOF10: 8

## 2025-04-07 NOTE — FLOWSHEET NOTE
04/07/25 1730   Vital Signs   BP (!) 108/49   Temp 96.9 °F (36.1 °C)   Pulse 73   Respirations 16   Post-Hemodialysis Assessment   Post-Treatment Procedures Blood returned;Access bleeding time < 10 minutes   Machine Disinfection Process Acid/Vinegar Clean;Heat Disinfect;Exterior Machine Disinfection   Rinseback Volume (ml) 300 ml   Blood Volume Processed (Liters) 65 L   Dialyzer Clearance Lightly streaked   Duration of Treatment (minutes) 180 minutes   Heparin Amount Administered During Treatment (mL) 0 mL   Hemodialysis Intake (ml) 300 ml   Hemodialysis Output (ml) 1300 ml   NET Removed (ml) 1000   Tolerated Treatment Good   Patient Response to Treatment tolerated well, pt only staying 3 hours, blood returned, needles pulled, sites held, stasis achieved, bandaids applied, +thrill, +bruit, no complications during dialysis

## 2025-04-07 NOTE — CONSULTS
Not on file   Food Insecurity: No Food Insecurity (12/16/2024)    Received from [facility]    Hunger Vital Sign     Worried About Running Out of Food in the Last Year: Never true     Ran Out of Food in the Last Year: Never true   Transportation Needs: No Transportation Needs (12/16/2024)    Received from [facility]    PRAPARE - Transportation     Lack of Transportation (Medical): No     Lack of Transportation (Non-Medical): No   Physical Activity: Not on file   Stress: Not on file   Social Connections: Not on file   Intimate Partner Violence: Not on file   Housing Stability: Unknown (12/16/2024)    Received from [facility]    Housing Stability Vital Sign     Unable to Pay for Housing in the Last Year: No     Number of Times Moved in the Last Year: Not on file     Homeless in the Last Year: No        No family history on file.    PHYSICAL EXAM:    /75   Pulse 61   Temp 97.9 °F (36.6 °C) (Temporal)   Resp 18   SpO2 100%   CONSTITUTIONAL:  resting in wheelchair  NEURO:  Normal  EYES:  lids and lashes normal, sclera clear and conjunctiva normal  ENT:  normocepalic, without obvious abnormality, external ears without lesions  NECK:  supple, symmetrical, trachea midline  LUNGS:  no increased work of breathing  CARDIOVASCULAR:  regular rate and rhythm  ABDOMEN:  soft, non-distended, non-tender  SKIN:  ecchymosis present on bilateral upper extremities. Sensation and motor intact.    EXTREMITIES:    R UE Swelling absent   Incisions absent         5/5 Strength    L UE Swelling absent   Incisions well healed. Fistula present on LUE with what appears to be aneurysm like dilation with ecchymosis present over superior aspect of access site. Pulsatile thrill        5/5 Strength    R brachial  L brachial    R radial +2 L radial +2   R femoral  L femoral    R popliteal  L popliteal    R posterior tibial  L posterior tibial    R dorsalis pedis  L dorsalis pedis        LABS:    Lab Results   Component Value

## 2025-04-07 NOTE — PROGRESS NOTES
Was alerted that patient was refusing dialysis. Myself and nursing discussed with patient and his family that we think this is likely from that site being access numerous times and then lack of compression with thrombocytopenia. Patient is adamant that he does not want dialysis. We discussed that if he were to receive dialysis that we would be able to ensure that his fistula was working well prior to discharge. We discussed that his labs are elevated and he would benefit from dialysis as it is his normal day. He continues to refuse. We also discussed if this were to be an issue he would likely need an HD line for dialysis. He continues to state that he does not want dialysis in the hospital.     - would recommend accessing in different site as well as adequate compression after access to assure adequate hemostasis due to thrombocytopenia.   - discussed risks and benefits of patient receiving dialysis prior to leaving hospital      Bee Moore DO  Vascular Surgery Resident, PGY-2

## 2025-04-07 NOTE — FLOWSHEET NOTE
Per transport, pt refusing to come to dialysis, called ER and spoke with nurse. She will call dialysis back when she knows what's going on

## 2025-04-07 NOTE — ED PROVIDER NOTES
MAKING----------------------  Medications   oxyCODONE-acetaminophen (PERCOCET) 5-325 MG per tablet 2 tablet (2 tablets Oral Given 4/7/25 1413)   ondansetron (ZOFRAN-ODT) disintegrating tablet 4 mg (4 mg Oral Given 4/7/25 1414)         ED COURSE:             This patient's ED course included: a personal history and physicial examination    This patient has remained hemodynamically stable during their ED course.      Re-Evaluations:             Re-evaluation.  Patient’s symptoms are improving    Re-examination  4/7/25   10:55 AM EDT          Vital Signs:   Vitals:    04/07/25 1035 04/07/25 1044 04/07/25 1730   BP:  123/75 (!) 108/49   Pulse: 61  73   Resp:  18 16   Temp: 97.9 °F (36.6 °C)  96.9 °F (36.1 °C)   TempSrc: Temporal     SpO2: 100%           Consultations:             Vascular  nephro    Critical Care:         Counseling:   The emergency provider has spoken with the patient and spouse/SO and discussed today’s results, in addition to providing specific details for the plan of care and counseling regarding the diagnosis and prognosis.  Questions are answered at this time and they are agreeable with the plan.       --------------------------------- IMPRESSION AND DISPOSITION ---------------------------------    IMPRESSION  1. ESRD (end stage renal disease) (HCC)    2. Pain of left upper extremity        DISPOSITION  Disposition: Discharge to home  Patient condition is stable    NOTE: This report was transcribed using voice recognition software. Every effort was made to ensure accuracy; however, inadvertent computerized transcription errors may be present        Dax Young MD  04/07/25 1310

## 2025-04-08 LAB
EKG ATRIAL RATE: 66 BPM
EKG P AXIS: 54 DEGREES
EKG P-R INTERVAL: 144 MS
EKG Q-T INTERVAL: 402 MS
EKG QRS DURATION: 82 MS
EKG QTC CALCULATION (BAZETT): 421 MS
EKG R AXIS: 58 DEGREES
EKG T AXIS: 63 DEGREES
EKG VENTRICULAR RATE: 66 BPM

## 2025-04-08 PROCEDURE — 93010 ELECTROCARDIOGRAM REPORT: CPT | Performed by: INTERNAL MEDICINE

## 2025-05-16 ENCOUNTER — APPOINTMENT (OUTPATIENT)
Dept: GENERAL RADIOLOGY | Age: 80
DRG: 640 | End: 2025-05-16
Payer: MEDICARE

## 2025-05-16 ENCOUNTER — HOSPITAL ENCOUNTER (INPATIENT)
Age: 80
LOS: 1 days | Discharge: HOME | DRG: 640 | End: 2025-05-17
Attending: EMERGENCY MEDICINE | Admitting: INTERNAL MEDICINE
Payer: MEDICARE

## 2025-05-16 DIAGNOSIS — N18.6 END STAGE RENAL DISEASE ON DIALYSIS (HCC): ICD-10-CM

## 2025-05-16 DIAGNOSIS — E87.5 HYPERKALEMIA: Primary | ICD-10-CM

## 2025-05-16 DIAGNOSIS — Z99.2 END STAGE RENAL DISEASE ON DIALYSIS (HCC): ICD-10-CM

## 2025-05-16 LAB
ALBUMIN SERPL-MCNC: 4.2 G/DL (ref 3.5–5.2)
ALP SERPL-CCNC: 109 U/L (ref 40–129)
ALT SERPL-CCNC: 11 U/L (ref 0–40)
ANION GAP SERPL CALCULATED.3IONS-SCNC: 24 MMOL/L (ref 7–16)
AST SERPL-CCNC: 9 U/L (ref 0–39)
BASOPHILS # BLD: 0.06 K/UL (ref 0–0.2)
BASOPHILS NFR BLD: 1 % (ref 0–2)
BILIRUB SERPL-MCNC: 0.3 MG/DL (ref 0–1.2)
BUN SERPL-MCNC: 126 MG/DL (ref 6–23)
CALCIUM SERPL-MCNC: 10.5 MG/DL (ref 8.6–10.2)
CHLORIDE SERPL-SCNC: 91 MMOL/L (ref 98–107)
CO2 SERPL-SCNC: 21 MMOL/L (ref 22–29)
CREAT SERPL-MCNC: 11 MG/DL (ref 0.7–1.2)
EKG ATRIAL RATE: 74 BPM
EKG P AXIS: 60 DEGREES
EKG P-R INTERVAL: 158 MS
EKG Q-T INTERVAL: 380 MS
EKG QRS DURATION: 94 MS
EKG QTC CALCULATION (BAZETT): 421 MS
EKG R AXIS: 64 DEGREES
EKG T AXIS: 71 DEGREES
EKG VENTRICULAR RATE: 74 BPM
EOSINOPHIL # BLD: 0.62 K/UL (ref 0.05–0.5)
EOSINOPHILS RELATIVE PERCENT: 9 % (ref 0–6)
ERYTHROCYTE [DISTWIDTH] IN BLOOD BY AUTOMATED COUNT: 14.9 % (ref 11.5–15)
GFR, ESTIMATED: 4 ML/MIN/1.73M2
GLUCOSE SERPL-MCNC: 102 MG/DL (ref 74–99)
HCT VFR BLD AUTO: 29.8 % (ref 37–54)
HGB BLD-MCNC: 9.8 G/DL (ref 12.5–16.5)
LYMPHOCYTES NFR BLD: 1.37 K/UL (ref 1.5–4)
LYMPHOCYTES RELATIVE PERCENT: 19 % (ref 20–42)
MCH RBC QN AUTO: 33.7 PG (ref 26–35)
MCHC RBC AUTO-ENTMCNC: 32.9 G/DL (ref 32–34.5)
MCV RBC AUTO: 102.4 FL (ref 80–99.9)
MONOCYTES NFR BLD: 0.25 K/UL (ref 0.1–0.95)
MONOCYTES NFR BLD: 4 % (ref 2–12)
NEUTROPHILS NFR BLD: 68 % (ref 43–80)
NEUTS SEG NFR BLD: 4.8 K/UL (ref 1.8–7.3)
PLATELET # BLD AUTO: 97 K/UL (ref 130–450)
PLATELET CONFIRMATION: NORMAL
PMV BLD AUTO: 10.6 FL (ref 7–12)
POTASSIUM SERPL-SCNC: 6.6 MMOL/L (ref 3.5–5)
PROT SERPL-MCNC: 6.9 G/DL (ref 6.4–8.3)
RBC # BLD AUTO: 2.91 M/UL (ref 3.8–5.8)
RBC # BLD: ABNORMAL 10*6/UL
SODIUM SERPL-SCNC: 136 MMOL/L (ref 132–146)
TROPONIN I SERPL HS-MCNC: 156 NG/L (ref 0–22)
WBC OTHER # BLD: 7.1 K/UL (ref 4.5–11.5)

## 2025-05-16 PROCEDURE — 2580000003 HC RX 258: Performed by: EMERGENCY MEDICINE

## 2025-05-16 PROCEDURE — 85025 COMPLETE CBC W/AUTO DIFF WBC: CPT

## 2025-05-16 PROCEDURE — 96375 TX/PRO/DX INJ NEW DRUG ADDON: CPT

## 2025-05-16 PROCEDURE — 84484 ASSAY OF TROPONIN QUANT: CPT

## 2025-05-16 PROCEDURE — 80053 COMPREHEN METABOLIC PANEL: CPT

## 2025-05-16 PROCEDURE — 6370000000 HC RX 637 (ALT 250 FOR IP): Performed by: EMERGENCY MEDICINE

## 2025-05-16 PROCEDURE — 5A1D70Z PERFORMANCE OF URINARY FILTRATION, INTERMITTENT, LESS THAN 6 HOURS PER DAY: ICD-10-PCS | Performed by: INTERNAL MEDICINE

## 2025-05-16 PROCEDURE — 71046 X-RAY EXAM CHEST 2 VIEWS: CPT

## 2025-05-16 PROCEDURE — 2060000000 HC ICU INTERMEDIATE R&B

## 2025-05-16 PROCEDURE — 90935 HEMODIALYSIS ONE EVALUATION: CPT

## 2025-05-16 PROCEDURE — 6360000002 HC RX W HCPCS: Performed by: EMERGENCY MEDICINE

## 2025-05-16 PROCEDURE — 93005 ELECTROCARDIOGRAM TRACING: CPT

## 2025-05-16 PROCEDURE — 99285 EMERGENCY DEPT VISIT HI MDM: CPT

## 2025-05-16 PROCEDURE — 96374 THER/PROPH/DIAG INJ IV PUSH: CPT

## 2025-05-16 RX ORDER — CARVEDILOL 6.25 MG/1
12.5 TABLET ORAL 2 TIMES DAILY WITH MEALS
Status: DISCONTINUED | OUTPATIENT
Start: 2025-05-16 | End: 2025-05-17 | Stop reason: HOSPADM

## 2025-05-16 RX ORDER — SODIUM CHLORIDE 9 MG/ML
INJECTION, SOLUTION INTRAVENOUS PRN
Status: DISCONTINUED | OUTPATIENT
Start: 2025-05-16 | End: 2025-05-17 | Stop reason: HOSPADM

## 2025-05-16 RX ORDER — ONDANSETRON 2 MG/ML
4 INJECTION INTRAMUSCULAR; INTRAVENOUS EVERY 6 HOURS PRN
Status: DISCONTINUED | OUTPATIENT
Start: 2025-05-16 | End: 2025-05-17 | Stop reason: HOSPADM

## 2025-05-16 RX ORDER — GLUCAGON 1 MG/ML
1 KIT INJECTION PRN
Status: DISCONTINUED | OUTPATIENT
Start: 2025-05-16 | End: 2025-05-17 | Stop reason: HOSPADM

## 2025-05-16 RX ORDER — UBIDECARENONE/VIT E/VIT E MIX 100-20-15
100 CAPSULE ORAL NIGHTLY
Status: DISCONTINUED | OUTPATIENT
Start: 2025-05-16 | End: 2025-05-16 | Stop reason: RX

## 2025-05-16 RX ORDER — DEXTROSE MONOHYDRATE 100 MG/ML
INJECTION, SOLUTION INTRAVENOUS CONTINUOUS PRN
Status: DISCONTINUED | OUTPATIENT
Start: 2025-05-16 | End: 2025-05-17 | Stop reason: HOSPADM

## 2025-05-16 RX ORDER — SEVELAMER CARBONATE 800 MG/1
800 TABLET, FILM COATED ORAL
Status: DISCONTINUED | OUTPATIENT
Start: 2025-05-16 | End: 2025-05-17 | Stop reason: HOSPADM

## 2025-05-16 RX ORDER — ASPIRIN 81 MG/1
81 TABLET ORAL EVERY MORNING
Status: DISCONTINUED | OUTPATIENT
Start: 2025-05-17 | End: 2025-05-17 | Stop reason: HOSPADM

## 2025-05-16 RX ORDER — HEPARIN SODIUM 5000 [USP'U]/ML
5000 INJECTION, SOLUTION INTRAVENOUS; SUBCUTANEOUS EVERY 8 HOURS SCHEDULED
Status: DISCONTINUED | OUTPATIENT
Start: 2025-05-16 | End: 2025-05-17 | Stop reason: HOSPADM

## 2025-05-16 RX ORDER — ARFORMOTEROL TARTRATE 15 UG/2ML
15 SOLUTION RESPIRATORY (INHALATION)
Status: DISCONTINUED | OUTPATIENT
Start: 2025-05-16 | End: 2025-05-17 | Stop reason: HOSPADM

## 2025-05-16 RX ORDER — ACETAMINOPHEN 325 MG/1
650 TABLET ORAL EVERY 6 HOURS PRN
Status: DISCONTINUED | OUTPATIENT
Start: 2025-05-16 | End: 2025-05-17 | Stop reason: HOSPADM

## 2025-05-16 RX ORDER — SENNOSIDES A AND B 8.6 MG/1
1 TABLET, FILM COATED ORAL DAILY PRN
Status: DISCONTINUED | OUTPATIENT
Start: 2025-05-16 | End: 2025-05-17 | Stop reason: HOSPADM

## 2025-05-16 RX ORDER — PANTOPRAZOLE SODIUM 40 MG/1
40 TABLET, DELAYED RELEASE ORAL EVERY MORNING
Status: DISCONTINUED | OUTPATIENT
Start: 2025-05-17 | End: 2025-05-17 | Stop reason: HOSPADM

## 2025-05-16 RX ORDER — SODIUM CHLORIDE 0.9 % (FLUSH) 0.9 %
10 SYRINGE (ML) INJECTION EVERY 12 HOURS SCHEDULED
Status: DISCONTINUED | OUTPATIENT
Start: 2025-05-16 | End: 2025-05-17 | Stop reason: HOSPADM

## 2025-05-16 RX ORDER — SODIUM CHLORIDE 0.9 % (FLUSH) 0.9 %
10 SYRINGE (ML) INJECTION PRN
Status: DISCONTINUED | OUTPATIENT
Start: 2025-05-16 | End: 2025-05-17 | Stop reason: HOSPADM

## 2025-05-16 RX ORDER — ROSUVASTATIN CALCIUM 10 MG/1
10 TABLET, COATED ORAL EVERY OTHER DAY
Status: DISCONTINUED | OUTPATIENT
Start: 2025-05-16 | End: 2025-05-17 | Stop reason: HOSPADM

## 2025-05-16 RX ORDER — CALCIUM GLUCONATE 20 MG/ML
1000 INJECTION, SOLUTION INTRAVENOUS ONCE
Status: COMPLETED | OUTPATIENT
Start: 2025-05-16 | End: 2025-05-16

## 2025-05-16 RX ORDER — ONDANSETRON 4 MG/1
4 TABLET, ORALLY DISINTEGRATING ORAL EVERY 8 HOURS PRN
Status: DISCONTINUED | OUTPATIENT
Start: 2025-05-16 | End: 2025-05-17 | Stop reason: HOSPADM

## 2025-05-16 RX ORDER — ACETAMINOPHEN 650 MG/1
650 SUPPOSITORY RECTAL EVERY 6 HOURS PRN
Status: DISCONTINUED | OUTPATIENT
Start: 2025-05-16 | End: 2025-05-17 | Stop reason: HOSPADM

## 2025-05-16 RX ORDER — BUDESONIDE 0.25 MG/2ML
0.25 INHALANT ORAL
Status: DISCONTINUED | OUTPATIENT
Start: 2025-05-16 | End: 2025-05-17 | Stop reason: HOSPADM

## 2025-05-16 RX ADMIN — INSULIN HUMAN 10 UNITS: 100 INJECTION, SOLUTION PARENTERAL at 17:39

## 2025-05-16 RX ADMIN — DEXTROSE MONOHYDRATE 250 ML: 100 INJECTION, SOLUTION INTRAVENOUS at 17:38

## 2025-05-16 RX ADMIN — CALCIUM GLUCONATE 1000 MG: 20 INJECTION, SOLUTION INTRAVENOUS at 17:33

## 2025-05-16 ASSESSMENT — PAIN - FUNCTIONAL ASSESSMENT: PAIN_FUNCTIONAL_ASSESSMENT: NONE - DENIES PAIN

## 2025-05-16 NOTE — CARE COORDINATION
Internal Medicine On-call Care Coordination Note    I was called by the ED physician because they recommended admission for this patient and we cover their PCP.  The history as I understand it after discussion with the ED physician is as follows:    History of ESRD, missed last 2 session of dialysis due to weakness / fatigue  Sent in today by nephrology  K 6.6 in ED  ED spoke with nephrology, will do HD here  Decision made for admission    I placed admission orders.  Including:    Nephrology consult  Telemetry    Dr. Mccord or his coverage will see the patient tomorrow for H&P.    Electronically signed by Leon Marc PA-C on 5/16/2025 at 5:33 PM

## 2025-05-16 NOTE — ED NOTES
Nurse hand-off report given to ANTONI Putnam. Patient placed on continuous tele-monitoring, all alarms on.

## 2025-05-16 NOTE — PROGRESS NOTES
Pharmacy Note    James Vazquez III was ordered co-enzyme q10 caps.  As per the [facility] Formulary Committee Policy, herbals and certain dietary supplements will be discontinued.  The herbal or dietary supplement may be continued after discharge from the hospital.

## 2025-05-17 VITALS
RESPIRATION RATE: 18 BRPM | HEIGHT: 67 IN | OXYGEN SATURATION: 100 % | WEIGHT: 124 LBS | TEMPERATURE: 97.9 F | BODY MASS INDEX: 19.46 KG/M2 | SYSTOLIC BLOOD PRESSURE: 109 MMHG | DIASTOLIC BLOOD PRESSURE: 42 MMHG | HEART RATE: 70 BPM

## 2025-05-17 PROBLEM — R93.89 ABNORMAL CT OF THE CHEST: Status: ACTIVE | Noted: 2025-05-17

## 2025-05-17 LAB
ALBUMIN SERPL-MCNC: 3.7 G/DL (ref 3.5–5.2)
ALP SERPL-CCNC: 77 U/L (ref 40–129)
ALT SERPL-CCNC: 7 U/L (ref 0–40)
ANION GAP SERPL CALCULATED.3IONS-SCNC: 16 MMOL/L (ref 7–16)
AST SERPL-CCNC: 10 U/L (ref 0–39)
BASOPHILS # BLD: 0.04 K/UL (ref 0–0.2)
BASOPHILS NFR BLD: 1 % (ref 0–2)
BILIRUB SERPL-MCNC: 0.4 MG/DL (ref 0–1.2)
BUN SERPL-MCNC: 46 MG/DL (ref 6–23)
CALCIUM SERPL-MCNC: 9.2 MG/DL (ref 8.6–10.2)
CHLORIDE SERPL-SCNC: 94 MMOL/L (ref 98–107)
CO2 SERPL-SCNC: 25 MMOL/L (ref 22–29)
CREAT SERPL-MCNC: 5.8 MG/DL (ref 0.7–1.2)
EOSINOPHIL # BLD: 0.18 K/UL (ref 0.05–0.5)
EOSINOPHILS RELATIVE PERCENT: 3 % (ref 0–6)
ERYTHROCYTE [DISTWIDTH] IN BLOOD BY AUTOMATED COUNT: 14.8 % (ref 11.5–15)
GFR, ESTIMATED: 9 ML/MIN/1.73M2
GLUCOSE SERPL-MCNC: 77 MG/DL (ref 74–99)
HCT VFR BLD AUTO: 25.9 % (ref 37–54)
HGB BLD-MCNC: 8.7 G/DL (ref 12.5–16.5)
IMM GRANULOCYTES # BLD AUTO: 0.03 K/UL (ref 0–0.58)
IMM GRANULOCYTES NFR BLD: 1 % (ref 0–5)
LYMPHOCYTES NFR BLD: 1.43 K/UL (ref 1.5–4)
LYMPHOCYTES RELATIVE PERCENT: 22 % (ref 20–42)
MCH RBC QN AUTO: 34.3 PG (ref 26–35)
MCHC RBC AUTO-ENTMCNC: 33.6 G/DL (ref 32–34.5)
MCV RBC AUTO: 102 FL (ref 80–99.9)
MONOCYTES NFR BLD: 0.57 K/UL (ref 0.1–0.95)
MONOCYTES NFR BLD: 9 % (ref 2–12)
NEUTROPHILS NFR BLD: 66 % (ref 43–80)
NEUTS SEG NFR BLD: 4.41 K/UL (ref 1.8–7.3)
PLATELET # BLD AUTO: 97 K/UL (ref 130–450)
PLATELET CONFIRMATION: NORMAL
PMV BLD AUTO: 11.4 FL (ref 7–12)
POTASSIUM SERPL-SCNC: 4.7 MMOL/L (ref 3.5–5)
PROT SERPL-MCNC: 6 G/DL (ref 6.4–8.3)
RBC # BLD AUTO: 2.54 M/UL (ref 3.8–5.8)
SODIUM SERPL-SCNC: 135 MMOL/L (ref 132–146)
WBC OTHER # BLD: 6.7 K/UL (ref 4.5–11.5)

## 2025-05-17 PROCEDURE — 85025 COMPLETE CBC W/AUTO DIFF WBC: CPT

## 2025-05-17 PROCEDURE — 6360000002 HC RX W HCPCS: Performed by: PHYSICIAN ASSISTANT

## 2025-05-17 PROCEDURE — 80053 COMPREHEN METABOLIC PANEL: CPT

## 2025-05-17 PROCEDURE — 2500000003 HC RX 250 WO HCPCS: Performed by: PHYSICIAN ASSISTANT

## 2025-05-17 PROCEDURE — 6370000000 HC RX 637 (ALT 250 FOR IP): Performed by: PHYSICIAN ASSISTANT

## 2025-05-17 RX ADMIN — CARVEDILOL 12.5 MG: 6.25 TABLET, FILM COATED ORAL at 09:15

## 2025-05-17 RX ADMIN — ASPIRIN 81 MG: 81 TABLET, COATED ORAL at 09:13

## 2025-05-17 RX ADMIN — ONDANSETRON 4 MG: 2 INJECTION, SOLUTION INTRAMUSCULAR; INTRAVENOUS at 09:18

## 2025-05-17 RX ADMIN — SEVELAMER CARBONATE 800 MG: 800 TABLET, FILM COATED ORAL at 09:13

## 2025-05-17 RX ADMIN — SODIUM CHLORIDE, PRESERVATIVE FREE 10 ML: 5 INJECTION INTRAVENOUS at 09:14

## 2025-05-17 NOTE — PROGRESS NOTES
P Quality Flow/Interdisciplinary Rounds Progress Note        Quality Flow Rounds held on May 17, 2025    Disciplines Attending:  Bedside Nurse    James Vazquez III was admitted on 5/16/2025  3:43 PM    Anticipated Discharge Date:       Disposition:    Luis Antonio Score:  Luis Antonio Scale Score: 17    [facility] RISK OF UNPLANNED READMISSION 2.0             22.5 Total Score        Discussed patient goal for the day, patient clinical progression, and barriers to discharge.  The following Goal(s) of the Day/Commitment(s) have been identified:   Nephrology consult, dialysis, monitor labs       Corrine Bloom RN  May 17, 2025

## 2025-05-17 NOTE — FLOWSHEET NOTE
05/16/25 2105   Vital Signs   BP (!) 144/56   Temp 97 °F (36.1 °C)   Pulse 65   Respirations 18   Post-Hemodialysis Assessment   Post-Treatment Procedures Blood returned;Access bleeding time > 10 minutes   Machine Disinfection Process Acid/Vinegar Clean;Heat Disinfect;Exterior Machine Disinfection   Rinseback Volume (ml) 300 ml   Blood Volume Processed (Liters) 55.8 L   Dialyzer Clearance Lightly streaked   Duration of Treatment (minutes) 150 minutes   Hemodialysis Intake (ml) 400 ml   Hemodialysis Output (ml) 1000 ml   NET Removed (ml) 600   Tolerated Treatment Good   Interventions Taken Ultrafiltration goal decreased;Fluid bolus   Patient Response to Treatment vitals stable, sites held for 20 minutes, RN to RN called, transported to floor   Bilateral Breath Sounds Diminished   Edema Generalized   Edema Generalized +1   Physician Notified No   Time Off 2034   Patient Disposition Return to room     Sites held, transported to floor, Patient wanting to go home

## 2025-05-17 NOTE — CONSULTS
Department of Internal Medicine  Nephrology Attending Consult Note      Reason for Consult: ESRD on HD, hyperkalemia  Requesting Physician:  Mimi Preston DO     CHIEF COMPLAINT: Missed dialysis treatments, not feeling well    History Obtained From:  patient, electronic medical record    HISTORY OF PRESENT ILLNESS:  Briefly Mr. Vazquez is a 79-year-old male with history of ESRD on HD 3 times a week MWF, via left upper arm AV fistula, HTN, HFpEF 60-65%, moderate aortic valve stenosis with moderate AR, BPH, pulmonary nodule, herpes zoster with encephalitis, compression fractures, hyperlipidemia, esophagitis, who was admitted on 5/16/2025 after he presented to the ER due to missing 2 dialysis treatment, in the ER he was found to have a potassium level of 6.6 mg/ML, BUN of 126 mg/dL and creatinine of 11 mg/dL.  He reported that he has not been feeling well resolved for which he misses treatments, denies fever or chills     Past Medical History:        Diagnosis Date    Blind left eye     Chronic kidney disease     Dialysis patient     Hemodialysis patient     Hyperlipidemia     Hypertension      Past Surgical History:        Procedure Laterality Date    AV FISTULA CREATION Left 2015    Dr. Phillips [facility]    CARDIAC SURGERY      HEMODIALYSIS ACCESS PERCUTANEOUS REVISION Left 2019    2 x Dr. Phillips, [facility]    HIP SURGERY Left 10/16/2020    HIP HEMIARTHROPLASTY performed by Abel Birmingham MD at [facility] [place] [facility] OR    PERIPHERAL VASCULAR BYPASS  2008    Aortobifemoral bypass for claudicatio - Dr. Phillips [facility]    UPPER GASTROINTESTINAL ENDOSCOPY N/A 7/20/2020    EGD ESOPHAGOGASTRODUODENOSCOPY WITH ANTRAL BIOPSY performed by Lloyd Styles MD at [facility] OR    UPPER GASTROINTESTINAL ENDOSCOPY N/A 10/28/2020    EGD ESOPHAGOGASTRODUODENOSCOPY performed by Llyod Styles MD at [facility] ENDOSCOPY     Current Medications:    Current Facility-Administered Medications: glucose chewable tablet 16 g, 4 tablet, Oral, PRN  dextrose bolus 10% 125 mL,

## 2025-05-17 NOTE — PROGRESS NOTES
Patient is refusing to take the lokelma here said he will take at home. Evelyn RAZO notified and ok to send patient home with packet.

## 2025-05-17 NOTE — H&P
Internal Medicine History & Physical     Name: James Vazquez III  : 1945  Chief Complaint: Vomiting and Diarrhea (Missed  and fri dialysis)  Primary Care Physician: Cipriano Durham DO  Admission date: 2025  Date of service: 2025     History of Present Illness  James is a 79 y.o. year old male.  Patient was brought in after not being dialyzed for 2 days.  He missed Wednesday and Friday because he was too tired and weak.  He has not been eating or drinking as well per family.  Patient missed his dialysis appointment in the morning and then took a nap.  He felt better and was trying to go to a lighter dialysis but was unable to do so.  As he had already missed 2 dialysis appointments nephrology recommended sent to the hospital to be evaluated for inpatient dialysis that he would not miss any further.  On arrival, he was found to have a potassium greater than 6.  Previously had noted found to have potassium greater than 7.  Patient states he is feeling better today.  He is eating and drinking well.  Denies fevers or chills.  Tolerated dialysis last night.  Patient denies any chest pains or shortness of breath.  States he wants to go home today.  No other complaints at this time.      ED course:   Initial blood work and imaging studies performed. Admission recommended by ED physician. Case was discussed with ED provider. Meds in ED consisted of the following:  Medications   glucose chewable tablet 16 g (has no administration in time range)   dextrose bolus 10% 125 mL (has no administration in time range)     Or   dextrose bolus 10% 250 mL (has no administration in time range)   glucagon injection 1 mg (has no administration in time range)   dextrose 10 % infusion (has no administration in time range)   sodium zirconium cyclosilicate (LOKELMA) oral suspension 10 g (has no administration in time range)   sodium chloride flush 0.9 % injection 10 mL (has no administration in time range)   sodium  appears stated age, cooperative, no acute distress, pleasant, appropriate mood, fatigued in bed  Eyes: conjunctivae/corneas clear, sclera non icteric, EOMI  Ears: no obvious scars, no lesions, no masses, hearing intact  Mouth: mucous membranes moist, no obvious oral sores  Head: normocephalic, atraumatic  Neck: no JVD, no adenopathy, no thyromegaly, neck is supple, trachea is midline  Back: ROM normal, no CVA tenderness.  Chest: no pain on palpation  Lungs: clear to auscultation bilaterally, without rhonchi, crackle, wheezing, or rale, no retractions or use of accessory muscles  Heart: regular rate and regular rhythm, no murmur, normal S1, S2  Abdomen: soft, non-tender; bowel sounds normal; no masses, no organomegaly  : Deferred   Extremities: no lower extremity edema, extremities atraumatic, no cyanosis, no clubbing, 2+ pedal pulses palpated  Skin: normal color, normal texture, normal turgor, no rashes, no lesions  Neurologic:5/5 muscle strength throughout, normal muscle tone throughout, face symmetric, hearing intact, tongue midline, speech appropriate without slurring, sensation to fine touch intact in upper and lower extremities    Labs-   Lab Results   Component Value Date    WBC 6.7 05/17/2025    HGB 8.7 (L) 05/17/2025    HCT 25.9 (L) 05/17/2025    PLT 97 (L) 05/17/2025     05/17/2025    K 4.7 05/17/2025    CL 94 (L) 05/17/2025    CREATININE 5.8 (H) 05/17/2025    BUN 46 (H) 05/17/2025    CO2 25 05/17/2025    GLUCOSE 77 05/17/2025    ALT 7 05/17/2025    AST 10 05/17/2025    INR 1.1 01/15/2024    APTT 35.9 (H) 11/03/2020     Lab Results   Component Value Date    TROPONINI 0.11 (H) 10/11/2020       Echocardiogram:  na    Recent Radiological Studies:  XR CHEST (2 VW)   Final Result   Bilateral lung opacities versus pulmonary masslike nodules. CT chest   recommended.             Assessment  Active Hospital Problems    Diagnosis     Abnormal CT of the chest [R93.89]     Hyperkalemia [E87.5]     Chronic

## 2025-05-17 NOTE — PLAN OF CARE
Problem: Skin/Tissue Integrity  Goal: Skin integrity remains intact  Description: 1.  Monitor for areas of redness and/or skin breakdown2.  Assess vascular access sites hourly3.  Every 4-6 hours minimum:  Change oxygen saturation probe site4.  Every 4-6 hours:  If on nasal continuous positive airway pressure, respiratory therapy assess nares and determine need for appliance change or resting period  5/17/2025 1234 by Rocco Rivera, RN  Outcome: Progressing  5/17/2025 0354 by Rachel Mcintosh, RN  Outcome: Progressing     Problem: Safety - Adult  Goal: Free from fall injury  5/17/2025 1234 by Rocco Rivera, RN  Outcome: Progressing  5/17/2025 0354 by Rachel Mcintosh, RN  Outcome: Progressing

## 2025-05-17 NOTE — PROGRESS NOTES
Patient refusing to allow this nurse to complete head to toe skin assessment. Multiple attempts made throughout the night. Patient states \"My skin is fine\".

## 2025-05-17 NOTE — PROGRESS NOTES
Chart reviewed, full consultation to follow    Briefly Mr. Vazquez is a 79-year-old male with history of ESRD on HD 3 times a week MWF, via left upper arm AV fistula, HTN, HFpEF 60-65%, moderate aortic valve stenosis with moderate AR, BPH, pulmonary nodule, herpes zoster with encephalitis, compression fractures, hyperlipidemia, esophagitis, who was admitted on 5/16/2025 after he presented to the ER due to missing 2 dialysis treatment, in the ER he was found to have a potassium level of 6.6 mg/ML, BUN of 126 mg/dL and creatinine of 11 mg/dL.  He reported that he has not been feeling well resolved for which he misses treatments, denies fever or chills    ESRD on HD 3 times weekly MWF via left AV fistula, HD tomorrow    HTN, on carvedilol, amlodipine   HFpEF 60-65%, proBNP >70,000  MBD of CKD, on sevelamer   Anemia of CKD, hold NICOLE for HGB >10     ---------------------------------------------    L1, L4, L5 fractures  Hyperlipidemia, on rosuvastatin  History of left lung 12 mm nodule  Bilateral pulmonary opacities    PLAN:    HD 3 times weekly MWF, patient refuses treatment today  Continue carvedilol 12.5 mg PO BID   Continue sevelamer 800 mg PO TID   Epoetin delmer 5000 units SQ MWF  Monitor labs  Monitor blood pressure  Lokelma 10 g p.o. x 1 today  Okay to discharge from renal point of view        Harry Rivas MD

## 2025-05-18 NOTE — DISCHARGE SUMMARY
Patient left within 24 hours of admission    Tolerated dialysis       Medication List        CONTINUE taking these medications      acetaminophen 325 MG tablet  Commonly known as: TYLENOL     albuterol sulfate  (90 Base) MCG/ACT inhaler  Commonly known as: Proventil HFA  Inhale 2 puffs into the lungs every 6 hours as needed for Wheezing     aspirin 81 MG EC tablet     carvedilol 12.5 MG tablet  Commonly known as: COREG     Co-Enzyme Q10 100 MG Caps     nitroGLYCERIN 0.4 MG SL tablet  Commonly known as: NITROSTAT     pantoprazole 40 MG tablet  Commonly known as: PROTONIX     Renal 1 MG Caps     rosuvastatin 10 MG tablet  Commonly known as: CRESTOR     sevelamer 800 MG tablet  Commonly known as: RENVELA     Trelegy Ellipta 100-62.5-25 MCG/ACT Aepb inhaler  Generic drug: fluticasone-umeclidin-vilant            Electronically signed by Kenji Peguero MD on 5/18/2025 at 12:06 PM

## 2025-05-18 NOTE — ED PROVIDER NOTES
UPPER GASTROINTESTINAL ENDOSCOPY N/A 7/20/2020    EGD ESOPHAGOGASTRODUODENOSCOPY WITH ANTRAL BIOPSY performed by Lloyd Styles MD at [facility] OR    UPPER GASTROINTESTINAL ENDOSCOPY N/A 10/28/2020    EGD ESOPHAGOGASTRODUODENOSCOPY [place] [facility] [place]       CURRENTMEDICATIONS       Discharge Medication List as of 5/17/2025  1:55 PM        CONTINUE these medications which have NOT CHANGED    Details   B Complex-C-Folic Acid (RENAL) 1 MG CAPS Take 1 capsule by mouth dailyHistorical Med      carvedilol (COREG) 12.5 MG tablet Take 1 tablet by mouth 2 times daily (with meals)Historical Med      nitroGLYCERIN (NITROSTAT) 0.4 MG SL tablet Place 1 tablet under the tongueHistorical Med      sevelamer (RENVELA) 800 MG tablet Take 1 tablet by mouth 3 times daily (with meals)Historical Med      Co-Enzyme Q10 100 MG CAPS Take 100 mg by mouth nightlyHistorical Med      pantoprazole (PROTONIX) 40 MG tablet Take 1 tablet by mouth every morningHistorical Med      fluticasone-umeclidin-vilant (TRELEGY ELLIPTA) 100-62.5-25 MCG/ACT AEPB inhaler Inhale 1 puff into the lungs dailyHistorical Med      albuterol sulfate HFA (PROVENTIL HFA) 108 (90 Base) MCG/ACT inhaler Inhale 2 puffs into the lungs every 6 hours as needed for Wheezing, Disp-18 g, R-3Normal      aspirin 81 MG EC tablet Take 1 tablet by mouth every morningHistorical Med      acetaminophen (TYLENOL) 325 MG tablet Take 2 tablets by mouth every 4 hours as needed for Pain or FeverHistorical Med      rosuvastatin (CRESTOR) 10 MG tablet Take 1 tablet by mouth every other day QHS  LAST DOSE: 07/16/2024  NEXT DOSE DUE: 07/18/2024Historical Med             ALLERGIES     Patient has no known allergies.    FAMILYHISTORY     History reviewed. No pertinent family history.     SOCIAL HISTORY       Social History     Tobacco Use    Smoking status: Former     Current packs/day: 0.00     Average packs/day: 1 pack/day for 30.0 years (30.0 ttl pk-yrs)     Types:

## 2025-05-20 LAB
EKG ATRIAL RATE: 74 BPM
EKG P AXIS: 60 DEGREES
EKG P-R INTERVAL: 158 MS
EKG Q-T INTERVAL: 380 MS
EKG QRS DURATION: 94 MS
EKG QTC CALCULATION (BAZETT): 421 MS
EKG R AXIS: 64 DEGREES
EKG T AXIS: 71 DEGREES
EKG VENTRICULAR RATE: 74 BPM

## 2025-05-23 ENCOUNTER — APPOINTMENT (OUTPATIENT)
Dept: CT IMAGING | Age: 80
DRG: 306 | End: 2025-05-23
Payer: MEDICARE

## 2025-05-23 ENCOUNTER — APPOINTMENT (OUTPATIENT)
Dept: GENERAL RADIOLOGY | Age: 80
DRG: 306 | End: 2025-05-23
Payer: MEDICARE

## 2025-05-23 ENCOUNTER — HOSPITAL ENCOUNTER (INPATIENT)
Age: 80
LOS: 2 days | DRG: 306 | End: 2025-05-25
Attending: EMERGENCY MEDICINE | Admitting: STUDENT IN AN ORGANIZED HEALTH CARE EDUCATION/TRAINING PROGRAM
Payer: MEDICARE

## 2025-05-23 DIAGNOSIS — K92.2 GASTROINTESTINAL HEMORRHAGE, UNSPECIFIED GASTROINTESTINAL HEMORRHAGE TYPE: ICD-10-CM

## 2025-05-23 DIAGNOSIS — I21.4 NSTEMI (NON-ST ELEVATED MYOCARDIAL INFARCTION) (HCC): Primary | ICD-10-CM

## 2025-05-23 DIAGNOSIS — I21.3 STEMI (ST ELEVATION MYOCARDIAL INFARCTION) (HCC): ICD-10-CM

## 2025-05-23 DIAGNOSIS — D64.9 ANEMIA, UNSPECIFIED TYPE: ICD-10-CM

## 2025-05-23 PROBLEM — R07.9 ACUTE CHEST PAIN: Status: ACTIVE | Noted: 2025-05-23

## 2025-05-23 LAB
ALBUMIN SERPL-MCNC: 3.5 G/DL (ref 3.5–5.2)
ALP SERPL-CCNC: 80 U/L (ref 40–129)
ALT SERPL-CCNC: 12 U/L (ref 0–50)
ANION GAP SERPL CALCULATED.3IONS-SCNC: 18 MMOL/L (ref 7–16)
AST SERPL-CCNC: 21 U/L (ref 0–50)
BASOPHILS # BLD: 0.03 K/UL (ref 0–0.2)
BASOPHILS NFR BLD: 0 % (ref 0–2)
BILIRUB SERPL-MCNC: 0.4 MG/DL (ref 0–1.2)
BUN SERPL-MCNC: 56 MG/DL (ref 8–23)
CALCIUM SERPL-MCNC: 9.5 MG/DL (ref 8.8–10.2)
CHLORIDE SERPL-SCNC: 94 MMOL/L (ref 98–107)
CO2 SERPL-SCNC: 22 MMOL/L (ref 22–29)
CREAT SERPL-MCNC: 5.3 MG/DL (ref 0.7–1.2)
EKG ATRIAL RATE: 68 BPM
EKG ATRIAL RATE: 80 BPM
EKG P AXIS: 55 DEGREES
EKG P AXIS: 77 DEGREES
EKG P-R INTERVAL: 112 MS
EKG P-R INTERVAL: 162 MS
EKG Q-T INTERVAL: 390 MS
EKG Q-T INTERVAL: 402 MS
EKG QRS DURATION: 100 MS
EKG QRS DURATION: 92 MS
EKG QTC CALCULATION (BAZETT): 427 MS
EKG QTC CALCULATION (BAZETT): 449 MS
EKG R AXIS: 49 DEGREES
EKG R AXIS: 69 DEGREES
EKG T AXIS: -71 DEGREES
EKG T AXIS: -75 DEGREES
EKG VENTRICULAR RATE: 68 BPM
EKG VENTRICULAR RATE: 80 BPM
EOSINOPHIL # BLD: 0.19 K/UL (ref 0.05–0.5)
EOSINOPHILS RELATIVE PERCENT: 2 % (ref 0–6)
ERYTHROCYTE [DISTWIDTH] IN BLOOD BY AUTOMATED COUNT: 15.1 % (ref 11.5–15)
ERYTHROCYTE [DISTWIDTH] IN BLOOD BY AUTOMATED COUNT: 15.2 % (ref 11.5–15)
FERRITIN SERPL-MCNC: 6663 NG/ML
FOLATE SERPL-MCNC: 18.2 NG/ML (ref 4.6–34.8)
GFR, ESTIMATED: 10 ML/MIN/1.73M2
GLUCOSE SERPL-MCNC: 133 MG/DL (ref 74–99)
HCT VFR BLD AUTO: 20.7 % (ref 37–54)
HCT VFR BLD AUTO: 22.4 % (ref 37–54)
HCT VFR BLD AUTO: 22.8 % (ref 37–54)
HCT VFR BLD AUTO: 25.4 % (ref 37–54)
HGB BLD-MCNC: 6.8 G/DL (ref 12.5–16.5)
HGB BLD-MCNC: 7.1 G/DL (ref 12.5–16.5)
HGB BLD-MCNC: 7.4 G/DL (ref 12.5–16.5)
HGB BLD-MCNC: 8.7 G/DL (ref 12.5–16.5)
IMM GRANULOCYTES # BLD AUTO: <0.03 K/UL (ref 0–0.58)
IMM GRANULOCYTES NFR BLD: 0 % (ref 0–5)
IRON SATN MFR SERPL: ABNORMAL % (ref 20–55)
IRON SERPL-MCNC: 203 UG/DL (ref 61–157)
LYMPHOCYTES NFR BLD: 1.48 K/UL (ref 1.5–4)
LYMPHOCYTES RELATIVE PERCENT: 18 % (ref 20–42)
MCH RBC QN AUTO: 33.3 PG (ref 26–35)
MCH RBC QN AUTO: 33.9 PG (ref 26–35)
MCHC RBC AUTO-ENTMCNC: 31.7 G/DL (ref 32–34.5)
MCHC RBC AUTO-ENTMCNC: 32.5 G/DL (ref 32–34.5)
MCV RBC AUTO: 104.6 FL (ref 80–99.9)
MCV RBC AUTO: 105.2 FL (ref 80–99.9)
MONOCYTES NFR BLD: 0.71 K/UL (ref 0.1–0.95)
MONOCYTES NFR BLD: 8 % (ref 2–12)
NEUTROPHILS NFR BLD: 71 % (ref 43–80)
NEUTS SEG NFR BLD: 6.04 K/UL (ref 1.8–7.3)
PARTIAL THROMBOPLASTIN TIME: 31.3 SEC (ref 24.5–35.1)
PARTIAL THROMBOPLASTIN TIME: 56.4 SEC (ref 24.5–35.1)
PARTIAL THROMBOPLASTIN TIME: 67.6 SEC (ref 24.5–35.1)
PLATELET # BLD AUTO: 77 K/UL (ref 130–450)
PLATELET # BLD AUTO: 89 K/UL (ref 130–450)
PLATELET CONFIRMATION: NORMAL
PLATELET CONFIRMATION: NORMAL
PMV BLD AUTO: 11.1 FL (ref 7–12)
PMV BLD AUTO: 11.4 FL (ref 7–12)
POTASSIUM SERPL-SCNC: 5.2 MMOL/L (ref 3.5–5.1)
PROT SERPL-MCNC: 5.8 G/DL (ref 6.4–8.3)
RBC # BLD AUTO: 2.13 M/UL (ref 3.8–5.8)
RBC # BLD AUTO: 2.18 M/UL (ref 3.8–5.8)
SODIUM SERPL-SCNC: 133 MMOL/L (ref 136–145)
TIBC SERPL-MCNC: ABNORMAL UG/DL (ref 250–450)
TRANSFERRIN SERPL-MCNC: 170 MG/DL (ref 200–360)
TROPONIN I SERPL HS-MCNC: 152 NG/L (ref 0–22)
TROPONIN I SERPL HS-MCNC: 154 NG/L (ref 0–22)
VIT B12 SERPL-MCNC: 593 PG/ML (ref 232–1245)
WBC OTHER # BLD: 8.5 K/UL (ref 4.5–11.5)
WBC OTHER # BLD: 9.2 K/UL (ref 4.5–11.5)

## 2025-05-23 PROCEDURE — 85014 HEMATOCRIT: CPT

## 2025-05-23 PROCEDURE — 84484 ASSAY OF TROPONIN QUANT: CPT

## 2025-05-23 PROCEDURE — 71045 X-RAY EXAM CHEST 1 VIEW: CPT

## 2025-05-23 PROCEDURE — 2500000003 HC RX 250 WO HCPCS: Performed by: NURSE PRACTITIONER

## 2025-05-23 PROCEDURE — 74174 CTA ABD&PLVS W/CONTRAST: CPT

## 2025-05-23 PROCEDURE — 93010 ELECTROCARDIOGRAM REPORT: CPT | Performed by: INTERNAL MEDICINE

## 2025-05-23 PROCEDURE — 2140000000 HC CCU INTERMEDIATE R&B

## 2025-05-23 PROCEDURE — 06HY33Z INSERTION OF INFUSION DEVICE INTO LOWER VEIN, PERCUTANEOUS APPROACH: ICD-10-PCS | Performed by: INTERNAL MEDICINE

## 2025-05-23 PROCEDURE — 36556 INSERT NON-TUNNEL CV CATH: CPT

## 2025-05-23 PROCEDURE — P9016 RBC LEUKOCYTES REDUCED: HCPCS

## 2025-05-23 PROCEDURE — 5A1D70Z PERFORMANCE OF URINARY FILTRATION, INTERMITTENT, LESS THAN 6 HOURS PER DAY: ICD-10-PCS | Performed by: INTERNAL MEDICINE

## 2025-05-23 PROCEDURE — 86901 BLOOD TYPING SEROLOGIC RH(D): CPT

## 2025-05-23 PROCEDURE — 85730 THROMBOPLASTIN TIME PARTIAL: CPT

## 2025-05-23 PROCEDURE — 93005 ELECTROCARDIOGRAM TRACING: CPT | Performed by: EMERGENCY MEDICINE

## 2025-05-23 PROCEDURE — 86900 BLOOD TYPING SEROLOGIC ABO: CPT

## 2025-05-23 PROCEDURE — 2580000003 HC RX 258: Performed by: NURSE PRACTITIONER

## 2025-05-23 PROCEDURE — 93005 ELECTROCARDIOGRAM TRACING: CPT

## 2025-05-23 PROCEDURE — 71275 CT ANGIOGRAPHY CHEST: CPT

## 2025-05-23 PROCEDURE — 84466 ASSAY OF TRANSFERRIN: CPT

## 2025-05-23 PROCEDURE — 86923 COMPATIBILITY TEST ELECTRIC: CPT

## 2025-05-23 PROCEDURE — 96374 THER/PROPH/DIAG INJ IV PUSH: CPT

## 2025-05-23 PROCEDURE — 30233N1 TRANSFUSION OF NONAUTOLOGOUS RED BLOOD CELLS INTO PERIPHERAL VEIN, PERCUTANEOUS APPROACH: ICD-10-PCS | Performed by: INTERNAL MEDICINE

## 2025-05-23 PROCEDURE — 90935 HEMODIALYSIS ONE EVALUATION: CPT

## 2025-05-23 PROCEDURE — 6370000000 HC RX 637 (ALT 250 FOR IP)

## 2025-05-23 PROCEDURE — 85027 COMPLETE CBC AUTOMATED: CPT

## 2025-05-23 PROCEDURE — 6360000002 HC RX W HCPCS: Performed by: NURSE PRACTITIONER

## 2025-05-23 PROCEDURE — 6360000002 HC RX W HCPCS

## 2025-05-23 PROCEDURE — 31500 INSERT EMERGENCY AIRWAY: CPT

## 2025-05-23 PROCEDURE — 80053 COMPREHEN METABOLIC PANEL: CPT

## 2025-05-23 PROCEDURE — 85025 COMPLETE CBC W/AUTO DIFF WBC: CPT

## 2025-05-23 PROCEDURE — 82728 ASSAY OF FERRITIN: CPT

## 2025-05-23 PROCEDURE — 86920 COMPATIBILITY TEST SPIN: CPT

## 2025-05-23 PROCEDURE — 6360000004 HC RX CONTRAST MEDICATION: Performed by: RADIOLOGY

## 2025-05-23 PROCEDURE — 36415 COLL VENOUS BLD VENIPUNCTURE: CPT

## 2025-05-23 PROCEDURE — 2580000003 HC RX 258

## 2025-05-23 PROCEDURE — 99285 EMERGENCY DEPT VISIT HI MDM: CPT

## 2025-05-23 PROCEDURE — 82607 VITAMIN B-12: CPT

## 2025-05-23 PROCEDURE — 6360000002 HC RX W HCPCS: Performed by: EMERGENCY MEDICINE

## 2025-05-23 PROCEDURE — 99291 CRITICAL CARE FIRST HOUR: CPT | Performed by: INTERNAL MEDICINE

## 2025-05-23 PROCEDURE — 82746 ASSAY OF FOLIC ACID SERUM: CPT

## 2025-05-23 PROCEDURE — 99222 1ST HOSP IP/OBS MODERATE 55: CPT

## 2025-05-23 PROCEDURE — 85018 HEMOGLOBIN: CPT

## 2025-05-23 PROCEDURE — 83540 ASSAY OF IRON: CPT

## 2025-05-23 PROCEDURE — 86850 RBC ANTIBODY SCREEN: CPT

## 2025-05-23 RX ORDER — HEPARIN SODIUM 1000 [USP'U]/ML
60 INJECTION, SOLUTION INTRAVENOUS; SUBCUTANEOUS ONCE
Status: COMPLETED | OUTPATIENT
Start: 2025-05-23 | End: 2025-05-23

## 2025-05-23 RX ORDER — SUCRALFATE 1 G/1
1 TABLET ORAL
Status: DISCONTINUED | OUTPATIENT
Start: 2025-05-23 | End: 2025-05-25

## 2025-05-23 RX ORDER — IOPAMIDOL 755 MG/ML
75 INJECTION, SOLUTION INTRAVASCULAR
Status: COMPLETED | OUTPATIENT
Start: 2025-05-23 | End: 2025-05-23

## 2025-05-23 RX ORDER — ONDANSETRON 2 MG/ML
4 INJECTION INTRAMUSCULAR; INTRAVENOUS EVERY 6 HOURS PRN
Status: DISCONTINUED | OUTPATIENT
Start: 2025-05-23 | End: 2025-05-25 | Stop reason: HOSPADM

## 2025-05-23 RX ORDER — FENTANYL CITRATE 50 UG/ML
50 INJECTION, SOLUTION INTRAMUSCULAR; INTRAVENOUS
Status: DISCONTINUED | OUTPATIENT
Start: 2025-05-23 | End: 2025-05-25 | Stop reason: HOSPADM

## 2025-05-23 RX ORDER — SENNOSIDES 8.6 MG/1
1 TABLET ORAL DAILY PRN
Status: DISCONTINUED | OUTPATIENT
Start: 2025-05-23 | End: 2025-05-25

## 2025-05-23 RX ORDER — HEPARIN SODIUM 1000 [USP'U]/ML
60 INJECTION, SOLUTION INTRAVENOUS; SUBCUTANEOUS PRN
Status: DISCONTINUED | OUTPATIENT
Start: 2025-05-23 | End: 2025-05-25

## 2025-05-23 RX ORDER — HEPARIN SODIUM 10000 [USP'U]/100ML
5-30 INJECTION, SOLUTION INTRAVENOUS CONTINUOUS
Status: DISCONTINUED | OUTPATIENT
Start: 2025-05-23 | End: 2025-05-24

## 2025-05-23 RX ORDER — ASPIRIN 81 MG/1
81 TABLET ORAL EVERY MORNING
Status: DISCONTINUED | OUTPATIENT
Start: 2025-05-23 | End: 2025-05-25 | Stop reason: HOSPADM

## 2025-05-23 RX ORDER — HEPARIN SODIUM 1000 [USP'U]/ML
30 INJECTION, SOLUTION INTRAVENOUS; SUBCUTANEOUS PRN
Status: DISCONTINUED | OUTPATIENT
Start: 2025-05-23 | End: 2025-05-25

## 2025-05-23 RX ORDER — CARVEDILOL 6.25 MG/1
12.5 TABLET ORAL 2 TIMES DAILY WITH MEALS
Status: DISCONTINUED | OUTPATIENT
Start: 2025-05-23 | End: 2025-05-25

## 2025-05-23 RX ORDER — ACETAMINOPHEN 650 MG/1
650 SUPPOSITORY RECTAL EVERY 6 HOURS PRN
Status: DISCONTINUED | OUTPATIENT
Start: 2025-05-23 | End: 2025-05-25 | Stop reason: HOSPADM

## 2025-05-23 RX ORDER — SODIUM CHLORIDE 0.9 % (FLUSH) 0.9 %
10 SYRINGE (ML) INJECTION PRN
Status: DISCONTINUED | OUTPATIENT
Start: 2025-05-23 | End: 2025-05-25 | Stop reason: HOSPADM

## 2025-05-23 RX ORDER — SODIUM CHLORIDE 0.9 % (FLUSH) 0.9 %
10 SYRINGE (ML) INJECTION EVERY 12 HOURS SCHEDULED
Status: DISCONTINUED | OUTPATIENT
Start: 2025-05-23 | End: 2025-05-25 | Stop reason: HOSPADM

## 2025-05-23 RX ORDER — ARFORMOTEROL TARTRATE 15 UG/2ML
15 SOLUTION RESPIRATORY (INHALATION)
Status: DISCONTINUED | OUTPATIENT
Start: 2025-05-23 | End: 2025-05-25

## 2025-05-23 RX ORDER — ACETAMINOPHEN 325 MG/1
650 TABLET ORAL EVERY 6 HOURS PRN
Status: DISCONTINUED | OUTPATIENT
Start: 2025-05-23 | End: 2025-05-25 | Stop reason: HOSPADM

## 2025-05-23 RX ORDER — 0.9 % SODIUM CHLORIDE 0.9 %
500 INTRAVENOUS SOLUTION INTRAVENOUS ONCE
Status: COMPLETED | OUTPATIENT
Start: 2025-05-23 | End: 2025-05-23

## 2025-05-23 RX ORDER — IPRATROPIUM BROMIDE AND ALBUTEROL SULFATE 2.5; .5 MG/3ML; MG/3ML
1 SOLUTION RESPIRATORY (INHALATION)
Status: DISCONTINUED | OUTPATIENT
Start: 2025-05-23 | End: 2025-05-25

## 2025-05-23 RX ORDER — FENTANYL CITRATE 50 UG/ML
25 INJECTION, SOLUTION INTRAMUSCULAR; INTRAVENOUS ONCE
Refills: 0 | Status: COMPLETED | OUTPATIENT
Start: 2025-05-23 | End: 2025-05-23

## 2025-05-23 RX ORDER — FENTANYL CITRATE 50 UG/ML
50 INJECTION, SOLUTION INTRAMUSCULAR; INTRAVENOUS ONCE
Status: DISCONTINUED | OUTPATIENT
Start: 2025-05-23 | End: 2025-05-23

## 2025-05-23 RX ORDER — ONDANSETRON 4 MG/1
4 TABLET, ORALLY DISINTEGRATING ORAL EVERY 8 HOURS PRN
Status: DISCONTINUED | OUTPATIENT
Start: 2025-05-23 | End: 2025-05-25 | Stop reason: HOSPADM

## 2025-05-23 RX ORDER — SEVELAMER CARBONATE 800 MG/1
800 TABLET, FILM COATED ORAL
Status: DISCONTINUED | OUTPATIENT
Start: 2025-05-23 | End: 2025-05-23

## 2025-05-23 RX ORDER — PANTOPRAZOLE SODIUM 40 MG/10ML
40 INJECTION, POWDER, LYOPHILIZED, FOR SOLUTION INTRAVENOUS 2 TIMES DAILY
Status: DISCONTINUED | OUTPATIENT
Start: 2025-05-23 | End: 2025-05-23 | Stop reason: CLARIF

## 2025-05-23 RX ORDER — ROSUVASTATIN CALCIUM 5 MG/1
10 TABLET, COATED ORAL EVERY OTHER DAY
Status: DISCONTINUED | OUTPATIENT
Start: 2025-05-23 | End: 2025-05-25

## 2025-05-23 RX ORDER — ALBUTEROL SULFATE 0.83 MG/ML
2.5 SOLUTION RESPIRATORY (INHALATION) EVERY 6 HOURS PRN
Status: DISCONTINUED | OUTPATIENT
Start: 2025-05-23 | End: 2025-05-25

## 2025-05-23 RX ORDER — SODIUM CHLORIDE 9 MG/ML
INJECTION, SOLUTION INTRAVENOUS PRN
Status: DISCONTINUED | OUTPATIENT
Start: 2025-05-23 | End: 2025-05-25 | Stop reason: HOSPADM

## 2025-05-23 RX ORDER — BUDESONIDE 0.25 MG/2ML
0.25 INHALANT ORAL
Status: DISCONTINUED | OUTPATIENT
Start: 2025-05-23 | End: 2025-05-25

## 2025-05-23 RX ORDER — SODIUM CHLORIDE 9 MG/ML
INJECTION, SOLUTION INTRAVENOUS PRN
Status: DISCONTINUED | OUTPATIENT
Start: 2025-05-23 | End: 2025-05-25

## 2025-05-23 RX ORDER — NITROGLYCERIN 0.4 MG/1
0.4 TABLET SUBLINGUAL EVERY 5 MIN PRN
Status: DISCONTINUED | OUTPATIENT
Start: 2025-05-23 | End: 2025-05-25

## 2025-05-23 RX ADMIN — SODIUM CHLORIDE 500 ML: 9 INJECTION, SOLUTION INTRAVENOUS at 07:00

## 2025-05-23 RX ADMIN — HEPARIN SODIUM 3400 UNITS: 1000 INJECTION INTRAVENOUS; SUBCUTANEOUS at 05:51

## 2025-05-23 RX ADMIN — FENTANYL CITRATE 25 MCG: 50 INJECTION INTRAMUSCULAR; INTRAVENOUS at 03:20

## 2025-05-23 RX ADMIN — PANTOPRAZOLE SODIUM 40 MG: 40 INJECTION, POWDER, FOR SOLUTION INTRAVENOUS at 10:51

## 2025-05-23 RX ADMIN — SODIUM CHLORIDE, PRESERVATIVE FREE 10 ML: 5 INJECTION INTRAVENOUS at 20:57

## 2025-05-23 RX ADMIN — HEPARIN SODIUM 12 UNITS/KG/HR: 10000 INJECTION, SOLUTION INTRAVENOUS at 06:23

## 2025-05-23 RX ADMIN — IOPAMIDOL 75 ML: 755 INJECTION, SOLUTION INTRAVENOUS at 07:11

## 2025-05-23 RX ADMIN — SODIUM CHLORIDE 500 ML: 0.9 INJECTION, SOLUTION INTRAVENOUS at 03:20

## 2025-05-23 RX ADMIN — NITROGLYCERIN 0.5 INCH: 20 OINTMENT TOPICAL at 03:58

## 2025-05-23 RX ADMIN — SUCRALFATE 1 G: 1 TABLET ORAL at 17:55

## 2025-05-23 RX ADMIN — PANTOPRAZOLE SODIUM 40 MG: 40 INJECTION, POWDER, FOR SOLUTION INTRAVENOUS at 20:57

## 2025-05-23 RX ADMIN — SUCRALFATE 1 G: 1 TABLET ORAL at 20:57

## 2025-05-23 ASSESSMENT — PAIN DESCRIPTION - DESCRIPTORS
DESCRIPTORS: PRESSURE;DULL;DISCOMFORT
DESCRIPTORS: ACHING;PRESSURE;HEAVINESS

## 2025-05-23 ASSESSMENT — PAIN DESCRIPTION - PAIN TYPE: TYPE: ACUTE PAIN

## 2025-05-23 ASSESSMENT — PAIN DESCRIPTION - FREQUENCY: FREQUENCY: CONTINUOUS

## 2025-05-23 ASSESSMENT — PAIN SCALES - GENERAL
PAINLEVEL_OUTOF10: 6
PAINLEVEL_OUTOF10: 8

## 2025-05-23 ASSESSMENT — PAIN DESCRIPTION - LOCATION
LOCATION: CHEST

## 2025-05-23 ASSESSMENT — PAIN DESCRIPTION - ONSET: ONSET: SUDDEN

## 2025-05-23 ASSESSMENT — PAIN - FUNCTIONAL ASSESSMENT
PAIN_FUNCTIONAL_ASSESSMENT: ACTIVITIES ARE NOT PREVENTED
PAIN_FUNCTIONAL_ASSESSMENT: 0-10

## 2025-05-23 ASSESSMENT — PAIN DESCRIPTION - ORIENTATION: ORIENTATION: MID

## 2025-05-23 NOTE — ED NOTES
Patient had a small hard round bowel movement that was very dark in color. Patient provided alise care and a clean dry breift

## 2025-05-23 NOTE — CONSULTS
GENERAL SURGERY  CONSULT NOTE    Patient's Name/Date of Birth: James Vazquez III / 1945    Date: May 23, 2025     PCP: Cipriano Durham DO     Chief Complaint:   Chief Complaint   Patient presents with    Chest Pain     Chest pain starting around midnight. Pt took Nitro x 2 at home and EMS gave ASA 324mg. Pt states he feels like he's \"working to breathe\"       Physician Consulted: Dr. Styles   Reason for Consult: anemia   Referring Physician: Dr. Vikki JANE  James Vazquez III is a 79 y.o. male with a history of HTN, ESRD on HD, aortobifemoral bypass whop resents for evaluation of chest pain and epigastric pain. General surgery was consulted for evaluation of anemia. The patient reports epigastric discomfort that has been ongoing for multiple days. He states he only has pain when people palpate his abdomen however otherwise has a vague discomfort that he describes as his stomach \"flipping\". The discomfort is not exacerbated by anything or relieved by anything. He states he has been taking some Pepto-Bismol and has noticed his stools turn black as a result. He also noted some small amount of blood in the toilet as well a couple days ago but could not quantify the amount.     He has been evaluated by general surgery in the past for anemia and underwent EGD in 2020 with Dr. Styles. EGD in 7/20/2020 with severe reflux with esophageal ulcerations and gastritis repeat EGD in 10/2020 with changes in the distal esophagus and normal stomach and duodenum. His wife does report that he protonix was discontinued when he was at [facility] a few months ago so he has been off of a PPI.     He takes an ASA 81 daily and is currently on a heparin gtt due to concern for NSTEMI.     Hgb 6.8 from baseline 9-10.       Past Medical History:   Diagnosis Date    Blind left eye     Chronic kidney disease     Dialysis patient     Hemodialysis patient     Hyperlipidemia     Hypertension        Past Surgical History:   Procedure Laterality Date    AV

## 2025-05-23 NOTE — CARE COORDINATION
05/23/25 Case Management note: Chart reviewed as patient is a return to the ER within 30 days of discharge. He is with c/o chest pain. Took his own nitro x2. He is currently pending blood transfusion for hgb 6.8. He is now inpatient and pending a bed assignment. Electronically signed by Pilar Dempsey RN CM on 5/23/2025 at 11:47 AM

## 2025-05-23 NOTE — H&P
Internal Medicine History & Physical     Name: James Vazquez III  : 1945  Chief Complaint: Chest Pain (Chest pain starting around midnight. Pt took Nitro x 2 at home and EMS gave ASA 324mg. Pt states he feels like he's \"working to breathe\")  Primary Care Physician: Cipriano Durham DO  Admission date: 2025  Date of service: 2025     History of Present Illness  James is a 79 y.o. year old male who presented with a chief complaint of chest pain      Recently admitted to [facility] after he missed several HD sessions was discharged after he received dialysis on 25     Now presenting to [facility] for chest pain and shortness of breath brought in by EMS     1130 pm woke up with CP was given nitro went to couch chest pain went away and then returned with chest pain called EMS came to [facility] ED     Has been lethargic since he left the hospital per his family     His blood count is low as it has been in the distant past and he is ESRD on HD.     Past Medical History:   Diagnosis Date    Blind left eye     Chronic kidney disease     Dialysis patient     Hemodialysis patient     Hyperlipidemia     Hypertension        Past Surgical History:   Procedure Laterality Date    AV FISTULA CREATION Left     Dr. Phillips [facility]    CARDIAC SURGERY      HEMODIALYSIS ACCESS PERCUTANEOUS REVISION Left 2019    2 x Dr. Phillips, [facility]    HIP SURGERY Left 10/16/2020    HIP HEMIARTHROPLASTY performed by Abel Birmingham [place] [facility] [place]    PERIPHERAL VASCULAR BYPASS  2008    Aortobifemoral bypass for claudicatio - Dr. Phillips [facility]    UPPER GASTROINTESTINAL ENDOSCOPY N/A 2020    EGD ESOPHAGOGASTRODUODENOSCOPY WITH ANTRAL BIOPSY performed by Lloyd Styles MD at [facility] OR    UPPER GASTROINTESTINAL ENDOSCOPY N/A 10/28/2020    EGD ESOPHAGOGASTRODUODENOSCOPY performed by Lloyd Styles MD at [facility] [place]       History reviewed. No pertinent family history.      Social History  TOBACCO:   reports that he quit smoking about 24 years

## 2025-05-23 NOTE — CONSULTS
Cardiology critical care consult    Reason for consultation: We are asked to see Mr. Vazquez in consultation by the emergency room physician regarding a possible acute coronary syndrome    History of chief complaint:  This is an 79-year-old gentleman who follows with Dr. West on at [facility] cardiology in [facility] cardiology.  His wife gives most of the history.  However, they state that he has been having chest discomfort since he 11 PM last evening.  His symptoms improved with nitroglycerin but persisted throughout the night.  In the emergency room he was given Nitropaste but his blood pressure bottomed out.  He was found to be severely anemic and had ST depressions.  The case was being discussed with cardiology through the night he continued to have symptoms so interventional cardiology was called.  Upon my arrival he was lying flat in bed.  He was comfortable and in no distress.  He states he was still feeling discomfort.  He actually points to his epigastric abdominal area in his right lower chest/flank area for his discomfort and states that it radiates across through his chest.  He states he feels like he needs to have a bowel movement.  He also feels nauseated and has an emesis basin at the bedside.    ECG: Sinus rhythm with ST depressions in the lateral leads  Repeat ECG shows sinus rhythm, 68 bpm.  1 PVC.  ST depressions in leads V4 through V6 and lead I.  Less pronounced on this first ECG.    Physical exam:  General: He is alert and orient x 3, communicates well, he is in no distress.  Vitals: Pulse is in the 70s blood pressure is 97/69 respirations are 16-26 he satting 97%.  Neck: Supple, forage motion, no JVD or bruits.  Heart: Regular rate and rhythm.  2/6 late peaking systolic ejection murmur.  Lungs: Poor air movement.  Clear to auscultation bilaterally.  Abdomen: Extremely tender to palpation especially in the epigastric area.  Extremities: Full range of motion x 4.  Good distal pulses.

## 2025-05-23 NOTE — CONSULTS
Thomas Sinclair M.D.,[facility]  Jori Carty D.O., BRIELLE., [facility]  Nima Monet M.D.  Mattie Goldman M.D.   Kenji Rowley D.O.  Carlos Gruber M.D.       Patient:  James Vazquez III 79 y.o. male MRN: [id number]           PULMONARY CONSULTATION    Reason for Consultation: Irregular noncalcified 1.9 soft tissue nodule in the right upper lobe concerning for malignancy and previously known about    Referring Physician: Omar Florez MD    Communication with the referring physician will be sent via the electronic medical record.    Chief Complaint: Chest Pain    CODE STATUS: FULL CODE    SUBJECTIVE:  HPI:  James Vazquez III is a 79 y.o. male we were asked to evaluate for an irregular lung nodule.      He is known to our service and follows with Dr. Kenji Rowley. The RUL nodule is known from previous imaging initially discovered 7/18/24. Lung LIZ probability of malignancy calculated as 68.8% probability of malignancy at that time. A PET/CT was recommended and ordered as outpatient.      James then had a follow up appointment with Dr. Rowley in the office on 10/22/24. He had not had the PET scan done and stated he was unsure how aggressive with treatment he wanted to be. It was again suggested to have the PET/CT scan and go from there. The scan was completed on 12/10/24 at [facility] reporting metabolic activity involving the cavitary right upper lobe pulmonary nodule without mediastinal or hilar activity.  Several attempts by Dr. Rowley to call patient by phone and notify him of abnormal results unsuccessful.  Voicemail left instructing patient to go to ER.  Abnormal metabolic activity throughout the aorto bifemoral bypass graft suggesting graft infection. Given his previous treatment for bacteremia, it was recommended he go to [facility] where the graft was done for further care.      He was hospitalized at [facility] on 12/11/24-12/22/24  for concerns of aortobifemoral fistula infection and infective

## 2025-05-23 NOTE — CARE COORDINATION
Internal Medicine On-call Care Coordination Note    I was called by the ED physician because they recommended admission for this patient and we cover their PCP.  The history as I understand it after discussion with the ED physician is as follows:    Chest pain, shortness of breath  EKG showing ischemia  Started on heparin gtt  Hgb 7.4, 7.1--  2 units PRBCs per cardio recommendations  Hx ESRD on HD    I placed admission orders.  Including:    General admission orders  Reconciled home meds  Cardiology consulted in ED  IV protonix  NPO  Nephrology consult    Dr. Florez, or our coverage will see the patient for H&P.    Electronically signed by TOMMY Soto CNP on 5/23/2025 at 7:13 AM     Addendum:reviewed     I agree with the above documentation and treatment plan.     Electronically signed by Omar Florez MD on 5/23/2025 at 7:25 AM    I can be reached through [employer].

## 2025-05-23 NOTE — PLAN OF CARE
Problem: Discharge Planning  Goal: Discharge to home or other facility with appropriate resources  Outcome: Progressing     Problem: Skin/Tissue Integrity  Goal: Skin integrity remains intact  Description: 1.  Monitor for areas of redness and/or skin breakdown2.  Assess vascular access sites hourly3.  Every 4-6 hours minimum:  Change oxygen saturation probe site4.  Every 4-6 hours:  If on nasal continuous positive airway pressure, respiratory therapy assess nares and determine need for appliance change or resting period  Outcome: Progressing     Problem: ABCDS Injury Assessment  Goal: Absence of physical injury  Outcome: Progressing     Problem: Safety - Adult  Goal: Free from fall injury  Outcome: Progressing

## 2025-05-23 NOTE — ED NOTES
2 units uncrossed blood ordered by Dr. Crooks to be given to gravity in prep for cath lab. Order does not match in computer but 2 units delivered for emergency transfusion. Provider made aware of order needing adjusted.

## 2025-05-23 NOTE — CONSULTS
Palliative Care Department  [phone]  Palliative Care Initial Consult  Provider TOMMY Bazan - CNP      PATIENT: James Vazquez III  : 1945  MRN: [id number]  ADMISSION DATE: 2025  2:29 AM  Referring Provider:  Omar Florez MD    Palliative Medicine was consulted on hospital day 0 for assistance with Goals of care    HPI:     Clinical Summary:James Vazquez is a 79 y.o. y/o male with a history of hypertension, ESRD on HD, Aortobifemoral bypass, severe reflux with esophageal ulcerations and gastritis, who presented to [facility] on 2025 with chest pain.  Found anemic with concern for NSTEMI, on heparin drip, going to be admitted for further medical management evaluation.    ASSESSMENT/PLAN:     Pertinent Hospital Diagnoses     Anemia  Chest pain    Palliative Care Encounter / Counseling Regarding Goals of Care  Please see detailed goals of care discussion as below  At this time, James Vazquez III, Does have capacity for medical decision-making.  Capacity is time limited and situation/question specific  During encounter Laura was surrogate medical decision-maker  Outcome of goals of care meeting:  Continue with current medical treatment  Continue full code  Has advanced directive at home with spouse continue by copies when available  Code status Full Code  Advanced Directives: no POA or living will in epic  Surrogate/Legal NOK:  Laura Vazquez (Spouse) [phone]    Spiritual assessment: no spiritual distress identified  Bereavement and grief: to be determined  Referrals to: none today    Thank you for the opportunity to participate in the care of James Vazquez III.     TOMMY Jesus CNP  Palliative Medicine     SUBJECTIVE:     Details of Conversation: Chart reviewed.  Patient seen in the emergency room, he was awake, alert to self, location, time and situation, hearing, however he was able to participate in meaningful conversation.  Patient's spouse Larua, and there are 2

## 2025-05-23 NOTE — CONSULTS
Nephrology Consult Note      Reason for Consult:  ESRD on HD  Requesting Physician:  Patricia SANDERS    CHIEF COMPLAINT:  chest pain    History Obtained From:  patient, electronic medical record    HISTORY OF PRESENT ILLNESS:  Briefly Mr. Vazquez is a 79-year-old male with history of ESRD on HD 3 times a week MWF, via left upper arm AV fistula, HTN, HFpEF 60-65%, moderate aortic valve stenosis with moderate AR, BPH, pulmonary nodule, herpes zoster with encephalitis, compression fractures, hyperlipidemia, esophagitis, who was admitted on 5/23/2025 after he presented to the ER with complaints of chest pain. We are consulted for HD management.     Past Medical History:        Diagnosis Date    Blind left eye     Chronic kidney disease     Dialysis patient     Hemodialysis patient     Hyperlipidemia     Hypertension      Past Surgical History:        Procedure Laterality Date    AV FISTULA CREATION Left 2015    Dr. Phillips [facility]    CARDIAC SURGERY      HEMODIALYSIS ACCESS PERCUTANEOUS REVISION Left 2019    2 x Dr. Phillips, [facility]    HIP SURGERY Left 10/16/2020    HIP HEMIARTHROPLASTY performed by Abel Birmingham MD at [facility] [place]    PERIPHERAL VASCULAR BYPASS  2008    Aortobifemoral bypass for claudicatio - Dr. Phillips [facility]    UPPER GASTROINTESTINAL ENDOSCOPY N/A 7/20/2020    EGD ESOPHAGOGASTRODUODENOSCOPY WITH ANTRAL BIOPSY performed by Lloyd Styles MD at [facility] OR    UPPER GASTROINTESTINAL ENDOSCOPY N/A 10/28/2020    EGD ESOPHAGOGASTRODUODENOSCOPY performed by Lloyd Styles MD at [facility] ENDOSCOPY     Current Medications:    Current Facility-Administered Medications: heparin (porcine) injection 3,400 Units, 60 Units/kg, IntraVENous, PRN  heparin (porcine) injection 1,700 Units, 30 Units/kg, IntraVENous, PRN  heparin 25,000 units in dextrose 5% 250 mL (premix) infusion, 5-30 Units/kg/hr, IntraVENous, Continuous  0.9 % sodium chloride infusion, , IntraVENous, PRN  fentaNYL (SUBLIMAZE) injection 50 mcg, 50 mcg,

## 2025-05-23 NOTE — ED PROVIDER NOTES
points: ST deviation not due to LBBB, LVH or digoxin         []   Normal  0       []   Nonspecific Repolarization Disturbance  +1       [x]   Significant ST Depression  +2    AGE       []   <45  0       []   45-64  +1       [x]    >65  +2    RISK FACTORS:  1. HTN    2. Hypercholesterolemia    3. DM     4. Cigarette smoking (current or cessation < 3 mos)    5.  Positive family history  (parent or sibling with CVD before age 65).   6. Obesity (BMI >30kg/m2)         []   No Risk factors Known  0       []   1-2 Risk Factors  +1       [x]   >3 Risk Factors or History of Atherosclerotic Disease  +2    INITIAL TROPONIN       []   < Normal Limit   0       []   1-3 x Normal Limit   +1       [x]   >3 x Normal Limit   +2     -----------------------------------------------------------------------------------------------------------------  SCORE TOTAL:  10 POINTS     Low Score:         (0-3 Points), risk of MACE of 0.9-1.7% (discuss d/c home with f/u)  Mod Score:         (4-6 Points), risk of MACE of 12-16.6% (discuss admission for further testing)  High Score:        (7-10 Points), risk of MACE of 50-65% (Admit ALL as they are candidates for early invasive measures)      PROCEDURES   Unless otherwise noted below, none     Procedures    PROCEDURE  5/23/25       Time: 0600    CENTRAL LINE INSERTION  Risks, benefits and alternatives (for applicable procedures below) described.   Performed By: Julio C Crooks DO.    Indication: centrally administered medications, need for frequent blood draws, and inability to gain peripheral access.  Informed consent: Verbal consent obtained.  The patient was counseled regarding the procedure in person, it's indications, risks, potential complications and alternatives and any questions were answered. Verbal consent was obtained.  Procedure: After routine sterile preparation, local anesthesia obtained by infiltration using 1% Lidocaine without epinephrine. A left 3-Lumen 7F Central Venous Catheter was

## 2025-05-23 NOTE — FLOWSHEET NOTE
05/23/25 1546   Vital Signs   BP (!) 114/50   Temp 96.8 °F (36 °C)   Pulse 96   Respirations 18   Pain Assessment   Pain Assessment None - Denies Pain   Post-Hemodialysis Assessment   Post-Treatment Procedures Blood returned;Access bleeding time > 10 minutes   Machine Disinfection Process Acid/Vinegar Clean;Exterior Machine Disinfection;Heat Disinfect   Rinseback Volume (ml) 300 ml   Blood Volume Processed (Liters) 27 L   Dialyzer Clearance Moderately streaked   Duration of Treatment (minutes) 120 minutes   Hemodialysis Intake (ml) 300 ml   Hemodialysis Output (ml) 700 ml   NET Removed (ml) 400   Tolerated Treatment Fair   Patient Response to Treatment Patient infiltrated needles twice during treatment after repeatedly being reminded to not move his arm around. Was successfully recannualted once.  Dr Rivas made aware pt only recieved 2 hours treatment   Bilateral Breath Sounds Diminished   Edema Generalized   Patient Disposition Return to room   Observations & Evaluations   Level of Consciousness 0   Heart Rhythm Regular   Respiratory Quality/Effort Unlabored   Handoff   Communication Given Shift Handoff   Handoff Given To Blanca CORRALES   Handoff Received From Bee CORRALES   Handoff Communication Telephone   Time Handoff Given 1836

## 2025-05-24 LAB
ALBUMIN SERPL-MCNC: 3.4 G/DL (ref 3.5–5.2)
ALP SERPL-CCNC: 80 U/L (ref 40–129)
ALT SERPL-CCNC: 39 U/L (ref 0–50)
ANION GAP SERPL CALCULATED.3IONS-SCNC: 17 MMOL/L (ref 7–16)
AST SERPL-CCNC: 80 U/L (ref 0–50)
BASOPHILS # BLD: 0.03 K/UL (ref 0–0.2)
BASOPHILS NFR BLD: 0 % (ref 0–2)
BILIRUB SERPL-MCNC: 0.4 MG/DL (ref 0–1.2)
BUN SERPL-MCNC: 68 MG/DL (ref 8–23)
CALCIUM SERPL-MCNC: 9.8 MG/DL (ref 8.8–10.2)
CHLORIDE SERPL-SCNC: 93 MMOL/L (ref 98–107)
CO2 SERPL-SCNC: 25 MMOL/L (ref 22–29)
CREAT SERPL-MCNC: 5.5 MG/DL (ref 0.7–1.2)
EOSINOPHIL # BLD: 0.01 K/UL (ref 0.05–0.5)
EOSINOPHILS RELATIVE PERCENT: 0 % (ref 0–6)
ERYTHROCYTE [DISTWIDTH] IN BLOOD BY AUTOMATED COUNT: 16.1 % (ref 11.5–15)
GFR, ESTIMATED: 10 ML/MIN/1.73M2
GLUCOSE SERPL-MCNC: 108 MG/DL (ref 74–99)
HCT VFR BLD AUTO: 22.5 % (ref 37–54)
HCT VFR BLD AUTO: 23.6 % (ref 37–54)
HCT VFR BLD AUTO: 25.5 % (ref 37–54)
HGB BLD-MCNC: 7.2 G/DL (ref 12.5–16.5)
HGB BLD-MCNC: 7.8 G/DL (ref 12.5–16.5)
HGB BLD-MCNC: 8.3 G/DL (ref 12.5–16.5)
IMM GRANULOCYTES # BLD AUTO: 0.05 K/UL (ref 0–0.58)
IMM GRANULOCYTES NFR BLD: 1 % (ref 0–5)
LYMPHOCYTES NFR BLD: 1.1 K/UL (ref 1.5–4)
LYMPHOCYTES RELATIVE PERCENT: 10 % (ref 20–42)
MCH RBC QN AUTO: 32.1 PG (ref 26–35)
MCHC RBC AUTO-ENTMCNC: 33.1 G/DL (ref 32–34.5)
MCV RBC AUTO: 97.1 FL (ref 80–99.9)
MONOCYTES NFR BLD: 0.89 K/UL (ref 0.1–0.95)
MONOCYTES NFR BLD: 8 % (ref 2–12)
NEUTROPHILS NFR BLD: 80 % (ref 43–80)
NEUTS SEG NFR BLD: 8.51 K/UL (ref 1.8–7.3)
PARTIAL THROMBOPLASTIN TIME: 50.4 SEC (ref 24.5–35.1)
PLATELET # BLD AUTO: 101 K/UL (ref 130–450)
PMV BLD AUTO: 11.3 FL (ref 7–12)
POTASSIUM SERPL-SCNC: 5.3 MMOL/L (ref 3.5–5.1)
PROT SERPL-MCNC: 5.6 G/DL (ref 6.4–8.3)
RBC # BLD AUTO: 2.43 M/UL (ref 3.8–5.8)
SODIUM SERPL-SCNC: 136 MMOL/L (ref 136–145)
TROPONIN I SERPL HS-MCNC: 1972 NG/L (ref 0–22)
WBC OTHER # BLD: 10.6 K/UL (ref 4.5–11.5)

## 2025-05-24 PROCEDURE — 2580000003 HC RX 258: Performed by: STUDENT IN AN ORGANIZED HEALTH CARE EDUCATION/TRAINING PROGRAM

## 2025-05-24 PROCEDURE — 6370000000 HC RX 637 (ALT 250 FOR IP): Performed by: INTERNAL MEDICINE

## 2025-05-24 PROCEDURE — P9016 RBC LEUKOCYTES REDUCED: HCPCS

## 2025-05-24 PROCEDURE — 2140000000 HC CCU INTERMEDIATE R&B

## 2025-05-24 PROCEDURE — 2580000003 HC RX 258: Performed by: NURSE PRACTITIONER

## 2025-05-24 PROCEDURE — 36415 COLL VENOUS BLD VENIPUNCTURE: CPT

## 2025-05-24 PROCEDURE — 6370000000 HC RX 637 (ALT 250 FOR IP)

## 2025-05-24 PROCEDURE — 2500000003 HC RX 250 WO HCPCS: Performed by: NURSE PRACTITIONER

## 2025-05-24 PROCEDURE — 6360000002 HC RX W HCPCS: Performed by: NURSE PRACTITIONER

## 2025-05-24 PROCEDURE — 85018 HEMOGLOBIN: CPT

## 2025-05-24 PROCEDURE — 6370000000 HC RX 637 (ALT 250 FOR IP): Performed by: STUDENT IN AN ORGANIZED HEALTH CARE EDUCATION/TRAINING PROGRAM

## 2025-05-24 PROCEDURE — 6370000000 HC RX 637 (ALT 250 FOR IP): Performed by: NURSE PRACTITIONER

## 2025-05-24 PROCEDURE — 85014 HEMATOCRIT: CPT

## 2025-05-24 PROCEDURE — 85025 COMPLETE CBC W/AUTO DIFF WBC: CPT

## 2025-05-24 PROCEDURE — 93005 ELECTROCARDIOGRAM TRACING: CPT | Performed by: NURSE PRACTITIONER

## 2025-05-24 PROCEDURE — 6360000002 HC RX W HCPCS: Performed by: STUDENT IN AN ORGANIZED HEALTH CARE EDUCATION/TRAINING PROGRAM

## 2025-05-24 PROCEDURE — 84484 ASSAY OF TROPONIN QUANT: CPT

## 2025-05-24 PROCEDURE — 85730 THROMBOPLASTIN TIME PARTIAL: CPT

## 2025-05-24 PROCEDURE — 99233 SBSQ HOSP IP/OBS HIGH 50: CPT | Performed by: INTERNAL MEDICINE

## 2025-05-24 PROCEDURE — 94640 AIRWAY INHALATION TREATMENT: CPT

## 2025-05-24 PROCEDURE — 80053 COMPREHEN METABOLIC PANEL: CPT

## 2025-05-24 PROCEDURE — 36430 TRANSFUSION BLD/BLD COMPNT: CPT

## 2025-05-24 PROCEDURE — 90935 HEMODIALYSIS ONE EVALUATION: CPT

## 2025-05-24 RX ORDER — MIDODRINE HYDROCHLORIDE 5 MG/1
5 TABLET ORAL
Status: DISCONTINUED | OUTPATIENT
Start: 2025-05-24 | End: 2025-05-25

## 2025-05-24 RX ORDER — 0.9 % SODIUM CHLORIDE 0.9 %
500 INTRAVENOUS SOLUTION INTRAVENOUS ONCE
Status: COMPLETED | OUTPATIENT
Start: 2025-05-24 | End: 2025-05-24

## 2025-05-24 RX ORDER — SODIUM CHLORIDE 9 MG/ML
INJECTION, SOLUTION INTRAVENOUS PRN
Status: DISCONTINUED | OUTPATIENT
Start: 2025-05-24 | End: 2025-05-25

## 2025-05-24 RX ORDER — HYDROCORTISONE SODIUM SUCCINATE 100 MG/2ML
100 INJECTION INTRAMUSCULAR; INTRAVENOUS EVERY 8 HOURS
Status: DISCONTINUED | OUTPATIENT
Start: 2025-05-24 | End: 2025-05-25

## 2025-05-24 RX ORDER — MIDODRINE HYDROCHLORIDE 10 MG/1
10 TABLET ORAL ONCE
Status: COMPLETED | OUTPATIENT
Start: 2025-05-24 | End: 2025-05-24

## 2025-05-24 RX ADMIN — ACETAMINOPHEN 650 MG: 325 TABLET ORAL at 09:04

## 2025-05-24 RX ADMIN — SODIUM CHLORIDE 500 ML: 0.9 INJECTION, SOLUTION INTRAVENOUS at 09:52

## 2025-05-24 RX ADMIN — PANTOPRAZOLE SODIUM 40 MG: 40 INJECTION, POWDER, FOR SOLUTION INTRAVENOUS at 09:04

## 2025-05-24 RX ADMIN — SODIUM CHLORIDE, PRESERVATIVE FREE 10 ML: 5 INJECTION INTRAVENOUS at 20:52

## 2025-05-24 RX ADMIN — HYDROCORTISONE SODIUM SUCCINATE 100 MG: 100 INJECTION, POWDER, FOR SOLUTION INTRAMUSCULAR; INTRAVENOUS at 20:51

## 2025-05-24 RX ADMIN — BUDESONIDE 250 MCG: 0.25 SUSPENSION RESPIRATORY (INHALATION) at 19:28

## 2025-05-24 RX ADMIN — IPRATROPIUM BROMIDE AND ALBUTEROL SULFATE 1 DOSE: 2.5; .5 SOLUTION RESPIRATORY (INHALATION) at 19:28

## 2025-05-24 RX ADMIN — SODIUM CHLORIDE, PRESERVATIVE FREE 10 ML: 5 INJECTION INTRAVENOUS at 09:41

## 2025-05-24 RX ADMIN — PANTOPRAZOLE SODIUM 40 MG: 40 INJECTION, POWDER, FOR SOLUTION INTRAVENOUS at 20:52

## 2025-05-24 RX ADMIN — MIDODRINE HYDROCHLORIDE 5 MG: 5 TABLET ORAL at 20:50

## 2025-05-24 RX ADMIN — ARFORMOTEROL TARTRATE 15 MCG: 15 SOLUTION RESPIRATORY (INHALATION) at 19:28

## 2025-05-24 RX ADMIN — SUCRALFATE 1 G: 1 TABLET ORAL at 17:02

## 2025-05-24 RX ADMIN — SUCRALFATE 1 G: 1 TABLET ORAL at 09:03

## 2025-05-24 RX ADMIN — MIDODRINE HYDROCHLORIDE 10 MG: 10 TABLET ORAL at 12:36

## 2025-05-24 RX ADMIN — ARFORMOTEROL TARTRATE 15 MCG: 15 SOLUTION RESPIRATORY (INHALATION) at 08:24

## 2025-05-24 RX ADMIN — IPRATROPIUM BROMIDE AND ALBUTEROL SULFATE 1 DOSE: 2.5; .5 SOLUTION RESPIRATORY (INHALATION) at 08:25

## 2025-05-24 RX ADMIN — ONDANSETRON 4 MG: 2 INJECTION, SOLUTION INTRAMUSCULAR; INTRAVENOUS at 06:05

## 2025-05-24 RX ADMIN — HYDROCORTISONE SODIUM SUCCINATE 100 MG: 100 INJECTION, POWDER, FOR SOLUTION INTRAMUSCULAR; INTRAVENOUS at 13:07

## 2025-05-24 RX ADMIN — ASPIRIN 81 MG: 81 TABLET, COATED ORAL at 09:03

## 2025-05-24 RX ADMIN — ONDANSETRON 4 MG: 2 INJECTION, SOLUTION INTRAMUSCULAR; INTRAVENOUS at 13:07

## 2025-05-24 RX ADMIN — SUCRALFATE 1 G: 1 TABLET ORAL at 20:51

## 2025-05-24 RX ADMIN — BUDESONIDE 250 MCG: 0.25 SUSPENSION RESPIRATORY (INHALATION) at 08:24

## 2025-05-24 ASSESSMENT — PAIN DESCRIPTION - LOCATION: LOCATION: BACK

## 2025-05-24 ASSESSMENT — PAIN SCALES - GENERAL
PAINLEVEL_OUTOF10: 5
PAINLEVEL_OUTOF10: 7

## 2025-05-24 ASSESSMENT — PAIN DESCRIPTION - ORIENTATION: ORIENTATION: MID

## 2025-05-24 ASSESSMENT — PAIN DESCRIPTION - DESCRIPTORS: DESCRIPTORS: ACHING

## 2025-05-24 ASSESSMENT — PAIN SCALES - WONG BAKER: WONGBAKER_NUMERICALRESPONSE: HURTS A LITTLE BIT

## 2025-05-24 NOTE — PLAN OF CARE
Problem: Discharge Planning  Goal: Discharge to home or other facility with appropriate resources  5/24/2025 1202 by Marley PALM RN  Outcome: Progressing  Flowsheets (Taken 5/24/2025 0815)  Discharge to home or other facility with appropriate resources:   Arrange for needed discharge resources and transportation as appropriate   Identify barriers to discharge with patient and caregiver  5/23/2025 2319 by Radha Vazquez RN  Outcome: Progressing     Problem: Skin/Tissue Integrity  Goal: Skin integrity remains intact  Description: 1.  Monitor for areas of redness and/or skin breakdown2.  Assess vascular access sites hourly3.  Every 4-6 hours minimum:  Change oxygen saturation probe site4.  Every 4-6 hours:  If on nasal continuous positive airway pressure, respiratory therapy assess nares and determine need for appliance change or resting period  5/24/2025 1202 by Marley PALM RN  Outcome: Progressing  Flowsheets  Taken 5/24/2025 1201  Skin Integrity Remains Intact: Monitor for areas of redness and/or skin breakdown  Taken 5/24/2025 0815  Skin Integrity Remains Intact:   Monitor for areas of redness and/or skin breakdown   Assess vascular access sites hourly  5/23/2025 2319 by Radha Vazquez RN  Outcome: Progressing     Problem: ABCDS Injury Assessment  Goal: Absence of physical injury  5/24/2025 1202 by Marley PALM RN  Outcome: Progressing  Flowsheets (Taken 5/24/2025 1201)  Absence of Physical Injury: Implement safety measures based on patient assessment  5/23/2025 2319 by Radha Vazquez RN  Outcome: Progressing     Problem: Safety - Adult  Goal: Free from fall injury  5/24/2025 1202 by Marley PALM RN  Outcome: Progressing  5/23/2025 2319 by Radha Vazquez RN  Outcome: Progressing     Problem: Neurosensory - Adult  Goal: Achieves stable or improved neurological status  5/24/2025 1202 by Marley PALM RN  Outcome: Progressing  5/23/2025 2319 by Radha Vazquez RN  Outcome: Progressing  Goal: Achieves

## 2025-05-24 NOTE — PLAN OF CARE
Problem: Discharge Planning  Goal: Discharge to home or other facility with appropriate resources  5/23/2025 2319 by Radha Vazquez RN  Outcome: Progressing  5/23/2025 1853 by Marley PALM RN  Outcome: Progressing  Flowsheets (Taken 5/23/2025 1707)  Discharge to home or other facility with appropriate resources:   Identify barriers to discharge with patient and caregiver   Arrange for needed discharge resources and transportation as appropriate     Problem: Skin/Tissue Integrity  Goal: Skin integrity remains intact  Description: 1.  Monitor for areas of redness and/or skin breakdown2.  Assess vascular access sites hourly3.  Every 4-6 hours minimum:  Change oxygen saturation probe site4.  Every 4-6 hours:  If on nasal continuous positive airway pressure, respiratory therapy assess nares and determine need for appliance change or resting period  5/23/2025 2319 by Radha Vazquez RN  Outcome: Progressing  5/23/2025 1853 by Marley PALM RN  Outcome: Progressing  Flowsheets (Taken 5/23/2025 1707)  Skin Integrity Remains Intact:   Assess vascular access sites hourly   Monitor for areas of redness and/or skin breakdown     Problem: ABCDS Injury Assessment  Goal: Absence of physical injury  5/23/2025 2319 by Radha Vazquez RN  Outcome: Progressing  5/23/2025 1853 by Marley PALM RN  Outcome: Progressing     Problem: Safety - Adult  Goal: Free from fall injury  5/23/2025 2319 by Radha Vazquez RN  Outcome: Progressing  5/23/2025 1853 by Marley PALM RN  Outcome: Progressing     Problem: Neurosensory - Adult  Goal: Achieves stable or improved neurological status  Outcome: Progressing  Goal: Achieves maximal functionality and self care  Outcome: Progressing     Problem: Cardiovascular - Adult  Goal: Maintains optimal cardiac output and hemodynamic stability  Outcome: Progressing     Problem: Skin/Tissue Integrity - Adult  Goal: Skin integrity remains intact  Description: 1.  Monitor for areas of redness and/or

## 2025-05-25 VITALS
OXYGEN SATURATION: 92 % | TEMPERATURE: 97.9 F | RESPIRATION RATE: 19 BRPM | HEART RATE: 90 BPM | WEIGHT: 130.07 LBS | HEIGHT: 67 IN | SYSTOLIC BLOOD PRESSURE: 98 MMHG | DIASTOLIC BLOOD PRESSURE: 52 MMHG | BODY MASS INDEX: 20.42 KG/M2

## 2025-05-25 LAB
ABO/RH: NORMAL
ALBUMIN SERPL-MCNC: 3.4 G/DL (ref 3.5–5.2)
ALP SERPL-CCNC: 78 U/L (ref 40–129)
ALT SERPL-CCNC: 248 U/L (ref 0–50)
ANION GAP SERPL CALCULATED.3IONS-SCNC: 22 MMOL/L (ref 7–16)
ANTIBODY SCREEN: NEGATIVE
ARM BAND NUMBER: NORMAL
AST SERPL-CCNC: 205 U/L (ref 0–50)
BASOPHILS # BLD: 0.01 K/UL (ref 0–0.2)
BASOPHILS NFR BLD: 0 % (ref 0–2)
BILIRUB SERPL-MCNC: 0.5 MG/DL (ref 0–1.2)
BLOOD BANK BLOOD PRODUCT EXPIRATION DATE: NORMAL
BLOOD BANK DISPENSE STATUS: NORMAL
BLOOD BANK ISBT PRODUCT BLOOD TYPE: 5100
BLOOD BANK PRODUCT CODE: NORMAL
BLOOD BANK SAMPLE EXPIRATION: NORMAL
BLOOD BANK UNIT TYPE AND RH: NORMAL
BPU ID: NORMAL
BUN SERPL-MCNC: 82 MG/DL (ref 8–23)
CALCIUM SERPL-MCNC: 9.9 MG/DL (ref 8.8–10.2)
CHLORIDE SERPL-SCNC: 93 MMOL/L (ref 98–107)
CO2 SERPL-SCNC: 21 MMOL/L (ref 22–29)
COMPONENT: NORMAL
CREAT SERPL-MCNC: 7.1 MG/DL (ref 0.7–1.2)
CROSSMATCH RESULT: NORMAL
EKG ATRIAL RATE: 83 BPM
EKG P AXIS: 76 DEGREES
EKG P-R INTERVAL: 148 MS
EKG Q-T INTERVAL: 384 MS
EKG QRS DURATION: 106 MS
EKG QTC CALCULATION (BAZETT): 451 MS
EKG R AXIS: 61 DEGREES
EKG T AXIS: 228 DEGREES
EKG VENTRICULAR RATE: 83 BPM
EOSINOPHIL # BLD: 0 K/UL (ref 0.05–0.5)
EOSINOPHILS RELATIVE PERCENT: 0 % (ref 0–6)
ERYTHROCYTE [DISTWIDTH] IN BLOOD BY AUTOMATED COUNT: 17.2 % (ref 11.5–15)
GFR, ESTIMATED: 7 ML/MIN/1.73M2
GLUCOSE BLD-MCNC: 124 MG/DL (ref 74–99)
GLUCOSE SERPL-MCNC: 129 MG/DL (ref 74–99)
HCT VFR BLD AUTO: 25.1 % (ref 37–54)
HCT VFR BLD AUTO: 25.1 % (ref 37–54)
HGB BLD-MCNC: 8.2 G/DL (ref 12.5–16.5)
HGB BLD-MCNC: 8.2 G/DL (ref 12.5–16.5)
IMM GRANULOCYTES # BLD AUTO: 0.07 K/UL (ref 0–0.58)
IMM GRANULOCYTES NFR BLD: 1 % (ref 0–5)
LYMPHOCYTES NFR BLD: 1.12 K/UL (ref 1.5–4)
LYMPHOCYTES RELATIVE PERCENT: 8 % (ref 20–42)
MCH RBC QN AUTO: 31.9 PG (ref 26–35)
MCHC RBC AUTO-ENTMCNC: 32.7 G/DL (ref 32–34.5)
MCV RBC AUTO: 97.7 FL (ref 80–99.9)
MONOCYTES NFR BLD: 0.83 K/UL (ref 0.1–0.95)
MONOCYTES NFR BLD: 6 % (ref 2–12)
NEUTROPHILS NFR BLD: 85 % (ref 43–80)
NEUTS SEG NFR BLD: 11.54 K/UL (ref 1.8–7.3)
PARTIAL THROMBOPLASTIN TIME: 34.3 SEC (ref 24.5–35.1)
PLATELET # BLD AUTO: 92 K/UL (ref 130–450)
PLATELET CONFIRMATION: NORMAL
PMV BLD AUTO: 11.4 FL (ref 7–12)
POTASSIUM SERPL-SCNC: 5.9 MMOL/L (ref 3.5–5.1)
POTASSIUM SERPL-SCNC: 6.3 MMOL/L (ref 3.5–5.1)
PROT SERPL-MCNC: 5.6 G/DL (ref 6.4–8.3)
RBC # BLD AUTO: 2.57 M/UL (ref 3.8–5.8)
SODIUM SERPL-SCNC: 137 MMOL/L (ref 136–145)
TRANSFUSION STATUS: NORMAL
UNIT DIVISION: 0
UNIT ISSUE DATE/TIME: NORMAL
UNIT TAG COMMENT: NORMAL
UNIT TAG COMMENT: NORMAL
WBC OTHER # BLD: 13.6 K/UL (ref 4.5–11.5)

## 2025-05-25 PROCEDURE — 5A12012 PERFORMANCE OF CARDIAC OUTPUT, SINGLE, MANUAL: ICD-10-PCS | Performed by: INTERNAL MEDICINE

## 2025-05-25 PROCEDURE — 36592 COLLECT BLOOD FROM PICC: CPT

## 2025-05-25 PROCEDURE — 6360000002 HC RX W HCPCS: Performed by: PHYSICIAN ASSISTANT

## 2025-05-25 PROCEDURE — 6370000000 HC RX 637 (ALT 250 FOR IP): Performed by: NURSE PRACTITIONER

## 2025-05-25 PROCEDURE — 94640 AIRWAY INHALATION TREATMENT: CPT

## 2025-05-25 PROCEDURE — 84132 ASSAY OF SERUM POTASSIUM: CPT

## 2025-05-25 PROCEDURE — 6370000000 HC RX 637 (ALT 250 FOR IP)

## 2025-05-25 PROCEDURE — 99231 SBSQ HOSP IP/OBS SF/LOW 25: CPT

## 2025-05-25 PROCEDURE — 82962 GLUCOSE BLOOD TEST: CPT

## 2025-05-25 PROCEDURE — 80053 COMPREHEN METABOLIC PANEL: CPT

## 2025-05-25 PROCEDURE — 2500000003 HC RX 250 WO HCPCS: Performed by: PHYSICIAN ASSISTANT

## 2025-05-25 PROCEDURE — 6360000002 HC RX W HCPCS: Performed by: STUDENT IN AN ORGANIZED HEALTH CARE EDUCATION/TRAINING PROGRAM

## 2025-05-25 PROCEDURE — 2580000003 HC RX 258: Performed by: NURSE PRACTITIONER

## 2025-05-25 PROCEDURE — 93010 ELECTROCARDIOGRAM REPORT: CPT | Performed by: INTERNAL MEDICINE

## 2025-05-25 PROCEDURE — 2500000003 HC RX 250 WO HCPCS: Performed by: NURSE PRACTITIONER

## 2025-05-25 PROCEDURE — 85014 HEMATOCRIT: CPT

## 2025-05-25 PROCEDURE — 85025 COMPLETE CBC W/AUTO DIFF WBC: CPT

## 2025-05-25 PROCEDURE — 6370000000 HC RX 637 (ALT 250 FOR IP): Performed by: INTERNAL MEDICINE

## 2025-05-25 PROCEDURE — 6360000002 HC RX W HCPCS: Performed by: NURSE PRACTITIONER

## 2025-05-25 PROCEDURE — 85730 THROMBOPLASTIN TIME PARTIAL: CPT

## 2025-05-25 PROCEDURE — 85018 HEMOGLOBIN: CPT

## 2025-05-25 RX ORDER — MAGNESIUM SULFATE HEPTAHYDRATE 500 MG/ML
INJECTION, SOLUTION INTRAMUSCULAR; INTRAVENOUS
Status: COMPLETED | OUTPATIENT
Start: 2025-05-25 | End: 2025-05-25

## 2025-05-25 RX ORDER — MIDODRINE HYDROCHLORIDE 10 MG/1
10 TABLET ORAL
Status: DISCONTINUED | OUTPATIENT
Start: 2025-05-25 | End: 2025-05-25

## 2025-05-25 RX ORDER — INDOMETHACIN 25 MG/1
CAPSULE ORAL
Status: COMPLETED | OUTPATIENT
Start: 2025-05-25 | End: 2025-05-25

## 2025-05-25 RX ORDER — EPINEPHRINE 0.1 MG/ML
INJECTION INTRAVENOUS
Status: DISCONTINUED
Start: 2025-05-25 | End: 2025-05-25 | Stop reason: HOSPADM

## 2025-05-25 RX ORDER — EPINEPHRINE IN SOD CHLOR,ISO 1 MG/10 ML
SYRINGE (ML) INTRAVENOUS
Status: COMPLETED | OUTPATIENT
Start: 2025-05-25 | End: 2025-05-25

## 2025-05-25 RX ORDER — MORPHINE SULFATE 20 MG/ML
10 SOLUTION ORAL
Refills: 0 | Status: DISCONTINUED | OUTPATIENT
Start: 2025-05-25 | End: 2025-05-25 | Stop reason: HOSPADM

## 2025-05-25 RX ORDER — GLYCOPYRROLATE 0.2 MG/ML
0.2 INJECTION INTRAMUSCULAR; INTRAVENOUS EVERY 4 HOURS PRN
Status: DISCONTINUED | OUTPATIENT
Start: 2025-05-25 | End: 2025-05-25 | Stop reason: HOSPADM

## 2025-05-25 RX ORDER — LORAZEPAM 2 MG/ML
1 INJECTION INTRAMUSCULAR EVERY 6 HOURS PRN
Status: DISCONTINUED | OUTPATIENT
Start: 2025-05-25 | End: 2025-05-25 | Stop reason: HOSPADM

## 2025-05-25 RX ORDER — CALCIUM CHLORIDE 100 MG/ML
INJECTION INTRAVENOUS; INTRAVENTRICULAR
Status: COMPLETED | OUTPATIENT
Start: 2025-05-25 | End: 2025-05-25

## 2025-05-25 RX ADMIN — ACETAMINOPHEN 650 MG: 325 TABLET ORAL at 08:11

## 2025-05-25 RX ADMIN — HYDROCORTISONE SODIUM SUCCINATE 100 MG: 100 INJECTION, POWDER, FOR SOLUTION INTRAMUSCULAR; INTRAVENOUS at 04:09

## 2025-05-25 RX ADMIN — Medication 1 MG: at 16:19

## 2025-05-25 RX ADMIN — HYDROCORTISONE SODIUM SUCCINATE 100 MG: 100 INJECTION, POWDER, FOR SOLUTION INTRAMUSCULAR; INTRAVENOUS at 11:44

## 2025-05-25 RX ADMIN — SUCRALFATE 1 G: 1 TABLET ORAL at 11:44

## 2025-05-25 RX ADMIN — MIDODRINE HYDROCHLORIDE 10 MG: 10 TABLET ORAL at 11:44

## 2025-05-25 RX ADMIN — ACETAMINOPHEN 650 MG: 325 TABLET ORAL at 14:46

## 2025-05-25 RX ADMIN — CALCIUM CHLORIDE 1000 MG: 100 INJECTION, SOLUTION INTRAVENOUS; INTRAVENTRICULAR at 16:20

## 2025-05-25 RX ADMIN — SODIUM BICARBONATE 50 MEQ: 84 INJECTION, SOLUTION INTRAVENOUS at 16:19

## 2025-05-25 RX ADMIN — Medication 1 MG: at 16:13

## 2025-05-25 RX ADMIN — ASPIRIN 81 MG: 81 TABLET, COATED ORAL at 08:12

## 2025-05-25 RX ADMIN — Medication 1 MG: at 16:10

## 2025-05-25 RX ADMIN — BUDESONIDE 250 MCG: 0.25 SUSPENSION RESPIRATORY (INHALATION) at 08:07

## 2025-05-25 RX ADMIN — Medication 1 MG: at 16:22

## 2025-05-25 RX ADMIN — MIDODRINE HYDROCHLORIDE 5 MG: 5 TABLET ORAL at 08:12

## 2025-05-25 RX ADMIN — ROSUVASTATIN CALCIUM 10 MG: 5 TABLET, FILM COATED ORAL at 08:12

## 2025-05-25 RX ADMIN — SODIUM CHLORIDE, PRESERVATIVE FREE 10 ML: 5 INJECTION INTRAVENOUS at 08:12

## 2025-05-25 RX ADMIN — SODIUM ZIRCONIUM CYCLOSILICATE 10 G: 10 POWDER, FOR SUSPENSION ORAL at 06:12

## 2025-05-25 RX ADMIN — IPRATROPIUM BROMIDE AND ALBUTEROL SULFATE 1 DOSE: 2.5; .5 SOLUTION RESPIRATORY (INHALATION) at 08:06

## 2025-05-25 RX ADMIN — PANTOPRAZOLE SODIUM 40 MG: 40 INJECTION, POWDER, FOR SOLUTION INTRAVENOUS at 08:12

## 2025-05-25 RX ADMIN — SUCRALFATE 1 G: 1 TABLET ORAL at 08:12

## 2025-05-25 RX ADMIN — MAGNESIUM SULFATE HEPTAHYDRATE 2000 MG: 500 INJECTION, SOLUTION INTRAMUSCULAR; INTRAVENOUS at 16:24

## 2025-05-25 RX ADMIN — ARFORMOTEROL TARTRATE 15 MCG: 15 SOLUTION RESPIRATORY (INHALATION) at 08:06

## 2025-05-25 RX ADMIN — ACETAMINOPHEN 650 MG: 325 TABLET ORAL at 01:17

## 2025-05-25 RX ADMIN — Medication 1 MG: at 16:16

## 2025-05-25 ASSESSMENT — PAIN SCALES - GENERAL
PAINLEVEL_OUTOF10: 5
PAINLEVEL_OUTOF10: 7
PAINLEVEL_OUTOF10: 7
PAINLEVEL_OUTOF10: 5
PAINLEVEL_OUTOF10: 8

## 2025-05-25 ASSESSMENT — PAIN DESCRIPTION - DESCRIPTORS
DESCRIPTORS: DISCOMFORT;PRESSURE;HEAVINESS
DESCRIPTORS: ACHING;SHARP
DESCRIPTORS: ACHING;DISCOMFORT;SORE

## 2025-05-25 ASSESSMENT — PAIN DESCRIPTION - LOCATION
LOCATION: BACK
LOCATION: BACK
LOCATION: CHEST

## 2025-05-25 ASSESSMENT — PAIN DESCRIPTION - ORIENTATION
ORIENTATION: LOWER
ORIENTATION: MID

## 2025-05-25 ASSESSMENT — PAIN - FUNCTIONAL ASSESSMENT
PAIN_FUNCTIONAL_ASSESSMENT: ACTIVITIES ARE NOT PREVENTED

## 2025-05-25 ASSESSMENT — PAIN SCALES - WONG BAKER: WONGBAKER_NUMERICALRESPONSE: NO HURT

## 2025-05-25 NOTE — CODE DOCUMENTATION
Code Blue Note    Code blue was called at 4:10 p.m.    On arrival, patient was unresponsive    Initial rhythm was noted to be PEA    ACLS protocol was initiated and the following was given:    Epinephrine: 6  Amiodarone: None  Atropine: None  Calcium gluconate: 1 g x 2  Magnesium sulfate: 2 g once  Sodium bicarbonate: 3  Defibrillation: None    Patient intubated and bagging was started.    ABG could not be obtained despite several trials.    We continued CPR for 26 minutes, and pt was revived.  CODE STATUS discussed with patient and patient made DNR CC.  Comfort care measures ordered    Family, PCP and Intensivist were updated.        Coleen Lewis MD, PGY-2  5/25/2025  4:38 PM

## 2025-05-25 NOTE — PLAN OF CARE
Problem: Discharge Planning  Goal: Discharge to home or other facility with appropriate resources  Outcome: Progressing     Problem: Skin/Tissue Integrity  Goal: Skin integrity remains intact  Description: 1.  Monitor for areas of redness and/or skin breakdown2.  Assess vascular access sites hourly3.  Every 4-6 hours minimum:  Change oxygen saturation probe site4.  Every 4-6 hours:  If on nasal continuous positive airway pressure, respiratory therapy assess nares and determine need for appliance change or resting period  Outcome: Progressing     Problem: ABCDS Injury Assessment  Goal: Absence of physical injury  Outcome: Progressing     Problem: Safety - Adult  Goal: Free from fall injury  Outcome: Progressing     Problem: Neurosensory - Adult  Goal: Achieves stable or improved neurological status  Outcome: Progressing  Goal: Achieves maximal functionality and self care  Outcome: Progressing     Problem: Cardiovascular - Adult  Goal: Maintains optimal cardiac output and hemodynamic stability  Outcome: Progressing     Problem: Skin/Tissue Integrity - Adult  Goal: Skin integrity remains intact  Description: 1.  Monitor for areas of redness and/or skin breakdown2.  Assess vascular access sites hourly3.  Every 4-6 hours minimum:  Change oxygen saturation probe site4.  Every 4-6 hours:  If on nasal continuous positive airway pressure, respiratory therapy assess nares and determine need for appliance change or resting period  Outcome: Progressing     Problem: Musculoskeletal - Adult  Goal: Return mobility to safest level of function  Outcome: Progressing  Goal: Return ADL status to a safe level of function  Outcome: Progressing     Problem: Gastrointestinal - Adult  Goal: Minimal or absence of nausea and vomiting  Outcome: Progressing  Goal: Maintains or returns to baseline bowel function  Outcome: Progressing  Goal: Maintains adequate nutritional intake  Outcome: Progressing     Problem: Genitourinary - Adult  Goal:

## 2025-05-25 NOTE — PLAN OF CARE
Problem: Discharge Planning  Goal: Discharge to home or other facility with appropriate resources  5/24/2025 2342 by Noreen Colon RN  Outcome: Progressing  5/24/2025 1202 by Marley PALM RN  Outcome: Progressing  Flowsheets (Taken 5/24/2025 0815)  Discharge to home or other facility with appropriate resources:   Arrange for needed discharge resources and transportation as appropriate   Identify barriers to discharge with patient and caregiver     Problem: Skin/Tissue Integrity  Goal: Skin integrity remains intact  Description: 1.  Monitor for areas of redness and/or skin breakdown2.  Assess vascular access sites hourly3.  Every 4-6 hours minimum:  Change oxygen saturation probe site4.  Every 4-6 hours:  If on nasal continuous positive airway pressure, respiratory therapy assess nares and determine need for appliance change or resting period  5/24/2025 2342 by Noreen Colon RN  Outcome: Progressing  5/24/2025 1202 by Marley PALM RN  Outcome: Progressing  Flowsheets  Taken 5/24/2025 1201  Skin Integrity Remains Intact: Monitor for areas of redness and/or skin breakdown  Taken 5/24/2025 0815  Skin Integrity Remains Intact:   Monitor for areas of redness and/or skin breakdown   Assess vascular access sites hourly     Problem: ABCDS Injury Assessment  Goal: Absence of physical injury  5/24/2025 2342 by Noreen Colon RN  Outcome: Progressing  5/24/2025 1202 by Marley PALM RN  Outcome: Progressing  Flowsheets (Taken 5/24/2025 1201)  Absence of Physical Injury: Implement safety measures based on patient assessment     Problem: Safety - Adult  Goal: Free from fall injury  5/24/2025 2342 by Noreen Colon RN  Outcome: Progressing  5/24/2025 1202 by Marley PALM RN  Outcome: Progressing     Problem: Neurosensory - Adult  Goal: Achieves stable or improved neurological status  5/24/2025 2342 by Noreen Colon RN  Outcome: Progressing  5/24/2025 1202 by Marley PALM RN  Outcome: Progressing  Goal: Achieves maximal

## 2025-05-25 NOTE — PLAN OF CARE
Problem: Discharge Planning  Goal: Discharge to home or other facility with appropriate resources  5/25/2025 1856 by Randa Gibson RN  Outcome: Adequate for Discharge  5/25/2025 1511 by Randa Gibson RN  Outcome: Progressing     Problem: Skin/Tissue Integrity  Goal: Skin integrity remains intact  Description: 1.  Monitor for areas of redness and/or skin breakdown2.  Assess vascular access sites hourly3.  Every 4-6 hours minimum:  Change oxygen saturation probe site4.  Every 4-6 hours:  If on nasal continuous positive airway pressure, respiratory therapy assess nares and determine need for appliance change or resting period  5/25/2025 1856 by Randa Gibson RN  Outcome: Adequate for Discharge  5/25/2025 1511 by Randa Gibson RN  Outcome: Progressing     Problem: ABCDS Injury Assessment  Goal: Absence of physical injury  5/25/2025 1856 by Randa Gibson RN  Outcome: Adequate for Discharge  5/25/2025 1511 by Randa Gibson RN  Outcome: Progressing     Problem: Safety - Adult  Goal: Free from fall injury  5/25/2025 1856 by Randa Gibson RN  Outcome: Adequate for Discharge  5/25/2025 1511 by Randa Gibson RN  Outcome: Progressing     Problem: Neurosensory - Adult  Goal: Achieves stable or improved neurological status  5/25/2025 1856 by Randa Gibson RN  Outcome: Adequate for Discharge  5/25/2025 1511 by Randa Gibson RN  Outcome: Progressing  Goal: Achieves maximal functionality and self care  5/25/2025 1856 by Randa Gibson RN  Outcome: Adequate for Discharge  5/25/2025 1511 by Randa Gibson RN  Outcome: Progressing     Problem: Cardiovascular - Adult  Goal: Maintains optimal cardiac output and hemodynamic stability  5/25/2025 1856 by Randa Gibson RN  Outcome: Adequate for Discharge  5/25/2025 1511 by Randa Gibson RN  Outcome: Progressing     Problem: Skin/Tissue Integrity - Adult  Goal: Skin integrity remains intact  Description: 1.  Monitor for areas of redness and/or skin breakdown2.  Assess vascular access sites

## 2025-05-25 NOTE — DISCHARGE SUMMARY
Internal Medicine Discharge Summary    NAME: James Vazquez III :  1945  MRN:  [id number] PCP:Cipriano Durham DO    ADMITTED: 2025   DISCHARGED: ()  ADMITTING PHYSICIAN: Robert Mclaughlin MD    PCP: Cipriano Durham DO    CONSULTANT(S):   IP CONSULT TO CARDIOLOGY  IP CONSULT TO NEPHROLOGY  IP CONSULT TO PALLIATIVE CARE  IP CONSULT TO PULMONOLOGY     ADMITTING DIAGNOSIS:   STEMI (ST elevation myocardial infarction) (HCC) [I21.3]  Acute chest pain [R07.9]  NSTEMI (non-ST elevated myocardial infarction) (HCC) [I21.4]  Gastrointestinal hemorrhage, unspecified gastrointestinal hemorrhage type [K92.2]  Anemia, unspecified type [D64.9]     Please see H&P for further details    DISCHARGE DIAGNOSES:   Active Hospital Problems    Diagnosis     Acute chest pain [R07.9]     NSTEMI (non-ST elevated myocardial infarction) (HCC) [I21.4]     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 male patient of Cipriano Durham DO who  has a past medical history of Blind left eye, Chronic kidney disease, Dialysis patient, Hemodialysis patient, Hyperlipidemia, and Hypertension. who originally had concerns including Chest Pain (Chest pain starting around midnight. Pt took Nitro x 2 at home and EMS gave ASA 324mg. Pt states he feels like he's \"working to breathe\"). at presentation on 2025, and was found to have STEMI (ST elevation myocardial infarction) (HCC) [I21.3]  Acute chest pain [R07.9]  NSTEMI (non-ST elevated myocardial infarction) (HCC) [I21.4]  Gastrointestinal hemorrhage, unspecified gastrointestinal hemorrhage type [K92.2]  Anemia, unspecified type [D64.9] after workup.    Please see H&P for further

## 2025-05-27 NOTE — PROGRESS NOTES
Inpatient Cardiology Progress note     PATIENT IS BEING FOLLOWED FOR:    James Vazquez III is a 79 y.o. elderly  male not previously known to [facility] cardiology.  Follows with cardiology at [facility] and known to [facility] cardiology as inpatient only.  Also known to vascular surgery Dr. Briceno, last seen in office 3/27/2025 for evaluation of vascular status of the legs, noted to have ulcers on the tips of the great toes bilaterally, right calf ulcer.  Dr. Briceno noted patient was high risk for vascular intervention secondary to thrombocytopenia, recent streptococci bacteremia and ESRD.  Noted to have been seen by vascular surgery at [facility] during admission for possible graft infection but was determined not to be a candidate for surgery and was medically managed.     PMH briefly: HFpEF aortoiliac femoral bypass, VHD (mod-severe AS, mod TR) HTN, HLD, ESRD on HD (MWF) via left arm AV fistula, anemia of chronic disease, PVD (vascular ankle brachial index 3/2025-results below), secondary hyperparathyroidism, BPH, herpes zoster with encephalitis, compression fracture, pulmonary, prior tobacco abuse, hyperkalemia secondary to missing HD on 5/17/2025, ambulates with walker.     Recent F admission 12/2024: for bacteremia secondary to infected graft versus endocarditis, treated with ceftriaxone.  Diagnosed with NSTEMI troponin 608> 504, given loading dose of ASA and initiated on heparin drip x 48 hours, plan for conservative treatment.  MRI showing pancreatic mass concerning for malignancy which was deemed nonmalignant per oncology.        5/23/2025: Presented to [facility] [place] for complaints of CP beginning around midnight prior to arrival as well as increased work of breathing, took 2 nitro at home and given full dose aspirin by EMS.  Per ED reports had slight improvement of chest discomfort after nitroglycerin, persistent moderate chest discomfort and tachypnea on arrival.  Noted to be slightly hypotensive in EMS, 
  Palliative Care Department  [phone]  Palliative Care Progress Note  Provider TOMMY Mei CNP      PATIENT: James Vazquez III  : 1945  MRN: [id number]  ADMISSION DATE: 2025  2:29 AM  Referring Provider:  Omar Florez MD    Palliative Medicine was consulted on hospital day 2 for assistance with Goals of care    HPI:     Clinical Summary:James Vazquez is a 79 y.o. y/o male with a history of hypertension, ESRD on HD, Aortobifemoral bypass, severe reflux with esophageal ulcerations and gastritis, who presented to [facility] on 2025 with chest pain.  Found anemic with concern for NSTEMI, on heparin drip, going to be admitted for further medical management evaluation.    ASSESSMENT/PLAN:     Pertinent Hospital Diagnoses     Anemia  Chest pain    Palliative Care Encounter / Counseling Regarding Goals of Care  Please see detailed goals of care discussion as below  At this time, James Vazquez III, Does have capacity for medical decision-making.  Capacity is time limited and situation/question specific  During encounter Laura was surrogate medical decision-maker  Outcome of goals of care meeting:  Planning family meeting for tomorrow () @ 11am  Continue with current medical treatment  Continue full code  Code status Full Code  Advanced Directives: no POA or living will in epic  Surrogate/Legal NOK:  Laura Vazquez (Spouse) [phone]    Spiritual assessment: no spiritual distress identified  Bereavement and grief: to be determined  Referrals to: none today    Thank you for the opportunity to participate in the care of James Vazquez III.     TOMMY Mei CNP  Palliative Medicine     SUBJECTIVE:     Details of Conversation: Chart reviewed.  Update received from nursing.  Patient seen at bedside, he was awake and alert, c/o chest discomfort.  Patient's spouse Laura was present in the room.  Began discussing changes in his current medical condition and re-discussing 
  Physician Progress Note      PATIENT:               SKYLA ENCISO  CSN #:                  [id number]  :                       1945  ADMIT DATE:       2025 2:29 AM  DISCH DATE:        2025 7:06 PM  RESPONDING  PROVIDER #:        Robert Mclaughlin MD          QUERY TEXT:    Chest pain is documented in the medical record PCP and Cardio. Please specify   the underlying cause:    The clinical indicators include:  Pt admitted with chest pain.  - Chest pain / seems to be atypical in nature ...Trop 150's flat ... Anemia,   acute on chronic ...Irregular noncalcified 1.9 cm soft tissue nodule in the   right upper lobe concerning for malignancy ... previously known about...   patient was not interested in biopsy at that time. (H/P)  - Atypical chest and abdomen discomfort.  Reproducible with palpation on his   abdomen he is also tender to palpation on his chest.  Troponins are flat   pattern and at or below his baseline in the setting of chronic renal   insufficiency ...ECG changes suggesting lateral ischemia but no STEMI at this   time...Profound anemia...Thrombocytopenia...Severe aortic stenosis. (CARDIO   )  - Continues to have chest pain... likely has a malignant lesion on chest   imaging that he is not interested in biopsy of because he would not tolerate   treatment of this either ...Hospice care is recommended...Chest pain / seems   to be atypical in nature ...Irregular noncalcified 1.9 cm soft tissue nodule   in the right upper lobe concerning for malignancy (PCP )  - Irregular noncalcified 1.9 cm soft tissue nodule in the right upper lobe ...   concerning for malignancy...ECG changes suggesting lateral ischemia but no   STEMI. (CARDIO )  -Treated with nitro, Heparin, IVF  Options provided:  -- Chest pain related to pulmonary nodule likely malignancy  -- Chest pain related to aortic valve stenosis  -- Chest pain related to, Please specify the etiology of chest pain.  -- Other - I will add my 
  Radiology Procedure Waiver   Name: James Vazquez III  : 1945  MRN: [id number]    Date:  25    Time: 6:18 AM EDT    Benefits of immediately proceeding with Radiology exam(s) without pre-testing outweigh the risks or are not indicated as specified below and therefore the following is/are being waived:    [] Pregnancy test   [] Patients LMP on-time and regular.   [] Patient had Tubal Ligation or has other Contraception Device.   [] Patient  is Menopausal or Premenarcheal.    [] Patient had Full or Partial Hysterectomy.    [] Protocol for Iodine allergy    [] MRI Questionnaire     [x] BUN/Creatinine   [] Patient age w/no hx of renal dysfunction.   [x] Patient on Dialysis.   [] Recent Normal Labs.  Electronically signed by Julio C Crooks DO on 25 at 6:18 AM EDT            
4 Eyes Skin Assessment     NAME:  James Vazquez III  YOB: 1945  MEDICAL RECORD NUMBER:  [id number]    The patient is being assessed for  Admission    I agree that at least one RN has performed a thorough Head to Toe Skin Assessment on the patient. ALL assessment sites listed below have been assessed.      Areas assessed by both nurses:    Head, Face, Ears, Shoulders, Back, Chest, Arms, Elbows, Hands, Sacrum. Buttock, Coccyx, Ischium, Legs. Feet and Heels, and Under Medical Devices         Does the Patient have a Wound? Yes wound(s) were present on assessment. LDA wound assessment was Initiated and completed by RN       Luis Antonio Prevention initiated by RN: Yes  Wound Care Orders initiated by RN: Yes    Pressure Injury (Stage 3,4, Unstageable, DTI, NWPT, and Complex wounds) if present, place Wound referral order by RN under : Yes    New Ostomies, if present place, Ostomy referral order under : No     Nurse 1 eSignature: Electronically signed by Marley FERNANDEZ RN on 5/23/25 at 6:27 PM EDT    **SHARE this note so that the co-signing nurse can place an eSignature**    Nurse 2 eSignature: {Esignature:[id number]}  
Cardiology critical care consult    Reason for consultation: We are asked to see Mr. Vazquez in consultation by the emergency room physician regarding a possible acute coronary syndrome    History of chief complaint:  This is an 79-year-old gentleman who follows with Dr. West on at [facility] cardiology in [facility] cardiology.  His wife gives most of the history.  However, they state that he has been having chest discomfort since he 11 PM last evening.  His symptoms improved with nitroglycerin but persisted throughout the night.  In the emergency room he was given Nitropaste but his blood pressure bottomed out.  He was found to be severely anemic and had ST depressions.  The case was being discussed with cardiology through the night he continued to have symptoms so interventional cardiology was called.  Upon my arrival he was lying flat in bed.  He was comfortable and in no distress.  He states he was still feeling discomfort.  He actually points to his epigastric abdominal area in his right lower chest/flank area for his discomfort and states that it radiates across through his chest.  He states he feels like he needs to have a bowel movement.  He also feels nauseated and has an emesis basin at the bedside.      Interim:  Patient continued to have dark stools and GI bleeding with drop in hemoglobin  Spoke with patient wife who report awaiting family meeting to discuss goals of care  Given ongoing GI bleed hold heparin drip  Patient is also hypotensive not being able to undergo hemodialysis due to hypotension  Initiate midodrine 5 mg 3 times a day and uptitrate to support blood pressure        Physical exam:  General: He is alert and orient x 3, communicates well, he is in no distress.  Vitals: Pulse is in the 70s blood pressure is 97/69 respirations are 16-26 he satting 97%.  Neck: Supple, forage motion, no JVD or bruits.  Heart: Regular rate and rhythm.  2/6 late peaking systolic ejection murmur.  Lungs: 
Family stated they wanted to have a family meeting with palliative care tomorrow at 11AM  
Internal Medicine Progress Note    Patient's name: James Vazquez III  : 1945  Chief complaints (on day of admission): Chest Pain (Chest pain starting around midnight. Pt took Nitro x 2 at home and EMS gave ASA 324mg. Pt states he feels like he's \"working to breathe\")  Admission date: 2025  Date of service: 2025   Room: [address]  Primary care physician: Cipriano Durham DO  Reason for visit: Follow-up for chest pain     Subjective  James was seen and examined at bedside     Continues to have chest pain   Not a cardiac intervention candidate   He is on heparin drip and he is anemic  He likely has a malignant lesion on chest imaging that he is not interested in biopsy of because he would not tolerate treatment of this either   Hospice care is recommended which I did discuss with him and his wife at bedside   Palliative to assist is appreciated    Review of Systems  There are no new complaints of chest pain, shortness of breath, abdominal pain, nausea, vomiting, diarrhea, constipation unless otherwise mentioned above.     Hospital Medications  Current Facility-Administered Medications   Medication Dose Route Frequency Provider Last Rate Last Admin    heparin (porcine) injection 3,400 Units  60 Units/kg IntraVENous PRN Julio C Crooks DO        heparin (porcine) injection 1,700 Units  30 Units/kg IntraVENous PRN Julio C Crooks,         heparin 25,000 units in dextrose 5% 250 mL (premix) infusion  5-30 Units/kg/hr IntraVENous Continuous Julio C Crooks DO 6.8 mL/hr at 25 0634 12 Units/kg/hr at 25 0634    0.9 % sodium chloride infusion   IntraVENous PRN Julio C Crooks DO        fentaNYL (SUBLIMAZE) injection 50 mcg  50 mcg IntraVENous Q1H PRN Julio C Crooks DO        albuterol (PROVENTIL) (2.5 MG/3ML) 0.083% nebulizer solution 2.5 mg  2.5 mg Nebulization Q6H PRN Patricia Hannah APRN - CNP        aspirin EC tablet 81 mg  81 mg Oral QAM Patricia Hannah APRN - CNP        carvedilol (COREG) 
Internal Medicine Progress Note    Patient's name: James Vazquez III  : 1945  Chief complaints (on day of admission): Chest Pain (Chest pain starting around midnight. Pt took Nitro x 2 at home and EMS gave ASA 324mg. Pt states he feels like he's \"working to breathe\")  Admission date: 2025  Date of service: 2025   Room: [address]  Primary care physician: Cipriano Durham DO  Reason for visit: Follow-up for chest pain/incr trop  Dw nursing on:  incr trop:152--1972 on      Subjective  James was seen and examined at bedside     No cp. Sob.   Not a cardiac intervention candidate    Appreciate Pall care input-- pt is full code   He is on heparin drip and he is anemic(stable)  He likely has a malignant lesion on chest imaging that he is not interested in biopsy of because he would not tolerate treatment of this either   Hospice care is recommended which was previously discussed with him and his wife at bedside   Palliative input appreciated    Review of Systems  There are no new complaints of chest pain, shortness of breath, abdominal pain, nausea, vomiting, diarrhea, constipation unless otherwise mentioned above.     Hospital Medications  Current Facility-Administered Medications   Medication Dose Route Frequency Provider Last Rate Last Admin    0.9 % sodium chloride infusion   IntraVENous PRN Stas Horvath DO        hydrocortisone sodium succinate PF (SOLU-CORTEF) injection 100 mg  100 mg IntraVENous Q8H Omar Florez MD   100 mg at 25 0409    midodrine (PROAMATINE) tablet 5 mg  5 mg Oral TID  Laron Maria MD   5 mg at 25 0812    heparin (porcine) injection 3,400 Units  60 Units/kg IntraVENous PRN Julio C Crooks, DO        heparin (porcine) injection 1,700 Units  30 Units/kg IntraVENous PRN Julio C Crooks, DO        0.9 % sodium chloride infusion   IntraVENous PRN Julio C Crooks, DO        fentaNYL (SUBLIMAZE) injection 50 mcg  50 mcg IntraVENous Q1H PRN Julio C Crooks, DO     
Intubation Record    Date: 05/25/2025  Time: 1611  Patient identity confirmed: Yes  Indications: cardiac arrest respiratory arrest  Preoxygenation: BVM with 100% O2   Laryngoscope size and type: Lavell 3 laryngoscope blade. and 8.0mm cuffed () endotracheal tube.  Airway introducer used: Yes  Evac: Yes  Tube size: 8  Number of attempts :1  Cords visualized:  [x]Clearly [] Poorly   Breath sounds present bilaterally: Yes  ETCO2 38  ETT to lip: 24  Tube secured with: oral fixation device  Chest x-ray ordered: unaware at this point in the code blue  Difficulties encountered: none      Difficult Airway: no          Denise Reno, LIZETTEP, RRT  
Left voicemail with [employer] in regards to family not wanting patient to donate organs an would like a release on his body to proceed with  home arrangements. Left them a call back number.    Son would like us to call him with any concerns or updates in regards to  home arrangements. Josh Vazquez [phone] 1824 Leaky released the patient's body at this time.    0631 Called [facility] on [facility] in [place] in regards to releasing patient information. Phone [phone] if need to be reached  
[facility] called, patient currrently on hold. Referral number [id number].      Shana Salter RN   
Maik sent to Sophy Bellamy with general surgery regarding pt having 2 large, black, watery BMs while on a heparin gtt.     No new orders at this time.       Radha Vazquez RN    
Nephrology Progress Note      SUBJECTIVE: We are following Mr. Vazquez for ESRD on HD. Patient reports no new complaints today.     PHYSICAL EXAM:      Vitals:    VITALS:  BP (!) 110/54   Pulse 97   Temp 98 °F (36.7 °C)   Resp 18   Ht 1.702 m (5' 7\")   Wt 59 kg (130 lb 1.1 oz)   SpO2 93%   BMI 20.37 kg/m²   I/O last 3 completed shifts:  In: 1798 [P.O.:740; I.V.:198.7; Blood:359.3; IV Piggyback:500.1]  Out: 0   No intake/output data recorded.    Constitutional: normocephalic   HEENT: PERRLA  Respiratory: CTA  Cardiovascular/Edema:  S1, S2 present and regular  Gastrointestinal: active bowel sounds  Neurologic: alert and awake  Skin: warm and dry      Scheduled Meds:   hydrocortisone sodium succinate PF  100 mg IntraVENous Q8H    midodrine  5 mg Oral TID WC    aspirin  81 mg Oral QAM    [Held by provider] carvedilol  12.5 mg Oral BID WC    budesonide  0.25 mg Nebulization BID RT    And    arformoterol tartrate  15 mcg Nebulization BID RT    rosuvastatin  10 mg Oral Every Other Day    sodium chloride flush  10 mL IntraVENous 2 times per day    pantoprazole (PROTONIX) 40 mg in sodium chloride (PF) 0.9 % 10 mL injection  40 mg IntraVENous Q12H    sucralfate  1 g Oral 4x Daily WC    ipratropium 0.5 mg-albuterol 2.5 mg  1 Dose Inhalation Q4H WA RT     Continuous Infusions:   sodium chloride      sodium chloride      sodium chloride       PRN Meds:.sodium chloride, heparin (porcine), heparin (porcine), sodium chloride, fentanNYL, albuterol, nitroGLYCERIN, sodium chloride flush, sodium chloride, ondansetron **OR** ondansetron, senna, acetaminophen **OR** acetaminophen, melatonin      DATA:    CBC with Differential:    Lab Results   Component Value Date/Time    WBC 13.6 05/25/2025 04:17 AM    RBC 2.57 05/25/2025 04:17 AM    HGB 8.2 05/25/2025 04:17 AM    HCT 25.1 05/25/2025 04:17 AM    PLT 92 05/25/2025 04:17 AM    MCV 97.7 05/25/2025 04:17 AM    MCH 31.9 05/25/2025 04:17 AM    MCHC 32.7 05/25/2025 04:17 AM    RDW 17.2 
Nephrology Progress Note      SUBJECTIVE: We are following Mr. Vazquez for ESRD on HD. Patient reports no new complaints today.     PHYSICAL EXAM:      Vitals:    VITALS:  BP (!) 85/45   Pulse 88   Temp 97.3 °F (36.3 °C) (Temporal)   Resp 30   Ht 1.702 m (5' 7\")   Wt 59.5 kg (131 lb 2.8 oz)   SpO2 98%   BMI 20.54 kg/m²   I/O last 3 completed shifts:  In: 1761.4 [P.O.:100; Blood:1361.4]  Out: 700   No intake/output data recorded.    Constitutional: normocephalic   HEENT: PERRLA  Respiratory: CTA  Cardiovascular/Edema:  S1, S2 present and regular  Gastrointestinal: active bowel sounds  Neurologic: alert and awake  Skin: warm and dry      Scheduled Meds:   sodium chloride  500 mL IntraVENous Once    aspirin  81 mg Oral QAM    carvedilol  12.5 mg Oral BID WC    budesonide  0.25 mg Nebulization BID RT    And    arformoterol tartrate  15 mcg Nebulization BID RT    rosuvastatin  10 mg Oral Every Other Day    sodium chloride flush  10 mL IntraVENous 2 times per day    pantoprazole (PROTONIX) 40 mg in sodium chloride (PF) 0.9 % 10 mL injection  40 mg IntraVENous Q12H    sucralfate  1 g Oral 4x Daily WC    ipratropium 0.5 mg-albuterol 2.5 mg  1 Dose Inhalation Q4H WA RT     Continuous Infusions:   sodium chloride      heparin (PORCINE) Infusion 12 Units/kg/hr (05/24/25 0634)    sodium chloride      sodium chloride       PRN Meds:.sodium chloride, heparin (porcine), heparin (porcine), sodium chloride, fentanNYL, albuterol, nitroGLYCERIN, sodium chloride flush, sodium chloride, ondansetron **OR** ondansetron, senna, acetaminophen **OR** acetaminophen, melatonin      DATA:    CBC with Differential:    Lab Results   Component Value Date/Time    WBC 10.6 05/24/2025 05:46 AM    RBC 2.43 05/24/2025 05:46 AM    HGB 7.8 05/24/2025 05:46 AM    HCT 23.6 05/24/2025 05:46 AM     05/24/2025 05:46 AM    MCV 97.1 05/24/2025 05:46 AM    MCH 32.1 05/24/2025 05:46 AM    MCHC 33.1 05/24/2025 05:46 AM    RDW 16.1 05/24/2025 05:46 AM 
Notified Crissy Grant with Dr Maria of chest pain and heaviness with SOB.     Dr Maria called back and stated to draw another H/H STAT. This RN stated that I did not feel comfortable to give the pt nitro d/t BP being 93/63.  stated that if repeat SBP is less than 100, increase midodrine to 10mg 3x/day.     0925 Notified Crissy Grant that repeat Hgb came back at 8.2  
Notified cardiology that pt is complaining of pressure, heaviness and discomfort in chest. Order for troponin and EKG. EKG done and transmitted to pt's E file.   Perfect served cardio about pt's troponin result.  
Patient received the Sacrament of the Anointing of the Sick and Last Rites by Father Santhosh Amador on [place], May 25, 2025.    If additional support is requested or needed please reach out to Spiritual Health ([phone]).    Chap. Reilly Moses MDIV, BCC    
Perfect served attending that if pt can have diet or not.They said okay for pt to have a diet.  Marley FERNANDEZ RN   
Pt SPO2 was sating on 87% . Pt kept on 1l O2 via NC.  Marley FERNANDEZ RN   
Pt's manual BP was 85/45.Notified attending.Order for 500cc bolus NS and to inform Nephrology.Notified  and he said low BP is not related to kidney problem and to inform gen surg. Left  message on  of gen surg using answering service.  
Spoke to Dr. Maria over phone about pt's BP and multiple black bowel movement.He said  to discontinue heparin and order for midodrine PO 5mg 3 times a day.  Marley FERNANDEZ RN   
Told to go ahead with blood transfusion if BP is still low by . Bp is 85/49.Going ahead to blood transfusion.  
family  Family/Friends are connected with a spiritual community: and feel well-supported. Support system includes: Spouse/Partner, Children, and Extended family    Assessment and Plan of Care:     Patient Interventions include: Unable to assess, pt passed  Family/Friends Interventions include: Facilitated expression of thoughts and feelings, Explored spiritual coping/struggle/distress, Affirmed coping skills/support systems, and Facilitated life review and/or legacy    Patient Plan of Care: No spiritual needs identified for follow-up  Family/Friends Plan of Care: No spiritual needs identified for follow-up    Electronically signed by Chaplain Florentino on 5/25/2025 at 5:24 PM

## 2025-06-05 LAB
EKG ATRIAL RATE: 82 BPM
EKG ATRIAL RATE: 84 BPM
EKG P AXIS: 61 DEGREES
EKG P AXIS: 67 DEGREES
EKG P-R INTERVAL: 114 MS
EKG P-R INTERVAL: 148 MS
EKG Q-T INTERVAL: 366 MS
EKG Q-T INTERVAL: 392 MS
EKG QRS DURATION: 100 MS
EKG QRS DURATION: 88 MS
EKG QTC CALCULATION (BAZETT): 432 MS
EKG QTC CALCULATION (BAZETT): 455 MS
EKG R AXIS: 54 DEGREES
EKG R AXIS: 65 DEGREES
EKG T AXIS: 198 DEGREES
EKG T AXIS: 199 DEGREES
EKG VENTRICULAR RATE: 81 BPM
EKG VENTRICULAR RATE: 84 BPM

## (undated) DEVICE — EXTRAS HIP

## (undated) DEVICE — RACK TUBE CURETTE

## (undated) DEVICE — NEEDLE HYPO 18GA L1.5IN PNK POLYPR HUB S STL REG BVL STR

## (undated) DEVICE — TUBING PRSS MON L12IN PVC RIG NONEXPANDING M TO FEM CONN

## (undated) DEVICE — SHEET,DRAPE,53X77,STERILE: Brand: MEDLINE

## (undated) DEVICE — SYRINGE 20ML LL S/C 50

## (undated) DEVICE — APPLICATOR PREP 26ML 0.7% IOD POVACRYLEX 74% ISO ALC ST

## (undated) DEVICE — GOWN,BREATHABLE SLV,AURORA,XLG,STRL: Brand: MEDLINE

## (undated) DEVICE — Device

## (undated) DEVICE — HANDPIECE SET WITH BONE CLEANING TIP AND SUCTION TUBE: Brand: INTERPULSE

## (undated) DEVICE — SET ORTHO STD STORTSTD2

## (undated) DEVICE — TOTAL HIP PK

## (undated) DEVICE — INFLATION DEVICE KIT: Brand: ENCORE™ 26 ADVANTAGE KIT

## (undated) DEVICE — CLOTH SURG PREP PREOPERATIVE CHLORHEXIDINE GLUC 2% READYPREP

## (undated) DEVICE — STRYKER PERFORMANCE SERIES SAGITTAL BLADE: Brand: STRYKER PERFORMANCE SERIES

## (undated) DEVICE — GUIDEWIRE VASC J 0.035 INX300 CM INTERMED SHAPEABLE TIP WHLY

## (undated) DEVICE — DRILL SYSTEM 7

## (undated) DEVICE — SET ORTHO ACCOLADE HEMI HIP INSRT

## (undated) DEVICE — GLOVE ORANGE PI 8   MSG9080

## (undated) DEVICE — PATIENT RETURN ELECTRODE, SINGLE-USE, CONTACT QUALITY MONITORING, ADULT, WITH 9FT CORD, FOR PATIENTS WEIGING OVER 33LBS. (15KG): Brand: MEGADYNE

## (undated) DEVICE — GRADUATE TRIANG MEASURE 1000ML BLK PRNT

## (undated) DEVICE — GLIDESHEATH SLENDER ACCESS KIT: Brand: GLIDESHEATH SLENDER

## (undated) DEVICE — INTENDED FOR TISSUE SEPARATION, AND OTHER PROCEDURES THAT REQUIRE A SHARP SURGICAL BLADE TO PUNCTURE OR CUT.: Brand: BARD-PARKER ® STAINLESS STEEL BLADES

## (undated) DEVICE — GOWN,AURORA,BRTHSLV,2XL,18/CS: Brand: MEDLINE

## (undated) DEVICE — NEEDLE HYPO 21GA L1.5IN GRN POLYPR HUB S STL REG BVL STR

## (undated) DEVICE — 1.5L THIN WALL CAN: Brand: CRD

## (undated) DEVICE — DRESSING,GAUZE,XEROFORM,CURAD,5"X9",ST: Brand: CURAD

## (undated) DEVICE — SET ORTHO STD STORTSTD1

## (undated) DEVICE — 1000 S-DRAPE TOWEL DRAPE 10/BX: Brand: STERI-DRAPE™

## (undated) DEVICE — BLOCK BITE 60FR RUBBER ADLT DENTAL

## (undated) DEVICE — DEFENDO AIR WATER SUCTION AND BIOPSY VALVE KIT FOR  OLYMPUS: Brand: DEFENDO AIR/WATER/SUCTION AND BIOPSY VALVE

## (undated) DEVICE — Z INACTIVE USE 2660664 SOLUTION IRRIG 3000ML 0.9% SOD CHL USP UROMATIC PLAS CONT

## (undated) DEVICE — CANNULA NSL ORAL AD FOR CAPNOFLEX CO2 O2 AIRLFE

## (undated) DEVICE — GLOVE SURG SZ 8 L12IN FNGR THK79MIL GRN LTX FREE

## (undated) DEVICE — KIT ANGIO W/ AT P65 PREM HND CTRL FOR CNTRST DEL ANGIOTOUCH

## (undated) DEVICE — SET ORTHO ACCOLADE HEMI HIP BROACH

## (undated) DEVICE — SURGICAL PROCEDURE PACK BASIC

## (undated) DEVICE — CANNULA NSL CANN NSL L25FT TBNG AD O2 SUP SFT UC

## (undated) DEVICE — ANGIOPLASTY ADD-ON PACK: Brand: MEDLINE INDUSTRIES, INC.

## (undated) DEVICE — SYRINGE MED 50ML LUERLOCK TIP

## (undated) DEVICE — PAD, DEFIB, ADULT, RADIOTRAN, PHYSIO, LO: Brand: MEDLINE

## (undated) DEVICE — Z DISCONTINUED USE 2275686 GLOVE SURG SZ 8 L12IN FNGR THK13MIL WHT ISOLEX POLYISOPRENE

## (undated) DEVICE — SET ORTHO CENTRAX CUPS

## (undated) DEVICE — KIT MFLD ISOLATN NACL CNTRST PRT TBNG SPIK W/ PRSS TRNSDUC

## (undated) DEVICE — FORCEPS BX OVL CUP FEN DISPOSABLE CAP L 160CM RAD JAW 4

## (undated) DEVICE — PILLOW POS W15XH6XL22IN RASPBERRY FOAM ABD W/ STRP DISP FOR

## (undated) DEVICE — TOWEL,OR,DSP,ST,BLUE,DLX,10/PK,8PK/CS: Brand: MEDLINE

## (undated) DEVICE — SPONGE GZ W4XL4IN RAYON POLY FILL CVR W/ NONWOVEN FAB

## (undated) DEVICE — 3M™ IOBAN™ 2 ANTIMICROBIAL INCISE DRAPE 6650EZ: Brand: IOBAN™ 2

## (undated) DEVICE — 3M™ COBAN™ NL STERILE NON-LATEX SELF-ADHERENT WRAP, 2084S, 4 IN X 5 YD (10 CM X 4,5 M), 18 ROLLS/CASE: Brand: 3M™ COBAN™

## (undated) DEVICE — E-Z CLEAN, NON-STICK, PTFE COATED, ELECTROSURGICAL BLADE ELECTRODE, 6.5 INCH (16.5 CM): Brand: MEGADYNE

## (undated) DEVICE — BIT DRL L5IN DIA2.8MM STD ST S STL TWST BUSA

## (undated) DEVICE — 3M™ STERI-DRAPE™ U-DRAPE 1015: Brand: STERI-DRAPE™

## (undated) DEVICE — BITEBLOCK 54FR W/ DENT RIM BLOX

## (undated) DEVICE — COPILOT BLEEDBACK CONTROL VALVE: Brand: COPILOT

## (undated) DEVICE — BLADE CLIPPER GEN PURP NS

## (undated) DEVICE — GAUZE,SPONGE,POST-OP,4X3,STRL,LF: Brand: MEDLINE

## (undated) DEVICE — GOWN,BREATHABLE,IMP SLV 3XL AURORA: Brand: MEDLINE

## (undated) DEVICE — SET LAMBOTTE